# Patient Record
Sex: MALE | Race: WHITE | NOT HISPANIC OR LATINO | URBAN - METROPOLITAN AREA
[De-identification: names, ages, dates, MRNs, and addresses within clinical notes are randomized per-mention and may not be internally consistent; named-entity substitution may affect disease eponyms.]

---

## 2017-06-26 PROBLEM — Z00.00 ENCOUNTER FOR PREVENTIVE HEALTH EXAMINATION: Status: ACTIVE | Noted: 2017-06-26

## 2018-02-13 ENCOUNTER — INPATIENT (INPATIENT)
Facility: HOSPITAL | Age: 78
LOS: 6 days | Discharge: NURSING, REHAB OR LONG-TERM CARE | End: 2018-02-20
Attending: INTERNAL MEDICINE

## 2018-02-13 VITALS
DIASTOLIC BLOOD PRESSURE: 64 MMHG | HEART RATE: 90 BPM | OXYGEN SATURATION: 98 % | TEMPERATURE: 99 F | SYSTOLIC BLOOD PRESSURE: 137 MMHG | RESPIRATION RATE: 16 BRPM

## 2018-02-13 DIAGNOSIS — I49.9 CARDIAC ARRHYTHMIA, UNSPECIFIED: ICD-10-CM

## 2018-02-13 DIAGNOSIS — D64.9 ANEMIA, UNSPECIFIED: ICD-10-CM

## 2018-02-13 DIAGNOSIS — A41.9 SEPSIS, UNSPECIFIED ORGANISM: ICD-10-CM

## 2018-02-13 DIAGNOSIS — K80.20 CALCULUS OF GALLBLADDER WITHOUT CHOLECYSTITIS WITHOUT OBSTRUCTION: Chronic | ICD-10-CM

## 2018-02-13 DIAGNOSIS — F03.90 UNSPECIFIED DEMENTIA WITHOUT BEHAVIORAL DISTURBANCE: ICD-10-CM

## 2018-02-13 DIAGNOSIS — E11.9 TYPE 2 DIABETES MELLITUS WITHOUT COMPLICATIONS: ICD-10-CM

## 2018-02-13 DIAGNOSIS — Z96.649 PRESENCE OF UNSPECIFIED ARTIFICIAL HIP JOINT: Chronic | ICD-10-CM

## 2018-02-13 DIAGNOSIS — Z90.49 ACQUIRED ABSENCE OF OTHER SPECIFIED PARTS OF DIGESTIVE TRACT: Chronic | ICD-10-CM

## 2018-02-13 LAB
ALBUMIN SERPL ELPH-MCNC: 2.9 G/DL — LOW (ref 3–5.5)
ALP SERPL-CCNC: 62 U/L — SIGNIFICANT CHANGE UP (ref 30–115)
ALT FLD-CCNC: 15 U/L — SIGNIFICANT CHANGE UP (ref 0–41)
ANION GAP SERPL CALC-SCNC: 6 MMOL/L — LOW (ref 7–14)
APTT BLD: 27.4 SEC — SIGNIFICANT CHANGE UP (ref 27–39.2)
AST SERPL-CCNC: 26 U/L — SIGNIFICANT CHANGE UP (ref 0–41)
BASOPHILS # BLD AUTO: 0 K/UL — SIGNIFICANT CHANGE UP (ref 0–0.2)
BASOPHILS NFR BLD AUTO: 0 % — SIGNIFICANT CHANGE UP (ref 0–1)
BILIRUB SERPL-MCNC: 0.9 MG/DL — SIGNIFICANT CHANGE UP (ref 0.2–1.2)
BLD GP AB SCN SERPL QL: SIGNIFICANT CHANGE UP
BUN SERPL-MCNC: 35 MG/DL — HIGH (ref 10–20)
CALCIUM SERPL-MCNC: 8.6 MG/DL — SIGNIFICANT CHANGE UP (ref 8.5–10.1)
CHLORIDE SERPL-SCNC: 102 MMOL/L — SIGNIFICANT CHANGE UP (ref 98–110)
CK MB CFR SERPL CALC: 2.6 NG/ML — SIGNIFICANT CHANGE UP (ref 0.6–6.3)
CO2 SERPL-SCNC: 25 MMOL/L — SIGNIFICANT CHANGE UP (ref 17–32)
CREAT SERPL-MCNC: 1.8 MG/DL — HIGH (ref 0.7–1.5)
EOSINOPHIL # BLD AUTO: 0 K/UL — SIGNIFICANT CHANGE UP (ref 0–0.7)
EOSINOPHIL NFR BLD AUTO: 0 % — SIGNIFICANT CHANGE UP (ref 0–8)
GLUCOSE SERPL-MCNC: 417 MG/DL — CRITICAL HIGH (ref 70–110)
HCT VFR BLD CALC: 22.2 % — LOW (ref 42–52)
HGB BLD-MCNC: 5.9 G/DL — CRITICAL LOW (ref 14–18)
IMM GRANULOCYTES NFR BLD AUTO: 0.8 % — HIGH (ref 0.1–0.3)
INR BLD: 1.48 RATIO — HIGH (ref 0.65–1.3)
LACTATE SERPL-SCNC: 2 MMOL/L — SIGNIFICANT CHANGE UP (ref 0.5–2.2)
LIDOCAIN IGE QN: 24 U/L — SIGNIFICANT CHANGE UP (ref 7–60)
LYMPHOCYTES # BLD AUTO: 0.21 K/UL — LOW (ref 1.2–3.4)
LYMPHOCYTES # BLD AUTO: 3.2 % — LOW (ref 20.5–51.1)
MAGNESIUM SERPL-MCNC: 2.4 MG/DL — SIGNIFICANT CHANGE UP (ref 1.8–2.4)
MCHC RBC-ENTMCNC: 16.5 PG — LOW (ref 27–31)
MCHC RBC-ENTMCNC: 26.6 G/DL — LOW (ref 32–37)
MCV RBC AUTO: 62.2 FL — LOW (ref 80–94)
MONOCYTES # BLD AUTO: 0.27 K/UL — SIGNIFICANT CHANGE UP (ref 0.1–0.6)
MONOCYTES NFR BLD AUTO: 4.1 % — SIGNIFICANT CHANGE UP (ref 1.7–9.3)
NEUTROPHILS # BLD AUTO: 6.04 K/UL — SIGNIFICANT CHANGE UP (ref 1.4–6.5)
NEUTROPHILS NFR BLD AUTO: 91.9 % — HIGH (ref 42.2–75.2)
NRBC # BLD: 0 /100 WBCS — SIGNIFICANT CHANGE UP (ref 0–0)
PLATELET # BLD AUTO: 191 K/UL — SIGNIFICANT CHANGE UP (ref 130–400)
POTASSIUM SERPL-MCNC: 5 MMOL/L — SIGNIFICANT CHANGE UP (ref 3.5–5)
POTASSIUM SERPL-SCNC: 5 MMOL/L — SIGNIFICANT CHANGE UP (ref 3.5–5)
PROT SERPL-MCNC: 5.6 G/DL — LOW (ref 6–8)
PROTHROM AB SERPL-ACNC: 16.1 SEC — HIGH (ref 9.95–12.87)
RBC # BLD: 3.57 M/UL — LOW (ref 4.7–6.1)
RBC # FLD: 19.9 % — HIGH (ref 11.5–14.5)
SODIUM SERPL-SCNC: 133 MMOL/L — LOW (ref 135–146)
TROPONIN I SERPL-MCNC: 0.17 NG/ML — HIGH (ref 0–0.05)
TYPE + AB SCN PNL BLD: SIGNIFICANT CHANGE UP
WBC # BLD: 6.57 K/UL — SIGNIFICANT CHANGE UP (ref 4.8–10.8)
WBC # FLD AUTO: 6.57 K/UL — SIGNIFICANT CHANGE UP (ref 4.8–10.8)

## 2018-02-13 RX ORDER — CEFTRIAXONE 500 MG/1
1 INJECTION, POWDER, FOR SOLUTION INTRAMUSCULAR; INTRAVENOUS ONCE
Qty: 0 | Refills: 0 | Status: COMPLETED | OUTPATIENT
Start: 2018-02-13 | End: 2018-02-13

## 2018-02-13 RX ORDER — ACETAMINOPHEN 500 MG
650 TABLET ORAL ONCE
Qty: 0 | Refills: 0 | Status: COMPLETED | OUTPATIENT
Start: 2018-02-13 | End: 2018-02-13

## 2018-02-13 RX ORDER — AZITHROMYCIN 500 MG/1
500 TABLET, FILM COATED ORAL ONCE
Qty: 0 | Refills: 0 | Status: COMPLETED | OUTPATIENT
Start: 2018-02-13 | End: 2018-02-13

## 2018-02-13 RX ORDER — INSULIN LISPRO 100/ML
5 VIAL (ML) SUBCUTANEOUS ONCE
Qty: 0 | Refills: 0 | Status: COMPLETED | OUTPATIENT
Start: 2018-02-13 | End: 2018-02-13

## 2018-02-13 RX ADMIN — AZITHROMYCIN 255 MILLIGRAM(S): 500 TABLET, FILM COATED ORAL at 20:58

## 2018-02-13 RX ADMIN — Medication 5 UNIT(S): at 20:59

## 2018-02-13 RX ADMIN — CEFTRIAXONE 100 GRAM(S): 500 INJECTION, POWDER, FOR SOLUTION INTRAMUSCULAR; INTRAVENOUS at 21:04

## 2018-02-13 RX ADMIN — Medication 650 MILLIGRAM(S): at 20:58

## 2018-02-13 NOTE — H&P ADULT - HISTORY OF PRESENT ILLNESS
78 yo M with  history of dementia, afib on xarelto, HTN, DM II, anemia, brought in by EMS for multiple falls. Per family, patient has had rapid decline in health, fever, cough, congestion, increased agitation, last night fell from bed and this morning fell in bathroom, hit head + LOC. EMS was called and patient was combative, received sedation, likely IM Versed by paramedics prior to arrival. Per family pts Hb is usually 6 due to iron deficiency. Per family, due to his dementia he can become very combative and refuses to take his medications frequently.

## 2018-02-13 NOTE — H&P ADULT - NSHPLABSRESULTS_GEN_ALL_CORE
Labs:                         5.9<LL>  6.57    )-----------(   191      ( 13 Feb 2018 18:00 )              22.2<L>    Neutro%  91.9<H>   Lympho%  3.2<L>   Mono%    4.1     Bands    x        02-13    133<L>  |  102  |  35<H>  ----------------------------<  417<HH>  5.0   |  25  |  1.8<H>    Ca    8.6      13 Feb 2018 18:00  Mg     2.4     02-13    TPro  5.6<L>  /  Alb  2.9<L>  /  TBili  0.9  /  DBili  x   /  AST  26  /  ALT  15  /  AlkPhos  62  02-13      PT/INR - ( 13 Feb 2018 18:00 )   PT: 16.10 sec;   INR: 1.48 ratio         PTT - ( 13 Feb 2018 18:00 )  PTT:27.4 sec    CARDIAC MARKERS ( 13 Feb 2018 18:00 )  0.17 ng/mL / x     / x     / x     / 2.6 ng/mL        LIVER FUNCTIONS - ( 13 Feb 2018 18:00 )  Alb: 2.9 g/dL / Pro: 5.6 g/dL / ALK PHOS: 62 U/L / ALT: 15 U/L / AST: 26 U/L / GGT: x               Lactate, Blood: 2.0 mmol/L (02-13-18 @ 18:00)      < from: CT Chest No Cont (02.13.18 @ 18:32) >    No acute traumatic injury to the chest, abdomen or pelvis.    Small to moderate right pleural effusion and trace left pleural effusion.    Bilateralpatchy lung opacities, right greater the left.    Left apical 10 mm pulmonary nodule. On an outpatient basis PET CT is   recommended for further evaluation.    < end of copied text >    < from: CT Head No Cont (02.13.18 @ 18:26) >    No CT evidence for acute intracranial pathology.      Chronic microvascular ischemic changes    Sinus disease    < end of copied text >    < from: CT Cervical Spine No Cont (02.13.18 @ 18:27) >    Osteopenia without acute osseous abnormality.    < end of copied text >    < from: 12 Lead ECG (02.13.18 @ 16:44) >     Atrial fibrillation. rate 95  Possible Inferior infarct , age undetermined  Possible Anterior infarct , age undetermined  Abnormal ECG    < end of copied text >

## 2018-02-13 NOTE — H&P ADULT - PROBLEM SELECTOR PLAN 1
admit to ICU for monitoring  Start Unasyn continue azithromycin  check sputum culture, blood culture

## 2018-02-13 NOTE — ED PROVIDER NOTE - OBJECTIVE STATEMENT
76 yo M with  history of dementia, afib on xarelto, HTN, DM II, anemia, brought in by EMS for multiple falls. Per family, patient has had rapid decline in health, fever, cough, congestion, increased agitation, last night fell from bed and this morning fell in bathroom, hit head + LOC. EMS was called and patient was combative, received sedation, likely IM Versed by paramedics prior to arrival.     In ED patient had normal VS but was unable to provide any history, was somnolent with ?nystagmus, [facility] for head trauma with AMS on xarelto.

## 2018-02-13 NOTE — ED PROVIDER NOTE - MEDICAL DECISION MAKING DETAILS
Pt on blood thinnners, head trauma with LOC -- concern for secondary trauma in setting of fall due to medical condition. Will need trauma eval, pan scan, labs, fluids, likely admission

## 2018-02-13 NOTE — CONSULT NOTE ADULT - SUBJECTIVE AND OBJECTIVE BOX
76 yo M with recent multiple falls at home. Fell in bathroom today, hit head, LOC for 2 min. Presented to ED by EMS, received versed on route due to agitation. Vitals stable. On exam GCS 14 due to confusion. No traumatic injuries.    PMH: DM, HTN, CHF, afib on xarelto  ALL: NKDA  PSH: none  Meds: xarelto, januvia    Phys exam:  Gen: a&Ox2  Lungs: clear b/l  Abd: soft, NT, ND  Neuro: intact, GCS 14 for confusion                          5.9    6.57  )-----------( 191      ( 13 Feb 2018 18:00 )             22.2   LIVER FUNCTIONS - ( 13 Feb 2018 18:00 )  Alb: 2.9 g/dL / Pro: 5.6 g/dL / ALK PHOS: 62 U/L / ALT: 15 U/L / AST: 26 U/L / GGT: x         02-13    133<L>  |  102  |  35<H>  ----------------------------<  417<HH>  5.0   |  25  |  1.8<H>    Ca    8.6      13 Feb 2018 18:00  Mg     2.4     02-13    TPro  5.6<L>  /  Alb  2.9<L>  /  TBili  0.9  /  DBili  x   /  AST  26  /  ALT  15  /  AlkPhos  62  02-13      troponin 0.17    < from: CT Abdomen and Pelvis w/ IV Cont (02.13.18 @ 18:31) >  IMPRESSION:    No acute traumatic injury to the chest, abdomen or pelvis.    Small to moderate right pleural effusion and trace left pleural effusion.    Bilateralpatchy lung opacities, right greater the left.    Left apical 10 mm pulmonary nodule. On an outpatient basis PET CT is   recommended for further evaluation.    < end of copied text >  < from: CT Cervical Spine No Cont (02.13.18 @ 18:27) >  IMPRESSION:    Osteopenia without acute osseous abnormality.    < end of copied text >  < from: CT Head No Cont (02.13.18 @ 18:26) >  IMPRESSION:    No CT evidence for acute intracranial pathology.      Chronic microvascular ischemic changes    < end of copied text >

## 2018-02-13 NOTE — H&P ADULT - ASSESSMENT
Patient is a 77y old  Male who presents with a chief complaint of weakness and fall found to have acute on chronic anemia with new opacities on CT chest possible aspiration PNA no evidence of acute blood loss.   PAST MEDICAL & SURGICAL HISTORY:  Anemia  Diabetes  HTN (hypertension)  Arrhythmia  Dementia  S/P cholecystectomy  History of hip replacement

## 2018-02-13 NOTE — ED PROVIDER NOTE - PHYSICAL EXAMINATION
VITAL SIGNS: I have reviewed nursing notes and confirm.  CONSTITUTIONAL: Well-developed; well-nourished  SKIN: pale, dry skin, good turgor  HEAD: no visible signs of trauma  EYES: PERRL, EOM intact; initially ?horizontal nystagmus but now resolved  NECK: Supple, no step off, no apparent tenderness  CARD: irregular rhythm, normal rate  RESP: decreased BS on R base, ?rales above, unremarkable L Lung exam.  ABD: soft, NT, ND, normal BS  EXT: spontaneous movement of all extremities  NEURO: sedated but arousable, follows some very simple commands

## 2018-02-13 NOTE — H&P ADULT - NSHPPHYSICALEXAM_GEN_ALL_CORE
T(C): 37.1 (02-13-18 @ 23:22), Max: 38.7 (02-13-18 @ 18:08)  HR: 90 (02-13-18 @ 23:22) (90 - 98)  BP: 109/56 (02-13-18 @ 23:22) (109/56 - 147/70)  RR: 20 (02-13-18 @ 23:22) (16 - 20)  SpO2: 98% (02-13-18 @ 23:22) (96% - 100%)    PHYSICAL EXAM:  GENERAL: NAD, well-developed  HEAD:  Atraumatic, Normocephalic  EYES: EOMI, PERRLA, conjunctiva and sclera clear  NECK: Supple, No JVD  CHEST/LUNG: poor inspiratory effort, anterior breath sounds rhonchi noted  HEART: Regular rate and rhythm; No murmurs, rubs, or gallops  ABDOMEN: Soft, Nontender, Nondistended; Bowel sounds present. Stool brown  EXTREMITIES:  2+ Peripheral Pulses, No clubbing, cyanosis, or edema  PSYCH: AAOx3  NEUROLOGY: non-focal, arousable but obtunded  SKIN: No rashes or lesions

## 2018-02-13 NOTE — H&P ADULT - PROBLEM SELECTOR PLAN 5
pt has been having decline, initially was given sedative by EMS if upon arousal patient develops further agitation and medication is needed, would recommend haldol.

## 2018-02-13 NOTE — ED ADULT NURSE REASSESSMENT NOTE - NS ED NURSE REASSESS COMMENT FT1
Pt reassessed remain comfortable T-98.7 a fter Tylenol 650 mg po is given antibiotic is given as per order tolerated well , comfort provide pt with order for 2 unit PRBC  to be given not ready ,safety precaution on progress ,ED attending aware of pt status pt with fever , family members at bed site on going nursing observation .

## 2018-02-13 NOTE — H&P ADULT - PROBLEM SELECTOR PLAN 4
hold xarelto until Hb stable then can likely been resumed.  Pt on bystolic at home, will use low dose metoprolol for rate control

## 2018-02-13 NOTE — CONSULT NOTE ADULT - ASSESSMENT
76 yo M s/p multiple recent falls and LOC today after fall  - no traumatic injuries  - anemic  - JOEL  - elevated cardiac enzymes    Plan  Cleared from trauma   Medical mngt per primary team

## 2018-02-13 NOTE — H&P ADULT - PROBLEM SELECTOR PLAN 2
given 2 units PRBC in emergency department  follow up CBC in AM  will likely need iron supplementation

## 2018-02-13 NOTE — H&P ADULT - NSHPREVIEWOFSYSTEMS_GEN_ALL_CORE
REVIEW OF SYSTEMS:    CONSTITUTIONAL: No weakness, fevers or chills  EYES/ENT: No visual changes;  No vertigo or throat pain   NECK: No pain or stiffness  RESPIRATORY: See HPI  CARDIOVASCULAR: No chest pain or palpitations  GASTROINTESTINAL: No abdominal or epigastric pain. No nausea, vomiting, or hematemesis; No diarrhea or constipation. No melena or hematochezia.  GENITOURINARY: No dysuria, frequency or hematuria  NEUROLOGICAL: confusion acute on chronic  SKIN: No itching, rashes

## 2018-02-13 NOTE — ED PROVIDER NOTE - PROGRESS NOTE DETAILS
spoke with radiology, chest CT concerning for PNA, also nodule which will require additional imaging, PET scan -- will treat for PNA, labs still pending CBC etc. Hgb 5.9, will consent for transfusion -- per family, last week has blood stools, now resolved. rectally febrile to 101.6, tylenol ordered. trop 0.17, lactate 2.0, blood cultures ordered, will place talavera for I/O in setting of sepsis, get UA, UCx. patient will need ICU assessment Patient is much more awake, responsive, hemodynamically stable, accepted for admission to ICU by Dr. villavicencio

## 2018-02-14 LAB
ANION GAP SERPL CALC-SCNC: 4 MMOL/L — LOW (ref 7–14)
B-TYPE NATRIURETIC PEPTIDE BNP RESULT: 438 PG/ML — HIGH (ref 0–99)
B-TYPE NATRIURETIC PEPTIDE BNP RESULT: 566 PG/ML — HIGH (ref 0–99)
BUN SERPL-MCNC: 34 MG/DL — HIGH (ref 10–20)
CALCIUM SERPL-MCNC: 8.2 MG/DL — LOW (ref 8.5–10.1)
CHLORIDE SERPL-SCNC: 108 MMOL/L — SIGNIFICANT CHANGE UP (ref 98–110)
CHOLEST SERPL-MCNC: 93 MG/DL — LOW (ref 100–200)
CK MB BLD-MCNC: 2 % — SIGNIFICANT CHANGE UP (ref 0–4)
CK MB CFR SERPL CALC: 2.4 NG/ML — SIGNIFICANT CHANGE UP (ref 0.6–6.3)
CK MB CFR SERPL CALC: 4.2 NG/ML — SIGNIFICANT CHANGE UP (ref 0.6–6.3)
CK SERPL-CCNC: 111 U/L — SIGNIFICANT CHANGE UP (ref 0–225)
CK SERPL-CCNC: 138 U/L — SIGNIFICANT CHANGE UP (ref 0–225)
CO2 SERPL-SCNC: 25 MMOL/L — SIGNIFICANT CHANGE UP (ref 17–32)
CREAT SERPL-MCNC: 1.5 MG/DL — SIGNIFICANT CHANGE UP (ref 0.7–1.5)
FLU A RESULT: POSITIVE
FLU A RESULT: POSITIVE
FLUAV AG NPH QL: POSITIVE
FLUBV AG NPH QL: NEGATIVE — SIGNIFICANT CHANGE UP
GLUCOSE SERPL-MCNC: 187 MG/DL — HIGH (ref 70–110)
HCT VFR BLD CALC: 23.9 % — LOW (ref 42–52)
HCT VFR BLD CALC: 25.5 % — LOW (ref 42–52)
HDLC SERPL-MCNC: 50 MG/DL — SIGNIFICANT CHANGE UP (ref 40–60)
HGB BLD-MCNC: 6.7 G/DL — CRITICAL LOW (ref 14–18)
HGB BLD-MCNC: 7.2 G/DL — CRITICAL LOW (ref 14–18)
LACTATE SERPL-SCNC: 1.3 MMOL/L — SIGNIFICANT CHANGE UP (ref 0.5–2.2)
LACTATE SERPL-SCNC: 1.4 MMOL/L — SIGNIFICANT CHANGE UP (ref 0.5–2.2)
LIPID PNL WITH DIRECT LDL SERPL: 23 MG/DL — LOW (ref 50–100)
MAGNESIUM SERPL-MCNC: 2.5 MG/DL — HIGH (ref 1.8–2.4)
MCHC RBC-ENTMCNC: 18.2 PG — LOW (ref 27–31)
MCHC RBC-ENTMCNC: 18.3 PG — LOW (ref 27–31)
MCHC RBC-ENTMCNC: 28 G/DL — LOW (ref 32–37)
MCHC RBC-ENTMCNC: 28.2 G/DL — LOW (ref 32–37)
MCV RBC AUTO: 64.7 FL — LOW (ref 80–94)
MCV RBC AUTO: 64.9 FL — LOW (ref 80–94)
NRBC # BLD: 0 /100 WBCS — SIGNIFICANT CHANGE UP (ref 0–0)
NRBC # BLD: 0 /100 WBCS — SIGNIFICANT CHANGE UP (ref 0–0)
PHOSPHATE SERPL-MCNC: 5.9 MG/DL — HIGH (ref 2.1–4.9)
PLATELET # BLD AUTO: 182 K/UL — SIGNIFICANT CHANGE UP (ref 130–400)
PLATELET # BLD AUTO: 184 K/UL — SIGNIFICANT CHANGE UP (ref 130–400)
POTASSIUM SERPL-MCNC: 3.6 MMOL/L — SIGNIFICANT CHANGE UP (ref 3.5–5)
POTASSIUM SERPL-SCNC: 3.6 MMOL/L — SIGNIFICANT CHANGE UP (ref 3.5–5)
RBC # BLD: 3.68 M/UL — LOW (ref 4.7–6.1)
RBC # BLD: 3.94 M/UL — LOW (ref 4.7–6.1)
RBC # FLD: 21.5 % — HIGH (ref 11.5–14.5)
RBC # FLD: 22 % — HIGH (ref 11.5–14.5)
SODIUM SERPL-SCNC: 137 MMOL/L — SIGNIFICANT CHANGE UP (ref 135–146)
TOTAL CHOLESTEROL/HDL RATIO MEASUREMENT: 1.9 RATIO — LOW (ref 4–5.5)
TRIGL SERPL-MCNC: 39 MG/DL — LOW (ref 40–150)
TROPONIN I SERPL-MCNC: 0.69 NG/ML — HIGH (ref 0–0.05)
TROPONIN I SERPL-MCNC: 0.97 NG/ML — HIGH (ref 0–0.05)
WBC # BLD: 5.67 K/UL — SIGNIFICANT CHANGE UP (ref 4.8–10.8)
WBC # BLD: 6.1 K/UL — SIGNIFICANT CHANGE UP (ref 4.8–10.8)
WBC # FLD AUTO: 5.67 K/UL — SIGNIFICANT CHANGE UP (ref 4.8–10.8)
WBC # FLD AUTO: 6.1 K/UL — SIGNIFICANT CHANGE UP (ref 4.8–10.8)

## 2018-02-14 RX ORDER — INSULIN LISPRO 100/ML
6 VIAL (ML) SUBCUTANEOUS
Qty: 0 | Refills: 0 | Status: DISCONTINUED | OUTPATIENT
Start: 2018-02-14 | End: 2018-02-20

## 2018-02-14 RX ORDER — AMPICILLIN SODIUM AND SULBACTAM SODIUM 250; 125 MG/ML; MG/ML
3 INJECTION, POWDER, FOR SUSPENSION INTRAMUSCULAR; INTRAVENOUS EVERY 8 HOURS
Qty: 0 | Refills: 0 | Status: DISCONTINUED | OUTPATIENT
Start: 2018-02-14 | End: 2018-02-14

## 2018-02-14 RX ORDER — DEXTROSE 50 % IN WATER 50 %
12.5 SYRINGE (ML) INTRAVENOUS ONCE
Qty: 0 | Refills: 0 | Status: DISCONTINUED | OUTPATIENT
Start: 2018-02-14 | End: 2018-02-20

## 2018-02-14 RX ORDER — DEXTROSE 50 % IN WATER 50 %
1 SYRINGE (ML) INTRAVENOUS ONCE
Qty: 0 | Refills: 0 | Status: DISCONTINUED | OUTPATIENT
Start: 2018-02-14 | End: 2018-02-20

## 2018-02-14 RX ORDER — METOPROLOL TARTRATE 50 MG
12.5 TABLET ORAL
Qty: 0 | Refills: 0 | Status: DISCONTINUED | OUTPATIENT
Start: 2018-02-14 | End: 2018-02-14

## 2018-02-14 RX ORDER — DEXTROSE 50 % IN WATER 50 %
25 SYRINGE (ML) INTRAVENOUS
Qty: 0 | Refills: 0 | Status: DISCONTINUED | OUTPATIENT
Start: 2018-02-14 | End: 2018-02-20

## 2018-02-14 RX ORDER — ALFUZOSIN HYDROCHLORIDE 10 MG/1
0 TABLET, EXTENDED RELEASE ORAL
Qty: 30 | Refills: 0 | COMMUNITY

## 2018-02-14 RX ORDER — INSULIN GLARGINE 100 [IU]/ML
20 INJECTION, SOLUTION SUBCUTANEOUS AT BEDTIME
Qty: 0 | Refills: 0 | Status: DISCONTINUED | OUTPATIENT
Start: 2018-02-14 | End: 2018-02-20

## 2018-02-14 RX ORDER — SODIUM CHLORIDE 9 MG/ML
1000 INJECTION, SOLUTION INTRAVENOUS
Qty: 0 | Refills: 0 | Status: DISCONTINUED | OUTPATIENT
Start: 2018-02-14 | End: 2018-02-20

## 2018-02-14 RX ORDER — FERROUS SULFATE 325(65) MG
325 TABLET ORAL
Qty: 0 | Refills: 0 | Status: DISCONTINUED | OUTPATIENT
Start: 2018-02-14 | End: 2018-02-20

## 2018-02-14 RX ORDER — DONEPEZIL HYDROCHLORIDE 10 MG/1
10 TABLET, FILM COATED ORAL AT BEDTIME
Qty: 0 | Refills: 0 | Status: DISCONTINUED | OUTPATIENT
Start: 2018-02-14 | End: 2018-02-20

## 2018-02-14 RX ORDER — METOPROLOL TARTRATE 50 MG
25 TABLET ORAL
Qty: 0 | Refills: 0 | Status: DISCONTINUED | OUTPATIENT
Start: 2018-02-14 | End: 2018-02-20

## 2018-02-14 RX ORDER — DEXTROSE 50 % IN WATER 50 %
50 SYRINGE (ML) INTRAVENOUS
Qty: 0 | Refills: 0 | Status: DISCONTINUED | OUTPATIENT
Start: 2018-02-14 | End: 2018-02-20

## 2018-02-14 RX ORDER — INSULIN LISPRO 100/ML
VIAL (ML) SUBCUTANEOUS
Qty: 0 | Refills: 0 | Status: DISCONTINUED | OUTPATIENT
Start: 2018-02-14 | End: 2018-02-20

## 2018-02-14 RX ORDER — AMOXICILLIN 250 MG/5ML
0 SUSPENSION, RECONSTITUTED, ORAL (ML) ORAL
Qty: 28 | Refills: 0 | COMMUNITY

## 2018-02-14 RX ORDER — PANTOPRAZOLE SODIUM 20 MG/1
40 TABLET, DELAYED RELEASE ORAL
Qty: 0 | Refills: 0 | Status: DISCONTINUED | OUTPATIENT
Start: 2018-02-14 | End: 2018-02-15

## 2018-02-14 RX ORDER — DEXTROSE 50 % IN WATER 50 %
25 SYRINGE (ML) INTRAVENOUS ONCE
Qty: 0 | Refills: 0 | Status: DISCONTINUED | OUTPATIENT
Start: 2018-02-14 | End: 2018-02-20

## 2018-02-14 RX ORDER — SOD,AMMONIUM,POTASSIUM LACTATE
0 CREAM (GRAM) TOPICAL
Qty: 140 | Refills: 0 | COMMUNITY

## 2018-02-14 RX ORDER — AZITHROMYCIN 500 MG/1
500 TABLET, FILM COATED ORAL EVERY 24 HOURS
Qty: 0 | Refills: 0 | Status: DISCONTINUED | OUTPATIENT
Start: 2018-02-14 | End: 2018-02-20

## 2018-02-14 RX ORDER — ASPIRIN/CALCIUM CARB/MAGNESIUM 324 MG
81 TABLET ORAL DAILY
Qty: 0 | Refills: 0 | Status: DISCONTINUED | OUTPATIENT
Start: 2018-02-14 | End: 2018-02-20

## 2018-02-14 RX ORDER — AMPICILLIN SODIUM AND SULBACTAM SODIUM 250; 125 MG/ML; MG/ML
INJECTION, POWDER, FOR SUSPENSION INTRAMUSCULAR; INTRAVENOUS
Qty: 0 | Refills: 0 | Status: DISCONTINUED | OUTPATIENT
Start: 2018-02-14 | End: 2018-02-14

## 2018-02-14 RX ORDER — AMPICILLIN SODIUM AND SULBACTAM SODIUM 250; 125 MG/ML; MG/ML
3 INJECTION, POWDER, FOR SUSPENSION INTRAMUSCULAR; INTRAVENOUS ONCE
Qty: 0 | Refills: 0 | Status: COMPLETED | OUTPATIENT
Start: 2018-02-14 | End: 2018-02-14

## 2018-02-14 RX ORDER — GLUCAGON INJECTION, SOLUTION 0.5 MG/.1ML
1 INJECTION, SOLUTION SUBCUTANEOUS ONCE
Qty: 0 | Refills: 0 | Status: DISCONTINUED | OUTPATIENT
Start: 2018-02-14 | End: 2018-02-20

## 2018-02-14 RX ORDER — MEMANTINE HYDROCHLORIDE 10 MG/1
10 TABLET ORAL EVERY 12 HOURS
Qty: 0 | Refills: 0 | Status: DISCONTINUED | OUTPATIENT
Start: 2018-02-14 | End: 2018-02-20

## 2018-02-14 RX ORDER — PANTOPRAZOLE SODIUM 20 MG/1
40 TABLET, DELAYED RELEASE ORAL
Qty: 0 | Refills: 0 | Status: DISCONTINUED | OUTPATIENT
Start: 2018-02-14 | End: 2018-02-14

## 2018-02-14 RX ORDER — SODIUM CHLORIDE 9 MG/ML
1000 INJECTION INTRAMUSCULAR; INTRAVENOUS; SUBCUTANEOUS ONCE
Qty: 0 | Refills: 0 | Status: COMPLETED | OUTPATIENT
Start: 2018-02-14 | End: 2018-02-14

## 2018-02-14 RX ORDER — INSULIN HUMAN 100 [IU]/ML
1 INJECTION, SOLUTION SUBCUTANEOUS
Qty: 50 | Refills: 0 | Status: DISCONTINUED | OUTPATIENT
Start: 2018-02-14 | End: 2018-02-14

## 2018-02-14 RX ORDER — FUROSEMIDE 40 MG
20 TABLET ORAL ONCE
Qty: 0 | Refills: 0 | Status: COMPLETED | OUTPATIENT
Start: 2018-02-14 | End: 2018-02-14

## 2018-02-14 RX ADMIN — Medication 30 MILLIGRAM(S): at 04:23

## 2018-02-14 RX ADMIN — SODIUM CHLORIDE 1000 MILLILITER(S): 9 INJECTION INTRAMUSCULAR; INTRAVENOUS; SUBCUTANEOUS at 01:10

## 2018-02-14 RX ADMIN — Medication 81 MILLIGRAM(S): at 12:25

## 2018-02-14 RX ADMIN — AZITHROMYCIN 255 MILLIGRAM(S): 500 TABLET, FILM COATED ORAL at 05:52

## 2018-02-14 RX ADMIN — AMPICILLIN SODIUM AND SULBACTAM SODIUM 200 GRAM(S): 250; 125 INJECTION, POWDER, FOR SUSPENSION INTRAMUSCULAR; INTRAVENOUS at 04:23

## 2018-02-14 RX ADMIN — INSULIN GLARGINE 20 UNIT(S): 100 INJECTION, SOLUTION SUBCUTANEOUS at 22:07

## 2018-02-14 RX ADMIN — INSULIN HUMAN 1 UNIT(S)/HR: 100 INJECTION, SOLUTION SUBCUTANEOUS at 00:53

## 2018-02-14 RX ADMIN — MEMANTINE HYDROCHLORIDE 10 MILLIGRAM(S): 10 TABLET ORAL at 18:05

## 2018-02-14 RX ADMIN — Medication 25 MILLIGRAM(S): at 18:05

## 2018-02-14 RX ADMIN — Medication 30 MILLIGRAM(S): at 18:05

## 2018-02-14 RX ADMIN — Medication 20 MILLIGRAM(S): at 11:29

## 2018-02-14 RX ADMIN — Medication 325 MILLIGRAM(S): at 12:25

## 2018-02-14 RX ADMIN — DONEPEZIL HYDROCHLORIDE 10 MILLIGRAM(S): 10 TABLET, FILM COATED ORAL at 21:07

## 2018-02-14 RX ADMIN — Medication 12.5 MILLIGRAM(S): at 06:48

## 2018-02-14 RX ADMIN — PANTOPRAZOLE SODIUM 40 MILLIGRAM(S): 20 TABLET, DELAYED RELEASE ORAL at 18:04

## 2018-02-14 RX ADMIN — Medication 325 MILLIGRAM(S): at 17:38

## 2018-02-14 NOTE — CONSULT NOTE ADULT - ASSESSMENT
78 yo M with  history of dementia, afib on xarelto, HTN, DM II, anemia, brought in by EMS for multiple falls found to have Hb 5.9.    Anemia   likely multifactorial  No signs of active GI bleed   NPO   cbc q 12h   PPI q12h 76 yo M with  history of dementia, afib on xarelto, HTN, DM II, anemia, brought in by EMS for multiple falls found to have Hb 5.9, unknown baseline, unknown previous investigations because the family was not answering, no previous record in [facility]     Anemia   likely multifactorial  No signs of active GI bleed   cbc q 12h   PPI q12h   Transfuse to Hb > 7  will keep trying to reach the family 78 yo M with  history of dementia, afib on xarelto, HTN, DM II, anemia, brought in by EMS for multiple falls found to have Hb 5.9, unknown baseline, unknown previous investigations because the family was not answering, no previous record in [facility]     Anemia: likely multifactorial  No signs of active GI bleed     -cbc q 12h   -PPI q12h   -Transfuse to Hb > 7  -will keep trying to reach the family  -Elective EGD/Colonoscopy if family consents and if patient demonstrates no overt bleeding.   -If overt bleeding is observed will perform endoscopic procedures sooner

## 2018-02-14 NOTE — CONSULT NOTE ADULT - ASSESSMENT
IMPRESSION:    Influenza A  CHF? Pulmonary edema   HO dementia and anemia     PLAN:    CNS: Consider antipsychotic    HEENT: Oral care    PULMONARY:  HOB @ 45 degrees    CARDIOVASCULAR: I=O 2DEcho.  BNP.  Avoid volume overload.  ASA Beta blockers. Cards evaluation     GI: GI prophylaxis.  Feeding 1:1    RENAL:  Follow up lytes.  Correct as needed    INFECTIOUS DISEASE: Follow up cultures. Tamiflu BID.  Azithromycin.  DC Unasyn    HEMATOLOGICAL:  DVT prophylaxis.  1 Unit PRBC.  Lasix 40 mg post transfusion     ENDOCRINE:  Follow up FS.  Insulin protocol if needed.    MUSCULOSKELETAL:    Transfer to floor     Advance directives

## 2018-02-14 NOTE — ED ADULT NURSE REASSESSMENT NOTE - NS ED NURSE REASSESS COMMENT FT1
talavera order noted, no talavera placed by previous RN. confirmed with ICU MD that talavera not indicated at this time.

## 2018-02-14 NOTE — CONSULT NOTE ADULT - SUBJECTIVE AND OBJECTIVE BOX
HPI:  76 yo M with  history of dementia, afib on xarelto, HTN, DM II, anemia, brought in by EMS for multiple falls. Per family, patient has had rapid decline in health, fever, cough, congestion, increased agitation, last night fell from bed and this morning fell in bathroom, hit head + LOC. EMS was called and patient was combative, received sedation, likely IM Versed by paramedics prior to arrival. Per family pts Hb is usually 6 due to iron deficiency. Per family, due to his dementia he can become very combative and refuses to take his medications frequently. (13 Feb 2018 23:26). CT head showed no ich.      PAST MEDICAL & SURGICAL HISTORY:  Anemia  Diabetes  HTN (hypertension)  Arrhythmia  Dementia  S/P cholecystectomy  History of hip replacement      Hospital Course:    TODAY'S SUBJECTIVE & REVIEW OF SYMPTOMS:     Constitutional WNL   Cardio WNL   Resp WNL   GI WNL  Heme WNL  Endo WNL  Skin WNL  MSK WNL  Neuro WNL  Cognitive confused  Psych WNL      MEDICATIONS  (STANDING):  aspirin  chewable 81 milliGRAM(s) Oral daily  azithromycin  IVPB 500 milliGRAM(s) IV Intermittent every 24 hours  dextrose 50% Injectable 50 milliLiter(s) IV Push every 15 minutes  dextrose 50% Injectable 25 milliLiter(s) IV Push every 15 minutes  donepezil 10 milliGRAM(s) Oral at bedtime  ferrous    sulfate 325 milliGRAM(s) Oral three times a day with meals  insulin Infusion 1 Unit(s)/Hr (1 mL/Hr) IV Continuous <Continuous>  memantine 10 milliGRAM(s) Oral every 12 hours  metoprolol     tartrate 25 milliGRAM(s) Oral two times a day  oseltamivir 30 milliGRAM(s) Oral every 12 hours  pantoprazole   Suspension 40 milliGRAM(s) Oral before breakfast    MEDICATIONS  (PRN):      FAMILY HISTORY:  No pertinent family history in first degree relatives      Allergies    No Known Allergies    Intolerances        SOCIAL HISTORY:    [  ] Etoh  [  ] Smoking  [  ] Substance abuse     Home Environment:  [  ] Home Alone  [x  ] Lives with Family  [  ] Home Health Aid    Dwelling:  [  ] Apartment  [x  ] Private House  [  ] Adult Home  [  ] Skilled Nursing Facility      [  ] Short Term  [  ] Long Term  [ x ] Stairs       Elevator [  ]    FUNCTIONAL STATUS PTA: (Check all that apply)  Ambulation: [ x  ]Independent    [  ] Dependent     [  ] Non-Ambulatory  Assistive Device: [x  ] SA Cane  [  ]  Q Cane  [  ] Walker  [  ]  Wheelchair  ADL : [  ] Independent  [  ]  Dependent       Vital Signs Last 24 Hrs  T(C): 37.2 (14 Feb 2018 12:00), Max: 38.7 (13 Feb 2018 18:08)  T(F): 98.9 (14 Feb 2018 12:00), Max: 101.6 (13 Feb 2018 18:08)  HR: 108 (14 Feb 2018 12:30) (78 - 108)  BP: 136/64 (14 Feb 2018 12:30) (101/57 - 154/73)  BP(mean): 97 (14 Feb 2018 12:30) (81 - 120)  RR: 25 (14 Feb 2018 12:30) (14 - 28)  SpO2: 98% (14 Feb 2018 12:30) (95% - 100%)      PHYSICAL EXAM: Awake / confused  GENERAL: NAD, well-groomed, well-developed  HEAD:  Atraumatic, Normocephalic  EYES: EOMI, PERRLA, conjunctiva and sclera clear  NECK: Supple, No JVD, Normal thyroid  CHEST/LUNG: Clear to percussion bilaterally; No rales, rhonchi, wheezing, or rubs  HEART: Regular rate and rhythm; No murmurs, rubs, or gallops  ABDOMEN: Soft, Nontender, Nondistended; Bowel sounds present  EXTREMITIES:  2+ Peripheral Pulses, No clubbing, cyanosis, or edema    NERVOUS SYSTEM:  Cranial Nerves 2-12 intact [  ] Abnormal  [  ]  ROM: WFL all extremities [ x ]  Abnormal [  ]  Motor Strength: WFL all extremities  [x  ]  Abnormal [  ]  Sensation: intact to light touch [  ] Abnormal [  ]  Reflexes: Symmetric [  ]  Abnormal [  ]    FUNCTIONAL STATUS:  Bed Mobility: Independent [  ]  Supervision [  ]  Needs Assistance [ x ]  N/A [  ]  Transfers: Independent [  ]  Supervision [  ]  Needs Assistance [ x ]  N/A [  ]   Ambulation: Independent [  ]  Supervision [  ]  Needs Assistance [  ]  N/A [  ]  ADL: Independent [  ] Requires Assistance [  ] N/A [  ]      LABS:                        6.7    5.67  )-----------( 182      ( 14 Feb 2018 04:26 )             23.9     02-14    137  |  108  |  34<H>  ----------------------------<  187<H>  3.6   |  25  |  1.5    Ca    8.2<L>      14 Feb 2018 04:26  Phos  5.9     02-14  Mg     2.5     02-14    TPro  5.6<L>  /  Alb  2.9<L>  /  TBili  0.9  /  DBili  x   /  AST  26  /  ALT  15  /  AlkPhos  62  02-13    PT/INR - ( 13 Feb 2018 18:00 )   PT: 16.10 sec;   INR: 1.48 ratio         PTT - ( 13 Feb 2018 18:00 )  PTT:27.4 sec      RADIOLOGY & ADDITIONAL STUDIES:    Assesment:

## 2018-02-14 NOTE — SWALLOW BEDSIDE ASSESSMENT ADULT - ASR SWALLOW LINGUAL MOBILITY
gen weakness/impaired right lateral movement/impaired left lateral movement/impaired anterior elevation

## 2018-02-14 NOTE — PROGRESS NOTE ADULT - SUBJECTIVE AND OBJECTIVE BOX
Patient Summary:  Patient is a 77y old  Male who presents with a chief complaint of fall, weakness (13 Feb 2018 23:26)    HPI:  78 yo M with  history of dementia, afib on xarelto, HTN, DM II, anemia, brought in by EMS for multiple falls. Per family, patient has had rapid decline in health, fever, cough, congestion, increased agitation, last night fell from bed and this morning fell in bathroom, hit head + LOC. EMS was called and patient was combative, received sedation, likely IM Versed by paramedics prior to arrival. Per family pts Hb is usually 6 due to iron deficiency. Per family, due to his dementia he can become very combative and refuses to take his medications frequently. (13 Feb 2018 23:26)      HEALTH ISSUES - PROBLEM Dx:  Dementia: Dementia  Arrhythmia: Arrhythmia  Diabetes: Diabetes  Anemia: Anemia  Sepsis: Sepsis    OVERNIGHT/DAY EVENTS:  No acute events overnight. NO complaints at this time    CAPILLARY BLOOD GLUCOSE  123 (14 Feb 2018 14:00)  117 (14 Feb 2018 12:00)  104 (14 Feb 2018 10:00)  123 (14 Feb 2018 08:00)  160 (14 Feb 2018 06:00)  203 (14 Feb 2018 04:00)  183 (14 Feb 2018 03:00)          VITAL SIGNS:  Vital Signs Last 24 Hrs  T(C): 37.2 (14 Feb 2018 12:00), Max: 38.7 (13 Feb 2018 18:08)  T(F): 98.9 (14 Feb 2018 12:00), Max: 101.6 (13 Feb 2018 18:08)  HR: 106 (14 Feb 2018 15:00) (78 - 108)  BP: 144/72 (14 Feb 2018 15:00) (101/57 - 154/73)  BP(mean): 109 (14 Feb 2018 15:00) (81 - 120)  RR: 24 (14 Feb 2018 15:00) (14 - 28)  SpO2: 99% (14 Feb 2018 15:00) (95% - 100%)      02-13 @ 07:01  -  02-14 @ 07:00  --------------------------------------------------------  IN: 709 mL / OUT: 0 mL / NET: 709 mL    02-14 @ 07:01  -  02-14 @ 15:44  --------------------------------------------------------  IN: 294 mL / OUT: 500 mL / NET: -206 mL        PHYSICAL EXAM:  General: NAD  HEENT: NC/AT; PERRL, clear conjunctiva  Neck: supple  Respiratory: CTA b/l  Cardiovascular: +S1/S2; RRR  Abdomen: soft, NT/ND; +BS x4  Extremities:  no LE edema. Echymosis near IV site on R arm  Vascular: WWP, 2+ peripheral pulses b/l;  Skin: normal color and turgor; no rash  Neurological: A&Ox1-2, move all extremities. CN II-XII intact    LABS:                        6.7<LL>  5.67  )-----------( 182      ( 02-14 @ 04:26 )             23.9<L>                          5.9<LL>  6.57  )-----------( 191      ( 02-13 @ 18:00 )             22.2<L>      137  |  108  |  34<H>  ----------------------------<  187<H>  02-14 @ 04:26  3.6   |  25  |  1.5      133<L>  |  102  |  35<H>  ----------------------------<  417<HH>  02-13 @ 18:00  5.0   |  25  |  1.8<H>        Ca    8.2<L>   Ca    8.6   Phos  5.9   Mg     2.5   Mg     2.4     TPro  5.6<L>  /  Alb  2.9<L>  /  TBili  0.9  /  DBili  x   /  AST  26  /  ALT  15  /  AlkPhos  62      Alb: 2.9 g/dL / Pro: 5.6 g/dL / ALK PHOS: 62 U/L / ALT: 15 U/L / AST: 26 U/L / GGT: x           PT: 16.10 sec;   INR: 1.48 ratio;  PTT: 27.4 sec           CARDIAC MARKERS ( 14 Feb 2018 04:26 )  0.97 ng/mL / x     / 138 U/L / x     / 4.2 ng/mL  CARDIAC MARKERS ( 13 Feb 2018 18:00 )  0.17 ng/mL / x     / x     / x     / 2.6 ng/mL        RADIOLOGY & ADDITIONAL TESTS:    MEDICATIONS  (STANDING):  aspirin  chewable 81 milliGRAM(s) Oral daily  azithromycin  IVPB 500 milliGRAM(s) IV Intermittent every 24 hours  dextrose 50% Injectable 50 milliLiter(s) IV Push every 15 minutes  dextrose 50% Injectable 25 milliLiter(s) IV Push every 15 minutes  donepezil 10 milliGRAM(s) Oral at bedtime  ferrous    sulfate 325 milliGRAM(s) Oral three times a day with meals  insulin Infusion 1 Unit(s)/Hr (1 mL/Hr) IV Continuous <Continuous>  memantine 10 milliGRAM(s) Oral every 12 hours  metoprolol     tartrate 25 milliGRAM(s) Oral two times a day  oseltamivir 30 milliGRAM(s) Oral every 12 hours  pantoprazole   Suspension 40 milliGRAM(s) Oral two times a day before meals    MEDICATIONS  (PRN):      PAST MEDICAL & SURGICAL HISTORY:  Anemia  Diabetes  HTN (hypertension)  Arrhythmia  Dementia  S/P cholecystectomy  History of hip replacement      Allergies    No Known Allergies Patient Summary:  Patient is a 77y old  Male who presents with a chief complaint of fall, weakness (13 Feb 2018 23:26)    HPI:  78 yo M with  history of dementia, afib on xarelto, HTN, DM II, anemia, brought in by EMS for multiple falls. Per family, patient has had rapid decline in health, fever, cough, congestion, increased agitation, last night fell from bed and this morning fell in bathroom, hit head + LOC. EMS was called and patient was combative, received sedation, likely IM Versed by paramedics prior to arrival. Per family pts Hb is usually 6 due to iron deficiency. Per family, due to his dementia he can become very combative and refuses to take his medications frequently. (13 Feb 2018 23:26)      HEALTH ISSUES - PROBLEM Dx:  Dementia: Dementia  Arrhythmia: Arrhythmia  Diabetes: Diabetes  Anemia: Anemia  Sepsis: Sepsis    OVERNIGHT/DAY EVENTS:  No acute events overnight. NO complaints at this time. Pt is stable enough to be downgraded to the floor as pt does not require ICU level of care at this time    CAPILLARY BLOOD GLUCOSE  123 (14 Feb 2018 14:00)  117 (14 Feb 2018 12:00)  104 (14 Feb 2018 10:00)  123 (14 Feb 2018 08:00)  160 (14 Feb 2018 06:00)  203 (14 Feb 2018 04:00)  183 (14 Feb 2018 03:00)          VITAL SIGNS:  Vital Signs Last 24 Hrs  T(C): 37.2 (14 Feb 2018 12:00), Max: 38.7 (13 Feb 2018 18:08)  T(F): 98.9 (14 Feb 2018 12:00), Max: 101.6 (13 Feb 2018 18:08)  HR: 106 (14 Feb 2018 15:00) (78 - 108)  BP: 144/72 (14 Feb 2018 15:00) (101/57 - 154/73)  BP(mean): 109 (14 Feb 2018 15:00) (81 - 120)  RR: 24 (14 Feb 2018 15:00) (14 - 28)  SpO2: 99% (14 Feb 2018 15:00) (95% - 100%)      02-13 @ 07:01  -  02-14 @ 07:00  --------------------------------------------------------  IN: 709 mL / OUT: 0 mL / NET: 709 mL    02-14 @ 07:01  -  02-14 @ 15:44  --------------------------------------------------------  IN: 294 mL / OUT: 500 mL / NET: -206 mL        PHYSICAL EXAM:  General: NAD  HEENT: NC/AT; PERRL, clear conjunctiva  Neck: supple  Respiratory: CTA b/l  Cardiovascular: +S1/S2; RRR  Abdomen: soft, NT/ND; +BS x4  Extremities:  no LE edema. Echymosis near IV site on R arm  Vascular: WWP, 2+ peripheral pulses b/l;  Skin: normal color and turgor; no rash  Neurological: A&Ox1-2, move all extremities. CN II-XII intact    LABS:                        6.7<LL>  5.67  )-----------( 182      ( 02-14 @ 04:26 )             23.9<L>                          5.9<LL>  6.57  )-----------( 191      ( 02-13 @ 18:00 )             22.2<L>      137  |  108  |  34<H>  ----------------------------<  187<H>  02-14 @ 04:26  3.6   |  25  |  1.5      133<L>  |  102  |  35<H>  ----------------------------<  417<HH>  02-13 @ 18:00  5.0   |  25  |  1.8<H>        Ca    8.2<L>   Ca    8.6   Phos  5.9   Mg     2.5   Mg     2.4     TPro  5.6<L>  /  Alb  2.9<L>  /  TBili  0.9  /  DBili  x   /  AST  26  /  ALT  15  /  AlkPhos  62      Alb: 2.9 g/dL / Pro: 5.6 g/dL / ALK PHOS: 62 U/L / ALT: 15 U/L / AST: 26 U/L / GGT: x           PT: 16.10 sec;   INR: 1.48 ratio;  PTT: 27.4 sec           CARDIAC MARKERS ( 14 Feb 2018 04:26 )  0.97 ng/mL / x     / 138 U/L / x     / 4.2 ng/mL  CARDIAC MARKERS ( 13 Feb 2018 18:00 )  0.17 ng/mL / x     / x     / x     / 2.6 ng/mL        RADIOLOGY & ADDITIONAL TESTS:    MEDICATIONS  (STANDING):  aspirin  chewable 81 milliGRAM(s) Oral daily  azithromycin  IVPB 500 milliGRAM(s) IV Intermittent every 24 hours  dextrose 50% Injectable 50 milliLiter(s) IV Push every 15 minutes  dextrose 50% Injectable 25 milliLiter(s) IV Push every 15 minutes  donepezil 10 milliGRAM(s) Oral at bedtime  ferrous    sulfate 325 milliGRAM(s) Oral three times a day with meals  insulin Infusion 1 Unit(s)/Hr (1 mL/Hr) IV Continuous <Continuous>  memantine 10 milliGRAM(s) Oral every 12 hours  metoprolol     tartrate 25 milliGRAM(s) Oral two times a day  oseltamivir 30 milliGRAM(s) Oral every 12 hours  pantoprazole   Suspension 40 milliGRAM(s) Oral two times a day before meals    MEDICATIONS  (PRN):      PAST MEDICAL & SURGICAL HISTORY:  Anemia  Diabetes  HTN (hypertension)  Arrhythmia  Dementia  S/P cholecystectomy  History of hip replacement      Allergies    No Known Allergies

## 2018-02-14 NOTE — SWALLOW BEDSIDE ASSESSMENT ADULT - COMMENTS
+toleration w/o any overt s/s of penetration/aspiration +overt s/s of penetration/aspiration w/ thin Mild oral dysphagia w/o any overt s/s of penetration/aspiration

## 2018-02-14 NOTE — CONSULT NOTE ADULT - ASSESSMENT
77 years old male patient  with  history of dementia, afib on xarelto, HTN, DM II, anemia, brought in by EMS for multiple falls. Found to be Flu positive. severe anemia with hx of iron deficiency anemia s/p transfusion. Patient is asymptomatic currently with no evidence of acute decompensated heart failure clinically. normal echo findings. elvated BNP could be related to anemia.      1-Flu  2-aFib   3-anemia    Plan:  -tele monitor  -continue BB for rate control  -CHADSVASC = 4 , will hold xarelto for now given significant anemia and recent falls  -anemia workup, consider GI evaluation  -monitor H/H and transfuse as needed to maintain hb> 7  -monitor kidney function and maintain electrolytes within normal ranges

## 2018-02-14 NOTE — CONSULT NOTE ADULT - SUBJECTIVE AND OBJECTIVE BOX
HISTORY OF PRESENT ILLNESS:     This is a 77 years old male patient  with  history of dementia, afib on xarelto, HTN, DM II, anemia, brought in by EMS for multiple falls. As per note, patient has had rapid decline in health, fever, cough, congestion, increased agitation. Tested positive for flu. Found to have severe anemia s/p transfusion with no evidence of active bleeding. Patient seen at bedside, currently looks comfortable, denies chest pain shortness of breath or palpitations.   EKG showed afib with HR ~ 100. 2decho showed normal EF with no wall motion abnormality.     PAST MEDICAL & SURGICAL HISTORY:  Anemia  Diabetes  HTN (hypertension)  Arrhythmia  Dementia  S/P cholecystectomy  History of hip replacement    FAMILY HISTORY:  No pertinent family history in first degree relatives    Allergies  No Known Allergies      Home Medications:  AMLOD/OLMESA TAB 5-40MG:  (2018 11:32)  BYSTOLIC     TAB 5MG:  (2018 11:32)  EXELON       DIS 4.6MG/24:  (2018 11:32)  ferrous sulfate 325 mg (65 mg elemental iron) oral tablet: 1 tab(s) orally 3 times a day (2018 11:32)  JANUVIA      TAB 50MG:  (2018 11:32)  LINZESS      CAP 145MCG:  (2018 11:32)  NAMZARIC     CAP 28-10MG:  (2018 11:32)  NATEGLINIDE  TAB 120MG:  (2018 11:32)  PAZEO        KELLEE 0.7%:  (2018 11:32)  PIOGLITAZONE TAB 15MG:  (2018 11:32)  RAPAFLO      CAP 8MG:  (2018 11:32)  TOBRADEX     OIN 0.3-0.1%:  (2018 11:32)  TRADJENTA    TAB 5MG:  (2018 11:32)  XARELTO      TAB 15MG:  (2018 11:32)    MEDICATIONS  (STANDING):  aspirin  chewable 81 milliGRAM(s) Oral daily  azithromycin  IVPB 500 milliGRAM(s) IV Intermittent every 24 hours  dextrose 50% Injectable 50 milliLiter(s) IV Push every 15 minutes  dextrose 50% Injectable 25 milliLiter(s) IV Push every 15 minutes  donepezil 10 milliGRAM(s) Oral at bedtime  ferrous    sulfate 325 milliGRAM(s) Oral three times a day with meals  insulin Infusion 1 Unit(s)/Hr (1 mL/Hr) IV Continuous <Continuous>  memantine 10 milliGRAM(s) Oral every 12 hours  metoprolol     tartrate 25 milliGRAM(s) Oral two times a day  oseltamivir 30 milliGRAM(s) Oral every 12 hours  pantoprazole   Suspension 40 milliGRAM(s) Oral before breakfast      PHYSICAL EXAM:  T(C): 37.2 (18 @ 12:00), Max: 38.7 (18 @ 18:08)  HR: 104 (18 @ 14:30) (78 - 108)  BP: 149/69 (18 @ 14:30) (101/57 - 154/73)  RR: 26 (18 @ 14:30) (14 - 28)  SpO2: 95% (18 @ 14:30) (95% - 100%)  Wt(kg): --  I&O's Summary    2018 07:  -  2018 07:00  --------------------------------------------------------  IN: 709 mL / OUT: 0 mL / NET: 709 mL    2018 07:01  -  2018 15:10  --------------------------------------------------------  IN: 294 mL / OUT: 500 mL / NET: -206 mL      Daily     Daily Weight in k.1 (2018 02:19)    General Appearance: Normal	  Cardiovascular: Normal S1 S2, No JVD, No murmurs, No edema irregularly irregular.   Respiratory: Lungs clear to anterior auscultation	  Psychiatry: A & O x 3,   Gastrointestinal:  Soft, Non-tender  Extremities: Normal range of motion, No clubbing, cyanosis or edema    LABS:	 	    CBC Full  -  ( 2018 04:26 )  WBC Count : 5.67 K/uL  Hemoglobin : 6.7 g/dL  Hematocrit : 23.9 %  Platelet Count - Automated : 182 K/uL        137  |  108  |  34<H>  ----------------------------<  187<H>  3.6   |  25  |  1.5      133<L>  |  102  |  35<H>  ----------------------------<  417<HH>  5.0   |  25  |  1.8<H>    Ca    8.2<L>      2018 04:26  Ca    8.6      2018 18:00  Phos  5.9       Mg     2.5       Mg     2.4         TPro  5.6<L>  /  Alb  2.9<L>  /  TBili  0.9  /  DBili  x   /  AST  26  /  ALT  15  /  AlkPhos  62        CARDIAC MARKERS:  Troponin I, Serum: 0.97 ng/mL ( @ 04:26)  Troponin I, Serum: 0.17 ng/mL ( @ 18:00)        TELEMETRY EVENTS: afib HR ~ 100  ECG: < from: 12 Lead ECG (18 @ 21:01) >  Atrial fibrillation  Possible Anterior infarct , age undetermined    < end of copied text >    RADIOLOGY:< from: Xray Chest 1 View AP/PA (18 @ 05:45) >  Impression:      Stable bilateral lung opacities    < end of copied text >    PREVIOUS DIAGNOSTIC TESTING:    [ ] Echocardiogram:< from: Transthoracic Echocardiogram (18 @ 08:29) >  Summary:   1. Left ventricular ejection fraction, by visual estimation, is 55 to   60%.   2. Mildly increased LV wall thickness.   3. Thickening of the anterior and posterior mitral valve leaflets.   4. Moderate aortic regurgitation.   5. Mildly dilated aortic root.    < end of copied text >

## 2018-02-14 NOTE — CONSULT NOTE ADULT - ASSESSMENT
IMPRESSION: Rehab of gait dysfunction    PRECAUTIONS: [  ] Cardiac  [  ] Respiratory  [  ] Seizures [  ] Contact Isolation  [ x ] Droplet Isolation  [  ] Other    Weight Bearing Status:     RECOMMENDATION:    Out of Bed to Chair     DVT/Decubiti Prophylaxis    REHAB PLAN:     [ x  ] Bedside P/T 3-5 times a week   [   ]   Bedside O/T  2-3 times a week             [   ] No Rehab Therapy Indicated                   [   ]  Speech Therapy   Conditioning/ROM                                    ADL  Bed Mobility                                               Conditioning/ROM  Transfers                                                     Bed Mobility  Sitting /Standing Balance                         Transfers                                        Gait Training                                               Sitting/Standing Balance  Stair Training [   ]Applicable                    Home equipment Eval                                                                        Splinting  [   ] Only      GOALS:   ADL   [   ]   Independent                    Transfers  [ x  ] Independent                          Ambulation  [ x  ] Independent     [  x  ] With device                            [  x ]  CG                                                         [   ]  CG                                                                  [   ] CG                            [    ] Min A                                                   [   ] Min A                                                              [   ] Min  A          DISCHARGE PLAN:   [   ]  Good candidate for Intensive Rehabilitation/Hospital based-4A [facility]                                             Will tolerate 3hrs Intensive Rehab Daily                                       [x    ]  Short Term Rehab in Skilled Nursing Facility                        vs               [  x  ]  Home with Outpatient or VN services                                         [    ]  Possible Candidate for Intensive Hospital based Rehab

## 2018-02-14 NOTE — CONSULT NOTE ADULT - SUBJECTIVE AND OBJECTIVE BOX
Patient is a 77y old  Male who presents with a chief complaint of fall, weakness (13 Feb 2018 23:26)      HPI:  78 yo M with  history of dementia, afib on xarelto, HTN, DM II, anemia, brought in by EMS for multiple falls. Per family, patient has had rapid decline in health, fever, cough, congestion, increased agitation, last night fell from bed and this morning fell in bathroom, hit head + LOC. EMS was called and patient was combative, received sedation, likely IM Versed by paramedics prior to arrival. Per family pts Hb is usually 6 due to iron deficiency. Per family, due to his dementia he can become very combative and refuses to take his medications frequently. (13 Feb 2018 23:26)      ROS wnl      PAST MEDICAL & SURGICAL HISTORY:  Anemia  Diabetes  HTN (hypertension)  Arrhythmia  Dementia  S/P cholecystectomy  History of hip replacement      Home Medications:  AMLOD/OLMESA TAB 5-40MG:  (14 Feb 2018 11:32)  BYSTOLIC     TAB 5MG:  (14 Feb 2018 11:32)  EXELON       DIS 4.6MG/24:  (14 Feb 2018 11:32)  ferrous sulfate 325 mg (65 mg elemental iron) oral tablet: 1 tab(s) orally 3 times a day (14 Feb 2018 11:32)  JANUVIA      TAB 50MG:  (14 Feb 2018 11:32)  LINZESS      CAP 145MCG:  (14 Feb 2018 11:32)  NAMZARIC     CAP 28-10MG:  (14 Feb 2018 11:32)  NATEGLINIDE  TAB 120MG:  (14 Feb 2018 11:32)  PAZEO        KELLEE 0.7%:  (14 Feb 2018 11:32)  PIOGLITAZONE TAB 15MG:  (14 Feb 2018 11:32)  RAPAFLO      CAP 8MG:  (14 Feb 2018 11:32)  TOBRADEX     OIN 0.3-0.1%:  (14 Feb 2018 11:32)  TRADJENTA    TAB 5MG:  (14 Feb 2018 11:32)  XARELTO      TAB 15MG:  (14 Feb 2018 11:32)      MEDICATIONS  (STANDING):  aspirin  chewable 81 milliGRAM(s) Oral daily  azithromycin  IVPB 500 milliGRAM(s) IV Intermittent every 24 hours  dextrose 50% Injectable 50 milliLiter(s) IV Push every 15 minutes  dextrose 50% Injectable 25 milliLiter(s) IV Push every 15 minutes  donepezil 10 milliGRAM(s) Oral at bedtime  ferrous    sulfate 325 milliGRAM(s) Oral three times a day with meals  insulin Infusion 1 Unit(s)/Hr (1 mL/Hr) IV Continuous <Continuous>  memantine 10 milliGRAM(s) Oral every 12 hours  metoprolol     tartrate 25 milliGRAM(s) Oral two times a day  oseltamivir 30 milliGRAM(s) Oral every 12 hours  pantoprazole   Suspension 40 milliGRAM(s) Oral before breakfast    MEDICATIONS  (PRN):      Allergies    No Known Allergies    Intolerances        FAMILY HISTORY:  No pertinent family history in first degree relatives      SOCIAL    REVIEW OF SYSTEMS    Vital Signs Last 24 Hrs  T(C): 37.2 (14 Feb 2018 12:00), Max: 38.7 (13 Feb 2018 18:08)  T(F): 98.9 (14 Feb 2018 12:00), Max: 101.6 (13 Feb 2018 18:08)  HR: 104 (14 Feb 2018 13:30) (78 - 108)  BP: 123/68 (14 Feb 2018 13:30) (101/57 - 154/73)  BP(mean): 85 (14 Feb 2018 13:30) (81 - 120)  RR: 22 (14 Feb 2018 13:30) (14 - 28)  SpO2: 99% (14 Feb 2018 13:30) (95% - 100%)    GENERAL:  no distress  HEENT:  NC/AT,  anicteric  CHEST:   no increased effort, breath sounds clear  HEART:  Regular rhythm  ABDOMEN:  Soft, non-tender, non-distended, normoactive bowel sounds,  no masses ,no hepato-splenomegaly, no signs of chronic liver disease  EXTEREMITIES:  no cyanosis      CBC Full  -  ( 14 Feb 2018 04:26 )  WBC Count : 5.67 K/uL  Hemoglobin : 6.7 g/dL  Hematocrit : 23.9 %  Platelet Count - Automated : 182 K/uL  Mean Cell Volume : 64.9 fL  Mean Cell Hemoglobin : 18.2 pg  Mean Cell Hemoglobin Concentration : 28.0 g/dL  Auto Neutrophil # : x  Auto Lymphocyte # : x  Auto Monocyte # : x  Auto Eosinophil # : x  Auto Basophil # : x  Auto Neutrophil % : x  Auto Lymphocyte % : x  Auto Monocyte % : x  Auto Eosinophil % : x  Auto Basophil % : x      Hemoglobin: 6.7 g/dL (02-14-18 @ 04:26)  Hemoglobin: 5.9 g/dL (02-13-18 @ 18:00)  Bilirubin Total, Serum: 0.9 mg/dL (02-13-18 @ 18:00)  Alanine Aminotransferase (ALT/SGPT): 15 U/L (02-13-18 @ 18:00)  Aspartate Aminotransferase (AST/SGOT): 26 U/L (02-13-18 @ 18:00)  Alkaline Phosphatase, Serum: 62 U/L (02-13-18 @ 18:00)  INR: 1.48 ratio (02-13-18 @ 18:00)      PT/INR - ( 13 Feb 2018 18:00 )   PT: 16.10 sec;   INR: 1.48 ratio         PTT - ( 13 Feb 2018 18:00 )  PTT:27.4 sec    02-14    137  |  108  |  34<H>  ----------------------------<  187<H>  3.6   |  25  |  1.5    Ca    8.2<L>      14 Feb 2018 04:26  Phos  5.9     02-14  Mg     2.5     02-14    TPro  5.6<L>  /  Alb  2.9<L>  /  TBili  0.9  /  DBili  x   /  AST  26  /  ALT  15  /  AlkPhos  62  02-13        AMYLASE                  02-13 @ 18:00   --  LIPASE                   02-13 @ 18:00  24  HCG  --                    02-13 @ 18:00          RADIOLOGY Patient is a 77y old  Male who presents with a chief complaint of fall, weakness (13 Feb 2018 23:26)      HPI:  78 yo M with  history of dementia, afib on xarelto, HTN, DM II, anemia, brought in by EMS for multiple falls. Upon history taking the patient has dementia, not fully cooperative and refusing NANCY.   I tried to call the family (Gissel Baker at [phone]) but no answer     AS per ICU notes, and after they spoke to family the patient has had rapid decline in health in addition to fever, cough, congestion, increased agitation, and last night fell from bed and this morning fell in bathroom, hit head + LOC. EMS was called and patient was combative, received sedation, likely IM Versed by paramedics prior to arrival. Per family pts Hb is usually 6 due to iron deficiency. Per family, due to his dementia he can become very combative and refuses to take his medications frequently.     ROS wnl      PAST MEDICAL & SURGICAL HISTORY:  Anemia  Diabetes  HTN (hypertension)  Arrhythmia  Dementia  S/P cholecystectomy  History of hip replacement      Home Medications:  AMLOD/OLMESA TAB 5-40MG:  (14 Feb 2018 11:32)  BYSTOLIC     TAB 5MG:  (14 Feb 2018 11:32)  EXELON       DIS 4.6MG/24:  (14 Feb 2018 11:32)  ferrous sulfate 325 mg (65 mg elemental iron) oral tablet: 1 tab(s) orally 3 times a day (14 Feb 2018 11:32)  JANUVIA      TAB 50MG:  (14 Feb 2018 11:32)  LINZESS      CAP 145MCG:  (14 Feb 2018 11:32)  NAMZARIC     CAP 28-10MG:  (14 Feb 2018 11:32)  NATEGLINIDE  TAB 120MG:  (14 Feb 2018 11:32)  PAZEO        KELLEE 0.7%:  (14 Feb 2018 11:32)  PIOGLITAZONE TAB 15MG:  (14 Feb 2018 11:32)  RAPAFLO      CAP 8MG:  (14 Feb 2018 11:32)  TOBRADEX     OIN 0.3-0.1%:  (14 Feb 2018 11:32)  TRADJENTA    TAB 5MG:  (14 Feb 2018 11:32)  XARELTO      TAB 15MG:  (14 Feb 2018 11:32)      MEDICATIONS  (STANDING):  aspirin  chewable 81 milliGRAM(s) Oral daily  azithromycin  IVPB 500 milliGRAM(s) IV Intermittent every 24 hours  dextrose 50% Injectable 50 milliLiter(s) IV Push every 15 minutes  dextrose 50% Injectable 25 milliLiter(s) IV Push every 15 minutes  donepezil 10 milliGRAM(s) Oral at bedtime  ferrous    sulfate 325 milliGRAM(s) Oral three times a day with meals  insulin Infusion 1 Unit(s)/Hr (1 mL/Hr) IV Continuous <Continuous>  memantine 10 milliGRAM(s) Oral every 12 hours  metoprolol     tartrate 25 milliGRAM(s) Oral two times a day  oseltamivir 30 milliGRAM(s) Oral every 12 hours  pantoprazole   Suspension 40 milliGRAM(s) Oral before breakfast    MEDICATIONS  (PRN):      Allergies    No Known Allergies    Intolerances        FAMILY HISTORY:  No pertinent family history in first degree relatives      SOCIAL    REVIEW OF SYSTEMS    Vital Signs Last 24 Hrs  T(C): 37.2 (14 Feb 2018 12:00), Max: 38.7 (13 Feb 2018 18:08)  T(F): 98.9 (14 Feb 2018 12:00), Max: 101.6 (13 Feb 2018 18:08)  HR: 104 (14 Feb 2018 13:30) (78 - 108)  BP: 123/68 (14 Feb 2018 13:30) (101/57 - 154/73)  BP(mean): 85 (14 Feb 2018 13:30) (81 - 120)  RR: 22 (14 Feb 2018 13:30) (14 - 28)  SpO2: 99% (14 Feb 2018 13:30) (95% - 100%)    GENERAL:  no distress  HEENT:  NC/AT,  anicteric  CHEST:   no increased effort, breath sounds clear  HEART:  Regular rhythm  ABDOMEN:  Soft, non-tender, non-distended, normoactive bowel sounds,  no masses ,no hepato-splenomegaly, no signs of chronic liver disease  EXTEREMITIES:  no cyanosis      CBC Full  -  ( 14 Feb 2018 04:26 )  WBC Count : 5.67 K/uL  Hemoglobin : 6.7 g/dL  Hematocrit : 23.9 %  Platelet Count - Automated : 182 K/uL  Mean Cell Volume : 64.9 fL  Mean Cell Hemoglobin : 18.2 pg  Mean Cell Hemoglobin Concentration : 28.0 g/dL  Auto Neutrophil # : x  Auto Lymphocyte # : x  Auto Monocyte # : x  Auto Eosinophil # : x  Auto Basophil # : x  Auto Neutrophil % : x  Auto Lymphocyte % : x  Auto Monocyte % : x  Auto Eosinophil % : x  Auto Basophil % : x      Hemoglobin: 6.7 g/dL (02-14-18 @ 04:26)  Hemoglobin: 5.9 g/dL (02-13-18 @ 18:00)  Bilirubin Total, Serum: 0.9 mg/dL (02-13-18 @ 18:00)  Alanine Aminotransferase (ALT/SGPT): 15 U/L (02-13-18 @ 18:00)  Aspartate Aminotransferase (AST/SGOT): 26 U/L (02-13-18 @ 18:00)  Alkaline Phosphatase, Serum: 62 U/L (02-13-18 @ 18:00)  INR: 1.48 ratio (02-13-18 @ 18:00)      PT/INR - ( 13 Feb 2018 18:00 )   PT: 16.10 sec;   INR: 1.48 ratio         PTT - ( 13 Feb 2018 18:00 )  PTT:27.4 sec    02-14    137  |  108  |  34<H>  ----------------------------<  187<H>  3.6   |  25  |  1.5    Ca    8.2<L>      14 Feb 2018 04:26  Phos  5.9     02-14  Mg     2.5     02-14    TPro  5.6<L>  /  Alb  2.9<L>  /  TBili  0.9  /  DBili  x   /  AST  26  /  ALT  15  /  AlkPhos  62  02-13        AMYLASE                  02-13 @ 18:00   --  LIPASE                   02-13 @ 18:00  24  HCG  --                    02-13 @ 18:00          RADIOLOGY Patient is a 77y old  Male who presents with a chief complaint of fall, weakness       HPI:  76 yo M with  history of dementia, afib on xarelto, HTN, DM II, anemia, brought in by EMS for multiple falls. Upon history taking the patient has dementia, not fully cooperative and refusing NANCY.   I tried to call the family (Gissel Baker at [phone]) but no answer     AS per ICU notes, and after they spoke to family the patient has had rapid decline in health in addition to fever, cough, congestion, increased agitation, and last night fell from bed and this morning fell in bathroom, hit head + LOC. EMS was called and patient was combative, received sedation, likely IM Versed by paramedics prior to arrival. Per family pts Hb is usually 6 due to iron deficiency. Per family, due to his dementia he can become very combative and refuses to take his medications frequently.     ROS wnl      PAST MEDICAL & SURGICAL HISTORY:  Anemia  Diabetes  HTN (hypertension)  Arrhythmia  Dementia  S/P cholecystectomy  History of hip replacement      Home Medications:  AMLOD/OLMESA TAB 5-40MG:  (14 Feb 2018 11:32)  BYSTOLIC     TAB 5MG:  (14 Feb 2018 11:32)  EXELON       DIS 4.6MG/24:  (14 Feb 2018 11:32)  ferrous sulfate 325 mg (65 mg elemental iron) oral tablet: 1 tab(s) orally 3 times a day (14 Feb 2018 11:32)  JANUVIA      TAB 50MG:  (14 Feb 2018 11:32)  LINZESS      CAP 145MCG:  (14 Feb 2018 11:32)  NAMZARIC     CAP 28-10MG:  (14 Feb 2018 11:32)  NATEGLINIDE  TAB 120MG:  (14 Feb 2018 11:32)  PAZEO        KELLEE 0.7%:  (14 Feb 2018 11:32)  PIOGLITAZONE TAB 15MG:  (14 Feb 2018 11:32)  RAPAFLO      CAP 8MG:  (14 Feb 2018 11:32)  TOBRADEX     OIN 0.3-0.1%:  (14 Feb 2018 11:32)  TRADJENTA    TAB 5MG:  (14 Feb 2018 11:32)  XARELTO      TAB 15MG:  (14 Feb 2018 11:32)      MEDICATIONS  (STANDING):  aspirin  chewable 81 milliGRAM(s) Oral daily  azithromycin  IVPB 500 milliGRAM(s) IV Intermittent every 24 hours  dextrose 50% Injectable 50 milliLiter(s) IV Push every 15 minutes  dextrose 50% Injectable 25 milliLiter(s) IV Push every 15 minutes  donepezil 10 milliGRAM(s) Oral at bedtime  ferrous    sulfate 325 milliGRAM(s) Oral three times a day with meals  insulin Infusion 1 Unit(s)/Hr (1 mL/Hr) IV Continuous <Continuous>  memantine 10 milliGRAM(s) Oral every 12 hours  metoprolol     tartrate 25 milliGRAM(s) Oral two times a day  oseltamivir 30 milliGRAM(s) Oral every 12 hours  pantoprazole   Suspension 40 milliGRAM(s) Oral before breakfast    MEDICATIONS  (PRN):      Allergies    No Known Allergies        FAMILY HISTORY:  No pertinent family history in first degree relatives      Vital Signs Last 24 Hrs  T(C): 37.2 (14 Feb 2018 12:00), Max: 38.7 (13 Feb 2018 18:08)  T(F): 98.9 (14 Feb 2018 12:00), Max: 101.6 (13 Feb 2018 18:08)  HR: 104 (14 Feb 2018 13:30) (78 - 108)  BP: 123/68 (14 Feb 2018 13:30) (101/57 - 154/73)  BP(mean): 85 (14 Feb 2018 13:30) (81 - 120)  RR: 22 (14 Feb 2018 13:30) (14 - 28)  SpO2: 99% (14 Feb 2018 13:30) (95% - 100%)    GENERAL:  no distress  HEENT:  NC/AT,  anicteric  CHEST:   no increased effort, breath sounds clear  HEART:  Regular rhythm  ABDOMEN:  Soft, non-tender, non-distended, normoactive bowel sounds,  no masses ,no hepato-splenomegaly, no signs of chronic liver disease  EXTEREMITIES:  no cyanosis  RECTAL EXAM: refused      CBC Full  -  ( 14 Feb 2018 04:26 )  WBC Count : 5.67 K/uL  Hemoglobin : 6.7 g/dL  Hematocrit : 23.9 %  Platelet Count - Automated : 182 K/uL  Mean Cell Volume : 64.9 fL  Mean Cell Hemoglobin : 18.2 pg  Mean Cell Hemoglobin Concentration : 28.0 g/dL  Auto Neutrophil # : x  Auto Lymphocyte # : x  Auto Monocyte # : x  Auto Eosinophil # : x  Auto Basophil # : x  Auto Neutrophil % : x  Auto Lymphocyte % : x  Auto Monocyte % : x  Auto Eosinophil % : x  Auto Basophil % : x      Hemoglobin: 6.7 g/dL (02-14-18 @ 04:26)  Hemoglobin: 5.9 g/dL (02-13-18 @ 18:00)  Bilirubin Total, Serum: 0.9 mg/dL (02-13-18 @ 18:00)  Alanine Aminotransferase (ALT/SGPT): 15 U/L (02-13-18 @ 18:00)  Aspartate Aminotransferase (AST/SGOT): 26 U/L (02-13-18 @ 18:00)  Alkaline Phosphatase, Serum: 62 U/L (02-13-18 @ 18:00)  INR: 1.48 ratio (02-13-18 @ 18:00)      PT/INR - ( 13 Feb 2018 18:00 )   PT: 16.10 sec;   INR: 1.48 ratio         PTT - ( 13 Feb 2018 18:00 )  PTT:27.4 sec    02-14    137  |  108  |  34<H>  ----------------------------<  187<H>  3.6   |  25  |  1.5    Ca    8.2<L>      14 Feb 2018 04:26  Phos  5.9     02-14  Mg     2.5     02-14    TPro  5.6<L>  /  Alb  2.9<L>  /  TBili  0.9  /  DBili  x   /  AST  26  /  ALT  15  /  AlkPhos  62  02-13        AMYLASE                  02-13 @ 18:00   --  LIPASE                   02-13 @ 18:00  24  HCG  --                    02-13 @ 18:00          RADIOLOGY

## 2018-02-14 NOTE — PROGRESS NOTE ADULT - ASSESSMENT
CNS: Dementia - AAOx2  - c/w donepezil, memantine  - As per PT, SNF or H-OT    Pulmonary: CAP, Influenza A - sepsis resolved  - CXR: Stable b/l lung opacities  - c/w Azithromycin  - f/u cultures    CVS: Afib - rate controlled  - CHADSvASC 4 - holding AC for now considering signifcant anemia and recurrent falls  - As per cardio, c/w metoprolol for rate control.  NO evidence of overt CHF. Troponemia is likely 2/2 Demand Type 2 ischemia  - I=O  - BNP - 566  - f/u Echo: EF 55-60%. Moderate Aortic Regurg. Thick Anterior and posterior mitral valve leaflet.    GI: no active issues  - S&S cleared pt: Dysphagia 2 screen  - c/w Protonix BID    Renal: NO active issues    ID: CAP, Influenza A  - f/u cultures  - c/w Tamiflu BID  - c/w Azithromycin    Heme  - Hb baseline 6  - This admission, 5.9 -> s/p 2 units PRBC'  - As per GI, no overt signs of bleeding. CBC Q12, PPI Q12, Transfuse <7.  - c/w PO iron    Endo:  - Insulin protocol    Other:  - Full Code

## 2018-02-14 NOTE — CONSULT NOTE ADULT - SUBJECTIVE AND OBJECTIVE BOX
Patient is a 77y old  Male who presents with a chief complaint of fall, weakness (13 Feb 2018 23:26)      HPI:  78 yo M with  history of dementia, afib on xarelto, HTN, DM II, anemia, brought in by EMS for multiple falls. Per family, patient has had rapid decline in health, fever, cough, congestion, increased agitation, last night fell from bed and this morning fell in bathroom, hit head + LOC. EMS was called and patient was combative, received sedation, likely IM Versed by paramedics prior to arrival. Per family pts Hb is usually 6 due to iron deficiency. Per family, due to his dementia he can become very combative and refuses to take his medications frequently. (13 Feb 2018 23:26)      PAST MEDICAL & SURGICAL HISTORY:  Anemia  Diabetes  HTN (hypertension)  Arrhythmia  Dementia  S/P cholecystectomy  History of hip replacement      SOCIAL HX:   Smoking        no                 ETOH        no                    Other    FAMILY HISTORY:  No pertinent family history in first degree relatives      ROS:  See HPI     Allergies    No Known Allergies    Intolerances          PHYSICAL EXAM    ICU Vital Signs Last 24 Hrs  T(C): 36.7 (14 Feb 2018 04:00), Max: 38.7 (13 Feb 2018 18:08)  T(F): 98.1 (14 Feb 2018 04:00), Max: 101.6 (13 Feb 2018 18:08)  HR: 94 (14 Feb 2018 07:00) (78 - 98)  BP: 138/74 (14 Feb 2018 07:00) (101/57 - 154/73)  BP(mean): 107 (14 Feb 2018 07:00) (81 - 120)  ABP: --  ABP(mean): --  RR: 27 (14 Feb 2018 07:00) (14 - 27)  SpO2: 97% (14 Feb 2018 07:00) (95% - 100%)      General: In NAD   HEENT:  ARLINE              Lymph Nodes: No cervical LN   Lungs: Bilateral BS  Cardiovascular: Irregular  Abdomen: Soft, Positive BS  Extremities: No clubbing  Skin: Warm  Neurological: Non focal       02-13-18 @ 07:01  -  02-14-18 @ 07:00  --------------------------------------------------------  IN:    insulin Infusion: 9 mL    IV PiggyBack: 200 mL    Packed Red Blood Cells: 500 mL  Total IN: 709 mL    OUT:  Total OUT: 0 mL    Total NET: 709 mL          LABS:                          6.7    5.67  )-----------( 182      ( 14 Feb 2018 04:26 )             23.9                                               02-14    137  |  108  |  34<H>  ----------------------------<  187<H>  3.6   |  25  |  1.5    Ca    8.2<L>      14 Feb 2018 04:26  Phos  5.9     02-14  Mg     2.5     02-14    TPro  5.6<L>  /  Alb  2.9<L>  /  TBili  0.9  /  DBili  x   /  AST  26  /  ALT  15  /  AlkPhos  62  02-13      PT/INR - ( 13 Feb 2018 18:00 )   PT: 16.10 sec;   INR: 1.48 ratio         PTT - ( 13 Feb 2018 18:00 )  PTT:27.4 sec                                           CARDIAC MARKERS ( 14 Feb 2018 04:26 )  0.97 ng/mL / x     / 138 U/L / x     / 4.2 ng/mL  CARDIAC MARKERS ( 13 Feb 2018 18:00 )  0.17 ng/mL / x     / x     / x     / 2.6 ng/mL                                            LIVER FUNCTIONS - ( 13 Feb 2018 18:00 )  Alb: 2.9 g/dL / Pro: 5.6 g/dL / ALK PHOS: 62 U/L / ALT: 15 U/L / AST: 26 U/L / GGT: x                                                                                                                                       X-Rays                                                                                     ECHO    MEDICATIONS  (STANDING):  ampicillin/sulbactam  IVPB      ampicillin/sulbactam  IVPB 3 Gram(s) IV Intermittent every 8 hours  azithromycin  IVPB 500 milliGRAM(s) IV Intermittent every 24 hours  dextrose 50% Injectable 50 milliLiter(s) IV Push every 15 minutes  dextrose 50% Injectable 25 milliLiter(s) IV Push every 15 minutes  insulin Infusion 1 Unit(s)/Hr (1 mL/Hr) IV Continuous <Continuous>  metoprolol     tartrate 12.5 milliGRAM(s) Oral two times a day  oseltamivir      oseltamivir 30 milliGRAM(s) Oral daily    MEDICATIONS  (PRN):

## 2018-02-15 LAB
ANION GAP SERPL CALC-SCNC: 8 MMOL/L — SIGNIFICANT CHANGE UP (ref 7–14)
BASOPHILS # BLD AUTO: 0 K/UL — SIGNIFICANT CHANGE UP (ref 0–0.2)
BASOPHILS NFR BLD AUTO: 0 % — SIGNIFICANT CHANGE UP (ref 0–1)
BUN SERPL-MCNC: 30 MG/DL — HIGH (ref 10–20)
CALCIUM SERPL-MCNC: 8.2 MG/DL — LOW (ref 8.5–10.1)
CHLORIDE SERPL-SCNC: 103 MMOL/L — SIGNIFICANT CHANGE UP (ref 98–110)
CO2 SERPL-SCNC: 26 MMOL/L — SIGNIFICANT CHANGE UP (ref 17–32)
CREAT SERPL-MCNC: 1.5 MG/DL — SIGNIFICANT CHANGE UP (ref 0.7–1.5)
EOSINOPHIL # BLD AUTO: 0.05 K/UL — SIGNIFICANT CHANGE UP (ref 0–0.7)
EOSINOPHIL NFR BLD AUTO: 1.4 % — SIGNIFICANT CHANGE UP (ref 0–8)
GLUCOSE SERPL-MCNC: 201 MG/DL — HIGH (ref 70–110)
HCT VFR BLD CALC: 27.1 % — LOW (ref 42–52)
HGB BLD-MCNC: 7.7 G/DL — LOW (ref 14–18)
IMM GRANULOCYTES NFR BLD AUTO: 0.6 % — HIGH (ref 0.1–0.3)
LYMPHOCYTES # BLD AUTO: 0.32 K/UL — LOW (ref 1.2–3.4)
LYMPHOCYTES # BLD AUTO: 9 % — LOW (ref 20.5–51.1)
MCHC RBC-ENTMCNC: 18.4 PG — LOW (ref 27–31)
MCHC RBC-ENTMCNC: 28.4 G/DL — LOW (ref 32–37)
MCV RBC AUTO: 64.8 FL — LOW (ref 80–94)
MONOCYTES # BLD AUTO: 0.24 K/UL — SIGNIFICANT CHANGE UP (ref 0.1–0.6)
MONOCYTES NFR BLD AUTO: 6.8 % — SIGNIFICANT CHANGE UP (ref 1.7–9.3)
NEUTROPHILS # BLD AUTO: 2.92 K/UL — SIGNIFICANT CHANGE UP (ref 1.4–6.5)
NEUTROPHILS NFR BLD AUTO: 82.2 % — HIGH (ref 42.2–75.2)
NRBC # BLD: 1 /100 WBCS — HIGH (ref 0–0)
PLATELET # BLD AUTO: 174 K/UL — SIGNIFICANT CHANGE UP (ref 130–400)
POTASSIUM SERPL-MCNC: 3.3 MMOL/L — LOW (ref 3.5–5)
POTASSIUM SERPL-SCNC: 3.3 MMOL/L — LOW (ref 3.5–5)
RBC # BLD: 4.18 M/UL — LOW (ref 4.7–6.1)
RBC # FLD: 22 % — HIGH (ref 11.5–14.5)
SODIUM SERPL-SCNC: 137 MMOL/L — SIGNIFICANT CHANGE UP (ref 135–146)
WBC # BLD: 3.55 K/UL — LOW (ref 4.8–10.8)
WBC # FLD AUTO: 3.55 K/UL — LOW (ref 4.8–10.8)

## 2018-02-15 RX ORDER — POTASSIUM CHLORIDE 20 MEQ
40 PACKET (EA) ORAL ONCE
Qty: 0 | Refills: 0 | Status: COMPLETED | OUTPATIENT
Start: 2018-02-15 | End: 2018-02-15

## 2018-02-15 RX ORDER — PANTOPRAZOLE SODIUM 20 MG/1
40 TABLET, DELAYED RELEASE ORAL
Qty: 0 | Refills: 0 | Status: DISCONTINUED | OUTPATIENT
Start: 2018-02-15 | End: 2018-02-20

## 2018-02-15 RX ORDER — POTASSIUM CHLORIDE 20 MEQ
40 PACKET (EA) ORAL ONCE
Qty: 0 | Refills: 0 | Status: DISCONTINUED | OUTPATIENT
Start: 2018-02-15 | End: 2018-02-15

## 2018-02-15 RX ADMIN — Medication 30 MILLIGRAM(S): at 17:33

## 2018-02-15 RX ADMIN — MEMANTINE HYDROCHLORIDE 10 MILLIGRAM(S): 10 TABLET ORAL at 17:33

## 2018-02-15 RX ADMIN — Medication 6 UNIT(S): at 09:19

## 2018-02-15 RX ADMIN — Medication 25 MILLIGRAM(S): at 17:32

## 2018-02-15 RX ADMIN — Medication 25 MILLIGRAM(S): at 05:28

## 2018-02-15 RX ADMIN — Medication 325 MILLIGRAM(S): at 17:32

## 2018-02-15 RX ADMIN — Medication 325 MILLIGRAM(S): at 09:21

## 2018-02-15 RX ADMIN — AZITHROMYCIN 255 MILLIGRAM(S): 500 TABLET, FILM COATED ORAL at 05:26

## 2018-02-15 RX ADMIN — MEMANTINE HYDROCHLORIDE 10 MILLIGRAM(S): 10 TABLET ORAL at 05:27

## 2018-02-15 RX ADMIN — Medication 30 MILLIGRAM(S): at 05:27

## 2018-02-15 RX ADMIN — INSULIN GLARGINE 20 UNIT(S): 100 INJECTION, SOLUTION SUBCUTANEOUS at 21:37

## 2018-02-15 RX ADMIN — Medication 1: at 09:20

## 2018-02-15 NOTE — PHYSICAL THERAPY INITIAL EVALUATION ADULT - IMPAIRMENTS FOUND, PT EVAL
gait, locomotion, and balance/cognitive impairment/muscle strength/arousal, attention, and cognition

## 2018-02-15 NOTE — PHYSICAL THERAPY INITIAL EVALUATION ADULT - GENERAL OBSERVATIONS, REHAB EVAL
Patient encountered semi iniguez in bed + IV lock,+ condom catheter, NAD , initially receptive to PT , but declined midway, JUANITA fortune present serving as intereprtor

## 2018-02-15 NOTE — SWALLOW BEDSIDE ASSESSMENT ADULT - ASR SWALLOW ASPIRATION MONITOR
fever/position upright (90Y)/gurgly voice/throat clearing/cough
cough/gurgly voice/position upright (90Y)/fever

## 2018-02-15 NOTE — SWALLOW BEDSIDE ASSESSMENT ADULT - SLP GENERAL OBSERVATIONS
Pt received awake and alert on O2 nasal cannula.
Pt received in bed s/p breakfast, reportedly tolerating current po diet.

## 2018-02-15 NOTE — SWALLOW BEDSIDE ASSESSMENT ADULT - COMMENTS
+toleration Mild oral dysphagia w/o overt s/s of penetration/aspiration +overt s/s of penetration/aspiration persists

## 2018-02-15 NOTE — PROGRESS NOTE ADULT - ASSESSMENT
CNS: Dementia - AAOx1  - c/w donepezil, memantine  - As per PT, SNF or H-OT    Pulmonary: CAP, Influenza A - sepsis resolved  - CXR: Stable b/l lung opacities  - c/w Azithromycin  - f/u cultures    CVS: Afib - rate controlled  - CHADSvASC 4 - holding AC for now considering signifcant anemia and recurrent falls  - As per cardio, c/w metoprolol for rate control.  NO evidence of overt CHF. Troponemia is likely 2/2 Demand Type 2 ischemia  - I=O  - BNP - 566  - f/u Echo: EF 55-60%. Moderate Aortic Regurg. Thick Anterior and posterior mitral valve leaflet.    GI: no active issues  - S&S cleared pt: Dysphagia 2 screen  - c/w Protonix BID    Renal: NO active issues    ID: CAP, Influenza A  - f/u cultures  - c/w Tamiflu BID  - c/w Azithromycin    Heme  - Hb baseline 6  - This admission, 5.9 -> s/p 2 units PRBC'  - Hb 7.2 at last labs yesterday  - As per GI, no overt signs of bleeding. CBC Q12, PPI Q12, Transfuse <7.  - c/w PO iron    Endo:  - Insulin protocol    Other:  - Full Code CNS: Dementia - AAOx1  - c/w donepezil, memantine  - As per PT, SNF or H-OT    Pulmonary: CAP, Influenza A - sepsis resolved  - CXR: Stable b/l lung opacities  - c/w Azithromycin  - f/u cultures    CVS: Afib - rate controlled  - CHADSvASC 4 - holding AC for now considering signifcant anemia and recurrent falls  - As per cardio, c/w metoprolol for rate control.  NO evidence of overt CHF. Troponemia is likely 2/2 Demand Type 2 ischemia  - I=O  - BNP - 566  - f/u Echo: EF 55-60%. Moderate Aortic Regurg. Thick Anterior and posterior mitral valve leaflet.    GI: no active issues  - S&S cleared pt: Dysphagia 2 screen  - c/w Protonix BID    Renal: NO active issues    ID: CAP, Influenza A  - f/u cultures  - c/w Tamiflu BID  - c/w Azithromycin    Heme  - Hb baseline 6  - This admission, 5.9 -> s/p 2 units PRBC'  - Hb 7.2 at last labs yesterday  - As per GI, no overt signs of bleeding. CBC Q12, PPI Q12, Transfuse <7.  - Can follow up outpatient for EGD / colonoscopy  - c/w PO iron    Endo:  - Insulin protocol    Other:  - Full Code

## 2018-02-15 NOTE — PROGRESS NOTE ADULT - SUBJECTIVE AND OBJECTIVE BOX
Patient is a 77y old  Male who presents with a chief complaint of fall, weakness (13 Feb 2018 23:26)      MEDICATIONS  (STANDING):  aspirin  chewable 81 milliGRAM(s) Oral daily  azithromycin  IVPB 500 milliGRAM(s) IV Intermittent every 24 hours  dextrose 5%. 1000 milliLiter(s) (50 mL/Hr) IV Continuous <Continuous>  dextrose 50% Injectable 12.5 Gram(s) IV Push once  dextrose 50% Injectable 25 Gram(s) IV Push once  dextrose 50% Injectable 25 Gram(s) IV Push once  dextrose 50% Injectable 50 milliLiter(s) IV Push every 15 minutes  dextrose 50% Injectable 25 milliLiter(s) IV Push every 15 minutes  donepezil 10 milliGRAM(s) Oral at bedtime  ferrous    sulfate 325 milliGRAM(s) Oral three times a day with meals  insulin glargine Injectable (LANTUS) 20 Unit(s) SubCutaneous at bedtime  insulin lispro (HumaLOG) corrective regimen sliding scale   SubCutaneous three times a day before meals  insulin lispro Injectable (HumaLOG) 6 Unit(s) SubCutaneous three times a day before meals  memantine 10 milliGRAM(s) Oral every 12 hours  metoprolol     tartrate 25 milliGRAM(s) Oral two times a day  oseltamivir 30 milliGRAM(s) Oral every 12 hours  pantoprazole    Tablet 40 milliGRAM(s) Oral two times a day before meals    MEDICATIONS  (PRN):  dextrose Gel 1 Dose(s) Oral once PRN Blood Glucose LESS THAN 70 milliGRAM(s)/deciliter  glucagon  Injectable 1 milliGRAM(s) IntraMuscular once PRN Glucose LESS THAN 70 milligrams/deciliter      Overnight events: AAOx1; pt. is uncooperative due to dementia    T(C): 36.5, Max: 37.5 (02-14-18 @ 16:00)  HR: 95 (95 - 114)  BP: 143/77 (104/51 - 151/77)  RR: 19 (17 - 28)  SpO2: 95% (95% - 100%)  CAPILLARY BLOOD GLUCOSE  166 (15 Feb 2018 08:20)  216 (14 Feb 2018 22:00)  142 (14 Feb 2018 16:00)  123 (14 Feb 2018 14:00)  117 (14 Feb 2018 12:00)  104 (14 Feb 2018 10:00)        I&O's Summary    14 Feb 2018 07:01  -  15 Feb 2018 07:00  --------------------------------------------------------  IN: 294 mL / OUT: 1100 mL / NET: -806 mL        Labs:                        7.2    6.10  )-----------( 184      ( 14 Feb 2018 16:00 )             25.5             02-14    137  |  108  |  34<H>  ----------------------------<  187<H>  3.6   |  25  |  1.5    Ca    8.2<L>      14 Feb 2018 04:26  Phos  5.9     02-14  Mg     2.5     02-14    TPro  5.6<L>  /  Alb  2.9<L>  /  TBili  0.9  /  DBili  x   /  AST  26  /  ALT  15  /  AlkPhos  62  02-13    LIVER FUNCTIONS - ( 13 Feb 2018 18:00 )  Alb: 2.9 g/dL / Pro: 5.6 g/dL / ALK PHOS: 62 U/L / ALT: 15 U/L / AST: 26 U/L / GGT: x                 PT/INR - ( 13 Feb 2018 18:00 )   PT: 16.10 sec;   INR: 1.48 ratio         PTT - ( 13 Feb 2018 18:00 )  PTT:27.4 sec  CARDIAC MARKERS ( 14 Feb 2018 16:00 )  0.69 ng/mL / x     / 111 U/L / x     / 2.4 ng/mL  CARDIAC MARKERS ( 14 Feb 2018 04:26 )  0.97 ng/mL / x     / 138 U/L / x     / 4.2 ng/mL  CARDIAC MARKERS ( 13 Feb 2018 18:00 )  0.17 ng/mL / x     / x     / x     / 2.6 ng/mL Patient is a 77y old  Male who presents with a chief complaint of fall, weakness (13 Feb 2018 23:26)      MEDICATIONS  (STANDING):  aspirin  chewable 81 milliGRAM(s) Oral daily  azithromycin  IVPB 500 milliGRAM(s) IV Intermittent every 24 hours  dextrose 5%. 1000 milliLiter(s) (50 mL/Hr) IV Continuous <Continuous>  dextrose 50% Injectable 12.5 Gram(s) IV Push once  dextrose 50% Injectable 25 Gram(s) IV Push once  dextrose 50% Injectable 25 Gram(s) IV Push once  dextrose 50% Injectable 50 milliLiter(s) IV Push every 15 minutes  dextrose 50% Injectable 25 milliLiter(s) IV Push every 15 minutes  donepezil 10 milliGRAM(s) Oral at bedtime  ferrous    sulfate 325 milliGRAM(s) Oral three times a day with meals  insulin glargine Injectable (LANTUS) 20 Unit(s) SubCutaneous at bedtime  insulin lispro (HumaLOG) corrective regimen sliding scale   SubCutaneous three times a day before meals  insulin lispro Injectable (HumaLOG) 6 Unit(s) SubCutaneous three times a day before meals  memantine 10 milliGRAM(s) Oral every 12 hours  metoprolol     tartrate 25 milliGRAM(s) Oral two times a day  oseltamivir 30 milliGRAM(s) Oral every 12 hours  pantoprazole    Tablet 40 milliGRAM(s) Oral two times a day before meals    MEDICATIONS  (PRN):  dextrose Gel 1 Dose(s) Oral once PRN Blood Glucose LESS THAN 70 milliGRAM(s)/deciliter  glucagon  Injectable 1 milliGRAM(s) IntraMuscular once PRN Glucose LESS THAN 70 milligrams/deciliter      Overnight events: AAOx1; pt. is uncooperative due to dementia; no acute overnight events    T(C): 36.5, Max: 37.5 (02-14-18 @ 16:00)  HR: 95 (95 - 114)  BP: 143/77 (104/51 - 151/77)  RR: 19 (17 - 28)  SpO2: 95% (95% - 100%)  CAPILLARY BLOOD GLUCOSE  166 (15 Feb 2018 08:20)  216 (14 Feb 2018 22:00)  142 (14 Feb 2018 16:00)  123 (14 Feb 2018 14:00)  117 (14 Feb 2018 12:00)  104 (14 Feb 2018 10:00)        I&O's Summary    14 Feb 2018 07:01  -  15 Feb 2018 07:00  --------------------------------------------------------  IN: 294 mL / OUT: 1100 mL / NET: -806 mL        Labs:                        7.2    6.10  )-----------( 184      ( 14 Feb 2018 16:00 )             25.5             02-14    137  |  108  |  34<H>  ----------------------------<  187<H>  3.6   |  25  |  1.5    Ca    8.2<L>      14 Feb 2018 04:26  Phos  5.9     02-14  Mg     2.5     02-14    TPro  5.6<L>  /  Alb  2.9<L>  /  TBili  0.9  /  DBili  x   /  AST  26  /  ALT  15  /  AlkPhos  62  02-13    LIVER FUNCTIONS - ( 13 Feb 2018 18:00 )  Alb: 2.9 g/dL / Pro: 5.6 g/dL / ALK PHOS: 62 U/L / ALT: 15 U/L / AST: 26 U/L / GGT: x                 PT/INR - ( 13 Feb 2018 18:00 )   PT: 16.10 sec;   INR: 1.48 ratio         PTT - ( 13 Feb 2018 18:00 )  PTT:27.4 sec  CARDIAC MARKERS ( 14 Feb 2018 16:00 )  0.69 ng/mL / x     / 111 U/L / x     / 2.4 ng/mL  CARDIAC MARKERS ( 14 Feb 2018 04:26 )  0.97 ng/mL / x     / 138 U/L / x     / 4.2 ng/mL  CARDIAC MARKERS ( 13 Feb 2018 18:00 )  0.17 ng/mL / x     / x     / x     / 2.6 ng/mL

## 2018-02-15 NOTE — SWALLOW BEDSIDE ASSESSMENT ADULT - SWALLOW EVAL: DIAGNOSIS
+overt s/s of penetration/aspiration w/ thin liquids, +mild oral dysphagia for soft +toleration of puree, nectar and soft w/o any overt s/s of penetration/aspiration,.
+overt s/s of penetration/aspiration persist w/ thin liquids

## 2018-02-16 RX ORDER — POTASSIUM CHLORIDE 20 MEQ
20 PACKET (EA) ORAL ONCE
Qty: 0 | Refills: 0 | Status: COMPLETED | OUTPATIENT
Start: 2018-02-16 | End: 2018-02-16

## 2018-02-16 RX ORDER — DEXTROSE 50 % IN WATER 50 %
50 SYRINGE (ML) INTRAVENOUS
Qty: 0 | Refills: 0 | Status: COMPLETED | OUTPATIENT
Start: 2018-02-16 | End: 2018-02-16

## 2018-02-16 RX ADMIN — Medication 325 MILLIGRAM(S): at 17:40

## 2018-02-16 RX ADMIN — Medication 325 MILLIGRAM(S): at 08:37

## 2018-02-16 RX ADMIN — Medication 6 UNIT(S): at 17:39

## 2018-02-16 RX ADMIN — Medication 30 MILLIGRAM(S): at 17:40

## 2018-02-16 RX ADMIN — PANTOPRAZOLE SODIUM 40 MILLIGRAM(S): 20 TABLET, DELAYED RELEASE ORAL at 06:12

## 2018-02-16 RX ADMIN — AZITHROMYCIN 255 MILLIGRAM(S): 500 TABLET, FILM COATED ORAL at 05:34

## 2018-02-16 RX ADMIN — PANTOPRAZOLE SODIUM 40 MILLIGRAM(S): 20 TABLET, DELAYED RELEASE ORAL at 17:40

## 2018-02-16 RX ADMIN — Medication 25 MILLIGRAM(S): at 05:34

## 2018-02-16 RX ADMIN — Medication 325 MILLIGRAM(S): at 12:26

## 2018-02-16 RX ADMIN — Medication 25 MILLIGRAM(S): at 17:40

## 2018-02-16 RX ADMIN — DONEPEZIL HYDROCHLORIDE 10 MILLIGRAM(S): 10 TABLET, FILM COATED ORAL at 22:44

## 2018-02-16 RX ADMIN — MEMANTINE HYDROCHLORIDE 10 MILLIGRAM(S): 10 TABLET ORAL at 17:41

## 2018-02-16 RX ADMIN — Medication 50 MILLILITER(S): at 23:13

## 2018-02-16 RX ADMIN — Medication 30 MILLIGRAM(S): at 05:35

## 2018-02-16 RX ADMIN — Medication 6 UNIT(S): at 08:36

## 2018-02-16 RX ADMIN — Medication 2: at 12:25

## 2018-02-16 RX ADMIN — Medication 6 UNIT(S): at 12:25

## 2018-02-16 RX ADMIN — Medication 50 MILLIEQUIVALENT(S): at 15:15

## 2018-02-16 RX ADMIN — Medication 1: at 08:37

## 2018-02-16 RX ADMIN — Medication 81 MILLIGRAM(S): at 12:26

## 2018-02-16 RX ADMIN — MEMANTINE HYDROCHLORIDE 10 MILLIGRAM(S): 10 TABLET ORAL at 05:35

## 2018-02-16 NOTE — PROGRESS NOTE ADULT - SUBJECTIVE AND OBJECTIVE BOX
SUBJECTIVE:      REVIEW OF SYSTEMS:  See HPI    PHYSICAL EXAM  Vital Signs Last 24 Hrs  T(C): 37.2 (16 Feb 2018 14:29), Max: 37.2 (16 Feb 2018 14:29)  T(F): 98.9 (16 Feb 2018 14:29), Max: 98.9 (16 Feb 2018 14:29)  HR: 100 (16 Feb 2018 17:08) (94 - 103)  BP: 133/59 (16 Feb 2018 14:29) (133/59 - 175/71)  BP(mean): --  RR: 20 (16 Feb 2018 14:29) (18 - 20)  SpO2: 98% (16 Feb 2018 17:08) (95% - 100%)    General: In NAD  HEENT: ARLINE               Lymph Nodes: No Cervical LN    Lungs: [place] BS  Cardiovascular: Regular  Abdomen: Soft. + BS  Extremities: No clubbing   Skin: Warm  Neurological: Non focal      02-15-18 @ 07:01  -  02-16-18 @ 07:00  --------------------------------------------------------  IN:  Total IN: 0 mL    OUT:    Incontinent per Condom Catheter: 200 mL    Voided: 400 mL  Total OUT: 600 mL    Total NET: -600 mL          LABS:                          7.7    3.55  )-----------( 174      ( 15 Feb 2018 09:28 )             27.1                                               02-15    137  |  103  |  30<H>  ----------------------------<  201<H>  3.3<L>   |  26  |  1.5    Ca    8.2<L>      15 Feb 2018 09:28                                                                                                                                      Culture - Blood (collected 14 Feb 2018 20:00)  Source: .Blood Blood-Peripheral  Preliminary Report (16 Feb 2018 11:02):    No growth to date.    Culture - Blood (collected 14 Feb 2018 20:00)  Source: .Blood Blood-Peripheral  Preliminary Report (16 Feb 2018 11:02):    No growth to date.    Culture - Blood (collected 14 Feb 2018 11:29)  Source: .Blood None  Preliminary Report (16 Feb 2018 02:06):    No growth to date.    Culture - Blood (collected 14 Feb 2018 11:27)  Source: .Blood None  Preliminary Report (16 Feb 2018 01:02):    No growth to date.    Culture - Blood (collected 14 Feb 2018 04:26)  Source: .Blood None  Preliminary Report (15 Feb 2018 11:01):    No growth to date.                                                    MEDICATIONS  (STANDING):  aspirin  chewable 81 milliGRAM(s) Oral daily  azithromycin  IVPB 500 milliGRAM(s) IV Intermittent every 24 hours  dextrose 5%. 1000 milliLiter(s) (50 mL/Hr) IV Continuous <Continuous>  dextrose 50% Injectable 12.5 Gram(s) IV Push once  dextrose 50% Injectable 25 Gram(s) IV Push once  dextrose 50% Injectable 25 Gram(s) IV Push once  dextrose 50% Injectable 50 milliLiter(s) IV Push every 15 minutes  dextrose 50% Injectable 25 milliLiter(s) IV Push every 15 minutes  donepezil 10 milliGRAM(s) Oral at bedtime  ferrous    sulfate 325 milliGRAM(s) Oral three times a day with meals  insulin glargine Injectable (LANTUS) 20 Unit(s) SubCutaneous at bedtime  insulin lispro (HumaLOG) corrective regimen sliding scale   SubCutaneous three times a day before meals  insulin lispro Injectable (HumaLOG) 6 Unit(s) SubCutaneous three times a day before meals  memantine 10 milliGRAM(s) Oral every 12 hours  metoprolol     tartrate 25 milliGRAM(s) Oral two times a day  oseltamivir 30 milliGRAM(s) Oral every 12 hours  pantoprazole    Tablet 40 milliGRAM(s) Oral two times a day before meals    MEDICATIONS  (PRN):  dextrose Gel 1 Dose(s) Oral once PRN Blood Glucose LESS THAN 70 milliGRAM(s)/deciliter  glucagon  Injectable 1 milliGRAM(s) IntraMuscular once PRN Glucose LESS THAN 70 milligrams/deciliter

## 2018-02-16 NOTE — PROGRESS NOTE ADULT - ASSESSMENT
77M w/ PMH dementia, anemia; initial cc of weakness and fall. Flu posotive; sepsis resolved. Head CT negative; H/H stable; pt. stable. Pending d/c to SNF    CNS: Dementia: AAOx1  -donepezil, memantine  -d/c to SNF (PT/physiatry already evaluated)    Pulm: CAP; Influenza A; sepsis resolved  -CXR: stable b/l lung opacities  -azithromycin    CV: Afib: rate controlled  -witholding AC for now due to anemia/ recent hx of falls  -cw/ metoprolol for rate control  -no evidence of overt CHF as per cardio  -troponin elevation likely secondary to demand type 2 ischemia as per cardio    GI: no active issues  -c/w protonix  -speech and swallow has evaluated pt.    Renal: no active issues    ID: CAP; influenza A  -tamiflu, azithromyucin    Heme:  -Received 2 units PRBC during this admission  -H/H 7.7/27.1 today  -Can f/u outpatient EGD/colonoscopy  -po iron    Endo:  -Insulin sliding scale    Other:   -Full code 77M w/ PMH dementia, anemia; initial cc of weakness and fall. Flu positive; sepsis resolved. Head CT negative; H/H stable; pt. stable. Pending d/c to SNF    CNS: Dementia: AAOx1  -donepezil, memantine  -d/c to SNF (PT/physiatry already evaluated)    Pulm: CAP; Influenza A; sepsis resolved  -CXR: stable b/l lung opacities  -azithromycin    CV: Afib: rate controlled  -witholding AC for now due to anemia/ recent hx of falls  -cw/ metoprolol for rate control  -no evidence of overt CHF as per cardio  -troponin elevation likely secondary to demand type 2 ischemia as per cardio    GI: no active issues  -c/w protonix  -speech and swallow has evaluated pt.    Renal: no active issues    ID: CAP; influenza A  -tamiflu, azithromyucin    Heme:  -Received 2 units PRBC during this admission  -H/H 7.7/27.1 today  -Can f/u outpatient EGD/colonoscopy  -po iron    Endo:  -Insulin sliding scale    Other:   -Full code

## 2018-02-16 NOTE — PROGRESS NOTE ADULT - ASSESSMENT
IMPRESSION:    Influenza A  CHF? Pulmonary edema.Improved  HO dementia and anemia     PLAN:    CNS: Avoid depressants    HEENT: Oral care    PULMONARY:  HOB @ 45 degrees.  Repeat CXR     CARDIOVASCULAR: I=O to negative. FU 2DEcho.  Avoid volume overload.  ASA Beta blockers. Cards FU     GI: GI prophylaxis.  Feeding 1:1    RENAL:  Follow up lytes.  Correct as needed    INFECTIOUS DISEASE: Follow up cultures. Tamiflu BID.  Azithromycin.  DC Unasyn    HEMATOLOGICAL:  DVT prophylaxis.      ENDOCRINE:  Follow up FS.    MUSCULOSKELETAL:    Advance directives

## 2018-02-16 NOTE — PROGRESS NOTE ADULT - SUBJECTIVE AND OBJECTIVE BOX
Patient is a 77y old  Male who presents with a chief complaint of fall, weakness (13 Feb 2018 23:26)      MEDICATIONS  (STANDING):  aspirin  chewable 81 milliGRAM(s) Oral daily  azithromycin  IVPB 500 milliGRAM(s) IV Intermittent every 24 hours  dextrose 5%. 1000 milliLiter(s) (50 mL/Hr) IV Continuous <Continuous>  dextrose 50% Injectable 12.5 Gram(s) IV Push once  dextrose 50% Injectable 25 Gram(s) IV Push once  dextrose 50% Injectable 25 Gram(s) IV Push once  dextrose 50% Injectable 50 milliLiter(s) IV Push every 15 minutes  dextrose 50% Injectable 25 milliLiter(s) IV Push every 15 minutes  donepezil 10 milliGRAM(s) Oral at bedtime  ferrous    sulfate 325 milliGRAM(s) Oral three times a day with meals  insulin glargine Injectable (LANTUS) 20 Unit(s) SubCutaneous at bedtime  insulin lispro (HumaLOG) corrective regimen sliding scale   SubCutaneous three times a day before meals  insulin lispro Injectable (HumaLOG) 6 Unit(s) SubCutaneous three times a day before meals  memantine 10 milliGRAM(s) Oral every 12 hours  metoprolol     tartrate 25 milliGRAM(s) Oral two times a day  oseltamivir 30 milliGRAM(s) Oral every 12 hours  pantoprazole    Tablet 40 milliGRAM(s) Oral two times a day before meals    MEDICATIONS  (PRN):  dextrose Gel 1 Dose(s) Oral once PRN Blood Glucose LESS THAN 70 milliGRAM(s)/deciliter  glucagon  Injectable 1 milliGRAM(s) IntraMuscular once PRN Glucose LESS THAN 70 milligrams/deciliter      Overnight events:     T(C): 35.8, Max: 35.8 (02-16-18 @ 05:20)  HR: 94 (89 - 94)  BP: 175/71 (155/70 - 175/71)  RR: 18 (18 - 18)  SpO2: 100% (88% - 100%)  CAPILLARY BLOOD GLUCOSE  124 (15 Feb 2018 21:37)        I&O's Summary    15 Feb 2018 07:01  -  16 Feb 2018 07:00  --------------------------------------------------------  IN: 0 mL / OUT: 600 mL / NET: -600 mL        Labs:                        7.7    3.55  )-----------( 174      ( 15 Feb 2018 09:28 )             27.1             02-15    137  |  103  |  30<H>  ----------------------------<  201<H>  3.3<L>   |  26  |  1.5    Ca    8.2<L>      15 Feb 2018 09:28                CARDIAC MARKERS ( 14 Feb 2018 16:00 )  0.69 ng/mL / x     / 111 U/L / x     / 2.4 ng/mL            Culture - Blood (collected 14 Feb 2018 11:29)  Source: .Blood None  Preliminary Report (16 Feb 2018 02:06):    No growth to date.    Culture - Blood (collected 14 Feb 2018 11:27)  Source: .Blood None  Preliminary Report (16 Feb 2018 01:02):    No growth to date.    Culture - Blood (collected 14 Feb 2018 04:26)  Source: .Blood None  Preliminary Report (15 Feb 2018 11:01):    No growth to date. Patient is a 77y old  Male who presents with a chief complaint of fall, weakness (13 Feb 2018 23:26)      MEDICATIONS  (STANDING):  aspirin  chewable 81 milliGRAM(s) Oral daily  azithromycin  IVPB 500 milliGRAM(s) IV Intermittent every 24 hours  dextrose 5%. 1000 milliLiter(s) (50 mL/Hr) IV Continuous <Continuous>  dextrose 50% Injectable 12.5 Gram(s) IV Push once  dextrose 50% Injectable 25 Gram(s) IV Push once  dextrose 50% Injectable 25 Gram(s) IV Push once  dextrose 50% Injectable 50 milliLiter(s) IV Push every 15 minutes  dextrose 50% Injectable 25 milliLiter(s) IV Push every 15 minutes  donepezil 10 milliGRAM(s) Oral at bedtime  ferrous    sulfate 325 milliGRAM(s) Oral three times a day with meals  insulin glargine Injectable (LANTUS) 20 Unit(s) SubCutaneous at bedtime  insulin lispro (HumaLOG) corrective regimen sliding scale   SubCutaneous three times a day before meals  insulin lispro Injectable (HumaLOG) 6 Unit(s) SubCutaneous three times a day before meals  memantine 10 milliGRAM(s) Oral every 12 hours  metoprolol     tartrate 25 milliGRAM(s) Oral two times a day  oseltamivir 30 milliGRAM(s) Oral every 12 hours  pantoprazole    Tablet 40 milliGRAM(s) Oral two times a day before meals    MEDICATIONS  (PRN):  dextrose Gel 1 Dose(s) Oral once PRN Blood Glucose LESS THAN 70 milliGRAM(s)/deciliter  glucagon  Injectable 1 milliGRAM(s) IntraMuscular once PRN Glucose LESS THAN 70 milligrams/deciliter      Overnight events: NAD, AAOx1 (pt.'s baseline); ambulating w/ assistance out of bed to chair    T(C): 35.8, Max: 35.8 (02-16-18 @ 05:20)  HR: 94 (89 - 94)  BP: 175/71 (155/70 - 175/71)  RR: 18 (18 - 18)  SpO2: 100% (88% - 100%)  CAPILLARY BLOOD GLUCOSE  124 (15 Feb 2018 21:37)        I&O's Summary    15 Feb 2018 07:01  -  16 Feb 2018 07:00  --------------------------------------------------------  IN: 0 mL / OUT: 600 mL / NET: -600 mL        Labs:                        7.7    3.55  )-----------( 174      ( 15 Feb 2018 09:28 )             27.1             02-15    137  |  103  |  30<H>  ----------------------------<  201<H>  3.3<L>   |  26  |  1.5    Ca    8.2<L>      15 Feb 2018 09:28                CARDIAC MARKERS ( 14 Feb 2018 16:00 )  0.69 ng/mL / x     / 111 U/L / x     / 2.4 ng/mL            Culture - Blood (collected 14 Feb 2018 11:29)  Source: .Blood None  Preliminary Report (16 Feb 2018 02:06):    No growth to date.    Culture - Blood (collected 14 Feb 2018 11:27)  Source: .Blood None  Preliminary Report (16 Feb 2018 01:02):    No growth to date.    Culture - Blood (collected 14 Feb 2018 04:26)  Source: .Blood None  Preliminary Report (15 Feb 2018 11:01):    No growth to date.

## 2018-02-17 ENCOUNTER — TRANSCRIPTION ENCOUNTER (OUTPATIENT)
Age: 78
End: 2018-02-17

## 2018-02-17 LAB
ANION GAP SERPL CALC-SCNC: 3 MMOL/L — LOW (ref 7–14)
BASOPHILS # BLD AUTO: 0.01 K/UL — SIGNIFICANT CHANGE UP (ref 0–0.2)
BASOPHILS NFR BLD AUTO: 0.3 % — SIGNIFICANT CHANGE UP (ref 0–1)
BUN SERPL-MCNC: 25 MG/DL — HIGH (ref 10–20)
CALCIUM SERPL-MCNC: 7.5 MG/DL — LOW (ref 8.5–10.1)
CHLORIDE SERPL-SCNC: 105 MMOL/L — SIGNIFICANT CHANGE UP (ref 98–110)
CO2 SERPL-SCNC: 26 MMOL/L — SIGNIFICANT CHANGE UP (ref 17–32)
CREAT SERPL-MCNC: 1.3 MG/DL — SIGNIFICANT CHANGE UP (ref 0.7–1.5)
EOSINOPHIL # BLD AUTO: 0.04 K/UL — SIGNIFICANT CHANGE UP (ref 0–0.7)
EOSINOPHIL NFR BLD AUTO: 1.2 % — SIGNIFICANT CHANGE UP (ref 0–8)
GLUCOSE SERPL-MCNC: 108 MG/DL — SIGNIFICANT CHANGE UP (ref 70–110)
HCT VFR BLD CALC: 25.9 % — LOW (ref 42–52)
HGB BLD-MCNC: 7.2 G/DL — CRITICAL LOW (ref 14–18)
IMM GRANULOCYTES NFR BLD AUTO: 0.6 % — HIGH (ref 0.1–0.3)
LYMPHOCYTES # BLD AUTO: 0.42 K/UL — LOW (ref 1.2–3.4)
LYMPHOCYTES # BLD AUTO: 12.7 % — LOW (ref 20.5–51.1)
MCHC RBC-ENTMCNC: 18.4 PG — LOW (ref 27–31)
MCHC RBC-ENTMCNC: 27.8 G/DL — LOW (ref 32–37)
MCV RBC AUTO: 66.1 FL — LOW (ref 80–94)
MONOCYTES # BLD AUTO: 0.25 K/UL — SIGNIFICANT CHANGE UP (ref 0.1–0.6)
MONOCYTES NFR BLD AUTO: 7.5 % — SIGNIFICANT CHANGE UP (ref 1.7–9.3)
NEUTROPHILS # BLD AUTO: 2.58 K/UL — SIGNIFICANT CHANGE UP (ref 1.4–6.5)
NEUTROPHILS NFR BLD AUTO: 77.7 % — HIGH (ref 42.2–75.2)
NRBC # BLD: 0 /100 WBCS — SIGNIFICANT CHANGE UP (ref 0–0)
PLATELET # BLD AUTO: 149 K/UL — SIGNIFICANT CHANGE UP (ref 130–400)
POTASSIUM SERPL-MCNC: 3.3 MMOL/L — LOW (ref 3.5–5)
POTASSIUM SERPL-SCNC: 3.3 MMOL/L — LOW (ref 3.5–5)
RBC # BLD: 3.92 M/UL — LOW (ref 4.7–6.1)
RBC # FLD: 23 % — HIGH (ref 11.5–14.5)
SODIUM SERPL-SCNC: 134 MMOL/L — LOW (ref 135–146)
WBC # BLD: 3.32 K/UL — LOW (ref 4.8–10.8)
WBC # FLD AUTO: 3.32 K/UL — LOW (ref 4.8–10.8)

## 2018-02-17 RX ORDER — POTASSIUM CHLORIDE 20 MEQ
40 PACKET (EA) ORAL ONCE
Qty: 0 | Refills: 0 | Status: COMPLETED | OUTPATIENT
Start: 2018-02-17 | End: 2018-02-17

## 2018-02-17 RX ORDER — MEMANTINE HYDROCHLORIDE AND DONEPEZIL HYDROCHLORIDE 21; 10 MG/1; MG/1
0 CAPSULE ORAL
Qty: 30 | Refills: 0 | COMMUNITY

## 2018-02-17 RX ORDER — LINAGLIPTIN 5 MG/1
0 TABLET, FILM COATED ORAL
Qty: 30 | Refills: 0 | COMMUNITY

## 2018-02-17 RX ORDER — DONEPEZIL HYDROCHLORIDE 10 MG/1
1 TABLET, FILM COATED ORAL
Qty: 0 | Refills: 0 | COMMUNITY
Start: 2018-02-17

## 2018-02-17 RX ORDER — FERROUS SULFATE 325(65) MG
1 TABLET ORAL
Qty: 0 | Refills: 0 | COMMUNITY

## 2018-02-17 RX ORDER — POTASSIUM CHLORIDE 20 MEQ
40 PACKET (EA) ORAL ONCE
Qty: 0 | Refills: 0 | Status: DISCONTINUED | OUTPATIENT
Start: 2018-02-17 | End: 2018-02-17

## 2018-02-17 RX ORDER — LINACLOTIDE 145 UG/1
0 CAPSULE, GELATIN COATED ORAL
Qty: 30 | Refills: 0 | COMMUNITY

## 2018-02-17 RX ORDER — OLOPATADINE HYDROCHLORIDE 1 MG/ML
0 SOLUTION/ DROPS OPHTHALMIC
Qty: 2.5 | Refills: 0 | COMMUNITY

## 2018-02-17 RX ORDER — FERROUS SULFATE 325(65) MG
1 TABLET ORAL
Qty: 0 | Refills: 0 | COMMUNITY
Start: 2018-02-17

## 2018-02-17 RX ORDER — TOBRAMYCIN AND DEXAMETHASONE 1; 3 MG/ML; MG/ML
0 SUSPENSION/ DROPS OPHTHALMIC
Qty: 3.5 | Refills: 0 | COMMUNITY

## 2018-02-17 RX ORDER — ASPIRIN/CALCIUM CARB/MAGNESIUM 324 MG
1 TABLET ORAL
Qty: 0 | Refills: 0 | COMMUNITY
Start: 2018-02-17

## 2018-02-17 RX ADMIN — PANTOPRAZOLE SODIUM 40 MILLIGRAM(S): 20 TABLET, DELAYED RELEASE ORAL at 11:18

## 2018-02-17 RX ADMIN — INSULIN GLARGINE 20 UNIT(S): 100 INJECTION, SOLUTION SUBCUTANEOUS at 00:26

## 2018-02-17 RX ADMIN — Medication 325 MILLIGRAM(S): at 11:18

## 2018-02-17 NOTE — DISCHARGE NOTE ADULT - MEDICATION SUMMARY - MEDICATIONS TO CHANGE
I will SWITCH the dose or number of times a day I take the medications listed below when I get home from the hospital:    ALFUZOSIN    TAB 10MG ER    AMLOD/OLMESA TAB 5-40MG    AMMONIUM LAC CRE 12%    AMOXICILLIN  CAP 500MG    TOBRADEX     OIN 0.3-0.1%    NAMZARIC     CAP 28-10MG    LINZESS      CAP 145MCG    TRADJENTA    TAB 5MG    NATEGLINIDE  TAB 120MG    BYSTOLIC     TAB 5MG    PAZEO        KELLEE 0.7%    PIOGLITAZONE TAB 15MG    XARELTO      TAB 15MG    EXELON       DIS 4.6MG/24    RAPAFLO      CAP 8MG    JANUVIA      TAB 50MG    aspirin 81 mg oral tablet, chewable  -- 1 tab(s) by mouth once a day    donepezil 10 mg oral tablet  -- 1 tab(s) by mouth once a day (at bedtime)    ferrous sulfate 325 mg (65 mg elemental iron) oral tablet  -- 1 tab(s) by mouth 3 times a day    OLANZapine 2.5 mg oral tablet  -- 1 tab(s) by mouth 2 times a day    azithromycin 250 mg oral tablet  -- 1 tab(s) by mouth once a day I will SWITCH the dose or number of times a day I take the medications listed below when I get home from the hospital:  None I will SWITCH the dose or number of times a day I take the medications listed below when I get home from the hospital:    ALFUZOSIN    TAB 10MG ER    AMLOD/OLMESA TAB 5-40MG    NAMZARIC     CAP 28-10MG    NATEGLINIDE  TAB 120MG    BYSTOLIC     TAB 5MG    PIOGLITAZONE TAB 15MG    EXELON       DIS 4.6MG/24    RAPAFLO      CAP 8MG    JANUVIA      TAB 50MG    aspirin 81 mg oral tablet, chewable  -- 1 tab(s) by mouth once a day    OLANZapine 2.5 mg oral tablet  -- 1 tab(s) by mouth 2 times a day    azithromycin 250 mg oral tablet  -- 1 tab(s) by mouth once a day

## 2018-02-17 NOTE — DISCHARGE NOTE ADULT - MEDICATION SUMMARY - MEDICATIONS TO TAKE
I will START or STAY ON the medications listed below when I get home from the hospital:    aspirin 81 mg oral tablet, chewable  -- 1 tab(s) by mouth once a day  -- Indication: For History of hip replacement    RAPAFLO      CAP 8MG  -- Indication: For Arrhythmia    XARELTO      TAB 15MG  -- Indication: For HTN (hypertension)    JANUVIA      TAB 50MG  -- Indication: For Diabetes    NATEGLINIDE  TAB 120MG  -- Indication: For Diabetes    PIOGLITAZONE TAB 15MG  -- Indication: For Diabetes    AMLOD/OLMESA TAB 5-40MG  -- Indication: For HTN (hypertension)    OLANZapine 2.5 mg oral tablet  -- 1 tab(s) by mouth 2 times a day  -- Indication: For Dementia    BYSTOLIC     TAB 5MG  -- Indication: For Arrhythmia    EXELON       DIS 4.6MG/24  -- Indication: For Dementia    donepezil 10 mg oral tablet  -- 1 tab(s) by mouth once a day (at bedtime)  -- Indication: For Dementia    ferrous sulfate 325 mg (65 mg elemental iron) oral tablet  -- 1 tab(s) by mouth 3 times a day  -- Indication: For Anemia    azithromycin 250 mg oral tablet  -- 1 tab(s) by mouth once a day  -- Indication: For SEPSIS;PNEUMONIA;ANEMIA I will START or STAY ON the medications listed below when I get home from the hospital:    aspirin 81 mg oral tablet, chewable  -- 1 tab(s) by mouth once a day  -- Indication: For History of hip replacement    RAPAFLO      CAP 8MG  -- Indication: For Arrhythmia    JANUVIA      TAB 50MG  -- Indication: For Diabetes    NATEGLINIDE  TAB 120MG  -- Indication: For Diabetes    PIOGLITAZONE TAB 15MG  -- Indication: For Diabetes    AMLOD/OLMESA TAB 5-40MG  -- Indication: For HTN (hypertension)    OLANZapine 2.5 mg oral tablet  -- 1 tab(s) by mouth 2 times a day  -- Indication: For Dementia    BYSTOLIC     TAB 5MG  -- Indication: For Arrhythmia    EXELON       DIS 4.6MG/24  -- Indication: For Dementia    donepezil 10 mg oral tablet  -- 1 tab(s) by mouth once a day (at bedtime)  -- Indication: For Dementia    ferrous sulfate 325 mg (65 mg elemental iron) oral tablet  -- 1 tab(s) by mouth 3 times a day  -- Indication: For Anemia    azithromycin 250 mg oral tablet  -- 1 tab(s) by mouth once a day  -- Indication: For SEPSIS;PNEUMONIA;ANEMIA I will START or STAY ON the medications listed below when I get home from the hospital:    aspirin 81 mg oral tablet, chewable  -- 1 tab(s) by mouth once a day  -- Indication: For Heart Health    RAPAFLO      CAP 8MG  -- Indication: For BPH    alfuzosin 10 mg oral tablet, extended release  -- 1 tab(s) by mouth once a day (at bedtime)  -- Indication: For BPH    JANUVIA      TAB 50MG  -- Indication: For Diabetes    NATEGLINIDE  TAB 120MG  -- Indication: For Diabetes    PIOGLITAZONE TAB 15MG  -- Indication: For Diabetes    AMLOD/OLMESA TAB 5-40MG  -- Indication: For HTN (hypertension)    OLANZapine 2.5 mg oral tablet  -- 1 tab(s) by mouth 2 times a day  -- Indication: For Dementia    BYSTOLIC     TAB 5MG  -- Indication: For Arrhythmia    EXELON       DIS 4.6MG/24  -- Indication: For Dementia    memantine-donepezil 28 mg-10 mg oral capsule, extended release  -- 1 cap(s) by mouth once a day  -- Indication: For Dementia    azithromycin 250 mg oral tablet  -- 1 tab(s) by mouth once a day  -- Indication: For SEPSIS;PNEUMONIA;ANEMIA I will START or STAY ON the medications listed below when I get home from the hospital:    aspirin 81 mg oral tablet, chewable  -- 1 tab(s) by mouth once a day  -- Indication: For Heart Health    alfuzosin 10 mg oral tablet, extended release  -- 1 tab(s) by mouth once a day (at bedtime)  -- Indication: For BPH    RAPAFLO      CAP 8MG  -- 1 cap(s) by mouth once a day (before a meal)  -- Indication: For BPH    JANUVIA      TAB 50MG  -- 50 milligram(s) by mouth once a day  -- Indication: For Diabetes    NATEGLINIDE  TAB 120MG  -- 1 tab(s) by mouth 3 times a day (before meals)  -- Indication: For Diabetes    PIOGLITAZONE TAB 15MG  -- 1 tab(s) by mouth once a day  -- Indication: For Diabetes    AMLOD/OLMESA TAB 5-40MG  -- 1  by mouth once a day  -- Indication: For HTN (hypertension)    OLANZapine 2.5 mg oral tablet  -- 1 tab(s) by mouth 2 times a day  -- Indication: For Dementia    BYSTOLIC     TAB 5MG  -- 1 tab(s) by mouth once a day (at bedtime)  -- Indication: For Arrhythmia    Exelon 4.5 mg oral capsule  -- 1 cap(s) by transdermal patch 2 times a day  -- Indication: For Dementia    memantine-donepezil 28 mg-10 mg oral capsule, extended release  -- 1 cap(s) by mouth once a day  -- Indication: For Dementia    azithromycin 250 mg oral tablet  -- 1 tab(s) by mouth once a day  -- Indication: For SEPSIS;PNEUMONIA;ANEMIA

## 2018-02-17 NOTE — PROGRESS NOTE ADULT - ASSESSMENT
77M w/ PMH dementia, anemia; initial cc of weakness and fall. Flu positive; sepsis resolved. Head CT negative; H/H stable; pt. stable. Pending d/c to SNF.    CNS: Dementia: AAOx1  -donepezil, memantine  -d/c to SNF (PT/physiatry already evaluated)    Pulm: CAP; Influenza A; sepsis resolved  -CXR: stable b/l lung opacities  -azithromycin    CV: Afib: rate controlled  -witholding AC for now due to anemia/ recent hx of falls  -cw/ metoprolol for rate control  -no evidence of overt CHF as per cardio  -troponin elevation likely secondary to demand type 2 ischemia as per cardio    GI: no active issues  -c/w protonix  -speech and swallow has evaluated pt.    Renal: no active issues    ID: CAP; influenza A  -tamiflu, azithromyucin    Heme:  -Received 2 units PRBC during this admission  -H/H 7.7/27.1 today  -Can f/u outpatient EGD/colonoscopy  -po iron    Endo:  -Insulin sliding scale    Other:   -Full code    #Disposal;being discharged to SNF today

## 2018-02-17 NOTE — DISCHARGE NOTE ADULT - CARE PLAN
Principal Discharge DX:	Pneumonia due to infectious organism, unspecified laterality, unspecified part of lung  Goal:	To remain infection free  Assessment and plan of treatment:	Take all medications as prescribed  Secondary Diagnosis:	Delirium Principal Discharge DX:	Pneumonia due to infectious organism, unspecified laterality, unspecified part of lung  Goal:	To remain infection free  Assessment and plan of treatment:	Take all medications as prescribed  Secondary Diagnosis:	Anemia  Goal:	Unclear etiology at this time.  Assessment and plan of treatment:	s/p 3u PRBC total   Please monitor closely for GI bleeding and check Iron studies. Please repeat stool guaiac studies. If + will need GI evaluation for colonoscopy.  Xarelto is currently on hold. May resume if no evidence of GI bleeding, guaiac negative, and Hb is stable.  Secondary Diagnosis:	Dementia with behavioral disturbance, unspecified dementia type  Goal:	Improvement in behavioral symptoms  Assessment and plan of treatment:	Please continue home dementia medications and Olanzepine

## 2018-02-17 NOTE — PROGRESS NOTE ADULT - SUBJECTIVE AND OBJECTIVE BOX
T(F): , Max: 98.9 (02-16-18 @ 14:29)  HR: 93 (02-17-18 @ 12:38) (93 - 108)  BP: 133/65 (02-17-18 @ 12:38)  RR: 18 (02-17-18 @ 12:38)  SpO2: 98% (02-16-18 @ 17:08)  IN: 0 mL / OUT: 100 mL / NET: -100 mL      General: No apparent distress  Cardiovascular: S1, S2  Gastrointestinal: Soft, Non-tender, Non-distended  Respiratory: Good air entry bilaterally  Musculoskeletal: Moves all extremities  Lymphatic: No edema  Neurologic: No gross motor deficit, confused and slightly combative  Dermatologic: Skin dry                          7.2    3.32  )-----------( 149      ( 17 Feb 2018 05:34 )             25.9     02-17    134<L>  |  105  |  25<H>  ----------------------------<  108  3.3<L>   |  26  |  1.3    Ca    7.5<L>      17 Feb 2018 05:34        Culture - Blood (collected 14 Feb 2018 20:00)  Source: .Blood Blood-Peripheral  Preliminary Report (16 Feb 2018 11:02):    No growth to date.    Culture - Blood (collected 14 Feb 2018 20:00)  Source: .Blood Blood-Peripheral  Preliminary Report (16 Feb 2018 11:02):    No growth to date.

## 2018-02-17 NOTE — DISCHARGE NOTE ADULT - PATIENT PORTAL LINK FT
You can access the [facility] Patient Portal, offered by [facility], by registering with the following website: [email or web address]

## 2018-02-17 NOTE — PROGRESS NOTE ADULT - ASSESSMENT
Gram negative PNA/ Influenza A / Sepsis - resolved  Dementia - stable at baseline  Hypokalemia - replete  Hyponatremia - outpatient followup with PMD within 2 weeks  D/C planning to SNF for PT  Discharge planning >30 mins spent coordinating discharge planning and direct patient encounter

## 2018-02-17 NOTE — DISCHARGE NOTE ADULT - HOSPITAL COURSE
79Y/O M p s/p fall. Found to be Septic from PNA and Flu A. Patient had worsening cognative function from sepsis. Patient improved on tamiflu and azithromycin and was discharged to SNF for PT 79Y/O M p s/p fall. Found to be Septic from PNA and Flu A. Patient had worsening cognative function from sepsis. Patient improved on tamiflu and azithromycin and was discharged to SNF for PT.  We will discharge patient on Azithromycin 250mg daily. Pt had an acute episode of agitation the day before discharge, we initiated zyprexa and requested psych consult. pt refused psych interview but after discussing with psych, we will continue zyprexa on discharge. pt is stable otherwise and is currently not combative. agrees to go to SNF for rehab. 77Y/O M p s/p fall. Found to be Septic from PNA and Flu A. Patient had worsening cognative function from sepsis. Patient improved on tamiflu and azithromycin and was discharged to SNF for PT.  We will discharge patient on Azithromycin 250mg daily. Pt had an acute episode of agitation the day before discharge, we initiated zyprexa and requested psych consult. pt refused psych interview but after discussing with psych, we will continue zyprexa on discharge. pt is stable otherwise and is currently not combative. agrees to go to SNF for rehab. Xarelto currently being held for decrease in hemoglobin (to 6) requiring 3U PRBC. PATIENT MUST BE MONITORED CLOSELY FOR PRESENCE OF GI BLEED. 77Y/O M p s/p fall and combative behavior at home. Found to be Septic from PNA and Flu A. Patient also presented with an Hb of 6 s/p 3u PRBC total. Stool Guaiac was negative on admission. Patient had worsening cognitive function from sepsis. Patient improved on tamiflu and azithromycin and was discharged to SNF for PT.  We will discharge patient on Azithromycin 250mg daily. Pt had an acute episode of agitation the day before discharge, we initiated zyprexa and requested psych consult. pt refused psych interview but after discussing with psych, we will continue zyprexa on discharge. pt is stable otherwise and is currently not combative. agrees to go to SNF for rehab.     Xarelto currently being held for decrease in hemoglobin (to 6) requiring 3U PRBC. PATIENT MUST BE MONITORED CLOSELY FOR PRESENCE OF GI BLEED.

## 2018-02-17 NOTE — PROGRESS NOTE ADULT - SUBJECTIVE AND OBJECTIVE BOX
78 yo M with  history of dementia, afib on xarelto, HTN, DM II, anemia, brought in by EMS for multiple falls. Per family, patient has had rapid decline in health, fever, cough, congestion, increased agitation, last night fell from bed and this morning fell in bathroom, hit head + LOC. EMS was called and patient was combative, received sedation, likely IM Versed by paramedics prior to arrival. Per family pts Hb is usually 6 due to iron deficiency. Per family, due to his dementia he can become very combative and refuses to take his medications frequently.  Being treated for G -ve PNA/influenza A /Sepsis in hospital        Overnight events: NAD, AAOx1 (pt.'s baseline); ambulating w/ assistance out of bed to chair.plan of discharge today to SNF     MEDICATIONS  (STANDING):  aspirin  chewable 81 milliGRAM(s) Oral daily  azithromycin  IVPB 500 milliGRAM(s) IV Intermittent every 24 hours  dextrose 5%. 1000 milliLiter(s) (50 mL/Hr) IV Continuous <Continuous>  dextrose 50% Injectable 12.5 Gram(s) IV Push once  dextrose 50% Injectable 25 Gram(s) IV Push once  dextrose 50% Injectable 25 Gram(s) IV Push once  dextrose 50% Injectable 50 milliLiter(s) IV Push every 15 minutes  dextrose 50% Injectable 25 milliLiter(s) IV Push every 15 minutes  donepezil 10 milliGRAM(s) Oral at bedtime  ferrous    sulfate 325 milliGRAM(s) Oral three times a day with meals  insulin glargine Injectable (LANTUS) 20 Unit(s) SubCutaneous at bedtime  insulin lispro (HumaLOG) corrective regimen sliding scale   SubCutaneous three times a day before meals  insulin lispro Injectable (HumaLOG) 6 Unit(s) SubCutaneous three times a day before meals  memantine 10 milliGRAM(s) Oral every 12 hours  metoprolol     tartrate 25 milliGRAM(s) Oral two times a day  oseltamivir 30 milliGRAM(s) Oral every 12 hours  pantoprazole    Tablet 40 milliGRAM(s) Oral two times a day before meals    MEDICATIONS  (PRN):  dextrose Gel 1 Dose(s) Oral once PRN Blood Glucose LESS THAN 70 milliGRAM(s)/deciliter  glucagon  Injectable 1 milliGRAM(s) IntraMuscular once PRN Glucose LESS THAN 70 milligrams/deciliter    Vital Signs Last 24 Hrs  T(C): 35.8 (17 Feb 2018 12:38), Max: 37.2 (16 Feb 2018 14:29)  T(F): 96.4 (17 Feb 2018 12:38), Max: 98.9 (16 Feb 2018 14:29)  HR: 93 (17 Feb 2018 12:38) (93 - 108)  BP: 133/65 (17 Feb 2018 12:38) (133/59 - 143/72)  BP(mean): --  RR: 18 (17 Feb 2018 12:38) (18 - 97)  SpO2: 98% (16 Feb 2018 17:08) (98% - 98%)      I&O's Summary    15 Feb 2018 07:01  -  16 Feb 2018 07:00  --------------------------------------------------------  IN: 0 mL / OUT: 600 mL / NET: -600 mL        Labs:                                 7.2    3.32  )-----------( 149      ( 17 Feb 2018 05:34 )             25.9   02-17    134<L>  |  105  |  25<H>  ----------------------------<  108  3.3<L>   |  26  |  1.3    Ca    7.5<L>      17 Feb 2018 05:34                  CARDIAC MARKERS ( 14 Feb 2018 16:00 )  0.69 ng/mL / x     / 111 U/L / x     / 2.4 ng/mL            Culture - Blood (collected 14 Feb 2018 11:29)  Source: .Blood None  Preliminary Report (16 Feb 2018 02:06):    No growth to date.    Culture - Blood (collected 14 Feb 2018 11:27)  Source: .Blood None  Preliminary Report (16 Feb 2018 01:02):    No growth to date.    Culture - Blood (collected 14 Feb 2018 04:26)  Source: .Blood None  Preliminary Report (15 Feb 2018 11:01):    No growth to date.          < from: Xray Chest 1 View AP/PA (02.14.18 @ 05:45) >      < end of copied text > 78 yo M with  history of dementia, afib on xarelto, HTN, DM II, anemia, brought in by EMS for multiple falls. Per family, patient has had rapid decline in health, fever, cough, congestion, increased agitation, last night fell from bed and this morning fell in bathroom, hit head + LOC. EMS was called and patient was combative, received sedation, likely IM Versed by paramedics prior to arrival. Per family pts Hb is usually 6 due to iron deficiency. Per family, due to his dementia he can become very combative and refuses to take his medications frequently.  Being treated for G -ve PNA/influenza A /Sepsis in hospital        Overnight events: NAD, AAOx1 (pt.'s baseline); ambulating w/ assistance out of bed to chair.plan of discharge today to SNF     MEDICATIONS  (STANDING):  aspirin  chewable 81 milliGRAM(s) Oral daily  azithromycin  IVPB 500 milliGRAM(s) IV Intermittent every 24 hours  dextrose 5%. 1000 milliLiter(s) (50 mL/Hr) IV Continuous <Continuous>  dextrose 50% Injectable 12.5 Gram(s) IV Push once  dextrose 50% Injectable 25 Gram(s) IV Push once  dextrose 50% Injectable 25 Gram(s) IV Push once  dextrose 50% Injectable 50 milliLiter(s) IV Push every 15 minutes  dextrose 50% Injectable 25 milliLiter(s) IV Push every 15 minutes  donepezil 10 milliGRAM(s) Oral at bedtime  ferrous    sulfate 325 milliGRAM(s) Oral three times a day with meals  insulin glargine Injectable (LANTUS) 20 Unit(s) SubCutaneous at bedtime  insulin lispro (HumaLOG) corrective regimen sliding scale   SubCutaneous three times a day before meals  insulin lispro Injectable (HumaLOG) 6 Unit(s) SubCutaneous three times a day before meals  memantine 10 milliGRAM(s) Oral every 12 hours  metoprolol     tartrate 25 milliGRAM(s) Oral two times a day  oseltamivir 30 milliGRAM(s) Oral every 12 hours  pantoprazole    Tablet 40 milliGRAM(s) Oral two times a day before meals    MEDICATIONS  (PRN):  dextrose Gel 1 Dose(s) Oral once PRN Blood Glucose LESS THAN 70 milliGRAM(s)/deciliter  glucagon  Injectable 1 milliGRAM(s) IntraMuscular once PRN Glucose LESS THAN 70 milligrams/deciliter    Vital Signs Last 24 Hrs  T(C): 35.8 (17 Feb 2018 12:38), Max: 37.2 (16 Feb 2018 14:29)  T(F): 96.4 (17 Feb 2018 12:38), Max: 98.9 (16 Feb 2018 14:29)  HR: 93 (17 Feb 2018 12:38) (93 - 108)  BP: 133/65 (17 Feb 2018 12:38) (133/59 - 143/72)  BP(mean): --  RR: 18 (17 Feb 2018 12:38) (18 - 97)  SpO2: 98% (16 Feb 2018 17:08) (98% - 98%)    PHYSICAL EXAM:  general;refused all medications today,NAD  CVS;S1+S2+0  RESP;VB+0  ABD;sofft nontender BS+  Neuro;AAO*1   EXT;no LE edema        I&O's Summary    15 Feb 2018 07:01  -  16 Feb 2018 07:00  --------------------------------------------------------  IN: 0 mL / OUT: 600 mL / NET: -600 mL        Labs:                                 7.2    3.32  )-----------( 149      ( 17 Feb 2018 05:34 )             25.9   02-17    134<L>  |  105  |  25<H>  ----------------------------<  108  3.3<L>   |  26  |  1.3    Ca    7.5<L>      17 Feb 2018 05:34                  CARDIAC MARKERS ( 14 Feb 2018 16:00 )  0.69 ng/mL / x     / 111 U/L / x     / 2.4 ng/mL            Culture - Blood (collected 14 Feb 2018 11:29)  Source: .Blood None  Preliminary Report (16 Feb 2018 02:06):    No growth to date.    Culture - Blood (collected 14 Feb 2018 11:27)  Source: .Blood None  Preliminary Report (16 Feb 2018 01:02):    No growth to date.    Culture - Blood (collected 14 Feb 2018 04:26)  Source: .Blood None  Preliminary Report (15 Feb 2018 11:01):    No growth to date.          < from: Xray Chest 1 View AP/PA (02.14.18 @ 05:45) >      < end of copied text > 78 yo M with  history of dementia, afib on xarelto, HTN, DM II, anemia, brought in by EMS for multiple falls. Per family, patient has had rapid decline in health, fever, cough, congestion, increased agitation, last night fell from bed and this morning fell in bathroom, hit head + LOC. EMS was called and patient was combative, received sedation, likely IM Versed by paramedics prior to arrival. Per family pts Hb is usually 6 due to iron deficiency. Per family, due to his dementia he can become very combative and refuses to take his medications frequently.  Being treated for G -ve PNA/influenza A /Sepsis in hospital        Overnight events: NAD, AAOx1 (pt.'s baseline); ambulating w/ assistance out of bed to chair.refused all his meds today     MEDICATIONS  (STANDING):  aspirin  chewable 81 milliGRAM(s) Oral daily  azithromycin  IVPB 500 milliGRAM(s) IV Intermittent every 24 hours  dextrose 5%. 1000 milliLiter(s) (50 mL/Hr) IV Continuous <Continuous>  dextrose 50% Injectable 12.5 Gram(s) IV Push once  dextrose 50% Injectable 25 Gram(s) IV Push once  dextrose 50% Injectable 25 Gram(s) IV Push once  dextrose 50% Injectable 50 milliLiter(s) IV Push every 15 minutes  dextrose 50% Injectable 25 milliLiter(s) IV Push every 15 minutes  donepezil 10 milliGRAM(s) Oral at bedtime  ferrous    sulfate 325 milliGRAM(s) Oral three times a day with meals  insulin glargine Injectable (LANTUS) 20 Unit(s) SubCutaneous at bedtime  insulin lispro (HumaLOG) corrective regimen sliding scale   SubCutaneous three times a day before meals  insulin lispro Injectable (HumaLOG) 6 Unit(s) SubCutaneous three times a day before meals  memantine 10 milliGRAM(s) Oral every 12 hours  metoprolol     tartrate 25 milliGRAM(s) Oral two times a day  oseltamivir 30 milliGRAM(s) Oral every 12 hours  pantoprazole    Tablet 40 milliGRAM(s) Oral two times a day before meals    MEDICATIONS  (PRN):  dextrose Gel 1 Dose(s) Oral once PRN Blood Glucose LESS THAN 70 milliGRAM(s)/deciliter  glucagon  Injectable 1 milliGRAM(s) IntraMuscular once PRN Glucose LESS THAN 70 milligrams/deciliter    Vital Signs Last 24 Hrs  T(C): 35.8 (17 Feb 2018 12:38), Max: 37.2 (16 Feb 2018 14:29)  T(F): 96.4 (17 Feb 2018 12:38), Max: 98.9 (16 Feb 2018 14:29)  HR: 93 (17 Feb 2018 12:38) (93 - 108)  BP: 133/65 (17 Feb 2018 12:38) (133/59 - 143/72)  BP(mean): --  RR: 18 (17 Feb 2018 12:38) (18 - 97)  SpO2: 98% (16 Feb 2018 17:08) (98% - 98%)    PHYSICAL EXAM:  general;refused all medications today,NAD  CVS;S1+S2+0  RESP;VB+0  ABD;sofft nontender BS+  Neuro;AAO*1   EXT;no LE edema        I&O's Summary    15 Feb 2018 07:01  -  16 Feb 2018 07:00  --------------------------------------------------------  IN: 0 mL / OUT: 600 mL / NET: -600 mL        Labs:                                 7.2    3.32  )-----------( 149      ( 17 Feb 2018 05:34 )             25.9   02-17    134<L>  |  105  |  25<H>  ----------------------------<  108  3.3<L>   |  26  |  1.3    Ca    7.5<L>      17 Feb 2018 05:34                  CARDIAC MARKERS ( 14 Feb 2018 16:00 )  0.69 ng/mL / x     / 111 U/L / x     / 2.4 ng/mL            Culture - Blood (collected 14 Feb 2018 11:29)  Source: .Blood None  Preliminary Report (16 Feb 2018 02:06):    No growth to date.    Culture - Blood (collected 14 Feb 2018 11:27)  Source: .Blood None  Preliminary Report (16 Feb 2018 01:02):    No growth to date.    Culture - Blood (collected 14 Feb 2018 04:26)  Source: .Blood None  Preliminary Report (15 Feb 2018 11:01):    No growth to date.          < from: Xray Chest 1 View AP/PA (02.14.18 @ 05:45) >      < end of copied text >

## 2018-02-17 NOTE — CHART NOTE - NSCHARTNOTEFT_GEN_A_CORE
pt ' family don't want the pt to be discharged to [facility]  they have concerns about pt 'mental health  as pt is refusing medication and care and want psych eval

## 2018-02-17 NOTE — DISCHARGE NOTE ADULT - PLAN OF CARE
To remain infection free Take all medications as prescribed Unclear etiology at this time. s/p 3u PRBC total   Please monitor closely for GI bleeding and check Iron studies. Please repeat stool guaiac studies. If + will need GI evaluation for colonoscopy.  Xarelto is currently on hold. May resume if no evidence of GI bleeding, guaiac negative, and Hb is stable. Improvement in behavioral symptoms Please continue home dementia medications and Olanzepine

## 2018-02-17 NOTE — DISCHARGE NOTE ADULT - ADDITIONAL INSTRUCTIONS
Xarelto currently being held for decrease in hemoglobin (to 6) requiring 3U PRBC. PATIENT MUST BE MONITORED CLOSELY FOR PRESENCE OF GI BLEED.   Patient to have follow up stool Guaiac, if negative may restart Xarelto 15 qHS with close monitoring. If Guaiac + will need GI evaluation for GI bleed.

## 2018-02-17 NOTE — DISCHARGE NOTE ADULT - MEDICATION SUMMARY - MEDICATIONS TO STOP TAKING
I will STOP taking the medications listed below when I get home from the hospital:  None I will STOP taking the medications listed below when I get home from the hospital:    XARELTO      TAB 15MG I will STOP taking the medications listed below when I get home from the hospital:    AMMONIUM LAC CRE 12%    AMOXICILLIN  CAP 500MG    TOBRADEX     OIN 0.3-0.1%    LINZESS      CAP 145MCG    TRADJENTA    TAB 5MG    PAZEO        KELLEE 0.7%    XARELTO      TAB 15MG    RAPAFLO      CAP 8MG    donepezil 10 mg oral tablet  -- 1 tab(s) by mouth once a day (at bedtime)    ferrous sulfate 325 mg (65 mg elemental iron) oral tablet  -- 1 tab(s) by mouth 3 times a day I will STOP taking the medications listed below when I get home from the hospital:    AMMONIUM LAC CRE 12%    AMOXICILLIN  CAP 500MG    TOBRADEX     OIN 0.3-0.1%    LINZESS      CAP 145MCG    TRADJENTA    TAB 5MG    PAZEO        KELLEE 0.7%    XARELTO      TAB 15MG    donepezil 10 mg oral tablet  -- 1 tab(s) by mouth once a day (at bedtime)    ferrous sulfate 325 mg (65 mg elemental iron) oral tablet  -- 1 tab(s) by mouth 3 times a day

## 2018-02-17 NOTE — DISCHARGE NOTE ADULT - NSTOBACCOHOTLINE_GEN_A_NCS
[facility] Smokers Quitline (277-EV-HDNIT) [facility] Smokers Quitline (852-CI-AWQWK) [facility] Smokers Quitline (304-OR-MTHCW)

## 2018-02-18 LAB
ANION GAP SERPL CALC-SCNC: 5 MMOL/L — LOW (ref 7–14)
BASOPHILS # BLD AUTO: 0 K/UL — SIGNIFICANT CHANGE UP (ref 0–0.2)
BASOPHILS NFR BLD AUTO: 0 % — SIGNIFICANT CHANGE UP (ref 0–1)
BUN SERPL-MCNC: 23 MG/DL — HIGH (ref 10–20)
CALCIUM SERPL-MCNC: 7.7 MG/DL — LOW (ref 8.5–10.1)
CHLORIDE SERPL-SCNC: 107 MMOL/L — SIGNIFICANT CHANGE UP (ref 98–110)
CO2 SERPL-SCNC: 26 MMOL/L — SIGNIFICANT CHANGE UP (ref 17–32)
CREAT SERPL-MCNC: 1.2 MG/DL — SIGNIFICANT CHANGE UP (ref 0.7–1.5)
EOSINOPHIL # BLD AUTO: 0.05 K/UL — SIGNIFICANT CHANGE UP (ref 0–0.7)
EOSINOPHIL NFR BLD AUTO: 1.6 % — SIGNIFICANT CHANGE UP (ref 0–8)
GLUCOSE SERPL-MCNC: 129 MG/DL — HIGH (ref 70–110)
HCT VFR BLD CALC: 25.5 % — LOW (ref 42–52)
HGB BLD-MCNC: 7 G/DL — CRITICAL LOW (ref 14–18)
IMM GRANULOCYTES NFR BLD AUTO: 0.6 % — HIGH (ref 0.1–0.3)
LYMPHOCYTES # BLD AUTO: 0.64 K/UL — LOW (ref 1.2–3.4)
LYMPHOCYTES # BLD AUTO: 20.7 % — SIGNIFICANT CHANGE UP (ref 20.5–51.1)
MCHC RBC-ENTMCNC: 18.2 PG — LOW (ref 27–31)
MCHC RBC-ENTMCNC: 27.5 G/DL — LOW (ref 32–37)
MCV RBC AUTO: 66.4 FL — LOW (ref 80–94)
MONOCYTES # BLD AUTO: 0.26 K/UL — SIGNIFICANT CHANGE UP (ref 0.1–0.6)
MONOCYTES NFR BLD AUTO: 8.4 % — SIGNIFICANT CHANGE UP (ref 1.7–9.3)
NEUTROPHILS # BLD AUTO: 2.12 K/UL — SIGNIFICANT CHANGE UP (ref 1.4–6.5)
NEUTROPHILS NFR BLD AUTO: 68.7 % — SIGNIFICANT CHANGE UP (ref 42.2–75.2)
NRBC # BLD: 0 /100 WBCS — SIGNIFICANT CHANGE UP (ref 0–0)
PLATELET # BLD AUTO: 109 K/UL — LOW (ref 130–400)
POTASSIUM SERPL-MCNC: 3.6 MMOL/L — SIGNIFICANT CHANGE UP (ref 3.5–5)
POTASSIUM SERPL-SCNC: 3.6 MMOL/L — SIGNIFICANT CHANGE UP (ref 3.5–5)
RBC # BLD: 3.84 M/UL — LOW (ref 4.7–6.1)
RBC # FLD: 24.1 % — HIGH (ref 11.5–14.5)
SODIUM SERPL-SCNC: 138 MMOL/L — SIGNIFICANT CHANGE UP (ref 135–146)
WBC # BLD: 3.09 K/UL — LOW (ref 4.8–10.8)
WBC # FLD AUTO: 3.09 K/UL — LOW (ref 4.8–10.8)

## 2018-02-18 RX ORDER — SENNA PLUS 8.6 MG/1
2 TABLET ORAL AT BEDTIME
Qty: 0 | Refills: 0 | Status: DISCONTINUED | OUTPATIENT
Start: 2018-02-18 | End: 2018-02-20

## 2018-02-18 RX ORDER — OLANZAPINE 15 MG/1
2.5 TABLET, FILM COATED ORAL
Qty: 0 | Refills: 0 | Status: DISCONTINUED | OUTPATIENT
Start: 2018-02-18 | End: 2018-02-20

## 2018-02-18 RX ORDER — DOCUSATE SODIUM 100 MG
100 CAPSULE ORAL THREE TIMES A DAY
Qty: 0 | Refills: 0 | Status: DISCONTINUED | OUTPATIENT
Start: 2018-02-18 | End: 2018-02-20

## 2018-02-18 RX ADMIN — Medication 6 UNIT(S): at 18:01

## 2018-02-18 RX ADMIN — MEMANTINE HYDROCHLORIDE 10 MILLIGRAM(S): 10 TABLET ORAL at 17:59

## 2018-02-18 RX ADMIN — SENNA PLUS 2 TABLET(S): 8.6 TABLET ORAL at 21:40

## 2018-02-18 RX ADMIN — Medication 6 UNIT(S): at 12:17

## 2018-02-18 RX ADMIN — Medication 30 MILLIGRAM(S): at 17:58

## 2018-02-18 RX ADMIN — OLANZAPINE 2.5 MILLIGRAM(S): 15 TABLET, FILM COATED ORAL at 17:58

## 2018-02-18 RX ADMIN — Medication 325 MILLIGRAM(S): at 12:17

## 2018-02-18 RX ADMIN — Medication 81 MILLIGRAM(S): at 12:17

## 2018-02-18 RX ADMIN — DONEPEZIL HYDROCHLORIDE 10 MILLIGRAM(S): 10 TABLET, FILM COATED ORAL at 21:40

## 2018-02-18 RX ADMIN — Medication 325 MILLIGRAM(S): at 17:57

## 2018-02-18 RX ADMIN — PANTOPRAZOLE SODIUM 40 MILLIGRAM(S): 20 TABLET, DELAYED RELEASE ORAL at 17:57

## 2018-02-18 RX ADMIN — Medication 2: at 12:17

## 2018-02-18 RX ADMIN — Medication 25 MILLIGRAM(S): at 17:58

## 2018-02-18 RX ADMIN — Medication 100 MILLIGRAM(S): at 21:40

## 2018-02-18 RX ADMIN — Medication 1: at 18:02

## 2018-02-18 NOTE — PROGRESS NOTE ADULT - SUBJECTIVE AND OBJECTIVE BOX
Pt refused to take medications but agreed to in my presence    T(F): , Max: 97.7 (02-17-18 @ 21:46)  HR: 83 (02-18-18 @ 13:38) (83 - 95)  BP: 135/59 (02-18-18 @ 13:38)  RR: 18 (02-18-18 @ 13:38)  SpO2: --  IN: 0 mL / OUT: 600 mL / NET: -600 mL    IN: 0 mL / OUT: 280 mL / NET: -280 mL      General: No apparent distress  Cardiovascular: S1, S2  Gastrointestinal: Soft, Non-tender, Non-distended  Respiratory: Good air entry bilaterally  Musculoskeletal: Moves all extremities  Lymphatic: No edema  Neurologic: No gross motor deficit  Dermatologic: Skin dry                          7.0    3.09  )-----------( 109      ( 18 Feb 2018 04:30 )             25.5     02-18    138  |  107  |  23<H>  ----------------------------<  129<H>  3.6   |  26  |  1.2    Ca    7.7<L>      18 Feb 2018 04:30

## 2018-02-18 NOTE — PROGRESS NOTE ADULT - ASSESSMENT
Gram negative PNA/ Influenza A / Sepsis - resolved  Anemia - iron panel and cbc in AM, no obvious bleed  Dementia - combative, on 1:1, added zyprexa 2.5mg BID for now. F/U psych  Hypokalemia - resolved  Hyponatremia - outpatient followup with PMD within 2 weeks  D/C planning to SNF for PT

## 2018-02-19 DIAGNOSIS — F03.91 UNSPECIFIED DEMENTIA WITH BEHAVIORAL DISTURBANCE: ICD-10-CM

## 2018-02-19 LAB
ANION GAP SERPL CALC-SCNC: 8 MMOL/L — SIGNIFICANT CHANGE UP (ref 7–14)
BASOPHILS # BLD AUTO: 0 K/UL — SIGNIFICANT CHANGE UP (ref 0–0.2)
BASOPHILS NFR BLD AUTO: 0 % — SIGNIFICANT CHANGE UP (ref 0–1)
BLD GP AB SCN SERPL QL: SIGNIFICANT CHANGE UP
BUN SERPL-MCNC: 18 MG/DL — SIGNIFICANT CHANGE UP (ref 10–20)
CALCIUM SERPL-MCNC: 7.8 MG/DL — LOW (ref 8.5–10.1)
CHLORIDE SERPL-SCNC: 107 MMOL/L — SIGNIFICANT CHANGE UP (ref 98–110)
CO2 SERPL-SCNC: 26 MMOL/L — SIGNIFICANT CHANGE UP (ref 17–32)
CREAT SERPL-MCNC: 1.3 MG/DL — SIGNIFICANT CHANGE UP (ref 0.7–1.5)
CULTURE RESULTS: SIGNIFICANT CHANGE UP
EOSINOPHIL # BLD AUTO: 0.11 K/UL — SIGNIFICANT CHANGE UP (ref 0–0.7)
EOSINOPHIL NFR BLD AUTO: 2.8 % — SIGNIFICANT CHANGE UP (ref 0–8)
GLUCOSE SERPL-MCNC: 78 MG/DL — SIGNIFICANT CHANGE UP (ref 70–110)
HCT VFR BLD CALC: 28.2 % — LOW (ref 42–52)
HGB BLD-MCNC: 7.7 G/DL — LOW (ref 14–18)
IMM GRANULOCYTES NFR BLD AUTO: 0.8 % — HIGH (ref 0.1–0.3)
LYMPHOCYTES # BLD AUTO: 0.63 K/UL — LOW (ref 1.2–3.4)
LYMPHOCYTES # BLD AUTO: 16.3 % — LOW (ref 20.5–51.1)
MCHC RBC-ENTMCNC: 18.3 PG — LOW (ref 27–31)
MCHC RBC-ENTMCNC: 27.3 G/DL — LOW (ref 32–37)
MCV RBC AUTO: 67.1 FL — LOW (ref 80–94)
MONOCYTES # BLD AUTO: 0.25 K/UL — SIGNIFICANT CHANGE UP (ref 0.1–0.6)
MONOCYTES NFR BLD AUTO: 6.5 % — SIGNIFICANT CHANGE UP (ref 1.7–9.3)
NEUTROPHILS # BLD AUTO: 2.84 K/UL — SIGNIFICANT CHANGE UP (ref 1.4–6.5)
NEUTROPHILS NFR BLD AUTO: 73.6 % — SIGNIFICANT CHANGE UP (ref 42.2–75.2)
NRBC # BLD: 0 /100 WBCS — SIGNIFICANT CHANGE UP (ref 0–0)
PLATELET # BLD AUTO: 209 K/UL — SIGNIFICANT CHANGE UP (ref 130–400)
POTASSIUM SERPL-MCNC: 3.3 MMOL/L — LOW (ref 3.5–5)
POTASSIUM SERPL-SCNC: 3.3 MMOL/L — LOW (ref 3.5–5)
RBC # BLD: 4.2 M/UL — LOW (ref 4.7–6.1)
RBC # FLD: 24.6 % — HIGH (ref 11.5–14.5)
SODIUM SERPL-SCNC: 141 MMOL/L — SIGNIFICANT CHANGE UP (ref 135–146)
SPECIMEN SOURCE: SIGNIFICANT CHANGE UP
TYPE + AB SCN PNL BLD: SIGNIFICANT CHANGE UP
WBC # BLD: 3.86 K/UL — LOW (ref 4.8–10.8)
WBC # FLD AUTO: 3.86 K/UL — LOW (ref 4.8–10.8)

## 2018-02-19 RX ADMIN — OLANZAPINE 2.5 MILLIGRAM(S): 15 TABLET, FILM COATED ORAL at 06:43

## 2018-02-19 RX ADMIN — PANTOPRAZOLE SODIUM 40 MILLIGRAM(S): 20 TABLET, DELAYED RELEASE ORAL at 06:44

## 2018-02-19 RX ADMIN — PANTOPRAZOLE SODIUM 40 MILLIGRAM(S): 20 TABLET, DELAYED RELEASE ORAL at 18:14

## 2018-02-19 RX ADMIN — Medication 325 MILLIGRAM(S): at 06:42

## 2018-02-19 RX ADMIN — Medication 25 MILLIGRAM(S): at 18:14

## 2018-02-19 RX ADMIN — Medication 6 UNIT(S): at 09:13

## 2018-02-19 RX ADMIN — MEMANTINE HYDROCHLORIDE 10 MILLIGRAM(S): 10 TABLET ORAL at 18:15

## 2018-02-19 RX ADMIN — Medication 30 MILLIGRAM(S): at 06:43

## 2018-02-19 RX ADMIN — Medication 25 MILLIGRAM(S): at 06:43

## 2018-02-19 RX ADMIN — MEMANTINE HYDROCHLORIDE 10 MILLIGRAM(S): 10 TABLET ORAL at 06:43

## 2018-02-19 RX ADMIN — OLANZAPINE 2.5 MILLIGRAM(S): 15 TABLET, FILM COATED ORAL at 18:14

## 2018-02-19 RX ADMIN — Medication 325 MILLIGRAM(S): at 18:13

## 2018-02-19 NOTE — PROGRESS NOTE ADULT - ASSESSMENT
77M w/ PMH dementia, anemia; initial cc of weakness and fall. Flu positive; sepsis resolved. Head CT negative; H/H stable; pt. stable. Pending d/c to SNF.    overnight events: sunday around 2pm became very agitated and yelled at everyone in the room, wife states it was unusual behavior and requested psych consult. Zyprexa was given and 1:1 started, psych was consulted. pt was supposed to be discharged to SNF on sunday but this was postponed. pt wants to go home.     1) CAP; Influenza A; sepsis resolved (initially in ICU for 2 days and downgraded 3 days ago)  -CXR: stable b/l lung opacities  -azithromycin  -tamiflu completed    2) Dementia: AAOx1, Pakistani speaking but PCA says he is confused  - c/w donepezil, memantine  - planned d/c to SNF (PT/physiatry already evaluated) however, became combative on sunday--> on 1:1, added zyprexa 2.5mg BID for now. F/U psych    3) Afib: rate controlled, witholding AC for now due to anemia/ recent hx of falls  -cw/ metoprolol for rate control  -no evidence of overt CHF as per cardio  -troponin elevation likely secondary to demand type 2 ischemia as per cardio    4) Anemia  -Received 2 units PRBC during this admission  -Can f/u outpatient EGD/colonoscopy  -PO iron    5) DMII  -on Insulin sliding scale    -Full code    DISPO: planned discharge to SNF sunday --> sunday became very agitated and yelled at everyone in the room, wife states it was unusual behavior and requested psych consult. Zyprexa was given and 1:1 started, psych was consulted. pt was supposed to be discharged to SNF on sunday but this was postponed. pt wants to go home. Pt will go to [facility] , when medically stable.

## 2018-02-19 NOTE — BEHAVIORAL HEALTH ASSESSMENT NOTE - NSBHCONSULTRECOMMENDOTHER_PSY_A_CORE FT
Please make an appointment for psychiatry follow up at [facility] @ [address] [place] [phone] upon discharge.

## 2018-02-19 NOTE — BEHAVIORAL HEALTH ASSESSMENT NOTE - HPI (INCLUDE ILLNESS QUALITY, SEVERITY, DURATION, TIMING, CONTEXT, MODIFYING FACTORS, ASSOCIATED SIGNS AND SYMPTOMS)
Patient is a 78 year old [place]ian, , domiciled, male with dementia, is admitted to the hospital for . Psychiatry consult called for mental health evaluation. Upon approach patient is calm and cooperative, seen sitting in chair watching tv, patient's wife and son are at bedside. Patient reports that he is feeling "fine" and does not need to a psychiatrist. Patient reports that he does not need to talk to anyone and starts yelling at his wife in Lithuanian. Patient's son and wife report that since the dementia has been getting worse patient has becoming increasingly aggressive over the past year. Patient has been known to throw things, become abusive towards his wife, and verbally aggressive as well. During the hospital stay patient had episodes of aggression in which he was yelling and told his wife to leave the room. Patient also refuses medication. Patient has not required IM medication. As per family, patient has not made any comments of suicidality, homicidality and/or any hallucinations. Patient refused clinical interview. Patient is a 78 year old [place], , domiciled, male with dementia, is admitted to the hospital s/p fall. Psychiatry consult called for mental health evaluation. Upon approach patient is calm and cooperative, seen sitting in chair watching tv, patient's wife and son are at bedside. Patient reports that he is feeling "fine" and does not need to a psychiatrist. Patient reports that he does not need to talk to anyone and starts yelling at his wife in [place]ian. Patient's son and wife report that since the dementia has been getting worse patient has becoming increasingly aggressive over the past year. Patient has been known to throw things, become abusive towards his wife, and verbally aggressive as well. During the hospital stay patient had episodes of aggression in which he was yelling and told his wife to leave the room. Patient also refuses medication. Patient has not required IM medication. As per family, patient has not made any comments of suicidality, homicidality and/or any hallucinations. Patient refused clinical interview.

## 2018-02-19 NOTE — BEHAVIORAL HEALTH ASSESSMENT NOTE - DIFFERENTIAL
Dementia with behavioral disturbance Dementia with behavioral disturbance  depressive disorder nos  anxiety d/o nos

## 2018-02-19 NOTE — PROGRESS NOTE ADULT - SUBJECTIVE AND OBJECTIVE BOX
Patient confused, but no longer combative    T(F): , Max: 97.9 (02-19-18 @ 13:03)  HR: 106 (02-19-18 @ 13:03) (82 - 106)  BP: 136/64 (02-19-18 @ 13:03)  RR: 16 (02-19-18 @ 13:03)  SpO2: --  IN: 0 mL / OUT: 280 mL / NET: -280 mL    IN: 0 mL / OUT: 250 mL / NET: -250 mL      General: No apparent distress  Cardiovascular: S1, S2  Gastrointestinal: Soft, Non-tender, Non-distended  Respiratory: Good air entry bilaterally  Musculoskeletal: Moves all extremities  Lymphatic: No edema  Neurologic: No gross motor deficit, confused, gait somewhat unstable but ambulates with walker  Dermatologic: Skin dry                          7.7    3.86  )-----------( 209      ( 19 Feb 2018 07:45 )             28.2     02-19    141  |  107  |  18  ----------------------------<  78  3.3<L>   |  26  |  1.3    Ca    7.8<L>      19 Feb 2018 07:45

## 2018-02-19 NOTE — BEHAVIORAL HEALTH ASSESSMENT NOTE - NSBHCHARTREVIEWLAB_PSY_A_CORE FT
7.0    3.09  )-----------( 109      ( 18 Feb 2018 04:30 )             25.5   02-18    138  |  107  |  23<H>  ----------------------------<  129<H>  3.6   |  26  |  1.2    Ca    7.7<L>      18 Feb 2018 04:30

## 2018-02-19 NOTE — BEHAVIORAL HEALTH ASSESSMENT NOTE - SUMMARY
78 year old male with dementia admitted to the hospital for presents with chronic history of episodes of aggressive behavior. Given collateral information, exam and chart review patient does not appear to meet criteria for any acute mood/psychosis symptoms. Patient does not appear to be an imminent threat to himself and/or others at this time and does not demonstrate need for IPP admission. Patient may however benefit from outpatient psychiatry assessment and treatment.    If patient becomes agitated, patient may be given Zyprexa 2.5 mg po q6 prn, (not to exceed daily dose of 20 mg). If patient is considered a threat to himself and/or others and refusing po, patient may be given IM. 78 year old male with dementia admitted to the hospital s/p fall, for weakness, presents with chronic history of episodes of aggressive behavior. Given collateral information, exam and chart review patient does not appear to meet criteria for any acute mood/psychosis symptoms. Patient does not appear to be an imminent threat to himself and/or others at this time and does not demonstrate need for IPP admission. Patient may however benefit from outpatient psychiatry assessment and treatment.    If patient becomes agitated, patient may be given Zyprexa 2.5 mg po q6 prn, (not to exceed daily dose of 20 mg). If patient is considered a threat to himself and/or others and refusing po, patient may be given IM.

## 2018-02-19 NOTE — BEHAVIORAL HEALTH ASSESSMENT NOTE - NSBHCONSULTMEDAGITATION_PSY_A_CORE FT
If patient becomes agitated, patient may be given Zyprexa 2.5 mg po q6 prn, (not to exceed daily dose of 20 mg). If patient is considered a threat to himself and/or others and refusing po, patient may be given IM.

## 2018-02-19 NOTE — BEHAVIORAL HEALTH ASSESSMENT NOTE - RISK ASSESSMENT
Patient's risk is mitigated by family support, lack of pphx, lack of acute symptoms, no SA/HA history.

## 2018-02-19 NOTE — BEHAVIORAL HEALTH ASSESSMENT NOTE - NSBHCONSULTFOLLOWAFTERCARE_PSY_A_CORE FT
Please make an appointment for psychiatry follow up at [facility] @ [address] [place] [phone] upon discharge.

## 2018-02-19 NOTE — PROGRESS NOTE ADULT - ASSESSMENT
Gram negative PNA/ Influenza A / Sepsis - resolved  Anemia - stable  Dementia - calm on zyprexa 2.5mg BID   Hypokalemia - replete  Hyponatremia - outpatient followup with PMD within 2 weeks  D/C planning to SNF for PT

## 2018-02-19 NOTE — BEHAVIORAL HEALTH ASSESSMENT NOTE - NSBHCHARTREVIEWVS_PSY_A_CORE FT
Vital Signs Last 24 Hrs  T(F): 96.6 (19 Feb 2018 05:42), Max: 97.5 (18 Feb 2018 13:38)  HR: 88 (19 Feb 2018 05:42) (82 - 88)  BP: 142/64 (19 Feb 2018 05:42) (135/59 - 150/75)  RR: 20 (19 Feb 2018 05:42) (18 - 20)

## 2018-02-19 NOTE — PROGRESS NOTE ADULT - SUBJECTIVE AND OBJECTIVE BOX
LENGTH OF HOSPITAL STAY: 6d    CHIEF COMPLAINT:   Patient is a 78y old  Male who presents with a chief complaint of fall, weakness.      Overnight events:  sunday around 2pm became very agitated and yelled at everyone in the room, wife states it was unusual behavior and requested psych consult. Zyprexa was given and 1:1 started, psych was consulted. pt was supposed to be discharged to SNF on sunday but this was postponed. pt wants to go home.         ALLERGIES:  No Known Allergies    MEDICATIONS:  STANDING MEDICATIONS  aspirin  chewable 81 milliGRAM(s) Oral daily  azithromycin  IVPB 500 milliGRAM(s) IV Intermittent every 24 hours  dextrose 5%. 1000 milliLiter(s) IV Continuous <Continuous>  dextrose 50% Injectable 12.5 Gram(s) IV Push once  dextrose 50% Injectable 25 Gram(s) IV Push once  dextrose 50% Injectable 25 Gram(s) IV Push once  dextrose 50% Injectable 50 milliLiter(s) IV Push every 15 minutes  dextrose 50% Injectable 25 milliLiter(s) IV Push every 15 minutes  docusate sodium 100 milliGRAM(s) Oral three times a day  donepezil 10 milliGRAM(s) Oral at bedtime  ferrous    sulfate 325 milliGRAM(s) Oral three times a day with meals  insulin glargine Injectable (LANTUS) 20 Unit(s) SubCutaneous at bedtime  insulin lispro (HumaLOG) corrective regimen sliding scale   SubCutaneous three times a day before meals  insulin lispro Injectable (HumaLOG) 6 Unit(s) SubCutaneous three times a day before meals  memantine 10 milliGRAM(s) Oral every 12 hours  metoprolol     tartrate 25 milliGRAM(s) Oral two times a day  OLANZapine 2.5 milliGRAM(s) Oral two times a day  pantoprazole    Tablet 40 milliGRAM(s) Oral two times a day before meals  senna 2 Tablet(s) Oral at bedtime    PRN MEDICATIONS  dextrose Gel 1 Dose(s) Oral once PRN  glucagon  Injectable 1 milliGRAM(s) IntraMuscular once PRN    VITALS:   T(F): 96.6  HR: 88  BP: 142/64  RR: 20  SpO2: --    LABS:                        7.0    3.09  )-----------( 109      ( 18 Feb 2018 04:30 )             25.5     02-18    138  |  107  |  23<H>  ----------------------------<  129<H>  3.6   |  26  |  1.2    Ca    7.7<L>      18 Feb 2018 04:30      Cultures:      RADIOLOGY:    PHYSICAL EXAM:  GEN: No acute distress  HEENT:   LUNGS: Clear to auscultation bilaterally   HEART: S1/S2 present. RRR.   ABD: Soft, non-tender, non-distended. Bowel sounds present  EXT:  NEURO: AAOX3 LENGTH OF HOSPITAL STAY: 6d    CHIEF COMPLAINT:   Patient is a 78y old  Male who presents with a chief complaint of fall, weakness.      Overnight events:  sunday around 2pm became very agitated and yelled at everyone in the room, wife states it was unusual behavior and requested psych consult. Zyprexa was given and 1:1 started, psych was consulted. pt was supposed to be discharged to SNF on sunday but this was postponed. pt wants to go home.         ALLERGIES:  No Known Allergies    MEDICATIONS:  STANDING MEDICATIONS  aspirin  chewable 81 milliGRAM(s) Oral daily  azithromycin  IVPB 500 milliGRAM(s) IV Intermittent every 24 hours  dextrose 5%. 1000 milliLiter(s) IV Continuous <Continuous>  dextrose 50% Injectable 12.5 Gram(s) IV Push once  dextrose 50% Injectable 25 Gram(s) IV Push once  dextrose 50% Injectable 25 Gram(s) IV Push once  dextrose 50% Injectable 50 milliLiter(s) IV Push every 15 minutes  dextrose 50% Injectable 25 milliLiter(s) IV Push every 15 minutes  docusate sodium 100 milliGRAM(s) Oral three times a day  donepezil 10 milliGRAM(s) Oral at bedtime  ferrous    sulfate 325 milliGRAM(s) Oral three times a day with meals  insulin glargine Injectable (LANTUS) 20 Unit(s) SubCutaneous at bedtime  insulin lispro (HumaLOG) corrective regimen sliding scale   SubCutaneous three times a day before meals  insulin lispro Injectable (HumaLOG) 6 Unit(s) SubCutaneous three times a day before meals  memantine 10 milliGRAM(s) Oral every 12 hours  metoprolol     tartrate 25 milliGRAM(s) Oral two times a day  OLANZapine 2.5 milliGRAM(s) Oral two times a day  pantoprazole    Tablet 40 milliGRAM(s) Oral two times a day before meals  senna 2 Tablet(s) Oral at bedtime    PRN MEDICATIONS  dextrose Gel 1 Dose(s) Oral once PRN  glucagon  Injectable 1 milliGRAM(s) IntraMuscular once PRN    VITALS:   T(F): 96.6  HR: 88  BP: 142/64  RR: 20  SpO2: --    LABS:                        7.0    3.09  )-----------( 109      ( 18 Feb 2018 04:30 )             25.5     02-18    138  |  107  |  23<H>  ----------------------------<  129<H>  3.6   |  26  |  1.2    Ca    7.7<L>      18 Feb 2018 04:30      Cultures:      RADIOLOGY:    PHYSICAL EXAM:  GEN: No acute distress, calm with wife and PCA (1:1) at side, [place] speaking, wants to go home  LUNGS: Clear to auscultation bilaterally   HEART: S1/S2 present. RRR.   ABD: Soft, non-tender, non-distended. Bowel sounds present  NEURO: AAOx2

## 2018-02-20 VITALS
DIASTOLIC BLOOD PRESSURE: 55 MMHG | HEART RATE: 72 BPM | SYSTOLIC BLOOD PRESSURE: 143 MMHG | RESPIRATION RATE: 19 BRPM | TEMPERATURE: 98 F

## 2018-02-20 DIAGNOSIS — D64.9 ANEMIA, UNSPECIFIED: ICD-10-CM

## 2018-02-20 DIAGNOSIS — I48.2 CHRONIC ATRIAL FIBRILLATION: ICD-10-CM

## 2018-02-20 DIAGNOSIS — I10 ESSENTIAL (PRIMARY) HYPERTENSION: ICD-10-CM

## 2018-02-20 DIAGNOSIS — E11.9 TYPE 2 DIABETES MELLITUS WITHOUT COMPLICATIONS: ICD-10-CM

## 2018-02-20 DIAGNOSIS — J18.9 PNEUMONIA, UNSPECIFIED ORGANISM: ICD-10-CM

## 2018-02-20 DIAGNOSIS — J10.1 INFLUENZA DUE TO OTHER IDENTIFIED INFLUENZA VIRUS WITH OTHER RESPIRATORY MANIFESTATIONS: ICD-10-CM

## 2018-02-20 DIAGNOSIS — E87.8 OTHER DISORDERS OF ELECTROLYTE AND FLUID BALANCE, NOT ELSEWHERE CLASSIFIED: ICD-10-CM

## 2018-02-20 LAB
ANION GAP SERPL CALC-SCNC: 9 MMOL/L — SIGNIFICANT CHANGE UP (ref 7–14)
BASOPHILS # BLD AUTO: 0.01 K/UL — SIGNIFICANT CHANGE UP (ref 0–0.2)
BASOPHILS NFR BLD AUTO: 0.3 % — SIGNIFICANT CHANGE UP (ref 0–1)
BUN SERPL-MCNC: 17 MG/DL — SIGNIFICANT CHANGE UP (ref 10–20)
CALCIUM SERPL-MCNC: 7.7 MG/DL — LOW (ref 8.5–10.1)
CHLORIDE SERPL-SCNC: 106 MMOL/L — SIGNIFICANT CHANGE UP (ref 98–110)
CO2 SERPL-SCNC: 21 MMOL/L — SIGNIFICANT CHANGE UP (ref 17–32)
CREAT SERPL-MCNC: 1.5 MG/DL — SIGNIFICANT CHANGE UP (ref 0.7–1.5)
CULTURE RESULTS: SIGNIFICANT CHANGE UP
EOSINOPHIL # BLD AUTO: 0.08 K/UL — SIGNIFICANT CHANGE UP (ref 0–0.7)
EOSINOPHIL NFR BLD AUTO: 2.3 % — SIGNIFICANT CHANGE UP (ref 0–8)
GLUCOSE SERPL-MCNC: 158 MG/DL — HIGH (ref 70–110)
HCT VFR BLD CALC: 26.4 % — LOW (ref 42–52)
HGB BLD-MCNC: 7.3 G/DL — CRITICAL LOW (ref 14–18)
IMM GRANULOCYTES NFR BLD AUTO: 0.6 % — HIGH (ref 0.1–0.3)
LYMPHOCYTES # BLD AUTO: 0.59 K/UL — LOW (ref 1.2–3.4)
LYMPHOCYTES # BLD AUTO: 17.1 % — LOW (ref 20.5–51.1)
MCHC RBC-ENTMCNC: 18.5 PG — LOW (ref 27–31)
MCHC RBC-ENTMCNC: 27.7 G/DL — LOW (ref 32–37)
MCV RBC AUTO: 66.8 FL — LOW (ref 80–94)
MONOCYTES # BLD AUTO: 0.28 K/UL — SIGNIFICANT CHANGE UP (ref 0.1–0.6)
MONOCYTES NFR BLD AUTO: 8.1 % — SIGNIFICANT CHANGE UP (ref 1.7–9.3)
NEUTROPHILS # BLD AUTO: 2.48 K/UL — SIGNIFICANT CHANGE UP (ref 1.4–6.5)
NEUTROPHILS NFR BLD AUTO: 71.6 % — SIGNIFICANT CHANGE UP (ref 42.2–75.2)
NRBC # BLD: 0 /100 WBCS — SIGNIFICANT CHANGE UP (ref 0–0)
PLATELET # BLD AUTO: 177 K/UL — SIGNIFICANT CHANGE UP (ref 130–400)
POTASSIUM SERPL-MCNC: 3.6 MMOL/L — SIGNIFICANT CHANGE UP (ref 3.5–5)
POTASSIUM SERPL-SCNC: 3.6 MMOL/L — SIGNIFICANT CHANGE UP (ref 3.5–5)
RBC # BLD: 3.95 M/UL — LOW (ref 4.7–6.1)
RBC # FLD: 24.8 % — HIGH (ref 11.5–14.5)
SODIUM SERPL-SCNC: 136 MMOL/L — SIGNIFICANT CHANGE UP (ref 135–146)
SPECIMEN SOURCE: SIGNIFICANT CHANGE UP
WBC # BLD: 3.46 K/UL — LOW (ref 4.8–10.8)
WBC # FLD AUTO: 3.46 K/UL — LOW (ref 4.8–10.8)

## 2018-02-20 RX ORDER — AZITHROMYCIN 500 MG/1
250 TABLET, FILM COATED ORAL DAILY
Qty: 0 | Refills: 0 | Status: DISCONTINUED | OUTPATIENT
Start: 2018-02-20 | End: 2018-02-20

## 2018-02-20 RX ORDER — RIVAROXABAN 15 MG-20MG
0 KIT ORAL
Qty: 30 | Refills: 0 | COMMUNITY

## 2018-02-20 RX ORDER — NEBIVOLOL HYDROCHLORIDE 5 MG/1
0 TABLET ORAL
Qty: 60 | Refills: 0 | COMMUNITY

## 2018-02-20 RX ORDER — MEMANTINE HYDROCHLORIDE AND DONEPEZIL HYDROCHLORIDE 21; 10 MG/1; MG/1
1 CAPSULE ORAL
Qty: 0 | Refills: 0 | COMMUNITY
Start: 2018-02-20

## 2018-02-20 RX ORDER — AMLODIPINE BESYLATE AND OLMESARTRAN MEDOXOMIL 10; 40 MG/1; MG/1
0 TABLET, FILM COATED ORAL
Qty: 30 | Refills: 0 | COMMUNITY

## 2018-02-20 RX ORDER — AZITHROMYCIN 500 MG/1
1 TABLET, FILM COATED ORAL
Qty: 0 | Refills: 0 | COMMUNITY
Start: 2018-02-20

## 2018-02-20 RX ORDER — PIOGLITAZONE HYDROCHLORIDE 15 MG/1
0 TABLET ORAL
Qty: 60 | Refills: 0 | COMMUNITY

## 2018-02-20 RX ORDER — SITAGLIPTIN 50 MG/1
0 TABLET, FILM COATED ORAL
Qty: 30 | Refills: 0 | COMMUNITY

## 2018-02-20 RX ORDER — OLANZAPINE 15 MG/1
1 TABLET, FILM COATED ORAL
Qty: 0 | Refills: 0 | COMMUNITY
Start: 2018-02-20

## 2018-02-20 RX ORDER — SILODOSIN 4 MG/1
0 CAPSULE ORAL
Qty: 30 | Refills: 0 | COMMUNITY

## 2018-02-20 RX ORDER — RIVASTIGMINE 4.6 MG/24H
0 PATCH, EXTENDED RELEASE TRANSDERMAL
Qty: 30 | Refills: 0 | COMMUNITY

## 2018-02-20 RX ORDER — NATEGLINIDE 60 MG/1
0 TABLET, COATED ORAL
Qty: 90 | Refills: 0 | COMMUNITY

## 2018-02-20 RX ADMIN — Medication 6 UNIT(S): at 17:57

## 2018-02-20 RX ADMIN — MEMANTINE HYDROCHLORIDE 10 MILLIGRAM(S): 10 TABLET ORAL at 17:09

## 2018-02-20 RX ADMIN — Medication 325 MILLIGRAM(S): at 11:58

## 2018-02-20 RX ADMIN — PANTOPRAZOLE SODIUM 40 MILLIGRAM(S): 20 TABLET, DELAYED RELEASE ORAL at 17:09

## 2018-02-20 RX ADMIN — Medication 25 MILLIGRAM(S): at 17:09

## 2018-02-20 RX ADMIN — Medication 81 MILLIGRAM(S): at 11:58

## 2018-02-20 RX ADMIN — AZITHROMYCIN 250 MILLIGRAM(S): 500 TABLET, FILM COATED ORAL at 11:58

## 2018-02-20 RX ADMIN — OLANZAPINE 2.5 MILLIGRAM(S): 15 TABLET, FILM COATED ORAL at 17:09

## 2018-02-20 RX ADMIN — Medication 3: at 17:58

## 2018-02-20 NOTE — PROGRESS NOTE ADULT - SUBJECTIVE AND OBJECTIVE BOX
OSVALDO MARTINGAURI  78y  Male  ***My note supercedes ALL resident notes that I sign.  My corrections for their notes are in my note.***    INTERVAL EVENTS: Pt awake and alert.  Seems stable.  Agrees to go to SNF.  Has no pain.  No SOB.  Ate today.  Spoke with RN, no nursing issues.    T(F): 96.2 (02-20-18 @ 13:47), Max: 98.7 (02-19-18 @ 21:25)  HR: 93 (02-20-18 @ 13:47) (90 - 97)  BP: 152/72 (02-20-18 @ 13:47) (142/67 - 152/72)  RR: 18 (02-20-18 @ 13:47) (18 - 19)  SpO2: --    Labs:                        7.3     (    66.8   3.46  )-----------( ---------      177      ( 20 Feb 2018 05:51 )             26.4    (    24.8            02-20    Hemoglobin: 7.3 g/dL (02-20 @ 05:51)  Hemoglobin: 7.7 g/dL (02-19 @ 07:45)  Hemoglobin: 7.0 g/dL (02-18 @ 04:30)  Hemoglobin: 7.2 g/dL (02-17 @ 05:34)      136  |  106  |  17  ----------------------------<  158<H>  3.6   |  21  |  1.5    Ca    7.7<L>      20 Feb 2018 05:51    Creatinine:   1.5 (02-20 @ 05:51)  GFR (AA):     50  GFR (non AA): 43    Creatinine:   1.3 (02-19 @ 07:45)  GFR (AA):     62  GFR (non AA): 54    Creatinine:   1.2 (02-18 @ 04:30)  GFR (AA):     68  GFR (non AA): 59    Creatinine:   1.3 (02-17 @ 05:34)  GFR (AA):     58  GFR (non AA): 50        RADIOLOGY & ADDITIONAL TESTS:    azithromycin   Tablet 250 milliGRAM(s) Oral daily    aspirin  chewable 81 milliGRAM(s) Oral daily  dextrose 5%. 1000 milliLiter(s) IV Continuous <Continuous>  dextrose 50% Injectable 12.5 Gram(s) IV Push once  dextrose 50% Injectable 25 Gram(s) IV Push once  dextrose 50% Injectable 25 Gram(s) IV Push once  dextrose 50% Injectable 50 milliLiter(s) IV Push every 15 minutes  dextrose 50% Injectable 25 milliLiter(s) IV Push every 15 minutes  dextrose Gel 1 Dose(s) Oral once PRN  docusate sodium 100 milliGRAM(s) Oral three times a day  donepezil 10 milliGRAM(s) Oral at bedtime  ferrous    sulfate 325 milliGRAM(s) Oral three times a day with meals  glucagon  Injectable 1 milliGRAM(s) IntraMuscular once PRN  insulin glargine Injectable (LANTUS) 20 Unit(s) SubCutaneous at bedtime  insulin lispro (HumaLOG) corrective regimen sliding scale   SubCutaneous three times a day before meals  insulin lispro Injectable (HumaLOG) 6 Unit(s) SubCutaneous three times a day before meals  memantine 10 milliGRAM(s) Oral every 12 hours  metoprolol     tartrate 25 milliGRAM(s) Oral two times a day  OLANZapine 2.5 milliGRAM(s) Oral two times a day  pantoprazole    Tablet 40 milliGRAM(s) Oral two times a day before meals  senna 2 Tablet(s) Oral at bedtime

## 2018-02-20 NOTE — PROGRESS NOTE ADULT - PROBLEM SELECTOR PLAN 3
microcytic, not worked up (d/w prior hospitalist) - oversight  would give 1 unit PRBC prior to tx to snf (d/w pgy 1)  begin iron tab and mvi q24  Can w/u as outpt, since no obvious bleeding: check Fe studies, check guaiac at NH x2 - If + or iron def, then outpt GI eval (if family wants) - this needs to go in NH paperwork (d/w PGY 1)  cont PPI  OK to cont ASA microcytic, not worked up (d/w prior hospitalist) - oversight  would give 1 unit PRBC prior to tx to snf (d/w pgy 1)  begin iron tab and mvi q24  Can w/u as outpt, since no obvious bleeding: check Fe studies, check guaiac at NH x2 - If + or iron def, then outpt GI eval (if family wants) - this needs to go in NH paperwork (d/w PGY 1)  cont PPI  OK to cont ASA, but hold xarelto

## 2018-02-20 NOTE — PROGRESS NOTE ADULT - PROBLEM SELECTOR PLAN 7
cont usual meds (donepizil and namenda)  cont small dose zyprexa  pt off 1:1 sit and cooperating, mildly sleepy, easily arousable

## 2018-02-20 NOTE — PROVIDER CONTACT NOTE (OTHER) - SITUATION
Patient refusing Fingerstick, combative and slaps hand away when attempting to take fingerstick.
md made aware pts med rec does not have frequency next to meds to inform rehab when to give meds and how often.

## 2018-02-20 NOTE — DIETITIAN INITIAL EVALUATION ADULT. - DIET TYPE
dysphagia 2, mechanical soft, nectar consistency fld/As per PCA, pts PO intake varies based on mood/psychiatric state but mainly >50%. States that pt has good appetite.

## 2018-02-20 NOTE — PROGRESS NOTE ADULT - ATTENDING COMMENTS
Patient seen and examined independently. Agree with resident note with exceptions. Case discussed with housestaff, nursing and patient.    Sepsis 2/2 Gram negative PNA 2/2 aspiration - Improved. Modified diet recommended by Swallow    D/C plan for SNF for PT
Patient seen and examined independently. Agree with resident note with exceptions. Case discussed with housestaff, nursing and patient  T(F): , Max: 98.9 (02-16-18 @ 14:29)  HR: 100 (02-16-18 @ 17:08) (94 - 103)  BP: 133/59 (02-16-18 @ 14:29)  RR: 20 (02-16-18 @ 14:29)  SpO2: 98% (02-16-18 @ 17:08)  IN: 0 mL / OUT: 600 mL / NET: -600 mL      General: No apparent distress  Cardiovascular: S1, S2  Gastrointestinal: Soft, Non-tender, Non-distended  Respiratory: Good air entry bilaterally  Musculoskeletal: Moves all extremities  Lymphatic: No edema  Neurologic: No gross motor deficit  Dermatologic: Skin dry                          7.7    3.55  )-----------( 174      ( 15 Feb 2018 09:28 )             27.1     02-15    137  |  103  |  30<H>  ----------------------------<  201<H>  3.3<L>   |  26  |  1.5    Ca    8.2<L>      15 Feb 2018 09:28        Culture - Blood (collected 14 Feb 2018 20:00)  Source: .Blood Blood-Peripheral  Preliminary Report (16 Feb 2018 11:02):    No growth to date.    Culture - Blood (collected 14 Feb 2018 20:00)  Source: .Blood Blood-Peripheral  Preliminary Report (16 Feb 2018 11:02):    No growth to date.    Culture - Blood (collected 14 Feb 2018 11:29)  Source: .Blood None  Preliminary Report (16 Feb 2018 02:06):    No growth to date.    Culture - Blood (collected 14 Feb 2018 11:27)  Source: .Blood None  Preliminary Report (16 Feb 2018 01:02):    No growth to date.    Culture - Blood (collected 14 Feb 2018 04:26)  Source: .Blood None  Preliminary Report (15 Feb 2018 11:01):    No growth to date.      Gram negative PNA/ Influenza A / Sepsis - resolved  Dementia - stable at baseline  D/C planning to SNF for PT
OK to go to SNF after 1 unit of PRBC given (need consent from family)

## 2018-02-20 NOTE — PROGRESS NOTE ADULT - PROVIDER SPECIALTY LIST ADULT
Hospitalist
Internal Medicine
Pulmonology
Internal Medicine
Internal Medicine

## 2018-02-20 NOTE — DIETITIAN INITIAL EVALUATION ADULT. - ENERGY NEEDS
total kcal = 5681-5027 kcal/day (MSJ x 1.2-1.3); total protein =72-96 g/day (15-20% total kcal); total fluid = 2127 mL/day (25mL/kg)

## 2018-02-20 NOTE — DIETITIAN INITIAL EVALUATION ADULT. - PHYSICAL APPEARANCE
overweight/BMI 26.8; alert, disoriented/demented. Occasionally combative & refusing meds. Ecchymotic/ BS 18.

## 2018-02-20 NOTE — DIETITIAN INITIAL EVALUATION ADULT. - FACTORS AFF FOOD INTAKE
difficulty swallowing/SLP reccs: Dysphagia 2, soft w/ nectar thick liquids. No GI abnormalities noted. +LBM 2/19.

## 2018-02-20 NOTE — PROGRESS NOTE ADULT - PROBLEM SELECTOR PLAN 8
CHADSVASc is 4; was on xarelto  pt had hgb of 6 on admit and got 2 u PRBC and a 3rd today  check guaiac at NH, if neg, restart xarelto; if +, hold xarelto and cont asa and get GI eval  plan to NH must be clear in d/c instructions (d/w PGY 2)

## 2018-02-22 DIAGNOSIS — R55 SYNCOPE AND COLLAPSE: ICD-10-CM

## 2018-02-22 DIAGNOSIS — A41.9 SEPSIS, UNSPECIFIED ORGANISM: ICD-10-CM

## 2018-02-22 DIAGNOSIS — I48.91 UNSPECIFIED ATRIAL FIBRILLATION: ICD-10-CM

## 2018-02-22 DIAGNOSIS — E11.65 TYPE 2 DIABETES MELLITUS WITH HYPERGLYCEMIA: ICD-10-CM

## 2018-02-22 DIAGNOSIS — I10 ESSENTIAL (PRIMARY) HYPERTENSION: ICD-10-CM

## 2018-02-22 DIAGNOSIS — F05 DELIRIUM DUE TO KNOWN PHYSIOLOGICAL CONDITION: ICD-10-CM

## 2018-02-22 DIAGNOSIS — E87.1 HYPO-OSMOLALITY AND HYPONATREMIA: ICD-10-CM

## 2018-02-22 DIAGNOSIS — E11.9 TYPE 2 DIABETES MELLITUS WITHOUT COMPLICATIONS: ICD-10-CM

## 2018-02-22 DIAGNOSIS — Z91.81 HISTORY OF FALLING: ICD-10-CM

## 2018-02-22 DIAGNOSIS — Z96.649 PRESENCE OF UNSPECIFIED ARTIFICIAL HIP JOINT: ICD-10-CM

## 2018-02-22 DIAGNOSIS — N40.0 BENIGN PROSTATIC HYPERPLASIA WITHOUT LOWER URINARY TRACT SYMPTOMS: ICD-10-CM

## 2018-02-22 DIAGNOSIS — I48.2 CHRONIC ATRIAL FIBRILLATION: ICD-10-CM

## 2018-02-22 DIAGNOSIS — J15.6 PNEUMONIA DUE TO OTHER GRAM-NEGATIVE BACTERIA: ICD-10-CM

## 2018-02-22 DIAGNOSIS — F03.90 UNSPECIFIED DEMENTIA WITHOUT BEHAVIORAL DISTURBANCE: ICD-10-CM

## 2018-02-22 DIAGNOSIS — R50.9 FEVER, UNSPECIFIED: ICD-10-CM

## 2018-02-22 DIAGNOSIS — J11.1 INFLUENZA DUE TO UNIDENTIFIED INFLUENZA VIRUS WITH OTHER RESPIRATORY MANIFESTATIONS: ICD-10-CM

## 2018-02-22 DIAGNOSIS — F03.91 UNSPECIFIED DEMENTIA WITH BEHAVIORAL DISTURBANCE: ICD-10-CM

## 2018-02-22 DIAGNOSIS — D64.9 ANEMIA, UNSPECIFIED: ICD-10-CM

## 2018-02-22 DIAGNOSIS — E87.6 HYPOKALEMIA: ICD-10-CM

## 2018-02-22 DIAGNOSIS — N17.9 ACUTE KIDNEY FAILURE, UNSPECIFIED: ICD-10-CM

## 2018-02-22 DIAGNOSIS — Z79.01 LONG TERM (CURRENT) USE OF ANTICOAGULANTS: ICD-10-CM

## 2018-02-22 DIAGNOSIS — I35.1 NONRHEUMATIC AORTIC (VALVE) INSUFFICIENCY: ICD-10-CM

## 2018-02-22 DIAGNOSIS — I49.9 CARDIAC ARRHYTHMIA, UNSPECIFIED: ICD-10-CM

## 2018-03-22 PROBLEM — D64.9 ANEMIA, UNSPECIFIED: Chronic | Status: ACTIVE | Noted: 2018-02-13

## 2018-03-22 PROBLEM — E11.9 TYPE 2 DIABETES MELLITUS WITHOUT COMPLICATIONS: Chronic | Status: ACTIVE | Noted: 2018-02-13

## 2018-03-22 PROBLEM — F03.90 UNSPECIFIED DEMENTIA WITHOUT BEHAVIORAL DISTURBANCE: Chronic | Status: ACTIVE | Noted: 2018-02-13

## 2018-03-22 PROBLEM — I10 ESSENTIAL (PRIMARY) HYPERTENSION: Chronic | Status: ACTIVE | Noted: 2018-02-13

## 2018-03-22 PROBLEM — I49.9 CARDIAC ARRHYTHMIA, UNSPECIFIED: Chronic | Status: ACTIVE | Noted: 2018-02-13

## 2018-04-03 ENCOUNTER — OUTPATIENT (OUTPATIENT)
Dept: OUTPATIENT SERVICES | Facility: HOSPITAL | Age: 78
LOS: 1 days | Discharge: HOME | End: 2018-04-03

## 2018-04-03 ENCOUNTER — APPOINTMENT (OUTPATIENT)
Dept: HEMATOLOGY ONCOLOGY | Facility: CLINIC | Age: 78
End: 2018-04-03

## 2018-04-03 ENCOUNTER — LABORATORY RESULT (OUTPATIENT)
Age: 78
End: 2018-04-03

## 2018-04-03 VITALS
SYSTOLIC BLOOD PRESSURE: 151 MMHG | BODY MASS INDEX: 25.41 KG/M2 | HEART RATE: 83 BPM | RESPIRATION RATE: 14 BRPM | DIASTOLIC BLOOD PRESSURE: 81 MMHG | HEIGHT: 74 IN | WEIGHT: 198 LBS | TEMPERATURE: 97 F

## 2018-04-03 DIAGNOSIS — Z90.49 ACQUIRED ABSENCE OF OTHER SPECIFIED PARTS OF DIGESTIVE TRACT: Chronic | ICD-10-CM

## 2018-04-03 DIAGNOSIS — Z86.39 PERSONAL HISTORY OF OTHER ENDOCRINE, NUTRITIONAL AND METABOLIC DISEASE: ICD-10-CM

## 2018-04-03 DIAGNOSIS — Z86.79 PERSONAL HISTORY OF OTHER DISEASES OF THE CIRCULATORY SYSTEM: ICD-10-CM

## 2018-04-03 DIAGNOSIS — D64.9 ANEMIA, UNSPECIFIED: ICD-10-CM

## 2018-04-03 DIAGNOSIS — Z96.649 PRESENCE OF UNSPECIFIED ARTIFICIAL HIP JOINT: Chronic | ICD-10-CM

## 2018-04-03 DIAGNOSIS — Z78.9 OTHER SPECIFIED HEALTH STATUS: ICD-10-CM

## 2018-04-03 RX ORDER — CHLORHEXIDINE GLUCONATE, 0.12% ORAL RINSE 1.2 MG/ML
0.12 SOLUTION DENTAL
Qty: 1419 | Refills: 0 | Status: DISCONTINUED | COMMUNITY
Start: 2017-10-14

## 2018-04-03 RX ORDER — LINACLOTIDE 145 UG/1
145 CAPSULE, GELATIN COATED ORAL
Qty: 30 | Refills: 0 | Status: DISCONTINUED | COMMUNITY
Start: 2017-12-22

## 2018-04-03 RX ORDER — AMMONIUM LACTATE 12 %
12 CREAM (GRAM) TOPICAL
Qty: 140 | Refills: 0 | Status: DISCONTINUED | COMMUNITY
Start: 2017-12-22

## 2018-04-03 RX ORDER — OLOPATADINE HYDROCHLORIDE 7 MG/ML
0.7 SOLUTION OPHTHALMIC
Qty: 2 | Refills: 0 | Status: DISCONTINUED | COMMUNITY
Start: 2017-10-14

## 2018-04-03 RX ORDER — PIOGLITAZONE HYDROCHLORIDE 15 MG/1
15 TABLET ORAL
Qty: 60 | Refills: 0 | Status: ACTIVE | COMMUNITY
Start: 2017-11-22

## 2018-04-03 RX ORDER — RIVASTIGMINE 4.6 MG/24H
4.6 PATCH, EXTENDED RELEASE TRANSDERMAL
Qty: 30 | Refills: 0 | Status: DISCONTINUED | COMMUNITY
Start: 2017-12-22

## 2018-04-03 RX ORDER — TOBRAMYCIN AND DEXAMETHASONE 3; 1 MG/G; MG/G
0.3-0.1 OINTMENT OPHTHALMIC
Qty: 4 | Refills: 0 | Status: DISCONTINUED | COMMUNITY
Start: 2017-10-14

## 2018-04-03 RX ORDER — CHLORHEXIDINE GLUCONATE 4 %
325 (65 FE) LIQUID (ML) TOPICAL DAILY
Qty: 30 | Refills: 1 | Status: ACTIVE | COMMUNITY
Start: 2018-04-03 | End: 1900-01-01

## 2018-04-03 RX ORDER — SITAGLIPTIN 50 MG/1
50 TABLET, FILM COATED ORAL
Qty: 30 | Refills: 0 | Status: ACTIVE | COMMUNITY
Start: 2017-11-06

## 2018-04-03 RX ORDER — SILODOSIN 8 MG/1
8 CAPSULE ORAL
Qty: 30 | Refills: 0 | Status: ACTIVE | COMMUNITY
Start: 2017-10-14

## 2018-04-03 RX ORDER — BLOOD-GLUCOSE METER
70 EACH MISCELLANEOUS
Qty: 100 | Refills: 0 | Status: DISCONTINUED | COMMUNITY
Start: 2017-10-21

## 2018-04-03 RX ORDER — MEMANTINE HYDROCHLORIDE AND DONEPEZIL HYDROCHLORIDE 28; 10 MG/1; MG/1
28-10 CAPSULE ORAL
Qty: 30 | Refills: 0 | Status: ACTIVE | COMMUNITY
Start: 2017-10-18

## 2018-04-03 RX ORDER — OLMESARTAN MEDOXOMIL 40 MG/1
40 TABLET, FILM COATED ORAL
Qty: 30 | Refills: 0 | Status: ACTIVE | COMMUNITY
Start: 2018-03-29

## 2018-04-03 RX ORDER — NATEGLINIDE 120 MG/1
120 TABLET, COATED ORAL
Qty: 90 | Refills: 0 | Status: ACTIVE | COMMUNITY
Start: 2017-10-26

## 2018-04-03 RX ORDER — AMOXICILLIN 500 MG/1
500 CAPSULE ORAL
Qty: 28 | Refills: 0 | Status: DISCONTINUED | COMMUNITY
Start: 2018-02-06

## 2018-04-03 RX ORDER — AMLODIPINE AND OLMESARTAN MEDOXOMIL 5; 40 MG/1; MG/1
5-40 TABLET ORAL
Qty: 30 | Refills: 0 | Status: DISCONTINUED | COMMUNITY
Start: 2017-10-26

## 2018-04-03 RX ORDER — LINAGLIPTIN 5 MG/1
5 TABLET, FILM COATED ORAL
Qty: 30 | Refills: 0 | Status: ACTIVE | COMMUNITY
Start: 2017-10-05

## 2018-04-03 RX ORDER — RIVAROXABAN 15 MG/1
15 TABLET, FILM COATED ORAL
Qty: 30 | Refills: 0 | Status: DISCONTINUED | COMMUNITY
Start: 2017-11-06

## 2018-04-03 RX ORDER — AMLODIPINE BESYLATE 5 MG/1
5 TABLET ORAL
Qty: 30 | Refills: 0 | Status: ACTIVE | COMMUNITY
Start: 2018-03-29

## 2018-04-03 RX ORDER — OLANZAPINE 2.5 MG/1
2.5 TABLET, FILM COATED ORAL
Qty: 60 | Refills: 0 | Status: ACTIVE | COMMUNITY
Start: 2018-03-29

## 2018-04-03 RX ORDER — ALFUZOSIN HYDROCHLORIDE 10 MG/1
10 TABLET, EXTENDED RELEASE ORAL
Qty: 30 | Refills: 0 | Status: DISCONTINUED | COMMUNITY
Start: 2017-10-14

## 2018-04-03 RX ORDER — NEBIVOLOL HYDROCHLORIDE 5 MG/1
5 TABLET ORAL
Qty: 60 | Refills: 0 | Status: DISCONTINUED | COMMUNITY
Start: 2017-10-23

## 2018-04-04 LAB
ALBUMIN SERPL ELPH-MCNC: 3.8 G/DL
ALP BLD-CCNC: 78 U/L
ALT SERPL-CCNC: 8 U/L
ANION GAP SERPL CALC-SCNC: 16 MMOL/L
AST SERPL-CCNC: 13 U/L
BILIRUB SERPL-MCNC: 0.5 MG/DL
BUN SERPL-MCNC: 23 MG/DL
CALCIUM SERPL-MCNC: 9.1 MG/DL
CHLORIDE SERPL-SCNC: 102 MMOL/L
CO2 SERPL-SCNC: 24 MMOL/L
CREAT SERPL-MCNC: 1.3 MG/DL
FERRITIN SERPL-MCNC: 28 NG/ML
FOLATE SERPL-MCNC: 11.1 NG/ML
GLUCOSE SERPL-MCNC: 115 MG/DL
HCT VFR BLD CALC: 31.1 %
HGB BLD-MCNC: 8.8 G/DL
IRON SATN MFR SERPL: 6 %
IRON SERPL-MCNC: 22 UG/DL
MCHC RBC-ENTMCNC: 19.8 PG
MCHC RBC-ENTMCNC: 28.3 G/DL
MCV RBC AUTO: 69.9 FL
PLATELET # BLD AUTO: 248 K/UL
PMV BLD: NORMAL
POTASSIUM SERPL-SCNC: 4.8 MMOL/L
PROT SERPL-MCNC: 6.9 G/DL
RBC # BLD: 4.45 M/UL
RBC # FLD: 21.8 %
RETICS # AUTO: 1.1 %
RETICS AGGREG/RBC NFR: 49.3 K/UL
SODIUM SERPL-SCNC: 142 MMOL/L
TIBC SERPL-MCNC: 347 UG/DL
UIBC SERPL-MCNC: 325 UG/DL
VIT B12 SERPL-MCNC: 605 PG/ML
WBC # FLD AUTO: 5.21 K/UL

## 2018-04-05 LAB
DEPRECATED KAPPA LC FREE/LAMBDA SER: 1.22 RATIO
EPO SERPL-MCNC: 389.2 MIU/ML
KAPPA LC CSF-MCNC: 2.73 MG/DL
KAPPA LC SERPL-MCNC: 3.32 MG/DL

## 2018-04-09 DIAGNOSIS — D64.9 ANEMIA, UNSPECIFIED: ICD-10-CM

## 2018-04-09 LAB
ALBUMIN MFR SERPL ELPH: 54.4 %
ALBUMIN SERPL-MCNC: 3.8 G/DL
ALBUMIN/GLOB SERPL: 1.2 RATIO
ALPHA1 GLOB MFR SERPL ELPH: 4.9 %
ALPHA1 GLOB SERPL ELPH-MCNC: 0.3 G/DL
ALPHA2 GLOB MFR SERPL ELPH: 9.9 %
ALPHA2 GLOB SERPL ELPH-MCNC: 0.7 G/DL
B-GLOBULIN MFR SERPL ELPH: 11.1 %
B-GLOBULIN SERPL ELPH-MCNC: 0.8 G/DL
GAMMA GLOB FLD ELPH-MCNC: 1.4 G/DL
GAMMA GLOB MFR SERPL ELPH: 19.7 %
INTERPRETATION SERPL IEP-IMP: NORMAL
M PROTEIN SPEC IFE-MCNC: NORMAL
PROT SERPL-MCNC: 6.9 G/DL
PROT SERPL-MCNC: 6.9 G/DL

## 2018-04-21 NOTE — H&P ADULT - PROBLEM SELECTOR PLAN 3
Problem: Patient Care Overview  Goal: Plan of Care/Patient Progress Review  Outcome: Improving  Pt is POD 1 s/p transnasal endoscopic approach for resection of cyst. A&Ox4. AVSS. Neuros intact. Denied metallic, salty or vinnie taste in mouth. Nasal sling removed by patient. Dabbing on tissue small amt of sanguinous drainage. Pt respecting nasal precautions. Headache and nausea resolved. Tolerating regular diet with a good appetite. Scopolamine patch behind ear. PIV SL. Strict I&O. Remove garcia today once order is placed. Lung sounds clear. No bm yet. Bowel sounds active. Senna given. Up and ambulating hallways independently. Possible d/c home with assist tomorrow.        insulin basal bolus if tolerating PO diet

## 2018-06-21 NOTE — ED ADULT NURSE NOTE - READING LANGUAGE PREFERRED
Large area of cytotoxic cerebral edema identified when reviewing brain imaging in the territory of the R middle cerebral artery. There is not mass effect associated with it. We will continue to monitor the patients clinical exam for any worsening of symptoms which may indicate expansion of the stroke or the area of the edema resulting in the clinical change. 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   English

## 2018-08-11 ENCOUNTER — INPATIENT (INPATIENT)
Facility: HOSPITAL | Age: 78
LOS: 4 days | Discharge: NURSING, REHAB OR LONG-TERM CARE | End: 2018-08-16
Attending: INTERNAL MEDICINE | Admitting: INTERNAL MEDICINE

## 2018-08-11 VITALS
HEART RATE: 93 BPM | TEMPERATURE: 97 F | RESPIRATION RATE: 20 BRPM | SYSTOLIC BLOOD PRESSURE: 184 MMHG | OXYGEN SATURATION: 100 % | DIASTOLIC BLOOD PRESSURE: 103 MMHG

## 2018-08-11 DIAGNOSIS — Z96.649 PRESENCE OF UNSPECIFIED ARTIFICIAL HIP JOINT: Chronic | ICD-10-CM

## 2018-08-11 DIAGNOSIS — Z90.49 ACQUIRED ABSENCE OF OTHER SPECIFIED PARTS OF DIGESTIVE TRACT: Chronic | ICD-10-CM

## 2018-08-11 LAB
ALBUMIN SERPL ELPH-MCNC: 3.7 G/DL — SIGNIFICANT CHANGE UP (ref 3.5–5.2)
ALP SERPL-CCNC: 94 U/L — SIGNIFICANT CHANGE UP (ref 30–115)
ALT FLD-CCNC: 9 U/L — SIGNIFICANT CHANGE UP (ref 0–41)
ANION GAP SERPL CALC-SCNC: 15 MMOL/L — HIGH (ref 7–14)
APPEARANCE UR: CLEAR — SIGNIFICANT CHANGE UP
APTT BLD: 29.5 SEC — SIGNIFICANT CHANGE UP (ref 27–39.2)
AST SERPL-CCNC: 16 U/L — SIGNIFICANT CHANGE UP (ref 0–41)
BASE EXCESS BLDV CALC-SCNC: 5 MMOL/L — HIGH (ref -2–2)
BASOPHILS # BLD AUTO: 0.01 K/UL — SIGNIFICANT CHANGE UP (ref 0–0.2)
BASOPHILS NFR BLD AUTO: 0.1 % — SIGNIFICANT CHANGE UP (ref 0–1)
BILIRUB SERPL-MCNC: 0.6 MG/DL — SIGNIFICANT CHANGE UP (ref 0.2–1.2)
BILIRUB UR-MCNC: NEGATIVE — SIGNIFICANT CHANGE UP
BUN SERPL-MCNC: 24 MG/DL — HIGH (ref 10–20)
CA-I SERPL-SCNC: 1.26 MMOL/L — SIGNIFICANT CHANGE UP (ref 1.12–1.3)
CALCIUM SERPL-MCNC: 9 MG/DL — SIGNIFICANT CHANGE UP (ref 8.5–10.1)
CHLORIDE SERPL-SCNC: 101 MMOL/L — SIGNIFICANT CHANGE UP (ref 98–110)
CO2 SERPL-SCNC: 26 MMOL/L — SIGNIFICANT CHANGE UP (ref 17–32)
COLOR SPEC: YELLOW — SIGNIFICANT CHANGE UP
CREAT SERPL-MCNC: 1.2 MG/DL — SIGNIFICANT CHANGE UP (ref 0.7–1.5)
DIFF PNL FLD: NEGATIVE — SIGNIFICANT CHANGE UP
EOSINOPHIL # BLD AUTO: 0.24 K/UL — SIGNIFICANT CHANGE UP (ref 0–0.7)
EOSINOPHIL NFR BLD AUTO: 3.4 % — SIGNIFICANT CHANGE UP (ref 0–8)
GAS PNL BLDV: 136 MMOL/L — SIGNIFICANT CHANGE UP (ref 136–145)
GAS PNL BLDV: SIGNIFICANT CHANGE UP
GLUCOSE SERPL-MCNC: 120 MG/DL — HIGH (ref 70–99)
GLUCOSE UR QL: NEGATIVE MG/DL — SIGNIFICANT CHANGE UP
HCO3 BLDV-SCNC: 30 MMOL/L — HIGH (ref 22–29)
HCT VFR BLD CALC: 44.7 % — SIGNIFICANT CHANGE UP (ref 42–52)
HCT VFR BLDA CALC: 47.4 % — HIGH (ref 34–44)
HGB BLD CALC-MCNC: 15.5 G/DL — SIGNIFICANT CHANGE UP (ref 14–18)
HGB BLD-MCNC: 14.6 G/DL — SIGNIFICANT CHANGE UP (ref 14–18)
IMM GRANULOCYTES NFR BLD AUTO: 0.6 % — HIGH (ref 0.1–0.3)
INR BLD: 1.2 RATIO — SIGNIFICANT CHANGE UP (ref 0.65–1.3)
KETONES UR-MCNC: NEGATIVE — SIGNIFICANT CHANGE UP
LACTATE BLDV-MCNC: 1 MMOL/L — SIGNIFICANT CHANGE UP (ref 0.5–1.6)
LACTATE SERPL-SCNC: 1 MMOL/L — SIGNIFICANT CHANGE UP (ref 0.5–2.2)
LEUKOCYTE ESTERASE UR-ACNC: NEGATIVE — SIGNIFICANT CHANGE UP
LYMPHOCYTES # BLD AUTO: 0.7 K/UL — LOW (ref 1.2–3.4)
LYMPHOCYTES # BLD AUTO: 9.9 % — LOW (ref 20.5–51.1)
MCHC RBC-ENTMCNC: 26.6 PG — LOW (ref 27–31)
MCHC RBC-ENTMCNC: 32.7 G/DL — SIGNIFICANT CHANGE UP (ref 32–37)
MCV RBC AUTO: 81.6 FL — SIGNIFICANT CHANGE UP (ref 80–94)
MONOCYTES # BLD AUTO: 0.71 K/UL — HIGH (ref 0.1–0.6)
MONOCYTES NFR BLD AUTO: 10 % — HIGH (ref 1.7–9.3)
NEUTROPHILS # BLD AUTO: 5.38 K/UL — SIGNIFICANT CHANGE UP (ref 1.4–6.5)
NEUTROPHILS NFR BLD AUTO: 76 % — HIGH (ref 42.2–75.2)
NITRITE UR-MCNC: NEGATIVE — SIGNIFICANT CHANGE UP
NRBC # BLD: 0 /100 WBCS — SIGNIFICANT CHANGE UP (ref 0–0)
NT-PROBNP SERPL-SCNC: 1830 PG/ML — HIGH (ref 0–300)
PCO2 BLDV: 47 MMHG — SIGNIFICANT CHANGE UP (ref 41–51)
PH BLDV: 7.42 — SIGNIFICANT CHANGE UP (ref 7.26–7.43)
PH UR: 7 — SIGNIFICANT CHANGE UP (ref 5–8)
PLATELET # BLD AUTO: 149 K/UL — SIGNIFICANT CHANGE UP (ref 130–400)
PO2 BLDV: 35 MMHG — SIGNIFICANT CHANGE UP (ref 20–40)
POTASSIUM BLDV-SCNC: 3.9 MMOL/L — SIGNIFICANT CHANGE UP (ref 3.3–5.6)
POTASSIUM SERPL-MCNC: 4.4 MMOL/L — SIGNIFICANT CHANGE UP (ref 3.5–5)
POTASSIUM SERPL-SCNC: 4.4 MMOL/L — SIGNIFICANT CHANGE UP (ref 3.5–5)
PROT SERPL-MCNC: 6.7 G/DL — SIGNIFICANT CHANGE UP (ref 6–8)
PROT UR-MCNC: NEGATIVE MG/DL — SIGNIFICANT CHANGE UP
PROTHROM AB SERPL-ACNC: 12.9 SEC — HIGH (ref 9.95–12.87)
RBC # BLD: 5.48 M/UL — SIGNIFICANT CHANGE UP (ref 4.7–6.1)
RBC # FLD: 17.1 % — HIGH (ref 11.5–14.5)
SAO2 % BLDV: 64 % — SIGNIFICANT CHANGE UP
SODIUM SERPL-SCNC: 142 MMOL/L — SIGNIFICANT CHANGE UP (ref 135–146)
SP GR SPEC: 1.01 — SIGNIFICANT CHANGE UP (ref 1.01–1.03)
TROPONIN T SERPL-MCNC: <0.01 NG/ML — SIGNIFICANT CHANGE UP
UROBILINOGEN FLD QL: 0.2 MG/DL — SIGNIFICANT CHANGE UP (ref 0.2–0.2)
WBC # BLD: 7.08 K/UL — SIGNIFICANT CHANGE UP (ref 4.8–10.8)
WBC # FLD AUTO: 7.08 K/UL — SIGNIFICANT CHANGE UP (ref 4.8–10.8)

## 2018-08-11 RX ORDER — AMLODIPINE BESYLATE 2.5 MG/1
5 TABLET ORAL ONCE
Qty: 0 | Refills: 0 | Status: COMPLETED | OUTPATIENT
Start: 2018-08-11 | End: 2018-08-11

## 2018-08-11 RX ORDER — SODIUM CHLORIDE 9 MG/ML
1000 INJECTION INTRAMUSCULAR; INTRAVENOUS; SUBCUTANEOUS ONCE
Qty: 0 | Refills: 0 | Status: COMPLETED | OUTPATIENT
Start: 2018-08-11 | End: 2018-08-11

## 2018-08-11 RX ORDER — ALFUZOSIN HYDROCHLORIDE 10 MG/1
1 TABLET, EXTENDED RELEASE ORAL
Qty: 0 | Refills: 0 | COMMUNITY

## 2018-08-11 RX ORDER — AMLODIPINE BESYLATE AND OLMESARTRAN MEDOXOMIL 10; 40 MG/1; MG/1
1 TABLET, FILM COATED ORAL
Qty: 30 | Refills: 0 | COMMUNITY

## 2018-08-11 RX ADMIN — SODIUM CHLORIDE 1000 MILLILITER(S): 9 INJECTION INTRAMUSCULAR; INTRAVENOUS; SUBCUTANEOUS at 18:00

## 2018-08-11 RX ADMIN — SODIUM CHLORIDE 1000 MILLILITER(S): 9 INJECTION INTRAMUSCULAR; INTRAVENOUS; SUBCUTANEOUS at 18:50

## 2018-08-11 NOTE — ED ADULT TRIAGE NOTE - CHIEF COMPLAINT QUOTE
patient reports weakness and lethargy - frequent urination x few days mucous membranes dry. patient reports body pain

## 2018-08-11 NOTE — ED ADULT NURSE NOTE - NSIMPLEMENTINTERV_GEN_ALL_ED
Implemented All Fall with Harm Risk Interventions:  [place] to call system. Call bell, personal items and telephone within reach. Instruct patient to call for assistance. Room bathroom lighting operational. Non-slip footwear when patient is off stretcher. Physically safe environment: no spills, clutter or unnecessary equipment. Stretcher in lowest position, wheels locked, appropriate side rails in place. Provide visual cue, wrist band, yellow gown, etc. Monitor gait and stability. Monitor for mental status changes and reorient to person, place, and time. Review medications for side effects contributing to fall risk. Reinforce activity limits and safety measures with patient and family. Provide visual clues: red socks.

## 2018-08-11 NOTE — ED PROVIDER NOTE - MEDICAL DECISION MAKING DETAILS
unclear etiology of pt symtpoms. ? progression of chronic disease. CTC pending for occult pna. HD stable. afebrile. albs un

## 2018-08-11 NOTE — ED PROVIDER NOTE - OBJECTIVE STATEMENT
78 M pmhx afib formerly on Xarelto, dementia, DMII not on insulin, presenting from home with decreased ambulation (walks with walker at baseline) x 3 days "gurgling," and urinary frequency. bib family. Pt poor historian. denies active, cp, dysuria, n/v. No diarrhea per family.

## 2018-08-11 NOTE — ED ADULT NURSE NOTE - OBJECTIVE STATEMENT
Pt presents to ED with increased weakness, sleepiness, and decreased PO intake over the past 2 days. Family reports that patient would walk with walker, until the past days, also urinary frequency.

## 2018-08-11 NOTE — ED ADULT NURSE REASSESSMENT NOTE - NS ED NURSE REASSESS COMMENT FT1
pt appears to be comfortable. no s/s pain or discomfort. currently resting on stretcher, VSS. will monitor

## 2018-08-11 NOTE — ED PROVIDER NOTE - CARE PLAN
Principal Discharge DX:	Ambulatory dysfunction  Secondary Diagnosis:	Generalized weakness  Secondary Diagnosis:	Urinary frequency

## 2018-08-12 LAB
CULTURE RESULTS: NO GROWTH — SIGNIFICANT CHANGE UP
SPECIMEN SOURCE: SIGNIFICANT CHANGE UP

## 2018-08-12 RX ORDER — FERROUS SULFATE 325(65) MG
325 TABLET ORAL DAILY
Qty: 0 | Refills: 0 | Status: DISCONTINUED | OUTPATIENT
Start: 2018-08-12 | End: 2018-08-16

## 2018-08-12 RX ORDER — DEXTROSE 50 % IN WATER 50 %
15 SYRINGE (ML) INTRAVENOUS ONCE
Qty: 0 | Refills: 0 | Status: DISCONTINUED | OUTPATIENT
Start: 2018-08-12 | End: 2018-08-16

## 2018-08-12 RX ORDER — TAMSULOSIN HYDROCHLORIDE 0.4 MG/1
0.4 CAPSULE ORAL AT BEDTIME
Qty: 0 | Refills: 0 | Status: DISCONTINUED | OUTPATIENT
Start: 2018-08-12 | End: 2018-08-16

## 2018-08-12 RX ORDER — DEXTROSE 50 % IN WATER 50 %
12.5 SYRINGE (ML) INTRAVENOUS ONCE
Qty: 0 | Refills: 0 | Status: DISCONTINUED | OUTPATIENT
Start: 2018-08-12 | End: 2018-08-16

## 2018-08-12 RX ORDER — METOPROLOL TARTRATE 50 MG
100 TABLET ORAL DAILY
Qty: 0 | Refills: 0 | Status: DISCONTINUED | OUTPATIENT
Start: 2018-08-12 | End: 2018-08-16

## 2018-08-12 RX ORDER — AMLODIPINE BESYLATE 2.5 MG/1
5 TABLET ORAL DAILY
Qty: 0 | Refills: 0 | Status: DISCONTINUED | OUTPATIENT
Start: 2018-08-12 | End: 2018-08-16

## 2018-08-12 RX ORDER — HEPARIN SODIUM 5000 [USP'U]/ML
5000 INJECTION INTRAVENOUS; SUBCUTANEOUS EVERY 8 HOURS
Qty: 0 | Refills: 0 | Status: DISCONTINUED | OUTPATIENT
Start: 2018-08-12 | End: 2018-08-16

## 2018-08-12 RX ORDER — OLANZAPINE 15 MG/1
2.5 TABLET, FILM COATED ORAL
Qty: 0 | Refills: 0 | Status: DISCONTINUED | OUTPATIENT
Start: 2018-08-12 | End: 2018-08-16

## 2018-08-12 RX ORDER — GLUCAGON INJECTION, SOLUTION 0.5 MG/.1ML
1 INJECTION, SOLUTION SUBCUTANEOUS ONCE
Qty: 0 | Refills: 0 | Status: DISCONTINUED | OUTPATIENT
Start: 2018-08-12 | End: 2018-08-16

## 2018-08-12 RX ORDER — INSULIN LISPRO 100/ML
VIAL (ML) SUBCUTANEOUS
Qty: 0 | Refills: 0 | Status: DISCONTINUED | OUTPATIENT
Start: 2018-08-12 | End: 2018-08-16

## 2018-08-12 RX ORDER — DONEPEZIL HYDROCHLORIDE 10 MG/1
10 TABLET, FILM COATED ORAL AT BEDTIME
Qty: 0 | Refills: 0 | Status: DISCONTINUED | OUTPATIENT
Start: 2018-08-12 | End: 2018-08-16

## 2018-08-12 RX ORDER — MEMANTINE HYDROCHLORIDE 10 MG/1
10 TABLET ORAL
Qty: 0 | Refills: 0 | Status: DISCONTINUED | OUTPATIENT
Start: 2018-08-12 | End: 2018-08-16

## 2018-08-12 RX ORDER — INSULIN LISPRO 100/ML
5 VIAL (ML) SUBCUTANEOUS
Qty: 0 | Refills: 0 | Status: DISCONTINUED | OUTPATIENT
Start: 2018-08-12 | End: 2018-08-16

## 2018-08-12 RX ORDER — OXYBUTYNIN CHLORIDE 5 MG
5 TABLET ORAL
Qty: 0 | Refills: 0 | Status: DISCONTINUED | OUTPATIENT
Start: 2018-08-12 | End: 2018-08-16

## 2018-08-12 RX ORDER — INSULIN GLARGINE 100 [IU]/ML
15 INJECTION, SOLUTION SUBCUTANEOUS AT BEDTIME
Qty: 0 | Refills: 0 | Status: DISCONTINUED | OUTPATIENT
Start: 2018-08-12 | End: 2018-08-16

## 2018-08-12 RX ORDER — DEXTROSE 50 % IN WATER 50 %
25 SYRINGE (ML) INTRAVENOUS ONCE
Qty: 0 | Refills: 0 | Status: DISCONTINUED | OUTPATIENT
Start: 2018-08-12 | End: 2018-08-16

## 2018-08-12 RX ORDER — ASPIRIN/CALCIUM CARB/MAGNESIUM 324 MG
81 TABLET ORAL DAILY
Qty: 0 | Refills: 0 | Status: DISCONTINUED | OUTPATIENT
Start: 2018-08-12 | End: 2018-08-16

## 2018-08-12 RX ORDER — LOSARTAN POTASSIUM 100 MG/1
25 TABLET, FILM COATED ORAL DAILY
Qty: 0 | Refills: 0 | Status: DISCONTINUED | OUTPATIENT
Start: 2018-08-12 | End: 2018-08-16

## 2018-08-12 RX ORDER — SODIUM CHLORIDE 9 MG/ML
1000 INJECTION, SOLUTION INTRAVENOUS
Qty: 0 | Refills: 0 | Status: DISCONTINUED | OUTPATIENT
Start: 2018-08-12 | End: 2018-08-16

## 2018-08-12 RX ADMIN — Medication 81 MILLIGRAM(S): at 12:18

## 2018-08-12 RX ADMIN — Medication 325 MILLIGRAM(S): at 12:18

## 2018-08-12 RX ADMIN — Medication 5 MILLIGRAM(S): at 17:36

## 2018-08-12 RX ADMIN — TAMSULOSIN HYDROCHLORIDE 0.4 MILLIGRAM(S): 0.4 CAPSULE ORAL at 21:31

## 2018-08-12 RX ADMIN — MEMANTINE HYDROCHLORIDE 10 MILLIGRAM(S): 10 TABLET ORAL at 17:36

## 2018-08-12 RX ADMIN — LOSARTAN POTASSIUM 25 MILLIGRAM(S): 100 TABLET, FILM COATED ORAL at 06:21

## 2018-08-12 RX ADMIN — HEPARIN SODIUM 5000 UNIT(S): 5000 INJECTION INTRAVENOUS; SUBCUTANEOUS at 06:21

## 2018-08-12 RX ADMIN — Medication 1: at 17:34

## 2018-08-12 RX ADMIN — Medication 5 UNIT(S): at 17:34

## 2018-08-12 RX ADMIN — OLANZAPINE 2.5 MILLIGRAM(S): 15 TABLET, FILM COATED ORAL at 17:36

## 2018-08-12 RX ADMIN — DONEPEZIL HYDROCHLORIDE 10 MILLIGRAM(S): 10 TABLET, FILM COATED ORAL at 21:31

## 2018-08-12 RX ADMIN — HEPARIN SODIUM 5000 UNIT(S): 5000 INJECTION INTRAVENOUS; SUBCUTANEOUS at 21:32

## 2018-08-12 RX ADMIN — Medication 2: at 12:18

## 2018-08-12 RX ADMIN — Medication 100 MILLIGRAM(S): at 05:20

## 2018-08-12 RX ADMIN — HEPARIN SODIUM 5000 UNIT(S): 5000 INJECTION INTRAVENOUS; SUBCUTANEOUS at 14:00

## 2018-08-12 RX ADMIN — INSULIN GLARGINE 15 UNIT(S): 100 INJECTION, SOLUTION SUBCUTANEOUS at 21:43

## 2018-08-12 RX ADMIN — AMLODIPINE BESYLATE 5 MILLIGRAM(S): 2.5 TABLET ORAL at 10:11

## 2018-08-12 RX ADMIN — Medication 5 UNIT(S): at 12:18

## 2018-08-12 RX ADMIN — OLANZAPINE 2.5 MILLIGRAM(S): 15 TABLET, FILM COATED ORAL at 06:22

## 2018-08-12 RX ADMIN — Medication 5 MILLIGRAM(S): at 06:22

## 2018-08-12 RX ADMIN — AMLODIPINE BESYLATE 5 MILLIGRAM(S): 2.5 TABLET ORAL at 00:22

## 2018-08-12 RX ADMIN — MEMANTINE HYDROCHLORIDE 10 MILLIGRAM(S): 10 TABLET ORAL at 06:22

## 2018-08-12 NOTE — H&P ADULT - ATTENDING COMMENTS
78 year old male with pmhx afib formerly on Xarelto- stopped for unknown reasons, dementia, DMII not on insulin, presenting from home with decreased ambulation (walks with walker at baseline) x 3 days.  Per son, pt also has been experiencing urinary frequency.  Brought in by family for difficulty taking care of the patient at home. Pt poor historian. No review of systems obtained from patient.  Family report no fevers, recent infection.  Son reports loss of appetite recently x few days.  Pt does not have activities of daily living such as walking, dressing, showering, cleaning up after himself, toileting, eating, balancing check books.  Son had home attendant to help patient with common ADL's.  Pt baseline walks with a walker but unable to stand for a few weeks.      REVIEW OF SYSTEMS: see cc/HPI  CONSTITUTIONAL: (+) generalized weakness, fevers or chills  EYES/ENT: No visual changes;  No vertigo or throat pain   NECK: No pain or stiffness  RESPIRATORY: No cough, wheezing, hemoptysis; No shortness of breath  CARDIOVASCULAR: No chest pain or palpitations  GASTROINTESTINAL: No abdominal or epigastric pain. No nausea, vomiting, or hematemesis; No diarrhea or constipation. No melena or hematochezia.  GENITOURINARY: No dysuria, frequency or hematuria  NEUROLOGICAL: No numbness or weakness  SKIN: No itching, rashes    Physical Exam:  General: WN/WD NAD, spoke [place] citizen softly  Neurology: Arousable, appropriate and follows commands, NON - focal exam  Eyes: PERRLA/ EOMI  ENT/Neck: Neck supple, trachea midline, No JVD  Respiratory: CTA B/L, No wheezing, rales, rhonchi  CV: Normal rate regular rhythm, S1S2, no murmurs, rubs or gallops  Abdominal: Soft, NT, ND +BS,   Extremities: No edema, + peripheral pulses  Skin: No Rashes, Hematoma, Ecchymosis  Incisions: n/a  Tubes:n/a    A/P   Dementia w/ behavioral changes  -Neurology eval  -TSH, Vit B12, RPR   -Rehab /PT eval  - c/w outpatient Rx    A. Fib   -c/w outpatient Rx    DM-   monitoring and outpatient Rx    DVT prophylaxis

## 2018-08-12 NOTE — H&P ADULT - NSHPSOCIALHISTORY_GEN_ALL_CORE
Marital Status:  ( X  )    (   ) Single    (   )    (  )   Occupation:   Lives with: (  ) alone  ( X ) children   (  ) spouse   (  ) parents  (  ) other    Substance Use (street drugs): ( X ) never used  (  ) other:  Tobacco Usage:  (  X ) never smoked   (   ) former smoker   (   ) current smoker  (     ) pack year  (        ) last cigarette date  Alcohol Usage: Rarely

## 2018-08-12 NOTE — H&P ADULT - ASSESSMENT
78 year old male with PMHx of Atrial fibrillation, previously on xarelto - discontinued for unknown reason, dementia, DM presents with worsening dementia, needing help with all ADL's and difficulty ambulating independently.    # Inability to ambulate secondary to dementia  - Family request physiatry consult for admission to short term rehab transition to long term  - PT/ Physiatry consult    # Dementia  -family reports he is alert but oriented x 0 at baseline.  Has memory deficits  -needs help with all ADL's  -pt on memantine-donepezil, namzaric, exelon at home.    -Restart memantine and donepezil.      # Atrial Fibrillation  -pt on bystolic 5 mg at home - most appropriate in house medication is metoprolol 100 mg    #DM  -Start ISS  -monitor fingersticks    #FENP:  Fluids: not required  Electrolytes: WNL  Nutrition: Regular diet  Prophylaxis: Heparin    #Dispo: from home.  Family looking for admission to SNF    #Code Status: PLEASE DISCUSS CODE STATUS WITH SON. 78 year old male with PMHx of Atrial fibrillation, previously on xarelto - discontinued for unknown reason, dementia, DM presents with worsening dementia, needing help with all ADL's and difficulty ambulating independently.    # Inability to ambulate secondary to dementia  - Family request physiatry consult for admission to short term rehab transition to long term  - PT/ Physiatry consult    # Dementia  -family reports he is alert but oriented x 0 at baseline.  Has memory deficits  -needs help with all ADL's  -pt on memantine-donepezil, and exelon at home.    -Restart memantine and donepezil.  D/w in house pharmacy to see if there is another option for rivastigmine.     # Atrial Fibrillation  -pt on bystolic 5 mg at home - most appropriate in house medication is metoprolol 100 mg    #DM  -Start ISS  -monitor fingersticks    #FENP:  Fluids: not required  Electrolytes: WNL  Nutrition: Regular diet  Prophylaxis: Heparin    #Dispo: from home.  Family looking for admission to SNF    #Code Status: PLEASE DISCUSS CODE STATUS WITH SON.

## 2018-08-12 NOTE — H&P ADULT - HISTORY OF PRESENT ILLNESS
78 year old male with pmhx afib formerly on Xarelto- stopped for unknown reasons, dementia, DMII not on insulin, presenting from home with decreased ambulation (walks with walker at baseline) x 3 days.  Per son, pt also has been experiencing urinary frequency.  Brought in by family for difficulty taking care of the patient at home. Pt poor historian. No review of systems obtained from patient.  Family report no fevers, recent infection.  Son reports loss of appetite recently x few days.  Pt does not have activities of daily living such as walking, dressing, showering, cleaning up after himself, toileting, eating, balancing check books.  Son had home attendant to help patient with common ADL's.  Pt baseline walks with a walker but unable to stand for a few weeks.

## 2018-08-12 NOTE — H&P ADULT - NSHPPHYSICALEXAM_GEN_ALL_CORE
T(F): 96.3 (08-11-18 @ 23:00), Max: 98.8 (08-11-18 @ 17:36)  HR: 93 (08-11-18 @ 23:00) (83 - 93)  BP: 185/88 (08-11-18 @ 23:00) (154/76 - 185/88)  RR: 18 (08-11-18 @ 23:00) (18 - 20)  SpO2: 95% (08-11-18 @ 19:18) (95% - 100%)    General:  WN/ WD NAD  Neurology:  A&O x0  Respiratory: CTA B/l - pt did not take deep breaths  CV: Irregular heart beat  Abdominal: soft, NT, ND, +BS    Extremities: no edema, Pulses 2+ b/l,  Chronic venous stasis ulcers b/l

## 2018-08-12 NOTE — H&P ADULT - NSHPLABSRESULTS_GEN_ALL_CORE
14.6   7.08  )-----------( 149      ( 11 Aug 2018 17:30 )             44.7           142  |  101  |  24<H>  ----------------------------<  120<H>  4.4   |  26  |  1.2    Ca    9.0      11 Aug 2018 17:30    TPro  6.7  /  Alb  3.7  /  TBili  0.6  /  DBili  x   /  AST  16  /  ALT  9   /  AlkPhos  94                Urinalysis Basic - ( 11 Aug 2018 17:30 )    Color: Yellow / Appearance: Clear / S.010 / pH: x  Gluc: x / Ketone: Negative  / Bili: Negative / Urobili: 0.2 mg/dL   Blood: x / Protein: Negative mg/dL / Nitrite: Negative   Leuk Esterase: Negative / RBC: x / WBC x   Sq Epi: x / Non Sq Epi: x / Bacteria: x        PT/INR - ( 11 Aug 2018 17:30 )   PT: 12.90 sec;   INR: 1.20 ratio         PTT - ( 11 Aug 2018 17:30 )  PTT:29.5 sec    Lactate Trend   @ 17:30 Lactate:1.0       CARDIAC MARKERS ( 11 Aug 2018 17:30 )  x     / <0.01 ng/mL / x     / x     / x            CAPILLARY BLOOD GLUCOSE      CT Chest:  < from: CT Chest No Cont (18 @ 20:22) >  IMPRESSION:  Since 2018:  1.  Bilateral trace pleural effusions (improved on the right side).  2.  Stable small pericardial effusion.  3.  Unchanged 1 cm left upper lobe nodule containing calcification and apparent regions of fat density, may represent a pulmonary hamartoma.  4.  Apparent new multiple 2 to 3 mm nodular opacities in the bilateral lungs, predominant upper lobes, could represent small airways inflammation. Consider 3 month follow-up exam.    < from: CT Head No Cont (18 @ 20:21) >  IMPRESSION:   1.  No evidence of acute intracranial pathology.  Stable exam since 2018.    2.  Stable mild ventricular prominence, mildchronic microvascular changes and moderate cerebral atrophy.  < end of copied text >

## 2018-08-13 LAB
ANION GAP SERPL CALC-SCNC: 18 MMOL/L — HIGH (ref 7–14)
BASOPHILS # BLD AUTO: 0.02 K/UL — SIGNIFICANT CHANGE UP (ref 0–0.2)
BASOPHILS NFR BLD AUTO: 0.3 % — SIGNIFICANT CHANGE UP (ref 0–1)
BUN SERPL-MCNC: 29 MG/DL — HIGH (ref 10–20)
CALCIUM SERPL-MCNC: 8.6 MG/DL — SIGNIFICANT CHANGE UP (ref 8.5–10.1)
CHLORIDE SERPL-SCNC: 98 MMOL/L — SIGNIFICANT CHANGE UP (ref 98–110)
CO2 SERPL-SCNC: 24 MMOL/L — SIGNIFICANT CHANGE UP (ref 17–32)
CREAT SERPL-MCNC: 1.3 MG/DL — SIGNIFICANT CHANGE UP (ref 0.7–1.5)
EOSINOPHIL # BLD AUTO: 0.35 K/UL — SIGNIFICANT CHANGE UP (ref 0–0.7)
EOSINOPHIL NFR BLD AUTO: 5.1 % — SIGNIFICANT CHANGE UP (ref 0–8)
GLUCOSE SERPL-MCNC: 94 MG/DL — SIGNIFICANT CHANGE UP (ref 70–99)
HCT VFR BLD CALC: 43.6 % — SIGNIFICANT CHANGE UP (ref 42–52)
HGB BLD-MCNC: 14.1 G/DL — SIGNIFICANT CHANGE UP (ref 14–18)
IMM GRANULOCYTES NFR BLD AUTO: 0.7 % — HIGH (ref 0.1–0.3)
LYMPHOCYTES # BLD AUTO: 0.95 K/UL — LOW (ref 1.2–3.4)
LYMPHOCYTES # BLD AUTO: 14 % — LOW (ref 20.5–51.1)
MAGNESIUM SERPL-MCNC: 2.1 MG/DL — SIGNIFICANT CHANGE UP (ref 1.8–2.4)
MCHC RBC-ENTMCNC: 26.3 PG — LOW (ref 27–31)
MCHC RBC-ENTMCNC: 32.3 G/DL — SIGNIFICANT CHANGE UP (ref 32–37)
MCV RBC AUTO: 81.2 FL — SIGNIFICANT CHANGE UP (ref 80–94)
MONOCYTES # BLD AUTO: 0.72 K/UL — HIGH (ref 0.1–0.6)
MONOCYTES NFR BLD AUTO: 10.6 % — HIGH (ref 1.7–9.3)
NEUTROPHILS # BLD AUTO: 4.71 K/UL — SIGNIFICANT CHANGE UP (ref 1.4–6.5)
NEUTROPHILS NFR BLD AUTO: 69.3 % — SIGNIFICANT CHANGE UP (ref 42.2–75.2)
NRBC # BLD: 0 /100 WBCS — SIGNIFICANT CHANGE UP (ref 0–0)
PLATELET # BLD AUTO: 161 K/UL — SIGNIFICANT CHANGE UP (ref 130–400)
POTASSIUM SERPL-MCNC: 4 MMOL/L — SIGNIFICANT CHANGE UP (ref 3.5–5)
POTASSIUM SERPL-SCNC: 4 MMOL/L — SIGNIFICANT CHANGE UP (ref 3.5–5)
RBC # BLD: 5.37 M/UL — SIGNIFICANT CHANGE UP (ref 4.7–6.1)
RBC # FLD: 16.5 % — HIGH (ref 11.5–14.5)
SODIUM SERPL-SCNC: 140 MMOL/L — SIGNIFICANT CHANGE UP (ref 135–146)
WBC # BLD: 6.8 K/UL — SIGNIFICANT CHANGE UP (ref 4.8–10.8)
WBC # FLD AUTO: 6.8 K/UL — SIGNIFICANT CHANGE UP (ref 4.8–10.8)

## 2018-08-13 RX ORDER — CHLORHEXIDINE GLUCONATE 213 G/1000ML
1 SOLUTION TOPICAL
Qty: 0 | Refills: 0 | Status: DISCONTINUED | OUTPATIENT
Start: 2018-08-13 | End: 2018-08-16

## 2018-08-13 RX ADMIN — Medication 5 UNIT(S): at 18:04

## 2018-08-13 RX ADMIN — Medication 5 UNIT(S): at 12:51

## 2018-08-13 RX ADMIN — Medication 100 MILLIGRAM(S): at 05:57

## 2018-08-13 RX ADMIN — Medication 5 MILLIGRAM(S): at 18:07

## 2018-08-13 RX ADMIN — HEPARIN SODIUM 5000 UNIT(S): 5000 INJECTION INTRAVENOUS; SUBCUTANEOUS at 13:40

## 2018-08-13 RX ADMIN — HEPARIN SODIUM 5000 UNIT(S): 5000 INJECTION INTRAVENOUS; SUBCUTANEOUS at 05:57

## 2018-08-13 RX ADMIN — MEMANTINE HYDROCHLORIDE 10 MILLIGRAM(S): 10 TABLET ORAL at 18:07

## 2018-08-13 RX ADMIN — AMLODIPINE BESYLATE 5 MILLIGRAM(S): 2.5 TABLET ORAL at 05:58

## 2018-08-13 RX ADMIN — OLANZAPINE 2.5 MILLIGRAM(S): 15 TABLET, FILM COATED ORAL at 05:59

## 2018-08-13 RX ADMIN — INSULIN GLARGINE 15 UNIT(S): 100 INJECTION, SOLUTION SUBCUTANEOUS at 23:37

## 2018-08-13 RX ADMIN — MEMANTINE HYDROCHLORIDE 10 MILLIGRAM(S): 10 TABLET ORAL at 05:58

## 2018-08-13 RX ADMIN — Medication 5 MILLIGRAM(S): at 05:57

## 2018-08-13 RX ADMIN — DONEPEZIL HYDROCHLORIDE 10 MILLIGRAM(S): 10 TABLET, FILM COATED ORAL at 21:47

## 2018-08-13 RX ADMIN — Medication 325 MILLIGRAM(S): at 12:52

## 2018-08-13 RX ADMIN — OLANZAPINE 2.5 MILLIGRAM(S): 15 TABLET, FILM COATED ORAL at 18:07

## 2018-08-13 RX ADMIN — Medication 81 MILLIGRAM(S): at 12:52

## 2018-08-13 RX ADMIN — LOSARTAN POTASSIUM 25 MILLIGRAM(S): 100 TABLET, FILM COATED ORAL at 05:58

## 2018-08-13 RX ADMIN — Medication 1: at 18:05

## 2018-08-13 RX ADMIN — TAMSULOSIN HYDROCHLORIDE 0.4 MILLIGRAM(S): 0.4 CAPSULE ORAL at 21:47

## 2018-08-13 RX ADMIN — HEPARIN SODIUM 5000 UNIT(S): 5000 INJECTION INTRAVENOUS; SUBCUTANEOUS at 21:47

## 2018-08-13 NOTE — CONSULT NOTE ADULT - SUBJECTIVE AND OBJECTIVE BOX
Patient is a 78y old  Male who presents with a chief complaint of inability to ambulate (12 Aug 2018 03:02)    HPI:  78 year old male with pmhx afib formerly on Xarelto- stopped for unknown reasons, dementia, DMII not on insulin, presenting from home with decreased ambulation (walks with walker at baseline) x 3 days.  Per son, pt also has been experiencing urinary frequency.  Brought in by family for difficulty taking care of the patient at home. Pt poor historian. No review of systems obtained from patient.  Family report no fevers, recent infection.  Son reports loss of appetite recently x few days.  Pt does not have activities of daily living such as walking, dressing, showering, cleaning up after himself, toileting, eating, balancing check books.  Son had home attendant to help patient with common ADL's.  Pt baseline walks with a walker but unable to stand for a few weeks. (12 Aug 2018 03:02)      PAST MEDICAL & SURGICAL HISTORY:  Anemia  Diabetes  HTN (hypertension)  Arrhythmia  Dementia  S/P cholecystectomy  History of hip replacement      Hospital Course: uncomplicated    TODAY'S SUBJECTIVE & REVIEW OF SYMPTOMS:     Constitutional Weakness   Cardio WNL   Resp WNL   GI WNL  Heme WNL  Endo WNL  Skin WNL  MSK WNL  Neuro WNL  Cognitive WNL  Psych WNL      MEDICATIONS  (STANDING):  amLODIPine   Tablet 5 milliGRAM(s) Oral daily  aspirin  chewable 81 milliGRAM(s) Oral daily  dextrose 5%. 1000 milliLiter(s) (50 mL/Hr) IV Continuous <Continuous>  dextrose 50% Injectable 12.5 Gram(s) IV Push once  dextrose 50% Injectable 25 Gram(s) IV Push once  dextrose 50% Injectable 25 Gram(s) IV Push once  donepezil 10 milliGRAM(s) Oral at bedtime  ferrous    sulfate 325 milliGRAM(s) Oral daily  heparin  Injectable 5000 Unit(s) SubCutaneous every 8 hours  insulin glargine Injectable (LANTUS) 15 Unit(s) SubCutaneous at bedtime  insulin lispro (HumaLOG) corrective regimen sliding scale   SubCutaneous three times a day before meals  insulin lispro Injectable (HumaLOG) 5 Unit(s) SubCutaneous three times a day before meals  losartan 25 milliGRAM(s) Oral daily  memantine 10 milliGRAM(s) Oral two times a day  metoprolol succinate  milliGRAM(s) Oral daily  OLANZapine 2.5 milliGRAM(s) Oral two times a day  oxybutynin 5 milliGRAM(s) Oral two times a day  tamsulosin 0.4 milliGRAM(s) Oral at bedtime    MEDICATIONS  (PRN):  chlorhexidine 4% Liquid 1 Application(s) Topical two times a day PRN as needed  dextrose 40% Gel 15 Gram(s) Oral once PRN Blood Glucose LESS THAN 70 milliGRAM(s)/deciliter  glucagon  Injectable 1 milliGRAM(s) IntraMuscular once PRN Glucose LESS THAN 70 milligrams/deciliter      FAMILY HISTORY:  No pertinent family history in first degree relatives      Allergies    No Known Allergies    Intolerances        SOCIAL HISTORY:    [    ] Etoh  [    ] Smoking  [    ] Substance abuse     Home Environment:  [    ] Home Alone  [   x ] Lives with Family  [  x  ] Home Health Aid5 x7    Dwelling:  [ x   ] Apartment  [    ] Private House  [    ] Adult Home  [    ] Skilled Nursing Facility      [    ] Short Term  [    ] Long Term  [  x  ] Stairs First Floor                          [    ] Elevator     FUNCTIONAL STATUS PTA: (Check all that apply)  Ambulation: [     ]Independent    [ x   ] Dependent     [    ] Non-Ambulatory  Assistive Device: [    ] SA Cane  [    ]  Q Cane  [ x   ] Walker  [    ]  Wheelchair  ADL : [    ] Independent  [  x  ]  Dependent       Vital Signs Last 24 Hrs  T(C): 37.1 (13 Aug 2018 08:00), Max: 37.1 (13 Aug 2018 00:00)  T(F): 98.7 (13 Aug 2018 08:00), Max: 98.7 (13 Aug 2018 00:00)  HR: 91 (13 Aug 2018 08:00) (75 - 91)  BP: 170/95 (13 Aug 2018 08:00) (146/79 - 170/95)  BP(mean): --  RR: 18 (13 Aug 2018 08:00) (18 - 20)  SpO2: --      PHYSICAL EXAM: Alert & Oriented X1  GENERAL: NAD, well-groomed, well-developed  HEAD:  Atraumatic, Normocephalic  EYES: EOMI, PERRLA, conjunctiva and sclera clear  NECK: Supple, No JVD, Normal thyroid  CHEST/LUNG: Clear to percussion bilaterally; No rales, rhonchi, wheezing, or rubs  HEART: Regular rate and rhythm; No murmurs, rubs, or gallops  ABDOMEN: Soft, Nontender, Nondistended; Bowel sounds present  EXTREMITIES:  no calf tenderness/ stasis changes distally    NERVOUS SYSTEM:  Cranial Nerves 2-12 intact [  x  ] Abnormal  [    ]  ROM: WFL all extremities [ x   ]  Abnormal [     ]  Motor Strength: WFL all extremities  [  x  ]  Abnormal [    ]  Sensation: intact to light touch [ x   ] Abnormal [      ? cogwheeling BUEs    FUNCTIONAL STATUS:  Bed Mobility: [   ]  Independent [    ]  Supervision [ x   ]  Needs Assistance [  ]  N/A  Transfers: [    ]  Independent [    ]  Supervision [ x   ]  Needs Assistance [    ]  N/A    Ambulation:  [    ]  Independent [    ]  Supervision [ x   ]  Needs Assistance [    ]  N/A   ADL:  [    ]   Independent [  x  ] Requires Assistance [    ] N/A       LABS:                        14.1   6.80  )-----------( 161      ( 13 Aug 2018 05:12 )             43.6     08-13    140  |  98  |  29<H>  ----------------------------<  94  4.0   |  24  |  1.3    Ca    8.6      13 Aug 2018 05:12  Mg     2.1     08-13    TPro  6.7  /  Alb  3.7  /  TBili  0.6  /  DBili  x   /  AST  16  /  ALT  9   /  AlkPhos  94  08-11    PT/INR - ( 11 Aug 2018 17:30 )   PT: 12.90 sec;   INR: 1.20 ratio         PTT - ( 11 Aug 2018 17:30 )  PTT:29.5 sec  Urinalysis Basic - ( 11 Aug 2018 17:30 )    Color: Yellow / Appearance: Clear / S.010 / pH: x  Gluc: x / Ketone: Negative  / Bili: Negative / Urobili: 0.2 mg/dL   Blood: x / Protein: Negative mg/dL / Nitrite: Negative   Leuk Esterase: Negative / RBC: x / WBC x   Sq Epi: x / Non Sq Epi: x / Bacteria: x        RADIOLOGY & ADDITIONAL STUDIES:

## 2018-08-13 NOTE — PROGRESS NOTE ADULT - SUBJECTIVE AND OBJECTIVE BOX
SUBJECTIVE:    Patient is a 78y old Male who presents with a chief complaint of inability to ambulate (12 Aug 2018 03:02)    Currently admitted to medicine with the primary diagnosis of Ambulatory dysfunction       PAST MEDICAL & SURGICAL HISTORY  Anemia  Diabetes  HTN (hypertension)  Arrhythmia  Dementia  S/P cholecystectomy  History of hip replacement    SOCIAL HISTORY:  Negative for smoking/alcohol/drug use.     ALLERGIES:  No Known Allergies    MEDICATIONS:  STANDING MEDICATIONS  amLODIPine   Tablet 5 milliGRAM(s) Oral daily  aspirin  chewable 81 milliGRAM(s) Oral daily  dextrose 5%. 1000 milliLiter(s) IV Continuous <Continuous>  dextrose 50% Injectable 12.5 Gram(s) IV Push once  dextrose 50% Injectable 25 Gram(s) IV Push once  dextrose 50% Injectable 25 Gram(s) IV Push once  donepezil 10 milliGRAM(s) Oral at bedtime  ferrous    sulfate 325 milliGRAM(s) Oral daily  heparin  Injectable 5000 Unit(s) SubCutaneous every 8 hours  insulin glargine Injectable (LANTUS) 15 Unit(s) SubCutaneous at bedtime  insulin lispro (HumaLOG) corrective regimen sliding scale   SubCutaneous three times a day before meals  insulin lispro Injectable (HumaLOG) 5 Unit(s) SubCutaneous three times a day before meals  losartan 25 milliGRAM(s) Oral daily  memantine 10 milliGRAM(s) Oral two times a day  metoprolol succinate  milliGRAM(s) Oral daily  OLANZapine 2.5 milliGRAM(s) Oral two times a day  oxybutynin 5 milliGRAM(s) Oral two times a day  tamsulosin 0.4 milliGRAM(s) Oral at bedtime    PRN MEDICATIONS  chlorhexidine 4% Liquid 1 Application(s) Topical two times a day PRN  dextrose 40% Gel 15 Gram(s) Oral once PRN  glucagon  Injectable 1 milliGRAM(s) IntraMuscular once PRN    Vital Signs Last 24 Hrs  T(C): 37.1 (13 Aug 2018 08:00), Max: 37.1 (13 Aug 2018 00:00)  T(F): 98.7 (13 Aug 2018 08:00), Max: 98.7 (13 Aug 2018 00:00)  HR: 91 (13 Aug 2018 08:00) (75 - 91)  BP: 170/95 (13 Aug 2018 08:00) (146/79 - 170/95)  BP(mean): --  RR: 18 (13 Aug 2018 08:00) (18 - 20)  SpO2: --        LABS:                        14.1   6.80  )-----------( 161      ( 13 Aug 2018 05:12 )             43.6     13    140  |  98  |  29<H>  ----------------------------<  94  4.0   |  24  |  1.3    Ca    8.6      13 Aug 2018 05:12  Mg     2.1         TPro  6.7  /  Alb  3.7  /  TBili  0.6  /  DBili  x   /  AST  16  /  ALT  9   /  AlkPhos  94  0811    PT/INR - ( 11 Aug 2018 17:30 )   PT: 12.90 sec;   INR: 1.20 ratio         PTT - ( 11 Aug 2018 17:30 )  PTT:29.5 sec  Urinalysis Basic - ( 11 Aug 2018 17:30 )    Color: Yellow / Appearance: Clear / S.010 / pH: x  Gluc: x / Ketone: Negative  / Bili: Negative / Urobili: 0.2 mg/dL   Blood: x / Protein: Negative mg/dL / Nitrite: Negative   Leuk Esterase: Negative / RBC: x / WBC x   Sq Epi: x / Non Sq Epi: x / Bacteria: x            Culture - Urine (collected 11 Aug 2018 17:30)  Source: .Urine Clean Catch (Midstream)  Final Report (12 Aug 2018 19:31):    No growth    Culture - Blood (collected 11 Aug 2018 17:30)  Source: .Blood Blood-Peripheral  Preliminary Report (13 Aug 2018 01:02):    No growth to date.    Culture - Blood (collected 11 Aug 2018 17:30)  Source: .Blood Blood-Peripheral  Preliminary Report (13 Aug 2018 01:02):    No growth to date.      CARDIAC MARKERS ( 11 Aug 2018 17:30 )  x     / <0.01 ng/mL / x     / x     / x          RADIOLOGY:  < from: CT Chest No Cont (18 @ 20:22) >  IMPRESSION:    Since 2018:    1.  Bilateral trace pleural effusions (improved on the right side).    2.  Stable small pericardial effusion.    3.  Unchanged 1 cm left upper lobe nodule containing calcification and   apparent regions of fat density, may represent a pulmonary hamartoma.    4.  Apparent new multiple 2 to 3 mm nodular opacities in the bilateral   lungs, predominant upper lobes, could represent small airways   inflammation. Consider 3 month follow-up exam.    < from: CT Head No Cont (18 @ 20:21) >  IMPRESSION:     1.  No evidence of acute intracranial pathology.  Stable exam since   2018.    2.  Stable mild ventricular prominence, mildchronic microvascular   changes and moderate cerebral atrophy.    < from: Xray Chest 1 View-PORTABLE IMMEDIATE (18 @ 19:04) >  mpression:      Left upper lobe 1 cm nodule better seen on chest CT of same day    EKG  < from: 12 Lead ECG (18 @ 17:46) >  Diagnosis Line Atrial fibrillation  Incomplete right bundle branch block  Septalinfarct , age undetermined  Abnormal ECG    < end of copied text >    PHYSICAL EXAM:    GEN: No acute distress  LUNGS: Clear to auscultation bilaterally   HEART: Irregular, S1, S2 no murmurs, rubs, or gallops  ABD: Soft, non-tender, non-distended.  EXT: NC/NC/NE/2+PP/MARIN/lower extremity venous stasis changes seen bilaterally.3/5 bilatr LE, Gait not assessed    NEURO: AAOX1, CNII-XII intact   :

## 2018-08-13 NOTE — SWALLOW BEDSIDE ASSESSMENT ADULT - SWALLOW EVAL: DIAGNOSIS
mild oral dysphagia for thin liquids w/o overt s/s aspiration/penetration; +toleration for regular, soft, + puree w/o overt s/s aspiration/penetration

## 2018-08-13 NOTE — SWALLOW BEDSIDE ASSESSMENT ADULT - SLP PERTINENT HISTORY OF CURRENT PROBLEM
pt admitted from home for decreased ambulation + appetite. at baseline, pt walks w/ a walker but has been unable to stand for the last few wks. pt being treated for dementia w/ behavioral changes. neuro c/s pending. SLP c/s 2' to pt reportedly experiencing difficulty swallowing meds.

## 2018-08-13 NOTE — PROGRESS NOTE ADULT - ASSESSMENT
· Assessment		  78 year old male with PMHx of Atrial fibrillation, previously on xarelto - discontinued for unknown reason, dementia, DM presents with worsening dementia, needing help with all ADL's and difficulty ambulating independently.    # Inability to ambulate secondary to dementia  - Family request physiatry consult for admission to short term rehab transition to long term  - PT/ Physiatry consult    # Dementia w/ behavioral changes  -family reports he is alert but oriented x 0 at baseline.  Has memory deficits  -needs help with all ADL's    -Continue memantine and donepezil.   -CONSULT neurology - rule out parkinsonism, NPH  - f/u TSH, Vit B12, RPR   -Rehab /PT eval    #Urinary Incontinence  -Continue oxybutinin  -check bladder sono with PVR    # Atrial Fibrillation  -continue metoprolol 100 mg  no AC -high risk of falls    #Hypertension - titrate meds  -continue metoprolol, losartan, and amlodipine    #DM  -Continue insulin glargine and lispro  -monitor fingersticks  CAPILLARY BLOOD GLUCOSE  145 (13 Aug 2018 11:48)  102 (13 Aug 2018 07:57)  100 (13 Aug 2018 02:00)  92 (12 Aug 2018 21:36)  158 (12 Aug 2018 16:28)

## 2018-08-13 NOTE — CONSULT NOTE ADULT - ASSESSMENT
IMPRESSION: Rehab of gait ab / dementia/ parkinsonism?    PRECAUTIONS: [  x  ] Cardiac  [    ] Respiratory  [    ] Seizures [    ] Contact Isolation  [    ] Droplet Isolation  [    ] Other    Weight Bearing Status:     RECOMMENDATION: ? NEURO EVAL    Out of Bed to Chair     DVT/Decubiti Prophylaxis    REHAB PLAN:     [    x ] Bedside P/T 3-5 times a week   [     ] Bedside O/T  2-3 times a week   [     ] No Rehab Therapy Indicated   [     ]  Speech Therapy   Conditioning/ROM                                 ADL  Bed Mobility                                            Conditioning/ROM  Transfers                                                  Bed Mobility  Sitting /Standing Balance                      Transfers                                        Gait Training                                            Sitting/Standing Balance  Stair Training [   ]Applicable                 Home equipment Eval                                                                     Splinting  [   ] Only      GOALS:   ADL   [    ]   Independent         Transfers  [    ] Independent            Ambulation  [     ] Independent     [  x   ] With device                            [x    ]  CG                                               [  x  ]  CG                                                    [ x    ] CG                            [    ] Min A                                          [    ] Min A                                                [     ] Min  A          DISCHARGE PLAN:   [     ]  Good candidate for Intensive Rehabilitation/Hospital based-[facility]A [facility]                                             Will tolerate 3hrs Intensive Rehab Daily                                       [   x   ]  Short Term Rehab in Skilled Nursing Facility                                       [      ]  Home with Outpatient or  services                                         [      ]  Possible Candidate for Intensive Hospital based Rehab

## 2018-08-13 NOTE — PROGRESS NOTE ADULT - ASSESSMENT
· Assessment		  78 year old male with PMHx of Atrial fibrillation, previously on xarelto - discontinued for unknown reason, dementia, DM presents with worsening dementia, needing help with all ADL's and difficulty ambulating independently.    # Inability to ambulate secondary to dementia  - Family request physiatry consult for admission to short term rehab transition to long term  - PT/ Physiatry consult    # Dementia w/ behavioral changes  -family reports he is alert but oriented x 0 at baseline.  Has memory deficits  -needs help with all ADL's    -Continue memantine and donepezil.   -Possible Neurology eval  - f/u TSH, Vit B12, RPR   -Rehab /PT eval    #Urinary Incontinence  -Continue oxybutinin    # Atrial Fibrillation  -continue metoprolol 100 mg    #Elevated Blood Pressure  -continue metoprolol, losartan, and amlodipine    #DM  -Continue insulin glargine and lispro  -monitor fingersticks    #FENP:  Fluids: not required  Electrolytes: WNL  Nutrition: DASH diet  DVT Prophylaxis: Heparin    #Dispo: from home.  Family looking for admission to SNF    #Code Status: PLEASE DISCUSS CODE STATUS WITH SON. 78 year old male with PMHx of Atrial fibrillation, previously on xarelto - discontinued for unknown reason, dementia, DM presents with worsening dementia, needing help with all ADL's and difficulty ambulating independently.    # Inability to ambulate secondary to dementia  - Family request physiatry consult for admission to short term rehab transition to long term  - PT/ Physiatry consult    # Dementia w/ behavioral changes  -family reports he is alert but oriented x 0 at baseline.  Has memory deficits  -needs help with all ADL's    -Continue memantine and donepezil.   -Neuro consulted - r/o parkinsonism vs NPH  - f/u TSH, Vit B12, RPR   -Rehab /PT eval    #Urinary Incontinence  -Continue oxybutinin  -f/u bladder scan with PVR    # Atrial Fibrillation  -continue metoprolol 100 mg  -not on AC due to high risk for falls    #HTN  -continue metoprolol, losartan, and amlodipine    #DM  -Continue insulin glargine and lispro  -monitor fingersticks    Diet: DASH  DVT Prophylaxis: Heparin    #Dispo: from home, awaiting placement

## 2018-08-13 NOTE — SWALLOW BEDSIDE ASSESSMENT ADULT - SLP GENERAL OBSERVATIONS
pt received in bed awake alert w/o c/o pain. +room air. pt endorses difficulty swallowing some pills. [place] speaking JUANITA Miranda provided translation at b/s.

## 2018-08-13 NOTE — PROGRESS NOTE ADULT - SUBJECTIVE AND OBJECTIVE BOX
SUBJECTIVE:    Patient is a 78y old Male who presents with a chief complaint of inability to ambulate (12 Aug 2018 03:02)    Currently admitted to medicine with the primary diagnosis of Ambulatory dysfunction     Today is hospital day 2d. This morning he is resting in bed.  Patient is a poor historian due to worsening dementia and requires Vietnamese translation.  He has an acute urinary incontinence and inability to ambulate that began 3 weeks ago.   Patient is bedridden and now exhibiting mild fecal incontinence, epigastric pain and difficulty swallowing pills. He denies headache, nausea and vomiting. Patient care technician reports that stools are "pasty, dark brown" with no evidence of mary red blood or tarry appearance.  PAST MEDICAL & SURGICAL HISTORY  Anemia  Diabetes  HTN (hypertension)  Arrhythmia  Dementia  S/P cholecystectomy  History of hip replacement    SOCIAL HISTORY:  Negative for smoking/alcohol/drug use.     ALLERGIES:  No Known Allergies    MEDICATIONS:  STANDING MEDICATIONS  amLODIPine   Tablet 5 milliGRAM(s) Oral daily  aspirin  chewable 81 milliGRAM(s) Oral daily  dextrose 5%. 1000 milliLiter(s) IV Continuous <Continuous>  dextrose 50% Injectable 12.5 Gram(s) IV Push once  dextrose 50% Injectable 25 Gram(s) IV Push once  dextrose 50% Injectable 25 Gram(s) IV Push once  donepezil 10 milliGRAM(s) Oral at bedtime  ferrous    sulfate 325 milliGRAM(s) Oral daily  heparin  Injectable 5000 Unit(s) SubCutaneous every 8 hours  insulin glargine Injectable (LANTUS) 15 Unit(s) SubCutaneous at bedtime  insulin lispro (HumaLOG) corrective regimen sliding scale   SubCutaneous three times a day before meals  insulin lispro Injectable (HumaLOG) 5 Unit(s) SubCutaneous three times a day before meals  losartan 25 milliGRAM(s) Oral daily  memantine 10 milliGRAM(s) Oral two times a day  metoprolol succinate  milliGRAM(s) Oral daily  OLANZapine 2.5 milliGRAM(s) Oral two times a day  oxybutynin 5 milliGRAM(s) Oral two times a day  tamsulosin 0.4 milliGRAM(s) Oral at bedtime    PRN MEDICATIONS  chlorhexidine 4% Liquid 1 Application(s) Topical two times a day PRN  dextrose 40% Gel 15 Gram(s) Oral once PRN  glucagon  Injectable 1 milliGRAM(s) IntraMuscular once PRN    VITALS:   T(F): 98.7  HR: 91  BP: 170/95  RR: 18  SpO2: --    LABS:                        14.1   6.80  )-----------( 161      ( 13 Aug 2018 05:12 )             43.6         140  |  98  |  29<H>  ----------------------------<  94  4.0   |  24  |  1.3    Ca    8.6      13 Aug 2018 05:12  Mg     2.1         TPro  6.7  /  Alb  3.7  /  TBili  0.6  /  DBili  x   /  AST  16  /  ALT  9   /  AlkPhos  94  08    PT/INR - ( 11 Aug 2018 17:30 )   PT: 12.90 sec;   INR: 1.20 ratio         PTT - ( 11 Aug 2018 17:30 )  PTT:29.5 sec  Urinalysis Basic - ( 11 Aug 2018 17:30 )    Color: Yellow / Appearance: Clear / S.010 / pH: x  Gluc: x / Ketone: Negative  / Bili: Negative / Urobili: 0.2 mg/dL   Blood: x / Protein: Negative mg/dL / Nitrite: Negative   Leuk Esterase: Negative / RBC: x / WBC x   Sq Epi: x / Non Sq Epi: x / Bacteria: x            Culture - Urine (collected 11 Aug 2018 17:30)  Source: .Urine Clean Catch (Midstream)  Final Report (12 Aug 2018 19:31):    No growth    Culture - Blood (collected 11 Aug 2018 17:30)  Source: .Blood Blood-Peripheral  Preliminary Report (13 Aug 2018 01:02):    No growth to date.    Culture - Blood (collected 11 Aug 2018 17:30)  Source: .Blood Blood-Peripheral  Preliminary Report (13 Aug 2018 01:02):    No growth to date.      CARDIAC MARKERS ( 11 Aug 2018 17:30 )  x     / <0.01 ng/mL / x     / x     / x          RADIOLOGY:  < from: CT Chest No Cont (18 @ 20:22) >  IMPRESSION:    Since 2018:    1.  Bilateral trace pleural effusions (improved on the right side).    2.  Stable small pericardial effusion.    3.  Unchanged 1 cm left upper lobe nodule containing calcification and   apparent regions of fat density, may represent a pulmonary hamartoma.    4.  Apparent new multiple 2 to 3 mm nodular opacities in the bilateral   lungs, predominant upper lobes, could represent small airways   inflammation. Consider 3 month follow-up exam.    < from: CT Head No Cont (18 @ 20:21) >  IMPRESSION:     1.  No evidence of acute intracranial pathology.  Stable exam since   2018.    2.  Stable mild ventricular prominence, mildchronic microvascular   changes and moderate cerebral atrophy.    < from: Xray Chest 1 View-PORTABLE IMMEDIATE (18 @ 19:04) >  mpression:      Left upper lobe 1 cm nodule better seen on chest CT of same day    EKG  < from: 12 Lead ECG (18 @ 17:46) >  Diagnosis Line Atrial fibrillation  Incomplete right bundle branch block  Septalinfarct , age undetermined  Abnormal ECG    < end of copied text >    PHYSICAL EXAM:    GEN: No acute distress  LUNGS: Clear to auscultation bilaterally   HEART: Irregular, S1, S2 indistinguishable, no murmurs, rubs, or gallops  ABD: Soft, non-tender, non-distended.  EXT: NC/NC/NE/2+PP/MARIN/lower extremity venous stasis changes seen bilaterally.   NEURO: AAOX1    Intravenous access:   NG tube:   Deluca Catheter: SUBJECTIVE:    Patient is a 78y old Male who presents with a chief complaint of inability to ambulate (12 Aug 2018 03:02)    Currently admitted to medicine with the primary diagnosis of Ambulatory dysfunction     Today is hospital day 2d. This morning he is resting in bed.  Patient is a poor historian due to worsening dementia and requires Citizen of [place] translation.  He has an acute urinary incontinence and inability to ambulate that began 3 weeks ago.   Patient is bedridden and now exhibiting mild fecal incontinence, epigastric pain and difficulty swallowing pills. He denies headache, nausea and vomiting. Patient care technician reports that stools are "pasty, dark brown" with no evidence of mary red blood or tarry appearance.  PAST MEDICAL & SURGICAL HISTORY  Anemia  Diabetes  HTN (hypertension)  Arrhythmia  Dementia  S/P cholecystectomy  History of hip replacement    SOCIAL HISTORY:  Negative for smoking/alcohol/drug use.     ALLERGIES:  No Known Allergies    MEDICATIONS:  STANDING MEDICATIONS  amLODIPine   Tablet 5 milliGRAM(s) Oral daily  aspirin  chewable 81 milliGRAM(s) Oral daily  dextrose 5%. 1000 milliLiter(s) IV Continuous <Continuous>  dextrose 50% Injectable 12.5 Gram(s) IV Push once  dextrose 50% Injectable 25 Gram(s) IV Push once  dextrose 50% Injectable 25 Gram(s) IV Push once  donepezil 10 milliGRAM(s) Oral at bedtime  ferrous    sulfate 325 milliGRAM(s) Oral daily  heparin  Injectable 5000 Unit(s) SubCutaneous every 8 hours  insulin glargine Injectable (LANTUS) 15 Unit(s) SubCutaneous at bedtime  insulin lispro (HumaLOG) corrective regimen sliding scale   SubCutaneous three times a day before meals  insulin lispro Injectable (HumaLOG) 5 Unit(s) SubCutaneous three times a day before meals  losartan 25 milliGRAM(s) Oral daily  memantine 10 milliGRAM(s) Oral two times a day  metoprolol succinate  milliGRAM(s) Oral daily  OLANZapine 2.5 milliGRAM(s) Oral two times a day  oxybutynin 5 milliGRAM(s) Oral two times a day  tamsulosin 0.4 milliGRAM(s) Oral at bedtime    PRN MEDICATIONS  chlorhexidine 4% Liquid 1 Application(s) Topical two times a day PRN  dextrose 40% Gel 15 Gram(s) Oral once PRN  glucagon  Injectable 1 milliGRAM(s) IntraMuscular once PRN    VITALS:   T(F): 98.7  HR: 91  BP: 170/95  RR: 18  SpO2: --    LABS:                        14.1   6.80  )-----------( 161      ( 13 Aug 2018 05:12 )             43.6         140  |  98  |  29<H>  ----------------------------<  94  4.0   |  24  |  1.3    Ca    8.6      13 Aug 2018 05:12  Mg     2.1         TPro  6.7  /  Alb  3.7  /  TBili  0.6  /  DBili  x   /  AST  16  /  ALT  9   /  AlkPhos  94  08    PT/INR - ( 11 Aug 2018 17:30 )   PT: 12.90 sec;   INR: 1.20 ratio         PTT - ( 11 Aug 2018 17:30 )  PTT:29.5 sec  Urinalysis Basic - ( 11 Aug 2018 17:30 )    Color: Yellow / Appearance: Clear / S.010 / pH: x  Gluc: x / Ketone: Negative  / Bili: Negative / Urobili: 0.2 mg/dL   Blood: x / Protein: Negative mg/dL / Nitrite: Negative   Leuk Esterase: Negative / RBC: x / WBC x   Sq Epi: x / Non Sq Epi: x / Bacteria: x            Culture - Urine (collected 11 Aug 2018 17:30)  Source: .Urine Clean Catch (Midstream)  Final Report (12 Aug 2018 19:31):    No growth    Culture - Blood (collected 11 Aug 2018 17:30)  Source: .Blood Blood-Peripheral  Preliminary Report (13 Aug 2018 01:02):    No growth to date.    Culture - Blood (collected 11 Aug 2018 17:30)  Source: .Blood Blood-Peripheral  Preliminary Report (13 Aug 2018 01:02):    No growth to date.      CARDIAC MARKERS ( 11 Aug 2018 17:30 )  x     / <0.01 ng/mL / x     / x     / x          RADIOLOGY:  < from: CT Chest No Cont (18 @ 20:22) >  IMPRESSION:    Since 2018:    1.  Bilateral trace pleural effusions (improved on the right side).    2.  Stable small pericardial effusion.    3.  Unchanged 1 cm left upper lobe nodule containing calcification and   apparent regions of fat density, may represent a pulmonary hamartoma.    4.  Apparent new multiple 2 to 3 mm nodular opacities in the bilateral   lungs, predominant upper lobes, could represent small airways   inflammation. Consider 3 month follow-up exam.    < from: CT Head No Cont (18 @ 20:21) >  IMPRESSION:     1.  No evidence of acute intracranial pathology.  Stable exam since   2018.    2.  Stable mild ventricular prominence, mildchronic microvascular   changes and moderate cerebral atrophy.    < from: Xray Chest 1 View-PORTABLE IMMEDIATE (18 @ 19:04) >  mpression:      Left upper lobe 1 cm nodule better seen on chest CT of same day    EKG  < from: 12 Lead ECG (18 @ 17:46) >  Diagnosis Line Atrial fibrillation  Incomplete right bundle branch block  Septalinfarct , age undetermined  Abnormal ECG    < end of copied text >    PHYSICAL EXAM:    GEN: No acute distress  LUNGS: Clear to auscultation bilaterally   HEART: Irregular, S1, S2 indistinguishable, no murmurs, rubs, or gallops  ABD: Soft, non-tender, non-distended.  EXT: NC/NC/NE/2+PP/MARIN/lower extremity venous stasis changes seen bilaterally.   NEURO: AAOX1

## 2018-08-14 RX ORDER — DOCUSATE SODIUM 100 MG
100 CAPSULE ORAL THREE TIMES A DAY
Qty: 0 | Refills: 0 | Status: DISCONTINUED | OUTPATIENT
Start: 2018-08-14 | End: 2018-08-16

## 2018-08-14 RX ORDER — SENNA PLUS 8.6 MG/1
2 TABLET ORAL AT BEDTIME
Qty: 0 | Refills: 0 | Status: DISCONTINUED | OUTPATIENT
Start: 2018-08-14 | End: 2018-08-16

## 2018-08-14 RX ORDER — HYDRALAZINE HCL 50 MG
25 TABLET ORAL ONCE
Qty: 0 | Refills: 0 | Status: COMPLETED | OUTPATIENT
Start: 2018-08-14 | End: 2018-08-14

## 2018-08-14 RX ORDER — MORPHINE SULFATE 50 MG/1
2 CAPSULE, EXTENDED RELEASE ORAL ONCE
Qty: 0 | Refills: 0 | Status: DISCONTINUED | OUTPATIENT
Start: 2018-08-14 | End: 2018-08-14

## 2018-08-14 RX ADMIN — INSULIN GLARGINE 15 UNIT(S): 100 INJECTION, SOLUTION SUBCUTANEOUS at 22:19

## 2018-08-14 RX ADMIN — Medication 100 MILLIGRAM(S): at 13:26

## 2018-08-14 RX ADMIN — HEPARIN SODIUM 5000 UNIT(S): 5000 INJECTION INTRAVENOUS; SUBCUTANEOUS at 05:45

## 2018-08-14 RX ADMIN — Medication 81 MILLIGRAM(S): at 13:26

## 2018-08-14 RX ADMIN — Medication 325 MILLIGRAM(S): at 13:26

## 2018-08-14 RX ADMIN — Medication 5 MILLIGRAM(S): at 17:39

## 2018-08-14 RX ADMIN — Medication 5 UNIT(S): at 17:37

## 2018-08-14 RX ADMIN — Medication 5 MILLIGRAM(S): at 05:43

## 2018-08-14 RX ADMIN — AMLODIPINE BESYLATE 5 MILLIGRAM(S): 2.5 TABLET ORAL at 05:43

## 2018-08-14 RX ADMIN — Medication 5 UNIT(S): at 12:11

## 2018-08-14 RX ADMIN — HEPARIN SODIUM 5000 UNIT(S): 5000 INJECTION INTRAVENOUS; SUBCUTANEOUS at 13:24

## 2018-08-14 RX ADMIN — MORPHINE SULFATE 2 MILLIGRAM(S): 50 CAPSULE, EXTENDED RELEASE ORAL at 16:58

## 2018-08-14 RX ADMIN — HEPARIN SODIUM 5000 UNIT(S): 5000 INJECTION INTRAVENOUS; SUBCUTANEOUS at 22:19

## 2018-08-14 RX ADMIN — MEMANTINE HYDROCHLORIDE 10 MILLIGRAM(S): 10 TABLET ORAL at 05:44

## 2018-08-14 RX ADMIN — SENNA PLUS 2 TABLET(S): 8.6 TABLET ORAL at 22:19

## 2018-08-14 RX ADMIN — Medication 100 MILLIGRAM(S): at 22:19

## 2018-08-14 RX ADMIN — LOSARTAN POTASSIUM 25 MILLIGRAM(S): 100 TABLET, FILM COATED ORAL at 05:43

## 2018-08-14 RX ADMIN — TAMSULOSIN HYDROCHLORIDE 0.4 MILLIGRAM(S): 0.4 CAPSULE ORAL at 22:18

## 2018-08-14 RX ADMIN — OLANZAPINE 2.5 MILLIGRAM(S): 15 TABLET, FILM COATED ORAL at 17:38

## 2018-08-14 RX ADMIN — Medication 100 MILLIGRAM(S): at 05:44

## 2018-08-14 RX ADMIN — Medication 25 MILLIGRAM(S): at 17:37

## 2018-08-14 RX ADMIN — DONEPEZIL HYDROCHLORIDE 10 MILLIGRAM(S): 10 TABLET, FILM COATED ORAL at 22:19

## 2018-08-14 RX ADMIN — OLANZAPINE 2.5 MILLIGRAM(S): 15 TABLET, FILM COATED ORAL at 06:36

## 2018-08-14 RX ADMIN — MEMANTINE HYDROCHLORIDE 10 MILLIGRAM(S): 10 TABLET ORAL at 17:39

## 2018-08-14 RX ADMIN — Medication 5 UNIT(S): at 09:08

## 2018-08-14 RX ADMIN — MORPHINE SULFATE 2 MILLIGRAM(S): 50 CAPSULE, EXTENDED RELEASE ORAL at 17:39

## 2018-08-14 NOTE — PROGRESS NOTE ADULT - SUBJECTIVE AND OBJECTIVE BOX
SUBJECTIVE:    Patient is a 78y old Male who presents with a chief complaint of inability to ambulate (12 Aug 2018 03:02)    Currently admitted to medicine with the primary diagnosis of Ambulatory dysfunction     Today is hospital day 3d. This morning he is resting comfortably in bed. Patient is a poor historian due to worsening dementia and requires [place]onese translation.  He has an acute urinary incontinence and inability to ambulate that began 3 weeks ago.   Patient is bedridden and now exhibiting mild fecal incontinence, epigastric pain.  Neurology is consulted. Pt seen yesterday by speech and swallow for inability to swallow pills and passed swallow evaluation.  Pt receives oral medications crushed.   Patients condition is otherwise stable.     PAST MEDICAL & SURGICAL HISTORY  Anemia  Diabetes  HTN (hypertension)  Arrhythmia  Dementia  S/P cholecystectomy  History of hip replacement    SOCIAL HISTORY:  Negative for smoking/alcohol/drug use.     ALLERGIES:  No Known Allergies    MEDICATIONS:  STANDING MEDICATIONS  amLODIPine   Tablet 5 milliGRAM(s) Oral daily  aspirin  chewable 81 milliGRAM(s) Oral daily  dextrose 5%. 1000 milliLiter(s) IV Continuous <Continuous>  dextrose 50% Injectable 12.5 Gram(s) IV Push once  dextrose 50% Injectable 25 Gram(s) IV Push once  dextrose 50% Injectable 25 Gram(s) IV Push once  donepezil 10 milliGRAM(s) Oral at bedtime  ferrous    sulfate 325 milliGRAM(s) Oral daily  heparin  Injectable 5000 Unit(s) SubCutaneous every 8 hours  insulin glargine Injectable (LANTUS) 15 Unit(s) SubCutaneous at bedtime  insulin lispro (HumaLOG) corrective regimen sliding scale   SubCutaneous three times a day before meals  insulin lispro Injectable (HumaLOG) 5 Unit(s) SubCutaneous three times a day before meals  losartan 25 milliGRAM(s) Oral daily  memantine 10 milliGRAM(s) Oral two times a day  metoprolol succinate  milliGRAM(s) Oral daily  OLANZapine 2.5 milliGRAM(s) Oral two times a day  oxybutynin 5 milliGRAM(s) Oral two times a day  tamsulosin 0.4 milliGRAM(s) Oral at bedtime    PRN MEDICATIONS  chlorhexidine 4% Liquid 1 Application(s) Topical two times a day PRN  dextrose 40% Gel 15 Gram(s) Oral once PRN  glucagon  Injectable 1 milliGRAM(s) IntraMuscular once PRN    VITALS:   T(F): 99  HR: 76  BP: 109/59  RR: 18  SpO2: --    LABS:                        14.1   6.80  )-----------( 161      ( 13 Aug 2018 05:12 )             43.6     08-13    140  |  98  |  29<H>  ----------------------------<  94  4.0   |  24  |  1.3    Ca    8.6      13 Aug 2018 05:12  Mg     2.1     08-13                Culture - Urine (collected 11 Aug 2018 17:30)  Source: .Urine Clean Catch (Midstream)  Final Report (12 Aug 2018 19:31):    No growth    Culture - Blood (collected 11 Aug 2018 17:30)  Source: .Blood Blood-Peripheral  Preliminary Report (13 Aug 2018 01:02):    No growth to date.    Culture - Blood (collected 11 Aug 2018 17:30)  Source: .Blood Blood-Peripheral  Preliminary Report (13 Aug 2018 01:02):    No growth to date.    RADIOLOGY:  < from: CT Chest No Cont (08.11.18 @ 20:22) >  IMPRESSION:    Since 2/13/2018:    1.  Bilateral trace pleural effusions (improved on the right side).    2.  Stable small pericardial effusion.    3.  Unchanged 1 cm left upper lobe nodule containing calcification and   apparent regions of fat density, may represent a pulmonary hamartoma.    4.  Apparent new multiple 2 to 3 mm nodular opacities in the bilateral   lungs, predominant upper lobes, could represent small airways   inflammation. Consider 3 month follow-up exam.    < from: CT Head No Cont (08.11.18 @ 20:21) >  IMPRESSION:     1.  No evidence of acute intracranial pathology.  Stable exam since   2/13/2018.    2.  Stable mild ventricular prominence, mildchronic microvascular   changes and moderate cerebral atrophy.    < from: Xray Chest 1 View-PORTABLE IMMEDIATE (08.11.18 @ 19:04) >  Skeleton/soft tissues: Degenerative change    Impression:      Left upper lobe 1 cm nodule better seen on chest CT of same day      PHYSICAL EXAM:  GEN: No acute distress  LUNGS: Clear to auscultation bilaterally   HEART: Irregular, S1, S2 indistinguishable due to Afib  ABD: Soft, non-tender, non-distended.  EXT: NC/NC/NE/2+PP/MARIN/venous statis changes in lower extremities bilaterally   NEURO: AAOX3    Intravenous access:   NG tube:   Deluca Catheter: SUBJECTIVE:    Patient is a 78y old Male who presents with a chief complaint of inability to ambulate (12 Aug 2018 03:02)    Currently admitted to medicine with the primary diagnosis of Ambulatory dysfunction     Today is hospital day 3d. This morning he is resting comfortably in bed. Patient is a poor historian due to worsening dementia and requires [place] translation. He has acute urinary incontinence and inability to ambulate that began 3 weeks ago. Patient is bedridden and now exhibiting mild fecal incontinence, epigastric pain. Neurology is consulted. Pt seen yesterday by speech and swallow for inability to swallow pills and passed swallow evaluation.  Pt receives oral medications crushed. Patient's condition is otherwise stable.    PAST MEDICAL & SURGICAL HISTORY  Anemia  Diabetes  HTN (hypertension)  Arrhythmia  Dementia  S/P cholecystectomy  History of hip replacement    SOCIAL HISTORY:  Negative for smoking/alcohol/drug use.     ALLERGIES:  No Known Allergies    MEDICATIONS:  STANDING MEDICATIONS  amLODIPine   Tablet 5 milliGRAM(s) Oral daily  aspirin  chewable 81 milliGRAM(s) Oral daily  dextrose 5%. 1000 milliLiter(s) IV Continuous <Continuous>  dextrose 50% Injectable 12.5 Gram(s) IV Push once  dextrose 50% Injectable 25 Gram(s) IV Push once  dextrose 50% Injectable 25 Gram(s) IV Push once  donepezil 10 milliGRAM(s) Oral at bedtime  ferrous    sulfate 325 milliGRAM(s) Oral daily  heparin  Injectable 5000 Unit(s) SubCutaneous every 8 hours  insulin glargine Injectable (LANTUS) 15 Unit(s) SubCutaneous at bedtime  insulin lispro (HumaLOG) corrective regimen sliding scale   SubCutaneous three times a day before meals  insulin lispro Injectable (HumaLOG) 5 Unit(s) SubCutaneous three times a day before meals  losartan 25 milliGRAM(s) Oral daily  memantine 10 milliGRAM(s) Oral two times a day  metoprolol succinate  milliGRAM(s) Oral daily  OLANZapine 2.5 milliGRAM(s) Oral two times a day  oxybutynin 5 milliGRAM(s) Oral two times a day  tamsulosin 0.4 milliGRAM(s) Oral at bedtime    PRN MEDICATIONS  chlorhexidine 4% Liquid 1 Application(s) Topical two times a day PRN  dextrose 40% Gel 15 Gram(s) Oral once PRN  glucagon  Injectable 1 milliGRAM(s) IntraMuscular once PRN    VITALS:   T(F): 99  HR: 76  BP: 109/59  RR: 18  SpO2: --    LABS:                        14.1   6.80  )-----------( 161      ( 13 Aug 2018 05:12 )             43.6     08-13    140  |  98  |  29<H>  ----------------------------<  94  4.0   |  24  |  1.3    Ca    8.6      13 Aug 2018 05:12  Mg     2.1     08-13                Culture - Urine (collected 11 Aug 2018 17:30)  Source: .Urine Clean Catch (Midstream)  Final Report (12 Aug 2018 19:31):    No growth    Culture - Blood (collected 11 Aug 2018 17:30)  Source: .Blood Blood-Peripheral  Preliminary Report (13 Aug 2018 01:02):    No growth to date.    Culture - Blood (collected 11 Aug 2018 17:30)  Source: .Blood Blood-Peripheral  Preliminary Report (13 Aug 2018 01:02):    No growth to date.    RADIOLOGY:  < from: CT Chest No Cont (08.11.18 @ 20:22) >  IMPRESSION:    Since 2/13/2018:    1.  Bilateral trace pleural effusions (improved on the right side).    2.  Stable small pericardial effusion.    3.  Unchanged 1 cm left upper lobe nodule containing calcification and   apparent regions of fat density, may represent a pulmonary hamartoma.    4.  Apparent new multiple 2 to 3 mm nodular opacities in the bilateral   lungs, predominant upper lobes, could represent small airways   inflammation. Consider 3 month follow-up exam.    < from: CT Head No Cont (08.11.18 @ 20:21) >  IMPRESSION:     1.  No evidence of acute intracranial pathology.  Stable exam since   2/13/2018.    2.  Stable mild ventricular prominence, mildchronic microvascular   changes and moderate cerebral atrophy.    < from: Xray Chest 1 View-PORTABLE IMMEDIATE (08.11.18 @ 19:04) >  Skeleton/soft tissues: Degenerative change    Impression:      Left upper lobe 1 cm nodule better seen on chest CT of same day      PHYSICAL EXAM:  GEN: No acute distress  LUNGS: Clear to auscultation bilaterally   HEART: Irregularly irregular  ABD: Soft, non-tender, non-distended.  EXT: NC/NC/NE/2+PP/MARIN/venous statis changes in lower extremities bilaterally   NEURO: AAOX3

## 2018-08-14 NOTE — CONSULT NOTE ADULT - SUBJECTIVE AND OBJECTIVE BOX
CC: Difficulty ambulating    HPI:   78 year old male with pmhx Dementia ,afib formerly on Xarelto- stopped for unknown reasons, dementia, DMII not on insulin, presenting from home with decreased ambulation (walks with walker at baseline) and is able to carry ADLS with assistance of HHA. As per sons account patient noted to have difficulty walking x 3 days pta a/w urinary frequency/ incontinence.  Family report no fevers, recent infection. Pt baseline walks with a walker but unable to stand steady for a few weeks. Patient is a poor historian.    Home medication  oxybytynin 5mg bid  -linzess 145 mcg  namzeric 28/10mg q 24  januvia 50mg   nateglinide 120mg amlodipine 5mg   excelon po 4.5 mg bid  asa 81mgolmesartan 5mg     Social history  unable to obtain info     Neuro Exam:  Orientation: patient confused not following commands, says random words, fluent speech  Cranial Nerves: no obvious asymmetry  Motor: rigid tone right side              tremors with intention             No resting tremors noted  Sensory exam: responds to pain all 4 extremities by with drawing and grimacing  Deep tendon reflexes: brisk lower extremities 2+/4 upper extremities          Allergies    No Known Allergies    Intolerances      MEDICATIONS  (STANDING):  amLODIPine   Tablet 5 milliGRAM(s) Oral daily  aspirin  chewable 81 milliGRAM(s) Oral daily  dextrose 5%. 1000 milliLiter(s) (50 mL/Hr) IV Continuous <Continuous>  dextrose 50% Injectable 12.5 Gram(s) IV Push once  dextrose 50% Injectable 25 Gram(s) IV Push once  dextrose 50% Injectable 25 Gram(s) IV Push once  donepezil 10 milliGRAM(s) Oral at bedtime  ferrous    sulfate 325 milliGRAM(s) Oral daily  heparin  Injectable 5000 Unit(s) SubCutaneous every 8 hours  insulin glargine Injectable (LANTUS) 15 Unit(s) SubCutaneous at bedtime  insulin lispro (HumaLOG) corrective regimen sliding scale   SubCutaneous three times a day before meals  insulin lispro Injectable (HumaLOG) 5 Unit(s) SubCutaneous three times a day before meals  losartan 25 milliGRAM(s) Oral daily  memantine 10 milliGRAM(s) Oral two times a day  metoprolol succinate  milliGRAM(s) Oral daily  OLANZapine 2.5 milliGRAM(s) Oral two times a day  oxybutynin 5 milliGRAM(s) Oral two times a day  tamsulosin 0.4 milliGRAM(s) Oral at bedtime    MEDICATIONS  (PRN):  chlorhexidine 4% Liquid 1 Application(s) Topical two times a day PRN as needed  dextrose 40% Gel 15 Gram(s) Oral once PRN Blood Glucose LESS THAN 70 milliGRAM(s)/deciliter  glucagon  Injectable 1 milliGRAM(s) IntraMuscular once PRN Glucose LESS THAN 70 milligrams/deciliter      LABS:                        14.1   6.80  )-----------( 161      ( 13 Aug 2018 05:12 )             43.6     08-13    140  |  98  |  29<H>  ----------------------------<  94  4.0   |  24  |  1.3    Ca    8.6      13 Aug 2018 05:12  Mg     2.1     08-13              Neuro Imaging:  < from: CT Head No Cont (08.11.18 @ 20:21) >    Findings:    There is stable mild ventricular prominence superimposed upon a moderate   degree of cerebral volume loss.    There are stable patchy hypodensities throughout the hemispheric white   matter without mass effect compatible with chronic microvascular changes.     There is no acute intracranial hemorrhage, extra-axial fluid collection   or midline shift.  Gray white matter differentiation is maintained.    There is calcific atherosclerotic disease at the skull base.    There is polypoid mucosal disease within the maxillary sinuses..      IMPRESSION:     1.  No evidence of acute intracranial pathology.  Stable exam since   2/13/2018.    2.  Stable mild ventricular prominence, mildchronic microvascular   changes and moderate cerebral atrophy.      < end of copied text >    EEG:     Echo:   Carotid Doppler: N/A  Telemetry:

## 2018-08-14 NOTE — PROVIDER CONTACT NOTE (OTHER) - ACTION/TREATMENT ORDERED:
Dr. Kraus stated he will endorse to night shift
MD Salgado orded to give 15U of lantus. Will continue to monitor blood glucose.
MD to assess bedside

## 2018-08-14 NOTE — CONSULT NOTE ADULT - ATTENDING COMMENTS
Patient examined and wife interviewed.  Apparently patient on namzeric for poor memory for a couple of years.  Now recently ambulation and urinary function declined.  Recent head CT show enlarged ventricles.  Patient has some parkinson features on exam and wife described either festination or mangnetic gait.    Physical therapy for timed 10 meter walk.    Recommend high volume 30 cc spinal tap under flouroscopy (send for routine studies CSF cell count, protein, glucose, cryptococcal ag, VDRL, gram stain and culture).    Then in 24-48 hours have a documented physical therapy exam repeating the tests done prior to the spinal tap including a timed 10 meter walk.

## 2018-08-14 NOTE — PROGRESS NOTE ADULT - ASSESSMENT
· Assessment		  78 year old male with PMHx of Atrial fibrillation, previously on xarelto - discontinued for unknown reason, dementia, DM presents with worsening dementia, needing help with all ADL's and difficulty ambulating independently.    # Inability to ambulate secondary to dementia  - Family request physiatry consult for admission to short term rehab transition to long term  - PT/ Physiatry consult    # Dementia w/ behavioral changes  -family reports he is alert but oriented x 0 at baseline.  Has memory deficits  -needs help with all ADL's    -Continue memantine and donepezil.   -Neuro consulted - r/o parkinsonism vs NPH  - f/u TSH, Vit B12, RPR   -Rehab /PT eval    #Urinary Incontinence  -Continue oxybutinin and tamsulosin  -f/u bladder scan with PVR    # Atrial Fibrillation  -continue metoprolol 100 mg  -not on AC due to high risk for falls    #HTN  -continue metoprolol, losartan, and amlodipine    #DM  -Continue insulin glargine and lispro  -monitor fingersticks    Diet: DASH  DVT Prophylaxis: Heparin    #Dispo: from home, awaiting placement 78 year old male with PMHx of Atrial fibrillation, previously on xarelto - discontinued for unknown reason, dementia, DM presents with worsening dementia, needing help with all ADL's and difficulty ambulating independently.    # Inability to ambulate secondary to dementia  - Family request physiatry consult for admission to short term rehab transition to long term  - PT/ Physiatry consult    # Dementia w/ behavioral changes  -family reports he is alert but oriented x 0 at baseline.  Has memory deficits  -needs help with all ADL's    -Continue memantine and donepezil.   -Neuro consulted - r/o parkinsonism vs NPH  - f/u TSH, Vit B12, RPR   -Rehab /PT eval    #Urinary Incontinence  -Continue oxybutinin and tamsulosin  -f/u bladder scan with PVR    # Atrial Fibrillation  -continue metoprolol 100 mg  -not on AC due to high risk for falls    #HTN  -continue metoprolol, losartan, and amlodipine    #DM  -Continue insulin glargine and lispro  -monitor fingersticks    Diet: DASH  DVT Prophylaxis: Heparin    #Dispo: from home, awaiting placement

## 2018-08-14 NOTE — CONSULT NOTE ADULT - ASSESSMENT
78 year old male with pmhx Dementia ,afib formerly on Xarelto- stopped for unknown reasons, dementia, DMII not on insulin, presenting from home with decreased ambulation (walks with walker at baseline) and is able to carry ADLS with assistance of HHA. As per sons account patient noted to have difficulty walking x 3 days pta a/w urinary frequency/ incontinence.     Neuro attending note to follow

## 2018-08-14 NOTE — PROVIDER CONTACT NOTE (OTHER) - SITUATION
STAT troponin was ordered by Dr. Finnegan, as per MD he will order the lab for 4pm lab draw. phlebotomist stated she was not going to draw the lab because it was not on her list. Dr. Kraus aware

## 2018-08-15 LAB
ANION GAP SERPL CALC-SCNC: 15 MMOL/L — HIGH (ref 7–14)
BASOPHILS # BLD AUTO: 0.02 K/UL — SIGNIFICANT CHANGE UP (ref 0–0.2)
BASOPHILS NFR BLD AUTO: 0.3 % — SIGNIFICANT CHANGE UP (ref 0–1)
BUN SERPL-MCNC: 23 MG/DL — HIGH (ref 10–20)
CALCIUM SERPL-MCNC: 8.9 MG/DL — SIGNIFICANT CHANGE UP (ref 8.5–10.1)
CHLORIDE SERPL-SCNC: 100 MMOL/L — SIGNIFICANT CHANGE UP (ref 98–110)
CO2 SERPL-SCNC: 24 MMOL/L — SIGNIFICANT CHANGE UP (ref 17–32)
CREAT SERPL-MCNC: 1.1 MG/DL — SIGNIFICANT CHANGE UP (ref 0.7–1.5)
EOSINOPHIL # BLD AUTO: 0.4 K/UL — SIGNIFICANT CHANGE UP (ref 0–0.7)
EOSINOPHIL NFR BLD AUTO: 6.5 % — SIGNIFICANT CHANGE UP (ref 0–8)
GLUCOSE SERPL-MCNC: 108 MG/DL — HIGH (ref 70–99)
HCT VFR BLD CALC: 43.2 % — SIGNIFICANT CHANGE UP (ref 42–52)
HGB BLD-MCNC: 14.5 G/DL — SIGNIFICANT CHANGE UP (ref 14–18)
IMM GRANULOCYTES NFR BLD AUTO: 0.5 % — HIGH (ref 0.1–0.3)
LYMPHOCYTES # BLD AUTO: 0.73 K/UL — LOW (ref 1.2–3.4)
LYMPHOCYTES # BLD AUTO: 11.9 % — LOW (ref 20.5–51.1)
MCHC RBC-ENTMCNC: 26.9 PG — LOW (ref 27–31)
MCHC RBC-ENTMCNC: 33.6 G/DL — SIGNIFICANT CHANGE UP (ref 32–37)
MCV RBC AUTO: 80 FL — SIGNIFICANT CHANGE UP (ref 80–94)
MONOCYTES # BLD AUTO: 0.6 K/UL — SIGNIFICANT CHANGE UP (ref 0.1–0.6)
MONOCYTES NFR BLD AUTO: 9.7 % — HIGH (ref 1.7–9.3)
NEUTROPHILS # BLD AUTO: 4.38 K/UL — SIGNIFICANT CHANGE UP (ref 1.4–6.5)
NEUTROPHILS NFR BLD AUTO: 71.1 % — SIGNIFICANT CHANGE UP (ref 42.2–75.2)
NRBC # BLD: 0 /100 WBCS — SIGNIFICANT CHANGE UP (ref 0–0)
PLATELET # BLD AUTO: 164 K/UL — SIGNIFICANT CHANGE UP (ref 130–400)
POTASSIUM SERPL-MCNC: 3.8 MMOL/L — SIGNIFICANT CHANGE UP (ref 3.5–5)
POTASSIUM SERPL-SCNC: 3.8 MMOL/L — SIGNIFICANT CHANGE UP (ref 3.5–5)
RBC # BLD: 5.4 M/UL — SIGNIFICANT CHANGE UP (ref 4.7–6.1)
RBC # FLD: 16 % — HIGH (ref 11.5–14.5)
SODIUM SERPL-SCNC: 139 MMOL/L — SIGNIFICANT CHANGE UP (ref 135–146)
T PALLIDUM AB TITR SER: NEGATIVE — SIGNIFICANT CHANGE UP
TROPONIN T SERPL-MCNC: <0.01 NG/ML — SIGNIFICANT CHANGE UP
TSH SERPL-MCNC: 1.54 UIU/ML — SIGNIFICANT CHANGE UP (ref 0.27–4.2)
VIT B12 SERPL-MCNC: 612 PG/ML — SIGNIFICANT CHANGE UP (ref 232–1245)
WBC # BLD: 6.16 K/UL — SIGNIFICANT CHANGE UP (ref 4.8–10.8)
WBC # FLD AUTO: 6.16 K/UL — SIGNIFICANT CHANGE UP (ref 4.8–10.8)

## 2018-08-15 RX ORDER — LINACLOTIDE 145 UG/1
145 CAPSULE, GELATIN COATED ORAL
Qty: 0 | Refills: 0 | Status: DISCONTINUED | OUTPATIENT
Start: 2018-08-15 | End: 2018-08-16

## 2018-08-15 RX ADMIN — Medication 100 MILLIGRAM(S): at 13:57

## 2018-08-15 RX ADMIN — OLANZAPINE 2.5 MILLIGRAM(S): 15 TABLET, FILM COATED ORAL at 19:57

## 2018-08-15 RX ADMIN — Medication 5 MILLIGRAM(S): at 19:57

## 2018-08-15 RX ADMIN — TAMSULOSIN HYDROCHLORIDE 0.4 MILLIGRAM(S): 0.4 CAPSULE ORAL at 22:16

## 2018-08-15 RX ADMIN — Medication 5 UNIT(S): at 08:19

## 2018-08-15 RX ADMIN — MEMANTINE HYDROCHLORIDE 10 MILLIGRAM(S): 10 TABLET ORAL at 05:26

## 2018-08-15 RX ADMIN — INSULIN GLARGINE 15 UNIT(S): 100 INJECTION, SOLUTION SUBCUTANEOUS at 22:16

## 2018-08-15 RX ADMIN — Medication 81 MILLIGRAM(S): at 13:55

## 2018-08-15 RX ADMIN — Medication 100 MILLIGRAM(S): at 05:27

## 2018-08-15 RX ADMIN — SENNA PLUS 2 TABLET(S): 8.6 TABLET ORAL at 22:16

## 2018-08-15 RX ADMIN — AMLODIPINE BESYLATE 5 MILLIGRAM(S): 2.5 TABLET ORAL at 05:27

## 2018-08-15 RX ADMIN — HEPARIN SODIUM 5000 UNIT(S): 5000 INJECTION INTRAVENOUS; SUBCUTANEOUS at 13:57

## 2018-08-15 RX ADMIN — HEPARIN SODIUM 5000 UNIT(S): 5000 INJECTION INTRAVENOUS; SUBCUTANEOUS at 05:27

## 2018-08-15 RX ADMIN — LINACLOTIDE 145 MICROGRAM(S): 145 CAPSULE, GELATIN COATED ORAL at 13:54

## 2018-08-15 RX ADMIN — Medication 100 MILLIGRAM(S): at 05:26

## 2018-08-15 RX ADMIN — HEPARIN SODIUM 5000 UNIT(S): 5000 INJECTION INTRAVENOUS; SUBCUTANEOUS at 22:17

## 2018-08-15 RX ADMIN — Medication 5 MILLIGRAM(S): at 05:26

## 2018-08-15 RX ADMIN — Medication 100 MILLIGRAM(S): at 22:16

## 2018-08-15 RX ADMIN — Medication 5 UNIT(S): at 12:12

## 2018-08-15 RX ADMIN — DONEPEZIL HYDROCHLORIDE 10 MILLIGRAM(S): 10 TABLET, FILM COATED ORAL at 22:16

## 2018-08-15 RX ADMIN — OLANZAPINE 2.5 MILLIGRAM(S): 15 TABLET, FILM COATED ORAL at 05:26

## 2018-08-15 RX ADMIN — LOSARTAN POTASSIUM 25 MILLIGRAM(S): 100 TABLET, FILM COATED ORAL at 05:26

## 2018-08-15 RX ADMIN — Medication 1: at 12:10

## 2018-08-15 RX ADMIN — MEMANTINE HYDROCHLORIDE 10 MILLIGRAM(S): 10 TABLET ORAL at 19:57

## 2018-08-15 RX ADMIN — Medication 325 MILLIGRAM(S): at 13:56

## 2018-08-15 NOTE — PROGRESS NOTE ADULT - ASSESSMENT
78 year old male with PMHx of Atrial fibrillation, previously on xarelto - discontinued for unknown reason, dementia, DM presents with worsening dementia, needing help with all ADL's and difficulty ambulating independently.    # Inability to ambulate secondary to dementia  - Family request physiatry consult for admission to short term rehab transition to long term  - PT/ Physiatry consult    # Dementia w/ behavioral changes  -family reports he is alert but oriented x 0 at baseline.  Has memory deficits  -needs help with all ADL's    -Continue memantine and donepezil.   -Neuro consulted - r/o parkinsonism vs NPH  - f/u TSH, Vit B12  -RPR is negative  -Rehab /PT eval    #Urinary Incontinence  -Continue oxybutinin and tamsulosin  -f/u bladder scan with PVR    #Chest pain  -EKG: stable in comparison to baseline EKG  -Troponins (trend)- first result is negative    # Atrial Fibrillation  -continue metoprolol 100 mg  -not on AC due to high risk for falls    #HTN  -continue metoprolol, losartan, and amlodipine    #DM  -Continue insulin glargine and lispro  -monitor fingersticks    Diet: DASH  DVT Prophylaxis: Heparin    #Dispo: Longterm Living, awaiting placement

## 2018-08-15 NOTE — PHYSICAL THERAPY INITIAL EVALUATION ADULT - GENERAL OBSERVATIONS, REHAB EVAL
Time in: 230 pm Time out: 305 pm Chart reviewed. Pt. seen semirecline in bed in No apparent distress, + spouse at b/s; appears confused but oriented to person

## 2018-08-15 NOTE — PROGRESS NOTE ADULT - SUBJECTIVE AND OBJECTIVE BOX
SUBJECTIVE:    Patient is a 78y old Male who presents with a chief complaint of inability to ambulate (12 Aug 2018 03:02)    Currently admitted to medicine with the primary diagnosis of Ambulatory dysfunction     Today is hospital day 4d. This morning he is resting comfortably in bed. Yesterday, the family reported that the patient had chronic left sided anterior atypical chest pain.  EKG and troponin performed to rule out ACS. Patient's EKG has no changes from baseline EKG on admission and the first troponin is negative.  This morning, the patient reports that his chest pain has resolved.  The patient is alert and oriented with no complaints.  He reports feeling much better.      PAST MEDICAL & SURGICAL HISTORY  Anemia  Diabetes  HTN (hypertension)  Arrhythmia  Dementia  S/P cholecystectomy  History of hip replacement    SOCIAL HISTORY:  Negative for smoking/alcohol/drug use.     ALLERGIES:  No Known Allergies    MEDICATIONS:  STANDING MEDICATIONS  amLODIPine   Tablet 5 milliGRAM(s) Oral daily  aspirin  chewable 81 milliGRAM(s) Oral daily  dextrose 5%. 1000 milliLiter(s) IV Continuous <Continuous>  dextrose 50% Injectable 12.5 Gram(s) IV Push once  dextrose 50% Injectable 25 Gram(s) IV Push once  dextrose 50% Injectable 25 Gram(s) IV Push once  docusate sodium 100 milliGRAM(s) Oral three times a day  donepezil 10 milliGRAM(s) Oral at bedtime  ferrous    sulfate 325 milliGRAM(s) Oral daily  heparin  Injectable 5000 Unit(s) SubCutaneous every 8 hours  insulin glargine Injectable (LANTUS) 15 Unit(s) SubCutaneous at bedtime  insulin lispro (HumaLOG) corrective regimen sliding scale   SubCutaneous three times a day before meals  insulin lispro Injectable (HumaLOG) 5 Unit(s) SubCutaneous three times a day before meals  losartan 25 milliGRAM(s) Oral daily  memantine 10 milliGRAM(s) Oral two times a day  metoprolol succinate  milliGRAM(s) Oral daily  OLANZapine 2.5 milliGRAM(s) Oral two times a day  oxybutynin 5 milliGRAM(s) Oral two times a day  senna 2 Tablet(s) Oral at bedtime  tamsulosin 0.4 milliGRAM(s) Oral at bedtime    PRN MEDICATIONS  chlorhexidine 4% Liquid 1 Application(s) Topical two times a day PRN  dextrose 40% Gel 15 Gram(s) Oral once PRN  glucagon  Injectable 1 milliGRAM(s) IntraMuscular once PRN    VITALS:   T(F): 97.6  HR: 83  BP: 141/70  RR: 18  SpO2: --    LABS:                        14.5   6.16  )-----------( 164      ( 15 Aug 2018 06:35 )             43.2     08-15    139  |  100  |  23<H>  ----------------------------<  108<H>  3.8   |  24  |  1.1    Ca    8.9      15 Aug 2018 06:35      Troponin T, Serum: <0.01 ng/mL (08-15-18 @ 06:35)      CARDIAC MARKERS ( 15 Aug 2018 06:35 )  x     / <0.01 ng/mL / x     / x     / x        EKG  < from: 12 Lead ECG (08.14.18 @ 15:59) >  Diagnosis Line Atrial fibrillation  Incomplete right bundle branch block  Minimal voltage criteria for LVH, may be normal variant  Septal infarct , age undetermined  Abnormal ECG    < from: 12 Lead ECG (08.11.18 @ 17:46) >  Diagnosis Line Atrial fibrillation  Incomplete right bundle branch block  Septalinfarct , age undetermined  Abnormal ECG            RADIOLOGY:  < from: CT Chest No Cont (08.11.18 @ 20:22) >  1.  Bilateral trace pleural effusions (improved on the right side).    2.  Stable small pericardial effusion.    3.  Unchanged 1 cm left upper lobe nodule containing calcification and   apparent regions of fat density, may represent a pulmonary hamartoma.    4.  Apparent new multiple 2 to 3 mm nodular opacities in the bilateral   lungs, predominant upper lobes, could represent small airways   inflammation. Consider 3 month follow-up exam.  < from: CT Head No Cont (08.11.18 @ 20:21) >    IMPRESSION:     1.  No evidence of acute intracranial pathology.  Stable exam since   2/13/2018.    2.  Stable mild ventricular prominence, mildchronic microvascular   changes and moderate cerebral atrophy.    < from: Xray Chest 1 View-PORTABLE IMMEDIATE (08.11.18 @ 19:04) >  Impression:      Left upper lobe 1 cm nodule better seen on chest CT of same day          PHYSICAL EXAM:  GEN: No acute distress  LUNGS: Clear to auscultation bilaterally   HEART: Irregular, S1,S2 indistinguishable due to AFib  ABD: Soft, non-tender, non-distended.  EXT: NC/NC/NE/2+PP/MARIN/Venous Stasis changes in lower extremities bilaterally   NEURO: AAOX3    Intravenous access:   NG tube:   Deluca Catheter: SUBJECTIVE:    Patient is a 78y old Male who presents with a chief complaint of inability to ambulate (12 Aug 2018 03:02)    Currently admitted to medicine with the primary diagnosis of Ambulatory dysfunction     Today is hospital day 4d. This morning he is resting comfortably in bed. Yesterday, the family reported that the patient had chronic left sided anterior atypical chest pain.  EKG and troponin performed to rule out ACS. Patient's EKG has no changes from baseline EKG on admission and the first troponin is negative.  This morning, the patient reports that his chest pain has resolved.  The patient is alert and oriented with no complaints.  He reports feeling well. Patient remains bedridden and unable to ambulate.     PAST MEDICAL & SURGICAL HISTORY  Anemia  Diabetes  HTN (hypertension)  Arrhythmia  Dementia  S/P cholecystectomy  History of hip replacement    SOCIAL HISTORY:  Negative for smoking/alcohol/drug use.     ALLERGIES:  No Known Allergies    MEDICATIONS:  STANDING MEDICATIONS  amLODIPine   Tablet 5 milliGRAM(s) Oral daily  aspirin  chewable 81 milliGRAM(s) Oral daily  dextrose 5%. 1000 milliLiter(s) IV Continuous <Continuous>  dextrose 50% Injectable 12.5 Gram(s) IV Push once  dextrose 50% Injectable 25 Gram(s) IV Push once  dextrose 50% Injectable 25 Gram(s) IV Push once  docusate sodium 100 milliGRAM(s) Oral three times a day  donepezil 10 milliGRAM(s) Oral at bedtime  ferrous    sulfate 325 milliGRAM(s) Oral daily  heparin  Injectable 5000 Unit(s) SubCutaneous every 8 hours  insulin glargine Injectable (LANTUS) 15 Unit(s) SubCutaneous at bedtime  insulin lispro (HumaLOG) corrective regimen sliding scale   SubCutaneous three times a day before meals  insulin lispro Injectable (HumaLOG) 5 Unit(s) SubCutaneous three times a day before meals  losartan 25 milliGRAM(s) Oral daily  memantine 10 milliGRAM(s) Oral two times a day  metoprolol succinate  milliGRAM(s) Oral daily  OLANZapine 2.5 milliGRAM(s) Oral two times a day  oxybutynin 5 milliGRAM(s) Oral two times a day  senna 2 Tablet(s) Oral at bedtime  tamsulosin 0.4 milliGRAM(s) Oral at bedtime    PRN MEDICATIONS  chlorhexidine 4% Liquid 1 Application(s) Topical two times a day PRN  dextrose 40% Gel 15 Gram(s) Oral once PRN  glucagon  Injectable 1 milliGRAM(s) IntraMuscular once PRN    VITALS:   T(F): 97.6  HR: 83  BP: 141/70  RR: 18  SpO2: --    LABS:                        14.5   6.16  )-----------( 164      ( 15 Aug 2018 06:35 )             43.2     08-15    139  |  100  |  23<H>  ----------------------------<  108<H>  3.8   |  24  |  1.1    Ca    8.9      15 Aug 2018 06:35      Troponin T, Serum: <0.01 ng/mL (08-15-18 @ 06:35)      CARDIAC MARKERS ( 15 Aug 2018 06:35 )  x     / <0.01 ng/mL / x     / x     / x        EKG  < from: 12 Lead ECG (08.14.18 @ 15:59) >  Diagnosis Line Atrial fibrillation  Incomplete right bundle branch block  Minimal voltage criteria for LVH, may be normal variant  Septal infarct , age undetermined  Abnormal ECG    < from: 12 Lead ECG (08.11.18 @ 17:46) >  Diagnosis Line Atrial fibrillation  Incomplete right bundle branch block  Septalinfarct , age undetermined  Abnormal ECG            RADIOLOGY:  < from: CT Chest No Cont (08.11.18 @ 20:22) >  1.  Bilateral trace pleural effusions (improved on the right side).    2.  Stable small pericardial effusion.    3.  Unchanged 1 cm left upper lobe nodule containing calcification and   apparent regions of fat density, may represent a pulmonary hamartoma.    4.  Apparent new multiple 2 to 3 mm nodular opacities in the bilateral   lungs, predominant upper lobes, could represent small airways   inflammation. Consider 3 month follow-up exam.  < from: CT Head No Cont (08.11.18 @ 20:21) >    IMPRESSION:     1.  No evidence of acute intracranial pathology.  Stable exam since   2/13/2018.    2.  Stable mild ventricular prominence, mildchronic microvascular   changes and moderate cerebral atrophy.    < from: Xray Chest 1 View-PORTABLE IMMEDIATE (08.11.18 @ 19:04) >  Impression:      Left upper lobe 1 cm nodule better seen on chest CT of same day          PHYSICAL EXAM:  GEN: No acute distress  LUNGS: Clear to auscultation bilaterally   HEART: Irregular, S1,S2 indistinguishable due to AFib  ABD: Soft, non-tender, non-distended.  EXT: NC/NC/NE/2+PP/MARIN/Venous Stasis changes in lower extremities bilaterally   NEURO: AAOX3    Intravenous access:   NG tube:   Deluca Catheter:

## 2018-08-16 ENCOUNTER — TRANSCRIPTION ENCOUNTER (OUTPATIENT)
Age: 78
End: 2018-08-16

## 2018-08-16 VITALS
HEART RATE: 112 BPM | DIASTOLIC BLOOD PRESSURE: 84 MMHG | TEMPERATURE: 97 F | RESPIRATION RATE: 18 BRPM | SYSTOLIC BLOOD PRESSURE: 160 MMHG

## 2018-08-16 LAB — TROPONIN T SERPL-MCNC: <0.01 NG/ML — SIGNIFICANT CHANGE UP

## 2018-08-16 RX ORDER — DOCUSATE SODIUM 100 MG
1 CAPSULE ORAL
Qty: 0 | Refills: 0 | COMMUNITY
Start: 2018-08-16

## 2018-08-16 RX ORDER — SENNA PLUS 8.6 MG/1
1 TABLET ORAL
Qty: 0 | Refills: 0 | COMMUNITY
Start: 2018-08-16

## 2018-08-16 RX ORDER — LACTULOSE 10 G/15ML
10 SOLUTION ORAL THREE TIMES A DAY
Qty: 0 | Refills: 0 | Status: DISCONTINUED | OUTPATIENT
Start: 2018-08-16 | End: 2018-08-16

## 2018-08-16 RX ORDER — SENNA PLUS 8.6 MG/1
2 TABLET ORAL
Qty: 0 | Refills: 0 | COMMUNITY
Start: 2018-08-16

## 2018-08-16 RX ADMIN — HEPARIN SODIUM 5000 UNIT(S): 5000 INJECTION INTRAVENOUS; SUBCUTANEOUS at 13:22

## 2018-08-16 RX ADMIN — Medication 325 MILLIGRAM(S): at 13:22

## 2018-08-16 RX ADMIN — Medication 100 MILLIGRAM(S): at 06:24

## 2018-08-16 RX ADMIN — Medication 81 MILLIGRAM(S): at 13:22

## 2018-08-16 RX ADMIN — HEPARIN SODIUM 5000 UNIT(S): 5000 INJECTION INTRAVENOUS; SUBCUTANEOUS at 06:25

## 2018-08-16 RX ADMIN — OLANZAPINE 2.5 MILLIGRAM(S): 15 TABLET, FILM COATED ORAL at 06:25

## 2018-08-16 RX ADMIN — MEMANTINE HYDROCHLORIDE 10 MILLIGRAM(S): 10 TABLET ORAL at 06:24

## 2018-08-16 RX ADMIN — Medication 5 MILLIGRAM(S): at 06:24

## 2018-08-16 RX ADMIN — OLANZAPINE 2.5 MILLIGRAM(S): 15 TABLET, FILM COATED ORAL at 17:55

## 2018-08-16 RX ADMIN — Medication 1: at 17:54

## 2018-08-16 RX ADMIN — Medication 100 MILLIGRAM(S): at 13:22

## 2018-08-16 RX ADMIN — MEMANTINE HYDROCHLORIDE 10 MILLIGRAM(S): 10 TABLET ORAL at 17:55

## 2018-08-16 RX ADMIN — Medication 5 MILLIGRAM(S): at 17:55

## 2018-08-16 RX ADMIN — LACTULOSE 10 GRAM(S): 10 SOLUTION ORAL at 13:21

## 2018-08-16 RX ADMIN — Medication 5 UNIT(S): at 17:54

## 2018-08-16 RX ADMIN — LOSARTAN POTASSIUM 25 MILLIGRAM(S): 100 TABLET, FILM COATED ORAL at 06:24

## 2018-08-16 RX ADMIN — AMLODIPINE BESYLATE 5 MILLIGRAM(S): 2.5 TABLET ORAL at 06:24

## 2018-08-16 NOTE — DISCHARGE NOTE ADULT - CARE PROVIDER_API CALL
Maribell Eldridge  [address]   [place] [address]  Phone: [phone]  Fax: (   )    -

## 2018-08-16 NOTE — DISCHARGE NOTE ADULT - PROVIDER TOKENS
FREE:[LAST:[Chente],FIRST:[Maribell],PHONE:[phone]],FAX:[phone]   [phone]    -],ADDRESS:[[address] [place] [address] [address]   [place] [address]]]

## 2018-08-16 NOTE — PROGRESS NOTE ADULT - SUBJECTIVE AND OBJECTIVE BOX
SUBJECTIVE:    Patient is a 78y old Male who presents with a chief complaint of inability to ambulate (12 Aug 2018 03:02)    Currently admitted to medicine with the primary diagnosis of Ambulatory dysfunction     Today is hospital day 5d. This morning he is resting. The patient's wife reports that he has not had a bowel movement since admission and the patient is not eating.  His home medication (linzess) for constipation was added and given yesterday with no improvement. The patient has history of dementia and is alert and oriented x 2.  Patient remains bedridden and unable to ambulate.     PAST MEDICAL & SURGICAL HISTORY  Anemia  Diabetes  HTN (hypertension)  Arrhythmia  Dementia  S/P cholecystectomy  History of hip replacement    SOCIAL HISTORY:  Negative for smoking/alcohol/drug use.     ALLERGIES:  No Known Allergies    MEDICATIONS:  STANDING MEDICATIONS  amLODIPine   Tablet 5 milliGRAM(s) Oral daily  aspirin  chewable 81 milliGRAM(s) Oral daily  dextrose 5%. 1000 milliLiter(s) IV Continuous <Continuous>  dextrose 50% Injectable 12.5 Gram(s) IV Push once  dextrose 50% Injectable 25 Gram(s) IV Push once  dextrose 50% Injectable 25 Gram(s) IV Push once  docusate sodium 100 milliGRAM(s) Oral three times a day  donepezil 10 milliGRAM(s) Oral at bedtime  ferrous    sulfate 325 milliGRAM(s) Oral daily  heparin  Injectable 5000 Unit(s) SubCutaneous every 8 hours  insulin glargine Injectable (LANTUS) 15 Unit(s) SubCutaneous at bedtime  insulin lispro (HumaLOG) corrective regimen sliding scale   SubCutaneous three times a day before meals  insulin lispro Injectable (HumaLOG) 5 Unit(s) SubCutaneous three times a day before meals  linaclotide 145 MICROGram(s) Oral before breakfast  losartan 25 milliGRAM(s) Oral daily  memantine 10 milliGRAM(s) Oral two times a day  metoprolol succinate  milliGRAM(s) Oral daily  OLANZapine 2.5 milliGRAM(s) Oral two times a day  oxybutynin 5 milliGRAM(s) Oral two times a day  senna 2 Tablet(s) Oral at bedtime  tamsulosin 0.4 milliGRAM(s) Oral at bedtime    PRN MEDICATIONS  chlorhexidine 4% Liquid 1 Application(s) Topical two times a day PRN  dextrose 40% Gel 15 Gram(s) Oral once PRN  glucagon  Injectable 1 milliGRAM(s) IntraMuscular once PRN    VITALS:   T(F): 98.3  HR: 90  BP: 155/70  RR: 18  SpO2: --    LABS:                        14.5   6.16  )-----------( 164      ( 15 Aug 2018 06:35 )             43.2     08-15    139  |  100  |  23<H>  ----------------------------<  108<H>  3.8   |  24  |  1.1    Ca    8.9      15 Aug 2018 06:35    Troponin T, Serum: <0.01 ng/mL (08-16-18 @ 05:43)      CARDIAC MARKERS ( 16 Aug 2018 05:43 )  x     / <0.01 ng/mL / x     / x     / x      CARDIAC MARKERS ( 15 Aug 2018 06:35 )  x     / <0.01 ng/mL / x     / x     / x        EKG    < from: 12 Lead ECG (08.14.18 @ 15:59) >  Diagnosis Line Atrial fibrillation  Incomplete right bundle branch block  Minimal voltage criteria for LVH, may be normal variant  Septal infarct , age undetermined  Abnormal ECG    < from: 12 Lead ECG (08.11.18 @ 17:46) >  Diagnosis Line Atrial fibrillation  Incomplete right bundle branch block  Septalinfarct , age undetermined  Abnormal ECG    RADIOLOGY:  < from: CT Chest No Cont (08.11.18 @ 20:22) >  IMPRESSION:    Since 2/13/2018:    1.  Bilateral trace pleural effusions (improved on the right side).    2.  Stable small pericardial effusion.    3.  Unchanged 1 cm left upper lobe nodule containing calcification and   apparent regions of fat density, may represent a pulmonary hamartoma.    4.  Apparent new multiple 2 to 3 mm nodular opacities in the bilateral   lungs, predominant upper lobes, could represent small airways   inflammation. Consider 3 month follow-up exam.    < from: CT Head No Cont (08.11.18 @ 20:21) >  IMPRESSION:     1.  No evidence of acute intracranial pathology.  Stable exam since   2/13/2018.    2.  Stable mild ventricular prominence, mildchronic microvascular   changes and moderate cerebral atrophy.    < from: Xray Chest 1 View-PORTABLE IMMEDIATE (08.11.18 @ 19:04) >  Impression:      Left upper lobe 1 cm nodule better seen on chest CT of same day    PHYSICAL EXAM:    GEN: No acute distress  LUNGS: Clear to auscultation bilaterally   HEART: Irregular, S1, S2 indistinguishable due to AFib  ABD: Soft, non-tender, mildly distended.  EXT: NC/NC/NE/2+PP/MARIN/ Lower extremity venous stasis changes  NEURO: AAOX2    Intravenous access:   NG tube:   Deluca Catheter: SUBJECTIVE:    Patient is a 78y old Male who presents with a chief complaint of inability to ambulate (12 Aug 2018 03:02)    Currently admitted to medicine with the primary diagnosis of Ambulatory dysfunction     Today is hospital day 5d. This morning he is resting. The patient's wife reports that he has not had a bowel movement since admission and the patient is not eating.  His home medication (linzess) for constipation was added and given yesterday with no improvement. The patient has history of dementia and is alert and oriented x 2.  Patient remains bedridden and unable to ambulate.     PAST MEDICAL & SURGICAL HISTORY  Anemia  Diabetes  HTN (hypertension)  Arrhythmia  Dementia  S/P cholecystectomy  History of hip replacement    SOCIAL HISTORY:  Negative for smoking/alcohol/drug use.     ALLERGIES:  No Known Allergies    MEDICATIONS:  STANDING MEDICATIONS  amLODIPine   Tablet 5 milliGRAM(s) Oral daily  aspirin  chewable 81 milliGRAM(s) Oral daily  dextrose 5%. 1000 milliLiter(s) IV Continuous <Continuous>  dextrose 50% Injectable 12.5 Gram(s) IV Push once  dextrose 50% Injectable 25 Gram(s) IV Push once  dextrose 50% Injectable 25 Gram(s) IV Push once  docusate sodium 100 milliGRAM(s) Oral three times a day  donepezil 10 milliGRAM(s) Oral at bedtime  ferrous    sulfate 325 milliGRAM(s) Oral daily  heparin  Injectable 5000 Unit(s) SubCutaneous every 8 hours  insulin glargine Injectable (LANTUS) 15 Unit(s) SubCutaneous at bedtime  insulin lispro (HumaLOG) corrective regimen sliding scale   SubCutaneous three times a day before meals  insulin lispro Injectable (HumaLOG) 5 Unit(s) SubCutaneous three times a day before meals  linaclotide 145 MICROGram(s) Oral before breakfast  losartan 25 milliGRAM(s) Oral daily  memantine 10 milliGRAM(s) Oral two times a day  metoprolol succinate  milliGRAM(s) Oral daily  OLANZapine 2.5 milliGRAM(s) Oral two times a day  oxybutynin 5 milliGRAM(s) Oral two times a day  senna 2 Tablet(s) Oral at bedtime  tamsulosin 0.4 milliGRAM(s) Oral at bedtime    PRN MEDICATIONS  chlorhexidine 4% Liquid 1 Application(s) Topical two times a day PRN  dextrose 40% Gel 15 Gram(s) Oral once PRN  glucagon  Injectable 1 milliGRAM(s) IntraMuscular once PRN    VITALS:   T(F): 98.3  HR: 90  BP: 155/70  RR: 18  SpO2: --    LABS:                        14.5   6.16  )-----------( 164      ( 15 Aug 2018 06:35 )             43.2     08-15    139  |  100  |  23<H>  ----------------------------<  108<H>  3.8   |  24  |  1.1    Ca    8.9      15 Aug 2018 06:35    Troponin T, Serum: <0.01 ng/mL (08-16-18 @ 05:43)      CARDIAC MARKERS ( 16 Aug 2018 05:43 )  x     / <0.01 ng/mL / x     / x     / x      CARDIAC MARKERS ( 15 Aug 2018 06:35 )  x     / <0.01 ng/mL / x     / x     / x        EKG    < from: 12 Lead ECG (08.14.18 @ 15:59) >  Diagnosis Line Atrial fibrillation  Incomplete right bundle branch block  Minimal voltage criteria for LVH, may be normal variant  Septal infarct , age undetermined  Abnormal ECG    < from: 12 Lead ECG (08.11.18 @ 17:46) >  Diagnosis Line Atrial fibrillation  Incomplete right bundle branch block  Septalinfarct , age undetermined  Abnormal ECG    RADIOLOGY:  < from: CT Chest No Cont (08.11.18 @ 20:22) >  IMPRESSION:    Since 2/13/2018:    1.  Bilateral trace pleural effusions (improved on the right side).    2.  Stable small pericardial effusion.    3.  Unchanged 1 cm left upper lobe nodule containing calcification and   apparent regions of fat density, may represent a pulmonary hamartoma.    4.  Apparent new multiple 2 to 3 mm nodular opacities in the bilateral   lungs, predominant upper lobes, could represent small airways   inflammation. Consider 3 month follow-up exam.    < from: CT Head No Cont (08.11.18 @ 20:21) >  IMPRESSION:     1.  No evidence of acute intracranial pathology.  Stable exam since   2/13/2018.    2.  Stable mild ventricular prominence, mildchronic microvascular   changes and moderate cerebral atrophy.    < from: Xray Chest 1 View-PORTABLE IMMEDIATE (08.11.18 @ 19:04) >  Impression:      Left upper lobe 1 cm nodule better seen on chest CT of same day    PHYSICAL EXAM:    GEN: No acute distress  LUNGS: Clear to auscultation bilaterally, no rhonchi, rales, crackles, or wheezes   HEART: Irregular, S1, S2 indistinguishable due to AFib, no murmurs, rubs, or gallops  ABD: Soft, non-tender, mildly distended.  EXT: NC/NC/NE/2+PP/MARIN/ Lower extremity venous stasis changes  NEURO: AAOX2    Intravenous access:   NG tube:   Deluca Catheter: SUBJECTIVE:    Patient is a 78y old Male who presents with a chief complaint of inability to ambulate (12 Aug 2018 03:02)    Currently admitted to medicine with the primary diagnosis of Ambulatory dysfunction     Today is hospital day 5d. This morning he is resting. The patient's wife reports that he has not had a bowel movement since admission and the patient is not eating.  His home medication (Linzess) for constipation was added and given yesterday with no improvement. The patient has history of dementia and is alert and oriented x 2.  Patient remains in bed and difficulty ambulating. Seen by PT yesterday    PAST MEDICAL & SURGICAL HISTORY  Anemia  Diabetes  HTN (hypertension)  Arrhythmia  Dementia  S/P cholecystectomy  History of hip replacement    SOCIAL HISTORY:  Negative for smoking/alcohol/drug use.     ALLERGIES:  No Known Allergies    MEDICATIONS:  STANDING MEDICATIONS  amLODIPine   Tablet 5 milliGRAM(s) Oral daily  aspirin  chewable 81 milliGRAM(s) Oral daily  dextrose 5%. 1000 milliLiter(s) IV Continuous <Continuous>  dextrose 50% Injectable 12.5 Gram(s) IV Push once  dextrose 50% Injectable 25 Gram(s) IV Push once  dextrose 50% Injectable 25 Gram(s) IV Push once  docusate sodium 100 milliGRAM(s) Oral three times a day  donepezil 10 milliGRAM(s) Oral at bedtime  ferrous    sulfate 325 milliGRAM(s) Oral daily  heparin  Injectable 5000 Unit(s) SubCutaneous every 8 hours  insulin glargine Injectable (LANTUS) 15 Unit(s) SubCutaneous at bedtime  insulin lispro (HumaLOG) corrective regimen sliding scale   SubCutaneous three times a day before meals  insulin lispro Injectable (HumaLOG) 5 Unit(s) SubCutaneous three times a day before meals  linaclotide 145 MICROGram(s) Oral before breakfast  losartan 25 milliGRAM(s) Oral daily  memantine 10 milliGRAM(s) Oral two times a day  metoprolol succinate  milliGRAM(s) Oral daily  OLANZapine 2.5 milliGRAM(s) Oral two times a day  oxybutynin 5 milliGRAM(s) Oral two times a day  senna 2 Tablet(s) Oral at bedtime  tamsulosin 0.4 milliGRAM(s) Oral at bedtime    PRN MEDICATIONS  chlorhexidine 4% Liquid 1 Application(s) Topical two times a day PRN  dextrose 40% Gel 15 Gram(s) Oral once PRN  glucagon  Injectable 1 milliGRAM(s) IntraMuscular once PRN    VITALS:   T(F): 98.3  HR: 90  BP: 155/70  RR: 18  SpO2: --    LABS:                        14.5   6.16  )-----------( 164      ( 15 Aug 2018 06:35 )             43.2     08-15    139  |  100  |  23<H>  ----------------------------<  108<H>  3.8   |  24  |  1.1    Ca    8.9      15 Aug 2018 06:35    Troponin T, Serum: <0.01 ng/mL (08-16-18 @ 05:43)      CARDIAC MARKERS ( 16 Aug 2018 05:43 )  x     / <0.01 ng/mL / x     / x     / x      CARDIAC MARKERS ( 15 Aug 2018 06:35 )  x     / <0.01 ng/mL / x     / x     / x        EKG    < from: 12 Lead ECG (08.14.18 @ 15:59) >  Diagnosis Line Atrial fibrillation  Incomplete right bundle branch block  Minimal voltage criteria for LVH, may be normal variant  Septal infarct , age undetermined  Abnormal ECG    < from: 12 Lead ECG (08.11.18 @ 17:46) >  Diagnosis Line Atrial fibrillation  Incomplete right bundle branch block  Septalinfarct , age undetermined  Abnormal ECG    RADIOLOGY:  < from: CT Chest No Cont (08.11.18 @ 20:22) >  IMPRESSION:    Since 2/13/2018:    1.  Bilateral trace pleural effusions (improved on the right side).    2.  Stable small pericardial effusion.    3.  Unchanged 1 cm left upper lobe nodule containing calcification and   apparent regions of fat density, may represent a pulmonary hamartoma.    4.  Apparent new multiple 2 to 3 mm nodular opacities in the bilateral   lungs, predominant upper lobes, could represent small airways   inflammation. Consider 3 month follow-up exam.    < from: CT Head No Cont (08.11.18 @ 20:21) >  IMPRESSION:     1.  No evidence of acute intracranial pathology.  Stable exam since   2/13/2018.    2.  Stable mild ventricular prominence, mildchronic microvascular   changes and moderate cerebral atrophy.    < from: Xray Chest 1 View-PORTABLE IMMEDIATE (08.11.18 @ 19:04) >  Impression:      Left upper lobe 1 cm nodule better seen on chest CT of same day    PHYSICAL EXAM:    GEN: No acute distress  LUNGS: Clear to auscultation bilaterally, no rhonchi, rales, or wheezes   HEART: Irregularly irregular, no murmurs, rubs, or gallops  ABD: Soft, non-tender, mildly distended.  EXT: NC/NC/NE/2+PP/MARIN/Lower extremity with venous stasis changes B/L  NEURO: AAOX2 SUBJECTIVE:    Patient is a 78y old Male who presents with a chief complaint of inability to ambulate (12 Aug 2018 03:02)    Currently admitted to medicine with the primary diagnosis of Ambulatory dysfunction     Today is hospital day 5d. This morning he is resting. The patient's wife reports that he has not had a bowel movement since admission and the patient is not eating.  His home medication (Linzess) for constipation was added and given yesterday with no improvement. The patient has history of dementia and is alert and oriented x 2.  Patient remains in bed and difficulty ambulating. Seen by PT yesterday    PAST MEDICAL & SURGICAL HISTORY  Anemia  Diabetes  HTN (hypertension)  Arrhythmia  Dementia  S/P cholecystectomy  History of hip replacement    SOCIAL HISTORY:  Negative for smoking/alcohol/drug use.     ALLERGIES:  No Known Allergies    MEDICATIONS:  STANDING MEDICATIONS  amLODIPine   Tablet 5 milliGRAM(s) Oral daily  aspirin  chewable 81 milliGRAM(s) Oral daily  dextrose 5%. 1000 milliLiter(s) IV Continuous <Continuous>  dextrose 50% Injectable 12.5 Gram(s) IV Push once  dextrose 50% Injectable 25 Gram(s) IV Push once  dextrose 50% Injectable 25 Gram(s) IV Push once  docusate sodium 100 milliGRAM(s) Oral three times a day  donepezil 10 milliGRAM(s) Oral at bedtime  ferrous    sulfate 325 milliGRAM(s) Oral daily  heparin  Injectable 5000 Unit(s) SubCutaneous every 8 hours  insulin glargine Injectable (LANTUS) 15 Unit(s) SubCutaneous at bedtime  insulin lispro (HumaLOG) corrective regimen sliding scale   SubCutaneous three times a day before meals  insulin lispro Injectable (HumaLOG) 5 Unit(s) SubCutaneous three times a day before meals  linaclotide 145 MICROGram(s) Oral before breakfast  losartan 25 milliGRAM(s) Oral daily  memantine 10 milliGRAM(s) Oral two times a day  metoprolol succinate  milliGRAM(s) Oral daily  OLANZapine 2.5 milliGRAM(s) Oral two times a day  oxybutynin 5 milliGRAM(s) Oral two times a day  senna 2 Tablet(s) Oral at bedtime  tamsulosin 0.4 milliGRAM(s) Oral at bedtime    PRN MEDICATIONS  chlorhexidine 4% Liquid 1 Application(s) Topical two times a day PRN  dextrose 40% Gel 15 Gram(s) Oral once PRN  glucagon  Injectable 1 milliGRAM(s) IntraMuscular once PRN    VITALS:   T(F): 98.3  HR: 90  BP: 155/70  RR: 18  SpO2: --    LABS:                        14.5   6.16  )-----------( 164      ( 15 Aug 2018 06:35 )             43.2     08-15    139  |  100  |  23<H>  ----------------------------<  108<H>  3.8   |  24  |  1.1    Ca    8.9      15 Aug 2018 06:35    Troponin T, Serum: <0.01 ng/mL (08-16-18 @ 05:43)      CARDIAC MARKERS ( 16 Aug 2018 05:43 )  x     / <0.01 ng/mL / x     / x     / x      CARDIAC MARKERS ( 15 Aug 2018 06:35 )  x     / <0.01 ng/mL / x     / x     / x        EKG    < from: 12 Lead ECG (08.14.18 @ 15:59) >  Diagnosis Line Atrial fibrillation  Incomplete right bundle branch block  Minimal voltage criteria for LVH, may be normal variant  Septal infarct , age undetermined  Abnormal ECG    < from: 12 Lead ECG (08.11.18 @ 17:46) >  Diagnosis Line Atrial fibrillation  Incomplete right bundle branch block  Septalinfarct , age undetermined  Abnormal ECG    RADIOLOGY:  < from: CT Chest No Cont (08.11.18 @ 20:22) >  IMPRESSION:    Since 2/13/2018:    1.  Bilateral trace pleural effusions (improved on the right side).    2.  Stable small pericardial effusion.    3.  Unchanged 1 cm left upper lobe nodule containing calcification and   apparent regions of fat density, may represent a pulmonary hamartoma.    4.  Apparent new multiple 2 to 3 mm nodular opacities in the bilateral   lungs, predominant upper lobes, could represent small airways   inflammation. Consider 3 month follow-up exam.    < from: CT Head No Cont (08.11.18 @ 20:21) >  IMPRESSION:     1.  No evidence of acute intracranial pathology.  Stable exam since   2/13/2018.    2.  Stable mild ventricular prominence, mildchronic microvascular   changes and moderate cerebral atrophy.    < from: Xray Chest 1 View-PORTABLE IMMEDIATE (08.11.18 @ 19:04) >  Impression:      Left upper lobe 1 cm nodule better seen on chest CT of same day    PHYSICAL EXAM:    GEN: No acute distress  LUNGS/ pulmonary: Clear to auscultation bilaterally, no rhonchi, rales, or wheezes   HEART / cvs : Irregularly irregular, no murmurs, rubs, or gallops  ABD: Soft, non-tender, mildly distended.  EXT: NC/NC/NE/2+PP/MARIN/Lower extremity with venous stasis changes B/L  NEURO: AAOX1

## 2018-08-16 NOTE — DISCHARGE NOTE ADULT - PATIENT PORTAL LINK FT
You can access the [email or web address] Patient Portal, offered by [facility], by registering with the following website: [email or web address]

## 2018-08-16 NOTE — DISCHARGE NOTE ADULT - PLAN OF CARE
Overactive bladder  - Continue with current medications Improvement Ambulation dysfunction likely secondary to dementia  - Physiatry consulted - recommends short term rehab in a skilled nursing facility  - Physical therapy Control -family reports he is alert but oriented x 0 at baseline. Has memory deficits  -needs help with all ADLs    -Continue memantine and donepezil.   -Neuro consulted - to rule out parkinsonism vs NPH - recommended LP 30cc under fluoroscopy   -TSH and B12 are within normal limits  -RPR is negative Continue with current home medication and monitor glucose finger sticks twice daily  - Monitor BMP every 3 months Continue with metoprolol, losartan and amlodipine  Monitor blood pressure

## 2018-08-16 NOTE — PROGRESS NOTE ADULT - ASSESSMENT
78 year old male with PMHx of Atrial fibrillation, previously on xarelto - discontinued for unknown reason, dementia, DM presents with worsening dementia, needing help with all ADL's and difficulty ambulating independently.    # Inability to ambulate secondary to dementia  - Family request physiatry consult for admission to short term rehab transition to long term  - PT/ Physiatry consult    # Dementia w/ behavioral changes  -family reports he is alert but oriented x 0 at baseline.  Has memory deficits  -needs help with all ADL's    -Continue memantine and donepezil.   -Neuro consulted - r/o parkinsonism vs NPH  - f/u TSH, Vit B12  -RPR is negative  -Rehab /PT eval    #Urinary Incontinence  -Continue oxybutinin and tamsulosin  -f/u bladder scan with PVR    #Constipation  -continue docusate, senna, and linaclotide  -order lactulose    #Chest pain  -EKG: stable in comparison to baseline EKG  -f/u on Troponins (trend)- first result is negative,     # Atrial Fibrillation  -continue metoprolol 100 mg  -not on AC due to high risk for falls    #HTN  -continue metoprolol, losartan, and amlodipine    #DM  -Continue insulin glargine and lispro  -monitor fingersticks    Diet: DASH  DVT Prophylaxis: Heparin    #Dispo: Longterm Living, awaiting placement 78 year old male with PMHx of Atrial fibrillation, previously on xarelto - discontinued for unknown reason, dementia, DM presents with worsening dementia, requiring assistance with all ADLs and difficulty ambulating independently.    # Inability to ambulate secondary to dementia  - Family request physiatry consult for admission to short term rehab transition to long term  - PT/ Physiatry consult    # Dementia w/ behavioral changes  -family reports he is alert but oriented x 0 at baseline.  Has memory deficits  -needs help with all ADL's    -Continue memantine and donepezil.   -Neuro consulted - r/o parkinsonism vs NPH  - f/u TSH, Vit B12  -RPR is negative  -Rehab /PT eval    #Urinary Incontinence  -Continue oxybutinin and tamsulosin  -f/u bladder scan with PVR    #Constipation  -continue docusate, senna, and linaclotide  -order lactulose    #Chest pain  -EKG: stable in comparison to baseline EKG  -f/u on Troponins (trend)- first result is negative,     # Atrial Fibrillation  -continue metoprolol 100 mg  -not on AC due to high risk for falls    #HTN  -continue metoprolol, losartan, and amlodipine    #DM  -Continue insulin glargine and lispro  -monitor fingersticks    Diet: DASH  DVT Prophylaxis: Heparin    #Dispo: Longterm Living, awaiting placement 78 year old male with PMHx of Atrial fibrillation, previously on xarelto - discontinued for unknown reason, dementia, DM presents with worsening dementia, requiring assistance with all ADLs and difficulty ambulating independently.    # Inability to ambulate likely secondary to dementia  - Family request physiatry consult for admission to short term rehab transition to long term  - Physiatry recommends STR in SNF    # Vascular Dementia w/ behavioral changes  -family reports he is alert but oriented x 0 at baseline. Has memory deficits  -needs help with all ADLs    -Continue memantine and donepezil.   -Neuro consulted - r/o parkinsonism vs NPH - recommended LP 30cc under fluoroscopy   - TSH and B12 - WNL  -RPR is negative    #Urinary Incontinence  -Continue oxybutinin and tamsulosin  -f/u bladder scan with PVR    #Constipation  -continue docusate, senna, and linaclotide  -add lactulose    #Chest pain  - EKG: stable in comparison to EKG on admission  - Troponin negative x2    # Atrial Fibrillation  -continue metoprolol 100 mg  -not on AC due to high risk for falls    #HTN  -continue metoprolol, losartan, and amlodipine    #DM  -Continue insulin glargine and lispro  -monitor fingersticks    Diet: DASH  DVT Prophylaxis: Heparin    #Dispo: Awaiting long term placement 78 year old male with PMHx of Atrial fibrillation, previously on xarelto - discontinued for unknown reason, dementia, DM presents with worsening dementia, requiring assistance with all ADLs and difficulty ambulating independently.    # Inability to ambulate [id number] to worsening dementia  - Family request physiatry consult for admission to short term rehab transition to long term  - Physiatry recommends STR in SNF    # Vascular Dementia w/ behavioral changes  -family reports he is alert but oriented x 0 at baseline. Has memory deficits  -needs help with all ADLs    -Continue memantine and donepezil.   -Neuro consulted - r/o parkinsonism vs NPH - recommended LP 30cc under fluoroscopy   - TSH and B12 - WNL  -RPR is negative    #Urinary Incontinence  -Continue oxybutinin and tamsulosin  -f/u bladder scan with PVR    #Constipation  -continue docusate, senna, and linaclotide  -add lactulose    #Chest pain  - EKG: stable in comparison to EKG on admission  - Troponin negative x2    # Atrial Fibrillation  -continue metoprolol 100 mg  -not on AC due to high risk for falls    #HTN  -continue metoprolol, losartan, and amlodipine    #DM  -Continue insulin glargine and lispro  -monitor fingersticks    Diet: DASH  DVT Prophylaxis: Heparin    #Dispo: Awaiting long term placement    Constipation - Linez added as he ws taking at home

## 2018-08-16 NOTE — DISCHARGE NOTE ADULT - HOSPITAL COURSE
78 year old male with PMHx of Atrial fibrillation, previously on xarelto - discontinued for unknown reason, dementia, DM presents with worsening dementia, requiring assistance with all ADLs and difficulty ambulating independently.    # Inability to ambulate secondary to worsening dementia  - Family request physiatry consult for admission to short term rehab transition to long term  - Physiatry recommends STR in SNF    # Vascular Dementia w/ behavioral changes  -family reports he is alert but oriented x 0 at baseline. Has memory deficits  -needs help with all ADLs    -Continue memantine and donepezil.   -Neuro consulted - r/o parkinsonism vs NPH - recommended LP 30cc under fluoroscopy   - TSH and B12 - WNL  -RPR is negative    #Urinary Incontinence  -Continue oxybutinin and tamsulosin while inpatient. Switch back to patient's equivalent regimen once discharge    #Constipation  -continue docusate, senna, and linaclotide  -add lactulose as needed    #Chest pain  - EKG: stable in comparison to EKG on admission  - Troponin negative x2    # Atrial Fibrillation  -continue metoprolol 100 mg  -not on AC due to high risk for falls    #HTN  -continue metoprolol, losartan, and amlodipine    #DM  -Continue insulin glargine and lispro while inpatient, switch back to oral medication once discharged  -monitor fingersticks    Diet: DASH

## 2018-08-16 NOTE — DISCHARGE NOTE ADULT - MEDICATION SUMMARY - MEDICATIONS TO TAKE
I will START or STAY ON the medications listed below when I get home from the hospital:    aspirin 81 mg oral tablet, chewable  -- 1 tab(s) by mouth once a day  -- Indication: For CAD    Tylenol 325 mg oral tablet  -- 2 tab(s) by mouth every 4 hours, As Needed  -- Indication: For Pain    olmesartan 5 mg oral tablet  -- 2 tab(s) by mouth once a day  -- Indication: For Hypertension    RAPAFLO      CAP 8MG  -- 1 cap(s) by mouth once a day (before a meal)  -- Indication: For BPH    JANUVIA      TAB 50MG  -- 50 milligram(s) by mouth once a day  -- Indication: For Diabetes    NATEGLINIDE  TAB 120MG  -- 1 tab(s) by mouth 3 times a day (before meals)  -- Indication: For Diabetes    PIOGLITAZONE TAB 15MG  -- 1 tab(s) by mouth once a day  -- Indication: For Diabetes    OLANZapine 2.5 mg oral tablet  -- 1 tab(s) by mouth 2 times a day  -- Indication: For Agitation    BYSTOLIC     TAB 5MG  -- 1 tab(s) by mouth once a day (at bedtime)  -- Indication: For Overactive bladder    amLODIPine 5 mg oral tablet  -- 1 tab(s) by mouth once a day  -- Indication: For Hypertension    Namzaric 28 mg-10 mg oral capsule, extended release  -- 1 cap(s) by mouth once a day  -- Indication: For Dementia    Exelon 4.5 mg oral capsule  -- 1 cap(s) by transdermal patch 2 times a day  -- Indication: For Dementia    memantine-donepezil 28 mg-10 mg oral capsule, extended release  -- 1 cap(s) by mouth once a day  -- Indication: For Dementia    Linzess 145 mcg oral capsule  -- 1 cap(s) by mouth once a day  -- Indication: For Chronic constipation    ferrous sulfate 325 mg (65 mg elemental iron) oral delayed release tablet  -- 1 tab(s) by mouth once a day  -- Indication: For Anemia    docusate sodium 100 mg oral capsule  -- 1 cap(s) by mouth 3 times a day  -- Indication: For Constipation    senna oral tablet  -- 2 tab(s) by mouth once a day (at bedtime)  -- Indication: For Constipation    solifenacin 5 mg oral tablet  -- 1 tab(s) by mouth once a day  -- Indication: For Overactive bladder

## 2018-08-16 NOTE — DISCHARGE NOTE ADULT - CARE PLAN
Principal Discharge DX:	Gait abnormality  Goal:	Improvement  Assessment and plan of treatment:	Ambulation dysfunction likely secondary to dementia  - Physiatry consulted - recommends short term rehab in a skilled nursing facility  - Physical therapy  Secondary Diagnosis:	Vascular dementia with behavior disturbance  Goal:	Control  Assessment and plan of treatment:	-family reports he is alert but oriented x 0 at baseline. Has memory deficits  -needs help with all ADLs    -Continue memantine and donepezil.   -Neuro consulted - to rule out parkinsonism vs NPH - recommended LP 30cc under fluoroscopy   -TSH and B12 are within normal limits  -RPR is negative  Secondary Diagnosis:	Diabetes  Goal:	Control  Assessment and plan of treatment:	Continue with current home medication and monitor glucose finger sticks twice daily  - Monitor BMP every 3 months  Secondary Diagnosis:	HTN (hypertension)  Goal:	Control  Assessment and plan of treatment:	Continue with metoprolol, losartan and amlodipine  Monitor blood pressure  Secondary Diagnosis:	Urinary frequency  Goal:	Control  Assessment and plan of treatment:	Overactive bladder  - Continue with current medications

## 2018-08-16 NOTE — PROGRESS NOTE ADULT - ATTENDING COMMENTS
Patient seen and examined at the bed side.   D/W wife. not in distress.  C/O constipation and wife brought the medications from home. Linzess. non formulary. Patient will be receiving today.   D/W House staff. await d/c to SNF
Patient seen and examined at the bed side. not in distress.   Agree with above note.   C/o constipation - Add lactulose as needed with liness  D/W Wife

## 2018-08-17 LAB
CULTURE RESULTS: SIGNIFICANT CHANGE UP
CULTURE RESULTS: SIGNIFICANT CHANGE UP
SPECIMEN SOURCE: SIGNIFICANT CHANGE UP
SPECIMEN SOURCE: SIGNIFICANT CHANGE UP

## 2018-08-28 DIAGNOSIS — I10 ESSENTIAL (PRIMARY) HYPERTENSION: ICD-10-CM

## 2018-08-28 DIAGNOSIS — E11.9 TYPE 2 DIABETES MELLITUS WITHOUT COMPLICATIONS: ICD-10-CM

## 2018-08-28 DIAGNOSIS — R32 UNSPECIFIED URINARY INCONTINENCE: ICD-10-CM

## 2018-08-28 DIAGNOSIS — I49.9 CARDIAC ARRHYTHMIA, UNSPECIFIED: ICD-10-CM

## 2018-08-28 DIAGNOSIS — K59.00 CONSTIPATION, UNSPECIFIED: ICD-10-CM

## 2018-08-28 DIAGNOSIS — R53.1 WEAKNESS: ICD-10-CM

## 2018-08-28 DIAGNOSIS — R07.89 OTHER CHEST PAIN: ICD-10-CM

## 2018-08-28 DIAGNOSIS — R35.0 FREQUENCY OF MICTURITION: ICD-10-CM

## 2018-08-28 DIAGNOSIS — Z79.84 LONG TERM (CURRENT) USE OF ORAL HYPOGLYCEMIC DRUGS: ICD-10-CM

## 2018-08-28 DIAGNOSIS — F01.51 VASCULAR DEMENTIA, UNSPECIFIED SEVERITY, WITH BEHAVIORAL DISTURBANCE: ICD-10-CM

## 2018-08-28 DIAGNOSIS — R15.9 FULL INCONTINENCE OF FECES: ICD-10-CM

## 2018-08-28 DIAGNOSIS — Z90.49 ACQUIRED ABSENCE OF OTHER SPECIFIED PARTS OF DIGESTIVE TRACT: ICD-10-CM

## 2018-08-28 DIAGNOSIS — R26.2 DIFFICULTY IN WALKING, NOT ELSEWHERE CLASSIFIED: ICD-10-CM

## 2018-08-28 DIAGNOSIS — Z74.01 BED CONFINEMENT STATUS: ICD-10-CM

## 2018-08-28 DIAGNOSIS — D64.9 ANEMIA, UNSPECIFIED: ICD-10-CM

## 2018-08-28 DIAGNOSIS — I48.91 UNSPECIFIED ATRIAL FIBRILLATION: ICD-10-CM

## 2018-08-28 DIAGNOSIS — R26.9 UNSPECIFIED ABNORMALITIES OF GAIT AND MOBILITY: ICD-10-CM

## 2018-08-28 DIAGNOSIS — Z96.649 PRESENCE OF UNSPECIFIED ARTIFICIAL HIP JOINT: ICD-10-CM

## 2018-10-02 ENCOUNTER — OUTPATIENT (OUTPATIENT)
Dept: OUTPATIENT SERVICES | Facility: HOSPITAL | Age: 78
LOS: 1 days | Discharge: HOME | End: 2018-10-02

## 2018-10-02 DIAGNOSIS — Z90.49 ACQUIRED ABSENCE OF OTHER SPECIFIED PARTS OF DIGESTIVE TRACT: Chronic | ICD-10-CM

## 2018-10-02 DIAGNOSIS — Z96.649 PRESENCE OF UNSPECIFIED ARTIFICIAL HIP JOINT: Chronic | ICD-10-CM

## 2018-10-02 DIAGNOSIS — I25.10 ATHEROSCLEROTIC HEART DISEASE OF NATIVE CORONARY ARTERY WITHOUT ANGINA PECTORIS: ICD-10-CM

## 2018-10-02 DIAGNOSIS — R33.9 RETENTION OF URINE, UNSPECIFIED: ICD-10-CM

## 2018-10-09 ENCOUNTER — OUTPATIENT (OUTPATIENT)
Dept: OUTPATIENT SERVICES | Facility: HOSPITAL | Age: 78
LOS: 1 days | Discharge: HOME | End: 2018-10-09

## 2018-10-09 DIAGNOSIS — Z90.49 ACQUIRED ABSENCE OF OTHER SPECIFIED PARTS OF DIGESTIVE TRACT: Chronic | ICD-10-CM

## 2018-10-09 DIAGNOSIS — R33.9 RETENTION OF URINE, UNSPECIFIED: ICD-10-CM

## 2018-10-09 DIAGNOSIS — Z96.649 PRESENCE OF UNSPECIFIED ARTIFICIAL HIP JOINT: Chronic | ICD-10-CM

## 2018-10-19 ENCOUNTER — OUTPATIENT (OUTPATIENT)
Dept: OUTPATIENT SERVICES | Facility: HOSPITAL | Age: 78
LOS: 1 days | Discharge: HOME | End: 2018-10-19

## 2018-10-19 DIAGNOSIS — Z96.649 PRESENCE OF UNSPECIFIED ARTIFICIAL HIP JOINT: Chronic | ICD-10-CM

## 2018-10-19 DIAGNOSIS — E11.8 TYPE 2 DIABETES MELLITUS WITH UNSPECIFIED COMPLICATIONS: ICD-10-CM

## 2018-10-19 DIAGNOSIS — S72.091A OTHER FRACTURE OF HEAD AND NECK OF RIGHT FEMUR, INITIAL ENCOUNTER FOR CLOSED FRACTURE: ICD-10-CM

## 2018-10-19 DIAGNOSIS — Z90.49 ACQUIRED ABSENCE OF OTHER SPECIFIED PARTS OF DIGESTIVE TRACT: Chronic | ICD-10-CM

## 2018-10-22 ENCOUNTER — INPATIENT (INPATIENT)
Facility: HOSPITAL | Age: 78
LOS: 16 days | Discharge: NURSING, REHAB OR LONG-TERM CARE | End: 2018-11-08
Attending: INTERNAL MEDICINE | Admitting: INTERNAL MEDICINE
Payer: MEDICARE

## 2018-10-22 VITALS
WEIGHT: 160.06 LBS | OXYGEN SATURATION: 99 % | HEART RATE: 120 BPM | HEIGHT: 72 IN | RESPIRATION RATE: 16 BRPM | TEMPERATURE: 98 F | DIASTOLIC BLOOD PRESSURE: 76 MMHG | SYSTOLIC BLOOD PRESSURE: 128 MMHG

## 2018-10-22 DIAGNOSIS — Z90.49 ACQUIRED ABSENCE OF OTHER SPECIFIED PARTS OF DIGESTIVE TRACT: Chronic | ICD-10-CM

## 2018-10-22 DIAGNOSIS — Z96.649 PRESENCE OF UNSPECIFIED ARTIFICIAL HIP JOINT: Chronic | ICD-10-CM

## 2018-10-22 LAB
ALBUMIN SERPL ELPH-MCNC: 3.5 G/DL — SIGNIFICANT CHANGE UP (ref 3.5–5.2)
ALP SERPL-CCNC: 122 U/L — HIGH (ref 30–115)
ALT FLD-CCNC: 11 U/L — SIGNIFICANT CHANGE UP (ref 0–41)
ANION GAP SERPL CALC-SCNC: 12 MMOL/L — SIGNIFICANT CHANGE UP (ref 7–14)
APPEARANCE UR: ABNORMAL
APTT BLD: 33.1 SEC — SIGNIFICANT CHANGE UP (ref 27–39.2)
AST SERPL-CCNC: 16 U/L — SIGNIFICANT CHANGE UP (ref 0–41)
BACTERIA # UR AUTO: ABNORMAL
BASE EXCESS BLDV CALC-SCNC: 3.7 MMOL/L — HIGH (ref -2–2)
BASOPHILS # BLD AUTO: 0.02 K/UL — SIGNIFICANT CHANGE UP (ref 0–0.2)
BASOPHILS NFR BLD AUTO: 0.2 % — SIGNIFICANT CHANGE UP (ref 0–1)
BILIRUB SERPL-MCNC: 0.7 MG/DL — SIGNIFICANT CHANGE UP (ref 0.2–1.2)
BILIRUB UR-MCNC: NEGATIVE — SIGNIFICANT CHANGE UP
BUN SERPL-MCNC: 30 MG/DL — HIGH (ref 10–20)
CA-I SERPL-SCNC: 1.24 MMOL/L — SIGNIFICANT CHANGE UP (ref 1.12–1.3)
CALCIUM SERPL-MCNC: 9.1 MG/DL — SIGNIFICANT CHANGE UP (ref 8.5–10.1)
CHLORIDE SERPL-SCNC: 106 MMOL/L — SIGNIFICANT CHANGE UP (ref 98–110)
CO2 SERPL-SCNC: 26 MMOL/L — SIGNIFICANT CHANGE UP (ref 17–32)
COD CRY URNS QL: NEGATIVE — SIGNIFICANT CHANGE UP
COLOR SPEC: YELLOW — SIGNIFICANT CHANGE UP
CREAT SERPL-MCNC: 1.1 MG/DL — SIGNIFICANT CHANGE UP (ref 0.7–1.5)
DIFF PNL FLD: NEGATIVE — SIGNIFICANT CHANGE UP
EOSINOPHIL # BLD AUTO: 0.09 K/UL — SIGNIFICANT CHANGE UP (ref 0–0.7)
EOSINOPHIL NFR BLD AUTO: 1.1 % — SIGNIFICANT CHANGE UP (ref 0–8)
EPI CELLS # UR: NEGATIVE — SIGNIFICANT CHANGE UP
GAS PNL BLDV: 145 MMOL/L — SIGNIFICANT CHANGE UP (ref 136–145)
GAS PNL BLDV: SIGNIFICANT CHANGE UP
GLUCOSE SERPL-MCNC: 179 MG/DL — HIGH (ref 70–99)
GLUCOSE UR QL: NEGATIVE MG/DL — SIGNIFICANT CHANGE UP
GRAN CASTS # UR COMP ASSIST: NEGATIVE — SIGNIFICANT CHANGE UP
HCO3 BLDV-SCNC: 28 MMOL/L — SIGNIFICANT CHANGE UP (ref 22–29)
HCT VFR BLD CALC: 38.4 % — LOW (ref 42–52)
HCT VFR BLDA CALC: 39.4 % — SIGNIFICANT CHANGE UP (ref 34–44)
HGB BLD CALC-MCNC: 12.8 G/DL — LOW (ref 14–18)
HGB BLD-MCNC: 12.4 G/DL — LOW (ref 14–18)
HOROWITZ INDEX BLDV+IHG-RTO: 21 — SIGNIFICANT CHANGE UP
HYALINE CASTS # UR AUTO: NEGATIVE — SIGNIFICANT CHANGE UP
IMM GRANULOCYTES NFR BLD AUTO: 0.7 % — HIGH (ref 0.1–0.3)
INR BLD: 1.88 RATIO — HIGH (ref 0.65–1.3)
KETONES UR-MCNC: ABNORMAL
LACTATE BLDV-MCNC: 1.3 MMOL/L — SIGNIFICANT CHANGE UP (ref 0.5–1.6)
LACTATE SERPL-SCNC: 1.6 MMOL/L — SIGNIFICANT CHANGE UP (ref 0.5–2.2)
LEUKOCYTE ESTERASE UR-ACNC: NEGATIVE — SIGNIFICANT CHANGE UP
LYMPHOCYTES # BLD AUTO: 0.74 K/UL — LOW (ref 1.2–3.4)
LYMPHOCYTES # BLD AUTO: 8.9 % — LOW (ref 20.5–51.1)
MAGNESIUM SERPL-MCNC: 2.1 MG/DL — SIGNIFICANT CHANGE UP (ref 1.8–2.4)
MCHC RBC-ENTMCNC: 28.3 PG — SIGNIFICANT CHANGE UP (ref 27–31)
MCHC RBC-ENTMCNC: 32.3 G/DL — SIGNIFICANT CHANGE UP (ref 32–37)
MCV RBC AUTO: 87.7 FL — SIGNIFICANT CHANGE UP (ref 80–94)
MONOCYTES # BLD AUTO: 0.69 K/UL — HIGH (ref 0.1–0.6)
MONOCYTES NFR BLD AUTO: 8.3 % — SIGNIFICANT CHANGE UP (ref 1.7–9.3)
NEUTROPHILS # BLD AUTO: 6.7 K/UL — HIGH (ref 1.4–6.5)
NEUTROPHILS NFR BLD AUTO: 80.8 % — HIGH (ref 42.2–75.2)
NITRITE UR-MCNC: POSITIVE
NRBC # BLD: 0 /100 WBCS — SIGNIFICANT CHANGE UP (ref 0–0)
PCO2 BLDV: 40 MMHG — LOW (ref 41–51)
PH BLDV: 7.45 — HIGH (ref 7.26–7.43)
PH UR: 5.5 — SIGNIFICANT CHANGE UP (ref 5–8)
PLATELET # BLD AUTO: 222 K/UL — SIGNIFICANT CHANGE UP (ref 130–400)
PO2 BLDV: 50 MMHG — HIGH (ref 20–40)
POTASSIUM BLDV-SCNC: 4.1 MMOL/L — SIGNIFICANT CHANGE UP (ref 3.3–5.6)
POTASSIUM SERPL-MCNC: 4.6 MMOL/L — SIGNIFICANT CHANGE UP (ref 3.5–5)
POTASSIUM SERPL-SCNC: 4.6 MMOL/L — SIGNIFICANT CHANGE UP (ref 3.5–5)
PROT SERPL-MCNC: 6 G/DL — SIGNIFICANT CHANGE UP (ref 6–8)
PROT UR-MCNC: ABNORMAL MG/DL
PROTHROM AB SERPL-ACNC: 20.6 SEC — HIGH (ref 9.95–12.87)
RBC # BLD: 4.38 M/UL — LOW (ref 4.7–6.1)
RBC # FLD: 17.4 % — HIGH (ref 11.5–14.5)
RBC CASTS # UR COMP ASSIST: NEGATIVE — SIGNIFICANT CHANGE UP
SAO2 % BLDV: 88 % — SIGNIFICANT CHANGE UP
SODIUM SERPL-SCNC: 144 MMOL/L — SIGNIFICANT CHANGE UP (ref 135–146)
SP GR SPEC: >=1.03 (ref 1.01–1.03)
TRI-PHOS CRY UR QL COMP ASSIST: NEGATIVE — SIGNIFICANT CHANGE UP
TROPONIN T SERPL-MCNC: <0.01 NG/ML — SIGNIFICANT CHANGE UP
URATE CRY FLD QL MICRO: ABNORMAL /HPF
UROBILINOGEN FLD QL: 0.2 MG/DL — SIGNIFICANT CHANGE UP (ref 0.2–0.2)
WBC # BLD: 8.3 K/UL — SIGNIFICANT CHANGE UP (ref 4.8–10.8)
WBC # FLD AUTO: 8.3 K/UL — SIGNIFICANT CHANGE UP (ref 4.8–10.8)
WBC UR QL: NEGATIVE — SIGNIFICANT CHANGE UP

## 2018-10-22 RX ORDER — CEFTRIAXONE 500 MG/1
1 INJECTION, POWDER, FOR SOLUTION INTRAMUSCULAR; INTRAVENOUS ONCE
Qty: 0 | Refills: 0 | Status: COMPLETED | OUTPATIENT
Start: 2018-10-22 | End: 2018-10-22

## 2018-10-22 RX ORDER — SODIUM CHLORIDE 9 MG/ML
2000 INJECTION INTRAMUSCULAR; INTRAVENOUS; SUBCUTANEOUS
Qty: 0 | Refills: 0 | Status: DISCONTINUED | OUTPATIENT
Start: 2018-10-22 | End: 2018-10-27

## 2018-10-22 RX ORDER — DILTIAZEM HCL 120 MG
20 CAPSULE, EXT RELEASE 24 HR ORAL ONCE
Qty: 0 | Refills: 0 | Status: COMPLETED | OUTPATIENT
Start: 2018-10-22 | End: 2018-10-22

## 2018-10-22 RX ORDER — DILTIAZEM HCL 120 MG
20 CAPSULE, EXT RELEASE 24 HR ORAL ONCE
Qty: 0 | Refills: 0 | Status: DISCONTINUED | OUTPATIENT
Start: 2018-10-22 | End: 2018-10-27

## 2018-10-22 RX ADMIN — SODIUM CHLORIDE 1000 MILLILITER(S): 9 INJECTION INTRAMUSCULAR; INTRAVENOUS; SUBCUTANEOUS at 22:44

## 2018-10-22 RX ADMIN — Medication 324 MILLIGRAM(S): at 22:44

## 2018-10-22 RX ADMIN — SODIUM CHLORIDE 1000 MILLILITER(S): 9 INJECTION INTRAMUSCULAR; INTRAVENOUS; SUBCUTANEOUS at 23:53

## 2018-10-22 RX ADMIN — CEFTRIAXONE 1 GRAM(S): 500 INJECTION, POWDER, FOR SOLUTION INTRAMUSCULAR; INTRAVENOUS at 23:00

## 2018-10-22 RX ADMIN — CEFTRIAXONE 100 GRAM(S): 500 INJECTION, POWDER, FOR SOLUTION INTRAMUSCULAR; INTRAVENOUS at 22:24

## 2018-10-22 NOTE — ED ADULT TRIAGE NOTE - CHIEF COMPLAINT QUOTE
pt came in from [facility]. as per patients family "he was sent in because he is weak, not eating and not himself, his right foot hurts, he has a sore, he broke his right hip last month and had surgery"

## 2018-10-22 NOTE — ED ADULT NURSE NOTE - CHIEF COMPLAINT QUOTE
pt came in from [facility]. as per patients family "he was sent in because he is weak, not eating and not himself, his right foot hurts, he has a sore, he broke his right hip last month and had surgery"

## 2018-10-23 LAB
CK MB CFR SERPL CALC: 1.6 NG/ML — SIGNIFICANT CHANGE UP (ref 0.6–6.3)
CK SERPL-CCNC: 21 U/L — SIGNIFICANT CHANGE UP (ref 0–225)
TROPONIN T SERPL-MCNC: <0.01 NG/ML — SIGNIFICANT CHANGE UP

## 2018-10-23 PROCEDURE — 99221 1ST HOSP IP/OBS SF/LOW 40: CPT

## 2018-10-23 RX ORDER — FERROUS SULFATE 325(65) MG
325 TABLET ORAL DAILY
Qty: 0 | Refills: 0 | Status: DISCONTINUED | OUTPATIENT
Start: 2018-10-23 | End: 2018-10-30

## 2018-10-23 RX ORDER — NATEGLINIDE 60 MG/1
1 TABLET, COATED ORAL
Qty: 90 | Refills: 0 | COMMUNITY

## 2018-10-23 RX ORDER — OLMESARTAN MEDOXOMIL 5 MG/1
2 TABLET, FILM COATED ORAL
Qty: 0 | Refills: 0 | COMMUNITY

## 2018-10-23 RX ORDER — SITAGLIPTIN 50 MG/1
50 TABLET, FILM COATED ORAL
Qty: 30 | Refills: 0 | COMMUNITY

## 2018-10-23 RX ORDER — AMLODIPINE BESYLATE 2.5 MG/1
2.5 TABLET ORAL DAILY
Qty: 0 | Refills: 0 | Status: DISCONTINUED | OUTPATIENT
Start: 2018-10-23 | End: 2018-10-30

## 2018-10-23 RX ORDER — SILODOSIN 4 MG/1
1 CAPSULE ORAL
Qty: 30 | Refills: 0 | COMMUNITY

## 2018-10-23 RX ORDER — NEBIVOLOL HYDROCHLORIDE 5 MG/1
1 TABLET ORAL
Qty: 60 | Refills: 0 | COMMUNITY

## 2018-10-23 RX ORDER — SODIUM CHLORIDE 9 MG/ML
1000 INJECTION INTRAMUSCULAR; INTRAVENOUS; SUBCUTANEOUS ONCE
Qty: 0 | Refills: 0 | Status: COMPLETED | OUTPATIENT
Start: 2018-10-23 | End: 2018-10-23

## 2018-10-23 RX ORDER — METOPROLOL TARTRATE 50 MG
25 TABLET ORAL
Qty: 0 | Refills: 0 | Status: DISCONTINUED | OUTPATIENT
Start: 2018-10-23 | End: 2018-10-25

## 2018-10-23 RX ORDER — ACETAMINOPHEN 500 MG
975 TABLET ORAL ONCE
Qty: 0 | Refills: 0 | Status: COMPLETED | OUTPATIENT
Start: 2018-10-23 | End: 2018-10-23

## 2018-10-23 RX ORDER — OXYCODONE HYDROCHLORIDE 5 MG/1
5 TABLET ORAL EVERY 4 HOURS
Qty: 0 | Refills: 0 | Status: DISCONTINUED | OUTPATIENT
Start: 2018-10-23 | End: 2018-10-30

## 2018-10-23 RX ORDER — RIVASTIGMINE 4.6 MG/24H
1 PATCH, EXTENDED RELEASE TRANSDERMAL
Qty: 30 | Refills: 0 | COMMUNITY

## 2018-10-23 RX ORDER — CHLORHEXIDINE GLUCONATE 213 G/1000ML
1 SOLUTION TOPICAL
Qty: 0 | Refills: 0 | Status: DISCONTINUED | OUTPATIENT
Start: 2018-10-23 | End: 2018-11-02

## 2018-10-23 RX ORDER — SODIUM CHLORIDE 9 MG/ML
1000 INJECTION INTRAMUSCULAR; INTRAVENOUS; SUBCUTANEOUS
Qty: 0 | Refills: 0 | Status: DISCONTINUED | OUTPATIENT
Start: 2018-10-23 | End: 2018-10-30

## 2018-10-23 RX ORDER — SENNA PLUS 8.6 MG/1
1 TABLET ORAL AT BEDTIME
Qty: 0 | Refills: 0 | Status: DISCONTINUED | OUTPATIENT
Start: 2018-10-23 | End: 2018-10-30

## 2018-10-23 RX ORDER — PIOGLITAZONE HYDROCHLORIDE 15 MG/1
1 TABLET ORAL
Qty: 60 | Refills: 0 | COMMUNITY

## 2018-10-23 RX ORDER — OLANZAPINE 15 MG/1
5 TABLET, FILM COATED ORAL DAILY
Qty: 0 | Refills: 0 | Status: DISCONTINUED | OUTPATIENT
Start: 2018-10-23 | End: 2018-10-30

## 2018-10-23 RX ORDER — ZINC SULFATE TAB 220 MG (50 MG ZINC EQUIVALENT) 220 (50 ZN) MG
220 TAB ORAL DAILY
Qty: 0 | Refills: 0 | Status: DISCONTINUED | OUTPATIENT
Start: 2018-10-23 | End: 2018-10-30

## 2018-10-23 RX ORDER — LINACLOTIDE 145 UG/1
1 CAPSULE, GELATIN COATED ORAL
Qty: 0 | Refills: 0 | COMMUNITY

## 2018-10-23 RX ORDER — TAMSULOSIN HYDROCHLORIDE 0.4 MG/1
0.4 CAPSULE ORAL AT BEDTIME
Qty: 0 | Refills: 0 | Status: DISCONTINUED | OUTPATIENT
Start: 2018-10-23 | End: 2018-10-30

## 2018-10-23 RX ORDER — MEMANTINE HYDROCHLORIDE AND DONEPEZIL HYDROCHLORIDE 21; 10 MG/1; MG/1
1 CAPSULE ORAL
Qty: 0 | Refills: 0 | COMMUNITY

## 2018-10-23 RX ORDER — SOLIFENACIN SUCCINATE 10 MG/1
1 TABLET ORAL
Qty: 0 | Refills: 0 | COMMUNITY

## 2018-10-23 RX ORDER — AMLODIPINE BESYLATE 2.5 MG/1
1 TABLET ORAL
Qty: 0 | Refills: 0 | COMMUNITY

## 2018-10-23 RX ORDER — ASCORBIC ACID 60 MG
500 TABLET,CHEWABLE ORAL DAILY
Qty: 0 | Refills: 0 | Status: DISCONTINUED | OUTPATIENT
Start: 2018-10-23 | End: 2018-10-30

## 2018-10-23 RX ORDER — ACETAMINOPHEN 500 MG
650 TABLET ORAL EVERY 4 HOURS
Qty: 0 | Refills: 0 | Status: DISCONTINUED | OUTPATIENT
Start: 2018-10-23 | End: 2018-10-30

## 2018-10-23 RX ORDER — LOSARTAN POTASSIUM 100 MG/1
25 TABLET, FILM COATED ORAL DAILY
Qty: 0 | Refills: 0 | Status: DISCONTINUED | OUTPATIENT
Start: 2018-10-23 | End: 2018-10-25

## 2018-10-23 RX ORDER — LACTULOSE 10 G/15ML
30 SOLUTION ORAL DAILY
Qty: 0 | Refills: 0 | Status: DISCONTINUED | OUTPATIENT
Start: 2018-10-23 | End: 2018-11-02

## 2018-10-23 RX ADMIN — SODIUM CHLORIDE 75 MILLILITER(S): 9 INJECTION INTRAMUSCULAR; INTRAVENOUS; SUBCUTANEOUS at 12:14

## 2018-10-23 RX ADMIN — ZINC SULFATE TAB 220 MG (50 MG ZINC EQUIVALENT) 220 MILLIGRAM(S): 220 (50 ZN) TAB at 12:14

## 2018-10-23 RX ADMIN — SODIUM CHLORIDE 1000 MILLILITER(S): 9 INJECTION INTRAMUSCULAR; INTRAVENOUS; SUBCUTANEOUS at 10:20

## 2018-10-23 RX ADMIN — Medication 975 MILLIGRAM(S): at 08:26

## 2018-10-23 RX ADMIN — Medication 500 MILLIGRAM(S): at 12:13

## 2018-10-23 RX ADMIN — OLANZAPINE 5 MILLIGRAM(S): 15 TABLET, FILM COATED ORAL at 12:13

## 2018-10-23 RX ADMIN — SODIUM CHLORIDE 2000 MILLILITER(S): 9 INJECTION INTRAMUSCULAR; INTRAVENOUS; SUBCUTANEOUS at 08:27

## 2018-10-23 RX ADMIN — LACTULOSE 30 GRAM(S): 10 SOLUTION ORAL at 12:13

## 2018-10-23 RX ADMIN — Medication 325 MILLIGRAM(S): at 12:13

## 2018-10-23 NOTE — SWALLOW BEDSIDE ASSESSMENT ADULT - SLP PERTINENT HISTORY OF CURRENT PROBLEM
Pt admitted from [facility] w/ progressive weakness and decreased po acceptance. PMH: dementia, Afib. CTH: ? acute ICH vs subacute infacrt, awaiting MRI.

## 2018-10-23 NOTE — H&P ADULT - REASON FOR ADMISSION
worsening weakness and decreased PO intake for 2 weeks duration (as per wife), 2 days (as per rehab facility) progressive weakness and decreased PO intake for 2 days

## 2018-10-23 NOTE — H&P ADULT - HISTORY OF PRESENT ILLNESS
77 yo M with PMHx of AFIB previously on Xarelto discontinued secondary to frequent falls , DVT s/p Right Hip Replacement on Eliquis, Vascular Dementia with behavioral changes, DM II presents from Rehab facility for evaluation of worsening weakness and decreased PO intake. 77 yo M with PMHx of AFIB previously on Xarelto discontinued secondary to frequent falls , DVT s/p Right Hip Replacement (10/14/18) on Eliquis, Vascular Dementia with behavioral changes, [facility] II [facility] weakness and decreased PO intake for the past 2 weeks. 79 yo M with PMHx of AFIB previously on Xarelto discontinued secondary to frequent falls , DVT s/p Right Hip Replacement (10/14/18) on Eliquis-bedbound, Vascular Dementia with behavioral changes, DM II, HTN [facility] weakness and decreased PO intake for the past 2 days. As per son, prior to 2 days ago, patient had a great appetite however in the last 2 days patient has even refused PO medications. In the ED, patient was found to have AFIB with RVR, HR in 120s was given 20mg of IV Cardizem with response. CTH revealed what was initially suspected as an ICH, however upon Neurosurgery evaluation findings may be secondary to a subacute infarct, awaiting neurology evaluation.

## 2018-10-23 NOTE — ED PROVIDER NOTE - PHYSICAL EXAMINATION
Physical Exam    Vital Signs: I have reviewed the initial vital signs.  Constitutional: well-nourished, appears stated age, no acute distress  Eyes: Conjunctiva pink, Sclera clear, PERRLA, EOMI.  Cardiovascular: S1 and S2, tachycardia rate, irregular rhythm, well-perfused extremities, radial pulses equal and 2+  Respiratory: unlabored respiratory effort, clear to auscultation bilaterally no wheezing, rales and rhonchi  Gastrointestinal: soft, non-tender abdomen, no pulsatile mass, normal bowl sounds  Musculoskeletal: supple neck, no lower extremity edema, no midline tenderness  Integumentary: warm, dry, no rash. Right hip insicion is intact no erythema, nno drainage. Right lateral foot with healing ulcer.   Neurologic: awake, responds to voice stimuli, cranial nerves II-XII grossly intact, extremities’ motor and sensory functions grossly intact

## 2018-10-23 NOTE — H&P ADULT - NSHPLABSRESULTS_GEN_ALL_CORE
12.4   8.30  )-----------( 222      ( 22 Oct 2018 21:12 )             38.4       10-22    144  |  106  |  30<H>  ----------------------------<  179<H>  4.6   |  26  |  1.1    Ca    9.1      22 Oct 2018 21:12  Mg     2.1     10-22    TPro  6.0  /  Alb  3.5  /  TBili  0.7  /  DBili  x   /  AST  16  /  ALT  11  /  AlkPhos  122<H>  10-22              Urinalysis Basic - ( 22 Oct 2018 21:12 )    Color: Yellow / Appearance: Slightly Cloudy / SG: >=1.030 / pH: x  Gluc: x / Ketone: Trace  / Bili: Negative / Urobili: 0.2 mg/dL   Blood: x / Protein: Trace mg/dL / Nitrite: Positive   Leuk Esterase: Negative / RBC: Negative / WBC Negative   Sq Epi: x / Non Sq Epi: Negative / Bacteria: Moderate        PT/INR - ( 22 Oct 2018 21:12 )   PT: 20.60 sec;   INR: 1.88 ratio         PTT - ( 22 Oct 2018 21:12 )  PTT:33.1 sec    Lactate Trend  10-22 @ 21:12 Lactate:1.6       CARDIAC MARKERS ( 22 Oct 2018 21:12 )  x     / <0.01 ng/mL / x     / x     / x          CTH:    In the right parietal lobe posteromedially, there is an ill-defined area   with loss of gray-white matter differentiation is slightly increased   density within. Associated edematous changes are noted. The findings may   reflect an underlying mass with associated edema as well as resolving   subacute parenchymal hemorrhage.  Further evaluation with MRI with and without contrast may be of benefit. 12.4   8.30  )-----------( 222      ( 22 Oct 2018 21:12 )             38.4       10-22    144  |  106  |  30<H>  ----------------------------<  179<H>  4.6   |  26  |  1.1    Ca    9.1      22 Oct 2018 21:12  Mg     2.1     10-22    TPro  6.0  /  Alb  3.5  /  TBili  0.7  /  DBili  x   /  AST  16  /  ALT  11  /  AlkPhos  122<H>  10-22              Urinalysis Basic - ( 22 Oct 2018 21:12 )    Color: Yellow / Appearance: Slightly Cloudy / SG: >=1.030 / pH: x  Gluc: x / Ketone: Trace  / Bili: Negative / Urobili: 0.2 mg/dL   Blood: x / Protein: Trace mg/dL / Nitrite: Positive   Leuk Esterase: Negative / RBC: Negative / WBC Negative   Sq Epi: x / Non Sq Epi: Negative / Bacteria: Moderate        PT/INR - ( 22 Oct 2018 21:12 )   PT: 20.60 sec;   INR: 1.88 ratio         PTT - ( 22 Oct 2018 21:12 )  PTT:33.1 sec    Lactate Trend  10-22 @ 21:12 Lactate:1.6       CARDIAC MARKERS ( 22 Oct 2018 21:12 )  x     / <0.01 ng/mL / x     / x     / x          CTH which I reviewed shows the following     In the right parietal lobe posteromedially, there is an ill-defined area   with loss of gray-white matter differentiation is slightly increased   density within. Associated edematous changes are noted. The findings may   reflect an underlying mass with associated edema as well as resolving   subacute parenchymal hemorrhage.

## 2018-10-23 NOTE — CONSULT NOTE ADULT - SUBJECTIVE AND OBJECTIVE BOX
HISTORY OF PRESENT ILLNESS:     79 yo Hebrew speaking male, PMH of HTN, Afib RVR, dementia, DM, presents to the ED via EMS from rehab facility for evaluation of weakness. Pt had right hip replacement at [facility] and developed DVT during hospital course. Pt is on Eliquis. Pt is sent in from rehab facility due to change in baseline and not eating. Pt is unable to contribute to history due to dementia.    PAST MEDICAL & SURGICAL HISTORY:  Anemia  Diabetes  HTN (hypertension)  Arrhythmia  Dementia  S/P cholecystectomy  History of hip replacement    FAMILY HISTORY:  No pertinent family history in first degree relatives    Allergies    No Known Allergies    Intolerances    MEDICATIONS:  senna oral tablet: 2 tab(s) orally once a day (at bedtime)  · 	docusate sodium 100 mg oral capsule: 1 cap(s) orally 3 times a day  · 	memantine-donepezil 28 mg-10 mg oral capsule, extended release: 1 cap(s) orally once a day  · 	OLANZapine 2.5 mg oral tablet: 1 tab(s) orally 2 times a day  · 	aspirin 81 mg oral tablet, chewable: 1 tab(s) orally once a day  · 	JANUVIA      TAB 50M milligram(s) orally once a day  · 	NATEGLINIDE  TAB 120M tab(s) orally 3 times a day (before meals)  · 	PIOGLITAZONE TAB 15M tab(s) orally once a day  · 	RAPAFLO      CAP 8M cap(s) orally once a day (before a meal)  · 	BYSTOLIC     TAB 5M tab(s) orally once a day (at bedtime)  · 	Exelon 4.5 mg oral capsule: 1 cap(s) transdermal 2 times a day  · 	Linzess 145 mcg oral capsule: 1 cap(s) orally once a day  · 	ferrous sulfate 325 mg (65 mg elemental iron) oral delayed release tablet: 1 tab(s) orally once a day  · 	Namzaric 28 mg-10 mg oral capsule, extended release: 1 cap(s) orally once a day  · 	olmesartan 5 mg oral tablet: 2 tab(s) orally once a day  · 	Tylenol 325 mg oral tablet: 2 tab(s) orally every 4 hours, As Needed  · 	amLODIPine 5 mg oral tablet: 1 tab(s) orally once a day  · 	solifenacin 5 mg oral tablet: 1 tab(s) orally once a daysenna oral tablet: 2 tab(s) orally once a day (at bedtime)  · 	docusate sodium 100 mg oral capsule: 1 cap(s) orally 3 times a day  · 	memantine-donepezil 28 mg-10 mg oral capsule, extended release: 1 cap(s) orally once a day  · 	OLANZapine 2.5 mg oral tablet: 1 tab(s) orally 2 times a day  · 	aspirin 81 mg oral tablet, chewable: 1 tab(s) orally once a day  · 	JANUVIA      TAB 50M milligram(s) orally once a day  · 	NATEGLINIDE  TAB 120M tab(s) orally 3 times a day (before meals)  · 	PIOGLITAZONE TAB 15M tab(s) orally once a day  · 	RAPAFLO      CAP 8M cap(s) orally once a day (before a meal)  · 	BYSTOLIC     TAB 5M tab(s) orally once a day (at bedtime)  · 	Exelon 4.5 mg oral capsule: 1 cap(s) transdermal 2 times a day  · 	Linzess 145 mcg oral capsule: 1 cap(s) orally once a day  · 	ferrous sulfate 325 mg (65 mg elemental iron) oral delayed release tablet: 1 tab(s) orally once a day  · 	Namzaric 28 mg-10 mg oral capsule, extended release: 1 cap(s) orally once a day  · 	olmesartan 5 mg oral tablet: 2 tab(s) orally once a day  · 	Tylenol 325 mg oral tablet: 2 tab(s) orally every 4 hours, As Needed  · 	amLODIPine 5 mg oral tablet: 1 tab(s) orally once a day  · 	solifenacin 5 mg oral tablet: 1 tab(s) orally once a day  OTHER:  diltiazem Injectable 20 milliGRAM(s) IV Push Once  diltiazem IVPB 20 milliGRAM(s) IV Intermittent Once    IVF:  sodium chloride 0.9%. 2000 milliLiter(s) IV Continuous <Continuous>    Vital Signs Last 24 Hrs  T(C): 37.2 (23 Oct 2018 07:49), Max: 37.7 (23 Oct 2018 06:37)  T(F): 99 (23 Oct 2018 07:49), Max: 99.8 (23 Oct 2018 06:37)  HR: 111 (23 Oct 2018 07:49) (80 - 143)  BP: 145/67 (23 Oct 2018 07:49) (128/76 - 167/77)  BP(mean): --  RR: 17 (23 Oct 2018 07:49) (16 - 18)  SpO2: 97% (23 Oct 2018 07:49) (97% - 100%)    PHYSICAL EXAM:          LABS:                        12.4   8.30  )-----------( 222      ( 22 Oct 2018 21:12 )             38.4     10    144  |  106  |  30<H>  ----------------------------<  179<H>  4.6   |  26  |  1.1    Ca    9.1      22 Oct 2018 21:12  Mg     2.1     10-22    TPro  6.0  /  Alb  3.5  /  TBili  0.7  /  DBili  x   /  AST  16  /  ALT  11  /  AlkPhos  122<H>  10    PT/INR - ( 22 Oct 2018 21:12 )   PT: 20.60 sec;   INR: 1.88 ratio         PTT - ( 22 Oct 2018 21:12 )  PTT:33.1 sec  Urinalysis Basic - ( 22 Oct 2018 21:12 )    Color: Yellow / Appearance: Slightly Cloudy / SG: >=1.030 / pH: x  Gluc: x / Ketone: Trace  / Bili: Negative / Urobili: 0.2 mg/dL   Blood: x / Protein: Trace mg/dL / Nitrite: Positive   Leuk Esterase: Negative / RBC: Negative / WBC Negative   Sq Epi: x / Non Sq Epi: Negative / Bacteria: Moderate    RADIOLOGY & ADDITIONAL STUDIES:  Head CT reviewed with Dr. Freedman of Neuroradiology. Findings represent subacute infarct with questionable petechial hemorrhage.    Assessment:  As above    Plan:  No Neurosurgical intervention  Neurology for subacute infarct HISTORY OF PRESENT ILLNESS:     77 yo South Korean speaking male, PMH of HTN, Afib RVR, dementia, DM, presents to the ED via EMS from rehab facility for evaluation of weakness. Pt had right hip replacement at [facility] and developed DVT during hospital course. Pt is on Eliquis. Pt is sent in from rehab facility due to change in baseline and not eating. Pt is unable to contribute to history due to dementia. Pt had Head CT at [facility] which was read as < from: CT Head No Cont (10.23.18 @ 00:48) >  In the right parietal lobe posteromedially, there is an ill-defined area   with loss of gray-white matter differentiation is slightly increased   density within. Associated edematous changes are noted. The findings may   reflect an underlying mass with associated edema as well as resolving   subacute parenchymal hemorrhage.  Pt was transferred to [facility] for Neurosrugery eval.      PAST MEDICAL & SURGICAL HISTORY:  Anemia  Diabetes  HTN (hypertension)  Arrhythmia  Dementia  S/P cholecystectomy  History of hip replacement    FAMILY HISTORY:  No pertinent family history in first degree relatives    Allergies    No Known Allergies    Intolerances    MEDICATIONS:  senna oral tablet: 2 tab(s) orally once a day (at bedtime)  · 	docusate sodium 100 mg oral capsule: 1 cap(s) orally 3 times a day  · 	memantine-donepezil 28 mg-10 mg oral capsule, extended release: 1 cap(s) orally once a day  · 	OLANZapine 2.5 mg oral tablet: 1 tab(s) orally 2 times a day  · 	aspirin 81 mg oral tablet, chewable: 1 tab(s) orally once a day  · 	JANUVIA      TAB 50M milligram(s) orally once a day  · 	NATEGLINIDE  TAB 120M tab(s) orally 3 times a day (before meals)  · 	PIOGLITAZONE TAB 15M tab(s) orally once a day  · 	RAPAFLO      CAP 8M cap(s) orally once a day (before a meal)  · 	BYSTOLIC     TAB 5M tab(s) orally once a day (at bedtime)  · 	Exelon 4.5 mg oral capsule: 1 cap(s) transdermal 2 times a day  · 	Linzess 145 mcg oral capsule: 1 cap(s) orally once a day  · 	ferrous sulfate 325 mg (65 mg elemental iron) oral delayed release tablet: 1 tab(s) orally once a day  · 	Namzaric 28 mg-10 mg oral capsule, extended release: 1 cap(s) orally once a day  · 	olmesartan 5 mg oral tablet: 2 tab(s) orally once a day  · 	Tylenol 325 mg oral tablet: 2 tab(s) orally every 4 hours, As Needed  · 	amLODIPine 5 mg oral tablet: 1 tab(s) orally once a day  · 	solifenacin 5 mg oral tablet: 1 tab(s) orally once a daysenna oral tablet: 2 tab(s) orally once a day (at bedtime)  · 	docusate sodium 100 mg oral capsule: 1 cap(s) orally 3 times a day  · 	memantine-donepezil 28 mg-10 mg oral capsule, extended release: 1 cap(s) orally once a day  · 	OLANZapine 2.5 mg oral tablet: 1 tab(s) orally 2 times a day  · 	aspirin 81 mg oral tablet, chewable: 1 tab(s) orally once a day  · 	JANUVIA      TAB 50M milligram(s) orally once a day  · 	NATEGLINIDE  TAB 120M tab(s) orally 3 times a day (before meals)  · 	PIOGLITAZONE TAB 15M tab(s) orally once a day  · 	RAPAFLO      CAP 8M cap(s) orally once a day (before a meal)  · 	BYSTOLIC     TAB 5M tab(s) orally once a day (at bedtime)  · 	Exelon 4.5 mg oral capsule: 1 cap(s) transdermal 2 times a day  · 	Linzess 145 mcg oral capsule: 1 cap(s) orally once a day  · 	ferrous sulfate 325 mg (65 mg elemental iron) oral delayed release tablet: 1 tab(s) orally once a day  · 	Namzaric 28 mg-10 mg oral capsule, extended release: 1 cap(s) orally once a day  · 	olmesartan 5 mg oral tablet: 2 tab(s) orally once a day  · 	Tylenol 325 mg oral tablet: 2 tab(s) orally every 4 hours, As Needed  · 	amLODIPine 5 mg oral tablet: 1 tab(s) orally once a day  · 	solifenacin 5 mg oral tablet: 1 tab(s) orally once a day  OTHER:  diltiazem Injectable 20 milliGRAM(s) IV Push Once  diltiazem IVPB 20 milliGRAM(s) IV Intermittent Once    IVF:  sodium chloride 0.9%. 2000 milliLiter(s) IV Continuous <Continuous>    Vital Signs Last 24 Hrs  T(C): 37.2 (23 Oct 2018 07:49), Max: 37.7 (23 Oct 2018 06:37)  T(F): 99 (23 Oct 2018 07:49), Max: 99.8 (23 Oct 2018 06:37)  HR: 111 (23 Oct 2018 07:49) (80 - 143)  BP: 145/67 (23 Oct 2018 07:49) (128/76 - 167/77)  BP(mean): --  RR: 17 (23 Oct 2018 07:49) (16 - 18)  SpO2: 97% (23 Oct 2018 07:49) (97% - 100%)    PHYSICAL EXAM:  A&Ox1 (person only)  Pupils pinpoint  Tracks  No droop  Tongue midline  MARIN  Difficulty with commands    LABS:                        12.4   8.30  )-----------( 222      ( 22 Oct 2018 21:12 )             38.4     10-    144  |  106  |  30<H>  ----------------------------<  179<H>  4.6   |  26  |  1.1    Ca    9.1      22 Oct 2018 21:12  Mg     2.1     10-    TPro  6.0  /  Alb  3.5  /  TBili  0.7  /  DBili  x   /  AST  16  /  ALT  11  /  AlkPhos  122<H>  10-22    PT/INR - ( 22 Oct 2018 21:12 )   PT: 20.60 sec;   INR: 1.88 ratio         PTT - ( 22 Oct 2018 21:12 )  PTT:33.1 sec  Urinalysis Basic - ( 22 Oct 2018 21:12 )    Color: Yellow / Appearance: Slightly Cloudy / SG: >=1.030 / pH: x  Gluc: x / Ketone: Trace  / Bili: Negative / Urobili: 0.2 mg/dL   Blood: x / Protein: Trace mg/dL / Nitrite: Positive   Leuk Esterase: Negative / RBC: Negative / WBC Negative   Sq Epi: x / Non Sq Epi: Negative / Bacteria: Moderate    RADIOLOGY & ADDITIONAL STUDIES:  Head CT reviewed with Dr. Freedman of Neuroradiology. Findings represent subacute infarct with questionable petechial hemorrhage.    Assessment:  As above    Plan:  No Neurosurgical intervention  Neurology for subacute infarct  Would hold Eliquis at this time

## 2018-10-23 NOTE — ED PROVIDER NOTE - MEDICAL DECISION MAKING DETAILS
78yM pmhx NIDDM, Afib,  vascualr demenita,  HTN -  dvt diagnosed 1 week ago after right hip surgeyr 1 week ago  - on Eliquis - presents from NH for decreased PO x 2 days not eating drinking - pt with no appetite - family also mentions dysphagia for a few months.  + dry cough. has had talavera since surgery 1 week ago per family at bedside-  PE: alert  no pallor cvs tachycardic irregular resp cta  no tachypnea abd sfot nontender   right hip with well healing surgical site -  with staples. no erythema  FROM   LE no edema  healing ulcer right foot - no surrounding cellulitis -  neuro intact - plan   rate control , IVF  labs ekg 78yM pmhx NIDDM, Afib,  vascular dementia,  HTN -  dvt diagnosed 1 week ago after right hip surgery 1 week ago  - on Eliquis - presents from NH for decreased PO x 2 days not eating drinking - pt with no appetite - family also mentions dysphagia for a few months.  + dry cough. has had talavera since surgery 1 week ago per family at bedside-  PE: alert  no pallor cvs tachycardic irregular resp cta  no tachypnea abd sfot nontender   right hip with well healing surgical site -  with staples. no erythema  FROM   LE no edema  healing ulcer right foot - no surrounding cellulitis -  neuro intact - plan   rate control , IVF  labs ekg

## 2018-10-23 NOTE — ED ADULT NURSE REASSESSMENT NOTE - NS ED NURSE REASSESS COMMENT FT1
Satish transfer from [facility] for abnormal head CT, neuro consult. BIBA - Primary Care

## 2018-10-23 NOTE — ED PROVIDER NOTE - CARE PLAN
Principal Discharge DX:	Brain mass  Goal:	vs. Intracranial Bleed Principal Discharge DX:	UTI (urinary tract infection)  Secondary Diagnosis:	Afib  Secondary Diagnosis:	CVA (cerebral vascular accident)

## 2018-10-23 NOTE — ED PROVIDER NOTE - PROGRESS NOTE DETAILS
I was directly involved in the care of this patient. PA Fellow Saurabh note/plan reviewed and agreed. D/w Dr. Mohamud aware and accepts transfer Neurosurgery PA aware of consult Transfer from [facility], p/w AMS from NH, found to have UTI as well as concern for resolving parenchymal hemorrhage. Also per family here recent R hip surgery with DVT, pafib,on Eliquis. On exam, pt NAD, ill and non-toxic appearing, tachycardic, lungs CTAB, abd soft benign, LE including surgery site C/D/I with no edema/erythema, symmetric warmth/color. Pt awake, confused, EOMI grossly, PERRL, speaks in low voice that is intermittently comprehensible. NSGY paged. Will also give Tylenol and fluids. Tachy, hemodynamically stable. Giving 1L, Tylenol, another dilt push. Signed off care to Dr. Garcia who will continue to assess, awaiting NSGY consult GAYLE: Endorsed to me. 78M pmhx of afib on Eliquis tx presented altered found to have sepsis and UTI as well as ICH / mass on CTH. Pt HD stable and tachycardic. Pending NSG consult. To get diltiazem IVP for rate control and PO vs gtt depending on response. TBA. Neurosurgery RENETTA Acosta bedside, states Dr. parker read the CT as an subacute infarct w/ petechial hemorrages and no mass. recommends neurology to see patient. Neurosurgery RENETTA Acosta bedside, states Dr. parker read the CT as an subacute infarct w/ petechial hemorrages and no mass. recommends neurology to see patient and to hold eliquis GAYLE: Eval by NSG. Recommend Neuro consult and hold eliquis. Pt given 20 Cardizem IVP remains HD stable and . WIll give PO Cardizem and admit. Spoke with Dr. joe, aware of consult

## 2018-10-23 NOTE — PATIENT PROFILE ADULT - FALL HARM RISK
surgery/coagulation(Bleeding disorder R/T clinical cond/anti-coags)/age(85 years old or older)/other

## 2018-10-23 NOTE — ED ADULT NURSE REASSESSMENT NOTE - NS ED NURSE REASSESS COMMENT FT1
Pt assessed, lethargic, responds to pain and movement. Wife is at bedside. Pt has right ankle ulceration, cleaned and re dressed. Sacrum stage 1 PTA. Deluca cath in place PTA, draining zia urine. Pt is on cardiac monitor and POX continuous. Pt awaiting neurosurgery at this time, will cont to monitor and assess.

## 2018-10-23 NOTE — CONSULT NOTE ADULT - SUBJECTIVE AND OBJECTIVE BOX
SUSI MARTIN    Chief Complaint: Lethargy, AMS  Right   Handed    HPI:  78 year old gentleman with a PMHx of AFIB previously on Xarelto discontinued secondary to frequent falls , DVT s/p Right Hip Replacement on Eliquis, Vascular Dementia with behavioral changes, DM II presents from Rehab facility for evaluation of worsening weakness and decreased PO intake. CTH revealed a right parietal lobe posteromedially, there is an ill-defined area with loss of gray-white matter differentiation with associated edematous changes as well as resolving subacute parenchymal hemorrhage. On exam he exhibits generalized weakness and appears dehydrated.     Relevant PMH:  [] Prior ischemic stroke/TIA  [x] Afib  []CAD  []HTN  []DLD  []DM []PVD []Obesity [] Sedintary lifestyle []CHF  []CYRIL  []Cancer Hx     Social History: [] Smoking []  Drug Use: []   Alcohol Use:   [] Other:        Relevant Cerebral Imaging:  < from: CT Head No Cont (10.23.18 @ 00:48) >  IMPRESSION:      In the right parietal lobe posteromedially, there is an ill-defined area   with loss of gray-white matter differentiation is slightly increased   density within. Associated edematous changes are noted. The findings may   reflect an underlying mass with associated edema as well as resolving   subacute parenchymal hemorrhage.      Home Medications:  amLODIPine 5 mg oral tablet: 0.5 tab(s) orally once a day (23 Oct 2018 11:36)  BYSTOLIC     TAB 5M tab(s) orally once a day (at bedtime) (12 Aug 2018 03:21)  docusate sodium 100 mg oral capsule: 1 cap(s) orally 3 times a day (16 Aug 2018 16:40)  Eliquis 5 mg oral tablet: 1 tab(s) orally 2 times a day (23 Oct 2018 11:40)  Exelon 4.5 mg oral capsule: 1 cap(s) transdermal 2 times a day (12 Aug 2018 03:21)  ferrous sulfate 325 mg (65 mg elemental iron) oral delayed release tablet: 1 tab(s) orally once a day (12 Aug 2018 03:21)  Flomax 0.4 mg oral capsule: 1 cap(s) orally once a day (at bedtime) (23 Oct 2018 11:39)  lactulose 10 g/15 mL oral solution: orally once a day (23 Oct 2018 11:41)  Linzess 145 mcg oral capsule: 1 cap(s) orally once a day (12 Aug 2018 03:21)  memantine-donepezil 28 mg-10 mg oral capsule, extended release: 1 cap(s) orally once a day (12 Aug 2018 03:21)  metFORMIN 500 mg oral tablet: 1 tab(s) orally 2 times a day (23 Oct 2018 11:35)  Metoprolol Tartrate 25 mg oral tablet: 1 tab(s) orally 2 times a day (23 Oct 2018 11:37)  Namzaric 28 mg-10 mg oral capsule, extended release: 1 cap(s) orally once a day (12 Aug 2018 03:21)  OLANZapine 5 mg oral tablet: 1 tab(s) orally once a day (23 Oct 2018 11:38)  oxyCODONE 5 mg oral tablet: orally every 4 hours (5 times/day), As Needed (23 Oct 2018 11:42)  senna oral tablet: 2 tab(s) orally once a day (at bedtime) (16 Aug 2018 16:40)  solifenacin 5 mg oral tablet: 1 tab(s) orally once a day (12 Aug 2018 03:21)  Tylenol 325 mg oral tablet: 2 tab(s) orally every 4 hours, As Needed (12 Aug 2018 03:21)      MEDICATIONS  (STANDING):  chlorhexidine 4% Liquid 1 Application(s) Topical <User Schedule>  diltiazem IVPB 20 milliGRAM(s) IV Intermittent Once  sodium chloride 0.9%. 2000 milliLiter(s) (1000 mL/Hr) IV Continuous <Continuous>      PT/OT/Speech/Rehab/S&Swr:pending    Exam:    Vital Signs Last 24 Hrs  T(C): 37.2 (23 Oct 2018 07:49), Max: 37.7 (23 Oct 2018 06:37)  T(F): 99 (23 Oct 2018 07:49), Max: 99.8 (23 Oct 2018 06:37)  HR: 106 (23 Oct 2018 10:08) (80 - 143)  BP: 122/67 (23 Oct 2018 10:08) (122/67 - 185/82)  BP(mean): --  RR: 17 (23 Oct 2018 10:08) (16 - 20)  SpO2: 98% (23 Oct 2018 10:08) (97% - 100%)    NIHSS      LOC:       1a:   2  1b(Questions):     1      1c(Instructions):  2           Best Gaze:0  Visual:0  Motor:                 RUE: 3    RLE:  3   LUE:  3   LLE:   3  FACE:   0  Limb Ataxia:0  Sensory:     0  Language:     2  Dysarthria:     2     Extinction and Inattention:0    NIHSS on admission:   21       m-RS:3    Impression:  78 year old gentleman with a PMHx of AFIB previously on Xarelto discontinued secondary to frequent falls , DVT s/p Right Hip Replacement on Eliquis, Vascular Dementia with behavioral changes, DM II presents from Rehab facility for evaluation of worsening weakness and decreased PO intake. CTH revealed a right parietal lobe posteromedially, there is an ill-defined area with loss of gray-white matter differentiation with associated edematous changes as well as resolving subacute parenchymal hemorrhage. On exam he exhibits generalized weakness without focality and appears significantly dehydrated. Etiology of neuro imaging unknown at this time, will have a better understanding post MRI brain.    Suggestion:  MRI brain with and without contrast.  MRA head and neck.  PT OT Rehab  IV hydration as per medicine.    Conner Elizabeth NP  [phone]

## 2018-10-23 NOTE — H&P ADULT - NSHPPHYSICALEXAM_GEN_ALL_CORE
PHYSICAL EXAM:  GENERAL: NAD, somnolent   HEAD:  Atraumatic, Normocephalic  EYES: EOMI, PERRLA, conjunctiva and sclera clear  ENMT: No tonsillar erythema, exudates, or enlargement; Moist mucous membranes, Good dentition, No lesions  NECK: Supple, No JVD, Normal thyroid  NERVOUS SYSTEM:  Alert & Oriented X0  CHEST/LUNG: Clear to percussion bilaterally; No rales, rhonchi, wheezing, or rubs  HEART: Regular rate and rhythm; No murmurs, rubs, or gallops  ABDOMEN: Soft, Nontender, Nondistended; Bowel sounds present  EXTREMITIES:  2+ Peripheral Pulses, No clubbing, cyanosis, or edema, right hip staples still intact  LYMPH: No lymphadenopathy noted  SKIN: No rashes or lesions PHYSICAL EXAM:  GENERAL: NAD, somnolent   HEAD:  Atraumatic, Normocephalic  EYES: EOMI, PERRLA, conjunctiva and sclera clear  ENMT: No tonsillar erythema, exudates, or enlargement; Moist mucous membranes, Good dentition, No lesions  NECK: Supple, No JVD, Normal thyroid  NERVOUS SYSTEM:  Alert & Oriented X0  Pulm:Clear to percussion bilaterally; No rales, rhonchi, wheezing, or rubs  CV: Regular rate and rhythm; No murmurs, rubs, or gallops  GI: Soft, Nontender, Nondistended; Bowel sounds present  EXTREMITIES:  2+ Peripheral Pulses, No clubbing, cyanosis, or edema, right hip staples still intact  LYMPH: No lymphadenopathy noted  SKIN: No rashes or lesions

## 2018-10-23 NOTE — H&P ADULT - ASSESSMENT
79 [facility] evaluation of progressive weakness and decreased PO intake.     Suspected Right Parietal Lobe Subacute Infarct, Failure to Thrive  -Awaiting Neurology evaluation, advised to hold Eliquis   -Neuro checks as per unit, fall precautions  -Due to patient's somnolence and decreased PO intake will continue gentle IVF, f/u speech and swallow/nutrition     History of RLE DVT  -Repeat lower extremity duplex in light of holding off anticogulation    AFIB, HR currently in 90s  -Continue metoprolol    HTN  -Continue amlodipine, cozaar      DM II  -Monitor finger sticks, start insulin sliding scale if >180    Code Status: Full Code, discussed with Son  Disposition: Return to facility once stable

## 2018-10-23 NOTE — ED CLERICAL - NS ED CLERK NOTE PRE-ARRIVAL INFORMATION; ADDITIONAL PRE-ARRIVAL INFORMATION
This patient is eligible for outpatient care navigation through the [facility] readmission reduction initiative. This patient will be engaged by the transitional care management team to enroll into this program. Please call the number above to facilitate this enrollment or for close follow up.

## 2018-10-23 NOTE — ED PROVIDER NOTE - OBJECTIVE STATEMENT
79 yo Mauritian speaking male, PMH of HTN, Afib RVR, dementia, DM, presents to the ED via EMS from rehab facility for evaluation of weakness. Pt had right hip replacement at [facility] and developed DVT during hospital course. Pt is on Eliquis. Pt is sent in from rehab facility due to change in baseline and not eating. Pt is unable to contribute to history due to dementia.

## 2018-10-24 LAB
ALBUMIN SERPL ELPH-MCNC: 3.2 G/DL — LOW (ref 3.5–5.2)
ALP SERPL-CCNC: 112 U/L — SIGNIFICANT CHANGE UP (ref 30–115)
ALT FLD-CCNC: 10 U/L — SIGNIFICANT CHANGE UP (ref 0–41)
ANION GAP SERPL CALC-SCNC: 13 MMOL/L — SIGNIFICANT CHANGE UP (ref 7–14)
AST SERPL-CCNC: 15 U/L — SIGNIFICANT CHANGE UP (ref 0–41)
BILIRUB SERPL-MCNC: 0.9 MG/DL — SIGNIFICANT CHANGE UP (ref 0.2–1.2)
BUN SERPL-MCNC: 15 MG/DL — SIGNIFICANT CHANGE UP (ref 10–20)
CALCIUM SERPL-MCNC: 8.4 MG/DL — LOW (ref 8.5–10.1)
CHLORIDE SERPL-SCNC: 105 MMOL/L — SIGNIFICANT CHANGE UP (ref 98–110)
CO2 SERPL-SCNC: 26 MMOL/L — SIGNIFICANT CHANGE UP (ref 17–32)
CREAT SERPL-MCNC: 0.8 MG/DL — SIGNIFICANT CHANGE UP (ref 0.7–1.5)
GLUCOSE BLDC GLUCOMTR-MCNC: 108 MG/DL — HIGH (ref 70–99)
GLUCOSE BLDC GLUCOMTR-MCNC: 109 MG/DL — HIGH (ref 70–99)
GLUCOSE SERPL-MCNC: 103 MG/DL — HIGH (ref 70–99)
HCT VFR BLD CALC: 35.6 % — LOW (ref 42–52)
HGB BLD-MCNC: 11.3 G/DL — LOW (ref 14–18)
MAGNESIUM SERPL-MCNC: 1.8 MG/DL — SIGNIFICANT CHANGE UP (ref 1.8–2.4)
MCHC RBC-ENTMCNC: 28 PG — SIGNIFICANT CHANGE UP (ref 27–31)
MCHC RBC-ENTMCNC: 31.7 G/DL — LOW (ref 32–37)
MCV RBC AUTO: 88.1 FL — SIGNIFICANT CHANGE UP (ref 80–94)
NRBC # BLD: 0 /100 WBCS — SIGNIFICANT CHANGE UP (ref 0–0)
PLATELET # BLD AUTO: 169 K/UL — SIGNIFICANT CHANGE UP (ref 130–400)
POTASSIUM SERPL-MCNC: 3.7 MMOL/L — SIGNIFICANT CHANGE UP (ref 3.5–5)
POTASSIUM SERPL-SCNC: 3.7 MMOL/L — SIGNIFICANT CHANGE UP (ref 3.5–5)
PROT SERPL-MCNC: 5.4 G/DL — LOW (ref 6–8)
RBC # BLD: 4.04 M/UL — LOW (ref 4.7–6.1)
RBC # FLD: 17.2 % — HIGH (ref 11.5–14.5)
SODIUM SERPL-SCNC: 144 MMOL/L — SIGNIFICANT CHANGE UP (ref 135–146)
WBC # BLD: 4.14 K/UL — LOW (ref 4.8–10.8)
WBC # FLD AUTO: 4.14 K/UL — LOW (ref 4.8–10.8)

## 2018-10-24 PROCEDURE — 93970 EXTREMITY STUDY: CPT | Mod: 26

## 2018-10-24 RX ORDER — ASPIRIN/CALCIUM CARB/MAGNESIUM 324 MG
81 TABLET ORAL DAILY
Qty: 0 | Refills: 0 | Status: DISCONTINUED | OUTPATIENT
Start: 2018-10-24 | End: 2018-11-02

## 2018-10-24 RX ORDER — METFORMIN HYDROCHLORIDE 850 MG/1
1 TABLET ORAL
Qty: 60 | Refills: 0 | OUTPATIENT
Start: 2018-10-24 | End: 2018-11-22

## 2018-10-24 RX ORDER — CEFTRIAXONE 500 MG/1
1 INJECTION, POWDER, FOR SOLUTION INTRAMUSCULAR; INTRAVENOUS EVERY 24 HOURS
Qty: 0 | Refills: 0 | Status: DISCONTINUED | OUTPATIENT
Start: 2018-10-24 | End: 2018-10-31

## 2018-10-24 RX ADMIN — SODIUM CHLORIDE 75 MILLILITER(S): 9 INJECTION INTRAMUSCULAR; INTRAVENOUS; SUBCUTANEOUS at 00:25

## 2018-10-24 RX ADMIN — SODIUM CHLORIDE 75 MILLILITER(S): 9 INJECTION INTRAMUSCULAR; INTRAVENOUS; SUBCUTANEOUS at 16:27

## 2018-10-24 RX ADMIN — TAMSULOSIN HYDROCHLORIDE 0.4 MILLIGRAM(S): 0.4 CAPSULE ORAL at 22:01

## 2018-10-24 RX ADMIN — SENNA PLUS 1 TABLET(S): 8.6 TABLET ORAL at 22:01

## 2018-10-24 RX ADMIN — OLANZAPINE 5 MILLIGRAM(S): 15 TABLET, FILM COATED ORAL at 13:39

## 2018-10-24 RX ADMIN — Medication 81 MILLIGRAM(S): at 13:41

## 2018-10-24 RX ADMIN — Medication 500 MILLIGRAM(S): at 13:40

## 2018-10-24 RX ADMIN — CEFTRIAXONE 100 GRAM(S): 500 INJECTION, POWDER, FOR SOLUTION INTRAMUSCULAR; INTRAVENOUS at 13:41

## 2018-10-24 RX ADMIN — Medication 25 MILLIGRAM(S): at 17:49

## 2018-10-24 RX ADMIN — Medication 325 MILLIGRAM(S): at 13:39

## 2018-10-24 RX ADMIN — LACTULOSE 30 GRAM(S): 10 SOLUTION ORAL at 13:42

## 2018-10-24 RX ADMIN — ZINC SULFATE TAB 220 MG (50 MG ZINC EQUIVALENT) 220 MILLIGRAM(S): 220 (50 ZN) TAB at 13:39

## 2018-10-24 RX ADMIN — CHLORHEXIDINE GLUCONATE 1 APPLICATION(S): 213 SOLUTION TOPICAL at 05:24

## 2018-10-24 NOTE — SWALLOW BEDSIDE ASSESSMENT ADULT - SLP PERTINENT HISTORY OF CURRENT PROBLEM
Pt admitted from [facility] w/ progressive weakness and decreased po acceptance. PMH: dementia, Afib. CTH: ? acute ICH vs subacute infacrt, awaiting MRI.

## 2018-10-24 NOTE — PROGRESS NOTE ADULT - ASSESSMENT
79 yo M brought in from [facility] for evaluation of progressive weakness and decreased PO intake.     ===========================================================  TODAY: Patient was difficult to arouse this morning. I am told this is his baseline since coming to the floor. We will continue to follow up with MRI results and recommendations from Neurology. He is to be transferred to Stroke unit.   ===========================================================    PLAN:  Right Parietal Lobe Subacute Infarct vs. intracranial mass; complicated by Vascular dementia & Failure to Thrive  - Neurology recommendations appreciated:   ·	Will continue to hold Eliquis at this time   ·	Follow up with MRA and MRI head results; will likely need repeat in near future  ·	OK to give anti-platelet, but could be risky if this is a mass with small hemorrhage; hold for now  ·	Neurochecks Q4H  ·	Transfer to stroke unit  - UA (-); Bcx (-); Cardiac enzymes (-)  - MI/Rehab recommends short term rehab in SNF  - Follow up S+S  - Continue with IVF as he is not eating/drinking much  - Continue home Olanzapine 5mg PO QD    History of RLE DVT  - Duplex US performed this morning    Atrial fibrillation, rate controlled; on Eliquis (now discontinued); [id number] (7)  - Continue metoprolol tartrate 25mg PO QD    Hypertension; Controlled  - Pressure is on the lower end, will continue to monitor and hold amlodipine if necessary  - Continue amlodipine 2.5mg PO QD  - Continue Losartan 25mg PO QD    T2DM  - Monitor finger sticks, start insulin sliding scale if >180    Anemia  - Continue with Ferrous Sulfate 325mg PO QD    BPH  - Continue Tamsulosin 0.4mg PO QD    Constipation  - Senna QHS  _________________________________________  Fluid: Gentle IVF hydration  Nutrition: Pending speech and swallow recommendations  Electrolytes:      Sodium - NL      Potassium - NL      Bicarb - NL      Chloride - NL      GI PPX:                                   (X) Not indicated;  () Pantoprazole 40mg Daily    DVT PPX:  () Not indicated;  (X) Heparin 5000 mg SubQ;  () Lovenox 40 mg SubQ;  () SCDs    ACTIVITY:  () Ad Sylvia  /  (X) Increase as Tolerated  /  () OOB w/ assist  /  () Bed Rest    BMI:  Height (cm): 182.88 (10-23)  Weight (kg): 72.6 (10-22)  BMI (kg/m2): 21.7 (10-23)    DISPO:  Patient to be discharged when condition(s) optimized.    (X) Discussion with patient and/or family regarding goals of care.  (X) Discussed Case and Plan with the Medical Attending.    CODE STATUS  (X) FULL  () DNR / DNI    Please call Dr. Shields ([email or web address]) with any questions/consult recs: Spectra #[id number] 79 yo M brought in from [facility] for evaluation of progressive weakness and decreased PO intake.     ===========================================================  TODAY: Patient was difficult to arouse this morning. I am told this is his baseline since coming to the floor. We will continue to follow up with MRI results and recommendations from Neurology. He is to be transferred to Stroke unit.   ===========================================================    PLAN:  Right Parietal Lobe Subacute Infarct vs. intracranial mass; complicated by Vascular dementia & Failure to Thrive  - Neurology recommendations appreciated:   ·	Will continue to hold Eliquis at this time   ·	Follow up with MRA and MRI head results; will likely need repeat in near future  ·	OK to give anti-platelet, but could be risky if this is a mass with small hemorrhage; hold for now  ·	Neurochecks Q4H  ·	Transfer to stroke unit  - UA (-); Bcx (-); Cardiac enzymes (-)  - UT/Rehab recommends short term rehab in SNF  - Follow up S+S  - Continue with IVF as he is not eating/drinking much  - Continue home Olanzapine 5mg PO QD    Urinary tract infection; complicated  - UA is nitrite positive  - Will follow up on culture results    History of RLE DVT  - Duplex US performed this morning    Atrial fibrillation, rate controlled; on Eliquis (now discontinued); [id number] (7)  - Continue metoprolol tartrate 25mg PO QD    Hypertension; Controlled  - Pressure is on the lower end, will continue to monitor and hold amlodipine if necessary  - Continue amlodipine 2.5mg PO QD  - Continue Losartan 25mg PO QD    T2DM  - Monitor finger sticks, start insulin sliding scale if >180    Anemia  - Continue with Ferrous Sulfate 325mg PO QD    BPH  - Continue Tamsulosin 0.4mg PO QD    Constipation  - Senna QHS  _________________________________________  Fluid: Gentle IVF hydration  Nutrition: Pending speech and swallow recommendations  Electrolytes:      Sodium - NL      Potassium - NL      Bicarb - NL      Chloride - NL      GI PPX:                                   (X) Not indicated;  () Pantoprazole 40mg Daily    DVT PPX:  () Not indicated;  (X) Heparin 5000 mg SubQ;  () Lovenox 40 mg SubQ;  () SCDs    ACTIVITY:  () Ad Sylvia  /  (X) Increase as Tolerated  /  () OOB w/ assist  /  () Bed Rest    BMI:  Height (cm): 182.88 (10-23)  Weight (kg): 72.6 (10-22)  BMI (kg/m2): 21.7 (10-23)    DISPO:  Patient to be discharged when condition(s) optimized.    (X) Discussion with patient and/or family regarding goals of care.  (X) Discussed Case and Plan with the Medical Attending.    CODE STATUS  (X) FULL  () DNR / DNI    Please call Dr. Shields ([email or web address]) with any questions/consult recs: Spectra #[id number]

## 2018-10-24 NOTE — CONSULT NOTE ADULT - ASSESSMENT
IMPRESSION: Rehab of R THR / dementia    PRECAUTIONS: [  ] Cardiac  [  ] Respiratory  [  ] Seizures [  ] Contact Isolation  [  ] Droplet Isolation  [  ] Other    Weight Bearing Status:  NWB RLE    RECOMMENDATION:    Out of Bed to Chair     DVT/Decubiti Prophylaxis    REHAB PLAN:     [ x  ] Bedside P/T 3-5 times a week   [   ]   Bedside O/T  2-3 times a week             [   ] No Rehab Therapy Indicated                   [   ]  Speech Therapy   Conditioning/ROM                                    ADL  Bed Mobility                                               Conditioning/ROM  Transfers                                                     Bed Mobility  Sitting /Standing Balance                         Transfers                                        Gait Training                                               Sitting/Standing Balance  Stair Training [   ]Applicable                    Home equipment Eval                                                                        Splinting  [   ] Only      GOALS:   ADL   [   ]   Independent                    Transfers  [   ] Independent                          Ambulation  [   ] Independent     [  x  ] With device                            [   ]  CG                                                         [ x  ]  CG                                                                  [   ] CG                            [    ] Min A                                                   [   ] Min A                                                              [ x  ] Min  A          DISCHARGE PLAN:   [   ]  Good candidate for Intensive Rehabilitation/Hospital based                                             Will tolerate 3hrs Intensive Rehab Daily                                       [ x   ]  Short Term Rehab in Skilled Nursing Facility / snf                                       [    ]  Home with Outpatient or  services                                         [    ]  Possible Candidate for Intensive Hospital based Rehab

## 2018-10-24 NOTE — CONSULT NOTE ADULT - SUBJECTIVE AND OBJECTIVE BOX
HPI:  77 yo M with PMHx of AFIB previously on Xarelto discontinued secondary to frequent falls , DVT s/p Right Hip Replacement (10/14/18) on Eliquis-bedbound, Vascular Dementia with behavioral changes, DM II, HTN presents [facility] for evaluation of progressive weakness and decreased PO intake. As per son, prior the  patient had a great appetite however in the last 2 days patient has even refused PO medications. In the ED, patient was found to have AFIB with RVR, HR in 120s was given 20mg of IV Cardizem with response. CTH revealed what was initially suspected as an ICH, however upon Neurosurgery evaluation findings may be secondary to a subacute infarct,  pt passed swallowing evaluation for puree with thin liquids but is not eating.    PAST MEDICAL & SURGICAL HISTORY:  Anemia  Diabetes  HTN (hypertension)  Arrhythmia  Dementia  S/P cholecystectomy  History of hip replacement    ICU Vital Signs Last 24 Hrs  T(C): 37.3 (24 Oct 2018 13:05), Max: 37.4 (23 Oct 2018 19:10)  T(F): 99.1 (24 Oct 2018 13:05), Max: 99.3 (23 Oct 2018 19:10)  HR: 100 (24 Oct 2018 13:05) (82 - 101)  BP: 156/82 (24 Oct 2018 13:05) (104/61 - 165/81)  RR: 18 (24 Oct 2018 13:05) (18 - 18)  SpO2: 98% (23 Oct 2018 22:35) (98% - 100%)    Height (cm): 182.88 (10-23-18 @ 22:35), 185.42 (08-11-18 @ 23:00), 172.72 (02-20-18 @ 15:03)  Weight (kg): 72.6 (10-22-18 @ 20:11), 82.4 (08-11-18 @ 23:00), 85.1 (02-14-18 @ 02:19)  BMI (kg/m2): 21.7 (10-23-18 @ 22:35), 21.1 (10-22-18 @ 20:11), 24 (08-11-18 @ 23:00)  BSA (m2): 1.94 (10-23-18 @ 22:35), 1.96 (10-22-18 @ 20:11), 2.07 (08-11-18 @ 23:00)    I&O's Detail    23 Oct 2018 07:01  -  24 Oct 2018 07:00  --------------------------------------------------------  IN:  Total IN: 0 mL    OUT:    Indwelling Catheter - Urethral: 3000 mL  Total OUT: 3000 mL    Total NET: -3000 mL            PHYSICAL EXAM:  GENERAL: lethargy responding only when wife talks to him  HEENT: Moist mucous membranes  ABDOMEN: Soft, Nontender, Nondistended   + talavera in place  EXTREMITIES: no edema  SKIN: No rashes or lesions  IV ACCESS: peripheral iv line  FEEDING ACCESS: p.o diet    MEDICATIONS  (STANDING):  amLODIPine   Tablet 2.5 milliGRAM(s) Oral daily  ascorbic acid 500 milliGRAM(s) Oral daily  aspirin enteric coated 81 milliGRAM(s) Oral daily  cefTRIAXone   IVPB 1 Gram(s) IV Intermittent every 24 hours  chlorhexidine 4% Liquid 1 Application(s) Topical <User Schedule>  diltiazem IVPB 20 milliGRAM(s) IV Intermittent Once  ferrous    sulfate 325 milliGRAM(s) Oral daily  lactulose Syrup 30 Gram(s) Oral daily  losartan 25 milliGRAM(s) Oral daily  metoprolol tartrate 25 milliGRAM(s) Oral two times a day  OLANZapine 5 milliGRAM(s) Oral daily  senna 1 Tablet(s) Oral at bedtime  sodium chloride 0.9%. 2000 milliLiter(s) (1000 mL/Hr) IV Continuous <Continuous>  sodium chloride 0.9%. 1000 milliLiter(s) (75 mL/Hr) IV Continuous <Continuous>  tamsulosin 0.4 milliGRAM(s) Oral at bedtime  zinc sulfate 220 milliGRAM(s) Oral daily    MEDICATIONS  (PRN):  acetaminophen   Tablet .. 650 milliGRAM(s) Oral every 4 hours PRN Moderate Pain (4 - 6)  oxyCODONE    IR 5 milliGRAM(s) Oral every 4 hours PRN Moderate Pain (4 - 6)    Allergies: NKDA    LABS:    10-24    144  |  105  |  15  ----------------------------<  103<H>  3.7   |  26  |  0.8    Ca    8.4<L>      24 Oct 2018 06:12  Mg     1.8     10-24    TPro  5.4<L>  /  Alb  3.2<L>  /  TBili  0.9  /  DBili  x   /  AST  15  /  ALT  10  /  AlkPhos  112  10-24  PT/INR - ( 22 Oct 2018 21:12 )   PT: 20.60 sec;   INR: 1.88 ratio      PTT - ( 22 Oct 2018 21:12 )  PTT:33.1 sec  RADIOLOGY:  < from: CT Abdomen and Pelvis w/ IV Cont (10.23.18 @ 04:23) >    IMPRESSION:      1.  No CT evidence of acute abdominopelvic pathology.  2.  Trace right pleural effusion, essentially stable since August 11, 2018.  3.  8 mm left lower lobe nodule. Nonemergent PET CT may be of benefit. HPI:  77 yo M with PMHx of AFIB previously on Xarelto discontinued secondary to frequent falls , DVT s/p Right Hip Replacement (10/14/18) on Eliquis-bedbound, Vascular Dementia with behavioral changes, DM II, HTN [facility] for evaluation of progressive weakness and decreased PO intake. As per son, prior the  patient had a great appetite however in the last 2 days patient has even refused PO medications. In the ED, patient was found to have AFIB with RVR, HR in 120s was given 20mg of IV Cardizem with response. CTH revealed what was initially suspected as an ICH, however upon Neurosurgery evaluation findings may be secondary to a subacute infarct,  pt passed swallowing evaluation for puree with thin liquids but is not eating.    PAST MEDICAL & SURGICAL HISTORY:  Anemia  Diabetes  HTN (hypertension)  Arrhythmia  Dementia  S/P cholecystectomy  History of hip replacement    ICU Vital Signs Last 24 Hrs  T(C): 37.3 (24 Oct 2018 13:05), Max: 37.4 (23 Oct 2018 19:10)  T(F): 99.1 (24 Oct 2018 13:05), Max: 99.3 (23 Oct 2018 19:10)  HR: 100 (24 Oct 2018 13:05) (82 - 101)  BP: 156/82 (24 Oct 2018 13:05) (104/61 - 165/81)  RR: 18 (24 Oct 2018 13:05) (18 - 18)  SpO2: 98% (23 Oct 2018 22:35) (98% - 100%)    Height (cm): 182.88 (10-23-18 @ 22:35), 185.42 (08-11-18 @ 23:00), 172.72 (02-20-18 @ 15:03)  Weight (kg): 72.6 (10-22-18 @ 20:11), 82.4 (08-11-18 @ 23:00), 85.1 (02-14-18 @ 02:19)  BMI (kg/m2): 21.7 (10-23-18 @ 22:35), 21.1 (10-22-18 @ 20:11), 24 (08-11-18 @ 23:00)  BSA (m2): 1.94 (10-23-18 @ 22:35), 1.96 (10-22-18 @ 20:11), 2.07 (08-11-18 @ 23:00)    I&O's Detail    23 Oct 2018 07:01  -  24 Oct 2018 07:00  --------------------------------------------------------  IN:  Total IN: 0 mL    OUT:    Indwelling Catheter - Urethral: 3000 mL  Total OUT: 3000 mL    Total NET: -3000 mL            PHYSICAL EXAM:  GENERAL: lethargy responding only when wife talks to him, opened eyes to look at me only when she told him to, reportedly responds to her (? speaks, as well),  afebrile  HEENT: Moist mucous membranes  ABDOMEN: Soft, Nontender, Nondistended   + talavera in place  EXTREMITIES: no edema  SKIN: No rashes or lesions  IV ACCESS: peripheral iv line  FEEDING ACCESS: p.o diet    MEDICATIONS  (STANDING):  amLODIPine   Tablet 2.5 milliGRAM(s) Oral daily  ascorbic acid 500 milliGRAM(s) Oral daily  aspirin enteric coated 81 milliGRAM(s) Oral daily  cefTRIAXone   IVPB 1 Gram(s) IV Intermittent every 24 hours  chlorhexidine 4% Liquid 1 Application(s) Topical <User Schedule>  diltiazem IVPB 20 milliGRAM(s) IV Intermittent Once  ferrous    sulfate 325 milliGRAM(s) Oral daily  lactulose Syrup 30 Gram(s) Oral daily  losartan 25 milliGRAM(s) Oral daily  metoprolol tartrate 25 milliGRAM(s) Oral two times a day  OLANZapine 5 milliGRAM(s) Oral daily  senna 1 Tablet(s) Oral at bedtime  sodium chloride 0.9%. 2000 milliLiter(s) (1000 mL/Hr) IV Continuous <Continuous>  sodium chloride 0.9%. 1000 milliLiter(s) (75 mL/Hr) IV Continuous <Continuous>  tamsulosin 0.4 milliGRAM(s) Oral at bedtime  zinc sulfate 220 milliGRAM(s) Oral daily    MEDICATIONS  (PRN):  acetaminophen   Tablet .. 650 milliGRAM(s) Oral every 4 hours PRN Moderate Pain (4 - 6)  oxyCODONE    IR 5 milliGRAM(s) Oral every 4 hours PRN Moderate Pain (4 - 6)    Allergies: NKDA    LABS:    10-24    144  |  105  |  15  ----------------------------<  103<H>  3.7   |  26  |  0.8    Ca    8.4<L>      24 Oct 2018 06:12  Mg     1.8     10-24    TPro  5.4<L>  /  Alb  3.2<L>  /  TBili  0.9  /  DBili  x   /  AST  15  /  ALT  10  /  AlkPhos  112  10-24  PT/INR - ( 22 Oct 2018 21:12 )   PT: 20.60 sec;   INR: 1.88 ratio      PTT - ( 22 Oct 2018 21:12 )  PTT:33.1 sec  RADIOLOGY:  < from: CT Abdomen and Pelvis w/ IV Cont (10.23.18 @ 04:23) >    IMPRESSION:      1.  No CT evidence of acute abdominopelvic pathology.  2.  Trace right pleural effusion, essentially stable since August 11, 2018.  3.  8 mm left lower lobe nodule. Nonemergent PET CT may be of benefit.

## 2018-10-24 NOTE — CONSULT NOTE ADULT - ASSESSMENT
ASSESSMENT  -Right parietal lobe subacute infarct vs intracranial mass complicated by vascular dementia adn failure to thrive  -A fib rate controlled  -HTN   -DM  --BPH  -Hypokalemia  -hypomagnesemia  PLAN  suggest carbohydrate consistent with assistance  calorie count x 3days  -if p.o intake is inadequate suggest an NGT feed and feed after each meal glucerna 1.2 at 240ml x 3 feeds  check bmp/phos/mg and correct lytes ASSESSMENT  -Right parietal lobe subacute infarct vs intracranial mass complicated by vascular dementia adn failure to thrive  -A fib rate controlled  -HTN   -DM  --BPH  -Hypokalemia  -hypomagnesemia  PLAN  - permitted puree dieet plus all/ thin liquids, per speech  - suggest carbohydrate consistent with assistance  - add Glucerna Shake between meals or hs, dheeraj if he'll drink those but not eat food  - calorie count x 3days  - if p.o intake is inadequate suggest an NGT feed and feed after each attempted po meal give NG Glucerna 1.2 at 240ml x 3 feeds/d to start  - check bmp/phos/mg and correct lytes ASSESSMENT  -Right parietal lobe subacute infarct vs intracranial mass complicated by vascular dementia adn failure to thrive  -A fib rate controlled  -HTN   -DM  --BPH  -Hypokalemia  -hypomagnesemia  PLAN  - permitted puree dieet plus all/ thin liquids, per speech  - suggest carbohydrate consistent with assistance  - add Glucerna Shake between meals or hs, dheeraj if he'll drink those but not eat food  - calorie count x 3days  - if p.o intake is inadequate suggest an NGT feed and feed after each attempted po meal give NG Glucerna 1.2 at 240ml x 3 feeds/d to start  - check bmp/phos/mg and correct lytes  - how long has pt been receiving Zinc at these doses? Might want to check Zn level, if he's been on it for over a month.

## 2018-10-24 NOTE — PROGRESS NOTE ADULT - SUBJECTIVE AND OBJECTIVE BOX
Neurology Follow up note    Name  SUSI MARTIN    HPI:  79 yo M with PMHx of AFIB previously on Xarelto discontinued secondary to frequent falls , DVT s/p Right Hip Replacement (10/14/18) on Eliquis-bedbound, Vascular Dementia with behavioral changes, DM II, HTN presents from [facility] for evaluation of progressive weakness and decreased PO intake for the past 2 days. As per son, prior to 2 days ago, patient had a great appetite however in the last 2 days patient has even refused PO medications. In the ED, patient was found to have AFIB with RVR, HR in 120s was given 20mg of IV Cardizem with response. CTH revealed what was initially suspected as an ICH, however upon Neurosurgery evaluation findings may be secondary to a subacute infarct, awaiting neurology evaluation. (23 Oct 2018 10:52)      Interval History - No significant change or events overnight.  MRI done and reviewed with neuroradiology.  Looks like subacute stroke with enhancement typical for subacute stroke.  Petechial hemorrhage          Vital Signs Last 24 Hrs  T(C): 35.9 (24 Oct 2018 05:15), Max: 37.4 (23 Oct 2018 19:10)  T(F): 96.6 (24 Oct 2018 05:15), Max: 99.3 (23 Oct 2018 19:10)  HR: 90 (24 Oct 2018 05:15) (82 - 101)  BP: 104/61 (24 Oct 2018 05:15) (104/61 - 165/81)  BP(mean): --  RR: 18 (24 Oct 2018 05:15) (18 - 19)  SpO2: 98% (23 Oct 2018 22:35) (98% - 100%)  ICU Vital Signs Last 24 Hrs  T(C): 35.9 (24 Oct 2018 05:15), Max: 37.4 (23 Oct 2018 19:10)  T(F): 96.6 (24 Oct 2018 05:15), Max: 99.3 (23 Oct 2018 19:10)  HR: 90 (24 Oct 2018 05:15) (82 - 101)  BP: 104/61 (24 Oct 2018 05:15) (104/61 - 165/81)  BP(mean): --  ABP: --  ABP(mean): --  RR: 18 (24 Oct 2018 05:15) (18 - 19)  SpO2: 98% (23 Oct 2018 22:35) (98% - 100%)          Neurological Exam:   Lethargic follows simple commands intermittently.  Moving all extremities except RLE.  Legs not elevating against gravity  NIHSS 19    Medications  acetaminophen   Tablet .. 650 milliGRAM(s) Oral every 4 hours PRN  amLODIPine   Tablet 2.5 milliGRAM(s) Oral daily  ascorbic acid 500 milliGRAM(s) Oral daily  chlorhexidine 4% Liquid 1 Application(s) Topical <User Schedule>  diltiazem IVPB 20 milliGRAM(s) IV Intermittent Once  ferrous    sulfate 325 milliGRAM(s) Oral daily  lactulose Syrup 30 Gram(s) Oral daily  losartan 25 milliGRAM(s) Oral daily  metoprolol tartrate 25 milliGRAM(s) Oral two times a day  OLANZapine 5 milliGRAM(s) Oral daily  oxyCODONE    IR 5 milliGRAM(s) Oral every 4 hours PRN  senna 1 Tablet(s) Oral at bedtime  sodium chloride 0.9%. 2000 milliLiter(s) IV Continuous <Continuous>  sodium chloride 0.9%. 1000 milliLiter(s) IV Continuous <Continuous>  tamsulosin 0.4 milliGRAM(s) Oral at bedtime  zinc sulfate 220 milliGRAM(s) Oral daily      Lab                        11.3   4.14  )-----------( 169      ( 24 Oct 2018 06:12 )             35.6     10-24    144  |  105  |  15  ----------------------------<  103<H>  3.7   |  26  |  0.8    Ca    8.4<L>      24 Oct 2018 06:12  Mg     1.8     10-24    TPro  5.4<L>  /  Alb  3.2<L>  /  TBili  0.9  /  DBili  x   /  AST  15  /  ALT  10  /  AlkPhos  112  10-24    CAPILLARY BLOOD GLUCOSE        LIVER FUNCTIONS - ( 24 Oct 2018 06:12 )  Alb: 3.2 g/dL / Pro: 5.4 g/dL / ALK PHOS: 112 U/L / ALT: 10 U/L / AST: 15 U/L / GGT: x           PT/INR - ( 22 Oct 2018 21:12 )   PT: 20.60 sec;   INR: 1.88 ratio         PTT - ( 22 Oct 2018 21:12 )  PTT:33.1 sec    Urinalysis (10.22.18 @ 21:12)    pH Urine: 5.5    Glucose Qualitative, Urine: Negative mg/dL    Blood, Urine: Negative    Color: Yellow    Urine Appearance: Slightly Cloudy    Bilirubin: Negative    Ketone - Urine: Trace    Specific Gravity: >=1.030    Protein, Urine: Trace mg/dL    Urobilinogen: 0.2 mg/dL    Nitrite: Positive    Leukocyte Esterase Concentration: Negative        Radiology  < from: MR Angio Head No Cont (10.23.18 @ 19:08) >  EXAM:  MR ANGIO BRAIN            PROCEDURE DATE:  10/23/2018            INTERPRETATION:  Clinical History / Reason for exam: Subacute infarct.    Technique: MRA of the brain was performed using 3-D time-of-flight   technique on the 3.0 Layla magnetwithout the administration of   intravenous contrast.    Comparison: None available at this time.      Findings: There is normal signal flow enhancement noted in the bilateral   internal carotid arteries, middle cerebral arteries and distal MCA   branches.  Normal signal flow enhancement is noted of the bilateral   anterior cerebral arteries, anterior communicating artery and bilateral   A2 segments.    There is normal signal flow enhancement of the bilateral vertebral   arteries which are codominant.  There is otherwise normal filling is   noted of the basilar artery, bilateral SCA and PCA vessels. There is   persistent fetal origin of the left PCA off the left internal carotid   artery with small/hypoplastic P1 segment of left PCA.     There is normal signal flow enhancement of the right posterior   communicating artery.  There is no evidence of aneurysms, vascular   malformations or stenosis identified at this time.      IMPRESSION:     1.  Persistent fetal origin of the left PCA off the left internal carotid   artery with small/hypoplastic P1 segment of left PCA.    2.  Slight irregularity of the mid basilar likely due to atherosclerotic   changes.    3.  Otherwise unremarkable MRA of the brain without contrast.    < end of copied text >  < from: MR Angio Neck w/ IV Cont (10.23.18 @ 19:16) >  EXAM:  MR ANGIO NECK IC            PROCEDURE DATE:  10/23/2018            INTERPRETATION:  Clinical History / Reason for exam: Subacute infarct.    Technique: MRA of the neck was performed using 2-D time of flight and 3-D   time of flight techniqueon the 3.0 Layla magnet before and after the   intravenous administration of 9 cc of gadolinium contrast.      Comparison: None available at this time.      Findings: The origins of the innominate artery, left common carotid and   left subclavian artery off the aortic arch are not visualized in this   study..    There is normal signal flow enhancement of the bilateral common carotid   arteries with normal carotid bifurcations.  There is no MR evidence of   stenosis involving the bilateral internal carotid arteries.      Normal branching pattern is noted of the bilateral external carotid   arteries.  There is normal signal flow enhancement of the bilateral   vertebral arteries which are codominant.        IMPRESSION:     Unremarkable MRA of the neck with and without contrast.    < end of copied text >        Assessment- Patient presenting with AMS likely from subacute stroke and urinary tract infection.  OK to start on aspirin and in 2-3 days if patient improving ok to start back on eliquis    Plan  1. OK to start asa 81mg QD  2. Hold anticoagulation for another day ro two until mental status improves then ok to restart eliquis  3. Will need ASA 81mg and eliquis and statin  4. PT/OT/Rehab when more alert  5. Keep -140  6. Neurochecks and vitals q 6 hours  7. Call with questions or changes

## 2018-10-24 NOTE — SWALLOW BEDSIDE ASSESSMENT ADULT - COMMENTS
MD reports new CVA vs mass, awaiting MRI mild oral dysphagia w/o overt s/s of penetration/aspiration w/ puree

## 2018-10-24 NOTE — PROGRESS NOTE ADULT - SUBJECTIVE AND OBJECTIVE BOX
DAILY PROGRESS NOTE  ===========================================================    Patient Information:  SUSI MARTIN  /  78y  /  Male  /  MRN#: [id number]    Working Diagnosis:  1. Right Parietal Lobe Subacute Infarct vs. intracranial mass; complicated by Vascular dementia & Failure to Thrive    Hospital Day: 1d     Past Medical History:   Anemia  Diabetes  HTN (hypertension)  Dementia  UTI (urinary tract infection)  Gallstone S/P cholecystectomy  Hx of DVT s/p hip replacement    |:::::::::::::::::::::::::::| SUBJECTIVE |:::::::::::::::::::::::::::|    OVERNIGHT EVENTS: None reported    TODAY: Patient was seen today at bedside. He was difficult to arouse from sleep. He responded to simple commands.     Review of systems is otherwise negative.    |:::::::::::::::::::::::::::| OBJECTIVE |:::::::::::::::::::::::::::|    VITAL SIGNS: Last 24 Hours  T(F): 96.6 (24 Oct 2018 05:15), Max: 99.3 (23 Oct 2018 19:10)  HR: 90 (24 Oct 2018 05:15) (82 - 101)  BP: 104/61 (24 Oct 2018 05:15) (104/61 - 165/81)  RR: 18 (24 Oct 2018 05:15) (18 - 19)  SpO2: 98% (23 Oct 2018 22:35) (98% - 100%)    10-23-18 @ 07:01  -  10-24-18 @ 07:00  --------------------------------------------------------  IN: 0 mL / OUT: 3000 mL / NET: -3000 mL    PHYSICAL EXAM:  GENERAL:   Difficult to arouse from sleep; followed simple commands; NAD.  HEENT:  Head NC/AT; Conjunctivae pink, Sclera anicteric & non-injected.  NECK:   Supple.  CARDIO:  Slightly increased rate; Irregular rhythm; S1 & S2.  RESP:   Poor entry due to effort BL; No rales or rhonchi appreciated however.  GI:   Soft; NT/ND; No guarding; No rebound tenderness.  EXT:   No edema in UE and LE; could not assess strength as patient was difficult to arouse from sleep despite multiple efforts.  SKIN:   Intact.    LAB RESULTS:                        11.3   4.14  )-----------( 169      ( 24 Oct 2018 06:12 )             35.6     144  |  105  |  15  ----------------------------<  103<H>  3.7   |  26  |  0.8    Ca      8.4<L>       Mg     1.8        TPro  5.4<L>  /  Alb  3.2<L>  /  TBili  0.9  /  DBili  x   /  AST  15  /  ALT  10  /  AlkPhos  112      PT: 20.60 sec;   INR: 1.88 ratio     PTT:33.1 sec    Urinalysis Basic - ( 22 Oct 2018 21:12 )  Color: Yellow / Appearance: Slightly Cloudy / SG: >=1.030 / pH: x  Gluc: x / Ketone: Trace  / Bili: Negative / Urobili: 0.2 mg/dL   Blood: x / Protein: Trace mg/dL / Nitrite: Positive   Leuk Esterase: Negative / RBC: Negative / WBC Negative   Sq Epi: x / Non Sq Epi: Negative / Bacteria: Moderate    Creatine Kinase, Serum: 21 U/L (10-23-18 @ 16:30)  Troponin T, Serum: <0.01 ng/mL (10-23-18 @ 16:30)    CARDIAC MARKERS ( 23 Oct 2018 16:30 )  x     / <0.01 ng/mL / 21 U/L / x     / 1.6 ng/mL  CARDIAC MARKERS ( 22 Oct 2018 21:12 )  x     / <0.01 ng/mL / x     / x     / x        MICROBIOLOGY:    Culture - Blood (collected 22 Oct 2018 21:12)  Source: .Blood Blood-Peripheral  Preliminary Report (24 Oct 2018 03:01):    No growth to date.    Culture - Blood (collected 22 Oct 2018 21:12)  Source: .Blood Blood-Peripheral  Preliminary Report (24 Oct 2018 03:01):    No growth to date.    RADIOLOGY:  MR Angio Neck w/ IV Cont (10.23.18 @ 19:16)     IMPRESSION:   Unremarkable MRA of the neck with and without contrast.    ~~~~~  MR Angio Head No Cont (10.23.18 @ 19:08)     IMPRESSION:   1.  Persistent fetal origin of the left PCA off the left internal carotid artery with small/hypoplastic P1 segment of left PCA.  2.  Slight irregularity of the mid basilar likely due to atherosclerotic changes.  3.  Otherwise unremarkable MRA of the brain without contrast.    ~~~~~  PENDING MRI HEAD W/W/O CONTRAST READ    ALLERGIES:  No Known Allergies    INPATIENT MEDICATIONS:  amLODIPine   Tablet 2.5 milliGRAM(s) Oral daily  ascorbic acid 500 milliGRAM(s) Oral daily  chlorhexidine 4% Liquid 1 Application(s) Topical <User Schedule>  ferrous    sulfate 325 milliGRAM(s) Oral daily  lactulose Syrup 30 Gram(s) Oral daily  losartan 25 milliGRAM(s) Oral daily  metoprolol tartrate 25 milliGRAM(s) Oral two times a day  OLANZapine 5 milliGRAM(s) Oral daily  senna 1 Tablet(s) Oral at bedtime  sodium chloride 0.9%. 2000 milliLiter(s) IV Continuous <Continuous>  sodium chloride 0.9%. 1000 milliLiter(s) IV Continuous <Continuous>  tamsulosin 0.4 milliGRAM(s) Oral at bedtime  zinc sulfate 220 milliGRAM(s) Oral daily    PRN MEDICATIONS  acetaminophen   Tablet .. 650 milliGRAM(s) Oral every 4 hours PRN  oxyCODONE    IR 5 milliGRAM(s) Oral every 4 hours PRN    HOME MEDICATIONS:  amLODIPine 5 mg oral tablet: 0.5 tab(s) orally once a day (23 Oct 2018 11:36)  Cozaar 25 mg oral tablet: 1 tab(s) orally once a day (23 Oct 2018 11:48)  Eliquis 5 mg oral tablet: 1 tab(s) orally 2 times a day (23 Oct 2018 11:40)  ferrous sulfate 325 mg (65 mg elemental iron) oral delayed release tablet: 1 tab(s) orally once a day (12 Aug 2018 03:21)  Flomax 0.4 mg oral capsule: 1 cap(s) orally once a day (at bedtime) (23 Oct 2018 11:39)  lactulose 10 g/15 mL oral solution: orally once a day (23 Oct 2018 11:41)  metFORMIN 500 mg oral tablet: 1 tab(s) orally 2 times a day (23 Oct 2018 11:35)  Metoprolol Tartrate 25 mg oral tablet: 1 tab(s) orally 2 times a day (23 Oct 2018 11:37)  OLANZapine 5 mg oral tablet: 1 tab(s) orally once a day (23 Oct 2018 11:38)  oxyCODONE 5 mg oral tablet: orally every 4 hours (5 times/day), As Needed (23 Oct 2018 11:42)  senna oral tablet: 1 tab(s) orally once a day (at bedtime) (23 Oct 2018 11:44)  Tylenol 325 mg oral tablet: 2 tab(s) orally every 4 hours, As Needed (12 Aug 2018 03:21)  Vitamin C 500 mg oral tablet: 1 tab(s) orally once a day (23 Oct 2018 11:47)  zinc sulfate: 50 milligram(s) orally once a day (23 Oct 2018 11:47)  ------------------------------------------------------------------------------------------------------------

## 2018-10-24 NOTE — CONSULT NOTE ADULT - SUBJECTIVE AND OBJECTIVE BOX
HPI:  79 yo M with PMHx of AFIB previously on Xarelto discontinued secondary to frequent falls , DVT s/p Right Hip Replacement (10/14/18) on Eliquis-bedbound, Vascular Dementia with behavioral changes, DM II, HTN presents from [facility] facility for evaluation of progressive weakness and decreased PO intake for the past 2 days. As per son, prior to 2 days ago, patient had a great appetite however in the last 2 days patient has even refused PO medications. In the ED, patient was found to have AFIB with RVR, HR in 120s was given 20mg of IV Cardizem with response. CTH revealed what was initially suspected as an ICH, however upon Neurosurgery evaluation findings may be secondary to a subacute infarct, awaiting neurology evaluation. (23 Oct 2018 10:52)      PAST MEDICAL & SURGICAL HISTORY:  Anemia  Diabetes  HTN (hypertension)  Arrhythmia  Dementia  S/P cholecystectomy  History of hip replacement      Hospital Course:    TODAY'S SUBJECTIVE & REVIEW OF SYMPTOMS:     Constitutional WNL   Cardio WNL   Resp WNL   GI WNL  Heme WNL  Endo WNL  Skin WNL  Neuro Weakness  Cognitive confused      MEDICATIONS  (STANDING):  amLODIPine   Tablet 2.5 milliGRAM(s) Oral daily  ascorbic acid 500 milliGRAM(s) Oral daily  chlorhexidine 4% Liquid 1 Application(s) Topical <User Schedule>  diltiazem IVPB 20 milliGRAM(s) IV Intermittent Once  ferrous    sulfate 325 milliGRAM(s) Oral daily  lactulose Syrup 30 Gram(s) Oral daily  losartan 25 milliGRAM(s) Oral daily  metoprolol tartrate 25 milliGRAM(s) Oral two times a day  OLANZapine 5 milliGRAM(s) Oral daily  senna 1 Tablet(s) Oral at bedtime  sodium chloride 0.9%. 2000 milliLiter(s) (1000 mL/Hr) IV Continuous <Continuous>  sodium chloride 0.9%. 1000 milliLiter(s) (75 mL/Hr) IV Continuous <Continuous>  tamsulosin 0.4 milliGRAM(s) Oral at bedtime  zinc sulfate 220 milliGRAM(s) Oral daily    MEDICATIONS  (PRN):  acetaminophen   Tablet .. 650 milliGRAM(s) Oral every 4 hours PRN Moderate Pain (4 - 6)  oxyCODONE    IR 5 milliGRAM(s) Oral every 4 hours PRN Moderate Pain (4 - 6)      FAMILY HISTORY:  No pertinent family history in first degree relatives      Allergies    No Known Allergies    Intolerances        SOCIAL HISTORY:    [  ] Etoh  [  ] Smoking  [  ] Substance abuse     Home Environment:  [  ] Home Alone  [  ] Lives with Family  [  ] Home Health Aid    Dwelling:  [  ] Apartment  [  ] Private House  [  ] Adult Home  [  ] Skilled Nursing Facility      [x  ] Short Term  [  ] Long Term  [  ] Stairs       Elevator [  ]    FUNCTIONAL STATUS PTA: (Check all that apply)  Ambulation: [   ]Independent    [x  ] Dependent     [  ] Non-Ambulatory  Assistive Device: [  ] SA Cane  [  ]  Q Cane  [  ] Walker  [  ]  Wheelchair  ADL : [  ] Independent  [ x ]  Dependent       Vital Signs Last 24 Hrs  T(C): 35.9 (24 Oct 2018 05:15), Max: 37.4 (23 Oct 2018 19:10)  T(F): 96.6 (24 Oct 2018 05:15), Max: 99.3 (23 Oct 2018 19:10)  HR: 90 (24 Oct 2018 05:15) (82 - 101)  BP: 104/61 (24 Oct 2018 05:15) (104/61 - 165/81)  BP(mean): --  RR: 18 (24 Oct 2018 05:15) (18 - 19)  SpO2: 98% (23 Oct 2018 22:35) (98% - 100%)      PHYSICAL EXAM: confused  GENERAL: NAD, well-groomed, well-developed  HEAD:  Atraumatic, Normocephalic  CHEST/LUNG: Clear   HEART: S1S2+  ABDOMEN: Soft, Nontender  EXTREMITIES:  no calf tenderness    NERVOUS SYSTEM:  Cranial Nerves 2-12 intact [  ] Abnormal  [  ]  ROM: WFL all extremities [  ]  Abnormal [ x ]limited rle  Motor Strength: WFL all extremities  [  ]  Abnormal [ x ]limited rle  Sensation: intact to light touch [  ] Abnormal [  ]  Reflexes: Symmetric [  ]  Abnormal [  ]    FUNCTIONAL STATUS:  Bed Mobility: Independent [  ]  Supervision [  ]  Needs Assistance [x  ]  N/A [  ]  Transfers: Independent [  ]  Supervision [  ]  Needs Assistance [x  ]  N/A [  ]   Ambulation: Independent [  ]  Supervision [  ]  Needs Assistance [  ]  N/A [  ]  ADL: Independent [  ] Requires Assistance [  ] N/A [  ]      LABS:                        11.3   4.14  )-----------( 169      ( 24 Oct 2018 06:12 )             35.6     10-24    144  |  105  |  15  ----------------------------<  103<H>  3.7   |  26  |  0.8    Ca    8.4<L>      24 Oct 2018 06:12  Mg     1.8     10-24    TPro  5.4<L>  /  Alb  3.2<L>  /  TBili  0.9  /  DBili  x   /  AST  15  /  ALT  10  /  AlkPhos  112  10-24    PT/INR - ( 22 Oct 2018 21:12 )   PT: 20.60 sec;   INR: 1.88 ratio         PTT - ( 22 Oct 2018 21:12 )  PTT:33.1 sec  Urinalysis Basic - ( 22 Oct 2018 21:12 )    Color: Yellow / Appearance: Slightly Cloudy / SG: >=1.030 / pH: x  Gluc: x / Ketone: Trace  / Bili: Negative / Urobili: 0.2 mg/dL   Blood: x / Protein: Trace mg/dL / Nitrite: Positive   Leuk Esterase: Negative / RBC: Negative / WBC Negative   Sq Epi: x / Non Sq Epi: Negative / Bacteria: Moderate        RADIOLOGY & ADDITIONAL STUDIES:    Assesment:

## 2018-10-24 NOTE — SWALLOW BEDSIDE ASSESSMENT ADULT - ORAL PHASE
Delayed oral transit time Decreased anterior-posterior movement of the bolus/Delayed oral transit time

## 2018-10-25 LAB
APTT BLD: 34.7 SEC — SIGNIFICANT CHANGE UP (ref 27–39.2)
BASOPHILS # BLD AUTO: 0.01 K/UL — SIGNIFICANT CHANGE UP (ref 0–0.2)
BASOPHILS NFR BLD AUTO: 0.3 % — SIGNIFICANT CHANGE UP (ref 0–1)
EOSINOPHIL # BLD AUTO: 0.1 K/UL — SIGNIFICANT CHANGE UP (ref 0–0.7)
EOSINOPHIL NFR BLD AUTO: 2.5 % — SIGNIFICANT CHANGE UP (ref 0–8)
GLUCOSE BLDC GLUCOMTR-MCNC: 111 MG/DL — HIGH (ref 70–99)
GLUCOSE BLDC GLUCOMTR-MCNC: 112 MG/DL — HIGH (ref 70–99)
GLUCOSE BLDC GLUCOMTR-MCNC: 113 MG/DL — HIGH (ref 70–99)
GLUCOSE BLDC GLUCOMTR-MCNC: 85 MG/DL — SIGNIFICANT CHANGE UP (ref 70–99)
GLUCOSE BLDC GLUCOMTR-MCNC: 91 MG/DL — SIGNIFICANT CHANGE UP (ref 70–99)
HCT VFR BLD CALC: 35.5 % — LOW (ref 42–52)
HGB BLD-MCNC: 11.3 G/DL — LOW (ref 14–18)
IMM GRANULOCYTES NFR BLD AUTO: 0.8 % — HIGH (ref 0.1–0.3)
INR BLD: 1.34 RATIO — HIGH (ref 0.65–1.3)
LYMPHOCYTES # BLD AUTO: 0.52 K/UL — LOW (ref 1.2–3.4)
LYMPHOCYTES # BLD AUTO: 13 % — LOW (ref 20.5–51.1)
MAGNESIUM SERPL-MCNC: 1.8 MG/DL — SIGNIFICANT CHANGE UP (ref 1.8–2.4)
MCHC RBC-ENTMCNC: 27.8 PG — SIGNIFICANT CHANGE UP (ref 27–31)
MCHC RBC-ENTMCNC: 31.8 G/DL — LOW (ref 32–37)
MCV RBC AUTO: 87.2 FL — SIGNIFICANT CHANGE UP (ref 80–94)
MONOCYTES # BLD AUTO: 0.33 K/UL — SIGNIFICANT CHANGE UP (ref 0.1–0.6)
MONOCYTES NFR BLD AUTO: 8.3 % — SIGNIFICANT CHANGE UP (ref 1.7–9.3)
NEUTROPHILS # BLD AUTO: 3.01 K/UL — SIGNIFICANT CHANGE UP (ref 1.4–6.5)
NEUTROPHILS NFR BLD AUTO: 75.1 % — SIGNIFICANT CHANGE UP (ref 42.2–75.2)
NRBC # BLD: 0 /100 WBCS — SIGNIFICANT CHANGE UP (ref 0–0)
PHOSPHATE SERPL-MCNC: 2.5 MG/DL — SIGNIFICANT CHANGE UP (ref 2.1–4.9)
PLATELET # BLD AUTO: 174 K/UL — SIGNIFICANT CHANGE UP (ref 130–400)
PROTHROM AB SERPL-ACNC: 15.4 SEC — HIGH (ref 9.95–12.87)
RBC # BLD: 4.07 M/UL — LOW (ref 4.7–6.1)
RBC # FLD: 17.2 % — HIGH (ref 11.5–14.5)
TROPONIN T SERPL-MCNC: <0.01 NG/ML — SIGNIFICANT CHANGE UP
WBC # BLD: 4 K/UL — LOW (ref 4.8–10.8)
WBC # FLD AUTO: 4 K/UL — LOW (ref 4.8–10.8)

## 2018-10-25 RX ORDER — ATORVASTATIN CALCIUM 80 MG/1
40 TABLET, FILM COATED ORAL AT BEDTIME
Qty: 0 | Refills: 0 | Status: DISCONTINUED | OUTPATIENT
Start: 2018-10-25 | End: 2018-10-30

## 2018-10-25 RX ORDER — LOSARTAN POTASSIUM 100 MG/1
50 TABLET, FILM COATED ORAL DAILY
Qty: 0 | Refills: 0 | Status: DISCONTINUED | OUTPATIENT
Start: 2018-10-26 | End: 2018-10-30

## 2018-10-25 RX ORDER — LOSARTAN POTASSIUM 100 MG/1
25 TABLET, FILM COATED ORAL DAILY
Qty: 0 | Refills: 0 | Status: DISCONTINUED | OUTPATIENT
Start: 2018-10-25 | End: 2018-10-26

## 2018-10-25 RX ORDER — METOPROLOL TARTRATE 50 MG
50 TABLET ORAL
Qty: 0 | Refills: 0 | Status: DISCONTINUED | OUTPATIENT
Start: 2018-10-25 | End: 2018-10-26

## 2018-10-25 RX ADMIN — SENNA PLUS 1 TABLET(S): 8.6 TABLET ORAL at 21:49

## 2018-10-25 RX ADMIN — LOSARTAN POTASSIUM 25 MILLIGRAM(S): 100 TABLET, FILM COATED ORAL at 16:18

## 2018-10-25 RX ADMIN — Medication 25 MILLIGRAM(S): at 06:23

## 2018-10-25 RX ADMIN — AMLODIPINE BESYLATE 2.5 MILLIGRAM(S): 2.5 TABLET ORAL at 06:23

## 2018-10-25 RX ADMIN — CEFTRIAXONE 100 GRAM(S): 500 INJECTION, POWDER, FOR SOLUTION INTRAMUSCULAR; INTRAVENOUS at 12:37

## 2018-10-25 RX ADMIN — Medication 500 MILLIGRAM(S): at 12:36

## 2018-10-25 RX ADMIN — Medication 325 MILLIGRAM(S): at 12:36

## 2018-10-25 RX ADMIN — OLANZAPINE 5 MILLIGRAM(S): 15 TABLET, FILM COATED ORAL at 12:36

## 2018-10-25 RX ADMIN — SODIUM CHLORIDE 75 MILLILITER(S): 9 INJECTION INTRAMUSCULAR; INTRAVENOUS; SUBCUTANEOUS at 13:55

## 2018-10-25 RX ADMIN — CHLORHEXIDINE GLUCONATE 1 APPLICATION(S): 213 SOLUTION TOPICAL at 06:23

## 2018-10-25 RX ADMIN — LOSARTAN POTASSIUM 25 MILLIGRAM(S): 100 TABLET, FILM COATED ORAL at 06:23

## 2018-10-25 RX ADMIN — Medication 50 MILLIGRAM(S): at 17:16

## 2018-10-25 RX ADMIN — LACTULOSE 30 GRAM(S): 10 SOLUTION ORAL at 12:36

## 2018-10-25 RX ADMIN — ATORVASTATIN CALCIUM 40 MILLIGRAM(S): 80 TABLET, FILM COATED ORAL at 21:48

## 2018-10-25 RX ADMIN — Medication 81 MILLIGRAM(S): at 12:36

## 2018-10-25 RX ADMIN — ZINC SULFATE TAB 220 MG (50 MG ZINC EQUIVALENT) 220 MILLIGRAM(S): 220 (50 ZN) TAB at 12:37

## 2018-10-25 NOTE — CHART NOTE - NSCHARTNOTEFT_GEN_A_CORE
79 yo M brought in from [facility] for evaluation of progressive weakness and decreased PO intake.     ===========================================================  TODAY: Patient was seen by speech and swallow today and advanced his diet to Dysphagia II thin liquid; reported to be eating better as per his wife. He also complained of right foot pain at site of chronic ucer; ordered Foot XRAY and Podiatry consult. Lastly, he complained about chest pain this afternoon; EKG showed no acute ST or T wave changes and is likely GI related. His pressure and HR remain elevated and Metorpolol was increased from 25mg to 50mg BID. As per Neuro, we will resume Eliquis tomorrow.     VITAL SIGNS: Last 24 Hours  T(F): 96.6 (24 Oct 2018 05:15), Max: 99.3 (23 Oct 2018 19:10)  HR: 90 (24 Oct 2018 05:15) (82 - 101)  BP: 104/61 (24 Oct 2018 05:15) (104/61 - 165/81)  RR: 18 (24 Oct 2018 05:15) (18 - 19)  SpO2: 98% (23 Oct 2018 22:35) (98% - 100%)    10-23-18 @ 07:01  -  10-24-18 @ 07:00  --------------------------------------------------------  IN: 0 mL / OUT: 3000 mL / NET: -3000 mL    PHYSICAL EXAM:  GENERAL:   Difficult to arouse from sleep; followed simple commands; NAD.  HEENT:  Head NC/AT; Conjunctivae pink, Sclera anicteric & non-injected.  NECK:   Supple.  CARDIO:  Slightly increased rate; Irregular rhythm; S1 & S2.  RESP:   Poor entry due to effort BL; No rales or rhonchi appreciated however.  GI:   Soft; NT/ND; No guarding; No rebound tenderness.  EXT:   No edema in UE and LE; Strength 4-5/5 in extremity.  SKIN:   Intact; Right foot ulcer, dry.    LAB RESULTS:                   11.3<L>  4.00<L> >----------< 174                  35.5<L>    MCV: 87.2  MCHC: 31.8<L>  RDW:  17.2<H>    Urinalysis Basic - ( 22 Oct 2018 21:12 )  Nitrite: Positive   Leuk Esterase: Negative  ____________________________________________________________________    Please see my note from earlier today regarding ASSESSMENT AND PLAN    Please follow up with:   1. Deluca removed this afternoon for void trial; bladder scan ordered and should be performed tonight  2. Follow up with Foot Xray and Podiatry for chronic right foot ulcer that is now painful  3. Follow up with patient's blood pressure and HR; Medications increased today  4. Follow up Zinc; unsure of how long patient has been receiving supplementation  5. Follow up with speech and swallow, and dietary recommendations; increased diet to dysphagia 2 today  6. As per Dr. Yimi Santana, may resume Eliquis tomorrow for Afib  7. Follow up urine culture (received by lab); UA was Nitrite positive  8. Patient complained of CP this afternoon; EKG without changes from prior; likely GI related with changed diet    Please call Dr. Shields ([phone]) with any questions regarding this patient and/or his care. Thank you.

## 2018-10-25 NOTE — CONSULT NOTE ADULT - ASSESSMENT
Assessment:   - Right lateral foot DFU. Stable w/ no signs of acute infection.     Plan:   - pt seen/evaluated @ bedside  - right foot xray r/o osteomyelitis  - dressed w/ betadine/DSD  - plan will be discussed w/ attending   - podiatry will f/u

## 2018-10-25 NOTE — PHYSICAL THERAPY INITIAL EVALUATION ADULT - SPECIFY REASON(S)
patient wife declined PT eval stating patient has not been feeling well today and unable to stay awake . Patient observed to be somnolent and unable to stay awake . will f/u JUANITA mast made aware

## 2018-10-25 NOTE — CONSULT NOTE ADULT - SUBJECTIVE AND OBJECTIVE BOX
PROGRESS NOTE   Patient is a 78y old  Male who presents with a chief complaint of Generalized weakness; Failure to thrive (25 Oct 2018 14:46). Podiatry consulted for DFU right lateral foot       HPI:  79 yo M with PMHx of AFIB previously on Xarelto discontinued secondary to frequent falls , DVT s/p Right Hip Replacement (10/14/18) on Eliquis-bedbound, Vascular Dementia with behavioral changes, DM II, HTN presents from [facility] facility for evaluation of progressive weakness and decreased PO intake for the past 2 days. As per son, prior to 2 days ago, patient had a great appetite however in the last 2 days patient has even refused PO medications. In the ED, patient was found to have AFIB with RVR, HR in 120s was given 20mg of IV Cardizem with response. CTH revealed what was initially suspected as an ICH, however upon Neurosurgery evaluation findings may be secondary to a subacute infarct, awaiting neurology evaluation. (23 Oct 2018 10:52)      UTI(URINARY TRACT INFECTION);AFIB;CVA(CEREBAL VASCULAR ACCIDENT)  ^MED ATTN  No pertinent family history in first degree relatives  Handoff  MEWS Score  Anemia  Diabetes  HTN (hypertension)  Arrhythmia  Dementia  UTI (urinary tract infection)  Brain mass  S/P cholecystectomy  Gallstone  History of hip replacement  MED ATTN  66  CVA (cerebral vascular accident)  Afib      VITALS:  Vital Signs Last 24 Hrs  T(C): 37.2 (25 Oct 2018 13:15), Max: 37.2 (24 Oct 2018 20:25)  T(F): 98.9 (25 Oct 2018 13:15), Max: 98.9 (24 Oct 2018 20:25)  HR: 95 (25 Oct 2018 13:15) (87 - 95)  BP: 150/76 (25 Oct 2018 13:15) (150/76 - 174/77)  BP(mean): --  RR: 18 (25 Oct 2018 13:15) (16 - 18)  SpO2: 100% (25 Oct 2018 10:49) (98% - 100%)    LABS:                        11.3   4.00  )-----------( 174      ( 25 Oct 2018 06:10 )             35.5     10-24    144  |  105  |  15  ----------------------------<  103<H>  3.7   |  26  |  0.8    Ca    8.4<L>      24 Oct 2018 06:12  Phos  2.5     10-25  Mg     1.8     10-25    TPro  5.4<L>  /  Alb  3.2<L>  /  TBili  0.9  /  DBili  x   /  AST  15  /  ALT  10  /  AlkPhos  112  10-24    PT/INR - ( 25 Oct 2018 06:10 )   PT: 15.40 sec;   INR: 1.34 ratio         PTT - ( 25 Oct 2018 06:10 )  PTT:34.7 sec  Hemoglobin A1C     PHYSICAL EXAM  GEN: SUSI MARTIN is a pleasant well-nourished, well developed 78y Male in no acute distress, alert awake, and oriented to person, place and time.     Medication(s):   acetaminophen   Tablet .. 650 milliGRAM(s) Oral every 4 hours PRN  amLODIPine   Tablet 2.5 milliGRAM(s) Oral daily  ascorbic acid 500 milliGRAM(s) Oral daily  aspirin enteric coated 81 milliGRAM(s) Oral daily  atorvastatin 40 milliGRAM(s) Oral at bedtime  cefTRIAXone   IVPB 1 Gram(s) IV Intermittent every 24 hours  chlorhexidine 4% Liquid 1 Application(s) Topical <User Schedule>  diltiazem IVPB 20 milliGRAM(s) IV Intermittent Once  ferrous    sulfate 325 milliGRAM(s) Oral daily  lactulose Syrup 30 Gram(s) Oral daily  losartan 25 milliGRAM(s) Oral daily  metoprolol tartrate 50 milliGRAM(s) Oral two times a day  OLANZapine 5 milliGRAM(s) Oral daily  oxyCODONE    IR 5 milliGRAM(s) Oral every 4 hours PRN  senna 1 Tablet(s) Oral at bedtime  sodium chloride 0.9%. 2000 milliLiter(s) IV Continuous <Continuous>  sodium chloride 0.9%. 1000 milliLiter(s) IV Continuous <Continuous>  tamsulosin 0.4 milliGRAM(s) Oral at bedtime  zinc sulfate 220 milliGRAM(s) Oral daily      LE Focused Exam:      Vasc:    - DP/PT pulses 1/4 B/L   - Skin temp warm to warm, proximal to distal B/L  - CFT < 3 sec B/L    Neuro:   - Gross sensation in tact B/L     Derm:   - No interdigital macerations B/L   - Right lateral foot DFU. + erythema, + edema, - malodor, - purulence    MSK:   - Muscle strength 2/5 in all quadrants w/ no defects B/L

## 2018-10-25 NOTE — PROGRESS NOTE ADULT - SUBJECTIVE AND OBJECTIVE BOX
DAILY PROGRESS NOTE  ===========================================================    Patient Information:  SUSI MARTIN  /  78y  /  Male  /  MRN#: [id number]    Working Diagnosis:  1. 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 He was easier to arouse from sleep today; his wife was present at bedside. He responded to simple commands. He complains of right foot pain, and this afternoon complained of some chest pain.     Review of systems is otherwise negative.    |:::::::::::::::::::::::::::| OBJECTIVE |:::::::::::::::::::::::::::|    VITAL SIGNS: Last 24 Hours  T(F): 96.6 (24 Oct 2018 05:15), Max: 99.3 (23 Oct 2018 19:10)  HR: 90 (24 Oct 2018 05:15) (82 - 101)  BP: 104/61 (24 Oct 2018 05:15) (104/61 - 165/81)  RR: 18 (24 Oct 2018 05:15) (18 - 19)  SpO2: 98% (23 Oct 2018 22:35) (98% - 100%)    10-23-18 @ 07:01  -  10-24-18 @ 07:00  --------------------------------------------------------  IN: 0 mL / OUT: 3000 mL / NET: -3000 mL    PHYSICAL EXAM:  GENERAL:   Difficult to arouse from sleep; followed simple commands; NAD.  HEENT:  Head NC/AT; Conjunctivae pink, Sclera anicteric & non-injected.  NECK:   Supple.  CARDIO:  Slightly increased rate; Irregular rhythm; S1 & S2.  RESP:   Poor entry due to effort BL; No rales or rhonchi appreciated however.  GI:   Soft; NT/ND; No guarding; No rebound tenderness.  EXT:   No edema in UE and LE; could not assess strength as patient was difficult to arouse from sleep despite multiple efforts.  SKIN:   Intact; Right foot ulcer, dry.    LAB RESULTS:               11.3<L>  4.00<L> >----------< 174           35.5<L>    MCV: 87.2  MCHC: 31.8<L>  RDW:  17.2<H>    Urinalysis Basic - ( 22 Oct 2018 21:12 )  Color: Yellow / Appearance: Slightly Cloudy / SG: >=1.030 / pH: x  Gluc: x / Ketone: Trace  / Bili: Negative / Urobili: 0.2 mg/dL   Blood: x / Protein: Trace mg/dL / Nitrite: Positive   Leuk Esterase: Negative / RBC: Negative / WBC Negative   Sq Epi: x / Non Sq Epi: Negative / Bacteria: Moderate    MICROBIOLOGY:    Culture - Blood (collected 22 Oct 2018 21:12)  Source: .Blood Blood-Peripheral  Preliminary Report (24 Oct 2018 03:01):    No growth to date.    Culture - Blood (collected 22 Oct 2018 21:12)  Source: .Blood Blood-Peripheral  Preliminary Report (24 Oct 2018 03:01):    No growth to date.    RADIOLOGY:  MR Angio Neck w/ IV Cont (10.23.18 @ 19:16)     IMPRESSION:   Unremarkable MRA of the neck with and without contrast.    ~~~~~  MR Angio Head No Cont (10.23.18 @ 19:08)     IMPRESSION:   1.  Persistent fetal origin of the left PCA off the left internal carotid artery with small/hypoplastic P1 segment of left PCA.  2.  Slight irregularity of the mid basilar likely due to atherosclerotic changes.  3.  Otherwise unremarkable MRA of the brain without contrast.    ~~~~~  PENDING MRI HEAD W/W/O CONTRAST READ    ALLERGIES:  No Known Allergies    INPATIENT MEDICATIONS:  MEDICATIONS  (STANDING):  amLODIPine   Tablet 2.5 milliGRAM(s) Oral daily  ascorbic acid 500 milliGRAM(s) Oral daily  aspirin enteric coated 81 milliGRAM(s) Oral daily  atorvastatin 40 milliGRAM(s) Oral at bedtime  cefTRIAXone   IVPB 1 Gram(s) IV Intermittent every 24 hours  chlorhexidine 4% Liquid 1 Application(s) Topical <User Schedule>  diltiazem IVPB 20 milliGRAM(s) IV Intermittent Once  ferrous    sulfate 325 milliGRAM(s) Oral daily  lactulose Syrup 30 Gram(s) Oral daily  losartan 25 milliGRAM(s) Oral daily  metoprolol tartrate 50 milliGRAM(s) Oral two times a day  OLANZapine 5 milliGRAM(s) Oral daily  senna 1 Tablet(s) Oral at bedtime  sodium chloride 0.9%. 2000 milliLiter(s) (1000 mL/Hr) IV Continuous <Continuous>  sodium chloride 0.9%. 1000 milliLiter(s) (75 mL/Hr) IV Continuous <Continuous>  tamsulosin 0.4 milliGRAM(s) Oral at bedtime  zinc sulfate 220 milliGRAM(s) Oral daily    MEDICATIONS  (PRN):  acetaminophen   Tablet .. 650 milliGRAM(s) Oral every 4 hours PRN Moderate Pain (4 - 6)  oxyCODONE    IR 5 milliGRAM(s) Oral every 4 hours PRN Moderate Pain (4 - 6)    HOME MEDICATIONS:  amLODIPine 5 mg oral tablet: 0.5 tab(s) orally once a day (23 Oct 2018 11:36)  Cozaar 25 mg oral tablet: 1 tab(s) orally once a day (23 Oct 2018 11:48)  Eliquis 5 mg oral tablet: 1 tab(s) orally 2 times a day (23 Oct 2018 11:40)  ferrous sulfate 325 mg (65 mg elemental iron) oral delayed release tablet: 1 tab(s) orally once a day (12 Aug 2018 03:21)  Flomax 0.4 mg oral capsule: 1 cap(s) orally once a day (at bedtime) (23 Oct 2018 11:39)  lactulose 10 g/15 mL oral solution: orally once a day (23 Oct 2018 11:41)  metFORMIN 500 mg oral tablet: 1 tab(s) orally 2 times a day (23 Oct 2018 11:35)  Metoprolol Tartrate 25 mg oral tablet: 1 tab(s) orally 2 times a day (23 Oct 2018 11:37)  OLANZapine 5 mg oral tablet: 1 tab(s) orally once a day (23 Oct 2018 11:38)  oxyCODONE 5 mg oral tablet: orally every 4 hours (5 times/day), As Needed (23 Oct 2018 11:42)  senna oral tablet: 1 tab(s) orally once a day (at bedtime) (23 Oct 2018 11:44)  Tylenol 325 mg oral tablet: 2 tab(s) orally every 4 hours, As Needed (12 Aug 2018 03:21)  Vitamin C 500 mg oral tablet: 1 tab(s) orally once a day (23 Oct 2018 11:47)  zinc sulfate: 50 milligram(s) orally once a day (23 Oct 2018 11:47)  ------------------------------------------------------------------------------------------------------------ DAILY PROGRESS NOTE  ===========================================================    Patient Information:  SUSI MARTIN  /  78y  /  Male  /  MRN#: [id number]    Working Diagnosis:  1. 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 He was easier to arouse from sleep today; his wife was present at bedside. He responded to simple commands. He complains of right foot pain, and this afternoon complained of some chest pain.     Review of systems is otherwise negative.    |:::::::::::::::::::::::::::| OBJECTIVE |:::::::::::::::::::::::::::|    VITAL SIGNS: Last 24 Hours  T(F): 96.6 (24 Oct 2018 05:15), Max: 99.3 (23 Oct 2018 19:10)  HR: 90 (24 Oct 2018 05:15) (82 - 101)  BP: 104/61 (24 Oct 2018 05:15) (104/61 - 165/81)  RR: 18 (24 Oct 2018 05:15) (18 - 19)  SpO2: 98% (23 Oct 2018 22:35) (98% - 100%)    10-23-18 @ 07:01  -  10-24-18 @ 07:00  --------------------------------------------------------  IN: 0 mL / OUT: 3000 mL / NET: -3000 mL    PHYSICAL EXAM:  GENERAL:   Difficult to arouse from sleep; followed simple commands; NAD.  HEENT:  Head NC/AT; Conjunctivae pink, Sclera anicteric & non-injected.  NECK:   Supple.  CARDIO:  Slightly increased rate; Irregular rhythm; S1 & S2.  RESP:   Poor entry due to effort BL; No rales or rhonchi appreciated however.  GI:   Soft; NT/ND; No guarding; No rebound tenderness.  EXT:   No edema in UE and LE; could not assess strength as patient was difficult to arouse from sleep despite multiple efforts.  SKIN:   Intact; Right foot ulcer, dry.    LAB RESULTS:               11.3<L>  4.00<L> >----------< 174           35.5<L>    MCV: 87.2  MCHC: 31.8<L>  RDW:  17.2<H>    Urinalysis Basic - ( 22 Oct 2018 21:12 )  Color: Yellow / Appearance: Slightly Cloudy / SG: >=1.030 / pH: x  Gluc: x / Ketone: Trace  / Bili: Negative / Urobili: 0.2 mg/dL   Blood: x / Protein: Trace mg/dL / Nitrite: Positive   Leuk Esterase: Negative / RBC: Negative / WBC Negative   Sq Epi: x / Non Sq Epi: Negative / Bacteria: Moderate    MICROBIOLOGY:    Culture - Blood (collected 22 Oct 2018 21:12)  Source: .Blood Blood-Peripheral  Preliminary Report (24 Oct 2018 03:01):    No growth to date.    Culture - Blood (collected 22 Oct 2018 21:12)  Source: .Blood Blood-Peripheral  Preliminary Report (24 Oct 2018 03:01):    No growth to date.    RADIOLOGY:  MR Angio Neck w/ IV Cont (10.23.18 @ 19:16)     IMPRESSION:   Unremarkable MRA of the neck with and without contrast.    ~~~~~  MR Angio Head No Cont (10.23.18 @ 19:08)     IMPRESSION:   1.  Persistent fetal origin of the left PCA off the left internal carotid artery with small/hypoplastic P1 segment of left PCA.  2.  Slight irregularity of the mid basilar likely due to atherosclerotic changes.  3.  Otherwise unremarkable MRA of the brain without contrast.    ~~~~~  MR Head w/wo IV Cont (10.23.18 @ 18:57)     IMPRESSION:  1.  Signal abnormality with abnormal enhancement in the right medial occipital lobe consistent with subacute infarct and petechial/microhemorrhages.  2.  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   3.  Scattered T2 and FLAIR hyperintensities periventricular and subcortical white matter which are nonspecific and without mass effect most likely consistent with chronicsmall vessel ischemic changes.  4.  Left greater than right maxillary sinus disease.    ALLERGIES:  No Known Allergies    INPATIENT MEDICATIONS:  MEDICATIONS  (STANDING):  amLODIPine   Tablet 2.5 milliGRAM(s) Oral daily  ascorbic acid 500 milliGRAM(s) Oral daily  aspirin enteric coated 81 milliGRAM(s) Oral daily  atorvastatin 40 milliGRAM(s) Oral at bedtime  cefTRIAXone   IVPB 1 Gram(s) IV Intermittent every 24 hours  chlorhexidine 4% Liquid 1 Application(s) Topical <User Schedule>  diltiazem IVPB 20 milliGRAM(s) IV Intermittent Once  ferrous    sulfate 325 milliGRAM(s) Oral daily  lactulose Syrup 30 Gram(s) Oral daily  losartan 25 milliGRAM(s) Oral daily  metoprolol tartrate 50 milliGRAM(s) Oral two times a day  OLANZapine 5 milliGRAM(s) Oral daily  senna 1 Tablet(s) Oral at bedtime  sodium chloride 0.9%. 2000 milliLiter(s) (1000 mL/Hr) IV Continuous <Continuous>  sodium chloride 0.9%. 1000 milliLiter(s) (75 mL/Hr) IV Continuous <Continuous>  tamsulosin 0.4 milliGRAM(s) Oral at bedtime  zinc sulfate 220 milliGRAM(s) Oral daily    MEDICATIONS  (PRN):  acetaminophen   Tablet .. 650 milliGRAM(s) Oral every 4 hours PRN Moderate Pain (4 - 6)  oxyCODONE    IR 5 milliGRAM(s) Oral every 4 hours PRN Moderate Pain (4 - 6)    HOME MEDICATIONS:  amLODIPine 5 mg oral tablet: 0.5 tab(s) orally once a day (23 Oct 2018 11:36)  Cozaar 25 mg oral tablet: 1 tab(s) orally once a day (23 Oct 2018 11:48)  Eliquis 5 mg oral tablet: 1 tab(s) orally 2 times a day (23 Oct 2018 11:40)  ferrous sulfate 325 mg (65 mg elemental iron) oral delayed release tablet: 1 tab(s) orally once a day (12 Aug 2018 03:21)  Flomax 0.4 mg oral capsule: 1 cap(s) orally once a day (at bedtime) (23 Oct 2018 11:39)  lactulose 10 g/15 mL oral solution: orally once a day (23 Oct 2018 11:41)  metFORMIN 500 mg oral tablet: 1 tab(s) orally 2 times a day (23 Oct 2018 11:35)  Metoprolol Tartrate 25 mg oral tablet: 1 tab(s) orally 2 times a day (23 Oct 2018 11:37)  OLANZapine 5 mg oral tablet: 1 tab(s) orally once a day (23 Oct 2018 11:38)  oxyCODONE 5 mg oral tablet: orally every 4 hours (5 times/day), As Needed (23 Oct 2018 11:42)  senna oral tablet: 1 tab(s) orally once a day (at bedtime) (23 Oct 2018 11:44)  Tylenol 325 mg oral tablet: 2 tab(s) orally every 4 hours, As Needed (12 Aug 2018 03:21)  Vitamin C 500 mg oral tablet: 1 tab(s) orally once a day (23 Oct 2018 11:47)  zinc sulfate: 50 milligram(s) orally once a day (23 Oct 2018 11:47)  ------------------------------------------------------------------------------------------------------------

## 2018-10-25 NOTE — SWALLOW BEDSIDE ASSESSMENT ADULT - SLP PERTINENT HISTORY OF CURRENT PROBLEM
Pt admitted from [facility] w/ progressive weakness and decreased po acceptance. PMH: dementia, Afib. CTH: ? acute ICH vs subacute infacrt, awaiting MRI.

## 2018-10-25 NOTE — PROGRESS NOTE ADULT - ASSESSMENT
79 yo M brought in from [facility] for evaluation of progressive weakness and decreased PO intake.     ===========================================================  TODAY: Patient was seen by speech and swallow today and advanced his diet to Dysphagia II thin liquid; reported to be eating better as per his wife. He also complained of right foot pain at site of chronic ucer; ordered Foot XRAY and Podiatry consult. Lastly, he complained about chest pain this afternoon; EKG showed no acute ST or T wave changes and is likely GI related. His pressure and HR remain elevated and Metorpolol was increased from 25mg to 50mg BID. As per Neuro, we will resume Eliquis tomorrow.  ===========================================================    PLAN:  Right Parietal Lobe Subacute Infarct vs. intracranial mass; complicated by Vascular dementia & Failure to Thrive  - Neurology recommendations appreciated:   ·	Will continue to hold Eliquis at this time   ·	Follow up with MRA and MRI head results; will likely need repeat in near future  ·	OK to give anti-platelet, but could be risky if this is a mass with small hemorrhage; hold for now  ·	Neurochecks Q4H  ·	Transfer to stroke unit  - UA (-); Bcx (-); Cardiac enzymes (-)  - AR/Rehab recommends short term rehab in SNF  - Follow up S+S  - Continue with IVF as he is not eating/drinking much  - Continue home Olanzapine 5mg PO QD    Urinary tract infection; complicated  - UA is nitrite positive  - Will follow up on culture results    History of RLE DVT  - Duplex US performed this morning    Atrial fibrillation, rate controlled; on Eliquis (now discontinued); [id number] (7)  - Continue metoprolol tartrate 25mg PO QD    Hypertension; Controlled  - Pressure is on the lower end, will continue to monitor and hold amlodipine if necessary  - Continue amlodipine 2.5mg PO QD  - Continue Losartan 25mg PO QD    T2DM  - Monitor finger sticks, start insulin sliding scale if >180    Anemia  - Continue with Ferrous Sulfate 325mg PO QD    BPH  - Continue Tamsulosin 0.4mg PO QD    Constipation  - Senna QHS  _________________________________________  Fluid: Gentle IVF hydration  Nutrition: Pending speech and swallow recommendations  Electrolytes:  Phosphorus NL  Magnesium NL  Zinc Pending      GI PPX:                                   (X) Not indicated;  () Pantoprazole 40mg Daily    DVT PPX:  () Not indicated;  (X) Heparin 5000 mg SubQ;  () Lovenox 40 mg SubQ;  () SCDs    ACTIVITY:  () Ad Sylvia  /  (X) Increase as Tolerated  /  () OOB w/ assist  /  () Bed Rest    BMI:  Height (cm): 182.88 (10-23)  Weight (kg): 72.6 (10-22)  BMI (kg/m2): 21.7 (10-23)    DISPO:  Patient to be discharged when condition(s) optimized.    (X) Discussion with patient and/or family regarding goals of care.  (X) Discussed Case and Plan with the Medical Attending.    CODE STATUS  (X) FULL  () DNR / DNI    Please call Dr. Shields (PGY-1) with any questions/consult recs: Spectra #[id number] 77 yo M brought in from [facility] for evaluation of progressive weakness and decreased PO intake.     ===========================================================  TODAY: Patient was seen by speech and swallow today and advanced his diet to Dysphagia II thin liquid; reported to be eating better as per his wife. He also complained of right foot pain at site of chronic ucer; ordered Foot XRAY and Podiatry consult. Lastly, he complained about chest pain this afternoon; EKG showed no acute ST or T wave changes and is likely GI related. His pressure and HR remain elevated and Metorpolol was increased from 25mg to 50mg BID. As per Neuro, we will resume Eliquis tomorrow.  ===========================================================    PLAN:  Right Parietal Lobe Subacute Infarct vs. intracranial mass; complicated by Vascular dementia & Failure to Thrive  - Neurology recommendations appreciated:   ·	Will continue to hold Eliquis at this time; resume on 10/26/2018 per Neurology  ·	MRA and MRI head results noted  ·	OK to give anti-platelet, but could be risky if this is a mass with small hemorrhage; hold for now  ·	Neurochecks Q4H  ·	Transfer to stroke unit  - UA (-); Bcx (-); Cardiac enzymes (-)  - MN/Rehab recommends short term rehab in SNF  - Follow up S+S  - Continue with IVF as he is not eating/drinking much  - Continue home Olanzapine 5mg PO QD    Troponinemia without associated symptoms  - No complaint of chest pain  - Troponin increased to 0.11 today  - Follow up 4PM troponin level    Urinary tract infection; complicated  - UA is nitrite positive  - Will follow up on culture results    History of RLE DVT  - Duplex US performed this morning    Atrial fibrillation, rate controlled; on Eliquis (now discontinued); [id number] (7)  - Metoprolol tartrate 25mg PO QD increased to 50mg PO    RIGHT foot ulcer (lateral); dry; chronic  - Consult placed for podiatry  - Follow up with Foot Xray    Urinary retention?  - Deluca placed 12-13 days ago  - Deluca discontinued; follow up void trail with bladder scan    Hypertension; Uncontrolled  - Pressure is on the lower end, will continue to monitor and hold amlodipine if necessary  - Continue amlodipine 2.5mg PO QD  - Increased Losartan 25mg PO QD to 50mg     T2DM  - Monitor finger sticks, start insulin sliding scale if >180    Anemia  - Continue with Ferrous Sulfate 325mg PO QD    BPH  - Continue Tamsulosin 0.4mg PO QD    Constipation  - Senna QHS  _________________________________________  Fluid: Gentle IVF hydration  Nutrition: S+S recommendations noted; increased to Dysphagia 2 thin liquid  Electrolytes:  Phosphorus NL  Magnesium NL  Zinc Pending      GI PPX:                                   (X) Not indicated;  () Pantoprazole 40mg Daily    DVT PPX:  () Not indicated;  (X) Heparin 5000 mg SubQ;  () Lovenox 40 mg SubQ;  () SCDs    ACTIVITY:  () Ad Sylvia  /  (X) Increase as Tolerated  /  () OOB w/ assist  /  () Bed Rest    BMI:  Height (cm): 182.88 (10-23)  Weight (kg): 72.6 (10-22)  BMI (kg/m2): 21.7 (10-23)    DISPO:  Patient to be discharged when condition(s) optimized.    (X) Discussion with patient and/or family regarding goals of care.  (X) Discussed Case and Plan with the Medical Attending.    CODE STATUS  (X) FULL  () DNR / DNI    Please call Dr. Shields (PGY-1) with any questions/consult recs: Spectra #[id number]

## 2018-10-25 NOTE — SWALLOW BEDSIDE ASSESSMENT ADULT - COMMENTS
MRI: R medial occipital lobe subacute infarct and microhemorrhages, ?hydrocephalus, chronic sm vessel ischemic changes +toleration Mild oral dysphagia w/o any overt s/s of penetration/aspiration

## 2018-10-26 LAB
-  AMIKACIN: SIGNIFICANT CHANGE UP
-  AMOXICILLIN/CLAVULANIC ACID: SIGNIFICANT CHANGE UP
-  AMPICILLIN/SULBACTAM: SIGNIFICANT CHANGE UP
-  AMPICILLIN: SIGNIFICANT CHANGE UP
-  AZTREONAM: SIGNIFICANT CHANGE UP
-  CEFAZOLIN: SIGNIFICANT CHANGE UP
-  CEFEPIME: SIGNIFICANT CHANGE UP
-  CEFOXITIN: SIGNIFICANT CHANGE UP
-  CEFTRIAXONE: SIGNIFICANT CHANGE UP
-  CIPROFLOXACIN: SIGNIFICANT CHANGE UP
-  ERTAPENEM: SIGNIFICANT CHANGE UP
-  GENTAMICIN: SIGNIFICANT CHANGE UP
-  IMIPENEM: SIGNIFICANT CHANGE UP
-  LEVOFLOXACIN: SIGNIFICANT CHANGE UP
-  MEROPENEM: SIGNIFICANT CHANGE UP
-  NITROFURANTOIN: SIGNIFICANT CHANGE UP
-  PIPERACILLIN/TAZOBACTAM: SIGNIFICANT CHANGE UP
-  TIGECYCLINE: SIGNIFICANT CHANGE UP
-  TOBRAMYCIN: SIGNIFICANT CHANGE UP
-  TRIMETHOPRIM/SULFAMETHOXAZOLE: SIGNIFICANT CHANGE UP
ANION GAP SERPL CALC-SCNC: 11 MMOL/L — SIGNIFICANT CHANGE UP (ref 7–14)
BASOPHILS # BLD AUTO: 0.01 K/UL — SIGNIFICANT CHANGE UP (ref 0–0.2)
BASOPHILS NFR BLD AUTO: 0.3 % — SIGNIFICANT CHANGE UP (ref 0–1)
BUN SERPL-MCNC: 17 MG/DL — SIGNIFICANT CHANGE UP (ref 10–20)
CALCIUM SERPL-MCNC: 7.9 MG/DL — LOW (ref 8.5–10.1)
CHLORIDE SERPL-SCNC: 99 MMOL/L — SIGNIFICANT CHANGE UP (ref 98–110)
CO2 SERPL-SCNC: 24 MMOL/L — SIGNIFICANT CHANGE UP (ref 17–32)
CREAT SERPL-MCNC: 0.8 MG/DL — SIGNIFICANT CHANGE UP (ref 0.7–1.5)
CULTURE RESULTS: SIGNIFICANT CHANGE UP
EOSINOPHIL # BLD AUTO: 0.15 K/UL — SIGNIFICANT CHANGE UP (ref 0–0.7)
EOSINOPHIL NFR BLD AUTO: 3.8 % — SIGNIFICANT CHANGE UP (ref 0–8)
GLUCOSE BLDC GLUCOMTR-MCNC: 127 MG/DL — HIGH (ref 70–99)
GLUCOSE BLDC GLUCOMTR-MCNC: 139 MG/DL — HIGH (ref 70–99)
GLUCOSE BLDC GLUCOMTR-MCNC: 140 MG/DL — HIGH (ref 70–99)
GLUCOSE BLDC GLUCOMTR-MCNC: 95 MG/DL — SIGNIFICANT CHANGE UP (ref 70–99)
GLUCOSE SERPL-MCNC: 90 MG/DL — SIGNIFICANT CHANGE UP (ref 70–99)
HCT VFR BLD CALC: 34.6 % — LOW (ref 42–52)
HGB BLD-MCNC: 11.1 G/DL — LOW (ref 14–18)
IMM GRANULOCYTES NFR BLD AUTO: 0.8 % — HIGH (ref 0.1–0.3)
LYMPHOCYTES # BLD AUTO: 0.83 K/UL — LOW (ref 1.2–3.4)
LYMPHOCYTES # BLD AUTO: 20.8 % — SIGNIFICANT CHANGE UP (ref 20.5–51.1)
MCHC RBC-ENTMCNC: 27.8 PG — SIGNIFICANT CHANGE UP (ref 27–31)
MCHC RBC-ENTMCNC: 32.1 G/DL — SIGNIFICANT CHANGE UP (ref 32–37)
MCV RBC AUTO: 86.7 FL — SIGNIFICANT CHANGE UP (ref 80–94)
METHOD TYPE: SIGNIFICANT CHANGE UP
MONOCYTES # BLD AUTO: 0.39 K/UL — SIGNIFICANT CHANGE UP (ref 0.1–0.6)
MONOCYTES NFR BLD AUTO: 9.8 % — HIGH (ref 1.7–9.3)
NEUTROPHILS # BLD AUTO: 2.59 K/UL — SIGNIFICANT CHANGE UP (ref 1.4–6.5)
NEUTROPHILS NFR BLD AUTO: 64.5 % — SIGNIFICANT CHANGE UP (ref 42.2–75.2)
NRBC # BLD: 0 /100 WBCS — SIGNIFICANT CHANGE UP (ref 0–0)
ORGANISM # SPEC MICROSCOPIC CNT: SIGNIFICANT CHANGE UP
ORGANISM # SPEC MICROSCOPIC CNT: SIGNIFICANT CHANGE UP
PLATELET # BLD AUTO: 180 K/UL — SIGNIFICANT CHANGE UP (ref 130–400)
POTASSIUM SERPL-MCNC: 3.1 MMOL/L — LOW (ref 3.5–5)
POTASSIUM SERPL-SCNC: 3.1 MMOL/L — LOW (ref 3.5–5)
RBC # BLD: 3.99 M/UL — LOW (ref 4.7–6.1)
RBC # FLD: 17.2 % — HIGH (ref 11.5–14.5)
SODIUM SERPL-SCNC: 134 MMOL/L — LOW (ref 135–146)
SPECIMEN SOURCE: SIGNIFICANT CHANGE UP
WBC # BLD: 4 K/UL — LOW (ref 4.8–10.8)
WBC # FLD AUTO: 4 K/UL — LOW (ref 4.8–10.8)

## 2018-10-26 PROCEDURE — 93925 LOWER EXTREMITY STUDY: CPT | Mod: 26

## 2018-10-26 PROCEDURE — 99221 1ST HOSP IP/OBS SF/LOW 40: CPT

## 2018-10-26 RX ORDER — APIXABAN 2.5 MG/1
5 TABLET, FILM COATED ORAL EVERY 12 HOURS
Qty: 0 | Refills: 0 | Status: DISCONTINUED | OUTPATIENT
Start: 2018-10-26 | End: 2018-10-31

## 2018-10-26 RX ORDER — DRONABINOL 2.5 MG
2.5 CAPSULE ORAL
Qty: 0 | Refills: 0 | Status: DISCONTINUED | OUTPATIENT
Start: 2018-10-26 | End: 2018-10-30

## 2018-10-26 RX ORDER — METOPROLOL TARTRATE 50 MG
50 TABLET ORAL EVERY 8 HOURS
Qty: 0 | Refills: 0 | Status: DISCONTINUED | OUTPATIENT
Start: 2018-10-26 | End: 2018-10-30

## 2018-10-26 RX ORDER — COLLAGENASE CLOSTRIDIUM HIST. 250 UNIT/G
1 OINTMENT (GRAM) TOPICAL DAILY
Qty: 0 | Refills: 0 | Status: DISCONTINUED | OUTPATIENT
Start: 2018-10-26 | End: 2018-11-02

## 2018-10-26 RX ADMIN — Medication 50 MILLIGRAM(S): at 05:44

## 2018-10-26 RX ADMIN — CEFTRIAXONE 100 GRAM(S): 500 INJECTION, POWDER, FOR SOLUTION INTRAMUSCULAR; INTRAVENOUS at 13:04

## 2018-10-26 RX ADMIN — SODIUM CHLORIDE 75 MILLILITER(S): 9 INJECTION INTRAMUSCULAR; INTRAVENOUS; SUBCUTANEOUS at 16:50

## 2018-10-26 RX ADMIN — LOSARTAN POTASSIUM 25 MILLIGRAM(S): 100 TABLET, FILM COATED ORAL at 05:44

## 2018-10-26 RX ADMIN — TAMSULOSIN HYDROCHLORIDE 0.4 MILLIGRAM(S): 0.4 CAPSULE ORAL at 21:51

## 2018-10-26 RX ADMIN — AMLODIPINE BESYLATE 2.5 MILLIGRAM(S): 2.5 TABLET ORAL at 05:43

## 2018-10-26 RX ADMIN — Medication 81 MILLIGRAM(S): at 11:44

## 2018-10-26 RX ADMIN — ATORVASTATIN CALCIUM 40 MILLIGRAM(S): 80 TABLET, FILM COATED ORAL at 21:50

## 2018-10-26 RX ADMIN — Medication 500 MILLIGRAM(S): at 11:45

## 2018-10-26 RX ADMIN — Medication 325 MILLIGRAM(S): at 13:05

## 2018-10-26 RX ADMIN — OLANZAPINE 5 MILLIGRAM(S): 15 TABLET, FILM COATED ORAL at 11:46

## 2018-10-26 RX ADMIN — CHLORHEXIDINE GLUCONATE 1 APPLICATION(S): 213 SOLUTION TOPICAL at 05:42

## 2018-10-26 RX ADMIN — Medication 2.5 MILLIGRAM(S): at 16:34

## 2018-10-26 RX ADMIN — LOSARTAN POTASSIUM 50 MILLIGRAM(S): 100 TABLET, FILM COATED ORAL at 05:42

## 2018-10-26 RX ADMIN — ZINC SULFATE TAB 220 MG (50 MG ZINC EQUIVALENT) 220 MILLIGRAM(S): 220 (50 ZN) TAB at 11:45

## 2018-10-26 RX ADMIN — OXYCODONE HYDROCHLORIDE 5 MILLIGRAM(S): 5 TABLET ORAL at 06:34

## 2018-10-26 RX ADMIN — Medication 50 MILLIGRAM(S): at 14:06

## 2018-10-26 RX ADMIN — APIXABAN 5 MILLIGRAM(S): 2.5 TABLET, FILM COATED ORAL at 17:50

## 2018-10-26 RX ADMIN — Medication 50 MILLIGRAM(S): at 21:50

## 2018-10-26 RX ADMIN — Medication 1 APPLICATION(S): at 14:06

## 2018-10-26 RX ADMIN — SENNA PLUS 1 TABLET(S): 8.6 TABLET ORAL at 21:51

## 2018-10-26 RX ADMIN — LACTULOSE 30 GRAM(S): 10 SOLUTION ORAL at 11:48

## 2018-10-26 NOTE — PHYSICAL THERAPY INITIAL EVALUATION ADULT - PERTINENT HX OF CURRENT PROBLEM, REHAB EVAL
pt is an 79 y/o male admitted from [facility] 2* to progressive weakness and decreased PO intake

## 2018-10-26 NOTE — PROGRESS NOTE ADULT - SUBJECTIVE AND OBJECTIVE BOX
Gave patient depo provera injection. Patient tolerated well. Patient will return in 12 weeks.    PROGRESS NOTE   Pt seen at bedside during PM rounds with attending present.       Vital Signs Last 24 Hrs  T(C): 36.4 (26 Oct 2018 11:52), Max: 36.6 (25 Oct 2018 18:27)  T(F): 97.6 (26 Oct 2018 11:52), Max: 97.9 (25 Oct 2018 18:27)  HR: 112 (26 Oct 2018 11:52) (82 - 112)  BP: 156/96 (26 Oct 2018 11:52) (141/76 - 160/72)  BP(mean): --  RR: 18 (26 Oct 2018 11:52) (18 - 18)  SpO2: 97% (26 Oct 2018 11:52) (97% - 97%)                          11.1   4.00  )-----------( 180      ( 26 Oct 2018 05:19 )             34.6               10-26    134<L>  |  99  |  17  ----------------------------<  90  3.1<L>   |  24  |  0.8    Ca    7.9<L>      26 Oct 2018 05:19  Phos  2.5     10-25  Mg     1.8     10-25        PHYSICAL EXAM  LE Focused Exam:      Vasc:    - DP/PT pulses 1/4 B/L   - Skin temp warm to warm, proximal to distal B/L  - CFT < 3 sec B/L    Neuro:   - Gross sensation in tact B/L     Derm:   - No interdigital macerations B/L   - Right lateral foot ulcer - malodor, - purulence, Eschar wound base, Dry borders,     MSK:   - Muscle strength 2/5 in all quadrants w/ no defects B/L     < from: Xray Foot AP + Lateral, Right (10.25.18 @ 15:31) >    EXAM:  XR FOOT 2 VIEWS RT            PROCEDURE DATE:  10/25/2018            INTERPRETATION:  Clinical history: Chronic right lateral foot ulcer.    Technique: 2 views of the right foot.    No studies are available for direct comparison.    Findings:    Small skin ulceration lateral to the fifth metatarsal base with   subcortical apparent lucency and no evidence of periosteal reaction..    No evidence of acute displaced fracture or dislocation. Midfoot   degenerative change with overlap of the metatarsal bases present. There   are forefoot hammertoe deformities consistent with neuropathic change.   Tibiotalar degenerative changes are present. Degenerative changes of the   hindfoot midfoot and first MTP joint and IP joints. No joint effusion.    Impression:    1.  Base of fifth metatarsal skin ulceration with minimal subcortical   lucency identified on single projection. Consider MRI of the midfoot to   exclude osteomyelitis              JULIA MATAMOROS M.D., RESIDENT RADIOLOGIST  This document has been electronically signed.  ROSEY GALLARDO M.D., ATTENDING RADIOLOGIST  This document has been electronically signed. Oct 26 2018 10:46AM        < end of copied text >      Assessment:   - Right lateral foot DFU. Stable w/ no signs of acute infection.     Plan:   - pt seen/evaluated @ bedside  - xray reviewed with pt  - No dressing  - santyl Ordered  - WOund care orders: santyl, dsd/ kerlix  - A duplex ordered.   - plan will be discussed w/ attending   - podiatry will f/u PROGRESS NOTE   Pt seen at bedside during PM rounds with attending present.       Vital Signs Last 24 Hrs  T(C): 36.4 (26 Oct 2018 11:52), Max: 36.6 (25 Oct 2018 18:27)  T(F): 97.6 (26 Oct 2018 11:52), Max: 97.9 (25 Oct 2018 18:27)  HR: 112 (26 Oct 2018 11:52) (82 - 112)  BP: 156/96 (26 Oct 2018 11:52) (141/76 - 160/72)  BP(mean): --  RR: 18 (26 Oct 2018 11:52) (18 - 18)  SpO2: 97% (26 Oct 2018 11:52) (97% - 97%)                          11.1   4.00  )-----------( 180      ( 26 Oct 2018 05:19 )             34.6               10-26    134<L>  |  99  |  17  ----------------------------<  90  3.1<L>   |  24  |  0.8    Ca    7.9<L>      26 Oct 2018 05:19  Phos  2.5     10-25  Mg     1.8     10-25        PHYSICAL EXAM  LE Focused Exam:      Vasc:    - DP/PT pulses 1/4 B/L   - Skin temp warm to warm, proximal to distal B/L  - CFT < 3 sec B/L    Neuro:   - Gross sensation in tact B/L     Derm:   - No interdigital macerations B/L   - Right lateral foot ulcer - malodor, - purulence, Eschar wound base, Dry borders,     MSK:   - Muscle strength 2/5 in all quadrants w/ no defects B/L     < from: Xray Foot AP + Lateral, Right (10.25.18 @ 15:31) >    EXAM:  XR FOOT 2 VIEWS RT            PROCEDURE DATE:  10/25/2018            INTERPRETATION:  Clinical history: Chronic right lateral foot ulcer.    Technique: 2 views of the right foot.    No studies are available for direct comparison.    Findings:    Small skin ulceration lateral to the fifth metatarsal base with   subcortical apparent lucency and no evidence of periosteal reaction..    No evidence of acute displaced fracture or dislocation. Midfoot   degenerative change with overlap of the metatarsal bases present. There   are forefoot hammertoe deformities consistent with neuropathic change.   Tibiotalar degenerative changes are present. Degenerative changes of the   hindfoot midfoot and first MTP joint and IP joints. No joint effusion.    Impression:    1.  Base of fifth metatarsal skin ulceration with minimal subcortical   lucency identified on single projection. Consider MRI of the midfoot to   exclude osteomyelitis              JULIA MATAMOROS M.D., RESIDENT RADIOLOGIST  This document has been electronically signed.  ROSEY GALLARDO M.D., ATTENDING RADIOLOGIST  This document has been electronically signed. Oct 26 2018 10:46AM        < end of copied text >      Assessment:   - Right lateral foot DFU. Stable w/ no signs of acute infection.     Plan:   - pt seen/evaluated @ bedside  - xray reviewed with pt.   - recommend serial x-rays as o/p to monitor progression of tres changes.  - No dressing  - santyl Ordered  - WOund care orders: santyl, dsd/ kerlix - Daily  - A duplex ordered.   - plan will be discussed w/ attending   - podiatry will f/u PROGRESS NOTE   Pt seen at bedside during PM rounds with attending present.       Vital Signs Last 24 Hrs  T(C): 36.4 (26 Oct 2018 11:52), Max: 36.6 (25 Oct 2018 18:27)  T(F): 97.6 (26 Oct 2018 11:52), Max: 97.9 (25 Oct 2018 18:27)  HR: 112 (26 Oct 2018 11:52) (82 - 112)  BP: 156/96 (26 Oct 2018 11:52) (141/76 - 160/72)  BP(mean): --  RR: 18 (26 Oct 2018 11:52) (18 - 18)  SpO2: 97% (26 Oct 2018 11:52) (97% - 97%)                          11.1   4.00  )-----------( 180      ( 26 Oct 2018 05:19 )             34.6               10-26    134<L>  |  99  |  17  ----------------------------<  90  3.1<L>   |  24  |  0.8    Ca    7.9<L>      26 Oct 2018 05:19  Phos  2.5     10-25  Mg     1.8     10-25        PHYSICAL EXAM  LE Focused Exam:      Vasc:    - DP/PT pulses 1/4 B/L   - Skin temp warm to warm, proximal to distal B/L  - CFT < 3 sec B/L    Neuro:   - Gross sensation in tact B/L     Derm:   - No interdigital macerations B/L   - Right lateral foot ulcer - malodor, - purulence, Eschar wound base, Dry borders,     MSK:   - Muscle strength 2/5 in all quadrants w/ no defects B/L     < from: Xray Foot AP + Lateral, Right (10.25.18 @ 15:31) >    EXAM:  XR FOOT 2 VIEWS RT            PROCEDURE DATE:  10/25/2018            INTERPRETATION:  Clinical history: Chronic right lateral foot ulcer.    Technique: 2 views of the right foot.    No studies are available for direct comparison.    Findings:    Small skin ulceration lateral to the fifth metatarsal base with   subcortical apparent lucency and no evidence of periosteal reaction..    No evidence of acute displaced fracture or dislocation. Midfoot   degenerative change with overlap of the metatarsal bases present. There   are forefoot hammertoe deformities consistent with neuropathic change.   Tibiotalar degenerative changes are present. Degenerative changes of the   hindfoot midfoot and first MTP joint and IP joints. No joint effusion.    Impression:    1.  Base of fifth metatarsal skin ulceration with minimal subcortical   lucency identified on single projection. Consider MRI of the midfoot to   exclude osteomyelitis              JULIA MATAMOROS M.D., RESIDENT RADIOLOGIST  This document has been electronically signed.  ROSEY GALLARDO M.D., ATTENDING RADIOLOGIST  This document has been electronically signed. Oct 26 2018 10:46AM        < end of copied text >      Assessment:   - Right lateral foot DFU. Stable w/ no signs of acute infection.     Plan:   - pt seen/evaluated @ bedside  - xray reviewed with pt.   - recommend serial x-rays as o/p to monitor progression of tres changes.  - No dressing  - santyl Ordered  - WOund care orders: santyl, dsd/ kerlix - Daily  - A duplex ordered.   -see attending attestation   - podiatry will f/u

## 2018-10-26 NOTE — PROGRESS NOTE ADULT - SUBJECTIVE AND OBJECTIVE BOX
SUBJECTIVE:    Patient is a 78y old Male who presents with a chief complaint of progressive weakness and decreased PO intake for 2 days (26 Oct 2018 13:43)      HPI:  79 yo M with PMHx of AFIB previously on Xarelto discontinued secondary to frequent falls , DVT s/p Right Hip Replacement (10/14/18) on Eliquis-bedbound, Vascular Dementia with behavioral changes, DM II, HTN [facility] progressive weakness and decreased PO intake for the past 2 days. As per son, prior to 2 days ago, patient had a great appetite however in the last 2 days patient has even refused PO medications. In the ED, patient was found to have AFIB with RVR, HR in 120s was given 20mg of IV Cardizem with response. CTH revealed what was initially suspected as an ICH, however upon Neurosurgery evaluation findings may be secondary to a subacute infarct, awaiting neurology evaluation. (23 Oct 2018 10:52)      Currently admitted to medicine with the primary diagnosis of UTI (urinary tract infection)       Besides the pertinent positives and negatives described above, the ROS was within normal limits.    PAST MEDICAL & SURGICAL HISTORY  Anemia  Diabetes  HTN (hypertension)  Arrhythmia  Dementia  S/P cholecystectomy  History of hip replacement    SOCIAL HISTORY:    ALLERGIES:  No Known Allergies    MEDICATIONS:  STANDING MEDICATIONS  amLODIPine   Tablet 2.5 milliGRAM(s) Oral daily  ascorbic acid 500 milliGRAM(s) Oral daily  aspirin enteric coated 81 milliGRAM(s) Oral daily  atorvastatin 40 milliGRAM(s) Oral at bedtime  cefTRIAXone   IVPB 1 Gram(s) IV Intermittent every 24 hours  chlorhexidine 4% Liquid 1 Application(s) Topical <User Schedule>  collagenase Ointment 1 Application(s) Topical daily  diltiazem IVPB 20 milliGRAM(s) IV Intermittent Once  dronabinol 2.5 milliGRAM(s) Oral three times a day before meals  ferrous    sulfate 325 milliGRAM(s) Oral daily  lactulose Syrup 30 Gram(s) Oral daily  losartan 50 milliGRAM(s) Oral daily  metoprolol tartrate 50 milliGRAM(s) Oral every 8 hours  OLANZapine 5 milliGRAM(s) Oral daily  senna 1 Tablet(s) Oral at bedtime  sodium chloride 0.9%. 2000 milliLiter(s) IV Continuous <Continuous>  sodium chloride 0.9%. 1000 milliLiter(s) IV Continuous <Continuous>  tamsulosin 0.4 milliGRAM(s) Oral at bedtime  zinc sulfate 220 milliGRAM(s) Oral daily    PRN MEDICATIONS  acetaminophen   Tablet .. 650 milliGRAM(s) Oral every 4 hours PRN  oxyCODONE    IR 5 milliGRAM(s) Oral every 4 hours PRN    VITALS:   T(F): 97.2  HR: 999  BP: 156/77  RR: 20  SpO2: 97%    LABS:                        11.1   4.00  )-----------( 180      ( 26 Oct 2018 05:19 )             34.6     10-26    134<L>  |  99  |  17  ----------------------------<  90  3.1<L>   |  24  |  0.8    Ca    7.9<L>      26 Oct 2018 05:19  Phos  2.5     10-25  Mg     1.8     10-25      PT/INR - ( 25 Oct 2018 06:10 )   PT: 15.40 sec;   INR: 1.34 ratio         PTT - ( 25 Oct 2018 06:10 )  PTT:34.7 sec      Troponin T, Serum: <0.01 ng/mL (10-25-18 @ 17:28)      Culture - Urine (collected 24 Oct 2018 16:57)  Source: .Urine Clean Catch (Midstream)  Final Report (26 Oct 2018 17:10):    >100,000 CFU/ml Serratia marcescens  Organism: Serratia marcescens (26 Oct 2018 17:10)  Organism: Serratia marcescens (26 Oct 2018 17:10)      CARDIAC MARKERS ( 25 Oct 2018 17:28 )  x     / <0.01 ng/mL / x     / x     / x          RADIOLOGY:    PHYSICAL EXAM:  GEN: No acute distress  LUNGS: Clear to auscultation bilaterally   HEART: Regular  ABD: Soft, non-tender, non-distended.  EXT: NC/NC/NE/2+PP/MARIN/Skin Intact.   NEURO: AAOX3    Intravenous access:   NG tube:   Deluca Catheter:

## 2018-10-26 NOTE — PROGRESS NOTE ADULT - SUBJECTIVE AND OBJECTIVE BOX
SUSI MARTIN  78y Male    CHIEF COMPLAINT:    Patient is a 78y old  Male who presents with a chief complaint of progressive weakness and decreased PO intake for 2 days (26 Oct 2018 10:15)      INTERVAL HPI/OVERNIGHT EVENTS:    Patient seen and examined. Resting comfortably in bed with wife and son at bedside. He is more awake and alert, answering my questions appropriately.     ROS: All other systems are negative.    Vital Signs:    T(F): 97.6 (10-26-18 @ 11:52), Max: 98.9 (10-25-18 @ 13:15)  HR: 112 (10-26-18 @ 11:52) (82 - 112) - 96  BP: 156/96 (10-26-18 @ 11:52) (141/76 - 160/72)  RR: 18 (10-26-18 @ 11:52) (18 - 18)  SpO2: 97% (10-26-18 @ 11:52) (97% - 97%)  I&O's Summary    25 Oct 2018 07:01  -  26 Oct 2018 07:00  --------------------------------------------------------  IN: 120 mL / OUT: 200 mL / NET: -80 mL    POCT Blood Glucose.: 140 mg/dL (26 Oct 2018 11:30)  POCT Blood Glucose.: 95 mg/dL (26 Oct 2018 06:40)  POCT Blood Glucose.: 111 mg/dL (25 Oct 2018 21:18)  POCT Blood Glucose.: 112 mg/dL (25 Oct 2018 16:31)      PHYSICAL EXAM:    GENERAL:  NAD, thin, frail  SKIN: No rashes or lesions  HENT: Atraumatic. Normocephalic.   NECK: Supple, No JVD.  PULMONARY: Decreased inspiratory effort. No wheezing. No rales  CVS: Normal S1, S2. Rate and Rhythm are regular. No murmurs.  ABDOMEN/GI: Soft, Nontender, Nondistended; BS present  MSK:  No edema B/L LE, no clubbing or cyanosis.  NEUROLOGIC:  Sensation intact upper and lower extremities, LE 4/5 weakness bilaterally. No drift, no facial assymentry  PSYCH: Alert & oriented x 3    Consultant(s) Notes Reviewed:  [x ] YES  [ ] NO  Care Discussed with Consultants/Other Providers [ x] YES  [ ] NO    LABS:                        11.1   4.00  )-----------( 180      ( 26 Oct 2018 05:19 )             34.6   10-26    134<L>  |  99  |  17  ----------------------------<  90  3.1<L>   |  24  |  0.8    Ca    7.9<L>      26 Oct 2018 05:19  Phos  2.5     10-25  Mg     1.8     10-25    PT/INR - ( 25 Oct 2018 06:10 )   PT: 15.40 sec;   INR: 1.34 ratio      PTT - ( 25 Oct 2018 06:10 )  PTT:34.7 sec    Trop <0.01, CKMB --, CK --, 10-25-18 @ 17:28  Trop <0.01, CKMB 1.6, CK 21, 10-23-18 @ 16:30      Culture - Urine (collected 24 Oct 2018 16:57)  Source: .Urine Clean Catch (Midstream)  Preliminary Report (25 Oct 2018 17:58):    >100,000 CFU/ml Serratia marcescens    RADIOLOGY & ADDITIONAL TESTS:    Imaging or report Personally Reviewed:  [x] YES  [ ] NO    Medications:  Standing  amLODIPine   Tablet 2.5 milliGRAM(s) Oral daily  ascorbic acid 500 milliGRAM(s) Oral daily  aspirin enteric coated 81 milliGRAM(s) Oral daily  atorvastatin 40 milliGRAM(s) Oral at bedtime  cefTRIAXone   IVPB 1 Gram(s) IV Intermittent every 24 hours  chlorhexidine 4% Liquid 1 Application(s) Topical <User Schedule>  diltiazem IVPB 20 milliGRAM(s) IV Intermittent Once  ferrous    sulfate 325 milliGRAM(s) Oral daily  lactulose Syrup 30 Gram(s) Oral daily  losartan 25 milliGRAM(s) Oral daily  losartan 50 milliGRAM(s) Oral daily  metoprolol tartrate 50 milliGRAM(s) Oral every 8 hours  OLANZapine 5 milliGRAM(s) Oral daily  senna 1 Tablet(s) Oral at bedtime  sodium chloride 0.9%. 2000 milliLiter(s) IV Continuous <Continuous>  sodium chloride 0.9%. 1000 milliLiter(s) IV Continuous <Continuous>  tamsulosin 0.4 milliGRAM(s) Oral at bedtime  zinc sulfate 220 milliGRAM(s) Oral daily    PRN Meds  acetaminophen   Tablet .. 650 milliGRAM(s) Oral every 4 hours PRN  oxyCODONE    IR 5 milliGRAM(s) Oral every 4 hours PRN      Case discussed with housestaff    Care discussed with pt/family

## 2018-10-26 NOTE — PROGRESS NOTE ADULT - SUBJECTIVE AND OBJECTIVE BOX
SUBJECTIVE:    Patient is a 78y old Male who presents with a chief complaint of progressive weakness and decreased PO intake for 2 days (26 Oct 2018 17:24)      HPI:  79 yo M with PMHx of AFIB previously on Xarelto discontinued secondary to frequent falls , DVT s/p Right Hip Replacement (10/14/18) on Eliquis-bedbound, Vascular Dementia with behavioral changes, DM II, HTN [facility] progressive weakness and decreased PO intake for the past 2 days. As per son, prior to 2 days ago, patient had a great appetite however in the last 2 days patient has even refused PO medications. In the ED, patient was found to have AFIB with RVR, HR in 120s was given 20mg of IV Cardizem with response. CTH revealed what was initially suspected as an ICH, however upon Neurosurgery evaluation findings may be secondary to a subacute infarct, awaiting neurology evaluation. (23 Oct 2018 10:52)      Currently admitted to medicine with the primary diagnosis of UTI (urinary tract infection)       Besides the pertinent positives and negatives described above, the ROS was within normal limits.    PAST MEDICAL & SURGICAL HISTORY  Anemia  Diabetes  HTN (hypertension)  Arrhythmia  Dementia  S/P cholecystectomy  History of hip replacement    SOCIAL HISTORY:    ALLERGIES:  No Known Allergies    MEDICATIONS:  STANDING MEDICATIONS  amLODIPine   Tablet 2.5 milliGRAM(s) Oral daily  apixaban 5 milliGRAM(s) Oral every 12 hours  ascorbic acid 500 milliGRAM(s) Oral daily  aspirin enteric coated 81 milliGRAM(s) Oral daily  atorvastatin 40 milliGRAM(s) Oral at bedtime  cefTRIAXone   IVPB 1 Gram(s) IV Intermittent every 24 hours  chlorhexidine 4% Liquid 1 Application(s) Topical <User Schedule>  collagenase Ointment 1 Application(s) Topical daily  diltiazem IVPB 20 milliGRAM(s) IV Intermittent Once  dronabinol 2.5 milliGRAM(s) Oral three times a day before meals  ferrous    sulfate 325 milliGRAM(s) Oral daily  lactulose Syrup 30 Gram(s) Oral daily  losartan 50 milliGRAM(s) Oral daily  metoprolol tartrate 50 milliGRAM(s) Oral every 8 hours  OLANZapine 5 milliGRAM(s) Oral daily  senna 1 Tablet(s) Oral at bedtime  sodium chloride 0.9%. 2000 milliLiter(s) IV Continuous <Continuous>  sodium chloride 0.9%. 1000 milliLiter(s) IV Continuous <Continuous>  tamsulosin 0.4 milliGRAM(s) Oral at bedtime  zinc sulfate 220 milliGRAM(s) Oral daily    PRN MEDICATIONS  acetaminophen   Tablet .. 650 milliGRAM(s) Oral every 4 hours PRN  oxyCODONE    IR 5 milliGRAM(s) Oral every 4 hours PRN    VITALS:   T(F): 97.2  HR: 999  BP: 156/77  RR: 20  SpO2: 97%    LABS:                        11.1   4.00  )-----------( 180      ( 26 Oct 2018 05:19 )             34.6     10-26    134<L>  |  99  |  17  ----------------------------<  90  3.1<L>   |  24  |  0.8    Ca    7.9<L>      26 Oct 2018 05:19  Phos  2.5     10-25  Mg     1.8     10-25      PT/INR - ( 25 Oct 2018 06:10 )   PT: 15.40 sec;   INR: 1.34 ratio         PTT - ( 25 Oct 2018 06:10 )  PTT:34.7 sec          Culture - Urine (collected 24 Oct 2018 16:57)  Source: .Urine Clean Catch (Midstream)  Final Report (26 Oct 2018 17:10):    >100,000 CFU/ml Serratia marcescens  Organism: Serratia marcescens (26 Oct 2018 17:10)  Organism: Serratia marcescens (26 Oct 2018 17:10)      CARDIAC MARKERS ( 25 Oct 2018 17:28 )  x     / <0.01 ng/mL / x     / x     / x          RADIOLOGY:    PHYSICAL EXAM:  GEN: No acute distress  LUNGS: Clear to auscultation bilaterally   HEART: Regular  ABD: Soft, non-tender, non-distended.  EXT: NC/NC/NE/2+PP/MARIN/Skin Intact.   NEURO: AAOX1    Intravenous access:   NG tube:   Deluca Catheter:

## 2018-10-26 NOTE — GOALS OF CARE CONVERSATION - PERSONAL ADVANCE DIRECTIVE - CONVERSATION DETAILS
I had a long conversation with patient and wife at [facility] about his current diagnoses, management and prognosis. GIven the patient's failure to thrive, unwillingness to eat, the possibility of NG tube we introduced. SHe adamantly refused having one placed. Her and the patient are unsure of their advanced directives and wishes. They wish to have this conversation with their two sons and come to a decision. I had a long conversation with patient and wife at [facility] about his current diagnoses, management and prognosis. GIven the patient's failure to thrive, unwillingness to eat, the possibility of NG tube we introduced. SHe adamantly refused having one placed. Her and the patient are unsure of their advanced directives and wishes. They wish to have this conversation with their two sons and come to a decision.  Total time spent: 60 minutes

## 2018-10-26 NOTE — PROGRESS NOTE ADULT - ASSESSMENT
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    -CT head and MRI show right medial occipital lobe subacute infarct with stable microhemorrhages  -since second CT head reconfirmed stable hemorrhages, restarted eliquis as per neuro  -patient's mental status at baseline according to family  -patient has been downgraded from stroke unit, will continue to be monitored on neuro floor  -getting TTE with contrast for possible shunt    # Afib  -rate controlled on meto tartrate 50 q8h, eliquis resumed    # Right lateral foot DFU  -podiatry recommending serial xrays o/p, santyl, dsd/ kerlix - Daily  -pain control with oxy 5 q4h    # UTI  -on ceftriaxone 1g q24h    # HTN   -amlodipine 2.5 / losartan 50  # Decreased PO intake  -resolved with dronabinol 2.5 before meals    DVT PPX

## 2018-10-26 NOTE — CHART NOTE - NSCHARTNOTEFT_GEN_A_CORE
Pt appears comfortable, conversant when woken up by wife  He has been refusing to eat, including food brought from home  NGT presented to pt and wife but refused  Marinol ordered this afternoon, not given yet    T(F): 97.6 (10-26-18 @ 11:52), Max: 97.9 (10-25-18 @ 18:27)  HR: 112 (10-26-18 @ 11:52) (82 - 112)  BP: 156/96 (10-26-18 @ 11:52) (141/76 - 160/72)  RR: 18 (10-26-18 @ 11:52) (18 - 18)  SpO2: 97% (10-26-18 @ 11:52) (97% - 97%)    10-26    134<L>  |  99  |  17  ----------------------------<  90  3.1<L>   |  24  |  0.8    Ca    7.9<L>      26 Oct 2018 05:19  Phos  2.5     10-25  Mg     1.8     10-25                       11.1   4.00  )-----------( 180      ( 26 Oct 2018 05:19 )             34.6     CAPILLARY BLOOD GLUCOSE  POCT Blood Glucose.: 140 mg/dL (26 Oct 2018 11:30)  POCT Blood Glucose.: 95 mg/dL (26 Oct 2018 06:40)  POCT Blood Glucose.: 111 mg/dL (25 Oct 2018 21:18)  POCT Blood Glucose.: 112 mg/dL (25 Oct 2018 16:31)    Diet, Dysphagia 2 Mechanical Soft-Thin Liquids:   Consistent Carbohydrate {No Snacks} (10-26-18 @ 13:32)    Plan:  -supplement K+  -marinol as ordered  -continue to encourage and assist with meals  -if refuses to eat would benefit from NGT

## 2018-10-26 NOTE — PROGRESS NOTE ADULT - SUBJECTIVE AND OBJECTIVE BOX
SUSI MARTIN    Chief Complaint: Lethargy, AMS  Right   Handed    HPI:  78 year old gentleman with a PMHx of AFIB previously on Xarelto discontinued secondary to frequent falls , DVT s/p Right Hip Replacement on Eliquis, Vascular Dementia with behavioral changes, DM II presents from Rehab facility for evaluation of worsening weakness and decreased PO intake. CTH revealed a right parietal lobe posteromedially, there is an ill-defined area with loss of gray-white matter differentiation with associated edematous changes as well as resolving subacute parenchymal hemorrhage. Signal abnormality with abnormal enhancement in the right medial occipital lobe consistent with subacute infarct and   petechial/microhemorrhages. He is improved since admission he is mildly lethargic and following commands.     Relevant PMH:  [] Prior ischemic stroke/TIA  [x] Afib  []CAD  []HTN  []DLD  []DM []PVD []Obesity [] Sedintary lifestyle []CHF  []CYRIL[]Cancer Hx     Social History: [] Smoking []  Drug Use: []   Alcohol Use:   [] Other:        Relevant Cerebral Imaging:  IMPRESSION:    1.  Signal abnormality with abnormal enhancement in the right medial   occipital lobe consistent with subacute infarct and   petechial/microhemorrhages.    2.  Prominent ventricles out of proportion to the sulcal pattern in the   appropriate clinical setting may be consistent with communicating   hydrocephalus.     3.  Scattered T2 and FLAIR hyperintensities periventricular and   subcortical white matter which are nonspecific and without mass effect   most likely consistent with chronicsmall vessel ischemic changes.    4.  Left greater than right maxillary sinus disease.      Home Medications:  amLODIPine 5 mg oral tablet: 0.5 tab(s) orally once a day (23 Oct 2018 11:36)  Cozaar 25 mg oral tablet: 1 tab(s) orally once a day (23 Oct 2018 11:48)  Eliquis 5 mg oral tablet: 1 tab(s) orally 2 times a day (23 Oct 2018 11:40)  ferrous sulfate 325 mg (65 mg elemental iron) oral delayed release tablet: 1 tab(s) orally once a day (12 Aug 2018 03:21)  Flomax 0.4 mg oral capsule: 1 cap(s) orally once a day (at bedtime) (23 Oct 2018 11:39)  lactulose 10 g/15 mL oral solution: orally once a day (23 Oct 2018 11:41)  metFORMIN 500 mg oral tablet: 1 tab(s) orally 2 times a day (23 Oct 2018 11:35)  Metoprolol Tartrate 25 mg oral tablet: 1 tab(s) orally 2 times a day (23 Oct 2018 11:37)  OLANZapine 5 mg oral tablet: 1 tab(s) orally once a day (23 Oct 2018 11:38)  oxyCODONE 5 mg oral tablet: orally every 4 hours (5 times/day), As Needed (23 Oct 2018 11:42)  senna oral tablet: 1 tab(s) orally once a day (at bedtime) (23 Oct 2018 11:44)  Tylenol 325 mg oral tablet: 2 tab(s) orally every 4 hours, As Needed (12 Aug 2018 03:21)  Vitamin C 500 mg oral tablet: 1 tab(s) orally once a day (23 Oct 2018 11:47)  zinc sulfate: 50 milligram(s) orally once a day (23 Oct 2018 11:47)      MEDICATIONS  (STANDING):  amLODIPine   Tablet 2.5 milliGRAM(s) Oral daily  ascorbic acid 500 milliGRAM(s) Oral daily  aspirin enteric coated 81 milliGRAM(s) Oral daily  atorvastatin 40 milliGRAM(s) Oral at bedtime  cefTRIAXone   IVPB 1 Gram(s) IV Intermittent every 24 hours  chlorhexidine 4% Liquid 1 Application(s) Topical <User Schedule>  diltiazem IVPB 20 milliGRAM(s) IV Intermittent Once  ferrous    sulfate 325 milliGRAM(s) Oral daily  lactulose Syrup 30 Gram(s) Oral daily  losartan 25 milliGRAM(s) Oral daily  losartan 50 milliGRAM(s) Oral daily  metoprolol tartrate 50 milliGRAM(s) Oral two times a day  OLANZapine 5 milliGRAM(s) Oral daily  senna 1 Tablet(s) Oral at bedtime  sodium chloride 0.9%. 2000 milliLiter(s) (1000 mL/Hr) IV Continuous <Continuous>  sodium chloride 0.9%. 1000 milliLiter(s) (75 mL/Hr) IV Continuous <Continuous>  tamsulosin 0.4 milliGRAM(s) Oral at bedtime  zinc sulfate 220 milliGRAM(s) Oral daily      PT/OT/Speech/Rehab/S&Swr: pending    Exam:    Vital Signs Last 24 Hrs  T(C): 35.8 (26 Oct 2018 07:38), Max: 37.2 (25 Oct 2018 13:15)  T(F): 96.4 (26 Oct 2018 07:38), Max: 98.9 (25 Oct 2018 13:15)  HR: 88 (26 Oct 2018 07:38) (82 - 102)  BP: 160/72 (26 Oct 2018 07:38) (141/76 - 160/72)  BP(mean): --  RR: 18 (26 Oct 2018 03:19) (18 - 18)  SpO2: 100% (25 Oct 2018 10:49) (100% - 100%)    NIHSS      LOC:       1a:  0   1b(Questions): 1        1c(Instructions):1           Best Gaze:0  Visual:0  Motor:                 RUE:  1  RLE:  1  LUE: 1  LLE: 1   FACE:   0  Limb Ataxia:0  Sensory:     0  Language:  1    Dysarthria:   0       Extinction and Inattention:0    NIHSS on admission:   21      NIHSS today:    7         m-RS:3    Impression:  78 year old gentleman with a PMHx of AFIB previously on Xarelto discontinued secondary to frequent falls , DVT s/p Right Hip Replacement on Eliquis, Vascular Dementia with behavioral changes, DM II presents from Rehab facility for evaluation of worsening weakness and decreased PO intake. CTH revealed a right parietal lobe posteromedially, there is an ill-defined area with loss of gray-white matter differentiation with associated edematous changes as well as resolving subacute parenchymal hemorrhage. Signal abnormality with abnormal enhancement in the right medial occipital lobe consistent with subacute infarct and   petechial/microhemorrhages. He is improved since admission he is mildly lethargic and following commands.     Suggestion:  Routine stroke workup including:  Repeat CTH, if stable may restart Eliquis. ASA as per medicine.  TTE with contrast to rule out right to left shunt, vegetation, akinesia or intracardiac thrombus.  PT OT Rehab.    Telemetry monitoring.  Hemoglobin A1C, LDL  Disposition:  May come out of the stroke unit.  Conner Elizabeth NP  [phone]

## 2018-10-26 NOTE — PROGRESS NOTE ADULT - ASSESSMENT
78 year old male with AFIB previously on Xarelto discontinued secondary to frequent falls , DVT s/p Right Hip Replacement on Eliquis, Vascular Dementia with behavioral changes, DM II presents from [facility] due to worsening weakness and decreased PO intake. Found to have a subacute infarct and petechial/microhemorrhages.    1. Right Parietal Lobe subacute infarct and petechial/microhemorrhages.  -mental status improving  -check repeat CTH today, and if no bleed, may restart eliquis  -check TTE with contrast, HBa1c and Lipid Panel  -appreciate neurology eval    2. Malnutrition/Failure to thrive likely 2/2 vascular dementia and severe deconditioning  -patient ate some of his breakfast this morning and wife will attempt to give him food from home that is in accordance to s/s recs  -c/w with gentle hydration and Glucerna  -if appetite does not improve, will consider an appetite stimulant, such as marinol  -please replete K(3.1) and Mg (1.8) and monitor daily BMP, phos  -f/u Dr Crespo and c/w calorie count    3 Urinary tract infection; complicated  -s/p talavera removal yesterday, has had a chronic talavera x2-3 weeks per family  - UA is nitrite positive, cx >100k Serratia Marcescens, P sensitivity  - c/w rocephin    4. Atrial fibrillation   [id number] (7)  - c/w Metoprolol 50 Q8H daily  -if CTH stable, will restart eliquis    5. RIGHT foot ulcer  -painful to touch with black eschar, no   - Follow up with Foot Xray and podiatry    6. Hypertension  /96  -c/w Losartan 50, Metoprolol 50 Q8H and Amlodipine 2.5  -if BP remains elevated, can increase Losartan or Amlodipine    7. T2DM  - Monitor finger sticks, start insulin sliding scale if >180    8. Anemia  - Continue with Ferrous Sulfate 325mg PO QD    9. BPH  - Continue Tamsulosin 0.4mg PO QD    10. Dispo: from [facility] 78 year old male with AFIB previously on Xarelto discontinued secondary to frequent falls , DVT s/p Right Hip Replacement on Eliquis, Vascular Dementia with behavioral changes, DM II presents from [facility] due to worsening weakness and decreased PO intake. Found to have a subacute infarct and petechial/microhemorrhages.    1. Right Parietal Lobe subacute infarct and petechial/microhemorrhages.  -mental status improving  -check repeat CTH today, and if no bleed, may restart eliquis and may downgrade from Stroke Unit Monitoring   -check TTE with contrast, HBa1c and Lipid Panel  -appreciate neurology eval      2. Malnutrition/Failure to thrive likely 2/2 vascular dementia and severe deconditioning/ Hypokalemia  -patient ate some of his breakfast this morning and wife will attempt to give him food from home that is in accordance to s/s recs  -c/w with gentle hydration and Glucerna  -if appetite does not improve, will consider an appetite stimulant, such as marinol  -please replete K(3.1) and Mg (1.8) and monitor daily BMP, phos  -f/u Dr Crespo and c/w calorie count    3 Urinary tract infection; complicated by urinary retention  -s/p talavera removal yesterday, has had a chronic talavera x2-3 weeks per family  - UA is nitrite positive, cx >100k Serratia Marcescens, P sensitivity  - c/w rocephin    4. Atrial fibrillation   [id number] (7): High-risk for CVA  - c/w Metoprolol 50 Q8H daily  -if CTH stable, will restart eliquis    5. RIGHT foot ulcer  -painful to touch with black eschar, no   - Follow up with Foot Xray and podiatry    6. Hypertension  /96  -c/w Losartan 50, Metoprolol 50 Q8H and Amlodipine 2.5  -if BP remains elevated, can increase Losartan or Amlodipine    7. T2DM  - Monitor finger sticks, start insulin sliding scale if >180    8. Anemia  - Continue with Ferrous Sulfate 325mg PO QD    9. BPH  - Continue Tamsulosin 0.4mg PO QD    Overall patient  remains at high risk for cardiovascular events despite all care.  Goals of Care Conversation performed with patient and family member (spouse), please to refer to note (dated today); total discussion time 60 minutes.       10. Dispo: from [facility]    Senior Attending: Patient is seen and examined independently. I agree with hospitalist note above and plan of care.

## 2018-10-27 LAB
ALBUMIN SERPL ELPH-MCNC: 3 G/DL — LOW (ref 3.5–5.2)
ALP SERPL-CCNC: 120 U/L — HIGH (ref 30–115)
ALT FLD-CCNC: 9 U/L — SIGNIFICANT CHANGE UP (ref 0–41)
AST SERPL-CCNC: 16 U/L — SIGNIFICANT CHANGE UP (ref 0–41)
BASOPHILS # BLD AUTO: 0.01 K/UL — SIGNIFICANT CHANGE UP (ref 0–0.2)
BASOPHILS NFR BLD AUTO: 0.2 % — SIGNIFICANT CHANGE UP (ref 0–1)
BILIRUB SERPL-MCNC: 0.8 MG/DL — SIGNIFICANT CHANGE UP (ref 0.2–1.2)
BUN SERPL-MCNC: 16 MG/DL — SIGNIFICANT CHANGE UP (ref 10–20)
CALCIUM SERPL-MCNC: 8.5 MG/DL — SIGNIFICANT CHANGE UP (ref 8.5–10.1)
CHLORIDE SERPL-SCNC: 103 MMOL/L — SIGNIFICANT CHANGE UP (ref 98–110)
CO2 SERPL-SCNC: 24 MMOL/L — SIGNIFICANT CHANGE UP (ref 17–32)
CREAT SERPL-MCNC: 0.8 MG/DL — SIGNIFICANT CHANGE UP (ref 0.7–1.5)
EOSINOPHIL # BLD AUTO: 0.17 K/UL — SIGNIFICANT CHANGE UP (ref 0–0.7)
EOSINOPHIL NFR BLD AUTO: 3.5 % — SIGNIFICANT CHANGE UP (ref 0–8)
ESTIMATED AVERAGE GLUCOSE: 114 MG/DL — SIGNIFICANT CHANGE UP (ref 68–114)
GLUCOSE BLDC GLUCOMTR-MCNC: 109 MG/DL — HIGH (ref 70–99)
GLUCOSE BLDC GLUCOMTR-MCNC: 128 MG/DL — HIGH (ref 70–99)
GLUCOSE BLDC GLUCOMTR-MCNC: 130 MG/DL — HIGH (ref 70–99)
GLUCOSE BLDC GLUCOMTR-MCNC: 143 MG/DL — HIGH (ref 70–99)
GLUCOSE SERPL-MCNC: 143 MG/DL — HIGH (ref 70–99)
HBA1C BLD-MCNC: 5.6 % — SIGNIFICANT CHANGE UP (ref 4–5.6)
HCT VFR BLD CALC: 36.5 % — LOW (ref 42–52)
HGB BLD-MCNC: 11.9 G/DL — LOW (ref 14–18)
IMM GRANULOCYTES NFR BLD AUTO: 0.8 % — HIGH (ref 0.1–0.3)
LDLC SERPL DIRECT ASSAY-MCNC: 63 MG/DL — SIGNIFICANT CHANGE UP
LYMPHOCYTES # BLD AUTO: 0.56 K/UL — LOW (ref 1.2–3.4)
LYMPHOCYTES # BLD AUTO: 11.6 % — LOW (ref 20.5–51.1)
MAGNESIUM SERPL-MCNC: 1.7 MG/DL — LOW (ref 1.8–2.4)
MCHC RBC-ENTMCNC: 27.9 PG — SIGNIFICANT CHANGE UP (ref 27–31)
MCHC RBC-ENTMCNC: 32.6 G/DL — SIGNIFICANT CHANGE UP (ref 32–37)
MCV RBC AUTO: 85.5 FL — SIGNIFICANT CHANGE UP (ref 80–94)
MONOCYTES # BLD AUTO: 0.35 K/UL — SIGNIFICANT CHANGE UP (ref 0.1–0.6)
MONOCYTES NFR BLD AUTO: 7.3 % — SIGNIFICANT CHANGE UP (ref 1.7–9.3)
NEUTROPHILS # BLD AUTO: 3.68 K/UL — SIGNIFICANT CHANGE UP (ref 1.4–6.5)
NEUTROPHILS NFR BLD AUTO: 76.6 % — HIGH (ref 42.2–75.2)
NRBC # BLD: 0 /100 WBCS — SIGNIFICANT CHANGE UP (ref 0–0)
PLATELET # BLD AUTO: 189 K/UL — SIGNIFICANT CHANGE UP (ref 130–400)
POTASSIUM SERPL-MCNC: 3.2 MMOL/L — LOW (ref 3.5–5)
POTASSIUM SERPL-SCNC: 3.2 MMOL/L — LOW (ref 3.5–5)
PROT SERPL-MCNC: 5.2 G/DL — LOW (ref 6–8)
RBC # BLD: 4.27 M/UL — LOW (ref 4.7–6.1)
RBC # FLD: 17.7 % — HIGH (ref 11.5–14.5)
SODIUM SERPL-SCNC: 141 MMOL/L — SIGNIFICANT CHANGE UP (ref 135–146)
WBC # BLD: 4.81 K/UL — SIGNIFICANT CHANGE UP (ref 4.8–10.8)
WBC # FLD AUTO: 4.81 K/UL — SIGNIFICANT CHANGE UP (ref 4.8–10.8)
ZINC SERPL-MCNC: 76 UG/DL — SIGNIFICANT CHANGE UP (ref 56–134)

## 2018-10-27 RX ORDER — POTASSIUM CHLORIDE 20 MEQ
20 PACKET (EA) ORAL
Qty: 0 | Refills: 0 | Status: COMPLETED | OUTPATIENT
Start: 2018-10-27 | End: 2018-10-27

## 2018-10-27 RX ORDER — MAGNESIUM SULFATE 500 MG/ML
2 VIAL (ML) INJECTION ONCE
Qty: 0 | Refills: 0 | Status: COMPLETED | OUTPATIENT
Start: 2018-10-27 | End: 2018-10-27

## 2018-10-27 RX ADMIN — TAMSULOSIN HYDROCHLORIDE 0.4 MILLIGRAM(S): 0.4 CAPSULE ORAL at 21:07

## 2018-10-27 RX ADMIN — Medication 81 MILLIGRAM(S): at 11:49

## 2018-10-27 RX ADMIN — CEFTRIAXONE 100 GRAM(S): 500 INJECTION, POWDER, FOR SOLUTION INTRAMUSCULAR; INTRAVENOUS at 11:52

## 2018-10-27 RX ADMIN — Medication 1 APPLICATION(S): at 11:51

## 2018-10-27 RX ADMIN — Medication 50 GRAM(S): at 15:42

## 2018-10-27 RX ADMIN — AMLODIPINE BESYLATE 2.5 MILLIGRAM(S): 2.5 TABLET ORAL at 05:40

## 2018-10-27 RX ADMIN — Medication 50 MILLIEQUIVALENT(S): at 15:41

## 2018-10-27 RX ADMIN — LOSARTAN POTASSIUM 50 MILLIGRAM(S): 100 TABLET, FILM COATED ORAL at 05:40

## 2018-10-27 RX ADMIN — LACTULOSE 30 GRAM(S): 10 SOLUTION ORAL at 11:49

## 2018-10-27 RX ADMIN — APIXABAN 5 MILLIGRAM(S): 2.5 TABLET, FILM COATED ORAL at 05:40

## 2018-10-27 RX ADMIN — Medication 50 MILLIGRAM(S): at 21:07

## 2018-10-27 RX ADMIN — ATORVASTATIN CALCIUM 40 MILLIGRAM(S): 80 TABLET, FILM COATED ORAL at 21:07

## 2018-10-27 RX ADMIN — Medication 2.5 MILLIGRAM(S): at 08:18

## 2018-10-27 RX ADMIN — CHLORHEXIDINE GLUCONATE 1 APPLICATION(S): 213 SOLUTION TOPICAL at 05:40

## 2018-10-27 RX ADMIN — Medication 325 MILLIGRAM(S): at 11:49

## 2018-10-27 RX ADMIN — Medication 50 MILLIGRAM(S): at 05:40

## 2018-10-27 RX ADMIN — SENNA PLUS 1 TABLET(S): 8.6 TABLET ORAL at 21:07

## 2018-10-27 RX ADMIN — Medication 2.5 MILLIGRAM(S): at 17:33

## 2018-10-27 RX ADMIN — Medication 50 MILLIGRAM(S): at 13:28

## 2018-10-27 RX ADMIN — APIXABAN 5 MILLIGRAM(S): 2.5 TABLET, FILM COATED ORAL at 17:33

## 2018-10-27 RX ADMIN — Medication 500 MILLIGRAM(S): at 11:49

## 2018-10-27 RX ADMIN — ZINC SULFATE TAB 220 MG (50 MG ZINC EQUIVALENT) 220 MILLIGRAM(S): 220 (50 ZN) TAB at 11:49

## 2018-10-27 RX ADMIN — Medication 2.5 MILLIGRAM(S): at 11:48

## 2018-10-27 RX ADMIN — Medication 50 MILLIEQUIVALENT(S): at 20:21

## 2018-10-27 RX ADMIN — OLANZAPINE 5 MILLIGRAM(S): 15 TABLET, FILM COATED ORAL at 11:49

## 2018-10-27 NOTE — PROGRESS NOTE ADULT - SUBJECTIVE AND OBJECTIVE BOX
VERONICAALFREDOMIGAURI  78y Male    CHIEF COMPLAINT:    Patient is a 78y old  Male who presents with a chief complaint of progressive weakness and decreased PO intake for 2 days (26 Oct 2018 17:39)      INTERVAL HPI/OVERNIGHT EVENTS:    Patient seen and examined. Mental status unchanged. Arousable when prompted, answers questions appropriately with 1-2 work answers. Per RN, ate <25% of breakfast with the addition of marinol    ROS: All other systems are negative.    Vital Signs:    T(F): 96.9 (10-27-18 @ 07:15), Max: 98.2 (10-27-18 @ 00:08)  HR: 84 (10-27-18 @ 07:15) (84 - 999)  BP: 154/67 (10-27-18 @ 07:15) (126/67 - 156/77)  RR: 17 (10-27-18 @ 07:15) (17 - 20)  SpO2: --  I&O's Summary    26 Oct 2018 07:01  -  27 Oct 2018 07:00  --------------------------------------------------------  IN: 0 mL / OUT: 2 mL / NET: -2 mL    27 Oct 2018 07:01  -  27 Oct 2018 12:46  --------------------------------------------------------  IN: 300 mL / OUT: 0 mL / NET: 300 mL    POCT Blood Glucose.: 130 mg/dL (27 Oct 2018 11:10)  POCT Blood Glucose.: 109 mg/dL (27 Oct 2018 05:47)  POCT Blood Glucose.: 127 mg/dL (26 Oct 2018 21:38)  POCT Blood Glucose.: 139 mg/dL (26 Oct 2018 16:21)      PHYSICAL EXAM:    GENERAL:  NAD, elderly  SKIN: No rashes or lesions  HENT: Atraumatic. Normocephalic.   NECK: Supple, No JVD. No lymphadenopathy.  PULMONARY: decreased inspiratory effort.  No wheezing. No rales  CVS: Normal S1, S2. Rate and Rhythm are regular. No murmurs.  ABDOMEN/GI: Soft, Nontender, Nondistended; BS present  MSK:  No edema B/L LE. No clubbing or cyanosis  NEUROLOGIC:  No motor or sensory deficit.  PSYCH: Alert & oriented x 3, slightly confused at times    Consultant(s) Notes Reviewed:  [x ] YES  [ ] NO  Care Discussed with Consultants/Other Providers [ x] YES  [ ] NO    LABS:                        11.9   4.81  )-----------( 189      ( 27 Oct 2018 09:25 )             36.5     10-27    141  |  103  |  16  ----------------------------<  143<H>  3.2<L>   |  24  |  0.8    Ca    8.5      27 Oct 2018 09:25  Mg     1.7     10-27    TPro  5.2<L>  /  Alb  3.0<L>  /  TBili  0.8  /  DBili  x   /  AST  16  /  ALT  9   /  AlkPhos  120<H>  10-27    Trop <0.01, CKMB --, CK --, 10-25-18 @ 17:28      Culture - Urine (collected 24 Oct 2018 16:57)  Source: .Urine Clean Catch (Midstream)  Final Report (26 Oct 2018 17:10):    >100,000 CFU/ml Serratia marcescens  Organism: Serratia marcescens (26 Oct 2018 17:10)  Organism: Serratia marcescens (26 Oct 2018 17:10)    RADIOLOGY & ADDITIONAL TESTS:    Imaging or report Personally Reviewed:  [x] YES  [ ] NO    Medications:  Standing  amLODIPine   Tablet 2.5 milliGRAM(s) Oral daily  apixaban 5 milliGRAM(s) Oral every 12 hours  ascorbic acid 500 milliGRAM(s) Oral daily  aspirin enteric coated 81 milliGRAM(s) Oral daily  atorvastatin 40 milliGRAM(s) Oral at bedtime  cefTRIAXone   IVPB 1 Gram(s) IV Intermittent every 24 hours  chlorhexidine 4% Liquid 1 Application(s) Topical <User Schedule>  collagenase Ointment 1 Application(s) Topical daily  dronabinol 2.5 milliGRAM(s) Oral three times a day before meals  ferrous    sulfate 325 milliGRAM(s) Oral daily  lactulose Syrup 30 Gram(s) Oral daily  losartan 50 milliGRAM(s) Oral daily  metoprolol tartrate 50 milliGRAM(s) Oral every 8 hours  OLANZapine 5 milliGRAM(s) Oral daily  senna 1 Tablet(s) Oral at bedtime  sodium chloride 0.9%. 2000 milliLiter(s) IV Continuous <Continuous>  sodium chloride 0.9%. 1000 milliLiter(s) IV Continuous <Continuous>  tamsulosin 0.4 milliGRAM(s) Oral at bedtime  zinc sulfate 220 milliGRAM(s) Oral daily    PRN Meds  acetaminophen   Tablet .. 650 milliGRAM(s) Oral every 4 hours PRN  oxyCODONE    IR 5 milliGRAM(s) Oral every 4 hours PRN

## 2018-10-27 NOTE — PROGRESS NOTE ADULT - ASSESSMENT
78 year old male with AFIB previously on Xarelto discontinued secondary to frequent falls , DVT s/p Right Hip Replacement on Eliquis, Vascular Dementia with behavioral changes, DM II presents from [facility] due to worsening weakness and decreased PO intake. Found to have a subacute infarct and petechial/microhemorrhages.    1. Right Parietal Lobe subacute infarct and petechial/microhemorrhages.  -mental status improving although still lethargic  -CTH stable, eliquis resumed.   -check TTE with contrast, DLD 63, Hba1c 5.6  -if mental status worsens, check stat CTH    2. Malnutrition/Failure to thrive likely 2/2 vascular dementia and severe deconditioning/ Hypokalemia/Hypomagnesemia  -patient ate <25% of breakfast, slightly improved with the addition of marinol  -c/w with gentle hydration and Glucerna  -please replete K(3.2) and Mg (1.7) and monitor daily BMP, phos  -f/u Dr Crespo and c/w calorie count  -to readdress GOC with the family    3 Urinary tract infection; complicated by urinary retention  -s/p talavera removal yesterday, has had a chronic talavera x2-3 weeks per family  -voiding, incontinent  - UA is nitrite positive, cx >100k Serratia Marcescens, P sensitivity  - c/w rocephin    4. Atrial fibrillation   [id number] (7): High-risk for CVA  - c/w Metoprolol 50 Q8H daily and eliquis    5. RIGHT foot ulcer  -painful to touch with black eschar, no discharge  - appreciate podiatry eval, c/w santyl dressing with gauze, lightly moistened with saline    6. Hypertension  /67  -c/w Losartan 50, Metoprolol 50 Q8H and Amlodipine 2.5  -if BP remains elevated, can increase Losartan or Amlodipine    7. T2DM  Hba1c 5.6  - Monitor finger sticks, start insulin sliding scale if >180    8. Anemia  - Continue with Ferrous Sulfate 325mg PO QD    9. BPH  - Continue Tamsulosin 0.4mg PO QD    10. Dispo: from [facility]

## 2018-10-28 LAB
ALBUMIN SERPL ELPH-MCNC: 2.9 G/DL — LOW (ref 3.5–5.2)
ALP SERPL-CCNC: 123 U/L — HIGH (ref 30–115)
ALT FLD-CCNC: 9 U/L — SIGNIFICANT CHANGE UP (ref 0–41)
ANION GAP SERPL CALC-SCNC: 13 MMOL/L — SIGNIFICANT CHANGE UP (ref 7–14)
AST SERPL-CCNC: 15 U/L — SIGNIFICANT CHANGE UP (ref 0–41)
BASOPHILS # BLD AUTO: 0.02 K/UL — SIGNIFICANT CHANGE UP (ref 0–0.2)
BASOPHILS NFR BLD AUTO: 0.5 % — SIGNIFICANT CHANGE UP (ref 0–1)
BILIRUB SERPL-MCNC: 0.8 MG/DL — SIGNIFICANT CHANGE UP (ref 0.2–1.2)
BUN SERPL-MCNC: 15 MG/DL — SIGNIFICANT CHANGE UP (ref 10–20)
CALCIUM SERPL-MCNC: 8.5 MG/DL — SIGNIFICANT CHANGE UP (ref 8.5–10.1)
CHLORIDE SERPL-SCNC: 102 MMOL/L — SIGNIFICANT CHANGE UP (ref 98–110)
CO2 SERPL-SCNC: 22 MMOL/L — SIGNIFICANT CHANGE UP (ref 17–32)
CREAT SERPL-MCNC: 0.8 MG/DL — SIGNIFICANT CHANGE UP (ref 0.7–1.5)
CULTURE RESULTS: SIGNIFICANT CHANGE UP
CULTURE RESULTS: SIGNIFICANT CHANGE UP
EOSINOPHIL # BLD AUTO: 0.14 K/UL — SIGNIFICANT CHANGE UP (ref 0–0.7)
EOSINOPHIL NFR BLD AUTO: 3.2 % — SIGNIFICANT CHANGE UP (ref 0–8)
GLUCOSE BLDC GLUCOMTR-MCNC: 134 MG/DL — HIGH (ref 70–99)
GLUCOSE BLDC GLUCOMTR-MCNC: 169 MG/DL — HIGH (ref 70–99)
GLUCOSE BLDC GLUCOMTR-MCNC: 171 MG/DL — HIGH (ref 70–99)
GLUCOSE SERPL-MCNC: 111 MG/DL — HIGH (ref 70–99)
HCT VFR BLD CALC: 34.6 % — LOW (ref 42–52)
HGB BLD-MCNC: 11.2 G/DL — LOW (ref 14–18)
IMM GRANULOCYTES NFR BLD AUTO: 0.7 % — HIGH (ref 0.1–0.3)
LYMPHOCYTES # BLD AUTO: 0.84 K/UL — LOW (ref 1.2–3.4)
LYMPHOCYTES # BLD AUTO: 19 % — LOW (ref 20.5–51.1)
MAGNESIUM SERPL-MCNC: 2.1 MG/DL — SIGNIFICANT CHANGE UP (ref 1.8–2.4)
MCHC RBC-ENTMCNC: 27.7 PG — SIGNIFICANT CHANGE UP (ref 27–31)
MCHC RBC-ENTMCNC: 32.4 G/DL — SIGNIFICANT CHANGE UP (ref 32–37)
MCV RBC AUTO: 85.6 FL — SIGNIFICANT CHANGE UP (ref 80–94)
MONOCYTES # BLD AUTO: 0.4 K/UL — SIGNIFICANT CHANGE UP (ref 0.1–0.6)
MONOCYTES NFR BLD AUTO: 9.1 % — SIGNIFICANT CHANGE UP (ref 1.7–9.3)
NEUTROPHILS # BLD AUTO: 2.98 K/UL — SIGNIFICANT CHANGE UP (ref 1.4–6.5)
NEUTROPHILS NFR BLD AUTO: 67.5 % — SIGNIFICANT CHANGE UP (ref 42.2–75.2)
NRBC # BLD: 0 /100 WBCS — SIGNIFICANT CHANGE UP (ref 0–0)
PLATELET # BLD AUTO: 180 K/UL — SIGNIFICANT CHANGE UP (ref 130–400)
POTASSIUM SERPL-MCNC: 3.6 MMOL/L — SIGNIFICANT CHANGE UP (ref 3.5–5)
POTASSIUM SERPL-SCNC: 3.6 MMOL/L — SIGNIFICANT CHANGE UP (ref 3.5–5)
PROT SERPL-MCNC: 5.3 G/DL — LOW (ref 6–8)
RBC # BLD: 4.04 M/UL — LOW (ref 4.7–6.1)
RBC # FLD: 18 % — HIGH (ref 11.5–14.5)
SODIUM SERPL-SCNC: 137 MMOL/L — SIGNIFICANT CHANGE UP (ref 135–146)
SPECIMEN SOURCE: SIGNIFICANT CHANGE UP
SPECIMEN SOURCE: SIGNIFICANT CHANGE UP
WBC # BLD: 4.41 K/UL — LOW (ref 4.8–10.8)
WBC # FLD AUTO: 4.41 K/UL — LOW (ref 4.8–10.8)

## 2018-10-28 RX ADMIN — LOSARTAN POTASSIUM 50 MILLIGRAM(S): 100 TABLET, FILM COATED ORAL at 05:34

## 2018-10-28 RX ADMIN — Medication 50 MILLIGRAM(S): at 13:27

## 2018-10-28 RX ADMIN — SODIUM CHLORIDE 75 MILLILITER(S): 9 INJECTION INTRAMUSCULAR; INTRAVENOUS; SUBCUTANEOUS at 08:06

## 2018-10-28 RX ADMIN — Medication 2.5 MILLIGRAM(S): at 08:06

## 2018-10-28 RX ADMIN — CHLORHEXIDINE GLUCONATE 1 APPLICATION(S): 213 SOLUTION TOPICAL at 05:34

## 2018-10-28 RX ADMIN — ZINC SULFATE TAB 220 MG (50 MG ZINC EQUIVALENT) 220 MILLIGRAM(S): 220 (50 ZN) TAB at 11:14

## 2018-10-28 RX ADMIN — Medication 81 MILLIGRAM(S): at 11:14

## 2018-10-28 RX ADMIN — TAMSULOSIN HYDROCHLORIDE 0.4 MILLIGRAM(S): 0.4 CAPSULE ORAL at 21:35

## 2018-10-28 RX ADMIN — Medication 50 MILLIGRAM(S): at 21:36

## 2018-10-28 RX ADMIN — Medication 50 MILLIGRAM(S): at 05:34

## 2018-10-28 RX ADMIN — AMLODIPINE BESYLATE 2.5 MILLIGRAM(S): 2.5 TABLET ORAL at 05:34

## 2018-10-28 RX ADMIN — Medication 325 MILLIGRAM(S): at 11:14

## 2018-10-28 RX ADMIN — APIXABAN 5 MILLIGRAM(S): 2.5 TABLET, FILM COATED ORAL at 17:42

## 2018-10-28 RX ADMIN — Medication 2.5 MILLIGRAM(S): at 12:20

## 2018-10-28 RX ADMIN — APIXABAN 5 MILLIGRAM(S): 2.5 TABLET, FILM COATED ORAL at 05:34

## 2018-10-28 RX ADMIN — Medication 500 MILLIGRAM(S): at 11:14

## 2018-10-28 RX ADMIN — CEFTRIAXONE 100 GRAM(S): 500 INJECTION, POWDER, FOR SOLUTION INTRAMUSCULAR; INTRAVENOUS at 11:16

## 2018-10-28 RX ADMIN — Medication 2.5 MILLIGRAM(S): at 17:41

## 2018-10-28 RX ADMIN — OLANZAPINE 5 MILLIGRAM(S): 15 TABLET, FILM COATED ORAL at 11:14

## 2018-10-28 RX ADMIN — ATORVASTATIN CALCIUM 40 MILLIGRAM(S): 80 TABLET, FILM COATED ORAL at 21:35

## 2018-10-28 RX ADMIN — LACTULOSE 30 GRAM(S): 10 SOLUTION ORAL at 11:15

## 2018-10-28 RX ADMIN — Medication 1 APPLICATION(S): at 11:14

## 2018-10-28 NOTE — PROGRESS NOTE ADULT - SUBJECTIVE AND OBJECTIVE BOX
SUBJECTIVE:    Patient is a 78y old Male who presents with a chief complaint of progressive weakness and decreased PO intake for 2 days (27 Oct 2018 12:41)      HPI:  79 yo M with PMHx of AFIB previously on Xarelto discontinued secondary to frequent falls , DVT s/p Right Hip Replacement (10/14/18) on Eliquis-bedbound, Vascular Dementia with behavioral changes, DM II, HTN [facility] evaluation of progressive weakness and decreased PO intake for the past 2 days. As per son, prior to 2 days ago, patient had a great appetite however in the last 2 days patient has even refused PO medications. In the ED, patient was found to have AFIB with RVR, HR in 120s was given 20mg of IV Cardizem with response. CTH revealed what was initially suspected as an ICH, however upon Neurosurgery evaluation findings may be secondary to a subacute infarct, awaiting neurology evaluation. (23 Oct 2018 10:52)      Currently admitted to medicine with the primary diagnosis of UTI (urinary tract infection)       Besides the pertinent positives and negatives described above, the ROS was within normal limits.    PAST MEDICAL & SURGICAL HISTORY  Anemia  Diabetes  HTN (hypertension)  Arrhythmia  Dementia  S/P cholecystectomy  History of hip replacement    SOCIAL HISTORY:    ALLERGIES:  No Known Allergies    MEDICATIONS:  STANDING MEDICATIONS  amLODIPine   Tablet 2.5 milliGRAM(s) Oral daily  apixaban 5 milliGRAM(s) Oral every 12 hours  ascorbic acid 500 milliGRAM(s) Oral daily  aspirin enteric coated 81 milliGRAM(s) Oral daily  atorvastatin 40 milliGRAM(s) Oral at bedtime  cefTRIAXone   IVPB 1 Gram(s) IV Intermittent every 24 hours  chlorhexidine 4% Liquid 1 Application(s) Topical <User Schedule>  collagenase Ointment 1 Application(s) Topical daily  dronabinol 2.5 milliGRAM(s) Oral three times a day before meals  ferrous    sulfate 325 milliGRAM(s) Oral daily  lactulose Syrup 30 Gram(s) Oral daily  losartan 50 milliGRAM(s) Oral daily  metoprolol tartrate 50 milliGRAM(s) Oral every 8 hours  OLANZapine 5 milliGRAM(s) Oral daily  senna 1 Tablet(s) Oral at bedtime  sodium chloride 0.9%. 1000 milliLiter(s) IV Continuous <Continuous>  tamsulosin 0.4 milliGRAM(s) Oral at bedtime  zinc sulfate 220 milliGRAM(s) Oral daily    PRN MEDICATIONS  acetaminophen   Tablet .. 650 milliGRAM(s) Oral every 4 hours PRN  oxyCODONE    IR 5 milliGRAM(s) Oral every 4 hours PRN    VITALS:   T(F): 95.8  HR: 90  BP: 157/94  RR: 18  SpO2: 98%    LABS:                        11.2   4.41  )-----------( 180      ( 28 Oct 2018 05:10 )             34.6     10-28    137  |  102  |  15  ----------------------------<  111<H>  3.6   |  22  |  0.8    Ca    8.5      28 Oct 2018 05:10  Mg     2.1     10-28    TPro  5.3<L>  /  Alb  2.9<L>  /  TBili  0.8  /  DBili  x   /  AST  15  /  ALT  9   /  AlkPhos  123<H>  10-28                  RADIOLOGY:    PHYSICAL EXAM:  GEN: No acute distress  LUNGS: Clear to auscultation bilaterally   HEART: Regular  ABD: Soft, non-tender, non-distended.  EXT: NC/NC/NE/2+PP/MARIN/Skin Intact. right foot dfu  NEURO: AAOX1    Intravenous access:   NG tube:   Deluca Catheter:

## 2018-10-28 NOTE — PROGRESS NOTE ADULT - ASSESSMENT
78 year old male with AFIB previously on Xarelto discontinued secondary to frequent falls , DVT s/p Right Hip Replacement on Eliquis, Vascular Dementia with behavioral changes, DM II presents from [facility] due to worsening weakness and decreased PO intake. Found to have a subacute infarct and petechial/microhemorrhages.    1. Right Parietal Lobe subacute infarct and petechial/microhemorrhages.  -mental status stable, still lethargic  -CTH stable, eliquis resumed.   - DLD 63, Hba1c 5.6  -awaiting TTE with contrast    2. Malnutrition/Failure to thrive likely 2/2 vascular dementia and severe deconditioning/ Hypokalemia/Hypomagnesemia  -patient ate some food with the help of his wife  -c/w with gentle hydration and Glucerna  -electrolytes repleted, c/w daily monitoring of BMP, Mg, Phos  -f/u Dr Crespo and c/w calorie count  -GOC discussed with wife again today. She is yet to speak to her older son who is away and will be returning in a few days. However, given the patient's minimal PO intake, she wishes to continue to feed him today by mouth and if he still does not eat today with encouragement, she will most likely agree to NGT tomorrow as a temporary nutritional measure.     3 Urinary tract infection; complicated by urinary retention  -s/p talavera removal  -voiding, incontinent  - UA is nitrite positive, cx >100k Serratia Marcescens, P sensitivity  - c/w rocephin    4. Atrial fibrillation   [id number] (7): High-risk for CVA  - c/w Metoprolol 50 Q8H daily and eliquis    5. RIGHT foot ulcer  -pain improved  - appreciate podiatry eval, c/w santyl dressing with gauze, lightly moistened with saline    6. Hypertension  -stable   -c/w Losartan 50, Metoprolol 50 Q8H and Amlodipine 2.5  -if BP remains elevated, can increase Losartan or Amlodipine    7. T2DM  Hba1c 5.6  - Monitor finger sticks, start insulin sliding scale if >180    8. Anemia  - Continue with Ferrous Sulfate 325mg PO QD    9. BPH  - Continue Tamsulosin 0.4mg PO QD    10. Dispo: from [facility]

## 2018-10-28 NOTE — PROGRESS NOTE ADULT - ASSESSMENT
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    -CT head and MRI show right medial occipital lobe subacute infarct with stable microhemorrhages  -back on eliquis for afib with rvr and hip surgery ppx  -patient's mental status at baseline according to family  -patient has been downgraded from stroke unit, will continue to be monitored on neuro floor  -getting TTE with contrast for possible shunt    # Afib  -rate controlled on meto tartrate 50 q8h, eliquis    # Right lateral foot DFU  -podiatry recommending serial xrays o/p, santyl, dsd/ kerlix - Daily  -pain control with oxy 5 q4h    # UTI  -on ceftriaxone 1g q24h    # HTN   -amlodipine 2.5 / losartan 50    # Decreased PO intake  -resolved with dronabinol 2.5 before meals    DVT PPX

## 2018-10-28 NOTE — PROGRESS NOTE ADULT - SUBJECTIVE AND OBJECTIVE BOX
VERONICA OSVALDOGAURI  78y Male    CHIEF COMPLAINT:    Patient is a 78y old  Male who presents with a chief complaint of progressive weakness and decreased PO intake for 2 days (28 Oct 2018 08:42)      INTERVAL HPI/OVERNIGHT EVENTS:    Patient seen and examined. No acute events overnight. Lethargic but arousable, answers in short 1-2 word answers. Ate some breakfast that pts wife brought him    ROS: All other systems are negative.    Vital Signs:    T(F): 95.8 (10-28-18 @ 07:22), Max: 96.9 (10-28-18 @ 00:03)  HR: 90 (10-28-18 @ 07:22) (82 - 99)  BP: 157/94 (10-28-18 @ 07:22) (136/65 - 178/79)  RR: 18 (10-28-18 @ 07:22) (18 - 18)  SpO2: 98% (10-27-18 @ 21:09) (97% - 99%)  I&O's Summary    27 Oct 2018 07:01  -  28 Oct 2018 07:00  --------------------------------------------------------  IN: 900 mL / OUT: 0 mL / NET: 900 mL    28 Oct 2018 07:01  -  28 Oct 2018 11:51  --------------------------------------------------------  IN: 225 mL / OUT: 0 mL / NET: 225 mL    POCT Blood Glucose.: 134 mg/dL (28 Oct 2018 10:58)  POCT Blood Glucose.: 128 mg/dL (27 Oct 2018 21:27)  POCT Blood Glucose.: 143 mg/dL (27 Oct 2018 16:18)      PHYSICAL EXAM:    GENERAL:  NAD, laying in bed  SKIN: No rashes or lesions  HEENT: Atraumatic. Normocephalic.  NECK: Supple, No JVD. No lymphadenopathy.  PULMONARY: Decreased inspiratory effort. No wheezing. No rales  CVS: Normal S1, S2. Rate and Rhythm are regular. No murmurs.  ABDOMEN/GI: Soft, Nontender, mildly distended; BS present  MSK:  No edema B/L LE. No clubbing or cyanosis  NEUROLOGIC:  No motor or sensory deficit.  PSYCH: Alert & oriented x 3, lethargic    Consultant(s) Notes Reviewed:  [x ] YES  [ ] NO    LABS:                        11.2   4.41  )-----------( 180      ( 28 Oct 2018 05:10 )             34.6     10-28    137  |  102  |  15  ----------------------------<  111<H>  3.6   |  22  |  0.8    Ca    8.5      28 Oct 2018 05:10  Mg     2.1     10-28    TPro  5.3<L>  /  Alb  2.9<L>  /  TBili  0.8  /  DBili  x   /  AST  15  /  ALT  9   /  AlkPhos  123<H>  10-28  Trop <0.01, CKMB --, CK --, 10-25-18 @ 17:28    RADIOLOGY & ADDITIONAL TESTS:    Imaging or report Personally Reviewed:  [x] YES  [ ] NO  EKG reviewed: [x] YES  [ ] NO    Medications:  Standing  amLODIPine   Tablet 2.5 milliGRAM(s) Oral daily  apixaban 5 milliGRAM(s) Oral every 12 hours  ascorbic acid 500 milliGRAM(s) Oral daily  aspirin enteric coated 81 milliGRAM(s) Oral daily  atorvastatin 40 milliGRAM(s) Oral at bedtime  cefTRIAXone   IVPB 1 Gram(s) IV Intermittent every 24 hours  chlorhexidine 4% Liquid 1 Application(s) Topical <User Schedule>  collagenase Ointment 1 Application(s) Topical daily  dronabinol 2.5 milliGRAM(s) Oral three times a day before meals  ferrous    sulfate 325 milliGRAM(s) Oral daily  lactulose Syrup 30 Gram(s) Oral daily  losartan 50 milliGRAM(s) Oral daily  metoprolol tartrate 50 milliGRAM(s) Oral every 8 hours  OLANZapine 5 milliGRAM(s) Oral daily  senna 1 Tablet(s) Oral at bedtime  sodium chloride 0.9%. 1000 milliLiter(s) IV Continuous <Continuous>  tamsulosin 0.4 milliGRAM(s) Oral at bedtime  zinc sulfate 220 milliGRAM(s) Oral daily    PRN Meds  acetaminophen   Tablet .. 650 milliGRAM(s) Oral every 4 hours PRN  oxyCODONE    IR 5 milliGRAM(s) Oral every 4 hours PRN

## 2018-10-29 LAB
ALBUMIN SERPL ELPH-MCNC: 3.1 G/DL — LOW (ref 3.5–5.2)
ALP SERPL-CCNC: 129 U/L — HIGH (ref 30–115)
ALT FLD-CCNC: 10 U/L — SIGNIFICANT CHANGE UP (ref 0–41)
ANION GAP SERPL CALC-SCNC: 16 MMOL/L — HIGH (ref 7–14)
AST SERPL-CCNC: 16 U/L — SIGNIFICANT CHANGE UP (ref 0–41)
BASOPHILS # BLD AUTO: 0.01 K/UL — SIGNIFICANT CHANGE UP (ref 0–0.2)
BASOPHILS NFR BLD AUTO: 0.2 % — SIGNIFICANT CHANGE UP (ref 0–1)
BILIRUB SERPL-MCNC: 0.8 MG/DL — SIGNIFICANT CHANGE UP (ref 0.2–1.2)
BUN SERPL-MCNC: 12 MG/DL — SIGNIFICANT CHANGE UP (ref 10–20)
CALCIUM SERPL-MCNC: 8.6 MG/DL — SIGNIFICANT CHANGE UP (ref 8.5–10.1)
CHLORIDE SERPL-SCNC: 103 MMOL/L — SIGNIFICANT CHANGE UP (ref 98–110)
CO2 SERPL-SCNC: 25 MMOL/L — SIGNIFICANT CHANGE UP (ref 17–32)
CREAT SERPL-MCNC: 0.8 MG/DL — SIGNIFICANT CHANGE UP (ref 0.7–1.5)
EOSINOPHIL # BLD AUTO: 0.18 K/UL — SIGNIFICANT CHANGE UP (ref 0–0.7)
EOSINOPHIL NFR BLD AUTO: 4 % — SIGNIFICANT CHANGE UP (ref 0–8)
GLUCOSE BLDC GLUCOMTR-MCNC: 130 MG/DL — HIGH (ref 70–99)
GLUCOSE BLDC GLUCOMTR-MCNC: 133 MG/DL — HIGH (ref 70–99)
GLUCOSE BLDC GLUCOMTR-MCNC: 136 MG/DL — HIGH (ref 70–99)
GLUCOSE BLDC GLUCOMTR-MCNC: 181 MG/DL — HIGH (ref 70–99)
GLUCOSE SERPL-MCNC: 126 MG/DL — HIGH (ref 70–99)
HCT VFR BLD CALC: 35.2 % — LOW (ref 42–52)
HGB BLD-MCNC: 11.4 G/DL — LOW (ref 14–18)
IMM GRANULOCYTES NFR BLD AUTO: 0.9 % — HIGH (ref 0.1–0.3)
LYMPHOCYTES # BLD AUTO: 0.8 K/UL — LOW (ref 1.2–3.4)
LYMPHOCYTES # BLD AUTO: 17.6 % — LOW (ref 20.5–51.1)
MAGNESIUM SERPL-MCNC: 1.9 MG/DL — SIGNIFICANT CHANGE UP (ref 1.8–2.4)
MCHC RBC-ENTMCNC: 28.1 PG — SIGNIFICANT CHANGE UP (ref 27–31)
MCHC RBC-ENTMCNC: 32.4 G/DL — SIGNIFICANT CHANGE UP (ref 32–37)
MCV RBC AUTO: 86.7 FL — SIGNIFICANT CHANGE UP (ref 80–94)
MONOCYTES # BLD AUTO: 0.4 K/UL — SIGNIFICANT CHANGE UP (ref 0.1–0.6)
MONOCYTES NFR BLD AUTO: 8.8 % — SIGNIFICANT CHANGE UP (ref 1.7–9.3)
NEUTROPHILS # BLD AUTO: 3.12 K/UL — SIGNIFICANT CHANGE UP (ref 1.4–6.5)
NEUTROPHILS NFR BLD AUTO: 68.5 % — SIGNIFICANT CHANGE UP (ref 42.2–75.2)
PHOSPHATE SERPL-MCNC: 2.4 MG/DL — SIGNIFICANT CHANGE UP (ref 2.1–4.9)
PLATELET # BLD AUTO: 191 K/UL — SIGNIFICANT CHANGE UP (ref 130–400)
POTASSIUM SERPL-MCNC: 3.3 MMOL/L — LOW (ref 3.5–5)
POTASSIUM SERPL-SCNC: 3.3 MMOL/L — LOW (ref 3.5–5)
PROT SERPL-MCNC: 5.3 G/DL — LOW (ref 6–8)
RBC # BLD: 4.06 M/UL — LOW (ref 4.7–6.1)
RBC # FLD: 17.9 % — HIGH (ref 11.5–14.5)
SODIUM SERPL-SCNC: 144 MMOL/L — SIGNIFICANT CHANGE UP (ref 135–146)
WBC # BLD: 4.55 K/UL — LOW (ref 4.8–10.8)
WBC # FLD AUTO: 4.55 K/UL — LOW (ref 4.8–10.8)

## 2018-10-29 RX ORDER — POTASSIUM CHLORIDE 20 MEQ
20 PACKET (EA) ORAL ONCE
Qty: 0 | Refills: 0 | Status: COMPLETED | OUTPATIENT
Start: 2018-10-29 | End: 2018-10-29

## 2018-10-29 RX ADMIN — APIXABAN 5 MILLIGRAM(S): 2.5 TABLET, FILM COATED ORAL at 06:14

## 2018-10-29 RX ADMIN — CHLORHEXIDINE GLUCONATE 1 APPLICATION(S): 213 SOLUTION TOPICAL at 06:14

## 2018-10-29 RX ADMIN — Medication 50 MILLIEQUIVALENT(S): at 12:51

## 2018-10-29 RX ADMIN — Medication 500 MILLIGRAM(S): at 12:48

## 2018-10-29 RX ADMIN — LOSARTAN POTASSIUM 50 MILLIGRAM(S): 100 TABLET, FILM COATED ORAL at 06:14

## 2018-10-29 RX ADMIN — OLANZAPINE 5 MILLIGRAM(S): 15 TABLET, FILM COATED ORAL at 12:48

## 2018-10-29 RX ADMIN — Medication 1 APPLICATION(S): at 19:00

## 2018-10-29 RX ADMIN — Medication 2.5 MILLIGRAM(S): at 13:13

## 2018-10-29 RX ADMIN — Medication 325 MILLIGRAM(S): at 12:48

## 2018-10-29 RX ADMIN — SODIUM CHLORIDE 75 MILLILITER(S): 9 INJECTION INTRAMUSCULAR; INTRAVENOUS; SUBCUTANEOUS at 11:13

## 2018-10-29 RX ADMIN — CEFTRIAXONE 100 GRAM(S): 500 INJECTION, POWDER, FOR SOLUTION INTRAMUSCULAR; INTRAVENOUS at 12:54

## 2018-10-29 RX ADMIN — SODIUM CHLORIDE 75 MILLILITER(S): 9 INJECTION INTRAMUSCULAR; INTRAVENOUS; SUBCUTANEOUS at 12:47

## 2018-10-29 RX ADMIN — ZINC SULFATE TAB 220 MG (50 MG ZINC EQUIVALENT) 220 MILLIGRAM(S): 220 (50 ZN) TAB at 12:48

## 2018-10-29 RX ADMIN — Medication 50 MILLIGRAM(S): at 06:14

## 2018-10-29 RX ADMIN — LACTULOSE 30 GRAM(S): 10 SOLUTION ORAL at 12:52

## 2018-10-29 RX ADMIN — Medication 81 MILLIGRAM(S): at 12:48

## 2018-10-29 RX ADMIN — AMLODIPINE BESYLATE 2.5 MILLIGRAM(S): 2.5 TABLET ORAL at 06:14

## 2018-10-29 RX ADMIN — Medication 2.5 MILLIGRAM(S): at 06:22

## 2018-10-29 NOTE — PROGRESS NOTE ADULT - SUBJECTIVE AND OBJECTIVE BOX
OSVALDO MARTINGAURI  78y Male    CHIEF COMPLAINT:    Patient is a 78y old  Male who presents with a chief complaint of progressive weakness and decreased PO intake for 2 days (29 Oct 2018 13:07)      INTERVAL HPI/OVERNIGHT EVENTS:    Patient seen and examined. No acute events overnight. Remains lethargic    ROS: All other systems are negative.    Vital Signs:    T(F): 96.3 (10-29-18 @ 07:28), Max: 97.8 (10-28-18 @ 16:02)  HR: 95 (10-29-18 @ 07:28) (93 - 96)  BP: 176/90 (10-29-18 @ 07:28) (139/85 - 176/90)  RR: 17 (10-29-18 @ 07:28) (17 - 18)  SpO2: --  I&O's Summary      POCT Blood Glucose.: 130 mg/dL (29 Oct 2018 11:11)  POCT Blood Glucose.: 136 mg/dL (29 Oct 2018 07:08)  POCT Blood Glucose.: 169 mg/dL (28 Oct 2018 21:23)  POCT Blood Glucose.: 171 mg/dL (28 Oct 2018 16:26)      PHYSICAL EXAM:    GENERAL:  NAD, sleeping  SKIN: No rashes or lesions  HEENT: Atraumatic. Normocephalic.   NECK: Supple, No JVD.   PULMONARY: Decreased inspiratory effort  CVS: Normal S1, S2. Rate and Rhythm are regular. No murmurs.  ABDOMEN/GI: Soft, Nontender, Nondistended; BS present  MSK:  No edema B/L LE.  NEUROLOGIC:  No motor or sensory deficit.  PSYCH: Alert & oriented x 2, awakens when prompted, otherwise continues to sleep    Consultant(s) Notes Reviewed:  [x ] YES  [ ] NO  Care Discussed with Consultants/Other Providers [ x] YES  [ ] NO    LABS:                        11.4   4.55  )-----------( 191      ( 29 Oct 2018 06:42 )             35.2     10-29    144  |  103  |  12  ----------------------------<  126<H>  3.3<L>   |  25  |  0.8    Ca    8.6      29 Oct 2018 06:42  Phos  2.4     10-29  Mg     1.9     10-29    TPro  5.3<L>  /  Alb  3.1<L>  /  TBili  0.8  /  DBili  x   /  AST  16  /  ALT  10  /  AlkPhos  129<H>  10-29    RADIOLOGY & ADDITIONAL TESTS:    Imaging or report Personally Reviewed:  [x] YES  [ ] NO    Medications:  Standing  amLODIPine   Tablet 2.5 milliGRAM(s) Oral daily  apixaban 5 milliGRAM(s) Oral every 12 hours  ascorbic acid 500 milliGRAM(s) Oral daily  aspirin enteric coated 81 milliGRAM(s) Oral daily  atorvastatin 40 milliGRAM(s) Oral at bedtime  cefTRIAXone   IVPB 1 Gram(s) IV Intermittent every 24 hours  chlorhexidine 4% Liquid 1 Application(s) Topical <User Schedule>  collagenase Ointment 1 Application(s) Topical daily  dronabinol 2.5 milliGRAM(s) Oral three times a day before meals  ferrous    sulfate 325 milliGRAM(s) Oral daily  lactulose Syrup 30 Gram(s) Oral daily  losartan 50 milliGRAM(s) Oral daily  metoprolol tartrate 50 milliGRAM(s) Oral every 8 hours  OLANZapine 5 milliGRAM(s) Oral daily  senna 1 Tablet(s) Oral at bedtime  sodium chloride 0.9%. 1000 milliLiter(s) IV Continuous <Continuous>  tamsulosin 0.4 milliGRAM(s) Oral at bedtime  zinc sulfate 220 milliGRAM(s) Oral daily    PRN Meds  acetaminophen   Tablet .. 650 milliGRAM(s) Oral every 4 hours PRN  oxyCODONE    IR 5 milliGRAM(s) Oral every 4 hours PRN

## 2018-10-29 NOTE — PROGRESS NOTE ADULT - SUBJECTIVE AND OBJECTIVE BOX
SUBJECTIVE:    Patient is a 78y old Male who presents with a chief complaint of progressive weakness and decreased PO intake for 2 days (29 Oct 2018 14:08)      HPI:  79 yo M with PMHx of AFIB previously on Xarelto discontinued secondary to frequent falls , DVT s/p Right Hip Replacement (10/14/18) on Eliquis-bedbound, Vascular Dementia with behavioral changes, DM II, HTN [facility] of progressive weakness and decreased PO intake for the past 2 days. As per son, prior to 2 days ago, patient had a great appetite however in the last 2 days patient has even refused PO medications. In the ED, patient was found to have AFIB with RVR, HR in 120s was given 20mg of IV Cardizem with response. CTH revealed what was initially suspected as an ICH, however upon Neurosurgery evaluation findings may be secondary to a subacute infarct, awaiting neurology evaluation. (23 Oct 2018 10:52)      Currently admitted to medicine with the primary diagnosis of UTI (urinary tract infection)       Besides the pertinent positives and negatives described above, the ROS was within normal limits.    PAST MEDICAL & SURGICAL HISTORY  Anemia  Diabetes  HTN (hypertension)  Arrhythmia  Dementia  S/P cholecystectomy  History of hip replacement    SOCIAL HISTORY:    ALLERGIES:  No Known Allergies    MEDICATIONS:  STANDING MEDICATIONS  amLODIPine   Tablet 2.5 milliGRAM(s) Oral daily  apixaban 5 milliGRAM(s) Oral every 12 hours  ascorbic acid 500 milliGRAM(s) Oral daily  aspirin enteric coated 81 milliGRAM(s) Oral daily  atorvastatin 40 milliGRAM(s) Oral at bedtime  cefTRIAXone   IVPB 1 Gram(s) IV Intermittent every 24 hours  chlorhexidine 4% Liquid 1 Application(s) Topical <User Schedule>  collagenase Ointment 1 Application(s) Topical daily  dronabinol 2.5 milliGRAM(s) Oral three times a day before meals  ferrous    sulfate 325 milliGRAM(s) Oral daily  lactulose Syrup 30 Gram(s) Oral daily  losartan 50 milliGRAM(s) Oral daily  metoprolol tartrate 50 milliGRAM(s) Oral every 8 hours  OLANZapine 5 milliGRAM(s) Oral daily  senna 1 Tablet(s) Oral at bedtime  sodium chloride 0.9%. 1000 milliLiter(s) IV Continuous <Continuous>  tamsulosin 0.4 milliGRAM(s) Oral at bedtime  zinc sulfate 220 milliGRAM(s) Oral daily    PRN MEDICATIONS  acetaminophen   Tablet .. 650 milliGRAM(s) Oral every 4 hours PRN  oxyCODONE    IR 5 milliGRAM(s) Oral every 4 hours PRN    VITALS:   T(F): 97  HR: 101  BP: 179/82  RR: 18  SpO2: --    LABS:                        11.4   4.55  )-----------( 191      ( 29 Oct 2018 06:42 )             35.2     10-29    144  |  103  |  12  ----------------------------<  126<H>  3.3<L>   |  25  |  0.8    Ca    8.6      29 Oct 2018 06:42  Phos  2.4     10-29  Mg     1.9     10-29    TPro  5.3<L>  /  Alb  3.1<L>  /  TBili  0.8  /  DBili  x   /  AST  16  /  ALT  10  /  AlkPhos  129<H>  10-29                  RADIOLOGY:    PHYSICAL EXAM:  GEN: No acute distress  LUNGS: Clear to auscultation bilaterally   HEART: Regular  ABD: Soft, non-tender, non-distended.  EXT: NC/NC/NE/2+PP/MARIN/Skin Intact.   NEURO: AAOX1, lethargic    Intravenous access:   NG tube:   Deluca Catheter:

## 2018-10-29 NOTE — PROGRESS NOTE ADULT - SUBJECTIVE AND OBJECTIVE BOX
SUSI MARTIN      Chief Complaint: Lethargy, AMS     Right Handed    HPI:  78 year old gentleman with a PMHx of AFIB previously on Xarelto discontinued secondary to frequent falls , DVT s/p Right Hip Replacement on Eliquis, Vascular Dementia with behavioral changes, DM II presents from Rehab facility for evaluation of worsening weakness and decreased PO intake. CTH revealed a right parietal lobe posteromedially, there is an ill-defined area with loss of gray-white matter differentiation with associated edematous changes as well as resolving subacute parenchymal hemorrhage. Signal abnormality with abnormal enhancement in the right medial occipital lobe consistent with subacute infarct and   petechial/microhemorrhages. He is lethargic and able to move all extremities when aroused by noxious stimuli.     Relevant PMH:  [] Prior ischemic stroke/TIA  [x] Afib  []CAD  []HTN  []DLD  [x]DM []PVD []Obesity [] Sedintary lifestyle []CHF  []CYRIL[]Cancer Hx     Social History: [] Smoking []  Drug Use: []   Alcohol Use:   [] Other:        Relevant Cerebral Imaging:  IMPRESSION:    1.  Signal abnormality with abnormal enhancement in the right medial   occipital lobe consistent with subacute infarct and   petechial/microhemorrhages.    2.  Prominent ventricles out of proportion to the sulcal pattern in the   appropriate clinical setting may be consistent with communicating   hydrocephalus.     3.  Scattered T2 and FLAIR hyperintensities periventricular and   subcortical white matter which are nonspecific and without mass effect   most likely consistent with chronicsmall vessel ischemic changes.    4.  Left greater than right maxillary sinus disease.    Relevant blood tests:  Hemoglobin A1C, Whole Blood: 5.6 % (10-26-18 @ 20:00)  LDL Cholesterol, Direct: 63: LDLD    Relevant cardiac rhythm monitoring:  Known to have afib.  Relevant Cardiac Structure:(TTE/DOMO +/-):[]No intracardiac thrombus/[] no vegetation/[]no akynesia/EF:  TTE pending    Home Medications:  amLODIPine 5 mg oral tablet: 0.5 tab(s) orally once a day (23 Oct 2018 11:36)  Cozaar 25 mg oral tablet: 1 tab(s) orally once a day (23 Oct 2018 11:48)  Eliquis 5 mg oral tablet: 1 tab(s) orally 2 times a day (23 Oct 2018 11:40)  ferrous sulfate 325 mg (65 mg elemental iron) oral delayed release tablet: 1 tab(s) orally once a day (12 Aug 2018 03:21)  Flomax 0.4 mg oral capsule: 1 cap(s) orally once a day (at bedtime) (23 Oct 2018 11:39)  lactulose 10 g/15 mL oral solution: orally once a day (23 Oct 2018 11:41)  metFORMIN 500 mg oral tablet: 1 tab(s) orally 2 times a day (23 Oct 2018 11:35)  Metoprolol Tartrate 25 mg oral tablet: 1 tab(s) orally 2 times a day (23 Oct 2018 11:37)  OLANZapine 5 mg oral tablet: 1 tab(s) orally once a day (23 Oct 2018 11:38)  oxyCODONE 5 mg oral tablet: orally every 4 hours (5 times/day), As Needed (23 Oct 2018 11:42)  senna oral tablet: 1 tab(s) orally once a day (at bedtime) (23 Oct 2018 11:44)  Tylenol 325 mg oral tablet: 2 tab(s) orally every 4 hours, As Needed (12 Aug 2018 03:21)  Vitamin C 500 mg oral tablet: 1 tab(s) orally once a day (23 Oct 2018 11:47)  zinc sulfate: 50 milligram(s) orally once a day (23 Oct 2018 11:47)      MEDICATIONS  (STANDING):  amLODIPine   Tablet 2.5 milliGRAM(s) Oral daily  apixaban 5 milliGRAM(s) Oral every 12 hours  ascorbic acid 500 milliGRAM(s) Oral daily  aspirin enteric coated 81 milliGRAM(s) Oral daily  atorvastatin 40 milliGRAM(s) Oral at bedtime  cefTRIAXone   IVPB 1 Gram(s) IV Intermittent every 24 hours  chlorhexidine 4% Liquid 1 Application(s) Topical <User Schedule>  collagenase Ointment 1 Application(s) Topical daily  dronabinol 2.5 milliGRAM(s) Oral three times a day before meals  ferrous    sulfate 325 milliGRAM(s) Oral daily  lactulose Syrup 30 Gram(s) Oral daily  losartan 50 milliGRAM(s) Oral daily  metoprolol tartrate 50 milliGRAM(s) Oral every 8 hours  OLANZapine 5 milliGRAM(s) Oral daily  senna 1 Tablet(s) Oral at bedtime  sodium chloride 0.9%. 1000 milliLiter(s) (75 mL/Hr) IV Continuous <Continuous>  tamsulosin 0.4 milliGRAM(s) Oral at bedtime  zinc sulfate 220 milliGRAM(s) Oral daily      PT/OT/Speech/Rehab/S&Swr: pending    Exam:    Vital Signs Last 24 Hrs  T(C): 35.7 (29 Oct 2018 07:28), Max: 36.6 (28 Oct 2018 16:02)  T(F): 96.3 (29 Oct 2018 07:28), Max: 97.8 (28 Oct 2018 16:02)  HR: 95 (29 Oct 2018 07:28) (89 - 96)  BP: 176/90 (29 Oct 2018 07:28) (139/85 - 176/90)  BP(mean): --  RR: 17 (29 Oct 2018 07:28) (17 - 18)  SpO2: 97% (28 Oct 2018 13:30) (97% - 97%)    NIHSS          LOC:       1a:  0   1b(Questions): 1        1c(Instructions):1           Best Gaze:0  Visual:0  Motor:                 RUE:  1  RLE:  1  LUE: 1  LLE: 1   FACE:   0  Limb Ataxia:0  Sensory:     0  Language:  1    Dysarthria:   0       Extinction and Inattention:0    NIHSS on admission:   21      NIHSS today:    7         m-RS:3    Impression:  78 year old gentleman with a PMHx of AFIB previously on Xarelto discontinued secondary to frequent falls , DVT s/p Right Hip Replacement on Eliquis, Vascular Dementia with behavioral changes, DM II presents from Rehab facility for evaluation of worsening weakness and decreased PO intake. CTH revealed a right parietal lobe posteromedially, there is an ill-defined area with loss of gray-white matter differentiation with associated edematous changes as well as resolving subacute parenchymal hemorrhage. Signal abnormality with abnormal enhancement in the right medial occipital lobe consistent with subacute infarct and   petechial/microhemorrhages. He is lethargic and able to move all extremities when aroused by noxious stimuli.     Suggestion:  Routine stroke workup including:  May continue Eliquis provided the blood pressure is kept less than 140/80  ASA as per medicine.  Continue statin.  TTE with contrast to rule out right to left shunt, vegetation, akinesia or intracardiac thrombus.  PT OT Rehab.  Disposition:  Continue neuro medical floor.  Conner Elizabeth NP  [phone]

## 2018-10-29 NOTE — CHART NOTE - NSCHARTNOTEFT_GEN_A_CORE
Pt sleeping, arousable   Wife at bedside and reports he is still refusing to eat  She reports NGT being placed later today for feeding    T(F): 96.3 (10-29-18 @ 07:28), Max: 97.8 (10-28-18 @ 16:02)  HR: 95 (10-29-18 @ 07:28) (93 - 96)  BP: 176/90 (10-29-18 @ 07:28) (139/85 - 176/90)  RR: 17 (10-29-18 @ 07:28) (17 - 18)    10-29    144  |  103  |  12  ----------------------------<  126<H>  3.3<L>   |  25  |  0.8    Ca    8.6      29 Oct 2018 06:42  Phos  2.4     10-29  Mg     1.9     10-29    TPro  5.3<L>  /  Alb  3.1<L>  /  TBili  0.8  /  DBili  x   /  AST  16  /  ALT  10  /  AlkPhos  129<H>  10-29                        11.4   4.55  )-----------( 191      ( 29 Oct 2018 06:42 )             35.2     CAPILLARY BLOOD GLUCOSE  POCT Blood Glucose.: 130 mg/dL (29 Oct 2018 11:11)  POCT Blood Glucose.: 136 mg/dL (29 Oct 2018 07:08)  POCT Blood Glucose.: 169 mg/dL (28 Oct 2018 21:23)  POCT Blood Glucose.: 171 mg/dL (28 Oct 2018 16:26)     Diet, Dysphagia 2 Mechanical Soft-Thin Liquids:   Consistent Carbohydrate {No Snacks} (10-26-18 @ 13:32)    Plan:  -once NGT in place and placement confirmed start Jevity 1.2 120ml X 1 feed then 240ml X 4 feeds/day to start  -monitor K+, IP and Mg closely once feeds start. will increase feeds once lytes are stable  -supplement K+ today  -marinol as ordered  -continue to encourage and assist with meals

## 2018-10-29 NOTE — PROGRESS NOTE ADULT - ASSESSMENT
78 year old male with AFIB previously on Xarelto discontinued secondary to frequent falls , DVT s/p Right Hip Replacement on Eliquis, Vascular Dementia with behavioral changes, DM II presents from [facility] due to worsening weakness and decreased PO intake. Found to have a subacute infarct and petechial/microhemorrhages.    1. Right Parietal Lobe subacute infarct and petechial/microhemorrhages.  -mental status stable, still lethargic  -CTH stable, eliquis resumed.   - DLD 63, Hba1c 5.6  -awaiting TTE with contrast    2. Malnutrition/Failure to thrive likely 2/2 vascular dementia and severe deconditioning/ Hypokalemia/Hypomagnesemia  -today, patient ate only a very small amount of food  -the wife agreed to a trial of NGT today to start Glucerna 1.2 at 240ml x 3 feeds/d as per Dr Toussaint recommendations  -She is still awaiting for her younger son to return home to have a discussion with him regarding goals of care and their wishes in terms of how aggressive the treatment/managment should be  -replete K, check AM BMP, Mg, Phos  -f/u Nutrition      3 Urinary tract infection; complicated by urinary retention  -s/p talavera removal  -voiding, incontinent  - UA is nitrite positive, cx >100k Serratia Marcescens, P sensitivity  - c/w rocephin today and tomorrow. Then d/c as pt would've completed 7 days of therapy    4. Atrial fibrillation   [id number] (7): High-risk for CVA  - c/w Metoprolol 50 Q8H daily and eliquis    5. RIGHT foot ulcer  -pain improved  - appreciate podiatry eval, c/w santyl dressing with gauze, lightly moistened with saline    6. Hypertension  -stable   -c/w Losartan 50, Metoprolol 50 Q8H and Amlodipine 2.5  -if BP remains elevated, can increase Losartan or Amlodipine    7. T2DM  Hba1c 5.6  - Monitor finger sticks, start insulin sliding scale if >180    8. Anemia  - Continue with Ferrous Sulfate 325mg PO QD    9. BPH  - Continue Tamsulosin 0.4mg PO QD    10. Dispo: from [facility], [place], operated by [facility]    Case discussed with patient's wife at bedside and Housestaff 78 year old male with AFIB previously on Xarelto discontinued secondary to frequent falls , DVT s/p Right Hip Replacement on Eliquis, Vascular Dementia with behavioral changes, DM II presents from [facility] due to worsening weakness and decreased PO intake. Found to have a subacute infarct and petechial/microhemorrhages.    1. Right Parietal Lobe subacute infarct and petechial/microhemorrhages.  -mental status stable, still lethargic  -CTH stable, eliquis resumed.   - DLD 63, Hba1c 5.6  -awaiting TTE with contrast    2. Malnutrition/Failure to thrive likely 2/2 vascular dementia and severe deconditioning/ Hypokalemia/Hypomagnesemia  -today, patient ate only a very small amount of food  -the wife agreed to a trial of NGT today to start Glucerna 1.2 at 240ml x 3 feeds/d as per Dr Toussaint recommendations  -She is still awaiting for her younger son to return home to have a discussion with him regarding goals of care and their wishes in terms of how aggressive the treatment/managment should be  -replete K, check AM BMP, Mg, Phos  -f/u Nutrition    Senior Attending: Patient is seen and examined independently. I agree with hospitalist note above and plan of care.        3 Urinary tract infection; complicated by urinary retention  -s/p talavera removal  -voiding, incontinent  - UA is nitrite positive, cx >100k Serratia Marcescens, P sensitivity  - c/w rocephin today and tomorrow. Then d/c as pt would've completed 7 days of therapy    4. Atrial fibrillation   [id number] (7): High-risk for CVA  - c/w Metoprolol 50 Q8H daily and eliquis    5. RIGHT foot ulcer  -pain improved  - appreciate podiatry eval, c/w santyl dressing with gauze, lightly moistened with saline    6. Hypertension  -stable   -c/w Losartan 50, Metoprolol 50 Q8H and Amlodipine 2.5  -if BP remains elevated, can increase Losartan or Amlodipine    7. T2DM  Hba1c 5.6  - Monitor finger sticks, start insulin sliding scale if >180    8. Anemia  - Continue with Ferrous Sulfate 325mg PO QD    9. BPH  - Continue Tamsulosin 0.4mg PO QD    10. Dispo: from [facility]    Case discussed with patient's wife at bedside and Housestaff

## 2018-10-29 NOTE — PROGRESS NOTE ADULT - ASSESSMENT
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    -CT head and MRI show right medial occipital lobe subacute infarct with stable microhemorrhages  -on eliquis for afib with rvr and hip surgery dvt ppx  -patient has been more lethargic and not eating as much as per family  -getting TTE with contrast for possible shunt    # Afib  -rate controlled on meto tartrate 50 q8h, eliquis    # Right lateral foot DFU  -podiatry recommending serial xrays o/p, santyl, dsd/ kerlix - Daily  -pain control with oxy 5 q4h    # UTI  -on ceftriaxone 1g q24h    # HTN   -amlodipine 2.5 / losartan 50    # Decreased PO intake  -placed feeding tube, awaiting read to start feeds    DVT PPX

## 2018-10-30 LAB
ALBUMIN SERPL ELPH-MCNC: 2.9 G/DL — LOW (ref 3.5–5.2)
ALP SERPL-CCNC: 127 U/L — HIGH (ref 30–115)
ALT FLD-CCNC: 9 U/L — SIGNIFICANT CHANGE UP (ref 0–41)
ANION GAP SERPL CALC-SCNC: 15 MMOL/L — HIGH (ref 7–14)
AST SERPL-CCNC: 15 U/L — SIGNIFICANT CHANGE UP (ref 0–41)
BASOPHILS # BLD AUTO: 0.01 K/UL — SIGNIFICANT CHANGE UP (ref 0–0.2)
BASOPHILS NFR BLD AUTO: 0.2 % — SIGNIFICANT CHANGE UP (ref 0–1)
BILIRUB SERPL-MCNC: 0.9 MG/DL — SIGNIFICANT CHANGE UP (ref 0.2–1.2)
BUN SERPL-MCNC: 11 MG/DL — SIGNIFICANT CHANGE UP (ref 10–20)
CALCIUM SERPL-MCNC: 8.4 MG/DL — LOW (ref 8.5–10.1)
CHLORIDE SERPL-SCNC: 104 MMOL/L — SIGNIFICANT CHANGE UP (ref 98–110)
CO2 SERPL-SCNC: 25 MMOL/L — SIGNIFICANT CHANGE UP (ref 17–32)
CREAT SERPL-MCNC: 0.9 MG/DL — SIGNIFICANT CHANGE UP (ref 0.7–1.5)
EOSINOPHIL # BLD AUTO: 0.15 K/UL — SIGNIFICANT CHANGE UP (ref 0–0.7)
EOSINOPHIL NFR BLD AUTO: 3.1 % — SIGNIFICANT CHANGE UP (ref 0–8)
GLUCOSE BLDC GLUCOMTR-MCNC: 115 MG/DL — HIGH (ref 70–99)
GLUCOSE BLDC GLUCOMTR-MCNC: 128 MG/DL — HIGH (ref 70–99)
GLUCOSE BLDC GLUCOMTR-MCNC: 136 MG/DL — HIGH (ref 70–99)
GLUCOSE BLDC GLUCOMTR-MCNC: 142 MG/DL — HIGH (ref 70–99)
GLUCOSE SERPL-MCNC: 111 MG/DL — HIGH (ref 70–99)
HCT VFR BLD CALC: 33.9 % — LOW (ref 42–52)
HGB BLD-MCNC: 11 G/DL — LOW (ref 14–18)
IMM GRANULOCYTES NFR BLD AUTO: 0.6 % — HIGH (ref 0.1–0.3)
LYMPHOCYTES # BLD AUTO: 0.56 K/UL — LOW (ref 1.2–3.4)
LYMPHOCYTES # BLD AUTO: 11.5 % — LOW (ref 20.5–51.1)
MAGNESIUM SERPL-MCNC: 1.7 MG/DL — LOW (ref 1.8–2.4)
MCHC RBC-ENTMCNC: 28.4 PG — SIGNIFICANT CHANGE UP (ref 27–31)
MCHC RBC-ENTMCNC: 32.4 G/DL — SIGNIFICANT CHANGE UP (ref 32–37)
MCV RBC AUTO: 87.6 FL — SIGNIFICANT CHANGE UP (ref 80–94)
MONOCYTES # BLD AUTO: 0.36 K/UL — SIGNIFICANT CHANGE UP (ref 0.1–0.6)
MONOCYTES NFR BLD AUTO: 7.4 % — SIGNIFICANT CHANGE UP (ref 1.7–9.3)
NEUTROPHILS # BLD AUTO: 3.74 K/UL — SIGNIFICANT CHANGE UP (ref 1.4–6.5)
NEUTROPHILS NFR BLD AUTO: 77.2 % — HIGH (ref 42.2–75.2)
NRBC # BLD: 0 /100 WBCS — SIGNIFICANT CHANGE UP (ref 0–0)
PHOSPHATE SERPL-MCNC: 2.6 MG/DL — SIGNIFICANT CHANGE UP (ref 2.1–4.9)
PLATELET # BLD AUTO: 174 K/UL — SIGNIFICANT CHANGE UP (ref 130–400)
POTASSIUM SERPL-MCNC: 3.5 MMOL/L — SIGNIFICANT CHANGE UP (ref 3.5–5)
POTASSIUM SERPL-SCNC: 3.5 MMOL/L — SIGNIFICANT CHANGE UP (ref 3.5–5)
PROT SERPL-MCNC: 5.3 G/DL — LOW (ref 6–8)
RBC # BLD: 3.87 M/UL — LOW (ref 4.7–6.1)
RBC # FLD: 18.3 % — HIGH (ref 11.5–14.5)
SODIUM SERPL-SCNC: 144 MMOL/L — SIGNIFICANT CHANGE UP (ref 135–146)
WBC # BLD: 4.85 K/UL — SIGNIFICANT CHANGE UP (ref 4.8–10.8)
WBC # FLD AUTO: 4.85 K/UL — SIGNIFICANT CHANGE UP (ref 4.8–10.8)

## 2018-10-30 PROCEDURE — 99231 SBSQ HOSP IP/OBS SF/LOW 25: CPT

## 2018-10-30 RX ORDER — OXYCODONE HYDROCHLORIDE 5 MG/1
5 TABLET ORAL EVERY 4 HOURS
Qty: 0 | Refills: 0 | Status: DISCONTINUED | OUTPATIENT
Start: 2018-10-30 | End: 2018-11-02

## 2018-10-30 RX ORDER — SENNA PLUS 8.6 MG/1
1 TABLET ORAL AT BEDTIME
Qty: 0 | Refills: 0 | Status: DISCONTINUED | OUTPATIENT
Start: 2018-10-30 | End: 2018-11-02

## 2018-10-30 RX ORDER — LABETALOL HCL 100 MG
10 TABLET ORAL ONCE
Qty: 0 | Refills: 0 | Status: COMPLETED | OUTPATIENT
Start: 2018-10-30 | End: 2018-10-30

## 2018-10-30 RX ORDER — AMLODIPINE BESYLATE 2.5 MG/1
2.5 TABLET ORAL DAILY
Qty: 0 | Refills: 0 | Status: DISCONTINUED | OUTPATIENT
Start: 2018-10-30 | End: 2018-11-02

## 2018-10-30 RX ORDER — ATORVASTATIN CALCIUM 80 MG/1
40 TABLET, FILM COATED ORAL AT BEDTIME
Qty: 0 | Refills: 0 | Status: DISCONTINUED | OUTPATIENT
Start: 2018-10-30 | End: 2018-11-02

## 2018-10-30 RX ORDER — OLANZAPINE 15 MG/1
5 TABLET, FILM COATED ORAL DAILY
Qty: 0 | Refills: 0 | Status: DISCONTINUED | OUTPATIENT
Start: 2018-10-30 | End: 2018-11-02

## 2018-10-30 RX ORDER — MAGNESIUM OXIDE 400 MG ORAL TABLET 241.3 MG
400 TABLET ORAL
Qty: 0 | Refills: 0 | Status: DISCONTINUED | OUTPATIENT
Start: 2018-10-30 | End: 2018-11-02

## 2018-10-30 RX ORDER — ASCORBIC ACID 60 MG
500 TABLET,CHEWABLE ORAL DAILY
Qty: 0 | Refills: 0 | Status: DISCONTINUED | OUTPATIENT
Start: 2018-10-30 | End: 2018-11-02

## 2018-10-30 RX ORDER — FERROUS SULFATE 325(65) MG
325 TABLET ORAL DAILY
Qty: 0 | Refills: 0 | Status: DISCONTINUED | OUTPATIENT
Start: 2018-10-30 | End: 2018-11-02

## 2018-10-30 RX ORDER — METOPROLOL TARTRATE 50 MG
50 TABLET ORAL EVERY 8 HOURS
Qty: 0 | Refills: 0 | Status: DISCONTINUED | OUTPATIENT
Start: 2018-10-30 | End: 2018-11-02

## 2018-10-30 RX ORDER — ACETAMINOPHEN 500 MG
650 TABLET ORAL EVERY 4 HOURS
Qty: 0 | Refills: 0 | Status: DISCONTINUED | OUTPATIENT
Start: 2018-10-30 | End: 2018-11-02

## 2018-10-30 RX ORDER — MAGNESIUM SULFATE 500 MG/ML
1 VIAL (ML) INJECTION ONCE
Qty: 0 | Refills: 0 | Status: COMPLETED | OUTPATIENT
Start: 2018-10-30 | End: 2018-10-30

## 2018-10-30 RX ORDER — LOSARTAN POTASSIUM 100 MG/1
50 TABLET, FILM COATED ORAL DAILY
Qty: 0 | Refills: 0 | Status: DISCONTINUED | OUTPATIENT
Start: 2018-10-30 | End: 2018-11-02

## 2018-10-30 RX ORDER — TAMSULOSIN HYDROCHLORIDE 0.4 MG/1
0.4 CAPSULE ORAL AT BEDTIME
Qty: 0 | Refills: 0 | Status: DISCONTINUED | OUTPATIENT
Start: 2018-10-30 | End: 2018-11-02

## 2018-10-30 RX ORDER — ZINC SULFATE TAB 220 MG (50 MG ZINC EQUIVALENT) 220 (50 ZN) MG
220 TAB ORAL DAILY
Qty: 0 | Refills: 0 | Status: DISCONTINUED | OUTPATIENT
Start: 2018-10-30 | End: 2018-11-02

## 2018-10-30 RX ADMIN — APIXABAN 5 MILLIGRAM(S): 2.5 TABLET, FILM COATED ORAL at 15:26

## 2018-10-30 RX ADMIN — CEFTRIAXONE 100 GRAM(S): 500 INJECTION, POWDER, FOR SOLUTION INTRAMUSCULAR; INTRAVENOUS at 11:39

## 2018-10-30 RX ADMIN — Medication 10 MILLIGRAM(S): at 08:17

## 2018-10-30 RX ADMIN — Medication 50 MILLIGRAM(S): at 15:25

## 2018-10-30 RX ADMIN — Medication 1 APPLICATION(S): at 10:37

## 2018-10-30 RX ADMIN — Medication 25 GRAM(S): at 12:18

## 2018-10-30 RX ADMIN — CHLORHEXIDINE GLUCONATE 1 APPLICATION(S): 213 SOLUTION TOPICAL at 06:33

## 2018-10-30 RX ADMIN — SODIUM CHLORIDE 75 MILLILITER(S): 9 INJECTION INTRAMUSCULAR; INTRAVENOUS; SUBCUTANEOUS at 06:36

## 2018-10-30 RX ADMIN — Medication 81 MILLIGRAM(S): at 15:27

## 2018-10-30 RX ADMIN — LOSARTAN POTASSIUM 50 MILLIGRAM(S): 100 TABLET, FILM COATED ORAL at 15:26

## 2018-10-30 RX ADMIN — MAGNESIUM OXIDE 400 MG ORAL TABLET 400 MILLIGRAM(S): 241.3 TABLET ORAL at 17:36

## 2018-10-30 NOTE — PROGRESS NOTE ADULT - ASSESSMENT
78 year old male with AFIB previously on Xarelto discontinued secondary to frequent falls , DVT s/p Right Hip Replacement on Eliquis, Vascular Dementia with behavioral changes, DM II presents from [facility] due to worsening weakness and decreased PO intake. Found to have a subacute infarct and petechial/microhemorrhages.      ·	Malnutrition/Failure to thrive likely 2/2 vascular dementia and severe deconditioning  -today, patient did not eat anything by mouth  -placed NGT, confirmed placement with Xray.   start Glucerna 1.2 at 240ml x 3 feeds/d as per Dr Toussaint recommendations  -She is still awaiting for her younger son to return home to have a discussion with him regarding goals of care and their wishes in terms of how aggressive the treatment/managment should be. Once that conversation takes place, will need Palliative team support  -f/u Nutrition    ·	Magnesium Deficiency  repeatedly decreased levels  Start Magnesium supplements Q12H via NGT  Check AM BMP, Mg, Phos    ·	Right Parietal Lobe subacute infarct and petechial/microhemorrhages.  -mental status stable, still lethargic  -CTH stable, eliquis resumed.   - DLD 63, Hba1c 5.6  -pending TTE read    ·	Urinary tract infection; complicated by urinary retention  -s/p talavera removal  -voiding, incontinent  - UA is nitrite positive, cx >100k Serratia Marcescens, P sensitivity  -last day of rocephin today    ·	Atrial fibrillation   [id number] (7): High-risk for CVA  - c/w Metoprolol 50 Q8H daily and eliquis    ·	RIGHT foot ulcer  -pain improved  - appreciate podiatry eval, c/w santyl dressing with gauze, lightly moistened with saline    ·	Hypertension  -stable   -c/w Losartan 50, Metoprolol 50 Q8H and Amlodipine 2.5  -if BP remains elevated, can increase Losartan or Amlodipine    ·	T2DM  Hba1c 5.6  - Monitor finger sticks, start insulin sliding scale if >180    ·	Anemia  - Continue with Ferrous Sulfate 325mg PO QD    ·	BPH  - Continue Tamsulosin 0.4mg PO QD    ·	Dispo: from [facility]    Case discussed with patient's wife at bedside and Housestaff

## 2018-10-30 NOTE — PROGRESS NOTE ADULT - SUBJECTIVE AND OBJECTIVE BOX
SUSI MARTIN  78y Male    CHIEF COMPLAINT:    Patient is a 78y old  Male who presents with a chief complaint of progressive weakness and decreased PO intake for 2 days (30 Oct 2018 11:30)      INTERVAL HPI/OVERNIGHT EVENTS:    Patient seen and examined. No acute events overnight. Remains lethargic but arousable. Answers yes/no questions. Recognizes faces    ROS: All other systems are negative.    Vital Signs:    T(F): 96.5 (10-30-18 @ 07:49), Max: 96.8 (10-29-18 @ 23:25)  HR: 109 (10-30-18 @ 15:38) (96 - 113)  BP: 187/96 (10-30-18 @ 15:38) (158/83 - 190/91)  RR: 20 (10-30-18 @ 07:49) (17 - 20)  SpO2: --    POCT Blood Glucose.: 136 mg/dL (30 Oct 2018 11:38)  POCT Blood Glucose.: 115 mg/dL (30 Oct 2018 06:21)  POCT Blood Glucose.: 133 mg/dL (29 Oct 2018 21:07)  POCT Blood Glucose.: 181 mg/dL (29 Oct 2018 16:24)    PHYSICAL EXAM:    GENERAL:  NAD  SKIN: No rashes or lesions  HEENT: Atraumatic. Normocephalic. Dry Mucous membranes  NECK: Supple, No JVD.   PULMONARY: Decreased breath sounds b/l  CVS: Normal S1, S2. Tachycardic  ABDOMEN/GI: Soft, Nontender, Nondistended; BS present  MSK:  No edema B/L LE. No clubbing  NEUROLOGIC:  moves all extremities.  PSYCH: Alert & oriented x 3, lethargic     Consultant(s) Notes Reviewed:  [x ] YES  [ ] NO  Care Discussed with Consultants/Other Providers [ x] YES  [ ] NO    LABS:                        11.0   4.85  )-----------( 174      ( 30 Oct 2018 07:20 )             33.9     10-30    144  |  104  |  11  ----------------------------<  111<H>  3.5   |  25  |  0.9    Ca    8.4<L>      30 Oct 2018 07:20  Phos  2.6     10-30  Mg     1.7     10-30    TPro  5.3<L>  /  Alb  2.9<L>  /  TBili  0.9  /  DBili  x   /  AST  15  /  ALT  9   /  AlkPhos  127<H>  10-30    RADIOLOGY & ADDITIONAL TESTS:    Imaging or report Personally Reviewed:  [x] YES  [ ] NO    Medications:  Standing  amLODIPine   Tablet 2.5 milliGRAM(s) Oral daily  apixaban 5 milliGRAM(s) Oral every 12 hours  ascorbic acid 500 milliGRAM(s) Oral daily  aspirin enteric coated 81 milliGRAM(s) Oral daily  atorvastatin 40 milliGRAM(s) Oral at bedtime  cefTRIAXone   IVPB 1 Gram(s) IV Intermittent every 24 hours  chlorhexidine 4% Liquid 1 Application(s) Topical <User Schedule>  collagenase Ointment 1 Application(s) Topical daily  dronabinol 2.5 milliGRAM(s) Oral three times a day before meals  ferrous    sulfate 325 milliGRAM(s) Oral daily  lactulose Syrup 30 Gram(s) Oral daily  losartan 50 milliGRAM(s) Oral daily  metoprolol tartrate 50 milliGRAM(s) Oral every 8 hours  OLANZapine 5 milliGRAM(s) Oral daily  senna 1 Tablet(s) Oral at bedtime  tamsulosin 0.4 milliGRAM(s) Oral at bedtime  zinc sulfate 220 milliGRAM(s) Oral daily    PRN Meds  acetaminophen   Tablet .. 650 milliGRAM(s) Oral every 4 hours PRN  oxyCODONE    IR 5 milliGRAM(s) Oral every 4 hours PRN

## 2018-10-30 NOTE — PROGRESS NOTE ADULT - SUBJECTIVE AND OBJECTIVE BOX
PROGRESS NOTE   Pt seen at bedside during AM rounds. Pt is not verbal. Pt's wife at bedside. Pt's wife says her  experiences pain on pressure form his R foot ulcer. Dressing clean and intact. No acute events to his feet.      Vital Signs Last 24 Hrs  T(C): 35.8 (30 Oct 2018 07:49), Max: 36.1 (29 Oct 2018 15:48)  T(F): 96.5 (30 Oct 2018 07:49), Max: 97 (29 Oct 2018 15:48)  HR: 113 (30 Oct 2018 07:49) (96 - 113)  BP: 190/91 (30 Oct 2018 07:49) (160/97 - 190/91)  BP(mean): --  RR: 20 (30 Oct 2018 07:49) (17 - 20)  SpO2: --                          11.0   4.85  )-----------( 174      ( 30 Oct 2018 07:20 )             33.9               10-30    144  |  104  |  11  ----------------------------<  111<H>  3.5   |  25  |  0.9    Ca    8.4<L>      30 Oct 2018 07:20  Phos  2.6     10-30  Mg     1.7     10-30    TPro  5.3<L>  /  Alb  2.9<L>  /  TBili  0.9  /  DBili  x   /  AST  15  /  ALT  9   /  AlkPhos  127<H>  10-30      PHYSICAL EXAM  LE Focused Exam:      Vasc:    - DP/PT pulses 1/4 B/L   - Skin temp warm to warm, proximal to distal B/L  - CFT < 3 sec B/L  Neuro:   - Gross sensation in tact B/L   Derm:   - No interdigital macerations B/L   - Right lateral foot ulcer - malodor, - purulence, Eschar wound base, Dry borders, Improved necrotic and fibrotic tissue size.   MSK:   - Muscle strength 2/5 in all quadrants w/ no defects B/L     < from: [facility] Duplex Lower Extrem Arterial, Bilat (10.26.18 @ 16:10) >    EXAM:  ART DUPLEX LOWER EXT BILATERAL            PROCEDURE DATE:  10/26/2018            INTERPRETATION:  Clinical History / Reason for exam: Clinical History /   Reason for exam: This is an 78-year-old male with history of non-healing   ulcer.   Lower extremity arterial duplex ultrasound was performed to assess for   arterial occlusive disease.    Right:   common femoral artery: patent, triphasic waveforms, slightly slowed    superficial femoral artery: Very mild spectral broadening, with   diminished waveforms distally; triphasic waveform is maintained   popliteal artery: Triphasic waveforms   anterior tibial artery: Occluded   posterior tibial artery: Patent proximally, likely distal occlusion   peroneal artery: Patent proximally, with likely distal occlusion     Left:   common femoral artery: Patent, with triphasic waveforms   superficial femoral artery: Patent with very mild spectral broadening and   triphasic waveforms   popliteal artery: Patent, with triphasic waveforms   anteriortibial artery: Not visualized   posterior tibial artery: Occluded   peroneal artery: Not visualized     Impression:    On the right: Occlusion of the anterior tibial artery and of the distal   posterior tibial and peroneal arteries. No significant proximal disease.    On the left: No significant disease noted in the common femoral and   superficial femoral arteries, or the popliteal. The posterior tibial   artery is occluded, and the anterior tibial and peroneal arteries were   not visualized.    Please consider vascular surgical consultation if felt to be appropriate   in the current clinical context.    ICD-10: L97.819    RAGHAV GANT M.D., RESIDENT RADIOLOGIST  This document has been electronically signed.  JUSTIN HUGHES M.D., ATTENDING VASCULAR  This document has been electronically signed. Oct 29 2018  7:14AM      < end of copied text >      Assessment:   - Right lateral foot DFU. Stable w/ no signs of acute infection.     Plan:   - pt seen/evaluated @ bedside  - No surgical planing Per Podiatry  - F/u as o/p with Dr. Yanick Bower  - recommend serial x-rays as o/p to monitor progression of tres changes.  - WOund care orders: santyl, dsd/ kerlix - Daily  - Recommend Vascular consult- As per A duplex read  - Please Reconsult Podiatry as needed PROGRESS NOTE   Pt seen at bedside during AM rounds with attending. Pt is not verbal. Pt's wife at bedside. Pt's wife says her  experiences pain on pressure form his R foot ulcer. Dressing clean and intact. No acute events to his feet.      Vital Signs Last 24 Hrs  T(C): 35.8 (30 Oct 2018 07:49), Max: 36.1 (29 Oct 2018 15:48)  T(F): 96.5 (30 Oct 2018 07:49), Max: 97 (29 Oct 2018 15:48)  HR: 113 (30 Oct 2018 07:49) (96 - 113)  BP: 190/91 (30 Oct 2018 07:49) (160/97 - 190/91)  BP(mean): --  RR: 20 (30 Oct 2018 07:49) (17 - 20)  SpO2: --                          11.0   4.85  )-----------( 174      ( 30 Oct 2018 07:20 )             33.9               10-30    144  |  104  |  11  ----------------------------<  111<H>  3.5   |  25  |  0.9    Ca    8.4<L>      30 Oct 2018 07:20  Phos  2.6     10-30  Mg     1.7     10-30    TPro  5.3<L>  /  Alb  2.9<L>  /  TBili  0.9  /  DBili  x   /  AST  15  /  ALT  9   /  AlkPhos  127<H>  10-30      PHYSICAL EXAM  LE Focused Exam:      Vasc:    - DP/PT pulses 1/4 B/L   - Skin temp warm to warm, proximal to distal B/L  - CFT < 3 sec B/L  Neuro:   - Gross sensation in tact B/L   Derm:   - No interdigital macerations B/L   - Right lateral foot ulcer - malodor, - purulence, Eschar wound base, Dry borders, Improved necrotic and fibrotic tissue size.   MSK:   - Muscle strength 2/5 in all quadrants w/ no defects B/L     < from: [facility] Duplex Lower Extrem Arterial, Bilat (10.26.18 @ 16:10) >    EXAM:  ART DUPLEX LOWER EXT BILATERAL            PROCEDURE DATE:  10/26/2018            INTERPRETATION:  Clinical History / Reason for exam: Clinical History /   Reason for exam: This is an 78-year-old male with history of non-healing   ulcer.   Lower extremity arterial duplex ultrasound was performed to assess for   arterial occlusive disease.    Right:   common femoral artery: patent, triphasic waveforms, slightly slowed    superficial femoral artery: Very mild spectral broadening, with   diminished waveforms distally; triphasic waveform is maintained   popliteal artery: Triphasic waveforms   anterior tibial artery: Occluded   posterior tibial artery: Patent proximally, likely distal occlusion   peroneal artery: Patent proximally, with likely distal occlusion     Left:   common femoral artery: Patent, with triphasic waveforms   superficial femoral artery: Patent with very mild spectral broadening and   triphasic waveforms   popliteal artery: Patent, with triphasic waveforms   anteriortibial artery: Not visualized   posterior tibial artery: Occluded   peroneal artery: Not visualized     Impression:    On the right: Occlusion of the anterior tibial artery and of the distal   posterior tibial and peroneal arteries. No significant proximal disease.    On the left: No significant disease noted in the common femoral and   superficial femoral arteries, or the popliteal. The posterior tibial   artery is occluded, and the anterior tibial and peroneal arteries were   not visualized.    Please consider vascular surgical consultation if felt to be appropriate   in the current clinical context.    ICD-10: L97.819    RAGHAV GANT M.D., RESIDENT RADIOLOGIST  This document has been electronically signed.  JUSTIN HUGHES M.D., ATTENDING VASCULAR  This document has been electronically signed. Oct 29 2018  7:14AM      < end of copied text >      Assessment:   - Right lateral foot DFU. Stable w/ no signs of acute infection.     Plan:   - pt seen/evaluated @ bedside with attending   - No surgical planing Per Podiatry  - F/u as o/p with Dr. Yanick Bower  - recommend serial x-rays as o/p to monitor progression of tres changes.  - WOund care orders: santyl, dsd/ kerlix - Daily  - Recommend Vascular consult- As per A duplex read  - Please Reconsult Podiatry as needed

## 2018-10-30 NOTE — PROGRESS NOTE ADULT - ASSESSMENT
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, markedly decreased PO intake    -CT head and MRI show right medial occipital lobe subacute infarct with stable microhemorrhages  -on eliquis for afib with rvr and hip surgery dvt ppx  -patient has been more lethargic and not eating as much as per family  -NG tube placed, awaiting read, family wants to hold off on PEG tube and see if patient will become more alert after NG tube feeds, discussing goals of care    # Afib  -rate controlled on meto tartrate 50 q8h, eliquis    # Right lateral foot DFU  -podiatry recommending serial xrays o/p, santyl, dsd/ kerlix - Daily  -pain control with oxy 5 q4h  -will f/u as outpt    # UTI  -on ceftriaxone 1g q24h    # HTN   -amlodipine 2.5 / losartan 50    DVT PPX

## 2018-10-30 NOTE — CHART NOTE - NSCHARTNOTEFT_GEN_A_CORE
Calorie count ordered, however patient no longer on PO diet and per PCA, pt only receiving NGT feeds. If pt started on PO diet again, re-order NEW 3 day calorie count for RD to post.

## 2018-10-30 NOTE — PROGRESS NOTE ADULT - SUBJECTIVE AND OBJECTIVE BOX
SUBJECTIVE:    Patient is a 78y old Male who presents with a chief complaint of progressive weakness and decreased PO intake for 2 days (30 Oct 2018 09:37)      HPI:  77 yo M with PMHx of AFIB previously on Xarelto discontinued secondary to frequent falls , DVT s/p Right Hip Replacement (10/14/18) on Eliquis-bedbound, Vascular Dementia with behavioral changes, DM II, HTN [facility] of progressive weakness and decreased PO intake for the past 2 days. As per son, prior to 2 days ago, patient had a great appetite however in the last 2 days patient has even refused PO medications. In the ED, patient was found to have AFIB with RVR, HR in 120s was given 20mg of IV Cardizem with response. CTH revealed what was initially suspected as an ICH, however upon Neurosurgery evaluation findings may be secondary to a subacute infarct, awaiting neurology evaluation. (23 Oct 2018 10:52)      Currently admitted to medicine with the primary diagnosis of UTI (urinary tract infection)       Besides the pertinent positives and negatives described above, the ROS was within normal limits.    PAST MEDICAL & SURGICAL HISTORY  Anemia  Diabetes  HTN (hypertension)  Arrhythmia  Dementia  S/P cholecystectomy  History of hip replacement    SOCIAL HISTORY:    ALLERGIES:  No Known Allergies    MEDICATIONS:  STANDING MEDICATIONS  amLODIPine   Tablet 2.5 milliGRAM(s) Oral daily  apixaban 5 milliGRAM(s) Oral every 12 hours  ascorbic acid 500 milliGRAM(s) Oral daily  aspirin enteric coated 81 milliGRAM(s) Oral daily  atorvastatin 40 milliGRAM(s) Oral at bedtime  cefTRIAXone   IVPB 1 Gram(s) IV Intermittent every 24 hours  chlorhexidine 4% Liquid 1 Application(s) Topical <User Schedule>  collagenase Ointment 1 Application(s) Topical daily  dronabinol 2.5 milliGRAM(s) Oral three times a day before meals  ferrous    sulfate 325 milliGRAM(s) Oral daily  lactulose Syrup 30 Gram(s) Oral daily  losartan 50 milliGRAM(s) Oral daily  metoprolol tartrate 50 milliGRAM(s) Oral every 8 hours  OLANZapine 5 milliGRAM(s) Oral daily  senna 1 Tablet(s) Oral at bedtime  sodium chloride 0.9%. 1000 milliLiter(s) IV Continuous <Continuous>  tamsulosin 0.4 milliGRAM(s) Oral at bedtime  zinc sulfate 220 milliGRAM(s) Oral daily    PRN MEDICATIONS  acetaminophen   Tablet .. 650 milliGRAM(s) Oral every 4 hours PRN  oxyCODONE    IR 5 milliGRAM(s) Oral every 4 hours PRN    VITALS:   T(F): 96.5  HR: 109  BP: 158/83  RR: 20  SpO2: --    LABS:                        11.0   4.85  )-----------( 174      ( 30 Oct 2018 07:20 )             33.9     10-30    144  |  104  |  11  ----------------------------<  111<H>  3.5   |  25  |  0.9    Ca    8.4<L>      30 Oct 2018 07:20  Phos  2.6     10-30  Mg     1.7     10-30    TPro  5.3<L>  /  Alb  2.9<L>  /  TBili  0.9  /  DBili  x   /  AST  15  /  ALT  9   /  AlkPhos  127<H>  10-30                  RADIOLOGY:    PHYSICAL EXAM:  GEN: No acute distress  LUNGS: Clear to auscultation bilaterally   HEART: Regular  ABD: Soft, non-tender, non-distended.  EXT: NC/NC/NE/2+PP/MARIN/Skin Intact.   NEURO: lethargic, minimally responsive    Intravenous access:   NG tube: placed, awaiting confirmation  Deluca Catheter:

## 2018-10-31 LAB
ALBUMIN SERPL ELPH-MCNC: 2.8 G/DL — LOW (ref 3.5–5.2)
ALP SERPL-CCNC: 127 U/L — HIGH (ref 30–115)
ALT FLD-CCNC: 10 U/L — SIGNIFICANT CHANGE UP (ref 0–41)
ANION GAP SERPL CALC-SCNC: 16 MMOL/L — HIGH (ref 7–14)
AST SERPL-CCNC: 16 U/L — SIGNIFICANT CHANGE UP (ref 0–41)
BASOPHILS # BLD AUTO: 0.01 K/UL — SIGNIFICANT CHANGE UP (ref 0–0.2)
BASOPHILS NFR BLD AUTO: 0.2 % — SIGNIFICANT CHANGE UP (ref 0–1)
BILIRUB SERPL-MCNC: 0.8 MG/DL — SIGNIFICANT CHANGE UP (ref 0.2–1.2)
BUN SERPL-MCNC: 11 MG/DL — SIGNIFICANT CHANGE UP (ref 10–20)
CALCIUM SERPL-MCNC: 8.5 MG/DL — SIGNIFICANT CHANGE UP (ref 8.5–10.1)
CHLORIDE SERPL-SCNC: 104 MMOL/L — SIGNIFICANT CHANGE UP (ref 98–110)
CO2 SERPL-SCNC: 24 MMOL/L — SIGNIFICANT CHANGE UP (ref 17–32)
CREAT SERPL-MCNC: 0.8 MG/DL — SIGNIFICANT CHANGE UP (ref 0.7–1.5)
EOSINOPHIL # BLD AUTO: 0.11 K/UL — SIGNIFICANT CHANGE UP (ref 0–0.7)
EOSINOPHIL NFR BLD AUTO: 2.2 % — SIGNIFICANT CHANGE UP (ref 0–8)
GLUCOSE BLDC GLUCOMTR-MCNC: 131 MG/DL — HIGH (ref 70–99)
GLUCOSE BLDC GLUCOMTR-MCNC: 150 MG/DL — HIGH (ref 70–99)
GLUCOSE BLDC GLUCOMTR-MCNC: 155 MG/DL — HIGH (ref 70–99)
GLUCOSE BLDC GLUCOMTR-MCNC: 164 MG/DL — HIGH (ref 70–99)
GLUCOSE SERPL-MCNC: 129 MG/DL — HIGH (ref 70–99)
HCT VFR BLD CALC: 34.5 % — LOW (ref 42–52)
HGB BLD-MCNC: 11.1 G/DL — LOW (ref 14–18)
IMM GRANULOCYTES NFR BLD AUTO: 0.4 % — HIGH (ref 0.1–0.3)
LYMPHOCYTES # BLD AUTO: 0.59 K/UL — LOW (ref 1.2–3.4)
LYMPHOCYTES # BLD AUTO: 11.9 % — LOW (ref 20.5–51.1)
MAGNESIUM SERPL-MCNC: 1.9 MG/DL — SIGNIFICANT CHANGE UP (ref 1.8–2.4)
MCHC RBC-ENTMCNC: 27.9 PG — SIGNIFICANT CHANGE UP (ref 27–31)
MCHC RBC-ENTMCNC: 32.2 G/DL — SIGNIFICANT CHANGE UP (ref 32–37)
MCV RBC AUTO: 86.7 FL — SIGNIFICANT CHANGE UP (ref 80–94)
MONOCYTES # BLD AUTO: 0.33 K/UL — SIGNIFICANT CHANGE UP (ref 0.1–0.6)
MONOCYTES NFR BLD AUTO: 6.6 % — SIGNIFICANT CHANGE UP (ref 1.7–9.3)
NEUTROPHILS # BLD AUTO: 3.91 K/UL — SIGNIFICANT CHANGE UP (ref 1.4–6.5)
NEUTROPHILS NFR BLD AUTO: 78.7 % — HIGH (ref 42.2–75.2)
NRBC # BLD: 0 /100 WBCS — SIGNIFICANT CHANGE UP (ref 0–0)
PHOSPHATE SERPL-MCNC: 2.6 MG/DL — SIGNIFICANT CHANGE UP (ref 2.1–4.9)
PLATELET # BLD AUTO: 168 K/UL — SIGNIFICANT CHANGE UP (ref 130–400)
POTASSIUM SERPL-MCNC: 3.7 MMOL/L — SIGNIFICANT CHANGE UP (ref 3.5–5)
POTASSIUM SERPL-SCNC: 3.7 MMOL/L — SIGNIFICANT CHANGE UP (ref 3.5–5)
PROT SERPL-MCNC: 5.2 G/DL — LOW (ref 6–8)
RBC # BLD: 3.98 M/UL — LOW (ref 4.7–6.1)
RBC # FLD: 18.2 % — HIGH (ref 11.5–14.5)
SODIUM SERPL-SCNC: 144 MMOL/L — SIGNIFICANT CHANGE UP (ref 135–146)
WBC # BLD: 4.97 K/UL — SIGNIFICANT CHANGE UP (ref 4.8–10.8)
WBC # FLD AUTO: 4.97 K/UL — SIGNIFICANT CHANGE UP (ref 4.8–10.8)

## 2018-10-31 RX ORDER — ENOXAPARIN SODIUM 100 MG/ML
80 INJECTION SUBCUTANEOUS EVERY 12 HOURS
Qty: 0 | Refills: 0 | Status: DISCONTINUED | OUTPATIENT
Start: 2018-10-31 | End: 2018-11-01

## 2018-10-31 RX ADMIN — Medication 50 MILLIGRAM(S): at 13:38

## 2018-10-31 RX ADMIN — Medication 500 MILLIGRAM(S): at 11:27

## 2018-10-31 RX ADMIN — Medication 1 APPLICATION(S): at 11:41

## 2018-10-31 RX ADMIN — TAMSULOSIN HYDROCHLORIDE 0.4 MILLIGRAM(S): 0.4 CAPSULE ORAL at 21:21

## 2018-10-31 RX ADMIN — LACTULOSE 30 GRAM(S): 10 SOLUTION ORAL at 11:41

## 2018-10-31 RX ADMIN — OLANZAPINE 5 MILLIGRAM(S): 15 TABLET, FILM COATED ORAL at 11:22

## 2018-10-31 RX ADMIN — CHLORHEXIDINE GLUCONATE 1 APPLICATION(S): 213 SOLUTION TOPICAL at 06:44

## 2018-10-31 RX ADMIN — Medication 325 MILLIGRAM(S): at 11:22

## 2018-10-31 RX ADMIN — ATORVASTATIN CALCIUM 40 MILLIGRAM(S): 80 TABLET, FILM COATED ORAL at 21:21

## 2018-10-31 RX ADMIN — CEFTRIAXONE 100 GRAM(S): 500 INJECTION, POWDER, FOR SOLUTION INTRAMUSCULAR; INTRAVENOUS at 11:40

## 2018-10-31 RX ADMIN — MAGNESIUM OXIDE 400 MG ORAL TABLET 400 MILLIGRAM(S): 241.3 TABLET ORAL at 17:33

## 2018-10-31 RX ADMIN — Medication 81 MILLIGRAM(S): at 11:22

## 2018-10-31 RX ADMIN — ZINC SULFATE TAB 220 MG (50 MG ZINC EQUIVALENT) 220 MILLIGRAM(S): 220 (50 ZN) TAB at 11:22

## 2018-10-31 RX ADMIN — Medication 50 MILLIGRAM(S): at 21:21

## 2018-10-31 RX ADMIN — ENOXAPARIN SODIUM 80 MILLIGRAM(S): 100 INJECTION SUBCUTANEOUS at 17:33

## 2018-10-31 RX ADMIN — SENNA PLUS 1 TABLET(S): 8.6 TABLET ORAL at 21:21

## 2018-10-31 NOTE — SWALLOW BEDSIDE ASSESSMENT ADULT - SLP PERTINENT HISTORY OF CURRENT PROBLEM
Pt admitted from [facility] w/ progressive weakness and decreased po acceptance. PMH: dementia, Afib. CTH: ? acute ICH vs subacute infacrt, awaiting MRI.

## 2018-10-31 NOTE — PROGRESS NOTE ADULT - ASSESSMENT
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, markedly decreased PO intake    -CT head and MRI show right medial occipital lobe subacute infarct with stable microhemorrhages  -failure to thrive, however patient eats small amounts throughout the day, does not meet caloric needs  -changed Eliquis to lovenox 80 bid in preparation for possible PEG tube, awaiting GI consult / family amenable to PEG tube placement    # Afib  -rate controlled on meto tartrate 50 q8h, eliquis    # Right lateral foot DFU  -podiatry recommending serial xrays o/p, santyl, dsd/ kerlix - Daily  -pain control with oxy 5 q4h  -will f/u as outpt  -diminished pulses in LE bilaterally, vascular consult placed    # HTN   -amlodipine 2.5 / losartan 50    DVT PPX

## 2018-10-31 NOTE — SWALLOW BEDSIDE ASSESSMENT ADULT - ASR SWALLOW REFERRAL
Registered Dietitian/resume calorie count, if intake remains poor consider non-oral means of nutrition and hydration as 1' means w/ po fr supplemental means if this is consistent w/ pt and family wishes

## 2018-10-31 NOTE — CONSULT NOTE ADULT - SUBJECTIVE AND OBJECTIVE BOX
SUSI MARTIN [id number]  78y Male    HPI:  77 yo M with PMHx of AFIB previously on Xarelto discontinued secondary to frequent falls , DVT s/p Right Hip Replacement (10/14/18) on Eliquis-bedbound, Vascular Dementia with behavioral changes, DM II, HTN presents from [facility] for evaluation of progressive weakness and decreased PO intake for the past 2 days. Vascular surgery consulted due to a non-healing R lateral side foot ulcer and arterial duplex that showed R AT, PT, Peroneal occlusion, as well as, L PT occlusion. Podiatry has seen the patient and has no surgical plan at this time and would like to follow up with the patient as an outpatient.       PAST MEDICAL & SURGICAL HISTORY:  Anemia  Diabetes  HTN (hypertension)  Arrhythmia  Dementia  S/P cholecystectomy  History of hip replacement        MEDICATIONS  (STANDING):  amLODIPine   Tablet 2.5 milliGRAM(s) Oral daily  ascorbic acid 500 milliGRAM(s) Oral daily  aspirin enteric coated 81 milliGRAM(s) Oral daily  atorvastatin 40 milliGRAM(s) Oral at bedtime  chlorhexidine 4% Liquid 1 Application(s) Topical <User Schedule>  collagenase Ointment 1 Application(s) Topical daily  enoxaparin Injectable 80 milliGRAM(s) SubCutaneous every 12 hours  ferrous    sulfate 325 milliGRAM(s) Oral daily  lactulose Syrup 30 Gram(s) Oral daily  losartan 50 milliGRAM(s) Oral daily  magnesium oxide 400 milliGRAM(s) Oral two times a day with meals  metoprolol tartrate 50 milliGRAM(s) Oral every 8 hours  OLANZapine 5 milliGRAM(s) Oral daily  senna 1 Tablet(s) Oral at bedtime  tamsulosin 0.4 milliGRAM(s) Oral at bedtime  zinc sulfate 220 milliGRAM(s) Oral daily    MEDICATIONS  (PRN):  acetaminophen   Tablet .. 650 milliGRAM(s) Oral every 4 hours PRN Moderate Pain (4 - 6)  oxyCODONE    IR 5 milliGRAM(s) Oral every 4 hours PRN Moderate Pain (4 - 6)      Allergies    No Known Allergies    Intolerances          Vital Signs Last 24 Hrs  T(C): 36.1 (31 Oct 2018 16:23), Max: 36.1 (31 Oct 2018 16:23)  T(F): 96.9 (31 Oct 2018 16:23), Max: 96.9 (31 Oct 2018 16:23)  HR: 102 (31 Oct 2018 16:23) (97 - 105)  BP: 148/82 (31 Oct 2018 16:23) (131/77 - 158/98)  BP(mean): --  RR: 18 (31 Oct 2018 16:23) (18 - 20)  SpO2: --    PHYSICAL EXAM:  GENERAL: NAD, well-appearing  CHEST/LUNG: Clear to auscultation bilaterally  HEART: Regular rate and rhythm  ABDOMEN: Soft, Nontender, Nondistended;   EXTREMITIES:  No clubbing, cyanosis, or edema      LABS:  Labs:  CAPILLARY BLOOD GLUCOSE      POCT Blood Glucose.: 155 mg/dL (31 Oct 2018 16:31)  POCT Blood Glucose.: 164 mg/dL (31 Oct 2018 11:14)  POCT Blood Glucose.: 131 mg/dL (31 Oct 2018 06:52)  POCT Blood Glucose.: 142 mg/dL (30 Oct 2018 21:00)                          11.1   4.97  )-----------( 168      ( 31 Oct 2018 04:57 )             34.5       Auto Neutrophil %: 78.7 % (10-31-18 @ 04:57)  Auto Immature Granulocyte %: 0.4 % (10-31-18 @ 04:57)    10-31    144  |  104  |  11  ----------------------------<  129<H>  3.7   |  24  |  0.8      Calcium, Total Serum: 8.5 mg/dL (10-31-18 @ 04:57)      LFTs:             5.2  | 0.8  | 16       ------------------[127     ( 31 Oct 2018 04:57 )  2.8  | x    | 10          Lipase:x      Amylase:x             Coags:                RADIOLOGY & ADDITIONAL STUDIES:  < from: [facility] Duplex Lower Extrem Arterial, Bilat (10.26.18 @ 16:10) >    Impression:    On the right: Occlusion of the anterior tibial artery and of the distal   posterior tibial and peroneal arteries. No significant proximal disease.    On the left: No significant disease noted in the common femoral and   superficial femoral arteries, or the popliteal. The posterior tibial   artery is occluded, and the anterior tibial and peroneal arteries were   not visualized.    Please consider vascular surgical consultation if felt to be appropriate   in the current clinical context.    < end of copied text >  < from: [facility] Duplex Lower Ext Vein Scan, Radha (10.24.18 @ 07:47) >  Impression:    No evidence of deep venous thrombosis or superficial thrombophlebitis in   the bilateral lower extremities.    < end of copied text > SUSI MARTIN [id number]  78y Male    HPI:  79 yo M with PMHx of AFIB previously on Xarelto discontinued secondary to frequent falls , DVT s/p Right Hip Replacement (10/14/18) on Eliquis-bedbound, Vascular Dementia with behavioral changes, DM II, HTN presents from [facility] [place], operated by [facility] for evaluation of progressive weakness and decreased PO intake for the past 2 days. Vascular surgery consulted due to a non-healing R lateral side foot ulcer and arterial duplex that showed R AT, PT, Peroneal occlusion, as well as, L PT occlusion. Podiatry has seen the patient and has no surgical plan at this time and would like to follow up with the patient as an outpatient.       PAST MEDICAL & SURGICAL HISTORY:  Anemia  Diabetes  HTN (hypertension)  Arrhythmia  Dementia  S/P cholecystectomy  History of hip replacement        MEDICATIONS  (STANDING):  amLODIPine   Tablet 2.5 milliGRAM(s) Oral daily  ascorbic acid 500 milliGRAM(s) Oral daily  aspirin enteric coated 81 milliGRAM(s) Oral daily  atorvastatin 40 milliGRAM(s) Oral at bedtime  chlorhexidine 4% Liquid 1 Application(s) Topical <User Schedule>  collagenase Ointment 1 Application(s) Topical daily  enoxaparin Injectable 80 milliGRAM(s) SubCutaneous every 12 hours  ferrous    sulfate 325 milliGRAM(s) Oral daily  lactulose Syrup 30 Gram(s) Oral daily  losartan 50 milliGRAM(s) Oral daily  magnesium oxide 400 milliGRAM(s) Oral two times a day with meals  metoprolol tartrate 50 milliGRAM(s) Oral every 8 hours  OLANZapine 5 milliGRAM(s) Oral daily  senna 1 Tablet(s) Oral at bedtime  tamsulosin 0.4 milliGRAM(s) Oral at bedtime  zinc sulfate 220 milliGRAM(s) Oral daily    MEDICATIONS  (PRN):  acetaminophen   Tablet .. 650 milliGRAM(s) Oral every 4 hours PRN Moderate Pain (4 - 6)  oxyCODONE    IR 5 milliGRAM(s) Oral every 4 hours PRN Moderate Pain (4 - 6)      Allergies    No Known Allergies    Intolerances          Vital Signs Last 24 Hrs  T(C): 36.1 (31 Oct 2018 16:23), Max: 36.1 (31 Oct 2018 16:23)  T(F): 96.9 (31 Oct 2018 16:23), Max: 96.9 (31 Oct 2018 16:23)  HR: 102 (31 Oct 2018 16:23) (97 - 105)  BP: 148/82 (31 Oct 2018 16:23) (131/77 - 158/98)  RR: 18 (31 Oct 2018 16:23) (18 - 20)      PHYSICAL EXAM:  GENERAL: NAD, well-appearing  CHEST/LUNG: Clear to auscultation bilaterally  HEART: Regular rate and rhythm  ABDOMEN: Soft, Nontender, Nondistended;   EXTREMITIES:  No clubbing, cyanosis, or edema      LABS:  Labs:  CAPILLARY BLOOD GLUCOSE      POCT Blood Glucose.: 155 mg/dL (31 Oct 2018 16:31)  POCT Blood Glucose.: 164 mg/dL (31 Oct 2018 11:14)  POCT Blood Glucose.: 131 mg/dL (31 Oct 2018 06:52)  POCT Blood Glucose.: 142 mg/dL (30 Oct 2018 21:00)                          11.1   4.97  )-----------( 168      ( 31 Oct 2018 04:57 )             34.5       Auto Neutrophil %: 78.7 % (10-31-18 @ 04:57)  Auto Immature Granulocyte %: 0.4 % (10-31-18 @ 04:57)    10-31    144  |  104  |  11  ----------------------------<  129<H>  3.7   |  24  |  0.8      Calcium, Total Serum: 8.5 mg/dL (10-31-18 @ 04:57)      LFTs:             5.2  | 0.8  | 16       ------------------[127     ( 31 Oct 2018 04:57 )  2.8  | x    | 10          Lipase:x      Amylase:x             Coags:                RADIOLOGY & ADDITIONAL STUDIES:  < from: [facility] Duplex Lower Extrem Arterial, Bilat (10.26.18 @ 16:10) >    Impression:    On the right: Occlusion of the anterior tibial artery and of the distal   posterior tibial and peroneal arteries. No significant proximal disease.    On the left: No significant disease noted in the common femoral and   superficial femoral arteries, or the popliteal. The posterior tibial   artery is occluded, and the anterior tibial and peroneal arteries were   not visualized.    Please consider vascular surgical consultation if felt to be appropriate   in the current clinical context.    < end of copied text >  < from: [facility] Duplex Lower Ext Vein Scan, Radha (10.24.18 @ 07:47) >  Impression:    No evidence of deep venous thrombosis or superficial thrombophlebitis in   the bilateral lower extremities.    < end of copied text >

## 2018-10-31 NOTE — SWALLOW BEDSIDE ASSESSMENT ADULT - COMMENTS
MRI: R medial occipital lobe subacute infarct and microhemorrhages, chronic sm vessel ischemic changes. New orders placed 2' report of decreased po intake. Pt known to ST service from this admission, prev reccs for cut up (dys 3) w/ thin +toleration

## 2018-10-31 NOTE — PROGRESS NOTE ADULT - SUBJECTIVE AND OBJECTIVE BOX
SUBJECTIVE:    Patient is a 78y old Male who presents with a chief complaint of progressive weakness and decreased PO intake for 2 days (30 Oct 2018 15:56)      HPI:  77 yo M with PMHx of AFIB previously on Xarelto discontinued secondary to frequent falls , DVT s/p Right Hip Replacement (10/14/18) on Eliquis-bedbound, Vascular Dementia with behavioral changes, DM II, HTN [facility] for evaluation of progressive weakness and decreased PO intake for the past 2 days. As per son, prior to 2 days ago, patient had a great appetite however in the last 2 days patient has even refused PO medications. In the ED, patient was found to have AFIB with RVR, HR in 120s was given 20mg of IV Cardizem with response. CTH revealed what was initially suspected as an ICH, however upon Neurosurgery evaluation findings may be secondary to a subacute infarct, awaiting neurology evaluation. (23 Oct 2018 10:52)      Currently admitted to medicine with the primary diagnosis of UTI (urinary tract infection)       Besides the pertinent positives and negatives described above, the ROS was within normal limits.    PAST MEDICAL & SURGICAL HISTORY  Anemia  Diabetes  HTN (hypertension)  Arrhythmia  Dementia  S/P cholecystectomy  History of hip replacement    SOCIAL HISTORY:    ALLERGIES:  No Known Allergies    MEDICATIONS:  STANDING MEDICATIONS  amLODIPine   Tablet 2.5 milliGRAM(s) Oral daily  ascorbic acid 500 milliGRAM(s) Oral daily  aspirin enteric coated 81 milliGRAM(s) Oral daily  atorvastatin 40 milliGRAM(s) Oral at bedtime  cefTRIAXone   IVPB 1 Gram(s) IV Intermittent every 24 hours  chlorhexidine 4% Liquid 1 Application(s) Topical <User Schedule>  collagenase Ointment 1 Application(s) Topical daily  enoxaparin Injectable 80 milliGRAM(s) SubCutaneous every 12 hours  ferrous    sulfate 325 milliGRAM(s) Oral daily  lactulose Syrup 30 Gram(s) Oral daily  losartan 50 milliGRAM(s) Oral daily  magnesium oxide 400 milliGRAM(s) Oral two times a day with meals  metoprolol tartrate 50 milliGRAM(s) Oral every 8 hours  OLANZapine 5 milliGRAM(s) Oral daily  senna 1 Tablet(s) Oral at bedtime  tamsulosin 0.4 milliGRAM(s) Oral at bedtime  zinc sulfate 220 milliGRAM(s) Oral daily    PRN MEDICATIONS  acetaminophen   Tablet .. 650 milliGRAM(s) Oral every 4 hours PRN  oxyCODONE    IR 5 milliGRAM(s) Oral every 4 hours PRN    VITALS:   T(F): 96.1  HR: 105  BP: 156/81  RR: 20  SpO2: --    LABS:                        11.1   4.97  )-----------( 168      ( 31 Oct 2018 04:57 )             34.5     10-31    144  |  104  |  11  ----------------------------<  129<H>  3.7   |  24  |  0.8    Ca    8.5      31 Oct 2018 04:57  Phos  2.6     10-31  Mg     1.9     10-31    TPro  5.2<L>  /  Alb  2.8<L>  /  TBili  0.8  /  DBili  x   /  AST  16  /  ALT  10  /  AlkPhos  127<H>  10-31                  RADIOLOGY:    PHYSICAL EXAM:  GEN: No acute distress  LUNGS: Clear to auscultation bilaterally   HEART: Regular  ABD: Soft, non-tender, non-distended.  EXT: decreased pulses in b/l LE  NEURO: AAOX2    Intravenous access:   NG tube: no  Deluca Catheter:

## 2018-10-31 NOTE — CONSULT NOTE ADULT - ASSESSMENT
79 yo M with b/l R PT, AT, peroneal occlusion and L PT occlusion (AT and peroneal are not visualized)    PLAN:   - need for angiogram, likely able to do as an outpatient    - att to see

## 2018-10-31 NOTE — SWALLOW BEDSIDE ASSESSMENT ADULT - ORAL PREPARATORY PHASE
Within functional limits
Bolus falls into anterior sulcus/Reduced oral grading
Reduced oral grading
Within functional limits

## 2018-10-31 NOTE — SWALLOW BEDSIDE ASSESSMENT ADULT - NS SPL SWALLOW CLINIC TRIAL FT
+toleration
No effort to move bolus posteriorly, no swallow trigger
+bolus hold (judged to be behavioral) w/o overt s/s of penetration/aspiration w/ thin
mild oral dysphagia w/o overt s/s of penetration/aspiration w/ nectar

## 2018-10-31 NOTE — SWALLOW BEDSIDE ASSESSMENT ADULT - PHARYNGEAL PHASE
Within functional limits
Within functional limits
Absent trigger of swallow
Delayed pharyngeal swallow

## 2018-10-31 NOTE — CHART NOTE - NSCHARTNOTEFT_GEN_A_CORE
Speech yuan noted and appreciated  Pt with feeding tube yesterday and tolerated feeds but he pulled it out. Tube no longer in place  PO intake remains very poor    T(F): 96.1 (10-31-18 @ 08:02), Max: 96.2 (10-30-18 @ 16:07)  HR: 105 (10-31-18 @ 13:37) (97 - 109)  BP: 156/81 (10-31-18 @ 13:37) (131/77 - 187/96)  RR: 20 (10-31-18 @ 08:02) (18 - 20)    10-31    144  |  104  |  11  ----------------------------<  129<H>  3.7   |  24  |  0.8    Ca    8.5      31 Oct 2018 04:57  Phos  2.6     10-31  Mg     1.9     10-31    TPro  5.2<L>  /  Alb  2.8<L>  /  TBili  0.8  /  DBili  x   /  AST  16  /  ALT  10  /  AlkPhos  127<H>  10-31                          11.1   4.97  )-----------( 168      ( 31 Oct 2018 04:57 )             34.5     CAPILLARY BLOOD GLUCOSE  POCT Blood Glucose.: 164 mg/dL (31 Oct 2018 11:14)  POCT Blood Glucose.: 131 mg/dL (31 Oct 2018 06:52)  POCT Blood Glucose.: 142 mg/dL (30 Oct 2018 21:00)  POCT Blood Glucose.: 128 mg/dL (30 Oct 2018 16:24)     Diet, NPO with Tube Feed:   Tube Feeding Modality: Nasogastric  Jevity 1.2 Trae  Total Volume for 24 Hours (mL): 960  Bolus   Total Volume of Bolus (mL):  240  Bolus Feed Rate (mL per Hour): 240   Bolus Feed Duration (in Hours): 1  Tube Feed Frequency: Every 6 hours   Tube Feed Start Time: 05:00 (10-30-18 @ 17:03)  Diet, Dysphagia 2 Mechanical Soft-Thin Liquids:   Consistent Carbohydrate {No Snacks} (10-31-18 @ 07:06)    Plan:  -reattempt NGT placement and resume previously ordered feeds but give feeds 1 hour after each PO meal and qsh  -continue to encourage PO intake  -?PEG

## 2018-11-01 LAB
ALBUMIN SERPL ELPH-MCNC: 3.1 G/DL — LOW (ref 3.5–5.2)
ALP SERPL-CCNC: 126 U/L — HIGH (ref 30–115)
ALT FLD-CCNC: 9 U/L — SIGNIFICANT CHANGE UP (ref 0–41)
ANION GAP SERPL CALC-SCNC: 14 MMOL/L — SIGNIFICANT CHANGE UP (ref 7–14)
APTT BLD: 37.1 SEC — SIGNIFICANT CHANGE UP (ref 27–39.2)
APTT BLD: 52.5 SEC — HIGH (ref 27–39.2)
AST SERPL-CCNC: 15 U/L — SIGNIFICANT CHANGE UP (ref 0–41)
BASOPHILS # BLD AUTO: 0.01 K/UL — SIGNIFICANT CHANGE UP (ref 0–0.2)
BASOPHILS NFR BLD AUTO: 0.2 % — SIGNIFICANT CHANGE UP (ref 0–1)
BILIRUB SERPL-MCNC: 1 MG/DL — SIGNIFICANT CHANGE UP (ref 0.2–1.2)
BLD GP AB SCN SERPL QL: SIGNIFICANT CHANGE UP
BUN SERPL-MCNC: 13 MG/DL — SIGNIFICANT CHANGE UP (ref 10–20)
CALCIUM SERPL-MCNC: 8.6 MG/DL — SIGNIFICANT CHANGE UP (ref 8.5–10.1)
CHLORIDE SERPL-SCNC: 105 MMOL/L — SIGNIFICANT CHANGE UP (ref 98–110)
CO2 SERPL-SCNC: 27 MMOL/L — SIGNIFICANT CHANGE UP (ref 17–32)
CREAT SERPL-MCNC: 0.9 MG/DL — SIGNIFICANT CHANGE UP (ref 0.7–1.5)
EOSINOPHIL # BLD AUTO: 0.16 K/UL — SIGNIFICANT CHANGE UP (ref 0–0.7)
EOSINOPHIL NFR BLD AUTO: 3.8 % — SIGNIFICANT CHANGE UP (ref 0–8)
GLUCOSE BLDC GLUCOMTR-MCNC: 152 MG/DL — HIGH (ref 70–99)
GLUCOSE BLDC GLUCOMTR-MCNC: 154 MG/DL — HIGH (ref 70–99)
GLUCOSE BLDC GLUCOMTR-MCNC: 175 MG/DL — HIGH (ref 70–99)
GLUCOSE SERPL-MCNC: 147 MG/DL — HIGH (ref 70–99)
HCT VFR BLD CALC: 34.4 % — LOW (ref 42–52)
HGB BLD-MCNC: 11.3 G/DL — LOW (ref 14–18)
IMM GRANULOCYTES NFR BLD AUTO: 0.5 % — HIGH (ref 0.1–0.3)
INR BLD: 1.47 RATIO — HIGH (ref 0.65–1.3)
LYMPHOCYTES # BLD AUTO: 0.55 K/UL — LOW (ref 1.2–3.4)
LYMPHOCYTES # BLD AUTO: 12.9 % — LOW (ref 20.5–51.1)
MAGNESIUM SERPL-MCNC: 1.9 MG/DL — SIGNIFICANT CHANGE UP (ref 1.8–2.4)
MCHC RBC-ENTMCNC: 28.5 PG — SIGNIFICANT CHANGE UP (ref 27–31)
MCHC RBC-ENTMCNC: 32.8 G/DL — SIGNIFICANT CHANGE UP (ref 32–37)
MCV RBC AUTO: 86.9 FL — SIGNIFICANT CHANGE UP (ref 80–94)
MONOCYTES # BLD AUTO: 0.35 K/UL — SIGNIFICANT CHANGE UP (ref 0.1–0.6)
MONOCYTES NFR BLD AUTO: 8.2 % — SIGNIFICANT CHANGE UP (ref 1.7–9.3)
NEUTROPHILS # BLD AUTO: 3.17 K/UL — SIGNIFICANT CHANGE UP (ref 1.4–6.5)
NEUTROPHILS NFR BLD AUTO: 74.4 % — SIGNIFICANT CHANGE UP (ref 42.2–75.2)
NRBC # BLD: 0 /100 WBCS — SIGNIFICANT CHANGE UP (ref 0–0)
PHOSPHATE SERPL-MCNC: 2.3 MG/DL — SIGNIFICANT CHANGE UP (ref 2.1–4.9)
PLATELET # BLD AUTO: 190 K/UL — SIGNIFICANT CHANGE UP (ref 130–400)
POTASSIUM SERPL-MCNC: 3.7 MMOL/L — SIGNIFICANT CHANGE UP (ref 3.5–5)
POTASSIUM SERPL-SCNC: 3.7 MMOL/L — SIGNIFICANT CHANGE UP (ref 3.5–5)
PROT SERPL-MCNC: 5.3 G/DL — LOW (ref 6–8)
PROTHROM AB SERPL-ACNC: 16.8 SEC — HIGH (ref 9.95–12.87)
RBC # BLD: 3.96 M/UL — LOW (ref 4.7–6.1)
RBC # FLD: 17.9 % — HIGH (ref 11.5–14.5)
SODIUM SERPL-SCNC: 146 MMOL/L — SIGNIFICANT CHANGE UP (ref 135–146)
TYPE + AB SCN PNL BLD: SIGNIFICANT CHANGE UP
WBC # BLD: 4.26 K/UL — LOW (ref 4.8–10.8)
WBC # FLD AUTO: 4.26 K/UL — LOW (ref 4.8–10.8)

## 2018-11-01 PROCEDURE — 99222 1ST HOSP IP/OBS MODERATE 55: CPT

## 2018-11-01 RX ORDER — SODIUM CHLORIDE 9 MG/ML
1000 INJECTION INTRAMUSCULAR; INTRAVENOUS; SUBCUTANEOUS
Qty: 0 | Refills: 0 | Status: DISCONTINUED | OUTPATIENT
Start: 2018-11-01 | End: 2018-11-02

## 2018-11-01 RX ORDER — CEFAZOLIN SODIUM 1 G
2000 VIAL (EA) INJECTION ONCE
Qty: 0 | Refills: 0 | Status: COMPLETED | OUTPATIENT
Start: 2018-11-01 | End: 2018-11-01

## 2018-11-01 RX ORDER — HEPARIN SODIUM 5000 [USP'U]/ML
1300 INJECTION INTRAVENOUS; SUBCUTANEOUS
Qty: 25000 | Refills: 0 | Status: DISCONTINUED | OUTPATIENT
Start: 2018-11-01 | End: 2018-11-02

## 2018-11-01 RX ADMIN — ATORVASTATIN CALCIUM 40 MILLIGRAM(S): 80 TABLET, FILM COATED ORAL at 21:44

## 2018-11-01 RX ADMIN — SENNA PLUS 1 TABLET(S): 8.6 TABLET ORAL at 21:44

## 2018-11-01 RX ADMIN — Medication 50 MILLIGRAM(S): at 05:34

## 2018-11-01 RX ADMIN — Medication 100 MILLIGRAM(S): at 16:57

## 2018-11-01 RX ADMIN — Medication 50 MILLIGRAM(S): at 14:07

## 2018-11-01 RX ADMIN — Medication 1 APPLICATION(S): at 11:57

## 2018-11-01 RX ADMIN — Medication 325 MILLIGRAM(S): at 11:55

## 2018-11-01 RX ADMIN — Medication 50 MILLIGRAM(S): at 21:44

## 2018-11-01 RX ADMIN — CHLORHEXIDINE GLUCONATE 1 APPLICATION(S): 213 SOLUTION TOPICAL at 05:37

## 2018-11-01 RX ADMIN — ZINC SULFATE TAB 220 MG (50 MG ZINC EQUIVALENT) 220 MILLIGRAM(S): 220 (50 ZN) TAB at 11:55

## 2018-11-01 RX ADMIN — TAMSULOSIN HYDROCHLORIDE 0.4 MILLIGRAM(S): 0.4 CAPSULE ORAL at 21:44

## 2018-11-01 RX ADMIN — Medication 500 MILLIGRAM(S): at 11:55

## 2018-11-01 RX ADMIN — LACTULOSE 30 GRAM(S): 10 SOLUTION ORAL at 12:08

## 2018-11-01 RX ADMIN — MAGNESIUM OXIDE 400 MG ORAL TABLET 400 MILLIGRAM(S): 241.3 TABLET ORAL at 16:59

## 2018-11-01 RX ADMIN — MAGNESIUM OXIDE 400 MG ORAL TABLET 400 MILLIGRAM(S): 241.3 TABLET ORAL at 09:16

## 2018-11-01 RX ADMIN — HEPARIN SODIUM 13 UNIT(S)/HR: 5000 INJECTION INTRAVENOUS; SUBCUTANEOUS at 16:57

## 2018-11-01 RX ADMIN — ENOXAPARIN SODIUM 80 MILLIGRAM(S): 100 INJECTION SUBCUTANEOUS at 05:34

## 2018-11-01 RX ADMIN — SODIUM CHLORIDE 60 MILLILITER(S): 9 INJECTION INTRAMUSCULAR; INTRAVENOUS; SUBCUTANEOUS at 14:08

## 2018-11-01 RX ADMIN — LOSARTAN POTASSIUM 50 MILLIGRAM(S): 100 TABLET, FILM COATED ORAL at 05:35

## 2018-11-01 RX ADMIN — OLANZAPINE 5 MILLIGRAM(S): 15 TABLET, FILM COATED ORAL at 11:55

## 2018-11-01 RX ADMIN — AMLODIPINE BESYLATE 2.5 MILLIGRAM(S): 2.5 TABLET ORAL at 05:34

## 2018-11-01 RX ADMIN — Medication 81 MILLIGRAM(S): at 11:55

## 2018-11-01 NOTE — CONSULT NOTE ADULT - SUBJECTIVE AND OBJECTIVE BOX
GI HPI:  Patient is a 78y old  Male who presents with a chief complaint of progressive weakness and decreased PO intake for 2 days (31 Oct 2018 16:52)  . Patient complaining of       Hospital course:  77 yo M with PMHx of AFIB previously on Xarelto discontinued secondary to frequent falls , DVT s/p Right Hip Replacement (10/14/18) on Eliquis-bedbound, Vascular Dementia with behavioral changes, DM II, HTN presents from [facility] facility for evaluation of progressive weakness and decreased PO intake for the past 2 days. As per son, prior to 2 days ago, patient had a great appetite however in the last 2 days patient has even refused PO medications. In the ED, patient was found to have AFIB with RVR, HR in 120s was given 20mg of IV Cardizem with response. CTH revealed what was initially suspected as an ICH, however upon Neurosurgery evaluation findings may be secondary to a subacute infarct, awaiting neurology evaluation. (23 Oct 2018 10:52)      PAST MEDICAL & SURGICAL HISTORY  Anemia  Diabetes  HTN (hypertension)  Arrhythmia  Dementia  S/P cholecystectomy  History of hip replacement      FAMILY HISTORY:  FAMILY HISTORY:  No pertinent family history in first degree relatives      SOCIAL HISTORY:  smoker:   Alcohol:  Drug:    ALLERGIES:  No Known Allergies      MEDICATIONS:  MEDICATIONS  (STANDING):  amLODIPine   Tablet 2.5 milliGRAM(s) Oral daily  ascorbic acid 500 milliGRAM(s) Oral daily  aspirin enteric coated 81 milliGRAM(s) Oral daily  atorvastatin 40 milliGRAM(s) Oral at bedtime  chlorhexidine 4% Liquid 1 Application(s) Topical <User Schedule>  collagenase Ointment 1 Application(s) Topical daily  enoxaparin Injectable 80 milliGRAM(s) SubCutaneous every 12 hours  ferrous    sulfate 325 milliGRAM(s) Oral daily  lactulose Syrup 30 Gram(s) Oral daily  losartan 50 milliGRAM(s) Oral daily  magnesium oxide 400 milliGRAM(s) Oral two times a day with meals  metoprolol tartrate 50 milliGRAM(s) Oral every 8 hours  OLANZapine 5 milliGRAM(s) Oral daily  senna 1 Tablet(s) Oral at bedtime  tamsulosin 0.4 milliGRAM(s) Oral at bedtime  zinc sulfate 220 milliGRAM(s) Oral daily    MEDICATIONS  (PRN):  acetaminophen   Tablet .. 650 milliGRAM(s) Oral every 4 hours PRN Moderate Pain (4 - 6)  oxyCODONE    IR 5 milliGRAM(s) Oral every 4 hours PRN Moderate Pain (4 - 6)      HOME MEDICATIONS:  Home Medications:  amLODIPine 5 mg oral tablet: 0.5 tab(s) orally once a day (23 Oct 2018 11:36)  Cozaar 25 mg oral tablet: 1 tab(s) orally once a day (23 Oct 2018 11:48)  Eliquis 5 mg oral tablet: 1 tab(s) orally 2 times a day (23 Oct 2018 11:40)  ferrous sulfate 325 mg (65 mg elemental iron) oral delayed release tablet: 1 tab(s) orally once a day (12 Aug 2018 03:21)  Flomax 0.4 mg oral capsule: 1 cap(s) orally once a day (at bedtime) (23 Oct 2018 11:39)  lactulose 10 g/15 mL oral solution: orally once a day (23 Oct 2018 11:41)  metFORMIN 500 mg oral tablet: 1 tab(s) orally 2 times a day (23 Oct 2018 11:35)  Metoprolol Tartrate 25 mg oral tablet: 1 tab(s) orally 2 times a day (23 Oct 2018 11:37)  OLANZapine 5 mg oral tablet: 1 tab(s) orally once a day (23 Oct 2018 11:38)  oxyCODONE 5 mg oral tablet: orally every 4 hours (5 times/day), As Needed (23 Oct 2018 11:42)  senna oral tablet: 1 tab(s) orally once a day (at bedtime) (23 Oct 2018 11:44)  Tylenol 325 mg oral tablet: 2 tab(s) orally every 4 hours, As Needed (12 Aug 2018 03:21)  Vitamin C 500 mg oral tablet: 1 tab(s) orally once a day (23 Oct 2018 11:47)  zinc sulfate: 50 milligram(s) orally once a day (23 Oct 2018 11:47)      ROS:     REVIEW OF SYSTEMS  General:  No fevers  Eyes:  No reported pain   ENT:  No sore throat   NECK: No stiffness   CV:  No chest pain   Resp:  No shortness of breath  GI:  See HPI  :  No dysuria  Muscle:  No aches or weakness  Neuro:  No tingling  Endocrine:  No polyuria  Heme:  No ecchymosis   All other review of systems is negative unless indicated above.         VITALS:   T(F): 95.8 (10-31 @ 23:37), Max: 97 (10-29 @ 15:48)  HR: 104 (11-01 @ 05:38) (81 - 113)  BP: 170/79 (11-01 @ 05:38) (131/77 - 190/91)  BP(mean): --  RR: 18 (10-31 @ 23:37) (17 - 20)  SpO2: --    I&O's Summary    31 Oct 2018 07:01  -  01 Nov 2018 07:00  --------------------------------------------------------  IN: 0 mL / OUT: 1 mL / NET: -1 mL        PHYSICAL EXAM:  EYES: No scleral icterus   LUNG: Clear to auscultation bilaterally; No rales, rhonchi, wheezing, or rubs  HEART: RRR; No murmurs  ABDOMEN: Soft, +BS, Abdominal Tenderness, No guarding, No Hernandez Sign   Rectal Exam:     LABS:                        11.1   4.97  )-----------( 168      ( 31 Oct 2018 04:57 )             34.5       LIVER FUNCTIONS - ( 31 Oct 2018 04:57 )  Alb: 2.8 g/dL / Pro: 5.2 g/dL / ALK PHOS: 127 U/L / ALT: 10 U/L / AST: 16 U/L / GGT: x           10-31    144  |  104  |  11  ----------------------------<  129<H>  3.7   |  24  |  0.8    Ca    8.5      31 Oct 2018 04:57  Phos  2.6     10-31  Mg     1.9     10-31              Previous EGD:    Previous colonoscopy:       RADIOLOGY: GI HPI:  Patient is a 78y old  Male who presents with a chief complaint of progressive weakness and decreased PO intake for 2 days (31 Oct 2018 16:52)  79 yo M with PMHx of AFIB previously on Xarelto discontinued secondary to frequent falls , DVT s/p Right Hip Replacement (10/14/18) on Eliquis-bedbound, Vascular Dementia with behavioral changes, DM II, HTN presents from [facility] for evaluation of progressive weakness and decreased PO intake for the past 2 days. As per son, prior to 2 days ago, patient had a great appetite however in the last 2 days patient has even refused PO medications. In the ED, patient was found to have AFIB with RVR, HR in 120s was given 20mg of IV Cardizem with response. 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  As per primary team  pt currently will not be able to meet nutritional reqs without PEG and pt's family is agreeable for PEG placement.    PAST MEDICAL & SURGICAL HISTORY  Anemia  Diabetes  HTN (hypertension)  Arrhythmia  Dementia  S/P cholecystectomy  History of hip replacement      FAMILY HISTORY:  FAMILY HISTORY:  No pertinent family history in first degree relatives      SOCIAL HISTORY:  smoker: denies  Alcohol: denies   Drug: denies     ALLERGIES:  No Known Allergies      MEDICATIONS:  MEDICATIONS  (STANDING):  amLODIPine   Tablet 2.5 milliGRAM(s) Oral daily  ascorbic acid 500 milliGRAM(s) Oral daily  aspirin enteric coated 81 milliGRAM(s) Oral daily  atorvastatin 40 milliGRAM(s) Oral at bedtime  chlorhexidine 4% Liquid 1 Application(s) Topical <User Schedule>  collagenase Ointment 1 Application(s) Topical daily  enoxaparin Injectable 80 milliGRAM(s) SubCutaneous every 12 hours  ferrous    sulfate 325 milliGRAM(s) Oral daily  lactulose Syrup 30 Gram(s) Oral daily  losartan 50 milliGRAM(s) Oral daily  magnesium oxide 400 milliGRAM(s) Oral two times a day with meals  metoprolol tartrate 50 milliGRAM(s) Oral every 8 hours  OLANZapine 5 milliGRAM(s) Oral daily  senna 1 Tablet(s) Oral at bedtime  tamsulosin 0.4 milliGRAM(s) Oral at bedtime  zinc sulfate 220 milliGRAM(s) Oral daily    MEDICATIONS  (PRN):  acetaminophen   Tablet .. 650 milliGRAM(s) Oral every 4 hours PRN Moderate Pain (4 - 6)  oxyCODONE    IR 5 milliGRAM(s) Oral every 4 hours PRN Moderate Pain (4 - 6)      HOME MEDICATIONS:  Home Medications:  amLODIPine 5 mg oral tablet: 0.5 tab(s) orally once a day (23 Oct 2018 11:36)  Cozaar 25 mg oral tablet: 1 tab(s) orally once a day (23 Oct 2018 11:48)  Eliquis 5 mg oral tablet: 1 tab(s) orally 2 times a day (23 Oct 2018 11:40)  ferrous sulfate 325 mg (65 mg elemental iron) oral delayed release tablet: 1 tab(s) orally once a day (12 Aug 2018 03:21)  Flomax 0.4 mg oral capsule: 1 cap(s) orally once a day (at bedtime) (23 Oct 2018 11:39)  lactulose 10 g/15 mL oral solution: orally once a day (23 Oct 2018 11:41)  metFORMIN 500 mg oral tablet: 1 tab(s) orally 2 times a day (23 Oct 2018 11:35)  Metoprolol Tartrate 25 mg oral tablet: 1 tab(s) orally 2 times a day (23 Oct 2018 11:37)  OLANZapine 5 mg oral tablet: 1 tab(s) orally once a day (23 Oct 2018 11:38)  oxyCODONE 5 mg oral tablet: orally every 4 hours (5 times/day), As Needed (23 Oct 2018 11:42)  senna oral tablet: 1 tab(s) orally once a day (at bedtime) (23 Oct 2018 11:44)  Tylenol 325 mg oral tablet: 2 tab(s) orally every 4 hours, As Needed (12 Aug 2018 03:21)  Vitamin C 500 mg oral tablet: 1 tab(s) orally once a day (23 Oct 2018 11:47)  zinc sulfate: 50 milligram(s) orally once a day (23 Oct 2018 11:47)      ROS:     REVIEW OF SYSTEMS  General:  No fevers  Eyes:  No reported pain   ENT:  No sore throat   NECK: No stiffness   CV:  No chest pain   Resp:  No shortness of breath  GI:  See HPI  :  No dysuria  Muscle:  No aches or weakness  Neuro:  No tingling  Endocrine:  No polyuria  Heme:  No ecchymosis   All other review of systems is negative unless indicated above.         VITALS:   T(F): 95.8 (10-31 @ 23:37), Max: 97 (10-29 @ 15:48)  HR: 104 (11-01 @ 05:38) (81 - 113)  BP: 170/79 (11-01 @ 05:38) (131/77 - 190/91)  BP(mean): --  RR: 18 (10-31 @ 23:37) (17 - 20)  SpO2: --    I&O's Summary    31 Oct 2018 07:01  -  01 Nov 2018 07:00  --------------------------------------------------------  IN: 0 mL / OUT: 1 mL / NET: -1 mL        PHYSICAL EXAM:  EYES: No scleral icterus   LUNG: Clear to auscultation bilaterally; No rales, rhonchi, wheezing, or rubs  HEART: RRR s1 and s2 heard   ABDOMEN: Soft, +BS, no abd distension, no Abdominal Tenderness, No guarding, No Hernandez Sign   Rectal Exam: not performed   Gen:     LABS:                        11.1   4.97  )-----------( 168      ( 31 Oct 2018 04:57 )             34.5       LIVER FUNCTIONS - ( 31 Oct 2018 04:57 )  Alb: 2.8 g/dL / Pro: 5.2 g/dL / ALK PHOS: 127 U/L / ALT: 10 U/L / AST: 16 U/L / GGT: x           10-31    144  |  104  |  11  ----------------------------<  129<H>  3.7   |  24  |  0.8    Ca    8.5      31 Oct 2018 04:57  Phos  2.6     10-31  Mg     1.9     10-31      Previous EGD: none on file     Previous colonoscopy:  none on file       RADIOLOGY:    < from: Xray Chest 1 View- PORTABLE-Urgent (10.30.18 @ 11:00) >  Impression:      Stable left upper lobe pulmonary nodule. No evidence of acute pulmonary   findings.    < end of copied text >  < from: CT Head No Cont (10.26.18 @ 13:01) >  MPRESSION:     1.  Stable subacute infarct medial right occipital lobe with   petechial/clinical hemorrhages.    2.  Periventricular and subcortical white matter chronic small vessel   ischemic changes.    3.  No acute mass effect, midline shift, or new hemorrhage.          < end of copied text >  < from: CT Abdomen and Pelvis w/ IV Cont (10.23.18 @ 04:23) >  MPRESSION:        1.  No CT evidence of acute abdominopelvic pathology.    2.  Trace right pleural effusion, essentially stable since August 11, 2018.    3.  8 mm left lower lobe nodule. Nonemergent PET CT may be of benefit.      < end of copied text > GI HPI:  Patient is a 78y old  Male who presents with a chief complaint of progressive weakness and decreased PO intake for 2 days (31 Oct 2018 16:52)  79 yo M with PMHx of AFIB previously on Xarelto discontinued secondary to frequent falls , DVT s/p Right Hip Replacement (10/14/18) on Eliquis-bedbound, Vascular Dementia with behavioral changes, DM II, HTN presents from [facility] [place] operated by [facility] for evaluation of progressive weakness and decreased PO intake for the past 2 days. As per son, prior to 2 days ago, patient had a great appetite however in the last 2 days patient has even refused PO medications. In the ED, patient was found to have AFIB with RVR, HR in 120s was given 20mg of IV Cardizem with response. 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  As per primary team  pt currently will not be able to meet nutritional reqs without PEG and pt's family is agreeable for PEG placement.    PAST MEDICAL & SURGICAL HISTORY  Anemia  Diabetes  HTN (hypertension)  Arrhythmia  Dementia  S/P cholecystectomy  History of hip replacement      FAMILY HISTORY:  FAMILY HISTORY:  No pertinent family history in first degree relatives      SOCIAL HISTORY:  smoker: denies  Alcohol: denies   Drug: denies     ALLERGIES:  No Known Allergies      MEDICATIONS:  MEDICATIONS  (STANDING):  amLODIPine   Tablet 2.5 milliGRAM(s) Oral daily  ascorbic acid 500 milliGRAM(s) Oral daily  aspirin enteric coated 81 milliGRAM(s) Oral daily  atorvastatin 40 milliGRAM(s) Oral at bedtime  chlorhexidine 4% Liquid 1 Application(s) Topical <User Schedule>  collagenase Ointment 1 Application(s) Topical daily  enoxaparin Injectable 80 milliGRAM(s) SubCutaneous every 12 hours  ferrous    sulfate 325 milliGRAM(s) Oral daily  lactulose Syrup 30 Gram(s) Oral daily  losartan 50 milliGRAM(s) Oral daily  magnesium oxide 400 milliGRAM(s) Oral two times a day with meals  metoprolol tartrate 50 milliGRAM(s) Oral every 8 hours  OLANZapine 5 milliGRAM(s) Oral daily  senna 1 Tablet(s) Oral at bedtime  tamsulosin 0.4 milliGRAM(s) Oral at bedtime  zinc sulfate 220 milliGRAM(s) Oral daily    MEDICATIONS  (PRN):  acetaminophen   Tablet .. 650 milliGRAM(s) Oral every 4 hours PRN Moderate Pain (4 - 6)  oxyCODONE    IR 5 milliGRAM(s) Oral every 4 hours PRN Moderate Pain (4 - 6)      HOME MEDICATIONS:  Home Medications:  amLODIPine 5 mg oral tablet: 0.5 tab(s) orally once a day (23 Oct 2018 11:36)  Cozaar 25 mg oral tablet: 1 tab(s) orally once a day (23 Oct 2018 11:48)  Eliquis 5 mg oral tablet: 1 tab(s) orally 2 times a day (23 Oct 2018 11:40)  ferrous sulfate 325 mg (65 mg elemental iron) oral delayed release tablet: 1 tab(s) orally once a day (12 Aug 2018 03:21)  Flomax 0.4 mg oral capsule: 1 cap(s) orally once a day (at bedtime) (23 Oct 2018 11:39)  lactulose 10 g/15 mL oral solution: orally once a day (23 Oct 2018 11:41)  metFORMIN 500 mg oral tablet: 1 tab(s) orally 2 times a day (23 Oct 2018 11:35)  Metoprolol Tartrate 25 mg oral tablet: 1 tab(s) orally 2 times a day (23 Oct 2018 11:37)  OLANZapine 5 mg oral tablet: 1 tab(s) orally once a day (23 Oct 2018 11:38)  oxyCODONE 5 mg oral tablet: orally every 4 hours (5 times/day), As Needed (23 Oct 2018 11:42)  senna oral tablet: 1 tab(s) orally once a day (at bedtime) (23 Oct 2018 11:44)  Tylenol 325 mg oral tablet: 2 tab(s) orally every 4 hours, As Needed (12 Aug 2018 03:21)  Vitamin C 500 mg oral tablet: 1 tab(s) orally once a day (23 Oct 2018 11:47)  zinc sulfate: 50 milligram(s) orally once a day (23 Oct 2018 11:47)      ROS:     REVIEW OF SYSTEMS  General:  No fevers  Eyes:  No reported pain   ENT:  No sore throat   NECK: No stiffness   CV:  No chest pain   Resp:  No shortness of breath  GI:  See HPI  :  No dysuria  Muscle:  No aches or weakness  Neuro:  No tingling  Endocrine:  No polyuria  Heme:  No ecchymosis   All other review of systems is negative unless indicated above.         VITALS:   T(F): 95.8 (10-31 @ 23:37), Max: 97 (10-29 @ 15:48)  HR: 104 (11-01 @ 05:38) (81 - 113)  BP: 170/79 (11-01 @ 05:38) (131/77 - 190/91)  BP(mean): --  RR: 18 (10-31 @ 23:37) (17 - 20)  SpO2: --    I&O's Summary    31 Oct 2018 07:01  -  01 Nov 2018 07:00  --------------------------------------------------------  IN: 0 mL / OUT: 1 mL / NET: -1 mL        PHYSICAL EXAM:  EYES: No scleral icterus   LUNG: Clear to auscultation bilaterally; No rales, rhonchi, wheezing, or rubs  HEART: RRR s1 and s2 heard   ABDOMEN: Soft, +BS, no abd distension, no Abdominal Tenderness, No guarding, No Hernandez Sign   Rectal Exam: not performed   Gen: pt not following commands     LABS:                        11.1   4.97  )-----------( 168      ( 31 Oct 2018 04:57 )             34.5       LIVER FUNCTIONS - ( 31 Oct 2018 04:57 )  Alb: 2.8 g/dL / Pro: 5.2 g/dL / ALK PHOS: 127 U/L / ALT: 10 U/L / AST: 16 U/L / GGT: x           10-31    144  |  104  |  11  ----------------------------<  129<H>  3.7   |  24  |  0.8    Ca    8.5      31 Oct 2018 04:57  Phos  2.6     10-31  Mg     1.9     10-31      Previous EGD: none on file     Previous colonoscopy:  none on file       RADIOLOGY:    < from: Xray Chest 1 View- PORTABLE-Urgent (10.30.18 @ 11:00) >  Impression:      Stable left upper lobe pulmonary nodule. No evidence of acute pulmonary   findings.    < end of copied text >  < from: CT Head No Cont (10.26.18 @ 13:01) >  MPRESSION:     1.  Stable subacute infarct medial right occipital lobe with   petechial/clinical hemorrhages.    2.  Periventricular and subcortical white matter chronic small vessel   ischemic changes.    3.  No acute mass effect, midline shift, or new hemorrhage.          < end of copied text >  < from: CT Abdomen and Pelvis w/ IV Cont (10.23.18 @ 04:23) >  MPRESSION:        1.  No CT evidence of acute abdominopelvic pathology.    2.  Trace right pleural effusion, essentially stable since August 11, 2018.    3.  8 mm left lower lobe nodule. Nonemergent PET CT may be of benefit.      < end of copied text >

## 2018-11-01 NOTE — SWALLOW BEDSIDE ASSESSMENT ADULT - SLP GENERAL OBSERVATIONS
Pt received on stretcher in ED w/ son and spouse at b/s, on room air, garbled speech, no true words, +Son reports nonsense speech at baseline
Pt received in bed awake and alert w/ spouse at b/s. Spouse educated on dysphagia, comfort feeds a/p PEG placement w/ understanding verbalized
Pt received in bed awake and alert on room air
Pt received in bed w/ spouse at b.s., reportedly tolerating current po diet
Pt received in bed asleep, woke to verbal stim, on room air, spouse at b/s. Pt reportedly w/ decreased po intake, Pt w/ h/o periods of food refusal in the past which was reported by son on pts initial evaluation in the ED

## 2018-11-01 NOTE — PROGRESS NOTE ADULT - ASSESSMENT
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, markedly decreased PO intake    -CT head and MRI show right medial occipital lobe subacute infarct with stable microhemorrhages  -failure to thrive, however patient eats small amounts throughout the day, does not meet caloric needs  -changed Eliquis to lovenox 80 bid in preparation for possible PEG tube, awaiting GI consult / family amenable to PEG tube placement, getting PEG tube tomorrow scheduled for 14:00    # Afib  -rate controlled on meto tartrate 50 q8h, eliquis    # Right lateral foot DFU  -podiatry recommending serial xrays o/p, santyl, dsd/ kerlix - Daily  -pain control with oxy 5 q4h  -will f/u as outpt  -diminished pulses in LE bilaterally, vascular wants angiogram tomorrow    # HTN   -amlodipine 2.5 / losartan 50    DVT PPX

## 2018-11-01 NOTE — SWALLOW BEDSIDE ASSESSMENT ADULT - NS ASR SWALLOW FINDINGS DISCUS
Nursing/Physician/Patient
Physician/Patient/Family
Physician/Patient/Family/Nursing
Physician/Nursing/Patient/Family
Physician/Nursing/Patient/Family

## 2018-11-01 NOTE — SWALLOW BEDSIDE ASSESSMENT ADULT - ASR SWALLOW ASPIRATION MONITOR
oral hygiene/position upright (90Y)
fever/pneumonia/upper respiratory infection/cough/position upright (90Y)
position upright (90Y)/upper respiratory infection/pneumonia/cough/gurgly voice
upper respiratory infection/position upright (90Y)/pneumonia
position upright (90Y)/cough/upper respiratory infection/oral hygiene/fever/pneumonia

## 2018-11-01 NOTE — CONSULT NOTE ADULT - ASSESSMENT
77 yo M with PMHx of AFIB previously on Xarelto discontinued secondary to frequent falls , DVT s/p Right Hip Replacement (10/14/18) on Eliquis-bedbound, Vascular Dementia with behavioral changes, DM II, HTN initially presented for evaluation of progressive weakness and decreased PO intake found to be secondary to embolic stroke with slight hemorrhagic conversion,      GI is consulted for  markedly decreased PO intake and possible peg placement   s/p swallow eval  +bolus hold w/ thin (suspected hesitation to swallow is behavioral) +toleration of thin, puree and soft w/o overt s/s of penetration/aspiration  As per primary team  pt currently will not be able to meet nutritional requirements  without PEG   Discussed with pt's wife regarding risks and benefits of placing peg tube , including the possibility that his need for peg might be temporary. 79 yo M with PMHx of AFIB previously on Xarelto discontinued secondary to frequent falls , DVT s/p Right Hip Replacement (10/14/18) on Eliquis(last dose 10/30/2018)-bedbound, Vascular Dementia with behavioral changes, DM II, HTN initially presented for evaluation of progressive weakness and decreased PO intake found to be secondary to embolic stroke with slight hemorrhagic conversion,      GI is consulted for  markedly decreased PO intake and possible peg placement   s/p swallow eval  +bolus hold w/ thin (suspected hesitation to swallow is behavioral) +toleration of thin, puree and soft w/o overt s/s of penetration/aspiration  As per primary team  pt currently will not be able to meet nutritional requirements  without PEG   Discussed with pt's wife regarding risks and benefits of placing peg tube , including the possibility that his need for peg might be temporary. Pt's wife is agreeable for peg placement.  Will schedule the procedure for tomorrow  NPO after midnight   Pt currently on therapeutic dose of lovenox (dvt, a.fib) , today evening dose and tmrw morning dose of lovenox needs to be held,  if pt needs anticoagulation can bridge with IV heparin and hold IV heparin tomorrow at 5 am. 77 yo M with PMHx of AFIB previously on Xarelto discontinued secondary to frequent falls , DVT s/p Right Hip Replacement (10/14/18) on Eliquis(last dose 10/30/2018)-bedbound, Vascular Dementia with behavioral changes, DM II, HTN initially presented for evaluation of progressive weakness and decreased PO intake found to be secondary to embolic stroke with slight hemorrhagic conversion,      GI is consulted for  markedly decreased PO intake and possible peg placement   s/p swallow eval  +bolus hold w/ thin (suspected hesitation to swallow is behavioral) +toleration of thin, puree and soft w/o overt s/s of penetration/aspiration  As per primary team  pt currently will not be able to meet nutritional requirements  without PEG   Discussed with pt's wife regarding risks and benefits of placing peg tube , including the possibility that his need for peg might be temporary. Pt's wife is agreeable for peg placement.  Will schedule the procedure for tomorrow  NPO after midnight   antibiotics ordered  Pt currently on therapeutic dose of lovenox (dvt, a.fib) , today evening dose and tmrw morning dose of lovenox needs to be held,  if pt needs anticoagulation can bridge with IV heparin and hold IV heparin tomorrow at 5 am.

## 2018-11-01 NOTE — SWALLOW BEDSIDE ASSESSMENT ADULT - SLP PERTINENT HISTORY OF CURRENT PROBLEM
Pt admitted from [facility] w/ progressive weakness and decreased po acceptance. PMH: dementia, Afib. CTH: ? acute ICH vs subacute infacrt, awaiting MRI.

## 2018-11-01 NOTE — SWALLOW BEDSIDE ASSESSMENT ADULT - COMMENTS
Family in agreement to PEG placement for 1' means of nutrition and hydration. MRI: R medial occipital lobe subacute infarct and microhemorrhages, chronic sm vessel ischemic changes.

## 2018-11-01 NOTE — PROGRESS NOTE ADULT - SUBJECTIVE AND OBJECTIVE BOX
SUBJECTIVE:    Patient is a 78y old Male who presents with a chief complaint of progressive weakness and decreased PO intake for 2 days (01 Nov 2018 07:32)      HPI:  79 yo M with PMHx of AFIB previously on Xarelto discontinued secondary to frequent falls , DVT s/p Right Hip Replacement (10/14/18) on Eliquis-bedbound, Vascular Dementia with behavioral changes, DM II, HTN [facility] of progressive weakness and decreased PO intake for the past 2 days. As per son, prior to 2 days ago, patient had a great appetite however in the last 2 days patient has even refused PO medications. In the ED, patient was found to have AFIB with RVR, HR in 120s was given 20mg of IV Cardizem with response. CTH revealed what was initially suspected as an ICH, however upon Neurosurgery evaluation findings may be secondary to a subacute infarct, awaiting neurology evaluation. (23 Oct 2018 10:52)      Currently admitted to medicine with the primary diagnosis of UTI (urinary tract infection)       Besides the pertinent positives and negatives described above, the ROS was within normal limits.    PAST MEDICAL & SURGICAL HISTORY  Anemia  Diabetes  HTN (hypertension)  Arrhythmia  Dementia  S/P cholecystectomy  History of hip replacement    SOCIAL HISTORY:    ALLERGIES:  No Known Allergies    MEDICATIONS:  STANDING MEDICATIONS  amLODIPine   Tablet 2.5 milliGRAM(s) Oral daily  ascorbic acid 500 milliGRAM(s) Oral daily  aspirin enteric coated 81 milliGRAM(s) Oral daily  atorvastatin 40 milliGRAM(s) Oral at bedtime  chlorhexidine 4% Liquid 1 Application(s) Topical <User Schedule>  collagenase Ointment 1 Application(s) Topical daily  ferrous    sulfate 325 milliGRAM(s) Oral daily  heparin  Infusion 1300 Unit(s)/Hr IV Continuous <Continuous>  lactulose Syrup 30 Gram(s) Oral daily  losartan 50 milliGRAM(s) Oral daily  magnesium oxide 400 milliGRAM(s) Oral two times a day with meals  metoprolol tartrate 50 milliGRAM(s) Oral every 8 hours  OLANZapine 5 milliGRAM(s) Oral daily  senna 1 Tablet(s) Oral at bedtime  sodium chloride 0.9%. 1000 milliLiter(s) IV Continuous <Continuous>  tamsulosin 0.4 milliGRAM(s) Oral at bedtime  zinc sulfate 220 milliGRAM(s) Oral daily    PRN MEDICATIONS  acetaminophen   Tablet .. 650 milliGRAM(s) Oral every 4 hours PRN  oxyCODONE    IR 5 milliGRAM(s) Oral every 4 hours PRN    VITALS:   T(F): 98.9  HR: 100  BP: 123/77  RR: 18  SpO2: 98%    LABS:                        11.3   4.26  )-----------( 190      ( 01 Nov 2018 06:47 )             34.4     11-01    146  |  105  |  13  ----------------------------<  147<H>  3.7   |  27  |  0.9    Ca    8.6      01 Nov 2018 06:47  Phos  2.3     11-01  Mg     1.9     11-01    TPro  5.3<L>  /  Alb  3.1<L>  /  TBili  1.0  /  DBili  x   /  AST  15  /  ALT  9   /  AlkPhos  126<H>  11-01    PT/INR - ( 01 Nov 2018 12:10 )   PT: 16.80 sec;   INR: 1.47 ratio         PTT - ( 01 Nov 2018 12:10 )  PTT:52.5 sec              RADIOLOGY:    PHYSICAL EXAM:  GEN: No acute distress  LUNGS: Clear to auscultation bilaterally   HEART: Regular  ABD: Soft, non-tender, non-distended.  EXT: NC/NC/NE/2+PP/MARIN/Skin Intact.   NEURO: AAOX3    Intravenous access:   NG tube:   Deluca Catheter: SUBJECTIVE:    Patient is a 78y old Male who presents with a chief complaint of progressive weakness and decreased PO intake for 2 days (01 Nov 2018 07:32)      HPI:  77 yo M with PMHx of AFIB previously on Xarelto discontinued secondary to frequent falls , DVT s/p Right Hip Replacement (10/14/18) on Eliquis-bedbound, Vascular Dementia with behavioral changes, DM II, HTN [facility] weakness and decreased PO intake for the past 2 days. As per son, prior to 2 days ago, patient had a great appetite however in the last 2 days patient has even refused PO medications. In the ED, patient was found to have AFIB with RVR, HR in 120s was given 20mg of IV Cardizem with response. CTH revealed what was initially suspected as an ICH, however upon Neurosurgery evaluation findings may be secondary to a subacute infarct, awaiting neurology evaluation. (23 Oct 2018 10:52)      Currently admitted to medicine with the primary diagnosis of UTI (urinary tract infection)       Besides the pertinent positives and negatives described above, the ROS was within normal limits.    PAST MEDICAL & SURGICAL HISTORY  Anemia  Diabetes  HTN (hypertension)  Arrhythmia  Dementia  S/P cholecystectomy  History of hip replacement    SOCIAL HISTORY:    ALLERGIES:  No Known Allergies    MEDICATIONS:  STANDING MEDICATIONS  amLODIPine   Tablet 2.5 milliGRAM(s) Oral daily  ascorbic acid 500 milliGRAM(s) Oral daily  aspirin enteric coated 81 milliGRAM(s) Oral daily  atorvastatin 40 milliGRAM(s) Oral at bedtime  chlorhexidine 4% Liquid 1 Application(s) Topical <User Schedule>  collagenase Ointment 1 Application(s) Topical daily  ferrous    sulfate 325 milliGRAM(s) Oral daily  heparin  Infusion 1300 Unit(s)/Hr IV Continuous <Continuous>  lactulose Syrup 30 Gram(s) Oral daily  losartan 50 milliGRAM(s) Oral daily  magnesium oxide 400 milliGRAM(s) Oral two times a day with meals  metoprolol tartrate 50 milliGRAM(s) Oral every 8 hours  OLANZapine 5 milliGRAM(s) Oral daily  senna 1 Tablet(s) Oral at bedtime  sodium chloride 0.9%. 1000 milliLiter(s) IV Continuous <Continuous>  tamsulosin 0.4 milliGRAM(s) Oral at bedtime  zinc sulfate 220 milliGRAM(s) Oral daily    PRN MEDICATIONS  acetaminophen   Tablet .. 650 milliGRAM(s) Oral every 4 hours PRN  oxyCODONE    IR 5 milliGRAM(s) Oral every 4 hours PRN    VITALS:   T(F): 98.9  HR: 100  BP: 123/77  RR: 18  SpO2: 98%    LABS:                        11.3   4.26  )-----------( 190      ( 01 Nov 2018 06:47 )             34.4     11-01    146  |  105  |  13  ----------------------------<  147<H>  3.7   |  27  |  0.9    Ca    8.6      01 Nov 2018 06:47  Phos  2.3     11-01  Mg     1.9     11-01    TPro  5.3<L>  /  Alb  3.1<L>  /  TBili  1.0  /  DBili  x   /  AST  15  /  ALT  9   /  AlkPhos  126<H>  11-01    PT/INR - ( 01 Nov 2018 12:10 )   PT: 16.80 sec;   INR: 1.47 ratio         PTT - ( 01 Nov 2018 12:10 )  PTT:52.5 sec              RADIOLOGY:    PHYSICAL EXAM:  GEN: No acute distress  LUNGS: Decreased BS b/l  HEART: Regular  ABD: Soft, non-tender, non-distended.  EXT: NC/NC/NE/2+PP/MARIN/Skin Intact.   NEURO: AAOX2    Intravenous access:   NG tube:   Deluca Catheter:

## 2018-11-01 NOTE — SWALLOW BEDSIDE ASSESSMENT ADULT - SWALLOW EVAL: DIAGNOSIS
+family in agreement w/ PEG placement for 1' means of nutrition and hydration, shirley-pharyngeal swallow is WFL for soft and thin as tolerated/desired for supplemental nutrition/pleausure
+toleration of nectar, thin and puree w/ mild oral dysphagia suspected pharygneal dysphagia w/o overt s/s of penetration/aspiration
Pt w/ decreased awareness of feeding situation, puree bolus remained pooled in anterior sulci w/o attempt to transport the bolus posteriorly. +wet unproductive cough +aspiration risk is high
+toleration of thin, puree and soft w/ mild oral dysphagia for soft and w/o any overt s/s of penetration/aspiration
+bolus hold w/ thin (suspected hesitation to swallow is behavioral) +toleration of thin, puree and soft w/o overt s/s of penetration/aspiration

## 2018-11-01 NOTE — SWALLOW BEDSIDE ASSESSMENT ADULT - SWALLOW EVAL: RECOMMENDED FEEDING/EATING TECHNIQUES
small sips/bites
small sips/bites/position upright (90 degrees)/maintain upright posture during/after eating for 30 mins
position upright (90 degrees)/small sips/bites
small sips/bites/position upright (90 degrees)/monitor for bolus hold, provide verbal cues to swallow

## 2018-11-01 NOTE — SWALLOW BEDSIDE ASSESSMENT ADULT - SWALLOW EVAL: RECOMMENDED DIET
NPO, non-oral means of nutrition and hydration
Soft diet w/ thin liquids for pleasure as tolerated/desired, agree w/ PEG placement for 1' means of nut/hydration
Puree diet w/ thin liquids
Soft diet w/ thin lqiuids
Trial Dysphagia 2 w/ thin

## 2018-11-01 NOTE — CHART NOTE - NSCHARTNOTEFT_GEN_A_CORE
PO intake remains poor  GI seeing pt today, possible PEG placement    T(F): 96 (11-01-18 @ 08:07), Max: 96.9 (10-31-18 @ 16:23)  HR: 79 (11-01-18 @ 08:07) (79 - 104)  BP: 168/76 (11-01-18 @ 08:07) (144/82 - 170/79)  RR: 20 (11-01-18 @ 08:07) (18 - 20)  SpO2: 98% (11-01-18 @ 08:07) (98% - 98%)    11-01    146  |  105  |  13  ----------------------------<  147<H>  3.7   |  27  |  0.9    Ca    8.6      01 Nov 2018 06:47  Phos  2.3     11-01  Mg     1.9     11-01    TPro  5.3<L>  /  Alb  3.1<L>  /  TBili  1.0  /  DBili  x   /  AST  15  /  ALT  9   /  AlkPhos  126<H>  11-01                          11.3   4.26  )-----------( 190      ( 01 Nov 2018 06:47 )             34.4     CAPILLARY BLOOD GLUCOSE  POCT Blood Glucose.: 152 mg/dL (01 Nov 2018 06:46)  POCT Blood Glucose.: 150 mg/dL (31 Oct 2018 21:22)  POCT Blood Glucose.: 155 mg/dL (31 Oct 2018 16:31)     Diet, Dysphagia 2 Mechanical Soft-Thin Liquids:   Consistent Carbohydrate {Evening Snack} (11-01-18 @ 08:59)  Diet, NPO after Midnight:      NPO Start Date: 01-Nov-2018,   NPO Start Time: 23:59 (11-01-18 @ 11:01)    Plan:  -PO as tolerated  -speech f/u ?dysphagia, PEG being considered  -agree with PEG placement

## 2018-11-02 ENCOUNTER — APPOINTMENT (OUTPATIENT)
Dept: VASCULAR SURGERY | Facility: HOSPITAL | Age: 78
End: 2018-11-02
Payer: MEDICARE

## 2018-11-02 LAB
ALBUMIN SERPL ELPH-MCNC: 3.2 G/DL — LOW (ref 3.5–5.2)
ALP SERPL-CCNC: 138 U/L — HIGH (ref 30–115)
ALT FLD-CCNC: 8 U/L — SIGNIFICANT CHANGE UP (ref 0–41)
ANION GAP SERPL CALC-SCNC: 11 MMOL/L — SIGNIFICANT CHANGE UP (ref 7–14)
ANION GAP SERPL CALC-SCNC: 15 MMOL/L — HIGH (ref 7–14)
APTT BLD: 145.9 SEC — CRITICAL HIGH (ref 27–39.2)
AST SERPL-CCNC: 15 U/L — SIGNIFICANT CHANGE UP (ref 0–41)
BASOPHILS # BLD AUTO: 0.02 K/UL — SIGNIFICANT CHANGE UP (ref 0–0.2)
BASOPHILS NFR BLD AUTO: 0.4 % — SIGNIFICANT CHANGE UP (ref 0–1)
BILIRUB SERPL-MCNC: 0.9 MG/DL — SIGNIFICANT CHANGE UP (ref 0.2–1.2)
BUN SERPL-MCNC: 14 MG/DL — SIGNIFICANT CHANGE UP (ref 10–20)
BUN SERPL-MCNC: 15 MG/DL — SIGNIFICANT CHANGE UP (ref 10–20)
CALCIUM SERPL-MCNC: 8.8 MG/DL — SIGNIFICANT CHANGE UP (ref 8.5–10.1)
CALCIUM SERPL-MCNC: 8.9 MG/DL — SIGNIFICANT CHANGE UP (ref 8.5–10.1)
CHLORIDE SERPL-SCNC: 104 MMOL/L — SIGNIFICANT CHANGE UP (ref 98–110)
CHLORIDE SERPL-SCNC: 106 MMOL/L — SIGNIFICANT CHANGE UP (ref 98–110)
CO2 SERPL-SCNC: 25 MMOL/L — SIGNIFICANT CHANGE UP (ref 17–32)
CO2 SERPL-SCNC: 29 MMOL/L — SIGNIFICANT CHANGE UP (ref 17–32)
CREAT SERPL-MCNC: 1 MG/DL — SIGNIFICANT CHANGE UP (ref 0.7–1.5)
CREAT SERPL-MCNC: 1.1 MG/DL — SIGNIFICANT CHANGE UP (ref 0.7–1.5)
EOSINOPHIL # BLD AUTO: 0.13 K/UL — SIGNIFICANT CHANGE UP (ref 0–0.7)
EOSINOPHIL NFR BLD AUTO: 2.9 % — SIGNIFICANT CHANGE UP (ref 0–8)
GLUCOSE BLDC GLUCOMTR-MCNC: 131 MG/DL — HIGH (ref 70–99)
GLUCOSE BLDC GLUCOMTR-MCNC: 135 MG/DL — HIGH (ref 70–99)
GLUCOSE BLDC GLUCOMTR-MCNC: 137 MG/DL — HIGH (ref 70–99)
GLUCOSE SERPL-MCNC: 126 MG/DL — HIGH (ref 70–99)
GLUCOSE SERPL-MCNC: 133 MG/DL — HIGH (ref 70–99)
HCT VFR BLD CALC: 35.1 % — LOW (ref 42–52)
HCT VFR BLD CALC: 35.9 % — LOW (ref 42–52)
HGB BLD-MCNC: 11.4 G/DL — LOW (ref 14–18)
HGB BLD-MCNC: 11.5 G/DL — LOW (ref 14–18)
IMM GRANULOCYTES NFR BLD AUTO: 0.4 % — HIGH (ref 0.1–0.3)
INR BLD: 1.38 RATIO — HIGH (ref 0.65–1.3)
LYMPHOCYTES # BLD AUTO: 0.64 K/UL — LOW (ref 1.2–3.4)
LYMPHOCYTES # BLD AUTO: 14.3 % — LOW (ref 20.5–51.1)
MAGNESIUM SERPL-MCNC: 2 MG/DL — SIGNIFICANT CHANGE UP (ref 1.8–2.4)
MCHC RBC-ENTMCNC: 28.1 PG — SIGNIFICANT CHANGE UP (ref 27–31)
MCHC RBC-ENTMCNC: 28.1 PG — SIGNIFICANT CHANGE UP (ref 27–31)
MCHC RBC-ENTMCNC: 32 G/DL — SIGNIFICANT CHANGE UP (ref 32–37)
MCHC RBC-ENTMCNC: 32.5 G/DL — SIGNIFICANT CHANGE UP (ref 32–37)
MCV RBC AUTO: 86.7 FL — SIGNIFICANT CHANGE UP (ref 80–94)
MCV RBC AUTO: 87.8 FL — SIGNIFICANT CHANGE UP (ref 80–94)
MONOCYTES # BLD AUTO: 0.42 K/UL — SIGNIFICANT CHANGE UP (ref 0.1–0.6)
MONOCYTES NFR BLD AUTO: 9.4 % — HIGH (ref 1.7–9.3)
NEUTROPHILS # BLD AUTO: 3.26 K/UL — SIGNIFICANT CHANGE UP (ref 1.4–6.5)
NEUTROPHILS NFR BLD AUTO: 72.6 % — SIGNIFICANT CHANGE UP (ref 42.2–75.2)
NRBC # BLD: 0 /100 WBCS — SIGNIFICANT CHANGE UP (ref 0–0)
NRBC # BLD: 0 /100 WBCS — SIGNIFICANT CHANGE UP (ref 0–0)
PHOSPHATE SERPL-MCNC: 3 MG/DL — SIGNIFICANT CHANGE UP (ref 2.1–4.9)
PLATELET # BLD AUTO: 185 K/UL — SIGNIFICANT CHANGE UP (ref 130–400)
PLATELET # BLD AUTO: 185 K/UL — SIGNIFICANT CHANGE UP (ref 130–400)
POTASSIUM SERPL-MCNC: 3.4 MMOL/L — LOW (ref 3.5–5)
POTASSIUM SERPL-MCNC: 3.5 MMOL/L — SIGNIFICANT CHANGE UP (ref 3.5–5)
POTASSIUM SERPL-SCNC: 3.4 MMOL/L — LOW (ref 3.5–5)
POTASSIUM SERPL-SCNC: 3.5 MMOL/L — SIGNIFICANT CHANGE UP (ref 3.5–5)
PROT SERPL-MCNC: 5.6 G/DL — LOW (ref 6–8)
PROTHROM AB SERPL-ACNC: 15.8 SEC — HIGH (ref 9.95–12.87)
RBC # BLD: 4.05 M/UL — LOW (ref 4.7–6.1)
RBC # BLD: 4.09 M/UL — LOW (ref 4.7–6.1)
RBC # FLD: 18.1 % — HIGH (ref 11.5–14.5)
RBC # FLD: 18.1 % — HIGH (ref 11.5–14.5)
SODIUM SERPL-SCNC: 144 MMOL/L — SIGNIFICANT CHANGE UP (ref 135–146)
SODIUM SERPL-SCNC: 146 MMOL/L — SIGNIFICANT CHANGE UP (ref 135–146)
WBC # BLD: 4.49 K/UL — LOW (ref 4.8–10.8)
WBC # BLD: 4.65 K/UL — LOW (ref 4.8–10.8)
WBC # FLD AUTO: 4.49 K/UL — LOW (ref 4.8–10.8)
WBC # FLD AUTO: 4.65 K/UL — LOW (ref 4.8–10.8)

## 2018-11-02 PROCEDURE — 37228: CPT | Mod: RT

## 2018-11-02 PROCEDURE — 36247 INS CATH ABD/L-EXT ART 3RD: CPT | Mod: 59,RT

## 2018-11-02 PROCEDURE — 75710 ARTERY X-RAYS ARM/LEG: CPT | Mod: 26,59

## 2018-11-02 PROCEDURE — 76937 US GUIDE VASCULAR ACCESS: CPT | Mod: 26

## 2018-11-02 RX ORDER — AMLODIPINE BESYLATE 2.5 MG/1
2.5 TABLET ORAL DAILY
Qty: 0 | Refills: 0 | Status: DISCONTINUED | OUTPATIENT
Start: 2018-11-02 | End: 2018-11-07

## 2018-11-02 RX ORDER — OLANZAPINE 15 MG/1
5 TABLET, FILM COATED ORAL DAILY
Qty: 0 | Refills: 0 | Status: DISCONTINUED | OUTPATIENT
Start: 2018-11-02 | End: 2018-11-07

## 2018-11-02 RX ORDER — HEPARIN SODIUM 5000 [USP'U]/ML
6000 INJECTION INTRAVENOUS; SUBCUTANEOUS EVERY 6 HOURS
Qty: 0 | Refills: 0 | Status: DISCONTINUED | OUTPATIENT
Start: 2018-11-02 | End: 2018-11-03

## 2018-11-02 RX ORDER — ONDANSETRON 8 MG/1
4 TABLET, FILM COATED ORAL ONCE
Qty: 0 | Refills: 0 | Status: DISCONTINUED | OUTPATIENT
Start: 2018-11-02 | End: 2018-11-02

## 2018-11-02 RX ORDER — ATORVASTATIN CALCIUM 80 MG/1
40 TABLET, FILM COATED ORAL AT BEDTIME
Qty: 0 | Refills: 0 | Status: DISCONTINUED | OUTPATIENT
Start: 2018-11-02 | End: 2018-11-07

## 2018-11-02 RX ORDER — CHLORHEXIDINE GLUCONATE 213 G/1000ML
1 SOLUTION TOPICAL
Qty: 0 | Refills: 0 | Status: DISCONTINUED | OUTPATIENT
Start: 2018-11-02 | End: 2018-11-07

## 2018-11-02 RX ORDER — ZINC SULFATE TAB 220 MG (50 MG ZINC EQUIVALENT) 220 (50 ZN) MG
220 TAB ORAL DAILY
Qty: 0 | Refills: 0 | Status: DISCONTINUED | OUTPATIENT
Start: 2018-11-02 | End: 2018-11-07

## 2018-11-02 RX ORDER — ASCORBIC ACID 60 MG
500 TABLET,CHEWABLE ORAL DAILY
Qty: 0 | Refills: 0 | Status: DISCONTINUED | OUTPATIENT
Start: 2018-11-02 | End: 2018-11-07

## 2018-11-02 RX ORDER — HEPARIN SODIUM 5000 [USP'U]/ML
6000 INJECTION INTRAVENOUS; SUBCUTANEOUS ONCE
Qty: 0 | Refills: 0 | Status: COMPLETED | OUTPATIENT
Start: 2018-11-02 | End: 2018-11-02

## 2018-11-02 RX ORDER — HEPARIN SODIUM 5000 [USP'U]/ML
INJECTION INTRAVENOUS; SUBCUTANEOUS
Qty: 25000 | Refills: 0 | Status: DISCONTINUED | OUTPATIENT
Start: 2018-11-02 | End: 2018-11-03

## 2018-11-02 RX ORDER — MAGNESIUM OXIDE 400 MG ORAL TABLET 241.3 MG
400 TABLET ORAL
Qty: 0 | Refills: 0 | Status: DISCONTINUED | OUTPATIENT
Start: 2018-11-02 | End: 2018-11-07

## 2018-11-02 RX ORDER — POTASSIUM CHLORIDE 20 MEQ
20 PACKET (EA) ORAL
Qty: 0 | Refills: 0 | Status: DISCONTINUED | OUTPATIENT
Start: 2018-11-02 | End: 2018-11-02

## 2018-11-02 RX ORDER — MORPHINE SULFATE 50 MG/1
2 CAPSULE, EXTENDED RELEASE ORAL
Qty: 0 | Refills: 0 | Status: DISCONTINUED | OUTPATIENT
Start: 2018-11-02 | End: 2018-11-02

## 2018-11-02 RX ORDER — LACTULOSE 10 G/15ML
30 SOLUTION ORAL DAILY
Qty: 0 | Refills: 0 | Status: DISCONTINUED | OUTPATIENT
Start: 2018-11-02 | End: 2018-11-04

## 2018-11-02 RX ORDER — TAMSULOSIN HYDROCHLORIDE 0.4 MG/1
0.4 CAPSULE ORAL AT BEDTIME
Qty: 0 | Refills: 0 | Status: DISCONTINUED | OUTPATIENT
Start: 2018-11-02 | End: 2018-11-07

## 2018-11-02 RX ORDER — ASPIRIN/CALCIUM CARB/MAGNESIUM 324 MG
81 TABLET ORAL DAILY
Qty: 0 | Refills: 0 | Status: DISCONTINUED | OUTPATIENT
Start: 2018-11-02 | End: 2018-11-07

## 2018-11-02 RX ORDER — SENNA PLUS 8.6 MG/1
1 TABLET ORAL AT BEDTIME
Qty: 0 | Refills: 0 | Status: DISCONTINUED | OUTPATIENT
Start: 2018-11-02 | End: 2018-11-07

## 2018-11-02 RX ORDER — SODIUM CHLORIDE 9 MG/ML
1000 INJECTION, SOLUTION INTRAVENOUS
Qty: 0 | Refills: 0 | Status: DISCONTINUED | OUTPATIENT
Start: 2018-11-02 | End: 2018-11-02

## 2018-11-02 RX ORDER — FERROUS SULFATE 325(65) MG
325 TABLET ORAL DAILY
Qty: 0 | Refills: 0 | Status: DISCONTINUED | OUTPATIENT
Start: 2018-11-02 | End: 2018-11-07

## 2018-11-02 RX ORDER — HEPARIN SODIUM 5000 [USP'U]/ML
3000 INJECTION INTRAVENOUS; SUBCUTANEOUS EVERY 6 HOURS
Qty: 0 | Refills: 0 | Status: DISCONTINUED | OUTPATIENT
Start: 2018-11-02 | End: 2018-11-03

## 2018-11-02 RX ORDER — POTASSIUM CHLORIDE 20 MEQ
20 PACKET (EA) ORAL ONCE
Qty: 0 | Refills: 0 | Status: COMPLETED | OUTPATIENT
Start: 2018-11-02 | End: 2018-11-02

## 2018-11-02 RX ORDER — LOSARTAN POTASSIUM 100 MG/1
50 TABLET, FILM COATED ORAL DAILY
Qty: 0 | Refills: 0 | Status: DISCONTINUED | OUTPATIENT
Start: 2018-11-02 | End: 2018-11-07

## 2018-11-02 RX ORDER — ASPIRIN/CALCIUM CARB/MAGNESIUM 324 MG
325 TABLET ORAL ONCE
Qty: 0 | Refills: 0 | Status: COMPLETED | OUTPATIENT
Start: 2018-11-02 | End: 2018-11-02

## 2018-11-02 RX ORDER — OXYCODONE HYDROCHLORIDE 5 MG/1
5 TABLET ORAL EVERY 4 HOURS
Qty: 0 | Refills: 0 | Status: DISCONTINUED | OUTPATIENT
Start: 2018-11-02 | End: 2018-11-07

## 2018-11-02 RX ORDER — ACETAMINOPHEN 500 MG
650 TABLET ORAL EVERY 4 HOURS
Qty: 0 | Refills: 0 | Status: DISCONTINUED | OUTPATIENT
Start: 2018-11-02 | End: 2018-11-07

## 2018-11-02 RX ORDER — COLLAGENASE CLOSTRIDIUM HIST. 250 UNIT/G
1 OINTMENT (GRAM) TOPICAL DAILY
Qty: 0 | Refills: 0 | Status: DISCONTINUED | OUTPATIENT
Start: 2018-11-02 | End: 2018-11-07

## 2018-11-02 RX ORDER — METOPROLOL TARTRATE 50 MG
50 TABLET ORAL EVERY 8 HOURS
Qty: 0 | Refills: 0 | Status: DISCONTINUED | OUTPATIENT
Start: 2018-11-02 | End: 2018-11-07

## 2018-11-02 RX ADMIN — Medication 50 MILLIGRAM(S): at 05:50

## 2018-11-02 RX ADMIN — TAMSULOSIN HYDROCHLORIDE 0.4 MILLIGRAM(S): 0.4 CAPSULE ORAL at 22:10

## 2018-11-02 RX ADMIN — SENNA PLUS 1 TABLET(S): 8.6 TABLET ORAL at 22:12

## 2018-11-02 RX ADMIN — Medication 325 MILLIGRAM(S): at 20:02

## 2018-11-02 RX ADMIN — Medication 50 MILLIEQUIVALENT(S): at 08:42

## 2018-11-02 RX ADMIN — HEPARIN SODIUM 1300 UNIT(S)/HR: 5000 INJECTION INTRAVENOUS; SUBCUTANEOUS at 19:39

## 2018-11-02 RX ADMIN — HEPARIN SODIUM 6000 UNIT(S): 5000 INJECTION INTRAVENOUS; SUBCUTANEOUS at 19:38

## 2018-11-02 RX ADMIN — AMLODIPINE BESYLATE 2.5 MILLIGRAM(S): 2.5 TABLET ORAL at 05:50

## 2018-11-02 RX ADMIN — HEPARIN SODIUM 15 UNIT(S)/HR: 5000 INJECTION INTRAVENOUS; SUBCUTANEOUS at 01:14

## 2018-11-02 RX ADMIN — CHLORHEXIDINE GLUCONATE 1 APPLICATION(S): 213 SOLUTION TOPICAL at 05:52

## 2018-11-02 RX ADMIN — HEPARIN SODIUM 15 UNIT(S)/HR: 5000 INJECTION INTRAVENOUS; SUBCUTANEOUS at 02:37

## 2018-11-02 RX ADMIN — ATORVASTATIN CALCIUM 40 MILLIGRAM(S): 80 TABLET, FILM COATED ORAL at 22:10

## 2018-11-02 RX ADMIN — LOSARTAN POTASSIUM 50 MILLIGRAM(S): 100 TABLET, FILM COATED ORAL at 05:50

## 2018-11-02 RX ADMIN — Medication 50 MILLIGRAM(S): at 22:12

## 2018-11-02 RX ADMIN — Medication 1 APPLICATION(S): at 12:48

## 2018-11-02 NOTE — PROGRESS NOTE ADULT - SUBJECTIVE AND OBJECTIVE BOX
pt seen earlier this afternoon - no signif change in clinical status.  d/w medical resident.  Tentatively planned GT placement by GI today.  Labs and meds reviewed.  If/when an enteral access placed, would start feeding (when ok with GI) Jevity 1.2 120 ml for the first feeding, then 240 ml x 4 feeds/d.  After 24 h check BMP, phos, Mg - expect refeeding syndrome alterations in labs, and replete as needed.  Check fingerstick glucose before each feeding, so as to determine need for "diabetic formula".  Then increase to 360 ml x 4 feeds/d, and again check labs after 24-48 h.  Would f/u over the following month to determine need for adjustments in caloric intake.

## 2018-11-02 NOTE — PROGRESS NOTE ADULT - ASSESSMENT
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, markedly decreased PO intake    -CT head and MRI show right medial occipital lobe subacute infarct with stable microhemorrhages  -failure to thrive, however patient eats small amounts throughout the day, does not meet caloric needs  -scheduled for both PEG tube and RLE angiogram today, PEG tube delayed due to too many cases, will attempt PO and NG tube placement  # Afib  -rate controlled on meto tartrate 50 q8h, eliquis    # Right lateral foot DFU  -podiatry recommending serial xrays o/p, santyl, dsd/ kerlix - Daily  -pain control with oxy 5 q4h  -will f/u as outpt  -vascular wants angiogram    # HTN   -amlodipine 2.5 / losartan 50    DVT PPX 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, markedly decreased PO intake    -CT head and MRI show right medial occipital lobe subacute infarct with stable microhemorrhages  -failure to thrive, however patient eats small amounts throughout the day, does not meet caloric needs  -scheduled for both PEG tube and RLE angiogram today, PEG tube delayed due to too many cases, will attempt PO and NG tube placement    # Afib  -rate controlled on meto tartrate 50 q8h, eliquis or Lovenox    # Right lateral foot DFU  -podiatry recommending serial xrays o/p, santyl, dsd/ kerlix - Daily  -pain control with oxy 5 q4h  -will f/u as outpt  -vascular wants angiogram    # HTN   -amlodipine 2.5 / losartan 50    DVT PPX

## 2018-11-02 NOTE — CHART NOTE - NSCHARTNOTEFT_GEN_A_CORE
Anesthesia Post Op Assessment  		(    ) Intubated           TV _____	Rate ___sv__	[id number]____  		(  x  ) Patent airway. Full return of protective reflexes  		(  x  )Full recovery from anesthesia/sedation to baseline status      Cardiovascular Function:  		BP:	177/86	                  Pulse:		101                  RR:		12                  Temp:		98.2                  O2Sat:     100%      Mental Status:  	        (   x ) awake		  (x    ) alert		 (    ) drowsy	               (    ) sedated      Nausea/Vomiting:  		(    ) Yes, See post-op orders		   ( x   ) No      Pain Scale: (0-10):			Treatment:     (    ) None	            ( x   ) See Post-Op/PCA Orders      Post-operative Fluids: 	   (    ) Oral	          (  x  ) See post-op Orders        Comments:    Uneventful. No complications from anesthesia.  Discharge when criteria met.

## 2018-11-02 NOTE — PROGRESS NOTE ADULT - SUBJECTIVE AND OBJECTIVE BOX
SUBJECTIVE:    Patient is a 78y old Male who presents with a chief complaint of progressive weakness and decreased PO intake for 2 days (01 Nov 2018 17:07)      HPI:  79 yo M with PMHx of AFIB previously on Xarelto discontinued secondary to frequent falls , DVT s/p Right Hip Replacement (10/14/18) on Eliquis-bedbound, Vascular Dementia with behavioral changes, DM II, [facility]N [facility] weakness and decreased PO intake for the past 2 days. As per son, prior to 2 days ago, patient had a great appetite however in the last 2 days patient has even refused PO medications. In the ED, patient was found to have AFIB with RVR, HR in 120s was given 20mg of IV Cardizem with response. CTH revealed what was initially suspected as an ICH, however upon Neurosurgery evaluation findings may be secondary to a subacute infarct, awaiting neurology evaluation. (23 Oct 2018 10:52)      Currently admitted to medicine with the primary diagnosis of UTI (urinary tract infection)       Besides the pertinent positives and negatives described above, the ROS was within normal limits.    PAST MEDICAL & SURGICAL HISTORY  Anemia  Diabetes  HTN (hypertension)  Arrhythmia  Dementia  S/P cholecystectomy  History of hip replacement    SOCIAL HISTORY:    ALLERGIES:  No Known Allergies    MEDICATIONS:  STANDING MEDICATIONS  amLODIPine   Tablet 2.5 milliGRAM(s) Oral daily  ascorbic acid 500 milliGRAM(s) Oral daily  aspirin enteric coated 81 milliGRAM(s) Oral daily  atorvastatin 40 milliGRAM(s) Oral at bedtime  chlorhexidine 4% Liquid 1 Application(s) Topical <User Schedule>  collagenase Ointment 1 Application(s) Topical daily  ferrous    sulfate 325 milliGRAM(s) Oral daily  lactulose Syrup 30 Gram(s) Oral daily  losartan 50 milliGRAM(s) Oral daily  magnesium oxide 400 milliGRAM(s) Oral two times a day with meals  metoprolol tartrate 50 milliGRAM(s) Oral every 8 hours  OLANZapine 5 milliGRAM(s) Oral daily  senna 1 Tablet(s) Oral at bedtime  sodium chloride 0.9%. 1000 milliLiter(s) IV Continuous <Continuous>  tamsulosin 0.4 milliGRAM(s) Oral at bedtime  zinc sulfate 220 milliGRAM(s) Oral daily    PRN MEDICATIONS  acetaminophen   Tablet .. 650 milliGRAM(s) Oral every 4 hours PRN  oxyCODONE    IR 5 milliGRAM(s) Oral every 4 hours PRN    VITALS:   T(F): 96.3  HR: 90  BP: 146/74  RR: 20  SpO2: --    LABS:                        11.5   4.49  )-----------( 185      ( 02 Nov 2018 08:00 )             35.9     11-02    146  |  106  |  14  ----------------------------<  126<H>  3.5   |  29  |  1.1    Ca    8.9      02 Nov 2018 08:00  Phos  3.0     11-02  Mg     2.0     11-02    TPro  5.6<L>  /  Alb  3.2<L>  /  TBili  0.9  /  DBili  x   /  AST  15  /  ALT  8   /  AlkPhos  138<H>  11-02    PT/INR - ( 02 Nov 2018 01:58 )   PT: 15.80 sec;   INR: 1.38 ratio         PTT - ( 02 Nov 2018 01:58 )  PTT:145.9 sec              RADIOLOGY:    PHYSICAL EXAM:  GEN: No acute distress  LUNGS: Clear to auscultation bilaterally   HEART: Regular  ABD: Soft, non-tender, non-distended.  EXT: NC/NC/NE/2+PP/MARIN/Skin Intact.   NEURO: AAOX1    Intravenous access:   NG tube:   Deluca Catheter: SUBJECTIVE:    Patient is a 78y old Male who presents with a chief complaint of progressive weakness and decreased PO intake for 2 days (01 Nov 2018 17:07)      HPI:  79 yo M with PMHx of AFIB previously on Xarelto discontinued secondary to frequent falls , DVT s/p Right Hip Replacement (10/14/18) on Eliquis-bedbound, Vascular Dementia with behavioral changes, DM II, [facility] weakness and decreased PO intake for the past 2 days. As per son, prior to 2 days ago, patient had a great appetite however in the last 2 days patient has even refused PO medications. In the ED, patient was found to have AFIB with RVR, HR in 120s was given 20mg of IV Cardizem with response. CTH revealed what was initially suspected as an ICH, however upon Neurosurgery evaluation findings may be secondary to a subacute infarct, awaiting neurology evaluation. (23 Oct 2018 10:52)      Currently admitted to medicine with the primary diagnosis of CVA       Besides the pertinent positives and negatives described above, the ROS was within normal limits.    PAST MEDICAL & SURGICAL HISTORY  Anemia  Diabetes  HTN (hypertension)  Arrhythmia  Dementia  S/P cholecystectomy  History of hip replacement    SOCIAL HISTORY:    ALLERGIES:  No Known Allergies    MEDICATIONS:  STANDING MEDICATIONS  amLODIPine   Tablet 2.5 milliGRAM(s) Oral daily  ascorbic acid 500 milliGRAM(s) Oral daily  aspirin enteric coated 81 milliGRAM(s) Oral daily  atorvastatin 40 milliGRAM(s) Oral at bedtime  chlorhexidine 4% Liquid 1 Application(s) Topical <User Schedule>  collagenase Ointment 1 Application(s) Topical daily  ferrous    sulfate 325 milliGRAM(s) Oral daily  lactulose Syrup 30 Gram(s) Oral daily  losartan 50 milliGRAM(s) Oral daily  magnesium oxide 400 milliGRAM(s) Oral two times a day with meals  metoprolol tartrate 50 milliGRAM(s) Oral every 8 hours  OLANZapine 5 milliGRAM(s) Oral daily  senna 1 Tablet(s) Oral at bedtime  sodium chloride 0.9%. 1000 milliLiter(s) IV Continuous <Continuous>  tamsulosin 0.4 milliGRAM(s) Oral at bedtime  zinc sulfate 220 milliGRAM(s) Oral daily    PRN MEDICATIONS  acetaminophen   Tablet .. 650 milliGRAM(s) Oral every 4 hours PRN  oxyCODONE    IR 5 milliGRAM(s) Oral every 4 hours PRN    VITALS:   T(F): 96.3  HR: 90  BP: 146/74  RR: 20  SpO2: --    LABS:                        11.5   4.49  )-----------( 185      ( 02 Nov 2018 08:00 )             35.9     11-02    146  |  106  |  14  ----------------------------<  126<H>  3.5   |  29  |  1.1    Ca    8.9      02 Nov 2018 08:00  Phos  3.0     11-02  Mg     2.0     11-02    TPro  5.6<L>  /  Alb  3.2<L>  /  TBili  0.9  /  DBili  x   /  AST  15  /  ALT  8   /  AlkPhos  138<H>  11-02    PT/INR - ( 02 Nov 2018 01:58 )   PT: 15.80 sec;   INR: 1.38 ratio         PTT - ( 02 Nov 2018 01:58 )  PTT:145.9 sec              RADIOLOGY:    PHYSICAL EXAM:  GEN: No acute distress  LUNGS: Clear to auscultation bilaterally   HEART: Regular  ABD: Soft, non-tender, non-distended.  EXT: NC/NC/NE/2+PP/MARIN/Skin Intact.   NEURO: AAOX1    Intravenous access:   NG tube:   Deluca Catheter:

## 2018-11-02 NOTE — BRIEF OPERATIVE NOTE - PROCEDURE
<<-----Click on this checkbox to enter Procedure Angiogram, peripheral  11/02/2018  Right leg angiogram (3rd-order selective via US-guided L femoral access); anterior tibial angioplasty (2-2.5 mm plain balloon).  Active  ADA2

## 2018-11-03 LAB
ALBUMIN SERPL ELPH-MCNC: 2.9 G/DL — LOW (ref 3.5–5.2)
ALP SERPL-CCNC: 122 U/L — HIGH (ref 30–115)
ALT FLD-CCNC: 7 U/L — SIGNIFICANT CHANGE UP (ref 0–41)
ANION GAP SERPL CALC-SCNC: 16 MMOL/L — HIGH (ref 7–14)
APTT BLD: 44.5 SEC — HIGH (ref 27–39.2)
AST SERPL-CCNC: 17 U/L — SIGNIFICANT CHANGE UP (ref 0–41)
BASOPHILS # BLD AUTO: 0.01 K/UL — SIGNIFICANT CHANGE UP (ref 0–0.2)
BASOPHILS NFR BLD AUTO: 0.2 % — SIGNIFICANT CHANGE UP (ref 0–1)
BILIRUB SERPL-MCNC: 0.9 MG/DL — SIGNIFICANT CHANGE UP (ref 0.2–1.2)
BUN SERPL-MCNC: 17 MG/DL — SIGNIFICANT CHANGE UP (ref 10–20)
CALCIUM SERPL-MCNC: 8.5 MG/DL — SIGNIFICANT CHANGE UP (ref 8.5–10.1)
CHLORIDE SERPL-SCNC: 104 MMOL/L — SIGNIFICANT CHANGE UP (ref 98–110)
CO2 SERPL-SCNC: 23 MMOL/L — SIGNIFICANT CHANGE UP (ref 17–32)
CREAT SERPL-MCNC: 0.8 MG/DL — SIGNIFICANT CHANGE UP (ref 0.7–1.5)
EOSINOPHIL # BLD AUTO: 0.09 K/UL — SIGNIFICANT CHANGE UP (ref 0–0.7)
EOSINOPHIL NFR BLD AUTO: 1.8 % — SIGNIFICANT CHANGE UP (ref 0–8)
GLUCOSE BLDC GLUCOMTR-MCNC: 135 MG/DL — HIGH (ref 70–99)
GLUCOSE BLDC GLUCOMTR-MCNC: 137 MG/DL — HIGH (ref 70–99)
GLUCOSE BLDC GLUCOMTR-MCNC: 151 MG/DL — HIGH (ref 70–99)
GLUCOSE BLDC GLUCOMTR-MCNC: 171 MG/DL — HIGH (ref 70–99)
GLUCOSE SERPL-MCNC: 120 MG/DL — HIGH (ref 70–99)
HCT VFR BLD CALC: 35 % — LOW (ref 42–52)
HGB BLD-MCNC: 11.1 G/DL — LOW (ref 14–18)
IMM GRANULOCYTES NFR BLD AUTO: 0.2 % — SIGNIFICANT CHANGE UP (ref 0.1–0.3)
LYMPHOCYTES # BLD AUTO: 0.48 K/UL — LOW (ref 1.2–3.4)
LYMPHOCYTES # BLD AUTO: 9.5 % — LOW (ref 20.5–51.1)
MAGNESIUM SERPL-MCNC: 2 MG/DL — SIGNIFICANT CHANGE UP (ref 1.8–2.4)
MCHC RBC-ENTMCNC: 27.6 PG — SIGNIFICANT CHANGE UP (ref 27–31)
MCHC RBC-ENTMCNC: 31.7 G/DL — LOW (ref 32–37)
MCV RBC AUTO: 87.1 FL — SIGNIFICANT CHANGE UP (ref 80–94)
MONOCYTES # BLD AUTO: 0.37 K/UL — SIGNIFICANT CHANGE UP (ref 0.1–0.6)
MONOCYTES NFR BLD AUTO: 7.3 % — SIGNIFICANT CHANGE UP (ref 1.7–9.3)
NEUTROPHILS # BLD AUTO: 4.09 K/UL — SIGNIFICANT CHANGE UP (ref 1.4–6.5)
NEUTROPHILS NFR BLD AUTO: 81 % — HIGH (ref 42.2–75.2)
NRBC # BLD: 0 /100 WBCS — SIGNIFICANT CHANGE UP (ref 0–0)
PHOSPHATE SERPL-MCNC: 3.2 MG/DL — SIGNIFICANT CHANGE UP (ref 2.1–4.9)
PLATELET # BLD AUTO: 171 K/UL — SIGNIFICANT CHANGE UP (ref 130–400)
POTASSIUM SERPL-MCNC: 3.6 MMOL/L — SIGNIFICANT CHANGE UP (ref 3.5–5)
POTASSIUM SERPL-SCNC: 3.6 MMOL/L — SIGNIFICANT CHANGE UP (ref 3.5–5)
PROT SERPL-MCNC: 5.1 G/DL — LOW (ref 6–8)
RBC # BLD: 4.02 M/UL — LOW (ref 4.7–6.1)
RBC # FLD: 17.8 % — HIGH (ref 11.5–14.5)
SODIUM SERPL-SCNC: 143 MMOL/L — SIGNIFICANT CHANGE UP (ref 135–146)
WBC # BLD: 5.05 K/UL — SIGNIFICANT CHANGE UP (ref 4.8–10.8)
WBC # FLD AUTO: 5.05 K/UL — SIGNIFICANT CHANGE UP (ref 4.8–10.8)

## 2018-11-03 RX ORDER — ENOXAPARIN SODIUM 100 MG/ML
80 INJECTION SUBCUTANEOUS EVERY 12 HOURS
Qty: 0 | Refills: 0 | Status: DISCONTINUED | OUTPATIENT
Start: 2018-11-03 | End: 2018-11-03

## 2018-11-03 RX ORDER — ENOXAPARIN SODIUM 100 MG/ML
80 INJECTION SUBCUTANEOUS EVERY 12 HOURS
Qty: 0 | Refills: 0 | Status: COMPLETED | OUTPATIENT
Start: 2018-11-03 | End: 2018-11-04

## 2018-11-03 RX ORDER — SODIUM CHLORIDE 9 MG/ML
1000 INJECTION INTRAMUSCULAR; INTRAVENOUS; SUBCUTANEOUS
Qty: 0 | Refills: 0 | Status: DISCONTINUED | OUTPATIENT
Start: 2018-11-03 | End: 2018-11-07

## 2018-11-03 RX ADMIN — Medication 50 MILLIGRAM(S): at 05:21

## 2018-11-03 RX ADMIN — OLANZAPINE 5 MILLIGRAM(S): 15 TABLET, FILM COATED ORAL at 11:57

## 2018-11-03 RX ADMIN — Medication 500 MILLIGRAM(S): at 11:03

## 2018-11-03 RX ADMIN — CHLORHEXIDINE GLUCONATE 1 APPLICATION(S): 213 SOLUTION TOPICAL at 05:23

## 2018-11-03 RX ADMIN — ENOXAPARIN SODIUM 80 MILLIGRAM(S): 100 INJECTION SUBCUTANEOUS at 17:03

## 2018-11-03 RX ADMIN — Medication 81 MILLIGRAM(S): at 11:03

## 2018-11-03 RX ADMIN — Medication 50 MILLIGRAM(S): at 21:57

## 2018-11-03 RX ADMIN — ZINC SULFATE TAB 220 MG (50 MG ZINC EQUIVALENT) 220 MILLIGRAM(S): 220 (50 ZN) TAB at 11:57

## 2018-11-03 RX ADMIN — SODIUM CHLORIDE 50 MILLILITER(S): 9 INJECTION INTRAMUSCULAR; INTRAVENOUS; SUBCUTANEOUS at 12:32

## 2018-11-03 RX ADMIN — Medication 50 MILLIGRAM(S): at 13:05

## 2018-11-03 RX ADMIN — AMLODIPINE BESYLATE 2.5 MILLIGRAM(S): 2.5 TABLET ORAL at 05:21

## 2018-11-03 RX ADMIN — LACTULOSE 30 GRAM(S): 10 SOLUTION ORAL at 11:10

## 2018-11-03 RX ADMIN — Medication 325 MILLIGRAM(S): at 11:03

## 2018-11-03 RX ADMIN — SENNA PLUS 1 TABLET(S): 8.6 TABLET ORAL at 21:57

## 2018-11-03 RX ADMIN — MAGNESIUM OXIDE 400 MG ORAL TABLET 400 MILLIGRAM(S): 241.3 TABLET ORAL at 07:57

## 2018-11-03 RX ADMIN — LOSARTAN POTASSIUM 50 MILLIGRAM(S): 100 TABLET, FILM COATED ORAL at 05:21

## 2018-11-03 RX ADMIN — TAMSULOSIN HYDROCHLORIDE 0.4 MILLIGRAM(S): 0.4 CAPSULE ORAL at 21:57

## 2018-11-03 RX ADMIN — ATORVASTATIN CALCIUM 40 MILLIGRAM(S): 80 TABLET, FILM COATED ORAL at 21:57

## 2018-11-03 RX ADMIN — Medication 1 APPLICATION(S): at 11:03

## 2018-11-03 RX ADMIN — MAGNESIUM OXIDE 400 MG ORAL TABLET 400 MILLIGRAM(S): 241.3 TABLET ORAL at 17:03

## 2018-11-03 NOTE — PROGRESS NOTE ADULT - ASSESSMENT
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, markedly decreased PO intake    -CT head and MRI show right medial occipital lobe subacute infarct with stable microhemorrhages  -failure to thrive, however patient eats small amounts throughout the day, does not meet caloric needs  -RLE angiogram done yesterday, PEG tube has been delayed until Monday, back on heparin    # Afib  -rate controlled on meto tartrate 50 q8h, eliquis or Lovenox    # Right lateral foot DFU  -podiatry recommending serial xrays o/p, santyl, dsd/ kerlix - Daily  -pain control with oxy 5 q4h  -will f/u as outpt  -vascular wants angiogram    # HTN   -amlodipine 2.5 / losartan 50    DVT PPX 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, markedly decreased PO intake    -CT head and MRI show right medial occipital lobe subacute infarct with stable microhemorrhages  -failure to thrive, however patient eats small amounts throughout the day, does not meet caloric needs  -RLE angiogram done yesterday, PEG tube has been delayed until Monday as per GI, switching from heparin to lovenox until Sunday night, will start heparin Sunday night before peg tube    # Afib  -rate controlled on meto tartrate 50 q8h, on Lovenox now    # Right lateral foot DFU  -podiatry recommending serial xrays o/p, santyl, dsd/ kerlix - Daily  -pain control with oxy 5 q4h  -will f/u as outpt  -angiogram done    # HTN   -amlodipine 2.5 / losartan 50    DVT PPX 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, markedly decreased PO intake    -CT head and MRI show right medial occipital lobe subacute infarct with stable microhemorrhages  -failure to thrive, however patient eats small amounts throughout the day, does not meet caloric needs  -RLE angiogram done yesterday, PEG tube has been delayed until Monday as per GI, switching from heparin to lovenox until Sunday night, will start heparin Sunday night before peg tube  - family wants to wait on the NG tube today, because patient is having small amounts of food PO    # Afib  -rate controlled on meto tartrate 50 q8h, on Lovenox now    # Right lateral foot DFU  -podiatry recommending serial xrays o/p, santyl, dsd/ kerlix - Daily  -pain control with oxy 5 q4h  -will f/u as outpt  -angiogram done    # HTN   -amlodipine 2.5 / losartan 50    DVT PPX

## 2018-11-03 NOTE — PROGRESS NOTE ADULT - SUBJECTIVE AND OBJECTIVE BOX
SUBJECTIVE:    Patient is a 78y old Male who presents with a chief complaint of progressive weakness and decreased PO intake for 2 days (03 Nov 2018 01:06)      HPI:  77 yo M with PMHx of AFIB previously on Xarelto discontinued secondary to frequent falls , DVT s/p Right Hip Replacement (10/14/18) on Eliquis-bedbound, Vascular Dementia with behavioral changes, DM II, HTN [facility] of progressive weakness and decreased PO intake for the past 2 days. As per son, prior to 2 days ago, patient had a great appetite however in the last 2 days patient has even refused PO medications. In the ED, patient was found to have AFIB with RVR, HR in 120s was given 20mg of IV Cardizem with response. CTH revealed what was initially suspected as an ICH, however upon Neurosurgery evaluation findings may be secondary to a subacute infarct, awaiting neurology evaluation. (23 Oct 2018 10:52)      Currently admitted to medicine with the primary diagnosis of UTI (urinary tract infection)       Besides the pertinent positives and negatives described above, the ROS was within normal limits.    PAST MEDICAL & SURGICAL HISTORY  Anemia  Diabetes  HTN (hypertension)  Arrhythmia  Dementia  S/P cholecystectomy  History of hip replacement    SOCIAL HISTORY:    ALLERGIES:  No Known Allergies    MEDICATIONS:  STANDING MEDICATIONS  amLODIPine   Tablet 2.5 milliGRAM(s) Oral daily  ascorbic acid 500 milliGRAM(s) Oral daily  aspirin enteric coated 81 milliGRAM(s) Oral daily  atorvastatin 40 milliGRAM(s) Oral at bedtime  chlorhexidine 4% Liquid 1 Application(s) Topical <User Schedule>  collagenase Ointment 1 Application(s) Topical daily  ferrous    sulfate 325 milliGRAM(s) Oral daily  heparin  Infusion.  Unit(s)/Hr IV Continuous <Continuous>  lactulose Syrup 30 Gram(s) Oral daily  losartan 50 milliGRAM(s) Oral daily  magnesium oxide 400 milliGRAM(s) Oral two times a day with meals  metoprolol tartrate 50 milliGRAM(s) Oral every 8 hours  OLANZapine 5 milliGRAM(s) Oral daily  senna 1 Tablet(s) Oral at bedtime  tamsulosin 0.4 milliGRAM(s) Oral at bedtime  zinc sulfate 220 milliGRAM(s) Oral daily    PRN MEDICATIONS  acetaminophen   Tablet .. 650 milliGRAM(s) Oral every 4 hours PRN  heparin  Injectable 6000 Unit(s) IV Push every 6 hours PRN  heparin  Injectable 3000 Unit(s) IV Push every 6 hours PRN  oxyCODONE    IR 5 milliGRAM(s) Oral every 4 hours PRN    VITALS:   T(F): 96.2  HR: 89  BP: 158/79  RR: 19  SpO2: 97%    LABS:                        11.1   5.05  )-----------( 171      ( 03 Nov 2018 05:50 )             35.0     11-03    143  |  104  |  17  ----------------------------<  120<H>  3.6   |  23  |  0.8    Ca    8.5      03 Nov 2018 05:50  Phos  3.2     11-03  Mg     2.0     11-03    TPro  5.1<L>  /  Alb  2.9<L>  /  TBili  0.9  /  DBili  x   /  AST  17  /  ALT  7   /  AlkPhos  122<H>  11-03    PT/INR - ( 02 Nov 2018 01:58 )   PT: 15.80 sec;   INR: 1.38 ratio         PTT - ( 02 Nov 2018 01:58 )  PTT:145.9 sec              RADIOLOGY:    PHYSICAL EXAM:  GEN: No acute distress  LUNGS: Clear to auscultation bilaterally   HEART: Regular  ABD: Soft, non-tender, non-distended.  EXT: NC/NC/NE/2+PP/MARIN/Skin Intact.   NEURO: AAOX2, baseline    Intravenous access:   NG tube:   Deluca Catheter:

## 2018-11-03 NOTE — CHART NOTE - NSCHARTNOTEFT_GEN_A_CORE
Surgery post-op Note 11-03-18 @ 01:01    Procedure: Right leg angiogram, anterior tibial angioplasty     S: 79 y/o Male s/p Right leg angiogram, anterior tibial angioplasty Patient is laying comfortably in the bed. Denies any pain at the incision site, fever, chills, chest pain, shortness of breath.    O:   T(C): 35.6 (11-02-18 @ 23:57), Max: 36.8 (11-02-18 @ 18:56)  HR: 103 (11-02-18 @ 23:57) (86 - 108)  BP: 145/82 (11-02-18 @ 23:57) (122/69 - 181/94)  RR: 20 (11-02-18 @ 23:57) (12 - 20)  SpO2: 97% (11-02-18 @ 20:56) (97% - 100%)    General: AAOx 3. NAD  Heart: S1 and S2 noted  Lungs: Clear to auscultation bilaterally  Extremities: Normal in color and temperature. DP dopplerable in b/l LEs  Wound: No bleeding or discharge noted from the incision site in the groin    11-02-18 @ 07:01  -  11-03-18 @ 01:01  --------------------------------------------------------  IN: 253 mL / OUT: 0 mL / NET: 253 mL    acetaminophen   Tablet .. 650 milliGRAM(s) Oral every 4 hours PRN  amLODIPine   Tablet 2.5 milliGRAM(s) Oral daily  ascorbic acid 500 milliGRAM(s) Oral daily  aspirin enteric coated 81 milliGRAM(s) Oral daily  atorvastatin 40 milliGRAM(s) Oral at bedtime  chlorhexidine 4% Liquid 1 Application(s) Topical <User Schedule>  collagenase Ointment 1 Application(s) Topical daily  ferrous    sulfate 325 milliGRAM(s) Oral daily  heparin  Infusion.  Unit(s)/Hr IV Continuous <Continuous>  heparin  Injectable 6000 Unit(s) IV Push every 6 hours PRN  heparin  Injectable 3000 Unit(s) IV Push every 6 hours PRN  lactulose Syrup 30 Gram(s) Oral daily  losartan 50 milliGRAM(s) Oral daily  magnesium oxide 400 milliGRAM(s) Oral two times a day with meals  metoprolol tartrate 50 milliGRAM(s) Oral every 8 hours  OLANZapine 5 milliGRAM(s) Oral daily  oxyCODONE    IR 5 milliGRAM(s) Oral every 4 hours PRN  senna 1 Tablet(s) Oral at bedtime  tamsulosin 0.4 milliGRAM(s) Oral at bedtime  zinc sulfate 220 milliGRAM(s) Oral daily    Assessment:  78y Male s/p Right leg angiogram, anterior tibial angioplasty.    Plan:  Pain control  DVT/GI prophylaxis  Incentive spirometry  Heparin drip

## 2018-11-03 NOTE — PROGRESS NOTE ADULT - ASSESSMENT
Assessment:  77 y/o Male s/p Right leg angiogram, anterior tibial angioplasty. Doing fine post op. No post op pain.     Plan:  Pain control  DVT/GI prophylaxis  Incentive spirometry  Heparin drip.

## 2018-11-03 NOTE — PROGRESS NOTE ADULT - SUBJECTIVE AND OBJECTIVE BOX
Procedure: Right leg angiogram, anterior tibial angioplasty     S: 77 y/o Male s/p Right leg angiogram, anterior tibial angioplasty Patient is laying comfortably in the bed. Denies any pain at the incision site, fever, chills, chest pain, shortness of breath.    O:   T(C): 35.6 (11-02-18 @ 23:57), Max: 36.8 (11-02-18 @ 18:56)  HR: 103 (11-02-18 @ 23:57) (86 - 108)  BP: 145/82 (11-02-18 @ 23:57) (122/69 - 181/94)  RR: 20 (11-02-18 @ 23:57) (12 - 20)  SpO2: 97% (11-02-18 @ 20:56) (97% - 100%)    General: AAOx 3. NAD  Heart: S1 and S2 noted  Lungs: Clear to auscultation bilaterally  Extremities: Normal in color and temperature. DP dopplerable in b/l LEs  Wound: No bleeding or discharge noted from the incision site in the groin    11-02-18 @ 07:01  -  11-03-18 @ 01:01  --------------------------------------------------------  IN: 253 mL / OUT: 0 mL / NET: 253 mL    acetaminophen   Tablet .. 650 milliGRAM(s) Oral every 4 hours PRN  amLODIPine   Tablet 2.5 milliGRAM(s) Oral daily  ascorbic acid 500 milliGRAM(s) Oral daily  aspirin enteric coated 81 milliGRAM(s) Oral daily  atorvastatin 40 milliGRAM(s) Oral at bedtime  chlorhexidine 4% Liquid 1 Application(s) Topical <User Schedule>  collagenase Ointment 1 Application(s) Topical daily  ferrous    sulfate 325 milliGRAM(s) Oral daily  heparin  Infusion.  Unit(s)/Hr IV Continuous <Continuous>  heparin  Injectable 6000 Unit(s) IV Push every 6 hours PRN  heparin  Injectable 3000 Unit(s) IV Push every 6 hours PRN  lactulose Syrup 30 Gram(s) Oral daily  losartan 50 milliGRAM(s) Oral daily  magnesium oxide 400 milliGRAM(s) Oral two times a day with meals  metoprolol tartrate 50 milliGRAM(s) Oral every 8 hours  OLANZapine 5 milliGRAM(s) Oral daily  oxyCODONE    IR 5 milliGRAM(s) Oral every 4 hours PRN  senna 1 Tablet(s) Oral at bedtime  tamsulosin 0.4 milliGRAM(s) Oral at bedtime  zinc sulfate 220 milliGRAM(s) Oral daily

## 2018-11-04 LAB
ANION GAP SERPL CALC-SCNC: 12 MMOL/L — SIGNIFICANT CHANGE UP (ref 7–14)
APTT BLD: 158.5 SEC — CRITICAL HIGH (ref 27–39.2)
APTT BLD: 47.7 SEC — HIGH (ref 27–39.2)
APTT BLD: 95.1 SEC — CRITICAL HIGH (ref 27–39.2)
BUN SERPL-MCNC: 21 MG/DL — HIGH (ref 10–20)
CALCIUM SERPL-MCNC: 8.5 MG/DL — SIGNIFICANT CHANGE UP (ref 8.5–10.1)
CHLORIDE SERPL-SCNC: 103 MMOL/L — SIGNIFICANT CHANGE UP (ref 98–110)
CO2 SERPL-SCNC: 26 MMOL/L — SIGNIFICANT CHANGE UP (ref 17–32)
CREAT SERPL-MCNC: 1 MG/DL — SIGNIFICANT CHANGE UP (ref 0.7–1.5)
GLUCOSE BLDC GLUCOMTR-MCNC: 126 MG/DL — HIGH (ref 70–99)
GLUCOSE BLDC GLUCOMTR-MCNC: 146 MG/DL — HIGH (ref 70–99)
GLUCOSE BLDC GLUCOMTR-MCNC: 163 MG/DL — HIGH (ref 70–99)
GLUCOSE BLDC GLUCOMTR-MCNC: 185 MG/DL — HIGH (ref 70–99)
GLUCOSE SERPL-MCNC: 151 MG/DL — HIGH (ref 70–99)
HCT VFR BLD CALC: 34.1 % — LOW (ref 42–52)
HCT VFR BLD CALC: 34.7 % — LOW (ref 42–52)
HGB BLD-MCNC: 11 G/DL — LOW (ref 14–18)
HGB BLD-MCNC: 11.2 G/DL — LOW (ref 14–18)
INR BLD: 1.33 RATIO — HIGH (ref 0.65–1.3)
MCHC RBC-ENTMCNC: 28 PG — SIGNIFICANT CHANGE UP (ref 27–31)
MCHC RBC-ENTMCNC: 28.1 PG — SIGNIFICANT CHANGE UP (ref 27–31)
MCHC RBC-ENTMCNC: 32.3 G/DL — SIGNIFICANT CHANGE UP (ref 32–37)
MCHC RBC-ENTMCNC: 32.3 G/DL — SIGNIFICANT CHANGE UP (ref 32–37)
MCV RBC AUTO: 86.8 FL — SIGNIFICANT CHANGE UP (ref 80–94)
MCV RBC AUTO: 87 FL — SIGNIFICANT CHANGE UP (ref 80–94)
NRBC # BLD: 0 /100 WBCS — SIGNIFICANT CHANGE UP (ref 0–0)
NRBC # BLD: 0 /100 WBCS — SIGNIFICANT CHANGE UP (ref 0–0)
PLATELET # BLD AUTO: 166 K/UL — SIGNIFICANT CHANGE UP (ref 130–400)
PLATELET # BLD AUTO: 197 K/UL — SIGNIFICANT CHANGE UP (ref 130–400)
POTASSIUM SERPL-MCNC: 3.5 MMOL/L — SIGNIFICANT CHANGE UP (ref 3.5–5)
POTASSIUM SERPL-SCNC: 3.5 MMOL/L — SIGNIFICANT CHANGE UP (ref 3.5–5)
PROTHROM AB SERPL-ACNC: 15.2 SEC — HIGH (ref 9.95–12.87)
RBC # BLD: 3.93 M/UL — LOW (ref 4.7–6.1)
RBC # BLD: 3.99 M/UL — LOW (ref 4.7–6.1)
RBC # FLD: 17.7 % — HIGH (ref 11.5–14.5)
RBC # FLD: 17.7 % — HIGH (ref 11.5–14.5)
SODIUM SERPL-SCNC: 141 MMOL/L — SIGNIFICANT CHANGE UP (ref 135–146)
WBC # BLD: 5.77 K/UL — SIGNIFICANT CHANGE UP (ref 4.8–10.8)
WBC # BLD: 6.58 K/UL — SIGNIFICANT CHANGE UP (ref 4.8–10.8)
WBC # FLD AUTO: 5.77 K/UL — SIGNIFICANT CHANGE UP (ref 4.8–10.8)
WBC # FLD AUTO: 6.58 K/UL — SIGNIFICANT CHANGE UP (ref 4.8–10.8)

## 2018-11-04 RX ORDER — CEFAZOLIN SODIUM 1 G
2000 VIAL (EA) INJECTION ONCE
Qty: 0 | Refills: 0 | Status: DISCONTINUED | OUTPATIENT
Start: 2018-11-04 | End: 2018-11-05

## 2018-11-04 RX ORDER — LACTULOSE 10 G/15ML
20 SOLUTION ORAL
Qty: 0 | Refills: 0 | Status: DISCONTINUED | OUTPATIENT
Start: 2018-11-04 | End: 2018-11-07

## 2018-11-04 RX ORDER — HEPARIN SODIUM 5000 [USP'U]/ML
1300 INJECTION INTRAVENOUS; SUBCUTANEOUS
Qty: 25000 | Refills: 0 | Status: DISCONTINUED | OUTPATIENT
Start: 2018-11-04 | End: 2018-11-05

## 2018-11-04 RX ADMIN — AMLODIPINE BESYLATE 2.5 MILLIGRAM(S): 2.5 TABLET ORAL at 05:59

## 2018-11-04 RX ADMIN — CHLORHEXIDINE GLUCONATE 1 APPLICATION(S): 213 SOLUTION TOPICAL at 06:01

## 2018-11-04 RX ADMIN — TAMSULOSIN HYDROCHLORIDE 0.4 MILLIGRAM(S): 0.4 CAPSULE ORAL at 22:37

## 2018-11-04 RX ADMIN — SENNA PLUS 1 TABLET(S): 8.6 TABLET ORAL at 22:37

## 2018-11-04 RX ADMIN — MAGNESIUM OXIDE 400 MG ORAL TABLET 400 MILLIGRAM(S): 241.3 TABLET ORAL at 08:50

## 2018-11-04 RX ADMIN — HEPARIN SODIUM 13 UNIT(S)/HR: 5000 INJECTION INTRAVENOUS; SUBCUTANEOUS at 18:02

## 2018-11-04 RX ADMIN — LOSARTAN POTASSIUM 50 MILLIGRAM(S): 100 TABLET, FILM COATED ORAL at 05:59

## 2018-11-04 RX ADMIN — Medication 50 MILLIGRAM(S): at 05:59

## 2018-11-04 RX ADMIN — Medication 81 MILLIGRAM(S): at 11:21

## 2018-11-04 RX ADMIN — Medication 50 MILLIGRAM(S): at 22:37

## 2018-11-04 RX ADMIN — Medication 50 MILLIGRAM(S): at 13:06

## 2018-11-04 RX ADMIN — ATORVASTATIN CALCIUM 40 MILLIGRAM(S): 80 TABLET, FILM COATED ORAL at 22:37

## 2018-11-04 RX ADMIN — Medication 1 APPLICATION(S): at 11:14

## 2018-11-04 RX ADMIN — ENOXAPARIN SODIUM 80 MILLIGRAM(S): 100 INJECTION SUBCUTANEOUS at 06:00

## 2018-11-04 NOTE — PROGRESS NOTE ADULT - SUBJECTIVE AND OBJECTIVE BOX
SUBJECTIVE: no complaints     Patient is a 78y old Male who presents with a chief complaint of progressive weakness and decreased PO intake for 2 days (03 Nov 2018 01:06)      HPI:  77 yo M with PMHx of AFIB previously on Xarelto discontinued secondary to frequent falls , DVT s/p Right Hip Replacement (10/14/18) on Eliquis-bedbound, Vascular Dementia with behavioral changes, DM II, HTN [facility] of progressive weakness and decreased PO intake for the past 2 days. As per son, prior to 2 days ago, patient had a great appetite however in the last 2 days patient has even refused PO medications. In the ED, patient was found to have AFIB with RVR, HR in 120s was given 20mg of IV Cardizem with response. CTH revealed what was initially suspected as an ICH, however upon Neurosurgery evaluation findings may be secondary to a subacute infarct, awaiting neurology evaluation. (23 Oct 2018 10:52)      Currently admitted to medicine with the primary diagnosis of UTI (urinary tract infection)       Besides the pertinent positives and negatives described above, the ROS was within normal limits.    PAST MEDICAL & SURGICAL HISTORY  Anemia  Diabetes  HTN (hypertension)  Arrhythmia  Dementia  S/P cholecystectomy  History of hip replacement    SOCIAL HISTORY:    ALLERGIES:  No Known Allergies    MEDICATIONS:  STANDING MEDICATIONS  amLODIPine   Tablet 2.5 milliGRAM(s) Oral daily  ascorbic acid 500 milliGRAM(s) Oral daily  aspirin enteric coated 81 milliGRAM(s) Oral daily  atorvastatin 40 milliGRAM(s) Oral at bedtime  chlorhexidine 4% Liquid 1 Application(s) Topical <User Schedule>  collagenase Ointment 1 Application(s) Topical daily  ferrous    sulfate 325 milliGRAM(s) Oral daily  heparin  Infusion.  Unit(s)/Hr IV Continuous <Continuous>  lactulose Syrup 30 Gram(s) Oral daily  losartan 50 milliGRAM(s) Oral daily  magnesium oxide 400 milliGRAM(s) Oral two times a day with meals  metoprolol tartrate 50 milliGRAM(s) Oral every 8 hours  OLANZapine 5 milliGRAM(s) Oral daily  senna 1 Tablet(s) Oral at bedtime  tamsulosin 0.4 milliGRAM(s) Oral at bedtime  zinc sulfate 220 milliGRAM(s) Oral daily    PRN MEDICATIONS  acetaminophen   Tablet .. 650 milliGRAM(s) Oral every 4 hours PRN  heparin  Injectable 6000 Unit(s) IV Push every 6 hours PRN  heparin  Injectable 3000 Unit(s) IV Push every 6 hours PRN  oxyCODONE    IR 5 milliGRAM(s) Oral every 4 hours PRN    Vital Signs Last 24 Hrs  T(C): 36.1 (04 Nov 2018 07:21), Max: 36.4 (03 Nov 2018 16:00)  T(F): 97 (04 Nov 2018 07:21), Max: 97.6 (03 Nov 2018 23:46)  HR: 86 (04 Nov 2018 07:21) (72 - 111)  BP: 181/87 (04 Nov 2018 07:21) (141/71 - 181/87)  BP(mean): --  RR: 18 (04 Nov 2018 07:21) (17 - 18)  SpO2: --    LABS:                        11.1   5.05  )-----------( 171      ( 03 Nov 2018 05:50 )             35.0     11-03    143  |  104  |  17  ----------------------------<  120<H>  3.6   |  23  |  0.8    Ca    8.5      03 Nov 2018 05:50  Phos  3.2     11-03  Mg     2.0     11-03    TPro  5.1<L>  /  Alb  2.9<L>  /  TBili  0.9  /  DBili  x   /  AST  17  /  ALT  7   /  AlkPhos  122<H>  11-03    PT/INR - ( 02 Nov 2018 01:58 )   PT: 15.80 sec;   INR: 1.38 ratio         PTT - ( 02 Nov 2018 01:58 )  PTT:145.9 sec              RADIOLOGY:    PHYSICAL EXAM:  GEN: No acute distress  LUNGS: Clear to auscultation bilaterally   HEART: Regular  ABD: Soft, non-tender, non-distended.  EXT: NC/NC/NE/2+PP/MARIN/Skin Intact.   NEURO: AAOX2, baseline    Intravenous access:   NG tube:   Deluca Catheter:

## 2018-11-04 NOTE — PROGRESS NOTE ADULT - ASSESSMENT
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, markedly decreased PO intake    #Acute/subacute CVA  -CT head and MRI show right medial occipital lobe subacute infarct with stable microhemorrhages  -cont therapeutic A/c    #Failure to thrive/Mod-sev malnutrition, however patient eats small amounts throughout the day, does not meet caloric needs  -PEG in am  -start Heparin at 6pm and stop in am    #PVD/DFU  -s/p RLE angiogram 11/2  -podiatry recommending serial xrays o/p, santyl, dsd/ kerlix - Daily    # Afib  -rate controlled on meto tartrate 50 q8h, A/c    # HTN   -amlodipine 2.5 / losartan 50    High risk pt

## 2018-11-05 LAB
ALBUMIN SERPL ELPH-MCNC: 3 G/DL — LOW (ref 3.5–5.2)
ALBUMIN SERPL ELPH-MCNC: 3 G/DL — LOW (ref 3.5–5.2)
ALP SERPL-CCNC: 119 U/L — HIGH (ref 30–115)
ALP SERPL-CCNC: 120 U/L — HIGH (ref 30–115)
ALT FLD-CCNC: 8 U/L — SIGNIFICANT CHANGE UP (ref 0–41)
ALT FLD-CCNC: 9 U/L — SIGNIFICANT CHANGE UP (ref 0–41)
ANION GAP SERPL CALC-SCNC: 10 MMOL/L — SIGNIFICANT CHANGE UP (ref 7–14)
ANION GAP SERPL CALC-SCNC: 13 MMOL/L — SIGNIFICANT CHANGE UP (ref 7–14)
APTT BLD: >200 SEC — CRITICAL HIGH (ref 27–39.2)
AST SERPL-CCNC: 14 U/L — SIGNIFICANT CHANGE UP (ref 0–41)
AST SERPL-CCNC: 14 U/L — SIGNIFICANT CHANGE UP (ref 0–41)
BASOPHILS # BLD AUTO: 0.01 K/UL — SIGNIFICANT CHANGE UP (ref 0–0.2)
BASOPHILS # BLD AUTO: 0.01 K/UL — SIGNIFICANT CHANGE UP (ref 0–0.2)
BASOPHILS NFR BLD AUTO: 0.2 % — SIGNIFICANT CHANGE UP (ref 0–1)
BASOPHILS NFR BLD AUTO: 0.2 % — SIGNIFICANT CHANGE UP (ref 0–1)
BILIRUB SERPL-MCNC: 0.8 MG/DL — SIGNIFICANT CHANGE UP (ref 0.2–1.2)
BILIRUB SERPL-MCNC: 1 MG/DL — SIGNIFICANT CHANGE UP (ref 0.2–1.2)
BLD GP AB SCN SERPL QL: SIGNIFICANT CHANGE UP
BUN SERPL-MCNC: 18 MG/DL — SIGNIFICANT CHANGE UP (ref 10–20)
BUN SERPL-MCNC: 19 MG/DL — SIGNIFICANT CHANGE UP (ref 10–20)
CALCIUM SERPL-MCNC: 8.3 MG/DL — LOW (ref 8.5–10.1)
CALCIUM SERPL-MCNC: 8.4 MG/DL — LOW (ref 8.5–10.1)
CHLORIDE SERPL-SCNC: 102 MMOL/L — SIGNIFICANT CHANGE UP (ref 98–110)
CHLORIDE SERPL-SCNC: 103 MMOL/L — SIGNIFICANT CHANGE UP (ref 98–110)
CO2 SERPL-SCNC: 26 MMOL/L — SIGNIFICANT CHANGE UP (ref 17–32)
CO2 SERPL-SCNC: 28 MMOL/L — SIGNIFICANT CHANGE UP (ref 17–32)
CREAT SERPL-MCNC: 0.8 MG/DL — SIGNIFICANT CHANGE UP (ref 0.7–1.5)
CREAT SERPL-MCNC: 0.9 MG/DL — SIGNIFICANT CHANGE UP (ref 0.7–1.5)
EOSINOPHIL # BLD AUTO: 0.06 K/UL — SIGNIFICANT CHANGE UP (ref 0–0.7)
EOSINOPHIL # BLD AUTO: 0.07 K/UL — SIGNIFICANT CHANGE UP (ref 0–0.7)
EOSINOPHIL NFR BLD AUTO: 1.1 % — SIGNIFICANT CHANGE UP (ref 0–8)
EOSINOPHIL NFR BLD AUTO: 1.3 % — SIGNIFICANT CHANGE UP (ref 0–8)
GLUCOSE BLDC GLUCOMTR-MCNC: 128 MG/DL — HIGH (ref 70–99)
GLUCOSE BLDC GLUCOMTR-MCNC: 133 MG/DL — HIGH (ref 70–99)
GLUCOSE BLDC GLUCOMTR-MCNC: 138 MG/DL — HIGH (ref 70–99)
GLUCOSE BLDC GLUCOMTR-MCNC: 178 MG/DL — HIGH (ref 70–99)
GLUCOSE SERPL-MCNC: 139 MG/DL — HIGH (ref 70–99)
GLUCOSE SERPL-MCNC: 141 MG/DL — HIGH (ref 70–99)
HCT VFR BLD CALC: 32.5 % — LOW (ref 42–52)
HCT VFR BLD CALC: 33.5 % — LOW (ref 42–52)
HGB BLD-MCNC: 10.4 G/DL — LOW (ref 14–18)
HGB BLD-MCNC: 10.8 G/DL — LOW (ref 14–18)
IMM GRANULOCYTES NFR BLD AUTO: 0.4 % — HIGH (ref 0.1–0.3)
IMM GRANULOCYTES NFR BLD AUTO: 0.6 % — HIGH (ref 0.1–0.3)
INR BLD: 1.4 RATIO — HIGH (ref 0.65–1.3)
LYMPHOCYTES # BLD AUTO: 0.61 K/UL — LOW (ref 1.2–3.4)
LYMPHOCYTES # BLD AUTO: 0.86 K/UL — LOW (ref 1.2–3.4)
LYMPHOCYTES # BLD AUTO: 11.5 % — LOW (ref 20.5–51.1)
LYMPHOCYTES # BLD AUTO: 16.4 % — LOW (ref 20.5–51.1)
MAGNESIUM SERPL-MCNC: 1.8 MG/DL — SIGNIFICANT CHANGE UP (ref 1.8–2.4)
MCHC RBC-ENTMCNC: 27.9 PG — SIGNIFICANT CHANGE UP (ref 27–31)
MCHC RBC-ENTMCNC: 28 PG — SIGNIFICANT CHANGE UP (ref 27–31)
MCHC RBC-ENTMCNC: 32 G/DL — SIGNIFICANT CHANGE UP (ref 32–37)
MCHC RBC-ENTMCNC: 32.2 G/DL — SIGNIFICANT CHANGE UP (ref 32–37)
MCV RBC AUTO: 86.8 FL — SIGNIFICANT CHANGE UP (ref 80–94)
MCV RBC AUTO: 87.1 FL — SIGNIFICANT CHANGE UP (ref 80–94)
MONOCYTES # BLD AUTO: 0.4 K/UL — SIGNIFICANT CHANGE UP (ref 0.1–0.6)
MONOCYTES # BLD AUTO: 0.41 K/UL — SIGNIFICANT CHANGE UP (ref 0.1–0.6)
MONOCYTES NFR BLD AUTO: 7.5 % — SIGNIFICANT CHANGE UP (ref 1.7–9.3)
MONOCYTES NFR BLD AUTO: 7.8 % — SIGNIFICANT CHANGE UP (ref 1.7–9.3)
NEUTROPHILS # BLD AUTO: 3.88 K/UL — SIGNIFICANT CHANGE UP (ref 1.4–6.5)
NEUTROPHILS # BLD AUTO: 4.19 K/UL — SIGNIFICANT CHANGE UP (ref 1.4–6.5)
NEUTROPHILS NFR BLD AUTO: 73.9 % — SIGNIFICANT CHANGE UP (ref 42.2–75.2)
NEUTROPHILS NFR BLD AUTO: 79.1 % — HIGH (ref 42.2–75.2)
NRBC # BLD: 0 /100 WBCS — SIGNIFICANT CHANGE UP (ref 0–0)
NRBC # BLD: 0 /100 WBCS — SIGNIFICANT CHANGE UP (ref 0–0)
PHOSPHATE SERPL-MCNC: 2.6 MG/DL — SIGNIFICANT CHANGE UP (ref 2.1–4.9)
PLATELET # BLD AUTO: 153 K/UL — SIGNIFICANT CHANGE UP (ref 130–400)
PLATELET # BLD AUTO: 170 K/UL — SIGNIFICANT CHANGE UP (ref 130–400)
POTASSIUM SERPL-MCNC: 3.3 MMOL/L — LOW (ref 3.5–5)
POTASSIUM SERPL-MCNC: 3.4 MMOL/L — LOW (ref 3.5–5)
POTASSIUM SERPL-SCNC: 3.3 MMOL/L — LOW (ref 3.5–5)
POTASSIUM SERPL-SCNC: 3.4 MMOL/L — LOW (ref 3.5–5)
PROT SERPL-MCNC: 5.1 G/DL — LOW (ref 6–8)
PROT SERPL-MCNC: 5.2 G/DL — LOW (ref 6–8)
PROTHROM AB SERPL-ACNC: 16 SEC — HIGH (ref 9.95–12.87)
RBC # BLD: 3.73 M/UL — LOW (ref 4.7–6.1)
RBC # BLD: 3.86 M/UL — LOW (ref 4.7–6.1)
RBC # FLD: 17.5 % — HIGH (ref 11.5–14.5)
RBC # FLD: 17.7 % — HIGH (ref 11.5–14.5)
SODIUM SERPL-SCNC: 141 MMOL/L — SIGNIFICANT CHANGE UP (ref 135–146)
SODIUM SERPL-SCNC: 141 MMOL/L — SIGNIFICANT CHANGE UP (ref 135–146)
TYPE + AB SCN PNL BLD: SIGNIFICANT CHANGE UP
WBC # BLD: 5.25 K/UL — SIGNIFICANT CHANGE UP (ref 4.8–10.8)
WBC # BLD: 5.3 K/UL — SIGNIFICANT CHANGE UP (ref 4.8–10.8)
WBC # FLD AUTO: 5.25 K/UL — SIGNIFICANT CHANGE UP (ref 4.8–10.8)
WBC # FLD AUTO: 5.3 K/UL — SIGNIFICANT CHANGE UP (ref 4.8–10.8)

## 2018-11-05 RX ORDER — ENOXAPARIN SODIUM 100 MG/ML
80 INJECTION SUBCUTANEOUS EVERY 12 HOURS
Qty: 0 | Refills: 0 | Status: COMPLETED | OUTPATIENT
Start: 2018-11-05 | End: 2018-11-06

## 2018-11-05 RX ORDER — ENOXAPARIN SODIUM 100 MG/ML
80 INJECTION SUBCUTANEOUS ONCE
Qty: 0 | Refills: 0 | Status: DISCONTINUED | OUTPATIENT
Start: 2018-11-05 | End: 2018-11-05

## 2018-11-05 RX ORDER — ENOXAPARIN SODIUM 100 MG/ML
40 INJECTION SUBCUTANEOUS DAILY
Qty: 0 | Refills: 0 | Status: DISCONTINUED | OUTPATIENT
Start: 2018-11-05 | End: 2018-11-05

## 2018-11-05 RX ORDER — POTASSIUM CHLORIDE 20 MEQ
20 PACKET (EA) ORAL ONCE
Qty: 0 | Refills: 0 | Status: COMPLETED | OUTPATIENT
Start: 2018-11-05 | End: 2018-11-05

## 2018-11-05 RX ADMIN — Medication 50 MILLIGRAM(S): at 21:31

## 2018-11-05 RX ADMIN — Medication 50 MILLIEQUIVALENT(S): at 11:09

## 2018-11-05 RX ADMIN — TAMSULOSIN HYDROCHLORIDE 0.4 MILLIGRAM(S): 0.4 CAPSULE ORAL at 21:31

## 2018-11-05 RX ADMIN — CHLORHEXIDINE GLUCONATE 1 APPLICATION(S): 213 SOLUTION TOPICAL at 05:45

## 2018-11-05 RX ADMIN — Medication 81 MILLIGRAM(S): at 13:30

## 2018-11-05 RX ADMIN — Medication 500 MILLIGRAM(S): at 13:30

## 2018-11-05 RX ADMIN — ZINC SULFATE TAB 220 MG (50 MG ZINC EQUIVALENT) 220 MILLIGRAM(S): 220 (50 ZN) TAB at 13:30

## 2018-11-05 RX ADMIN — AMLODIPINE BESYLATE 2.5 MILLIGRAM(S): 2.5 TABLET ORAL at 05:44

## 2018-11-05 RX ADMIN — Medication 325 MILLIGRAM(S): at 13:30

## 2018-11-05 RX ADMIN — Medication 1 APPLICATION(S): at 13:31

## 2018-11-05 RX ADMIN — ENOXAPARIN SODIUM 80 MILLIGRAM(S): 100 INJECTION SUBCUTANEOUS at 13:32

## 2018-11-05 RX ADMIN — SENNA PLUS 1 TABLET(S): 8.6 TABLET ORAL at 21:31

## 2018-11-05 RX ADMIN — Medication 50 MILLIGRAM(S): at 13:32

## 2018-11-05 RX ADMIN — ATORVASTATIN CALCIUM 40 MILLIGRAM(S): 80 TABLET, FILM COATED ORAL at 21:31

## 2018-11-05 RX ADMIN — Medication 50 MILLIGRAM(S): at 05:44

## 2018-11-05 RX ADMIN — OLANZAPINE 5 MILLIGRAM(S): 15 TABLET, FILM COATED ORAL at 13:30

## 2018-11-05 RX ADMIN — SODIUM CHLORIDE 50 MILLILITER(S): 9 INJECTION INTRAMUSCULAR; INTRAVENOUS; SUBCUTANEOUS at 00:10

## 2018-11-05 RX ADMIN — LOSARTAN POTASSIUM 50 MILLIGRAM(S): 100 TABLET, FILM COATED ORAL at 05:44

## 2018-11-05 NOTE — PROGRESS NOTE ADULT - SUBJECTIVE AND OBJECTIVE BOX
LENGTH OF HOSPITAL STAY: 13d      CHIEF COMPLAINT:   Patient is a 78y old  Male who presents with a chief complaint of progressive weakness and decreased PO intake for 2 days (05 Nov 2018 13:11)      OVER Past 24hrs:  The patient was seen and examined at bedside there were no acute events no acute events over night. The patient denies fevers, chills, nausea, diarrhea. He still has decreased PO intake.       PAST MEDICAL & SURGICAL HISTORY  PAST MEDICAL & SURGICAL HISTORY:  Anemia  Diabetes  HTN (hypertension)  Arrhythmia  Dementia  S/P cholecystectomy  History of hip replacement        REVIEW OF SYSTEMS  RESPIRATORY: No cough, wheezing, chills or hemoptysis; No shortness of breath  CARDIOVASCULAR: No chest pain, palpitations, dizziness, or leg swelling        ALLERGIES:  No Known Allergies    MEDICATIONS:  STANDING MEDICATIONS  amLODIPine   Tablet 2.5 milliGRAM(s) Oral daily  ascorbic acid 500 milliGRAM(s) Oral daily  aspirin enteric coated 81 milliGRAM(s) Oral daily  atorvastatin 40 milliGRAM(s) Oral at bedtime  chlorhexidine 4% Liquid 1 Application(s) Topical <User Schedule>  collagenase Ointment 1 Application(s) Topical daily  enoxaparin Injectable 80 milliGRAM(s) SubCutaneous every 12 hours  ferrous    sulfate 325 milliGRAM(s) Oral daily  lactulose Syrup 20 Gram(s) Oral two times a day  losartan 50 milliGRAM(s) Oral daily  magnesium oxide 400 milliGRAM(s) Oral two times a day with meals  metoprolol tartrate 50 milliGRAM(s) Oral every 8 hours  OLANZapine 5 milliGRAM(s) Oral daily  senna 1 Tablet(s) Oral at bedtime  sodium chloride 0.9%. 1000 milliLiter(s) IV Continuous <Continuous>  tamsulosin 0.4 milliGRAM(s) Oral at bedtime  zinc sulfate 220 milliGRAM(s) Oral daily    PRN MEDICATIONS  acetaminophen   Tablet .. 650 milliGRAM(s) Oral every 4 hours PRN  oxyCODONE    IR 5 milliGRAM(s) Oral every 4 hours PRN    VITALS:   T(F): 97.2  HR: 100  BP: 152/75  RR: 18  SpO2: 97%    PHYSICAL EXAM:  General: No acute distress.  Alert, oriented, interactive, nonfocal. Cachetic male     HEENT: Pupils equal, reactive to light symmetrically.    PULM: Clear to auscultation bilaterally, no significant sputum production.    CVS: Regular rate and rhythm, no murmurs, rubs, or gallops.    ABD: Soft, nondistended, nontender, no masses.    EXT: No edema, nontender.    SKIN: Warm and well perfused, no rashes noted.    LABS:                        10.4   5.25  )-----------( 170      ( 05 Nov 2018 05:47 )             32.5     11-05    141  |  102  |  18  ----------------------------<  141<H>  3.4<L>   |  26  |  0.8    Ca    8.4<L>      05 Nov 2018 05:47  Phos  2.6     11-05  Mg     1.8     11-05    TPro  5.1<L>  /  Alb  3.0<L>  /  TBili  1.0  /  DBili  x   /  AST  14  /  ALT  8   /  AlkPhos  120<H>  11-05    PT/INR - ( 05 Nov 2018 02:47 )   PT: 16.00 sec;   INR: 1.40 ratio         PTT - ( 05 Nov 2018 02:47 )  PTT:>200.0 sec              RADIOLOGY:

## 2018-11-05 NOTE — CONSULT NOTE ADULT - CONSULT REQUESTED DATE/TIME
05-Nov-2018 10:40
05-Nov-2018 11:52
05-Nov-2018 20:41
23-Oct-2018 10:00
24-Oct-2018
24-Oct-2018 10:30
25-Oct-2018 15:24
01-Nov-2018 07:33
23-Oct-2018 11:40
31-Oct-2018 16:52

## 2018-11-05 NOTE — CONSULT NOTE ADULT - SUBJECTIVE AND OBJECTIVE BOX
INTERVENTIONAL RADIOLOGY CONSULT:     Procedure Requested: G tube placement     HPI:  77 yo M with PMHx of AFIB previously on Xarelto discontinued secondary to frequent falls , DVT s/p Right Hip Replacement (10/14/18) on Eliquis-bedbound, Vascular Dementia with behavioral changes, DM II, HTN presents from [facility] for evaluation of progressive weakness and decreased PO intake for the past 2 days. As per son, prior to 2 days ago, patient had a great appetite however in the last 2 days patient has even refused PO medications. In the ED, patient was found to have AFIB with RVR, HR in 120s was given 20mg of IV Cardizem with response. CTH revealed what was initially suspected as an ICH, however upon Neurosurgery evaluation findings may be secondary to a subacute infarct, awaiting neurology evaluation. (23 Oct 2018 10:52)      PAST MEDICAL & SURGICAL HISTORY:  Anemia  Diabetes  HTN (hypertension)  Arrhythmia  Dementia  S/P cholecystectomy  History of hip replacement      MEDICATIONS  (STANDING):  amLODIPine   Tablet 2.5 milliGRAM(s) Oral daily  ascorbic acid 500 milliGRAM(s) Oral daily  aspirin enteric coated 81 milliGRAM(s) Oral daily  atorvastatin 40 milliGRAM(s) Oral at bedtime  ceFAZolin   IVPB 2000 milliGRAM(s) IV Intermittent once  chlorhexidine 4% Liquid 1 Application(s) Topical <User Schedule>  collagenase Ointment 1 Application(s) Topical daily  ferrous    sulfate 325 milliGRAM(s) Oral daily  lactulose Syrup 20 Gram(s) Oral two times a day  losartan 50 milliGRAM(s) Oral daily  magnesium oxide 400 milliGRAM(s) Oral two times a day with meals  metoprolol tartrate 50 milliGRAM(s) Oral every 8 hours  OLANZapine 5 milliGRAM(s) Oral daily  potassium chloride  20 mEq/100 mL IVPB 20 milliEquivalent(s) IV Intermittent once  senna 1 Tablet(s) Oral at bedtime  sodium chloride 0.9%. 1000 milliLiter(s) (50 mL/Hr) IV Continuous <Continuous>  tamsulosin 0.4 milliGRAM(s) Oral at bedtime  zinc sulfate 220 milliGRAM(s) Oral daily    MEDICATIONS  (PRN):  acetaminophen   Tablet .. 650 milliGRAM(s) Oral every 4 hours PRN Moderate Pain (4 - 6)  oxyCODONE    IR 5 milliGRAM(s) Oral every 4 hours PRN Moderate Pain (4 - 6)      Allergies    No Known Allergies    Intolerances        Social History:   Smoking: Yes [ ]  No [ ]   ______pk yrs  ETOH  Yes [ ]  No [ ]  Social [ ]  DRUGS:  Yes [ ]  No [ ]  if so what______________    FAMILY HISTORY:  No pertinent family history in first degree relatives      Physical Exam:   Vital Signs Last 24 Hrs  T(C): 36.1 (05 Nov 2018 07:22), Max: 36.1 (05 Nov 2018 07:22)  T(F): 97 (05 Nov 2018 07:22), Max: 97 (05 Nov 2018 07:22)  HR: 89 (05 Nov 2018 07:22) (89 - 95)  BP: 142/70 (05 Nov 2018 07:22) (138/73 - 157/72)  BP(mean): --  RR: 18 (05 Nov 2018 07:22) (18 - 18)  SpO2: --      Labs:                         10.4   5.25  )-----------( 170      ( 05 Nov 2018 05:47 )             32.5     11-05    141  |  102  |  18  ----------------------------<  141<H>  3.4<L>   |  26  |  0.8    Ca    8.4<L>      05 Nov 2018 05:47  Phos  2.6     11-05  Mg     1.8     11-05    TPro  5.1<L>  /  Alb  3.0<L>  /  TBili  1.0  /  DBili  x   /  AST  14  /  ALT  8   /  AlkPhos  120<H>  11-05    PT/INR - ( 05 Nov 2018 02:47 )   PT: 16.00 sec;   INR: 1.40 ratio         PTT - ( 05 Nov 2018 02:47 )  PTT:>200.0 sec    Pertinent labs:                      10.4   5.25  )-----------( 170      ( 05 Nov 2018 05:47 )             32.5       11-05    141  |  102  |  18  ----------------------------<  141<H>  3.4<L>   |  26  |  0.8    Ca    8.4<L>      05 Nov 2018 05:47  Phos  2.6     11-05  Mg     1.8     11-05    TPro  5.1<L>  /  Alb  3.0<L>  /  TBili  1.0  /  DBili  x   /  AST  14  /  ALT  8   /  AlkPhos  120<H>  11-05      PT/INR - ( 05 Nov 2018 02:47 )   PT: 16.00 sec;   INR: 1.40 ratio         PTT - ( 05 Nov 2018 02:47 )  PTT:>200.0 sec    Radiology & Additional Studies:     < from: CT Head No Cont (10.26.18 @ 13:01) >  IMPRESSION:     1.  Stable subacute infarct medial right occipital lobe with   petechial/clinical hemorrhages.    2.  Periventricular and subcortical white matter chronic small vessel   ischemic changes.    3.  No acute mass effect, midline shift, or new hemorrhage.      < end of copied text >      Radiology imaging reviewed.       ASSESSMENT/ PLAN:   - consulted for G tube placement   - patient has recent history of subacute infarct, failure to thrive, does not meet caloric needs  - CT imaging reviewed with attending - no safe window for drainage   - G tube placement not warranted at this time     Thank you for the courtesy of this consult, please call [phone] with any further questions.

## 2018-11-05 NOTE — CONSULT NOTE ADULT - SUBJECTIVE AND OBJECTIVE BOX
SUSI MARTIN [id number]  78y Male    HPI:  79 yo M with PMHx of AFIB previously on Xarelto discontinued secondary to frequent falls , DVT s/p Right Hip Replacement (10/14/18) on Eliquis-bedbound, Vascular Dementia with behavioral changes, DM II, HTN presents from Rehab facility for evaluation of progressive weakness and decreased PO intake for the past 2 days. As per son, prior to 2 days ago, patient had a great appetite however in the last 2 days patient has even refused PO medications. pt was admitted for UTI and subacute infarct.       PAST MEDICAL & SURGICAL HISTORY:  Anemia  Diabetes  HTN (hypertension)  Arrhythmia  Dementia  S/P cholecystectomy  History of hip replacement        MEDICATIONS  (STANDING):  amLODIPine   Tablet 2.5 milliGRAM(s) Oral daily  ascorbic acid 500 milliGRAM(s) Oral daily  aspirin enteric coated 81 milliGRAM(s) Oral daily  atorvastatin 40 milliGRAM(s) Oral at bedtime  ceFAZolin   IVPB 2000 milliGRAM(s) IV Intermittent once  chlorhexidine 4% Liquid 1 Application(s) Topical <User Schedule>  collagenase Ointment 1 Application(s) Topical daily  enoxaparin Injectable 40 milliGRAM(s) SubCutaneous daily  ferrous    sulfate 325 milliGRAM(s) Oral daily  lactulose Syrup 20 Gram(s) Oral two times a day  losartan 50 milliGRAM(s) Oral daily  magnesium oxide 400 milliGRAM(s) Oral two times a day with meals  metoprolol tartrate 50 milliGRAM(s) Oral every 8 hours  OLANZapine 5 milliGRAM(s) Oral daily  senna 1 Tablet(s) Oral at bedtime  sodium chloride 0.9%. 1000 milliLiter(s) (50 mL/Hr) IV Continuous <Continuous>  tamsulosin 0.4 milliGRAM(s) Oral at bedtime  zinc sulfate 220 milliGRAM(s) Oral daily    MEDICATIONS  (PRN):  acetaminophen   Tablet .. 650 milliGRAM(s) Oral every 4 hours PRN Moderate Pain (4 - 6)  oxyCODONE    IR 5 milliGRAM(s) Oral every 4 hours PRN Moderate Pain (4 - 6)      Allergies    No Known Allergies    Intolerances        REVIEW OF SYSTEMS    [x] A ten-point review of systems was otherwise negative except as noted.  [ ] Due to altered mental status/intubation, subjective information were not able to be obtained from the patient. History was obtained, to the extent possible, from review of the chart and collateral sources of information.      Vital Signs Last 24 Hrs  T(C): 36.1 (05 Nov 2018 07:22), Max: 36.1 (05 Nov 2018 07:22)  T(F): 97 (05 Nov 2018 07:22), Max: 97 (05 Nov 2018 07:22)  HR: 89 (05 Nov 2018 07:22) (89 - 95)  BP: 142/70 (05 Nov 2018 07:22) (138/73 - 157/72)  RR: 18 (05 Nov 2018 07:22) (18 - 18)      PHYSICAL EXAM:  GENERAL: NAD, well-appearing  CHEST/LUNG: Clear to auscultation bilaterally  HEART: Regular rate and rhythm  ABDOMEN: Soft, Nontender, Nondistended;   EXTREMITIES:  No clubbing, cyanosis, or edema      LABS:    CAPILLARY BLOOD GLUCOSE    POCT Blood Glucose.: 128 mg/dL (05 Nov 2018 11:38)  POCT Blood Glucose.: 133 mg/dL (05 Nov 2018 06:28)  POCT Blood Glucose.: 163 mg/dL (04 Nov 2018 21:12)  POCT Blood Glucose.: 146 mg/dL (04 Nov 2018 17:14)                          10.4   5.25  )-----------( 170      ( 05 Nov 2018 05:47 )             32.5       Auto Neutrophil %: 73.9 % (11-05-18 @ 05:47)  Auto Immature Granulocyte %: 0.6 % (11-05-18 @ 05:47)  Auto Neutrophil %: 79.1 % (11-05-18 @ 02:47)  Auto Immature Granulocyte %: 0.4 % (11-05-18 @ 02:47)    11-05    141  |  102  |  18  ----------------------------<  141<H>  3.4<L>   |  26  |  0.8      Calcium, Total Serum: 8.4 mg/dL (11-05-18 @ 05:47)      LFTs:             5.1  | 1.0  | 14       ------------------[120     ( 05 Nov 2018 05:47 )  3.0  | x    | 8           Lipase:x      Amylase:x           Coags:     16.00  ----< 1.40    ( 05 Nov 2018 02:47 )     >200.0       RADIOLOGY & ADDITIONAL STUDIES:  < from: CT Abdomen and Pelvis w/ IV Cont (10.23.18 @ 04:23) >  IMPRESSION:        1.  No CT evidence of acute abdominopelvic pathology.    2.  Trace right pleural effusion, essentially stable since August 11, 2018.    3.  8 mm left lower lobe nodule. Nonemergent PET CT may be of benefit.    < end of copied text >    < from: CT Head No Cont (10.26.18 @ 13:01) >  IMPRESSION:     1.  Stable subacute infarct medial right occipital lobe with   petechial/clinical hemorrhages.    2.  Periventricular and subcortical white matter chronic small vessel   ischemic changes.    3.  No acute mass effect, midline shift, or new hemorrhage.      < end of copied text >

## 2018-11-05 NOTE — CONSULT NOTE ADULT - REASON FOR ADMISSION
Stroke on CTH
progressive weakness and decreased PO intake for 2 days

## 2018-11-05 NOTE — PROGRESS NOTE ADULT - ASSESSMENT
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, markedly decreased PO intake.     IMPRESSION   Failure to Thrive   Dementia   Decreased PO intake   DFU with PVD     Plan   #Acute/subacute CVA  -CT head and MRI show right medial occipital lobe subacute infarct with stable microhemorrhages  -cont therapeutic A/c with Lovenox for now    #Failure to thrive/Sev malnutrition, however patient eats small amounts throughout the day, does not meet caloric needs  -d/w GI/IR - unable to place PEG due to colon lying in front of the stomach   -D/w gen Sx, will attempt placement of PEG some time this week  -pt is mod risk for periop complications  -Gen Sx is asking for cardiac clearance, cardio consult placed  -start NGT feeds for now  -will give Lovenox today and switch to Heparin gtt in am    #PVD/DFU  -s/p RLE angiogram 11/2  -podiatry recommending serial xrays o/p, santyl, dsd/ kerlix - Daily    # Afib  -rate controlled on meto tartrate 50 q8h, A/c    # HTN   -amlodipine 2.5 / losartan 50    Activity: ambulate as tolerated   Diet Tube feeds pending   DISPO:  Continue to medically optimize

## 2018-11-05 NOTE — CONSULT NOTE ADULT - ATTENDING COMMENTS
I agree with findings and plan as above.  - no safe percutaneous window for IR placement of gastrostomy  - surgery consult recommended
Patient seen and examined agree with above except as noted.  Patient had recent right hip surgery and since then has been speaking less and not eating.  Was brought in for worsening mental status and decreased PO intake.    Found to have stroke on CTH with petechial hemorrhage.    Patient lethargic intermittently follows commands.  moving both upper extremities.  Difficult to assess lower extremities but responds to tactile stimuli.  Unable to test visual fields as too lethargic    A/P  Patient with confusion possible new stroke vs mass.  1. Admit to stroke unit  2. MRI brain w/w/o LEOBARDO  3. MRA H+N  4. Continue current meds  5. -140
Assessment and plan above were modified and discussed with residents, physician assistants, and nurses.
78 year old with non healing foot ulceration.    Duplex shows PVD. Will need angiogram in OR tomorrow.

## 2018-11-05 NOTE — CONSULT NOTE ADULT - PROVIDER SPECIALTY LIST ADULT
Cardiology
Intervent Radiology
Neurosurgery
Nutrition Support
Physiatry
Podiatry
Surgery
Gastroenterology
Neurology
Vascular Surgery

## 2018-11-05 NOTE — CONSULT NOTE ADULT - ASSESSMENT
77 yo M with PMHx of AFIB previously on Xarelto discontinued secondary to frequent falls , DVT s/p Right Hip Replacement (10/14/18) on Eliquis-bedbound, Vascular Dementia with behavioral changes, DM II, [facility] weakness and decreased PO intake found to have subacute ischemic stroke with petechial hemorrhage. pt is also s/[p angioram for RLE .  PEG is planned  to be done by Surgery ( LAP vs open ) .     pt has advanced dementia , unable to assess active symptoms and functional capacity , pt is bed bound.  RCRI : 1 ( recent stroke)    pt is at moderate risk for devante operative cardiac event , for an intermediate risk procedure  c/w AC devante procedure ( due to high risk of embolic events)  BP control , c/w metoprolol for rate control 77 yo M with PMHx of Afib, DVT s/p Right THR (10/14/18) on Eliquis, bedbound, Vascular Dementia with behavioral changes, DM II, [facility] weakness and decreased PO intake found to have subacute ischemic stroke with petechial hemorrhage. pt is also s/p RLE angiogram.  PEG is planned to be done by Surgery ( LAP vs open ).    pt has advanced dementia, unable to assess active symptoms and functional capacity, pt is bed bound.  RCRI : 1 ( recent stroke)    pt is at moderate-risk for perioperative cardiac event for an intermediate-risk procedure  c/w AC periprocedure (due to high risk of embolic events)    c/w metoprolol for rate control

## 2018-11-05 NOTE — CONSULT NOTE ADULT - SUBJECTIVE AND OBJECTIVE BOX
HPI:  77 yo M with PMHx of AFIB previously on Xarelto discontinued secondary to frequent falls , DVT s/p Right Hip Replacement (10/14/18) on Eliquis-bedbound, Vascular Dementia with behavioral changes, DM II, HTN [facility] weakness and decreased PO intake found to have subacute ischemic stroke with petechial hemorrhage.  PEG is planned  to be done by Surgery ( LAP vs open ) .   pt has advnaced dementia , unable to assess active symptoms.  as per the son , pt is bed bound .    PAST MEDICAL & SURGICAL HISTORY  Anemia  Diabetes  HTN (hypertension)  Arrhythmia  Dementia  S/P cholecystectomy  History of hip replacement      FAMILY HISTORY:  FAMILY HISTORY:  No pertinent family history in first degree relatives      SOCIAL HISTORY:  []smoker  []Alcohol  []Drug    ALLERGIES:  No Known Allergies      MEDICATIONS:  MEDICATIONS  (STANDING):  amLODIPine   Tablet 2.5 milliGRAM(s) Oral daily  ascorbic acid 500 milliGRAM(s) Oral daily  aspirin enteric coated 81 milliGRAM(s) Oral daily  atorvastatin 40 milliGRAM(s) Oral at bedtime  chlorhexidine 4% Liquid 1 Application(s) Topical <User Schedule>  collagenase Ointment 1 Application(s) Topical daily  enoxaparin Injectable 80 milliGRAM(s) SubCutaneous every 12 hours  ferrous    sulfate 325 milliGRAM(s) Oral daily  lactulose Syrup 20 Gram(s) Oral two times a day  losartan 50 milliGRAM(s) Oral daily  magnesium oxide 400 milliGRAM(s) Oral two times a day with meals  metoprolol tartrate 50 milliGRAM(s) Oral every 8 hours  OLANZapine 5 milliGRAM(s) Oral daily  senna 1 Tablet(s) Oral at bedtime  sodium chloride 0.9%. 1000 milliLiter(s) (50 mL/Hr) IV Continuous <Continuous>  tamsulosin 0.4 milliGRAM(s) Oral at bedtime  zinc sulfate 220 milliGRAM(s) Oral daily    MEDICATIONS  (PRN):  acetaminophen   Tablet .. 650 milliGRAM(s) Oral every 4 hours PRN Moderate Pain (4 - 6)  oxyCODONE    IR 5 milliGRAM(s) Oral every 4 hours PRN Moderate Pain (4 - 6)      HOME MEDICATIONS:  Home Medications:  amLODIPine 5 mg oral tablet: 0.5 tab(s) orally once a day (23 Oct 2018 11:36)  Cozaar 25 mg oral tablet: 1 tab(s) orally once a day (23 Oct 2018 11:48)  Eliquis 5 mg oral tablet: 1 tab(s) orally 2 times a day (23 Oct 2018 11:40)  ferrous sulfate 325 mg (65 mg elemental iron) oral delayed release tablet: 1 tab(s) orally once a day (12 Aug 2018 03:21)  Flomax 0.4 mg oral capsule: 1 cap(s) orally once a day (at bedtime) (23 Oct 2018 11:39)  lactulose 10 g/15 mL oral solution: orally once a day (23 Oct 2018 11:41)  metFORMIN 500 mg oral tablet: 1 tab(s) orally 2 times a day (23 Oct 2018 11:35)  Metoprolol Tartrate 25 mg oral tablet: 1 tab(s) orally 2 times a day (23 Oct 2018 11:37)  OLANZapine 5 mg oral tablet: 1 tab(s) orally once a day (23 Oct 2018 11:38)  oxyCODONE 5 mg oral tablet: orally every 4 hours (5 times/day), As Needed (23 Oct 2018 11:42)  senna oral tablet: 1 tab(s) orally once a day (at bedtime) (23 Oct 2018 11:44)  Tylenol 325 mg oral tablet: 2 tab(s) orally every 4 hours, As Needed (12 Aug 2018 03:21)  Vitamin C 500 mg oral tablet: 1 tab(s) orally once a day (23 Oct 2018 11:47)  zinc sulfate: 50 milligram(s) orally once a day (23 Oct 2018 11:47)      VITALS:   T(F): 97.2 (11-05 @ 15:30), Max: 98.2 (11-02 @ 18:56)  HR: 100 (11-05 @ 15:30) (72 - 111)  BP: 152/75 (11-05 @ 15:30) (122/69 - 181/94)  BP(mean): --  RR: 18 (11-05 @ 15:30) (12 - 20)  SpO2: 97% (11-05 @ 13:39) (97% - 100%)    I&O's Summary      REVIEW OF SYSTEMS:  unable to obtain full review of sys    PHYSICAL EXAM:  NEURO: patient is awake , follow commands , not oriented  GEN: Not in acute distress  NECK:  no JVD  LUNGS: Clear to auscultation bilaterally   CARDIOVASCULAR: irregular HR , diastolic murmur over aortic valve area  ABD: Soft, non-tender, non-distended  EXT: No BRIGETTE  moves all extremities    LABS:                        10.4   5.25  )-----------( 170      ( 05 Nov 2018 05:47 )             32.5     11-05    141  |  102  |  18  ----------------------------<  141<H>  3.4<L>   |  26  |  0.8    Ca    8.4<L>      05 Nov 2018 05:47  Phos  2.6     11-05  Mg     1.8     11-05    TPro  5.1<L>  /  Alb  3.0<L>  /  TBili  1.0  /  DBili  x   /  AST  14  /  ALT  8   /  AlkPhos  120<H>  11-05    PT/INR - ( 05 Nov 2018 02:47 )   PT: 16.00 sec;   INR: 1.40 ratio         PTT - ( 05 Nov 2018 02:47 )  PTT:>200.0 sec          RADIOLOGY:  -TTE:  < from: Transthoracic Echocardiogram (10.30.18 @ 12:53) >  Summary:   1. Left ventricular ejection fraction, by visual estimation, is 55 to   60%.   2. Normal global left ventricular systolic function.   3. Normal left ventricular internal cavity size.   4. Normal left atrial size.   5. Normal right atrial size.   6. Thickening of the anterior and posterior mitral valve leaflets.   7. Mild tricuspid regurgitation.   8. Moderate aortic regurgitation.   9. Dilatation of the aortic root.  10. Color flow doppler and intravenous injection of agitated saline   demonstrates the presence of an intact intra atrial septum    < end of copied text >    < from: MR Head w/wo IV Cont (10.23.18 @ 18:57) >  IMPRESSION:    1.  Signal abnormality with abnormal enhancement in the right medial   occipital lobe consistent with subacute infarct and   petechial/microhemorrhages.    2.  Prominent ventricles out of proportion to the sulcal pattern in the   appropriate clinical setting may be consistent with communicating   hydrocephalus.     3.  Scattered T2 and FLAIR hyperintensities periventricular and   subcortical white matter which are nonspecific and without mass effect   most likely consistent with chronicsmall vessel ischemic changes.    4.  Left greater than right maxillary sinus disease.    < end of copied text >      ECG:  from 10/23 : Afib with RVR HPI:  77 yo M with PMHx of Afib, DVT s/p Right THR (10/14/18) on Eliquis, bedbound, Vascular Dementia with behavioral changes, DM II, [facility] and decreased PO intake found to have subacute ischemic stroke with petechial hemorrhage.  PEG is planned to be done by Surgery ( LAP vs open ).  pt has advanced dementia , unable to assess active symptoms, bed bound.      PAST MEDICAL & SURGICAL HISTORY  Anemia  Diabetes  HTN (hypertension)  Arrhythmia  Dementia  S/P cholecystectomy  History of hip replacement    No pertinent family history of premature CAD in first degree relatives    SOCIAL HISTORY: Denies EtOH, smoking or drug use    ALLERGIES:  No Known Allergies      MEDICATIONS:  MEDICATIONS  (STANDING):  amLODIPine   Tablet 2.5 milliGRAM(s) Oral daily  ascorbic acid 500 milliGRAM(s) Oral daily  aspirin enteric coated 81 milliGRAM(s) Oral daily  atorvastatin 40 milliGRAM(s) Oral at bedtime  chlorhexidine 4% Liquid 1 Application(s) Topical <User Schedule>  collagenase Ointment 1 Application(s) Topical daily  enoxaparin Injectable 80 milliGRAM(s) SubCutaneous every 12 hours  ferrous    sulfate 325 milliGRAM(s) Oral daily  lactulose Syrup 20 Gram(s) Oral two times a day  losartan 50 milliGRAM(s) Oral daily  magnesium oxide 400 milliGRAM(s) Oral two times a day with meals  metoprolol tartrate 50 milliGRAM(s) Oral every 8 hours  OLANZapine 5 milliGRAM(s) Oral daily  senna 1 Tablet(s) Oral at bedtime  sodium chloride 0.9%. 1000 milliLiter(s) (50 mL/Hr) IV Continuous <Continuous>  tamsulosin 0.4 milliGRAM(s) Oral at bedtime  zinc sulfate 220 milliGRAM(s) Oral daily    MEDICATIONS  (PRN):  acetaminophen   Tablet .. 650 milliGRAM(s) Oral every 4 hours PRN Moderate Pain (4 - 6)  oxyCODONE    IR 5 milliGRAM(s) Oral every 4 hours PRN Moderate Pain (4 - 6)      HOME MEDICATIONS:  Home Medications:  amLODIPine 5 mg oral tablet: 0.5 tab(s) orally once a day (23 Oct 2018 11:36)  Cozaar 25 mg oral tablet: 1 tab(s) orally once a day (23 Oct 2018 11:48)  Eliquis 5 mg oral tablet: 1 tab(s) orally 2 times a day (23 Oct 2018 11:40)  ferrous sulfate 325 mg (65 mg elemental iron) oral delayed release tablet: 1 tab(s) orally once a day (12 Aug 2018 03:21)  Flomax 0.4 mg oral capsule: 1 cap(s) orally once a day (at bedtime) (23 Oct 2018 11:39)  lactulose 10 g/15 mL oral solution: orally once a day (23 Oct 2018 11:41)  metFORMIN 500 mg oral tablet: 1 tab(s) orally 2 times a day (23 Oct 2018 11:35)  Metoprolol Tartrate 25 mg oral tablet: 1 tab(s) orally 2 times a day (23 Oct 2018 11:37)  OLANZapine 5 mg oral tablet: 1 tab(s) orally once a day (23 Oct 2018 11:38)  oxyCODONE 5 mg oral tablet: orally every 4 hours (5 times/day), As Needed (23 Oct 2018 11:42)  senna oral tablet: 1 tab(s) orally once a day (at bedtime) (23 Oct 2018 11:44)  Tylenol 325 mg oral tablet: 2 tab(s) orally every 4 hours, As Needed (12 Aug 2018 03:21)  Vitamin C 500 mg oral tablet: 1 tab(s) orally once a day (23 Oct 2018 11:47)  zinc sulfate: 50 milligram(s) orally once a day (23 Oct 2018 11:47)      VITALS:   T(F): 97.2 (11-05 @ 15:30), Max: 98.2 (11-02 @ 18:56)  HR: 100 (11-05 @ 15:30) (72 - 111)  BP: 152/75 (11-05 @ 15:30) (122/69 - 181/94)  BP(mean): --  RR: 18 (11-05 @ 15:30) (12 - 20)  SpO2: 97% (11-05 @ 13:39) (97% - 100%)      REVIEW OF SYSTEMS:  unable to obtain    PHYSICAL EXAM:  NEURO: patient is awake , follow commands , not oriented  GEN: Not in acute distress  NECK:  no JVD  LUNGS: Clear to auscultation bilaterally   CARDIOVASCULAR: irregular HR , diastolic murmur over aortic valve area  ABD: Soft, non-tender, non-distended  EXT: No BRIGETTE, moves all extremities      LABS:                        10.4   5.25  )-----------( 170      ( 05 Nov 2018 05:47 )             32.5     11-05    141  |  102  |  18  ----------------------------<  141<H>  3.4<L>   |  26  |  0.8    Ca    8.4<L>      05 Nov 2018 05:47  Phos  2.6     11-05  Mg     1.8     11-05    TPro  5.1<L>  /  Alb  3.0<L>  /  TBili  1.0  /  DBili  x   /  AST  14  /  ALT  8   /  AlkPhos  120<H>  11-05    PT/INR - ( 05 Nov 2018 02:47 )   PT: 16.00 sec;   INR: 1.40 ratio      PTT - ( 05 Nov 2018 02:47 )  PTT:>200.0 sec          RADIOLOGY:  -TTE:  < from: Transthoracic Echocardiogram (10.30.18 @ 12:53) >  Summary:   1. Left ventricular ejection fraction, by visual estimation, is 55 to   60%.   2. Normal global left ventricular systolic function.   3. Normal left ventricular internal cavity size.   4. Normal left atrial size.   5. Normal right atrial size.   6. Thickening of the anterior and posterior mitral valve leaflets.   7. Mild tricuspid regurgitation.   8. Moderate aortic regurgitation.   9. Dilatation of the aortic root.  10. Color flow doppler and intravenous injection of agitated saline   demonstrates the presence of an intact intra atrial septum    < end of copied text >        < from: MR Head w/wo IV Cont (10.23.18 @ 18:57) >  IMPRESSION:    1.  Signal abnormality with abnormal enhancement in the right medial   occipital lobe consistent with subacute infarct and   petechial/microhemorrhages.    2.  Prominent ventricles out of proportion to the sulcal pattern in the   appropriate clinical setting may be consistent with communicating   hydrocephalus.     3.  Scattered T2 and FLAIR hyperintensities periventricular and   subcortical white matter which are nonspecific and without mass effect   most likely consistent with chronicsmall vessel ischemic changes.    4.  Left greater than right maxillary sinus disease.    < end of copied text >      ECG: from 10/23 : Afib with RVR

## 2018-11-05 NOTE — CONSULT NOTE ADULT - CONSULT REASON
G tube placement
Mass vs Subacute parenchymal hemorrhage
Right lateral foot DFU
gait dysfunction
peg placement
pre op risk stratification
progressive weaknessand decrease in p.o intake for 2 days
AMS
Non-healing foot wound with b/l arterial occlusion
PEG tube placement

## 2018-11-05 NOTE — CHART NOTE - NSCHARTNOTEFT_GEN_A_CORE
Gastroenterology Fellow Note , FY1   Pt was tentatively scheduled for PEG today , reviewed recent CT abdomen done approx 2 weeks ago which showed a colon anterior to stomach with no gastric window to place peg. Thus PEG procedure was cancelled and  recommended either Surgery or IR guided placement of G tube.   Discussed with . Gastroenterology Fellow Note , FY1   Pt was tentatively scheduled for PEG today , reviewed recent CT abdomen done approx 2 weeks ago which showed a colon anterior to stomach with no gastric window to place peg. Thus PEG procedure was cancelled and  recommended  Surgery consultation for  placement of G tube.   Discussed with .      Harpreet CAMERON

## 2018-11-05 NOTE — CONSULT NOTE ADULT - ASSESSMENT
79 yo M with PMHx of AFIB on Eliquis - bedbound present with progressive weakness and decreased PO intake, planned for PEG tube placed. GI and IR teams were consulted but procedure could not be done due to CT findings of transverse colon across the stomach.     Plan:    OR Thursday for PEG possible lap assisted possible open, discussed with family and medicine team in charge all of which agreed with plan.  pt is therapeutic lovenox which will be changed to heparin drip. heparin drip can be held 4 - 6 hrs before procedure  will continue to follow    D/W Dr. Anderson

## 2018-11-05 NOTE — PROGRESS NOTE ADULT - SUBJECTIVE AND OBJECTIVE BOX
SUBJECTIVE: no new complaints     Patient is a 78y old Male who presents with a chief complaint of progressive weakness and decreased PO intake for 2 days (03 Nov 2018 01:06)      HPI:  79 yo M with PMHx of AFIB previously on Xarelto discontinued secondary to frequent falls , DVT s/p Right Hip Replacement (10/14/18) on Eliquis-bedbound, Vascular Dementia with behavioral changes, DM II, HTN presents [facility] for evaluation of progressive weakness and decreased PO intake for the past 2 days. As per son, prior to 2 days ago, patient had a great appetite however in the last 2 days patient has even refused PO medications. In the ED, patient was found to have AFIB with RVR, HR in 120s was given 20mg of IV Cardizem with response. CTH revealed what was initially suspected as an ICH, however upon Neurosurgery evaluation findings may be secondary to a subacute infarct, awaiting neurology evaluation. (23 Oct 2018 10:52)    PAST MEDICAL & SURGICAL HISTORY  Anemia  Diabetes  HTN (hypertension)  Arrhythmia  Dementia  S/P cholecystectomy  History of hip replacement    SOCIAL HISTORY:    ALLERGIES:  No Known Allergies    PHYSICAL EXAM:  GEN: No acute distress  LUNGS: Clear to auscultation bilaterally   HEART: Regular  ABD: Soft, non-tender, non-distended.  EXT: NC/NC/NE/2+PP/MARIN/Skin Intact.   NEURO: AAOX2, baseline        ROS: Denies CP, SOB, AP    MEDICATIONS  (STANDING):  amLODIPine   Tablet 2.5 milliGRAM(s) Oral daily  ascorbic acid 500 milliGRAM(s) Oral daily  aspirin enteric coated 81 milliGRAM(s) Oral daily  atorvastatin 40 milliGRAM(s) Oral at bedtime  ceFAZolin   IVPB 2000 milliGRAM(s) IV Intermittent once  chlorhexidine 4% Liquid 1 Application(s) Topical <User Schedule>  collagenase Ointment 1 Application(s) Topical daily  enoxaparin Injectable 80 milliGRAM(s) SubCutaneous every 12 hours  ferrous    sulfate 325 milliGRAM(s) Oral daily  lactulose Syrup 20 Gram(s) Oral two times a day  losartan 50 milliGRAM(s) Oral daily  magnesium oxide 400 milliGRAM(s) Oral two times a day with meals  metoprolol tartrate 50 milliGRAM(s) Oral every 8 hours  OLANZapine 5 milliGRAM(s) Oral daily  senna 1 Tablet(s) Oral at bedtime  sodium chloride 0.9%. 1000 milliLiter(s) (50 mL/Hr) IV Continuous <Continuous>  tamsulosin 0.4 milliGRAM(s) Oral at bedtime  zinc sulfate 220 milliGRAM(s) Oral daily    MEDICATIONS  (PRN):  acetaminophen   Tablet .. 650 milliGRAM(s) Oral every 4 hours PRN Moderate Pain (4 - 6)  oxyCODONE    IR 5 milliGRAM(s) Oral every 4 hours PRN Moderate Pain (4 - 6)    Home Medications:  amLODIPine 5 mg oral tablet: 0.5 tab(s) orally once a day (23 Oct 2018 11:36)  Cozaar 25 mg oral tablet: 1 tab(s) orally once a day (23 Oct 2018 11:48)  Eliquis 5 mg oral tablet: 1 tab(s) orally 2 times a day (23 Oct 2018 11:40)  ferrous sulfate 325 mg (65 mg elemental iron) oral delayed release tablet: 1 tab(s) orally once a day (12 Aug 2018 03:21)  Flomax 0.4 mg oral capsule: 1 cap(s) orally once a day (at bedtime) (23 Oct 2018 11:39)  lactulose 10 g/15 mL oral solution: orally once a day (23 Oct 2018 11:41)  metFORMIN 500 mg oral tablet: 1 tab(s) orally 2 times a day (23 Oct 2018 11:35)  Metoprolol Tartrate 25 mg oral tablet: 1 tab(s) orally 2 times a day (23 Oct 2018 11:37)  OLANZapine 5 mg oral tablet: 1 tab(s) orally once a day (23 Oct 2018 11:38)  oxyCODONE 5 mg oral tablet: orally every 4 hours (5 times/day), As Needed (23 Oct 2018 11:42)  senna oral tablet: 1 tab(s) orally once a day (at bedtime) (23 Oct 2018 11:44)  Tylenol 325 mg oral tablet: 2 tab(s) orally every 4 hours, As Needed (12 Aug 2018 03:21)  Vitamin C 500 mg oral tablet: 1 tab(s) orally once a day (23 Oct 2018 11:47)  zinc sulfate: 50 milligram(s) orally once a day (23 Oct 2018 11:47)    Vital Signs Last 24 Hrs  T(C): 36.1 (05 Nov 2018 07:22), Max: 36.1 (05 Nov 2018 07:22)  T(F): 97 (05 Nov 2018 07:22), Max: 97 (05 Nov 2018 07:22)  HR: 89 (05 Nov 2018 07:22) (89 - 95)  BP: 142/70 (05 Nov 2018 07:22) (138/73 - 157/72)  BP(mean): --  RR: 18 (05 Nov 2018 07:22) (18 - 18)  SpO2: --  CAPILLARY BLOOD GLUCOSE      POCT Blood Glucose.: 128 mg/dL (05 Nov 2018 11:38)  POCT Blood Glucose.: 133 mg/dL (05 Nov 2018 06:28)  POCT Blood Glucose.: 163 mg/dL (04 Nov 2018 21:12)  POCT Blood Glucose.: 146 mg/dL (04 Nov 2018 17:14)    LABS:                        10.4   5.25  )-----------( 170      ( 05 Nov 2018 05:47 )             32.5     11-05    141  |  102  |  18  ----------------------------<  141<H>  3.4<L>   |  26  |  0.8    Ca    8.4<L>      05 Nov 2018 05:47  Phos  2.6     11-05  Mg     1.8     11-05    TPro  5.1<L>  /  Alb  3.0<L>  /  TBili  1.0  /  DBili  x   /  AST  14  /  ALT  8   /  AlkPhos  120<H>  11-05    LIVER FUNCTIONS - ( 05 Nov 2018 05:47 )  Alb: 3.0 g/dL / Pro: 5.1 g/dL / ALK PHOS: 120 U/L / ALT: 8 U/L / AST: 14 U/L / GGT: x               PT/INR - ( 05 Nov 2018 02:47 )   PT: 16.00 sec;   INR: 1.40 ratio         PTT - ( 05 Nov 2018 02:47 )  PTT:>200.0 sec            Consultant(s) Notes Reviewed:  [x ] YES  [ ] NO  Care Discussed with Consultants/Other Providers/ Housestaff [ x] YES  [ ] NO  Radiology personally reviewed.

## 2018-11-05 NOTE — PROGRESS NOTE ADULT - ASSESSMENT
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, markedly decreased PO intake    #Acute/subacute CVA  -CT head and MRI show right medial occipital lobe subacute infarct with stable microhemorrhages  -cont therapeutic A/c with Lovenox for now    #Failure to thrive/Sev malnutrition, however patient eats small amounts throughout the day, does not meet caloric needs  -d/w GI/IR - unable to place PEG due to colon lying in front of the stomach  -D/w gen Sx, will attempt placement of PEG some time this week  -pt is mod risk for periop complications  -Gen Sx is asking for cardiac clearance, cardio consult placed    #PVD/DFU  -s/p RLE angiogram 11/2  -podiatry recommending serial xrays o/p, santyl, dsd/ kerlix - Daily    # Afib  -rate controlled on meto tartrate 50 q8h, A/c    # HTN   -amlodipine 2.5 / losartan 50    High risk pt 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, markedly decreased PO intake    #Acute/subacute CVA  -CT head and MRI show right medial occipital lobe subacute infarct with stable microhemorrhages  -cont therapeutic A/c with Lovenox for now    #Failure to thrive/Sev malnutrition, however patient eats small amounts throughout the day, does not meet caloric needs  -d/w GI/IR - unable to place PEG due to colon lying in front of the stomach  -D/w gen Sx, will attempt placement of PEG some time this week  -pt is mod risk for periop complications  -Gen Sx is asking for cardiac clearance, cardio consult placed  -start NGT feeds for now  -will give Lovenox today and switch to Heparin gtt in am    #PVD/DFU  -s/p RLE angiogram 11/2  -podiatry recommending serial xrays o/p, santyl, dsd/ kerlix - Daily    # Afib  -rate controlled on meto tartrate 50 q8h, A/c    # HTN   -amlodipine 2.5 / losartan 50    High risk pt

## 2018-11-06 LAB
ALBUMIN SERPL ELPH-MCNC: 2.9 G/DL — LOW (ref 3.5–5.2)
ALP SERPL-CCNC: 131 U/L — HIGH (ref 30–115)
ALT FLD-CCNC: 11 U/L — SIGNIFICANT CHANGE UP (ref 0–41)
ANION GAP SERPL CALC-SCNC: 13 MMOL/L — SIGNIFICANT CHANGE UP (ref 7–14)
APTT BLD: 42.2 SEC — HIGH (ref 27–39.2)
AST SERPL-CCNC: 16 U/L — SIGNIFICANT CHANGE UP (ref 0–41)
BASOPHILS # BLD AUTO: 0.01 K/UL — SIGNIFICANT CHANGE UP (ref 0–0.2)
BASOPHILS NFR BLD AUTO: 0.1 % — SIGNIFICANT CHANGE UP (ref 0–1)
BILIRUB SERPL-MCNC: 1 MG/DL — SIGNIFICANT CHANGE UP (ref 0.2–1.2)
BUN SERPL-MCNC: 18 MG/DL — SIGNIFICANT CHANGE UP (ref 10–20)
CALCIUM SERPL-MCNC: 8.9 MG/DL — SIGNIFICANT CHANGE UP (ref 8.5–10.1)
CHLORIDE SERPL-SCNC: 105 MMOL/L — SIGNIFICANT CHANGE UP (ref 98–110)
CO2 SERPL-SCNC: 26 MMOL/L — SIGNIFICANT CHANGE UP (ref 17–32)
CREAT SERPL-MCNC: 0.9 MG/DL — SIGNIFICANT CHANGE UP (ref 0.7–1.5)
EOSINOPHIL # BLD AUTO: 0.06 K/UL — SIGNIFICANT CHANGE UP (ref 0–0.7)
EOSINOPHIL NFR BLD AUTO: 0.8 % — SIGNIFICANT CHANGE UP (ref 0–8)
GLUCOSE BLDC GLUCOMTR-MCNC: 120 MG/DL — HIGH (ref 70–99)
GLUCOSE BLDC GLUCOMTR-MCNC: 127 MG/DL — HIGH (ref 70–99)
GLUCOSE BLDC GLUCOMTR-MCNC: 143 MG/DL — HIGH (ref 70–99)
GLUCOSE BLDC GLUCOMTR-MCNC: 144 MG/DL — HIGH (ref 70–99)
GLUCOSE SERPL-MCNC: 117 MG/DL — HIGH (ref 70–99)
HCT VFR BLD CALC: 33.5 % — LOW (ref 42–52)
HGB BLD-MCNC: 10.7 G/DL — LOW (ref 14–18)
IMM GRANULOCYTES NFR BLD AUTO: 0.3 % — SIGNIFICANT CHANGE UP (ref 0.1–0.3)
INR BLD: 1.29 RATIO — SIGNIFICANT CHANGE UP (ref 0.65–1.3)
LYMPHOCYTES # BLD AUTO: 0.68 K/UL — LOW (ref 1.2–3.4)
LYMPHOCYTES # BLD AUTO: 9.5 % — LOW (ref 20.5–51.1)
MAGNESIUM SERPL-MCNC: 2 MG/DL — SIGNIFICANT CHANGE UP (ref 1.8–2.4)
MCHC RBC-ENTMCNC: 28.2 PG — SIGNIFICANT CHANGE UP (ref 27–31)
MCHC RBC-ENTMCNC: 31.9 G/DL — LOW (ref 32–37)
MCV RBC AUTO: 88.2 FL — SIGNIFICANT CHANGE UP (ref 80–94)
MONOCYTES # BLD AUTO: 0.46 K/UL — SIGNIFICANT CHANGE UP (ref 0.1–0.6)
MONOCYTES NFR BLD AUTO: 6.4 % — SIGNIFICANT CHANGE UP (ref 1.7–9.3)
NEUTROPHILS # BLD AUTO: 5.95 K/UL — SIGNIFICANT CHANGE UP (ref 1.4–6.5)
NEUTROPHILS NFR BLD AUTO: 82.9 % — HIGH (ref 42.2–75.2)
NRBC # BLD: 0 /100 WBCS — SIGNIFICANT CHANGE UP (ref 0–0)
PHOSPHATE SERPL-MCNC: 2.7 MG/DL — SIGNIFICANT CHANGE UP (ref 2.1–4.9)
PLATELET # BLD AUTO: 168 K/UL — SIGNIFICANT CHANGE UP (ref 130–400)
POTASSIUM SERPL-MCNC: 3.8 MMOL/L — SIGNIFICANT CHANGE UP (ref 3.5–5)
POTASSIUM SERPL-SCNC: 3.8 MMOL/L — SIGNIFICANT CHANGE UP (ref 3.5–5)
PROT SERPL-MCNC: 5.4 G/DL — LOW (ref 6–8)
PROTHROM AB SERPL-ACNC: 14.8 SEC — HIGH (ref 9.95–12.87)
RBC # BLD: 3.8 M/UL — LOW (ref 4.7–6.1)
RBC # FLD: 17.7 % — HIGH (ref 11.5–14.5)
SODIUM SERPL-SCNC: 144 MMOL/L — SIGNIFICANT CHANGE UP (ref 135–146)
WBC # BLD: 7.18 K/UL — SIGNIFICANT CHANGE UP (ref 4.8–10.8)
WBC # FLD AUTO: 7.18 K/UL — SIGNIFICANT CHANGE UP (ref 4.8–10.8)

## 2018-11-06 RX ORDER — HEPARIN SODIUM 5000 [USP'U]/ML
1200 INJECTION INTRAVENOUS; SUBCUTANEOUS
Qty: 25000 | Refills: 0 | Status: DISCONTINUED | OUTPATIENT
Start: 2018-11-06 | End: 2018-11-06

## 2018-11-06 RX ORDER — HEPARIN SODIUM 5000 [USP'U]/ML
1300 INJECTION INTRAVENOUS; SUBCUTANEOUS
Qty: 25000 | Refills: 0 | Status: DISCONTINUED | OUTPATIENT
Start: 2018-11-06 | End: 2018-11-06

## 2018-11-06 RX ORDER — HEPARIN SODIUM 5000 [USP'U]/ML
700 INJECTION INTRAVENOUS; SUBCUTANEOUS
Qty: 25000 | Refills: 0 | Status: DISCONTINUED | OUTPATIENT
Start: 2018-11-06 | End: 2018-11-07

## 2018-11-06 RX ADMIN — SODIUM CHLORIDE 50 MILLILITER(S): 9 INJECTION INTRAMUSCULAR; INTRAVENOUS; SUBCUTANEOUS at 06:55

## 2018-11-06 RX ADMIN — Medication 500 MILLIGRAM(S): at 11:36

## 2018-11-06 RX ADMIN — AMLODIPINE BESYLATE 2.5 MILLIGRAM(S): 2.5 TABLET ORAL at 05:53

## 2018-11-06 RX ADMIN — LACTULOSE 20 GRAM(S): 10 SOLUTION ORAL at 05:53

## 2018-11-06 RX ADMIN — Medication 50 MILLIGRAM(S): at 13:13

## 2018-11-06 RX ADMIN — HEPARIN SODIUM 7 UNIT(S)/HR: 5000 INJECTION INTRAVENOUS; SUBCUTANEOUS at 18:43

## 2018-11-06 RX ADMIN — ATORVASTATIN CALCIUM 40 MILLIGRAM(S): 80 TABLET, FILM COATED ORAL at 21:32

## 2018-11-06 RX ADMIN — MAGNESIUM OXIDE 400 MG ORAL TABLET 400 MILLIGRAM(S): 241.3 TABLET ORAL at 10:25

## 2018-11-06 RX ADMIN — ENOXAPARIN SODIUM 80 MILLIGRAM(S): 100 INJECTION SUBCUTANEOUS at 05:53

## 2018-11-06 RX ADMIN — Medication 325 MILLIGRAM(S): at 11:36

## 2018-11-06 RX ADMIN — OLANZAPINE 5 MILLIGRAM(S): 15 TABLET, FILM COATED ORAL at 11:36

## 2018-11-06 RX ADMIN — TAMSULOSIN HYDROCHLORIDE 0.4 MILLIGRAM(S): 0.4 CAPSULE ORAL at 21:32

## 2018-11-06 RX ADMIN — ZINC SULFATE TAB 220 MG (50 MG ZINC EQUIVALENT) 220 MILLIGRAM(S): 220 (50 ZN) TAB at 11:36

## 2018-11-06 RX ADMIN — LOSARTAN POTASSIUM 50 MILLIGRAM(S): 100 TABLET, FILM COATED ORAL at 05:53

## 2018-11-06 RX ADMIN — Medication 50 MILLIGRAM(S): at 05:53

## 2018-11-06 RX ADMIN — Medication 81 MILLIGRAM(S): at 11:36

## 2018-11-06 RX ADMIN — Medication 1 APPLICATION(S): at 11:37

## 2018-11-06 RX ADMIN — Medication 50 MILLIGRAM(S): at 21:32

## 2018-11-06 RX ADMIN — SENNA PLUS 1 TABLET(S): 8.6 TABLET ORAL at 21:32

## 2018-11-06 RX ADMIN — MAGNESIUM OXIDE 400 MG ORAL TABLET 400 MILLIGRAM(S): 241.3 TABLET ORAL at 18:44

## 2018-11-06 RX ADMIN — CHLORHEXIDINE GLUCONATE 1 APPLICATION(S): 213 SOLUTION TOPICAL at 05:56

## 2018-11-06 RX ADMIN — SODIUM CHLORIDE 50 MILLILITER(S): 9 INJECTION INTRAMUSCULAR; INTRAVENOUS; SUBCUTANEOUS at 18:43

## 2018-11-06 NOTE — PROGRESS NOTE ADULT - SUBJECTIVE AND OBJECTIVE BOX
Progress Note: General Surgery  Patient: SUSI MARTIN , 78y (1940)Male   MRN: [id number]  Location: [address]  Visit: 10-23-18 Inpatient  Date: 11-06-18 @ 03:23  Hospital Day: 16    Procedure/Diagnosis: PEG tube placement  Events over 24h: No acute overnight events. Surgery following for PEG tube placement 11/7    Vitals: T(F): 96.4 (11-06-18 @ 00:01), Max: 97.2 (11-05-18 @ 15:30)  HR: 81 (11-06-18 @ 00:01)  BP: 137/65 (11-06-18 @ 00:01) (137/65 - 155/88)  RR: 18 (11-06-18 @ 00:01)  SpO2: 97% (11-05-18 @ 13:39)    In:   11-05-18 @ 07:01  -  11-06-18 @ 03:23  --------------------------------------------------------  IN: 0 mL      Out:   11-05-18 @ 07:01  -  11-06-18 @ 03:23  --------------------------------------------------------  OUT:    Indwelling Catheter - Urethral: 1 mL  Total OUT: 1 mL        Net:   11-05-18 @ 07:01  -  11-06-18 @ 03:23  --------------------------------------------------------  NET: -1 mL      Diet: Diet, Dysphagia 2 Mechanical Soft-Thin Liquids:   Consistent Carbohydrate Evening Snack  Tube Feeding Modality: Nasogastric  Jevity 1.2 Trae  Total Volume for 24 Hours (mL): 960  Bolus   Total Volume of Bolus (mL):  240  Bolus Feed Rate (mL per Hour): 60   Bolus Feed Duration (in Hours): 4  Tube Feed Frequency: Every 6 hours   Tube Feed Start Time: 18:00 (11-05-18 @ 15:35)    IV Fluids: yes no , Type: ascorbic acid 500 milliGRAM(s) Oral daily  ferrous    sulfate 325 milliGRAM(s) Oral daily  magnesium oxide 400 milliGRAM(s) Oral two times a day with meals  sodium chloride 0.9%. 1000 milliLiter(s) (50 mL/Hr) IV Continuous <Continuous>  zinc sulfate 220 milliGRAM(s) Oral daily    Physical Examination:  General Appearance: NAD, alert and cooperative  HEENT: NCAT, WNL  Heart: S1 and S2. No murmurs. RRR  Lungs: Clear to auscultation BL without rales, rhonchi, wheezing, crackles or diminished breath sounds.  Abdomen: Soft, nondistended      Medications: [Standing]  amLODIPine   Tablet 2.5 milliGRAM(s) Oral daily  ascorbic acid 500 milliGRAM(s) Oral daily  aspirin enteric coated 81 milliGRAM(s) Oral daily  atorvastatin 40 milliGRAM(s) Oral at bedtime  chlorhexidine 4% Liquid 1 Application(s) Topical <User Schedule>  collagenase Ointment 1 Application(s) Topical daily  enoxaparin Injectable 80 milliGRAM(s) SubCutaneous every 12 hours  ferrous    sulfate 325 milliGRAM(s) Oral daily  lactulose Syrup 20 Gram(s) Oral two times a day  losartan 50 milliGRAM(s) Oral daily  magnesium oxide 400 milliGRAM(s) Oral two times a day with meals  metoprolol tartrate 50 milliGRAM(s) Oral every 8 hours  OLANZapine 5 milliGRAM(s) Oral daily  senna 1 Tablet(s) Oral at bedtime  sodium chloride 0.9%. 1000 milliLiter(s) (50 mL/Hr) IV Continuous <Continuous>  tamsulosin 0.4 milliGRAM(s) Oral at bedtime  zinc sulfate 220 milliGRAM(s) Oral daily    DVT Prophylaxis: enoxaparin Injectable 80 milliGRAM(s) SubCutaneous every 12 hours    GI Prophylaxis:   Antibiotics:   Anticoagulation:   Medications:[PRN]  acetaminophen   Tablet .. 650 milliGRAM(s) Oral every 4 hours PRN  oxyCODONE    IR 5 milliGRAM(s) Oral every 4 hours PRN    Labs:                        10.4   5.25  )-----------( 170      ( 05 Nov 2018 05:47 )             32.5     11-05    141  |  102  |  18  ----------------------------<  141<H>  3.4<L>   |  26  |  0.8    Ca    8.4<L>      05 Nov 2018 05:47  Phos  2.6     11-05  Mg     1.8     11-05    TPro  5.1<L>  /  Alb  3.0<L>  /  TBili  1.0  /  DBili  x   /  AST  14  /  ALT  8   /  AlkPhos  120<H>  11-05    LIVER FUNCTIONS - ( 05 Nov 2018 05:47 )  Alb: 3.0 g/dL / Pro: 5.1 g/dL / ALK PHOS: 120 U/L / ALT: 8 U/L / AST: 14 U/L / GGT: x           PT/INR - ( 05 Nov 2018 02:47 )   PT: 16.00 sec;   INR: 1.40 ratio    PTT - ( 05 Nov 2018 02:47 )  PTT:>200.0 sec

## 2018-11-06 NOTE — PROGRESS NOTE ADULT - ASSESSMENT
Assessment:  78y Male patient admitted for PEG tube placement    Plan:  Diet NPO after midnight for OR 11/7  IVF  Preop  Consent  On therapeutic LVX, switch to heparin drip  Hold heparin few hours before procedure (around 2AM on 11/7)      Date/Time: 11-06-18 @ 03:23

## 2018-11-06 NOTE — PROGRESS NOTE ADULT - SUBJECTIVE AND OBJECTIVE BOX
LENGTH OF HOSPITAL STAY: 14d      CHIEF COMPLAINT:   Patient is a 78y old  Male who presents with a chief complaint of progressive weakness and decreased PO intake for 2 days (06 Nov 2018 03:23)      OVER Past 24hrs:  The patient was seen and examined at bedside there no acute events over night patient failed multiple attempts to place NG tube. He eats small amounts.  Patient had one large darl stool movement but he is known to be on Iron supplementation.         PAST MEDICAL & SURGICAL HISTORY  PAST MEDICAL & SURGICAL HISTORY:  Anemia  Diabetes  HTN (hypertension)  Arrhythmia  Dementia  S/P cholecystectomy  History of hip replacement    SOCIAL HISTORY:    REVIEW OF SYSTEMS  RESPIRATORY: No cough, wheezing, chills or hemoptysis; No shortness of breath  CARDIOVASCULAR: No chest pain, palpitations, dizziness, or leg swelling    ALLERGIES:  No Known Allergies    MEDICATIONS:  STANDING MEDICATIONS  amLODIPine   Tablet 2.5 milliGRAM(s) Oral daily  ascorbic acid 500 milliGRAM(s) Oral daily  aspirin enteric coated 81 milliGRAM(s) Oral daily  atorvastatin 40 milliGRAM(s) Oral at bedtime  chlorhexidine 4% Liquid 1 Application(s) Topical <User Schedule>  collagenase Ointment 1 Application(s) Topical daily  ferrous    sulfate 325 milliGRAM(s) Oral daily  lactulose Syrup 20 Gram(s) Oral two times a day  losartan 50 milliGRAM(s) Oral daily  magnesium oxide 400 milliGRAM(s) Oral two times a day with meals  metoprolol tartrate 50 milliGRAM(s) Oral every 8 hours  OLANZapine 5 milliGRAM(s) Oral daily  senna 1 Tablet(s) Oral at bedtime  sodium chloride 0.9%. 1000 milliLiter(s) IV Continuous <Continuous>  tamsulosin 0.4 milliGRAM(s) Oral at bedtime  zinc sulfate 220 milliGRAM(s) Oral daily    PRN MEDICATIONS  acetaminophen   Tablet .. 650 milliGRAM(s) Oral every 4 hours PRN  oxyCODONE    IR 5 milliGRAM(s) Oral every 4 hours PRN    VITALS:   T(F): 96.8  HR: 101  BP: 126/58  RR: 18  SpO2: --    PHYSICAL EXAM:  General: No acute distress.  Alert, minimally  interactive, nonfocal. Cachetic male     HEENT: Pupils equal, reactive to light symmetrically.    PULM: Clear to auscultation bilaterally, no significant sputum production.    CVS: Regular rate and rhythm, no murmurs, rubs, or gallops.    ABD: Soft, nondistended, nontender, no masses.    EXT: No edema, nontender.    SKIN: Warm and well perfused, no rashes noted    LABS:                        10.7   7.18  )-----------( 168      ( 06 Nov 2018 05:42 )             33.5     11-06    144  |  105  |  18  ----------------------------<  117<H>  3.8   |  26  |  0.9    Ca    8.9      06 Nov 2018 05:42  Phos  2.7     11-06  Mg     2.0     11-06    TPro  5.4<L>  /  Alb  2.9<L>  /  TBili  1.0  /  DBili  x   /  AST  16  /  ALT  11  /  AlkPhos  131<H>  11-06    PT/INR - ( 06 Nov 2018 05:42 )   PT: 14.80 sec;   INR: 1.29 ratio         PTT - ( 06 Nov 2018 05:42 )  PTT:42.2 sec

## 2018-11-06 NOTE — PROGRESS NOTE ADULT - ASSESSMENT
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, markedly decreased PO intake.     IMPRESSION   Failure to Thrive   Dementia   Decreased PO intake - Pending PEG tube Placement 11/07/18   DFU with PVD     Plan   #Acute/subacute CVA  -CT head and MRI show right medial occipital lobe subacute infarct with stable microhemorrhages  -cont therapeutic A/c with Lovenox for now    #Failure to thrive/Sev malnutrition, however patient eats small amounts throughout the day, does not meet caloric needs  -d/w GI/IR - unable to place PEG due to colon lying in front of the stomach   -D/w gen Sx, will attempt placement of PEG some time this week  -pt is mod risk for periop complications  -Gen Sx is asking for cardiac clearance, cardio consult placed  -start NGT feeds for now  -will give Lovenox today and switch to Heparin gtt in PM    #PVD/DFU  -s/p RLE angiogram 11/2  -podiatry recommending serial xrays o/p, santyl, dsd/ kerlix - Daily    # Afib  -rate controlled on meto tartrate 50 q8h, A/c  - on therapeutic Lovenox will switch  heparin tonight and stope stop at 2am tomorrow  for OR procedure at 730  - PTT gaol 45-55    # HTN   -amlodipine 2.5 / losartan 50    Activity: ambulate as tolerated   Diet Dysphagia 2   DISPO:  Continue to medically optimize 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, markedly decreased PO intake.     IMPRESSION   Failure to Thrive   Dementia   Decreased PO intake - Pending PEG tube Placement 11/07/18   DFU with PVD     Plan   #Acute/subacute CVA  -CT head and MRI show right medial occipital lobe subacute infarct with stable microhemorrhages  -cont therapeutic A/c     #Failure to thrive/Sev malnutrition, however patient eats small amounts throughout the day, does not meet caloric needs  -d/w GI/IR - unable to place PEG due to colon lying in front of the stomach   -D/w gen Sx, will attempt placement of PEG some time this week  -pt is mod risk for periop complications  -Gen Sx is asking for cardiac clearance, cardio consult placed  -start NGT feeds for now  -will give Lovenox today and switch to Heparin gtt in PM    #PVD/DFU  -s/p RLE angiogram 11/2  -podiatry recommending serial xrays o/p, santyl, dsd/ kerlix - Daily    # Afib  -rate controlled on meto tartrate 50 q8h, A/c  - on therapeutic Lovenox will switch  heparin tonight and stope stop at 2am tomorrow  for OR procedure at 730  - PTT gaol 45-55    # HTN   -amlodipine 2.5 / losartan 50    Activity: ambulate as tolerated   Diet Dysphagia 2   DISPO:  Continue to medically optimize

## 2018-11-06 NOTE — CHART NOTE - NSCHARTNOTEFT_GEN_A_CORE
Three-Day Calorie Count Results:    11/3-11/5 3-day calorie count results    Day 1: 150 kcal, 8 g protein (no documentation for lunch & dinner)    Day 2: 0 kcal, 0 g protein (no documentation for any meal)    Day 3: 50 kcal, 1 g protein (documented NPO at breakfast, no documentation for dinner)    3-day average: 67 kcal, 3 g protein.

## 2018-11-07 LAB
ALBUMIN SERPL ELPH-MCNC: 2.8 G/DL — LOW (ref 3.5–5.2)
ALP SERPL-CCNC: 117 U/L — HIGH (ref 30–115)
ALT FLD-CCNC: 7 U/L — SIGNIFICANT CHANGE UP (ref 0–41)
ANION GAP SERPL CALC-SCNC: 11 MMOL/L — SIGNIFICANT CHANGE UP (ref 7–14)
APTT BLD: 69.9 SEC — HIGH (ref 27–39.2)
AST SERPL-CCNC: 20 U/L — SIGNIFICANT CHANGE UP (ref 0–41)
BASOPHILS # BLD AUTO: 0.01 K/UL — SIGNIFICANT CHANGE UP (ref 0–0.2)
BASOPHILS NFR BLD AUTO: 0.2 % — SIGNIFICANT CHANGE UP (ref 0–1)
BILIRUB SERPL-MCNC: 0.9 MG/DL — SIGNIFICANT CHANGE UP (ref 0.2–1.2)
BUN SERPL-MCNC: 15 MG/DL — SIGNIFICANT CHANGE UP (ref 10–20)
CALCIUM SERPL-MCNC: 8.4 MG/DL — LOW (ref 8.5–10.1)
CHLORIDE SERPL-SCNC: 107 MMOL/L — SIGNIFICANT CHANGE UP (ref 98–110)
CO2 SERPL-SCNC: 25 MMOL/L — SIGNIFICANT CHANGE UP (ref 17–32)
CREAT SERPL-MCNC: 0.7 MG/DL — SIGNIFICANT CHANGE UP (ref 0.7–1.5)
EOSINOPHIL # BLD AUTO: 0.07 K/UL — SIGNIFICANT CHANGE UP (ref 0–0.7)
EOSINOPHIL NFR BLD AUTO: 1.5 % — SIGNIFICANT CHANGE UP (ref 0–8)
GLUCOSE BLDC GLUCOMTR-MCNC: 105 MG/DL — HIGH (ref 70–99)
GLUCOSE BLDC GLUCOMTR-MCNC: 145 MG/DL — HIGH (ref 70–99)
GLUCOSE BLDC GLUCOMTR-MCNC: 148 MG/DL — HIGH (ref 70–99)
GLUCOSE BLDC GLUCOMTR-MCNC: 166 MG/DL — HIGH (ref 70–99)
GLUCOSE SERPL-MCNC: 116 MG/DL — HIGH (ref 70–99)
HCT VFR BLD CALC: 31.1 % — LOW (ref 42–52)
HGB BLD-MCNC: 9.8 G/DL — LOW (ref 14–18)
IMM GRANULOCYTES NFR BLD AUTO: 0.4 % — HIGH (ref 0.1–0.3)
LYMPHOCYTES # BLD AUTO: 0.82 K/UL — LOW (ref 1.2–3.4)
LYMPHOCYTES # BLD AUTO: 17.8 % — LOW (ref 20.5–51.1)
MAGNESIUM SERPL-MCNC: 2 MG/DL — SIGNIFICANT CHANGE UP (ref 1.8–2.4)
MCHC RBC-ENTMCNC: 27.7 PG — SIGNIFICANT CHANGE UP (ref 27–31)
MCHC RBC-ENTMCNC: 31.5 G/DL — LOW (ref 32–37)
MCV RBC AUTO: 87.9 FL — SIGNIFICANT CHANGE UP (ref 80–94)
MONOCYTES # BLD AUTO: 0.4 K/UL — SIGNIFICANT CHANGE UP (ref 0.1–0.6)
MONOCYTES NFR BLD AUTO: 8.7 % — SIGNIFICANT CHANGE UP (ref 1.7–9.3)
NEUTROPHILS # BLD AUTO: 3.28 K/UL — SIGNIFICANT CHANGE UP (ref 1.4–6.5)
NEUTROPHILS NFR BLD AUTO: 71.4 % — SIGNIFICANT CHANGE UP (ref 42.2–75.2)
NRBC # BLD: 0 /100 WBCS — SIGNIFICANT CHANGE UP (ref 0–0)
PHOSPHATE SERPL-MCNC: 2.4 MG/DL — SIGNIFICANT CHANGE UP (ref 2.1–4.9)
PLATELET # BLD AUTO: 144 K/UL — SIGNIFICANT CHANGE UP (ref 130–400)
POTASSIUM SERPL-MCNC: 3.4 MMOL/L — LOW (ref 3.5–5)
POTASSIUM SERPL-SCNC: 3.4 MMOL/L — LOW (ref 3.5–5)
PROT SERPL-MCNC: 5.1 G/DL — LOW (ref 6–8)
RBC # BLD: 3.54 M/UL — LOW (ref 4.7–6.1)
RBC # FLD: 17.6 % — HIGH (ref 11.5–14.5)
SODIUM SERPL-SCNC: 143 MMOL/L — SIGNIFICANT CHANGE UP (ref 135–146)
WBC # BLD: 4.6 K/UL — LOW (ref 4.8–10.8)
WBC # FLD AUTO: 4.6 K/UL — LOW (ref 4.8–10.8)

## 2018-11-07 PROCEDURE — 43241 EGD TUBE/CATH INSERTION: CPT

## 2018-11-07 RX ORDER — LACTULOSE 10 G/15ML
20 SOLUTION ORAL
Qty: 0 | Refills: 0 | Status: DISCONTINUED | OUTPATIENT
Start: 2018-11-07 | End: 2018-11-08

## 2018-11-07 RX ORDER — SODIUM CHLORIDE 9 MG/ML
1000 INJECTION INTRAMUSCULAR; INTRAVENOUS; SUBCUTANEOUS
Qty: 0 | Refills: 0 | Status: DISCONTINUED | OUTPATIENT
Start: 2018-11-07 | End: 2018-11-08

## 2018-11-07 RX ORDER — LOSARTAN POTASSIUM 100 MG/1
50 TABLET, FILM COATED ORAL DAILY
Qty: 0 | Refills: 0 | Status: DISCONTINUED | OUTPATIENT
Start: 2018-11-07 | End: 2018-11-08

## 2018-11-07 RX ORDER — AMLODIPINE BESYLATE 2.5 MG/1
2.5 TABLET ORAL DAILY
Qty: 0 | Refills: 0 | Status: DISCONTINUED | OUTPATIENT
Start: 2018-11-07 | End: 2018-11-08

## 2018-11-07 RX ORDER — ASCORBIC ACID 60 MG
500 TABLET,CHEWABLE ORAL DAILY
Qty: 0 | Refills: 0 | Status: DISCONTINUED | OUTPATIENT
Start: 2018-11-07 | End: 2018-11-08

## 2018-11-07 RX ORDER — ASPIRIN/CALCIUM CARB/MAGNESIUM 324 MG
81 TABLET ORAL DAILY
Qty: 0 | Refills: 0 | Status: DISCONTINUED | OUTPATIENT
Start: 2018-11-07 | End: 2018-11-08

## 2018-11-07 RX ORDER — ACETAMINOPHEN 500 MG
650 TABLET ORAL EVERY 4 HOURS
Qty: 0 | Refills: 0 | Status: DISCONTINUED | OUTPATIENT
Start: 2018-11-07 | End: 2018-11-08

## 2018-11-07 RX ORDER — METOPROLOL TARTRATE 50 MG
50 TABLET ORAL EVERY 8 HOURS
Qty: 0 | Refills: 0 | Status: DISCONTINUED | OUTPATIENT
Start: 2018-11-07 | End: 2018-11-08

## 2018-11-07 RX ORDER — TAMSULOSIN HYDROCHLORIDE 0.4 MG/1
0.4 CAPSULE ORAL AT BEDTIME
Qty: 0 | Refills: 0 | Status: DISCONTINUED | OUTPATIENT
Start: 2018-11-07 | End: 2018-11-08

## 2018-11-07 RX ORDER — ONDANSETRON 8 MG/1
4 TABLET, FILM COATED ORAL ONCE
Qty: 0 | Refills: 0 | Status: DISCONTINUED | OUTPATIENT
Start: 2018-11-07 | End: 2018-11-07

## 2018-11-07 RX ORDER — CHLORHEXIDINE GLUCONATE 213 G/1000ML
1 SOLUTION TOPICAL
Qty: 0 | Refills: 0 | Status: DISCONTINUED | OUTPATIENT
Start: 2018-11-07 | End: 2018-11-08

## 2018-11-07 RX ORDER — MORPHINE SULFATE 50 MG/1
2 CAPSULE, EXTENDED RELEASE ORAL ONCE
Qty: 0 | Refills: 0 | Status: DISCONTINUED | OUTPATIENT
Start: 2018-11-07 | End: 2018-11-07

## 2018-11-07 RX ORDER — MAGNESIUM OXIDE 400 MG ORAL TABLET 241.3 MG
400 TABLET ORAL
Qty: 0 | Refills: 0 | Status: DISCONTINUED | OUTPATIENT
Start: 2018-11-07 | End: 2018-11-08

## 2018-11-07 RX ORDER — FERROUS SULFATE 325(65) MG
325 TABLET ORAL DAILY
Qty: 0 | Refills: 0 | Status: DISCONTINUED | OUTPATIENT
Start: 2018-11-07 | End: 2018-11-08

## 2018-11-07 RX ORDER — OLANZAPINE 15 MG/1
5 TABLET, FILM COATED ORAL DAILY
Qty: 0 | Refills: 0 | Status: DISCONTINUED | OUTPATIENT
Start: 2018-11-07 | End: 2018-11-08

## 2018-11-07 RX ORDER — SENNA PLUS 8.6 MG/1
1 TABLET ORAL AT BEDTIME
Qty: 0 | Refills: 0 | Status: DISCONTINUED | OUTPATIENT
Start: 2018-11-07 | End: 2018-11-08

## 2018-11-07 RX ORDER — SODIUM CHLORIDE 9 MG/ML
1000 INJECTION, SOLUTION INTRAVENOUS
Qty: 0 | Refills: 0 | Status: DISCONTINUED | OUTPATIENT
Start: 2018-11-07 | End: 2018-11-07

## 2018-11-07 RX ORDER — ATORVASTATIN CALCIUM 80 MG/1
40 TABLET, FILM COATED ORAL AT BEDTIME
Qty: 0 | Refills: 0 | Status: DISCONTINUED | OUTPATIENT
Start: 2018-11-07 | End: 2018-11-08

## 2018-11-07 RX ORDER — HEPARIN SODIUM 5000 [USP'U]/ML
700 INJECTION INTRAVENOUS; SUBCUTANEOUS
Qty: 25000 | Refills: 0 | Status: DISCONTINUED | OUTPATIENT
Start: 2018-11-07 | End: 2018-11-08

## 2018-11-07 RX ORDER — OXYCODONE HYDROCHLORIDE 5 MG/1
5 TABLET ORAL EVERY 4 HOURS
Qty: 0 | Refills: 0 | Status: DISCONTINUED | OUTPATIENT
Start: 2018-11-07 | End: 2018-11-08

## 2018-11-07 RX ORDER — COLLAGENASE CLOSTRIDIUM HIST. 250 UNIT/G
1 OINTMENT (GRAM) TOPICAL DAILY
Qty: 0 | Refills: 0 | Status: DISCONTINUED | OUTPATIENT
Start: 2018-11-07 | End: 2018-11-08

## 2018-11-07 RX ADMIN — CHLORHEXIDINE GLUCONATE 1 APPLICATION(S): 213 SOLUTION TOPICAL at 06:05

## 2018-11-07 RX ADMIN — AMLODIPINE BESYLATE 2.5 MILLIGRAM(S): 2.5 TABLET ORAL at 06:03

## 2018-11-07 RX ADMIN — SENNA PLUS 1 TABLET(S): 8.6 TABLET ORAL at 21:02

## 2018-11-07 RX ADMIN — CHLORHEXIDINE GLUCONATE 1 APPLICATION(S): 213 SOLUTION TOPICAL at 11:08

## 2018-11-07 RX ADMIN — LOSARTAN POTASSIUM 50 MILLIGRAM(S): 100 TABLET, FILM COATED ORAL at 06:03

## 2018-11-07 RX ADMIN — MAGNESIUM OXIDE 400 MG ORAL TABLET 400 MILLIGRAM(S): 241.3 TABLET ORAL at 16:45

## 2018-11-07 RX ADMIN — OLANZAPINE 5 MILLIGRAM(S): 15 TABLET, FILM COATED ORAL at 11:11

## 2018-11-07 RX ADMIN — LACTULOSE 20 GRAM(S): 10 SOLUTION ORAL at 16:46

## 2018-11-07 RX ADMIN — Medication 325 MILLIGRAM(S): at 11:11

## 2018-11-07 RX ADMIN — Medication 50 MILLIGRAM(S): at 16:45

## 2018-11-07 RX ADMIN — SODIUM CHLORIDE 75 MILLILITER(S): 9 INJECTION, SOLUTION INTRAVENOUS at 08:45

## 2018-11-07 RX ADMIN — Medication 81 MILLIGRAM(S): at 11:08

## 2018-11-07 RX ADMIN — MAGNESIUM OXIDE 400 MG ORAL TABLET 400 MILLIGRAM(S): 241.3 TABLET ORAL at 11:09

## 2018-11-07 RX ADMIN — Medication 500 MILLIGRAM(S): at 11:12

## 2018-11-07 RX ADMIN — HEPARIN SODIUM 7 UNIT(S)/HR: 5000 INJECTION INTRAVENOUS; SUBCUTANEOUS at 21:02

## 2018-11-07 RX ADMIN — Medication 50 MILLIGRAM(S): at 21:02

## 2018-11-07 RX ADMIN — TAMSULOSIN HYDROCHLORIDE 0.4 MILLIGRAM(S): 0.4 CAPSULE ORAL at 21:02

## 2018-11-07 RX ADMIN — AMLODIPINE BESYLATE 2.5 MILLIGRAM(S): 2.5 TABLET ORAL at 11:09

## 2018-11-07 RX ADMIN — Medication 50 MILLIGRAM(S): at 06:03

## 2018-11-07 RX ADMIN — LOSARTAN POTASSIUM 50 MILLIGRAM(S): 100 TABLET, FILM COATED ORAL at 11:12

## 2018-11-07 RX ADMIN — ATORVASTATIN CALCIUM 40 MILLIGRAM(S): 80 TABLET, FILM COATED ORAL at 21:02

## 2018-11-07 RX ADMIN — SODIUM CHLORIDE 50 MILLILITER(S): 9 INJECTION INTRAMUSCULAR; INTRAVENOUS; SUBCUTANEOUS at 10:00

## 2018-11-07 RX ADMIN — Medication 1 APPLICATION(S): at 11:12

## 2018-11-07 NOTE — PRE-ANESTHESIA EVALUATION ADULT - NSANTHPMHFT_GEN_ALL_CORE
Afib previously on Xarelto d/c'd do to recent falls, DVT s/p R hip replacement on Eliquis, dementia, transferred from rehab facility due to weakness and decreased PO intake but found to be in RVR controlled with cardizem  Head CT with suspected ICH, but neurosurgery thought subacute infarct  Now for angio on R leg
77 yo M with PMHx of Afib, DVT s/p Right THR (10/14/18) on Eliquis, bedbound, Vascular Dementia with behavioral changes, DM II, HTN presents from [facility] facility for evaluation of progressive weakness and decreased PO intake found to have subacute ischemic stroke with petechial hemorrhage.  PEG is planned to be done by Surgery ( LAP vs open ).  pt has advanced dementia , unable to assess active symptoms, bed bound.
77 yo M with PMHx of AFIB previously on Xarelto discontinued secondary to frequent falls , DVT s/p Right Hip Replacement (10/14/18) on Eliquis-bedbound, Vascular Dementia with behavioral changes, DM II, HTN [facility] for evaluation of progressive weakness and decreased PO intake for the past 2 days. As per son, prior to 2 days ago, patient had a great appetite however in the last 2 days patient has even refused PO medications. In the ED, patient was found to have AFIB with RVR, HR in 120s was given 20mg of IV Cardizem with response. CTH revealed what was initially suspected as an ICH, however upon Neurosurgery evaluation findings may be secondary to a subacute infarct

## 2018-11-07 NOTE — PROGRESS NOTE ADULT - SUBJECTIVE AND OBJECTIVE BOX
Patient is a 78y old  Male who presents with a chief complaint of progressive weakness and decreased PO intake for 2 days (07 Nov 2018 15:05)  pt seen and evaluated resting comfortably   family member at bedside  s/p PEG placement    LABS  11-07    143  |  107  |  15  ----------------------------<  116<H>  3.4<L>   |  25  |  0.7    Ca    8.4<L>      07 Nov 2018 06:18  Phos  2.4     11-07  Mg     2.0     11-07    TPro  5.1<L>  /  Alb  2.8<L>  /  TBili  0.9  /  DBili  x   /  AST  20  /  ALT  7   /  AlkPhos  117<H>  11-07                          9.8    4.60  )-----------( 144      ( 07 Nov 2018 06:18 )             31.1     Drug Dosing Weight  Height (cm): 182.88 (07 Nov 2018 07:28)  Weight (kg): 74.3 (07 Nov 2018 07:28)  BMI (kg/m2): 22.2 (07 Nov 2018 07:28)  BSA (m2): 1.96 (07 Nov 2018 07:28)  Daily Height in cm: 182.88 (07 Nov 2018 07:28)      T(C): 36.1 (11-07-18 @ 10:41), Max: 36.8 (11-07-18 @ 08:42)  HR: 90 (11-07-18 @ 10:41) (74 - 107)  BP: 120/73 (11-07-18 @ 10:41) (120/73 - 174/84)  RR: 16 (11-07-18 @ 10:41) (10 - 24)  SpO2: 99% (11-07-18 @ 10:41) (93% - 100%)        acetaminophen   Tablet .. 650 milliGRAM(s) Oral every 4 hours PRN  amLODIPine   Tablet 2.5 milliGRAM(s) Oral daily  ascorbic acid 500 milliGRAM(s) Oral daily  aspirin enteric coated 81 milliGRAM(s) Oral daily  atorvastatin 40 milliGRAM(s) Oral at bedtime  chlorhexidine 4% Liquid 1 Application(s) Topical <User Schedule>  collagenase Ointment 1 Application(s) Topical daily  ferrous    sulfate 325 milliGRAM(s) Oral daily  lactulose Syrup 20 Gram(s) Oral two times a day  losartan 50 milliGRAM(s) Oral daily  magnesium oxide 400 milliGRAM(s) Oral two times a day with meals  metoprolol tartrate 50 milliGRAM(s) Oral every 8 hours  OLANZapine 5 milliGRAM(s) Oral daily  oxyCODONE    IR 5 milliGRAM(s) Oral every 4 hours PRN  senna 1 Tablet(s) Oral at bedtime  sodium chloride 0.9%. 1000 milliLiter(s) IV Continuous <Continuous>  tamsulosin 0.4 milliGRAM(s) Oral at bedtime      PHYSICAL EXAM:  Constitutional: pt is resting comfortably  Gastrointestinal: +peg tube in place  +abdominal binder  Extremities no edema  Skin: no lesion

## 2018-11-07 NOTE — PROGRESS NOTE ADULT - ASSESSMENT
A/P:  SUSI MARTIN is a 78yMale HD17 currently preopped for OR t/d.    Plan:   -preop for OR today w/ Dr. Anderson  -will see post-operatively  -NPO, NS@ 75  -continue DVT/GI ppx  -hep gtt held for OR today A/P:  SUSI MARTIN is a 78yMale HD17 currently preopped for OR t/d.    Plan:   -s/p PEG  - restart heparin in 12 hours, no boluses- if hgb stable for 24 hours on heparin may switch to PO   -feeds can be started 6 hours after surgery.

## 2018-11-07 NOTE — PRE-ANESTHESIA EVALUATION ADULT - MALLAMPATI CLASS
Class II - visualization of the soft palate, fauces, and uvula
Class III - visualization of the soft palate and the base of the uvula
Class III - visualization of the soft palate and the base of the uvula

## 2018-11-07 NOTE — CHART NOTE - NSCHARTNOTEFT_GEN_A_CORE
PACU ANESTHESIA ADMISSION NOTE      Procedure: PEG (percutaneous endoscopic gastrostomy)  Angiogram, peripheral: Right leg angiogram (3rd-order selective via US-guided L femoral access); anterior tibial angioplasty (2-2.5 mm plain balloon).    Post op diagnosis:  Failure to thrive in adult  Ulcer of right foot, unspecified ulcer stage      ____  Intubated  TV:______       Rate: ______      FiO2: ______    _x___  Patent Airway    _x___  Full return of protective reflexes    ___  Full recovery from anesthesia / back to baseline status    Vitals:  T97  HR: 113  BP: 145/44  RR: 18  SpO2: 99    Mental Status:  _x___ Awake   _____ Alert   _____ Drowsy   _____ Sedated    Nausea/Vomiting:  _x___  NO       ______Yes,   See Post - Op Orders         Pain Scale (0-10):  __0___    Treatment: _x___ None    ____ See Post - Op/PCA Orders    Post - Operative Fluids:   __x__ Oral   ____ See Post - Op Orders    Plan: Discharge:   ____Home       __x__Floor     _____Critical Care    _____  Other:_________________    Comments:  No anesthesia issues or complications noted.  Discharge to floor when criteria met.

## 2018-11-07 NOTE — CHART NOTE - NSCHARTNOTEFT_GEN_A_CORE
11/6/2018: Spoke with patient's wife fluently in Latvian to discuss the procedure. Wife, Gissel, stated she spoke with Dr. Anderson earlier that day and she understood the details and risks of the procedure. Consent was obtained over the phone.

## 2018-11-07 NOTE — PRE-ANESTHESIA EVALUATION ADULT - NSANTHOSAYNRD_GEN_A_CORE
No. CYRIL screening performed.  STOP BANG Legend: 0-2 = LOW Risk; 3-4 = INTERMEDIATE Risk; 5-8 = HIGH Risk

## 2018-11-07 NOTE — BRIEF OPERATIVE NOTE - PROCEDURE
<<-----Click on this checkbox to enter Procedure PEG (percutaneous endoscopic gastrostomy)  11/07/2018    Active  FELABBASY

## 2018-11-07 NOTE — BRIEF OPERATIVE NOTE - POST-OP DX
Ulcer of right foot, unspecified ulcer stage  11/02/2018    Active  Valeria Ambrocio
Failure to thrive in adult  11/07/2018    Active  Bennett Dixon  Ulcer of right foot, unspecified ulcer stage  11/02/2018    Active  Valeria Ambrocio

## 2018-11-07 NOTE — BRIEF OPERATIVE NOTE - OPERATION/FINDINGS
Significant tortuosity of the bilateral VENKATA/EIA. Multifocal, non-flow-limiting stenoses of R SFA and popliteal artery.  Single-vessel runoff (AT) to foot; chronic occlusion recanalized and angioplastied. Occlusion of distal PT and peroneal arteries.
transillumination was achieved

## 2018-11-07 NOTE — CHART NOTE - NSCHARTNOTEFT_GEN_A_CORE
Post Operative Check    Patient is post op from a PEG (percutaneous endoscopic gastrostomy) ([id number])   Procedure was without complications.      Vitals    T(C): 36.1 (11-07-18 @ 10:41), Max: 36.8 (11-07-18 @ 08:42)  HR: 90 (11-07-18 @ 10:41) (74 - 107)  BP: 120/73 (11-07-18 @ 10:41) (120/73 - 174/84)  RR: 16 (11-07-18 @ 10:41) (10 - 24)  SpO2: 99% (11-07-18 @ 10:41) (93% - 100%)  Wt(kg): --        Labs                        9.8    4.60  )-----------( 144      ( 07 Nov 2018 06:18 )             31.1       CBC Full  -  ( 07 Nov 2018 06:18 )  WBC Count : 4.60 K/uL  Hemoglobin : 9.8 g/dL  Hematocrit : 31.1 %  Platelet Count - Automated : 144 K/uL  Mean Cell Volume : 87.9 fL  Mean Cell Hemoglobin : 27.7 pg  Mean Cell Hemoglobin Concentration : 31.5 g/dL  Auto Neutrophil # : 3.28 K/uL  Auto Lymphocyte # : 0.82 K/uL  Auto Monocyte # : 0.40 K/uL  Auto Eosinophil # : 0.07 K/uL  Auto Basophil # : 0.01 K/uL  Auto Neutrophil % : 71.4 %  Auto Lymphocyte % : 17.8 %  Auto Monocyte % : 8.7 %  Auto Eosinophil % : 1.5 %  Auto Basophil % : 0.2 %      Physical Exam  General: NAD, AAOx3   Cards: Tachycardic, reg rhythm  Resp: CTAB, non-labored  Abdomen: soft, nontender, nondistended; PEG tube with foam tape dressing in place, c/d/i  Ext: NTBL    Patient is a 78y old Male s/p PEG tube placement. Wife at bedside. Spoke with patient and wife in [place]. Pt denied complaints. Denied pain at surgical site and elsewhere, denied CP, SOB, abd pain, fever/chills, n/v. States he feels well. Post Operative Check    Patient is post op from a PEG (percutaneous endoscopic gastrostomy) ([id number])   Procedure was without complications.      Vitals    T(C): 36.1 (11-07-18 @ 10:41), Max: 36.8 (11-07-18 @ 08:42)  HR: 90 (11-07-18 @ 10:41) (74 - 107)  BP: 120/73 (11-07-18 @ 10:41) (120/73 - 174/84)  RR: 16 (11-07-18 @ 10:41) (10 - 24)  SpO2: 99% (11-07-18 @ 10:41) (93% - 100%)  Wt(kg): --        Labs                        9.8    4.60  )-----------( 144      ( 07 Nov 2018 06:18 )             31.1       CBC Full  -  ( 07 Nov 2018 06:18 )  WBC Count : 4.60 K/uL  Hemoglobin : 9.8 g/dL  Hematocrit : 31.1 %  Platelet Count - Automated : 144 K/uL  Mean Cell Volume : 87.9 fL  Mean Cell Hemoglobin : 27.7 pg  Mean Cell Hemoglobin Concentration : 31.5 g/dL  Auto Neutrophil # : 3.28 K/uL  Auto Lymphocyte # : 0.82 K/uL  Auto Monocyte # : 0.40 K/uL  Auto Eosinophil # : 0.07 K/uL  Auto Basophil # : 0.01 K/uL  Auto Neutrophil % : 71.4 %  Auto Lymphocyte % : 17.8 %  Auto Monocyte % : 8.7 %  Auto Eosinophil % : 1.5 %  Auto Basophil % : 0.2 %      Physical Exam  General: NAD, AAOx3   Cards: Tachycardic, reg rhythm  Resp: CTAB, non-labored  Abdomen: soft, nontender, nondistended; PEG tube with foam tape dressing in place, c/d/i  Ext: NTBL    Patient is a 78y old Male s/p PEG tube placement. Wife at bedside. Spoke with patient and wife in [place]. Pt denied complaints. Denied pain at surgical site and elsewhere, denied CP, SOB, abd pain, fever/chills, n/v. States he feels well.  Heparin drip can be restarted 12 hrs post-op, at 9pm.  Tube feeds can be restarted at 3pm.

## 2018-11-07 NOTE — PROGRESS NOTE ADULT - SUBJECTIVE AND OBJECTIVE BOX
GENERAL SURGERY PROGRESS NOTE     OSVALDO MARTINL  [address] [place] [address] day :15d  Procedure: Angiogram, peripheral    Surgical Attending:  Matt Anderson  Overnight events: No acute events overnight. Patient preopped for OR t/d for PEG placement. Did not arouse for exam, unable assess subjectively.    T(F): 97.1 (11-07-18 @ 00:15), Max: 97.1 (11-07-18 @ 00:15)  HR: 82 (11-07-18 @ 00:15) (82 - 107)  BP: 141/77 (11-07-18 @ 00:15) (126/58 - 156/78)  ABP: --  ABP(mean): --  RR: 18 (11-07-18 @ 00:15) (18 - 18)  SpO2: --      11-05-18 @ 07:01  -  11-06-18 @ 07:00  --------------------------------------------------------  IN:  Total IN: 0 mL    OUT:    Indwelling Catheter - Urethral: 1 mL  Total OUT: 1 mL    Total NET: -1 mL        DIET/FLUIDS: ascorbic acid 500 milliGRAM(s) Oral daily  ferrous    sulfate 325 milliGRAM(s) Oral daily  magnesium oxide 400 milliGRAM(s) Oral two times a day with meals  sodium chloride 0.9%. 1000 milliLiter(s) IV Continuous <Continuous>  zinc sulfate 220 milliGRAM(s) Oral daily      URINE:   11-05-18 @ 07:01  -  11-06-18 @ 07:00  --------------------------------------------------------  OUT: 1 mL       GI proph:    AC/ proph: aspirin enteric coated 81 milliGRAM(s) Oral daily    ABx:     PHYSICAL EXAM:  GENERAL: NAD,sleeping  CHEST/LUNG: Clear to auscultation bilaterally  HEART: Regular rate and rhythm  ABDOMEN: Soft, Nontender, Nondistended;       LABS  Labs:  CAPILLARY BLOOD GLUCOSE      POCT Blood Glucose.: 143 mg/dL (06 Nov 2018 20:48)  POCT Blood Glucose.: 120 mg/dL (06 Nov 2018 16:27)  POCT Blood Glucose.: 144 mg/dL (06 Nov 2018 11:30)  POCT Blood Glucose.: 127 mg/dL (06 Nov 2018 07:17)                          10.7   7.18  )-----------( 168      ( 06 Nov 2018 05:42 )             33.5         11-06    144  |  105  |  18  ----------------------------<  117<H>  3.8   |  26  |  0.9          LFTs:             5.4  | 1.0  | 16       ------------------[131     ( 06 Nov 2018 05:42 )  2.9  | x    | 11          Lipase:x      Amylase:x             Coags:     x      ----< x       ( 06 Nov 2018 23:30 )     69.9

## 2018-11-07 NOTE — PROGRESS NOTE ADULT - ASSESSMENT
ASSESSMENT  -Right parietal lobe subacute infarct vs intracranial mass complicated by vascular dementia adn failure to thrive  -A fib rate controlled  -HTN   -DM  --BPH  -dysphagia  -Hypokalemia    PLAN  peg tube feed ordered   jevity1.2 at 240ml [id number]  will re-evaluate pt in am  check bmp/phos/mg and correct lytes

## 2018-11-07 NOTE — PROGRESS NOTE ADULT - SUBJECTIVE AND OBJECTIVE BOX
LENGTH OF HOSPITAL STAY: 15d      CHIEF COMPLAINT:   Patient is a 78y old  Male who presents with a chief complaint of progressive weakness and decreased PO intake for 2 days (07 Nov 2018 05:54)      OVER Past 24hrs:  The patient was seen and examined at bedside there were s/p Gtube placement he is stable.       PAST MEDICAL & SURGICAL HISTORY  PAST MEDICAL & SURGICAL HISTORY:  Anemia  Diabetes  HTN (hypertension)  Arrhythmia  Dementia  S/P cholecystectomy  History of hip replacement        REVIEW OF SYSTEMS  RESPIRATORY: No cough, wheezing, chills or hemoptysis; No shortness of breath  CARDIOVASCULAR: No chest pain, palpitations, dizziness, or leg swelling    ALLERGIES:  No Known Allergies    MEDICATIONS:  STANDING MEDICATIONS  amLODIPine   Tablet 2.5 milliGRAM(s) Oral daily  ascorbic acid 500 milliGRAM(s) Oral daily  aspirin enteric coated 81 milliGRAM(s) Oral daily  atorvastatin 40 milliGRAM(s) Oral at bedtime  chlorhexidine 4% Liquid 1 Application(s) Topical <User Schedule>  collagenase Ointment 1 Application(s) Topical daily  ferrous    sulfate 325 milliGRAM(s) Oral daily  lactulose Syrup 20 Gram(s) Oral two times a day  losartan 50 milliGRAM(s) Oral daily  magnesium oxide 400 milliGRAM(s) Oral two times a day with meals  metoprolol tartrate 50 milliGRAM(s) Oral every 8 hours  OLANZapine 5 milliGRAM(s) Oral daily  senna 1 Tablet(s) Oral at bedtime  sodium chloride 0.9%. 1000 milliLiter(s) IV Continuous <Continuous>  tamsulosin 0.4 milliGRAM(s) Oral at bedtime    PRN MEDICATIONS  acetaminophen   Tablet .. 650 milliGRAM(s) Oral every 4 hours PRN  oxyCODONE    IR 5 milliGRAM(s) Oral every 4 hours PRN    VITALS:   T(F): 97  HR: 90  BP: 120/73  RR: 16  SpO2: 99%    PHYSICAL EXAM:  General: No acute distress.  Alert, minimally  interactive, nonfocal. Cachetic male       PULM: Clear to auscultation bilaterally, no significant sputum production.    CVS: Regular rate and rhythm, no murmurs, rubs, or gallops.    ABD: Soft, nondistended, nontender, no masses. Gtube place site clean and dry no drainage     EXT: No edema, nontender. DFU LE    SKIN: Warm and well perfused, no rashes noted Scrotal ulcer     LABS:                        9.8    4.60  )-----------( 144      ( 07 Nov 2018 06:18 )             31.1     11-07    143  |  107  |  15  ----------------------------<  116<H>  3.4<L>   |  25  |  0.7    Ca    8.4<L>      07 Nov 2018 06:18  Phos  2.4     11-07  Mg     2.0     11-07    TPro  5.1<L>  /  Alb  2.8<L>  /  TBili  0.9  /  DBili  x   /  AST  20  /  ALT  7   /  AlkPhos  117<H>  11-07    PT/INR - ( 06 Nov 2018 05:42 )   PT: 14.80 sec;   INR: 1.29 ratio         PTT - ( 06 Nov 2018 23:30 )  PTT:69.9 sec

## 2018-11-08 ENCOUNTER — TRANSCRIPTION ENCOUNTER (OUTPATIENT)
Age: 78
End: 2018-11-08

## 2018-11-08 VITALS
SYSTOLIC BLOOD PRESSURE: 112 MMHG | TEMPERATURE: 98 F | DIASTOLIC BLOOD PRESSURE: 56 MMHG | RESPIRATION RATE: 18 BRPM | HEART RATE: 97 BPM

## 2018-11-08 LAB
ALBUMIN SERPL ELPH-MCNC: 2.7 G/DL — LOW (ref 3.5–5.2)
ALP SERPL-CCNC: 117 U/L — HIGH (ref 30–115)
ALT FLD-CCNC: 8 U/L — SIGNIFICANT CHANGE UP (ref 0–41)
ANION GAP SERPL CALC-SCNC: 10 MMOL/L — SIGNIFICANT CHANGE UP (ref 7–14)
APTT BLD: 38.8 SEC — SIGNIFICANT CHANGE UP (ref 27–39.2)
AST SERPL-CCNC: 16 U/L — SIGNIFICANT CHANGE UP (ref 0–41)
BASOPHILS # BLD AUTO: 0.01 K/UL — SIGNIFICANT CHANGE UP (ref 0–0.2)
BASOPHILS NFR BLD AUTO: 0.2 % — SIGNIFICANT CHANGE UP (ref 0–1)
BILIRUB SERPL-MCNC: 0.8 MG/DL — SIGNIFICANT CHANGE UP (ref 0.2–1.2)
BUN SERPL-MCNC: 16 MG/DL — SIGNIFICANT CHANGE UP (ref 10–20)
CALCIUM SERPL-MCNC: 8.6 MG/DL — SIGNIFICANT CHANGE UP (ref 8.5–10.1)
CHLORIDE SERPL-SCNC: 104 MMOL/L — SIGNIFICANT CHANGE UP (ref 98–110)
CO2 SERPL-SCNC: 31 MMOL/L — SIGNIFICANT CHANGE UP (ref 17–32)
CREAT SERPL-MCNC: 0.9 MG/DL — SIGNIFICANT CHANGE UP (ref 0.7–1.5)
EOSINOPHIL # BLD AUTO: 0.03 K/UL — SIGNIFICANT CHANGE UP (ref 0–0.7)
EOSINOPHIL NFR BLD AUTO: 0.6 % — SIGNIFICANT CHANGE UP (ref 0–8)
GLUCOSE BLDC GLUCOMTR-MCNC: 136 MG/DL — HIGH (ref 70–99)
GLUCOSE BLDC GLUCOMTR-MCNC: 189 MG/DL — HIGH (ref 70–99)
GLUCOSE BLDC GLUCOMTR-MCNC: 205 MG/DL — HIGH (ref 70–99)
GLUCOSE BLDC GLUCOMTR-MCNC: 280 MG/DL — HIGH (ref 70–99)
GLUCOSE SERPL-MCNC: 161 MG/DL — HIGH (ref 70–99)
HCT VFR BLD CALC: 32.4 % — LOW (ref 42–52)
HGB BLD-MCNC: 10.2 G/DL — LOW (ref 14–18)
IMM GRANULOCYTES NFR BLD AUTO: 0.8 % — HIGH (ref 0.1–0.3)
LYMPHOCYTES # BLD AUTO: 0.47 K/UL — LOW (ref 1.2–3.4)
LYMPHOCYTES # BLD AUTO: 9.9 % — LOW (ref 20.5–51.1)
MAGNESIUM SERPL-MCNC: 2.2 MG/DL — SIGNIFICANT CHANGE UP (ref 1.8–2.4)
MCHC RBC-ENTMCNC: 27.7 PG — SIGNIFICANT CHANGE UP (ref 27–31)
MCHC RBC-ENTMCNC: 31.5 G/DL — LOW (ref 32–37)
MCV RBC AUTO: 88 FL — SIGNIFICANT CHANGE UP (ref 80–94)
MONOCYTES # BLD AUTO: 0.27 K/UL — SIGNIFICANT CHANGE UP (ref 0.1–0.6)
MONOCYTES NFR BLD AUTO: 5.7 % — SIGNIFICANT CHANGE UP (ref 1.7–9.3)
NEUTROPHILS # BLD AUTO: 3.93 K/UL — SIGNIFICANT CHANGE UP (ref 1.4–6.5)
NEUTROPHILS NFR BLD AUTO: 82.8 % — HIGH (ref 42.2–75.2)
NRBC # BLD: 0 /100 WBCS — SIGNIFICANT CHANGE UP (ref 0–0)
PHOSPHATE SERPL-MCNC: 2 MG/DL — LOW (ref 2.1–4.9)
PLATELET # BLD AUTO: 158 K/UL — SIGNIFICANT CHANGE UP (ref 130–400)
POTASSIUM SERPL-MCNC: 3 MMOL/L — LOW (ref 3.5–5)
POTASSIUM SERPL-SCNC: 3 MMOL/L — LOW (ref 3.5–5)
PROT SERPL-MCNC: 5.1 G/DL — LOW (ref 6–8)
RBC # BLD: 3.68 M/UL — LOW (ref 4.7–6.1)
RBC # FLD: 17.6 % — HIGH (ref 11.5–14.5)
SODIUM SERPL-SCNC: 145 MMOL/L — SIGNIFICANT CHANGE UP (ref 135–146)
WBC # BLD: 4.75 K/UL — LOW (ref 4.8–10.8)
WBC # FLD AUTO: 4.75 K/UL — LOW (ref 4.8–10.8)

## 2018-11-08 RX ORDER — ASPIRIN/CALCIUM CARB/MAGNESIUM 324 MG
1 TABLET ORAL
Qty: 0 | Refills: 0 | COMMUNITY
Start: 2018-11-08

## 2018-11-08 RX ORDER — OXYCODONE HYDROCHLORIDE 5 MG/1
0 TABLET ORAL
Qty: 0 | Refills: 0 | COMMUNITY

## 2018-11-08 RX ORDER — COLLAGENASE CLOSTRIDIUM HIST. 250 UNIT/G
1 OINTMENT (GRAM) TOPICAL
Qty: 0 | Refills: 0 | COMMUNITY
Start: 2018-11-08

## 2018-11-08 RX ORDER — HEPARIN SODIUM 5000 [USP'U]/ML
500 INJECTION INTRAVENOUS; SUBCUTANEOUS
Qty: 25000 | Refills: 0 | Status: DISCONTINUED | OUTPATIENT
Start: 2018-11-08 | End: 2018-11-08

## 2018-11-08 RX ORDER — ATORVASTATIN CALCIUM 80 MG/1
1 TABLET, FILM COATED ORAL
Qty: 0 | Refills: 0 | COMMUNITY
Start: 2018-11-08

## 2018-11-08 RX ORDER — POTASSIUM CHLORIDE 20 MEQ
20 PACKET (EA) ORAL ONCE
Qty: 0 | Refills: 0 | Status: COMPLETED | OUTPATIENT
Start: 2018-11-08 | End: 2018-11-08

## 2018-11-08 RX ORDER — APIXABAN 2.5 MG/1
5 TABLET, FILM COATED ORAL EVERY 12 HOURS
Qty: 0 | Refills: 0 | Status: DISCONTINUED | OUTPATIENT
Start: 2018-11-08 | End: 2018-11-08

## 2018-11-08 RX ADMIN — OXYCODONE HYDROCHLORIDE 5 MILLIGRAM(S): 5 TABLET ORAL at 04:07

## 2018-11-08 RX ADMIN — Medication 81 MILLIGRAM(S): at 12:26

## 2018-11-08 RX ADMIN — APIXABAN 5 MILLIGRAM(S): 2.5 TABLET, FILM COATED ORAL at 12:26

## 2018-11-08 RX ADMIN — Medication 50 MILLIGRAM(S): at 05:35

## 2018-11-08 RX ADMIN — OLANZAPINE 5 MILLIGRAM(S): 15 TABLET, FILM COATED ORAL at 12:26

## 2018-11-08 RX ADMIN — MAGNESIUM OXIDE 400 MG ORAL TABLET 400 MILLIGRAM(S): 241.3 TABLET ORAL at 17:26

## 2018-11-08 RX ADMIN — Medication 100 MILLIEQUIVALENT(S): at 09:47

## 2018-11-08 RX ADMIN — CHLORHEXIDINE GLUCONATE 1 APPLICATION(S): 213 SOLUTION TOPICAL at 05:34

## 2018-11-08 RX ADMIN — Medication 50 MILLIGRAM(S): at 13:06

## 2018-11-08 RX ADMIN — Medication 325 MILLIGRAM(S): at 12:26

## 2018-11-08 RX ADMIN — HEPARIN SODIUM 5 UNIT(S)/HR: 5000 INJECTION INTRAVENOUS; SUBCUTANEOUS at 02:39

## 2018-11-08 RX ADMIN — LACTULOSE 20 GRAM(S): 10 SOLUTION ORAL at 17:26

## 2018-11-08 RX ADMIN — LOSARTAN POTASSIUM 50 MILLIGRAM(S): 100 TABLET, FILM COATED ORAL at 05:35

## 2018-11-08 RX ADMIN — Medication 500 MILLIGRAM(S): at 12:27

## 2018-11-08 RX ADMIN — APIXABAN 5 MILLIGRAM(S): 2.5 TABLET, FILM COATED ORAL at 17:26

## 2018-11-08 RX ADMIN — MAGNESIUM OXIDE 400 MG ORAL TABLET 400 MILLIGRAM(S): 241.3 TABLET ORAL at 08:38

## 2018-11-08 RX ADMIN — Medication 1 APPLICATION(S): at 12:26

## 2018-11-08 RX ADMIN — AMLODIPINE BESYLATE 2.5 MILLIGRAM(S): 2.5 TABLET ORAL at 05:35

## 2018-11-08 NOTE — CHART NOTE - NSCHARTNOTEFT_GEN_A_CORE
Patient seen and evaluated.   Patient c/o mild sore throat, no complications.  Patient recovering well

## 2018-11-08 NOTE — PROGRESS NOTE ADULT - ASSESSMENT
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, markedly decreased PO intake.     IMPRESSION   Failure to Thrive   Dementia   Decreased PO intake -  PEG tube Placed 11/07/18   DFU with PVD   Malnutrition     Plan   #Acute/subacute CVA  -CT head and MRI show right medial occipital lobe subacute infarct with stable microhemorrhages  -cont therapeutic A/c     #Failure to thrive/Sev malnutrition, however patient eats small amounts throughout the day, does not meet caloric needs  - Gtube placed successfully   -start GTube  feeds  as per [facility] recommendations     #PVD/DFU  -s/p RLE angiogram 11/2  -podiatry recommending serial xrays o/p, santyl, dsd/ kerlix - Daily    # Afib  -rate controlled on meto tartrate 50 q8h, A/c  -started  Eliquis   - dc heparin     # HTN   -amlodipine 2.5 / losartan 50    Activity: ambulate as tolerated   Diet tube feeds   [facility]   DC planing f/u hep panel 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, markedly decreased PO intake.     IMPRESSION   Failure to Thrive   Dementia   Decreased PO intake -  PEG tube Placed 11/07/18   DFU with PVD   Malnutrition     Plan   #Acute/subacute CVA  -CT head and MRI show right medial occipital lobe subacute infarct with stable microhemorrhages  -cont therapeutic A/c     #Failure to thrive/Sev malnutrition, however patient eats small amounts throughout the day, does not meet caloric needs  - Gtube placed successfully   -start GTube  feeds  as per [employer] recommendations     #PVD/DFU  -s/p RLE angiogram 11/2  -podiatry recommending serial xrays o/p, santyl, dsd/ kerlix - Daily    # Afib  -rate controlled on meto tartrate 50 q8h, A/c  -started  Eliquis   - dc heparin     # HTN   -amlodipine 2.5 / losartan 50    Activity: ambulate as tolerated   Diet tube feeds   [facility]   DC planing 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, markedly decreased PO intake.     IMPRESSION   CVA  Failure to Thrive   Dementia   Decreased PO intake -  PEG tube Placed 11/07/18   DFU with PVD   Malnutrition     Plan   #Acute/subacute CVA  -CT head and MRI show right medial occipital lobe subacute infarct with stable microhemorrhages  -cont therapeutic A/c     #Failure to thrive/Sev malnutrition, however patient eats small amounts throughout the day, does not meet caloric needs  - Gtube placed successfully   -start GTube  feeds  as per [employer] recommendations     #PVD/DFU  -s/p RLE angiogram 11/2  -podiatry recommending serial xrays o/p, santyl, dsd/ kerlix - Daily    # Afib  -rate controlled on meto tartrate 50 q8h, A/c  -started  Eliquis   - dc heparin     # HTN   -amlodipine 2.5 / losartan 50    Activity: ambulate as tolerated   Diet tube feeds   [facility]   DC planing 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, markedly decreased PO intake.     IMPRESSION   CVA  Failure to Thrive   Dementia   Decreased PO intake -  PEG tube Placed 11/07/18   DFU with PVD   Malnutrition     Plan   #Acute/subacute CVA  -CT head and MRI show right medial occipital lobe subacute infarct with stable microhemorrhages  -cont therapeutic A/c     #Failure to thrive/Sev malnutrition, however patient eats small amounts throughout the day, does not meet caloric needs  - Gtube placed successfully   -start GTube  feeds  as per [employer] recommendations     #PVD/DFU  -s/p RLE angiogram 11/2  -podiatry recommending serial xrays o/p, santyl, dsd/ kerlix - Daily    # Afib  -rate controlled on meto tartrate 50 q8h, A/c  -started  Eliquis   - dc heparin     # HTN   -amlodipine 2.5 / losartan 50    Activity: ambulate as tolerated   Diet tube feeds   [facility]   DC planing     FINAL DISCHARGE INTERVIEW:  Resident(s) Present: (Name:_____Ramirez Corrigan ________), RN Present: (Name:  Yvrose__________)    DISCHARGE MEDICATION RECONCILIATION  reviewed with Attending (Name:____Dr Barr _______)    DISPOSITION:   [  ] Home,    [  ] Home with Visiting Nursing Services,   [   X ]  SNF/ NH,    [   ] Acute Rehab (4A),   [   ] Other (Specify:_________)

## 2018-11-08 NOTE — DISCHARGE NOTE ADULT - CARE PLAN
Principal Discharge DX:	CVA (cerebral vascular accident)  Goal:	Evaluation and therapy  Assessment and plan of treatment:	Your were found to have a an acute/subacute stroke please follow up with  your neurologist with in two weeks of discharge.  Please  start taking Atorvastatin 40mg daily. please start taking Aspirin 81mg daily.  Please continue taking anticoagulation medication.  If you experience slurred speech, headaches associated with muscle weakness please seek immediate medical advice.  Secondary Diagnosis:	UTI (urinary tract infection)  Goal:	Evaluation and therapy  Assessment and plan of treatment:	No further intervention  Secondary Diagnosis:	Diabetes  Goal:	Evaluation and therapy  Assessment and plan of treatment:	Continue home medication as directed. follow up with pcp.  Secondary Diagnosis:	Afib  Goal:	Evaluation and therapy  Assessment and plan of treatment:	Continue taking Eliquis directed.  Follow up with cardiologist.  Secondary Diagnosis:	Scrotal bruise  Goal:	Evaluation and therapy  Assessment and plan of treatment:	Continue wound  degassing.  Secondary Diagnosis:	Foot ulcer  Goal:	Evaluation and therapy  Assessment and plan of treatment:	please follow up with Vascular surgeon Dr. BENJAMIN Bingham.  Please follow up with Podiatrist Dr. Herndon.  Please continue wound care as directed-santyl, dsd/ kerlix. Principal Discharge DX:	CVA (cerebral vascular accident)  Goal:	Evaluation and therapy  Assessment and plan of treatment:	Your were found to have a an acute/subacute stroke please follow up with  your neurologist with in two weeks of discharge.  Please start taking Atorvastatin 40mg daily. Cont Eliquis.  If you experience slurred speech, headaches associated with muscle weakness please seek immediate medical advice.  Secondary Diagnosis:	UTI (urinary tract infection)  Goal:	Evaluation and therapy  Assessment and plan of treatment:	No further intervention  Secondary Diagnosis:	Diabetes  Goal:	Evaluation and therapy  Assessment and plan of treatment:	Continue home medication as directed. follow up with pcp.  Secondary Diagnosis:	Afib  Goal:	Evaluation and therapy  Assessment and plan of treatment:	Continue taking Eliquis directed.  Follow up with cardiologist.  Secondary Diagnosis:	Scrotal bruise  Goal:	Evaluation and therapy  Assessment and plan of treatment:	Continue wound  degassing.  Secondary Diagnosis:	Foot ulcer  Goal:	Evaluation and therapy  Assessment and plan of treatment:	S/p baloon angioplasty. please follow up with Vascular surgeon Dr. BENJAMIN Bingham. Cont ASA for now. Discuss with vascular surgeon when aspirin can be stopped.  Please follow up with Podiatrist Dr. Herndon.  Please continue wound care as directed-santyl, dsd/ kerlix. Principal Discharge DX:	CVA (cerebral vascular accident)  Goal:	Evaluation and therapy  Assessment and plan of treatment:	Your were found to have a an acute/subacute stroke please follow up with  your neurologist with in two weeks of discharge.  Please start taking Atorvastatin 40mg daily. Cont Eliquis.  If you experience slurred speech, headaches associated with muscle weakness please seek immediate medical advice.  Secondary Diagnosis:	UTI (urinary tract infection)  Goal:	Evaluation and therapy  Assessment and plan of treatment:	No further intervention  Secondary Diagnosis:	Diabetes  Goal:	Evaluation and therapy  Assessment and plan of treatment:	Continue home medication as directed. follow up with pcp.  Secondary Diagnosis:	Afib  Goal:	Evaluation and therapy  Assessment and plan of treatment:	Continue taking Eliquis directed.  Follow up with cardiologist.  Secondary Diagnosis:	Scrotal bruise  Goal:	Evaluation and therapy  Assessment and plan of treatment:	Continue wound  degassing.  Secondary Diagnosis:	Foot ulcer  Goal:	Evaluation and therapy  Assessment and plan of treatment:	S/p baloon angioplasty. please follow up with Vascular surgeon Dr. BENJAMIN Bingham. Cont ASA for now. Discuss with vascular surgeon when aspirin can be stopped.  Please follow up with Podiatrist Dr. Herndon.  Please continue wound care as directed-santyl, dsd/ kerlix.  Secondary Diagnosis:	Pulmonary nodule  Assessment and plan of treatment:	repeat CT chest in 3-6 months

## 2018-11-08 NOTE — PROGRESS NOTE ADULT - REASON FOR ADMISSION
progressive weakness and decreased PO intake for 2 days
Generalized weakness; Failure to thrive
Generalizes weakness; Failure to thrive
progressive weakness and decreased PO intake for 2 days

## 2018-11-08 NOTE — PROGRESS NOTE ADULT - PROVIDER SPECIALTY LIST ADULT
Internal Medicine
Neurology
Nutrition Support
Nutrition Support
Podiatry
Podiatry
Surgery
Internal Medicine

## 2018-11-08 NOTE — DISCHARGE NOTE ADULT - MEDICATION SUMMARY - MEDICATIONS TO TAKE
I will START or STAY ON the medications listed below when I get home from the hospital:    Tylenol 325 mg oral tablet  -- 2 tab(s) by mouth every 4 hours, As Needed  -- Indication: For Pain     oxyCODONE 5 mg oral tablet  -- orally every 4 hours (5 times/day), As Needed  -- Indication: For PAin     aspirin 81 mg oral delayed release tablet  -- 1 tab(s) by mouth once a day  -- Indication: For Heart Health     Cozaar 25 mg oral tablet  -- 1 tab(s) by mouth once a day  -- Indication: For HTN    Flomax 0.4 mg oral capsule  -- 1 cap(s) by mouth once a day (at bedtime)  -- Indication: For BPH    Eliquis 5 mg oral tablet  -- 1 tab(s) by mouth 2 times a day  -- Indication: For Afib    metFORMIN 500 mg oral tablet  -- 1 tab(s) by mouth 2 times a day  -- Indication: For DM    atorvastatin 40 mg oral tablet  -- 1 tab(s) by mouth once a day (at bedtime)  -- Indication: For DLD    OLANZapine 5 mg oral tablet  -- 1 tab(s) by mouth once a day  -- Indication: For Psychiatric Health     Metoprolol Tartrate 25 mg oral tablet  -- 1 tab(s) by mouth 2 times a day  -- Indication: For Heart Health     amLODIPine 5 mg oral tablet  -- 0.5 tab(s) by mouth once a day  -- Indication: For HTN     collagenase 250 units/g topical ointment  -- 1 application on skin once a day  -- Indication: For Scrotal Pressure wound     ferrous sulfate 325 mg (65 mg elemental iron) oral delayed release tablet  -- 1 tab(s) by mouth once a day  -- Indication: For Anemia     lactulose 10 g/15 mL oral solution  -- orally once a day  -- Indication: For Constipation     senna oral tablet  -- 1 tab(s) by mouth once a day (at bedtime)  -- Indication: For Constipation     zinc sulfate  -- 50 milligram(s) by mouth once a day  -- Indication: For UTI (urinary tract infection)    Vitamin C 500 mg oral tablet  -- 1 tab(s) by mouth once a day  -- Indication: For vitamin I will START or STAY ON the medications listed below when I get home from the hospital:    Tylenol 325 mg oral tablet  -- 2 tab(s) by mouth every 4 hours, As Needed  -- Indication: For Pain     aspirin 81 mg oral delayed release tablet  -- 1 tab(s) by mouth once a day  -- Indication: For PVD    Cozaar 25 mg oral tablet  -- 1 tab(s) by mouth once a day  -- Indication: For HTN    Flomax 0.4 mg oral capsule  -- 1 cap(s) by mouth once a day (at bedtime)  -- Indication: For BPH    Eliquis 5 mg oral tablet  -- 1 tab(s) by mouth 2 times a day  -- Indication: For Afib    metFORMIN 500 mg oral tablet  -- 1 tab(s) by mouth 2 times a day  -- Indication: For DM    atorvastatin 40 mg oral tablet  -- 1 tab(s) by mouth once a day (at bedtime)  -- Indication: For DLD    OLANZapine 5 mg oral tablet  -- 1 tab(s) by mouth once a day  -- Indication: For Psychiatric Health     Metoprolol Tartrate 25 mg oral tablet  -- 1 tab(s) by mouth 2 times a day  -- Indication: For Heart Health     amLODIPine 5 mg oral tablet  -- 0.5 tab(s) by mouth once a day  -- Indication: For HTN     collagenase 250 units/g topical ointment  -- 1 application on skin once a day  -- Indication: For Scrotal Pressure wound     ferrous sulfate 325 mg (65 mg elemental iron) oral delayed release tablet  -- 1 tab(s) by mouth once a day  -- Indication: For Anemia     lactulose 10 g/15 mL oral solution  -- orally once a day  -- Indication: For Constipation     senna oral tablet  -- 1 tab(s) by mouth once a day (at bedtime)  -- Indication: For Constipation     zinc sulfate  -- 50 milligram(s) by mouth once a day  -- Indication: For UTI (urinary tract infection)    Vitamin C 500 mg oral tablet  -- 1 tab(s) by mouth once a day  -- Indication: For vitamin

## 2018-11-08 NOTE — DISCHARGE NOTE ADULT - PATIENT PORTAL LINK FT
You can access the [facility] Portal, offered by [facility], by registering with the following website: [email or web address]

## 2018-11-08 NOTE — DISCHARGE NOTE ADULT - CARE PROVIDER_API CALL
Eduin Boggs), EEGEpilepsy; Neurology  [address]  [address]  [place] [address]  Phone: [phone]  Fax: [phone]    Tamara Ward), Internal Medicine  [address]  [address]  [place] [address]  Phone: [phone]  Fax: [phone]    Maribell Eldridge)  [address]  [facility] [address]  Phone: [phone]  Fax: (   )    -    Casey Bingham), Surgery; Vascular Surgery  [address]  [address]  [place] [address]  Phone: [phone]  Fax: [phone]

## 2018-11-08 NOTE — PROGRESS NOTE ADULT - SUBJECTIVE AND OBJECTIVE BOX
LENGTH OF HOSPITAL STAY: 16d      CHIEF COMPLAINT:   Patient is a 78y old  Male who presents with a chief complaint of progressive weakness and decreased PO intake for 2 days (08 Nov 2018 04:23)      OVER Past 24hrs:  The patient was seen and examined at bedside there were no acute events over night. He has no fever or chills and is tolerating peg feeds.      PAST MEDICAL & SURGICAL HISTORY  PAST MEDICAL & SURGICAL HISTORY:  Anemia  Diabetes  HTN (hypertension)  Arrhythmia  Dementia  S/P cholecystectomy  History of hip replacement        REVIEW OF SYSTEMS  RESPIRATORY: No cough, wheezing, chills or hemoptysis; No shortness of breath  CARDIOVASCULAR: No chest pain, palpitations, dizziness, or leg swelling    ALLERGIES:  No Known Allergies    MEDICATIONS:  STANDING MEDICATIONS  amLODIPine   Tablet 2.5 milliGRAM(s) Oral daily  apixaban 5 milliGRAM(s) Oral every 12 hours  ascorbic acid 500 milliGRAM(s) Oral daily  aspirin enteric coated 81 milliGRAM(s) Oral daily  atorvastatin 40 milliGRAM(s) Oral at bedtime  chlorhexidine 4% Liquid 1 Application(s) Topical <User Schedule>  collagenase Ointment 1 Application(s) Topical daily  ferrous    sulfate 325 milliGRAM(s) Oral daily  lactulose Syrup 20 Gram(s) Oral two times a day  losartan 50 milliGRAM(s) Oral daily  magnesium oxide 400 milliGRAM(s) Oral two times a day with meals  metoprolol tartrate 50 milliGRAM(s) Oral every 8 hours  OLANZapine 5 milliGRAM(s) Oral daily  senna 1 Tablet(s) Oral at bedtime  tamsulosin 0.4 milliGRAM(s) Oral at bedtime    PRN MEDICATIONS  acetaminophen   Tablet .. 650 milliGRAM(s) Oral every 4 hours PRN  oxyCODONE    IR 5 milliGRAM(s) Oral every 4 hours PRN    VITALS:   T(F): 97  HR: 64  BP: 112/56  RR: 16  SpO2: --    PHYSICAL EXAM:  General: No acute distress.  Alert, minimally  interactive, nonfocal. Cachetic male       PULM: Clear to auscultation bilaterally, no significant sputum production.    CVS: Regular rate and rhythm, no murmurs, rubs, or gallops.    ABD: Soft, nondistended, nontender, no masses. Gtube place site clean and dry no drainage     EXT: No edema, nontender. DFU LE    SKIN: Warm and well perfused, no rashes noted Scrotal ulcer .    SKIN: Warm and well perfused, no rashes noted.    LABS:                        10.2   4.75  )-----------( 158      ( 08 Nov 2018 06:22 )             32.4     11-08    145  |  104  |  16  ----------------------------<  161<H>  3.0<L>   |  31  |  0.9    Ca    8.6      08 Nov 2018 06:22  Phos  2.0     11-08  Mg     2.2     11-08    TPro  5.1<L>  /  Alb  2.7<L>  /  TBili  0.8  /  DBili  x   /  AST  16  /  ALT  8   /  AlkPhos  117<H>  11-08    PTT - ( 08 Nov 2018 06:22 )  PTT:38.8 sec LENGTH OF HOSPITAL STAY: 16d      CHIEF COMPLAINT:   Patient is a 78y old  Male who presents with a chief complaint of progressive weakness and decreased PO intake for 2 days (08 Nov 2018 04:23)      OVER Past 24hrs:  The patient was seen and examined at bedside there were no acute events over night. He has no fever or chills and is tolerating peg feeds.      PAST MEDICAL & SURGICAL HISTORY  PAST MEDICAL & SURGICAL HISTORY:  Anemia  Diabetes  HTN (hypertension)  Arrhythmia  Dementia  S/P cholecystectomy  History of hip replacement        REVIEW OF SYSTEMS  RESPIRATORY: No cough, wheezing, chills or hemoptysis; No shortness of breath  CARDIOVASCULAR: No chest pain, palpitations, dizziness, or leg swelling    ALLERGIES:  No Known Allergies    MEDICATIONS:  STANDING MEDICATIONS  amLODIPine   Tablet 2.5 milliGRAM(s) Oral daily  apixaban 5 milliGRAM(s) Oral every 12 hours  ascorbic acid 500 milliGRAM(s) Oral daily  aspirin enteric coated 81 milliGRAM(s) Oral daily  atorvastatin 40 milliGRAM(s) Oral at bedtime  chlorhexidine 4% Liquid 1 Application(s) Topical <User Schedule>  collagenase Ointment 1 Application(s) Topical daily  ferrous    sulfate 325 milliGRAM(s) Oral daily  lactulose Syrup 20 Gram(s) Oral two times a day  losartan 50 milliGRAM(s) Oral daily  magnesium oxide 400 milliGRAM(s) Oral two times a day with meals  metoprolol tartrate 50 milliGRAM(s) Oral every 8 hours  OLANZapine 5 milliGRAM(s) Oral daily  senna 1 Tablet(s) Oral at bedtime  tamsulosin 0.4 milliGRAM(s) Oral at bedtime    PRN MEDICATIONS  acetaminophen   Tablet .. 650 milliGRAM(s) Oral every 4 hours PRN  oxyCODONE    IR 5 milliGRAM(s) Oral every 4 hours PRN    VITALS:   T(F): 97  HR: 64  BP: 112/56  RR: 16  SpO2: --    PHYSICAL EXAM:  General: No acute distress.  Alert, minimally  interactive, nonfocal. Cachetic male     PULM: Clear to auscultation bilaterally, no significant sputum production.    CVS: Regular rate and rhythm, no murmurs, rubs, or gallops.    ABD: Soft, nondistended, nontender, no masses. Gtube place site clean and dry no drainage     EXT: No edema, nontender. DFU LE    SKIN: Warm and well perfused, no rashes noted Scrotal ulcer .    SKIN: Warm and well perfused, no rashes noted.    LABS:                        10.2   4.75  )-----------( 158      ( 08 Nov 2018 06:22 )             32.4     11-08    145  |  104  |  16  ----------------------------<  161<H>  3.0<L>   |  31  |  0.9    Ca    8.6      08 Nov 2018 06:22  Phos  2.0     11-08  Mg     2.2     11-08    TPro  5.1<L>  /  Alb  2.7<L>  /  TBili  0.8  /  DBili  x   /  AST  16  /  ALT  8   /  AlkPhos  117<H>  11-08    PTT - ( 08 Nov 2018 06:22 )  PTT:38.8 sec

## 2018-11-08 NOTE — DISCHARGE NOTE ADULT - PROVIDER TOKENS
TOKEN:'[id number]',TOKEN:'[id number]',FREE:[LAST:[Chente],FIRST:[Maribell HIDALGO)],PHONE:[phone]:[(   [phone]    -],ADDRESS:[[address] [place] [address]  [place] [address]]],TOKEN:'[id number]'

## 2018-11-08 NOTE — PROGRESS NOTE ADULT - SUBJECTIVE AND OBJECTIVE BOX
GENERAL SURGERY PROGRESS NOTE     SUSI MARTIN  78y  Male  Hospital day :16d  POD: 1  Procedure: PEG (percutaneous endoscopic gastrostomy)  Angiogram, peripheral    OVERNIGHT EVENTS:  No acute events, patient tube feeds restarted at 3pm, heparin restarted at 8pm.  No bleeding around PEG site noted during night rounds.    T(F): 96.3 (11-07-18 @ 23:32), Max: 98.4 (11-07-18 @ 16:32)  HR: 80 (11-07-18 @ 23:32) (74 - 115)  BP: 132/83 (11-07-18 @ 23:32) (120/73 - 174/84)  RR: 18 (11-07-18 @ 23:32) (10 - 24)  SpO2: 99% (11-07-18 @ 10:41) (93% - 100%)    DIET/FLUIDS: ascorbic acid 500 milliGRAM(s) Oral daily  ferrous    sulfate 325 milliGRAM(s) Oral daily  magnesium oxide 400 milliGRAM(s) Oral two times a day with meals  sodium chloride 0.9%. 1000 milliLiter(s) IV Continuous <Continuous>    AC/ proph: aspirin enteric coated 81 milliGRAM(s) Oral daily  heparin  Infusion 500 Unit(s)/Hr IV Continuous <Continuous>    ABx:     PHYSICAL EXAM:  GENERAL: NAD,  CHEST/LUNG: Clear to auscultation bilaterally  HEART: Regular rate and rhythm  ABDOMEN: Soft, Nontender, Nondistended;   EXTREMITIES:  No clubbing, cyanosis, or edema      LABS  Labs:  CAPILLARY BLOOD GLUCOSE      POCT Blood Glucose.: 166 mg/dL (07 Nov 2018 21:31)  POCT Blood Glucose.: 148 mg/dL (07 Nov 2018 16:20)  POCT Blood Glucose.: 145 mg/dL (07 Nov 2018 12:14)  POCT Blood Glucose.: 105 mg/dL (07 Nov 2018 06:12)                          9.8    4.60  )-----------( 144      ( 07 Nov 2018 06:18 )             31.1       Auto Neutrophil %: 71.4 % (11-07-18 @ 06:18)  Auto Immature Granulocyte %: 0.4 % (11-07-18 @ 06:18)    11-07    143  |  107  |  15  ----------------------------<  116<H>  3.4<L>   |  25  |  0.7      Calcium, Total Serum: 8.4 mg/dL (11-07-18 @ 06:18)      LFTs:             5.1  | 0.9  | 20       ------------------[117     ( 07 Nov 2018 06:18 )  2.8  | x    | 7             Coags:     x      ----< x       ( 06 Nov 2018 23:30 )     69.9          RADIOLOGY & ADDITIONAL TESTS:  No pertinent imaging

## 2018-11-08 NOTE — PROGRESS NOTE ADULT - ATTENDING COMMENTS
Patient seen and discussed with NP and medical team during rounds this morning.  Patient improved more alert, answering questions naming objects and following commands.  No drift in UE and diffuse weakness in LE proximal and 4+/5 distal  Sensory symmetric    Patient presenting with right medial occipital infarct with some slight hemorrhagic conversion.  Likely post surgery and related to afib and emoblism    plan as above
Patient seen during rounds with NP.  Patient drowsy today and when waking he becomes agitated moving all extremities equally.  Has baseline dementia as per wife and sometimes he can get agitated.    Plan as above.  Etiology of stroke likely perioperative embolus formation off anticoagulation.
Assessment and plan above were modified and discussed with residents, physician assistants, and nurses.
Assessment and plan above were modified and discussed with residents, physician assistants, and nurses.
Patient seen and examined independently earlier today. Case discussed with housestaff, nursing, social work, patient. I agree with most of the resident's note, physical exam, and plan except as below. Pt pulled out NGT. Still with poor PO intake. D/w spouse and pt - they agree to proceed with PEG as pt currently will not be able to meet nutritional reqs without PEG. PEG will supplement PO intake. Eliquis changed to therapeutic Lovenox. GI c/s placed and hopefully PEG early next week.
Patient seen and examined independently. Agree with Dr Schmitz's note
Patient seen and examined. Failure to thrive and suspected vitamin efficiencies and hypomagnesemia - supplemented.    S/P Peg tube placement. will start feeding in 12 hours and advance.   clinically stable
Right foot necrotic eschar overlaying the styloid process. Recommend santyl dressing with gauze (gauze must be lightly moistened with saline to activate properties of santyl). Pulses non-palpable--> vascular recommendations appreciated. Podiatry will continue to monitor while patient is in-house. Will follow patient with serial xrays as outpatient to monitor findings of subcortical lucency. Patient should follow up with me as outpatient at [address] [phone]    Thank you for allowing me to participate in your patient care  Alex Herndon DPM
agree with above note   Cases discussed with patients family and resident  no intervention needed at this time
agree with above. Needs better HR and BP control. Px guarded. Family deciding on goals of care.
d/w medical resident after placement of surgical Gastrostomy tube  entered order for Jevity 1.2 240 ml q6h, once ok with surgery to use tube  once tolerated x 24 h, increase to 360 ml q6h  check fingerstick glucose prior to each feeding to determine need for insulin or change in formula
patient seen and examined independently
s/p AT angioplasty    palpable AT pulse  continue care per podiatry  follow up as outpatient 2 weeks
Assessment and plan above were modified and discussed with residents, physician assistants, and nurses.
Patient seen and examined independently earlier today. Case discussed with housestaff, nursing, social work, patient, spouse, GI team, Vascular. I agree with most of the resident's note, physical exam, and plan except as below. Patient not eating much as per staff and spouse. No new complaints. Denies CP, SOB, cough. D/w GI - they will do PEG tomorrow. Would prefer to do vascular procedure post PEG or Monday as not urgent. Pt/spouse agree with plan. May bridge with Heparin gtt.
Patient seen and examined independently earlier today. Case discussed with housestaff, nursing, social work, patient. I agree with most of the resident's note, physical exam, and plan except as below. Patient the same. No new complaints. Start Heparin gtt tonight at no more then 800 u/hr without bolus and d/c in am. Med and cardio clearance on chart. Pt pulled out NGT x2 overnight. Encourage PO and Ensure today. D/w spouse in details.
Patient seen and examined independently. Agree with Dr Schmitz's note.  She discussed with patient's wife about methods of feeding in detils and spent more than 30mins
Patient seen and examined independently earlier today. Case discussed with housestaff, nursing, social work, patient, spouse, GI, vascular. I agree with most of the resident's note, physical exam, and plan except as below. Patient without new complaints. Awaiting PEG and angiogram. Restart Lovenox post procedures if ok with GI. If GI unable to place PEG today, will need NGT for feeds.
Patient seen and examined independently earlier today. Case discussed with housestaff, nursing, social work, patient. I agree with most of the resident's note, physical exam, and plan except as below. S/p angiogram. No new complaints. Will give Lovenox until Sunday am and then Heparin gtt Sunday evening for PEG Monday. Pt not eating, needs NGT until Monday.
Patient seen and examined independently earlier today. Case discussed with housestaff, nursing, social work, patient. I agree with most of the resident's note, physical exam, and plan except as below. Patient doing well. No new complaints. D/c planning. D/w spouse in details.

## 2018-11-08 NOTE — DISCHARGE NOTE ADULT - MEDICATION SUMMARY - MEDICATIONS TO STOP TAKING
I will STOP taking the medications listed below when I get home from the hospital:  None I will STOP taking the medications listed below when I get home from the hospital:    NATEGLINIDE  TAB 120MG  -- 1 tab(s) by mouth 3 times a day (before meals)    PIOGLITAZONE TAB 15MG  -- 1 tab(s) by mouth once a day    RAPAFLO      CAP 8MG  -- 1 cap(s) by mouth once a day (before a meal)    BYSTOLIC     TAB 5MG  -- 1 tab(s) by mouth once a day (at bedtime)    Namzaric 28 mg-10 mg oral capsule, extended release  -- 1 cap(s) by mouth once a day    olmesartan 5 mg oral tablet  -- 2 tab(s) by mouth once a day    solifenacin 5 mg oral tablet  -- 1 tab(s) by mouth once a day

## 2018-11-08 NOTE — PROGRESS NOTE ADULT - ASSESSMENT
A/P  78 year old male s/p PEG placement in OR  -PEG ready for use  -Call surgery if any concerns or bleeding noted from insertion site.

## 2018-11-08 NOTE — DISCHARGE NOTE ADULT - INSTRUCTIONS
Please Limit daily sodium intake to 1.5 grams daily or 1/2 tea spoon of salt. Avoid foods high in cholesterol. Please make a concerted effort to stay hydrated at all times. Please calori count his daily intake. Please Limit daily sodium intake to 1.5 grams daily or 1/2 tea spoon of salt. Avoid foods high in cholesterol. Please make a concerted effort to stay hydrated at all times. Please calori count his daily intake. Gtube feeding: Jevity 1.2 Trae, 24hr 1440ml bolus feeding . Bolus 360ml every 6 hours

## 2018-11-08 NOTE — DISCHARGE NOTE ADULT - CARE PROVIDERS DIRECT ADDRESSES
[email or web address]

## 2018-11-08 NOTE — DISCHARGE NOTE ADULT - PLAN OF CARE
Evaluation and therapy Continue home medication as directed. follow up with pcp. Continue taking Eliquis directed.  Follow up with cardiologist. Continue wound  degassing. please follow up with Vascular surgeon Dr. BENJAMIN Bingham.  Please follow up with Podiatrist Dr. Herndon.  Please continue wound care as directed-santyl, dsd/ kerlix. Your were found to have a an acute/subacute stroke please follow up with  your neurologist with in two weeks of discharge.  Please  start taking Atorvastatin 40mg daily. please start taking Aspirin 81mg daily.  Please continue taking anticoagulation medication.  If you experience slurred speech, headaches associated with muscle weakness please seek immediate medical advice. No further intervention Your were found to have a an acute/subacute stroke please follow up with  your neurologist with in two weeks of discharge.  Please start taking Atorvastatin 40mg daily. Cont Eliquis.  If you experience slurred speech, headaches associated with muscle weakness please seek immediate medical advice. S/p baloon angioplasty. please follow up with Vascular surgeon Dr. BENJAMIN Bingham. Cont ASA for now. Discuss with vascular surgeon when aspirin can be stopped.  Please follow up with Podiatrist Dr. Herndon.  Please continue wound care as directed-santyl, dsd/ kerlix. repeat CT chest in 3-6 months

## 2018-11-08 NOTE — PROGRESS NOTE ADULT - NSHPATTENDINGPLANDISCUSS_GEN_ALL_CORE
d/w staff, spouse
d/w staff, spouse
patient, wife and housestaff
House staff
Patient and resident
patient

## 2018-11-08 NOTE — DISCHARGE NOTE ADULT - HOSPITAL COURSE
78 year old gentleman with a PMHx of AFIB previously on Xarelto discontinued secondary to frequent falls , DVT s/p Right Hip Replacement on Eliquis, Vascular Dementia with behavioral changes, DM II presents from Rehab facility for evaluation of worsening weakness and decreased PO intake. CTH revealed a right parietal lobe posteromedially, there is an ill-defined area with loss of gray-white matter differentiation with associated edematous changes as well as resolving subacute parenchymal hemorrhage. Signal abnormality with abnormal enhancement in the right medial occipital lobe consistent with subacute infarct and   petechial/microhemorrhages.  His stay was complicated by UTI which was treated and is now resolved. He has a foot ulcer that was  evaluated and treated  by vascular surgery and and wound therapy by podiatry.  He will follow up with is Cardiologist Dr. rikki Ward and neurologist dr. Garces on discharge.  He is current  has dementia and speak only [place]. Gtube was placed on 11/07/18 and he has since been tolerating feeds.  He is currently hemodynamically stable with out evidence of systemic infection.  He  will be discharged to a NH.

## 2018-11-09 ENCOUNTER — OUTPATIENT (OUTPATIENT)
Dept: OUTPATIENT SERVICES | Facility: HOSPITAL | Age: 78
LOS: 1 days | Discharge: HOME | End: 2018-11-09

## 2018-11-09 DIAGNOSIS — Z90.49 ACQUIRED ABSENCE OF OTHER SPECIFIED PARTS OF DIGESTIVE TRACT: Chronic | ICD-10-CM

## 2018-11-09 DIAGNOSIS — M62.81 MUSCLE WEAKNESS (GENERALIZED): ICD-10-CM

## 2018-11-09 DIAGNOSIS — Z96.649 PRESENCE OF UNSPECIFIED ARTIFICIAL HIP JOINT: Chronic | ICD-10-CM

## 2018-11-12 ENCOUNTER — OUTPATIENT (OUTPATIENT)
Dept: OUTPATIENT SERVICES | Facility: HOSPITAL | Age: 78
LOS: 1 days | Discharge: HOME | End: 2018-11-12

## 2018-11-12 DIAGNOSIS — Z90.49 ACQUIRED ABSENCE OF OTHER SPECIFIED PARTS OF DIGESTIVE TRACT: Chronic | ICD-10-CM

## 2018-11-12 DIAGNOSIS — Z96.649 PRESENCE OF UNSPECIFIED ARTIFICIAL HIP JOINT: Chronic | ICD-10-CM

## 2018-11-12 DIAGNOSIS — E87.6 HYPOKALEMIA: ICD-10-CM

## 2018-11-14 DIAGNOSIS — F01.51 VASCULAR DEMENTIA, UNSPECIFIED SEVERITY, WITH BEHAVIORAL DISTURBANCE: ICD-10-CM

## 2018-11-14 DIAGNOSIS — I10 ESSENTIAL (PRIMARY) HYPERTENSION: ICD-10-CM

## 2018-11-14 DIAGNOSIS — Z79.01 LONG TERM (CURRENT) USE OF ANTICOAGULANTS: ICD-10-CM

## 2018-11-14 DIAGNOSIS — Z79.84 LONG TERM (CURRENT) USE OF ORAL HYPOGLYCEMIC DRUGS: ICD-10-CM

## 2018-11-14 DIAGNOSIS — N40.0 BENIGN PROSTATIC HYPERPLASIA WITHOUT LOWER URINARY TRACT SYMPTOMS: ICD-10-CM

## 2018-11-14 DIAGNOSIS — B96.89 OTHER SPECIFIED BACTERIAL AGENTS AS THE CAUSE OF DISEASES CLASSIFIED ELSEWHERE: ICD-10-CM

## 2018-11-14 DIAGNOSIS — R53.1 WEAKNESS: ICD-10-CM

## 2018-11-14 DIAGNOSIS — R33.9 RETENTION OF URINE, UNSPECIFIED: ICD-10-CM

## 2018-11-14 DIAGNOSIS — I48.91 UNSPECIFIED ATRIAL FIBRILLATION: ICD-10-CM

## 2018-11-14 DIAGNOSIS — I82.4Z1 ACUTE EMBOLISM AND THROMBOSIS OF UNSPECIFIED DEEP VEINS OF RIGHT DISTAL LOWER EXTREMITY: ICD-10-CM

## 2018-11-14 DIAGNOSIS — D64.9 ANEMIA, UNSPECIFIED: ICD-10-CM

## 2018-11-14 DIAGNOSIS — I61.1 NONTRAUMATIC INTRACEREBRAL HEMORRHAGE IN HEMISPHERE, CORTICAL: ICD-10-CM

## 2018-11-14 DIAGNOSIS — K59.00 CONSTIPATION, UNSPECIFIED: ICD-10-CM

## 2018-11-14 DIAGNOSIS — Z74.01 BED CONFINEMENT STATUS: ICD-10-CM

## 2018-11-14 DIAGNOSIS — E87.6 HYPOKALEMIA: ICD-10-CM

## 2018-11-14 DIAGNOSIS — Z90.49 ACQUIRED ABSENCE OF OTHER SPECIFIED PARTS OF DIGESTIVE TRACT: ICD-10-CM

## 2018-11-14 DIAGNOSIS — L97.519 NON-PRESSURE CHRONIC ULCER OF OTHER PART OF RIGHT FOOT WITH UNSPECIFIED SEVERITY: ICD-10-CM

## 2018-11-14 DIAGNOSIS — E11.621 TYPE 2 DIABETES MELLITUS WITH FOOT ULCER: ICD-10-CM

## 2018-11-14 DIAGNOSIS — R13.10 DYSPHAGIA, UNSPECIFIED: ICD-10-CM

## 2018-11-14 DIAGNOSIS — Z96.641 PRESENCE OF RIGHT ARTIFICIAL HIP JOINT: ICD-10-CM

## 2018-11-14 DIAGNOSIS — I63.49 CEREBRAL INFARCTION DUE TO EMBOLISM OF OTHER CEREBRAL ARTERY: ICD-10-CM

## 2018-11-14 DIAGNOSIS — Z79.82 LONG TERM (CURRENT) USE OF ASPIRIN: ICD-10-CM

## 2018-11-14 DIAGNOSIS — N39.0 URINARY TRACT INFECTION, SITE NOT SPECIFIED: ICD-10-CM

## 2018-11-14 DIAGNOSIS — E43 UNSPECIFIED SEVERE PROTEIN-CALORIE MALNUTRITION: ICD-10-CM

## 2018-11-14 DIAGNOSIS — R62.7 ADULT FAILURE TO THRIVE: ICD-10-CM

## 2018-11-14 DIAGNOSIS — E11.51 TYPE 2 DIABETES MELLITUS WITH DIABETIC PERIPHERAL ANGIOPATHY WITHOUT GANGRENE: ICD-10-CM

## 2018-11-14 DIAGNOSIS — E83.42 HYPOMAGNESEMIA: ICD-10-CM

## 2018-11-14 DIAGNOSIS — I82.443 ACUTE EMBOLISM AND THROMBOSIS OF TIBIAL VEIN, BILATERAL: ICD-10-CM

## 2018-11-14 DIAGNOSIS — R91.1 SOLITARY PULMONARY NODULE: ICD-10-CM

## 2018-11-16 ENCOUNTER — OUTPATIENT (OUTPATIENT)
Dept: OUTPATIENT SERVICES | Facility: HOSPITAL | Age: 78
LOS: 1 days | Discharge: HOME | End: 2018-11-16

## 2018-11-16 DIAGNOSIS — Z90.49 ACQUIRED ABSENCE OF OTHER SPECIFIED PARTS OF DIGESTIVE TRACT: Chronic | ICD-10-CM

## 2018-11-16 DIAGNOSIS — Z96.649 PRESENCE OF UNSPECIFIED ARTIFICIAL HIP JOINT: Chronic | ICD-10-CM

## 2018-11-16 DIAGNOSIS — E87.6 HYPOKALEMIA: ICD-10-CM

## 2018-11-19 ENCOUNTER — OUTPATIENT (OUTPATIENT)
Dept: OUTPATIENT SERVICES | Facility: HOSPITAL | Age: 78
LOS: 1 days | Discharge: HOME | End: 2018-11-19

## 2018-11-19 ENCOUNTER — INPATIENT (INPATIENT)
Facility: HOSPITAL | Age: 78
LOS: 23 days | Discharge: NURSING, REHAB OR LONG-TERM CARE | End: 2018-12-13
Attending: INTERNAL MEDICINE | Admitting: INTERNAL MEDICINE
Payer: MEDICARE

## 2018-11-19 VITALS
WEIGHT: 158.95 LBS | RESPIRATION RATE: 24 BRPM | OXYGEN SATURATION: 100 % | HEART RATE: 176 BPM | SYSTOLIC BLOOD PRESSURE: 123 MMHG | DIASTOLIC BLOOD PRESSURE: 91 MMHG

## 2018-11-19 DIAGNOSIS — A78 Q FEVER: ICD-10-CM

## 2018-11-19 DIAGNOSIS — Z96.649 PRESENCE OF UNSPECIFIED ARTIFICIAL HIP JOINT: Chronic | ICD-10-CM

## 2018-11-19 DIAGNOSIS — A41.9 SEPSIS, UNSPECIFIED ORGANISM: ICD-10-CM

## 2018-11-19 DIAGNOSIS — N17.9 ACUTE KIDNEY FAILURE, UNSPECIFIED: ICD-10-CM

## 2018-11-19 DIAGNOSIS — E11.9 TYPE 2 DIABETES MELLITUS WITHOUT COMPLICATIONS: ICD-10-CM

## 2018-11-19 DIAGNOSIS — I82.409 ACUTE EMBOLISM AND THROMBOSIS OF UNSPECIFIED DEEP VEINS OF UNSPECIFIED LOWER EXTREMITY: ICD-10-CM

## 2018-11-19 DIAGNOSIS — I10 ESSENTIAL (PRIMARY) HYPERTENSION: ICD-10-CM

## 2018-11-19 DIAGNOSIS — R13.10 DYSPHAGIA, UNSPECIFIED: ICD-10-CM

## 2018-11-19 DIAGNOSIS — Z90.49 ACQUIRED ABSENCE OF OTHER SPECIFIED PARTS OF DIGESTIVE TRACT: Chronic | ICD-10-CM

## 2018-11-19 DIAGNOSIS — J18.9 PNEUMONIA, UNSPECIFIED ORGANISM: ICD-10-CM

## 2018-11-19 DIAGNOSIS — I48.91 UNSPECIFIED ATRIAL FIBRILLATION: ICD-10-CM

## 2018-11-19 LAB
ALBUMIN SERPL ELPH-MCNC: 3.5 G/DL — SIGNIFICANT CHANGE UP (ref 3.5–5.2)
ALP SERPL-CCNC: 160 U/L — HIGH (ref 30–115)
ALT FLD-CCNC: 14 U/L — SIGNIFICANT CHANGE UP (ref 0–41)
ANION GAP SERPL CALC-SCNC: 16 MMOL/L — HIGH (ref 7–14)
APPEARANCE UR: CLEAR — SIGNIFICANT CHANGE UP
APTT BLD: 34.2 SEC — SIGNIFICANT CHANGE UP (ref 27–39.2)
AST SERPL-CCNC: 12 U/L — SIGNIFICANT CHANGE UP (ref 0–41)
BASOPHILS # BLD AUTO: 0.01 K/UL — SIGNIFICANT CHANGE UP (ref 0–0.2)
BASOPHILS NFR BLD AUTO: 0.1 % — SIGNIFICANT CHANGE UP (ref 0–1)
BILIRUB SERPL-MCNC: 0.7 MG/DL — SIGNIFICANT CHANGE UP (ref 0.2–1.2)
BILIRUB UR-MCNC: NEGATIVE — SIGNIFICANT CHANGE UP
BUN SERPL-MCNC: 56 MG/DL — HIGH (ref 10–20)
CALCIUM SERPL-MCNC: 9.4 MG/DL — SIGNIFICANT CHANGE UP (ref 8.5–10.1)
CHLORIDE SERPL-SCNC: 107 MMOL/L — SIGNIFICANT CHANGE UP (ref 98–110)
CK MB CFR SERPL CALC: <1 NG/ML — SIGNIFICANT CHANGE UP (ref 0.6–6.3)
CK SERPL-CCNC: 20 U/L — SIGNIFICANT CHANGE UP (ref 0–225)
CO2 SERPL-SCNC: 27 MMOL/L — SIGNIFICANT CHANGE UP (ref 17–32)
COLOR SPEC: YELLOW — SIGNIFICANT CHANGE UP
CREAT SERPL-MCNC: 0.9 MG/DL — SIGNIFICANT CHANGE UP (ref 0.7–1.5)
DIFF PNL FLD: NEGATIVE — SIGNIFICANT CHANGE UP
EOSINOPHIL # BLD AUTO: 0.01 K/UL — SIGNIFICANT CHANGE UP (ref 0–0.7)
EOSINOPHIL NFR BLD AUTO: 0.1 % — SIGNIFICANT CHANGE UP (ref 0–8)
GAS PNL BLDV: SIGNIFICANT CHANGE UP
GAS PNL BLDV: SIGNIFICANT CHANGE UP
GLUCOSE BLDC GLUCOMTR-MCNC: 174 MG/DL — HIGH (ref 70–99)
GLUCOSE BLDC GLUCOMTR-MCNC: 266 MG/DL — HIGH (ref 70–99)
GLUCOSE BLDC GLUCOMTR-MCNC: 392 MG/DL — HIGH (ref 70–99)
GLUCOSE SERPL-MCNC: 310 MG/DL — HIGH (ref 70–99)
GLUCOSE UR QL: 500 MG/DL
HCT VFR BLD CALC: 35.9 % — LOW (ref 42–52)
HGB BLD-MCNC: 10.8 G/DL — LOW (ref 14–18)
IMM GRANULOCYTES NFR BLD AUTO: 0.5 % — HIGH (ref 0.1–0.3)
INR BLD: 2.45 RATIO — HIGH (ref 0.65–1.3)
KETONES UR-MCNC: NEGATIVE — SIGNIFICANT CHANGE UP
LACTATE SERPL-SCNC: 3.9 MMOL/L — HIGH (ref 0.5–2.2)
LEUKOCYTE ESTERASE UR-ACNC: ABNORMAL
LYMPHOCYTES # BLD AUTO: 0.58 K/UL — LOW (ref 1.2–3.4)
LYMPHOCYTES # BLD AUTO: 3.8 % — LOW (ref 20.5–51.1)
MCHC RBC-ENTMCNC: 27.8 PG — SIGNIFICANT CHANGE UP (ref 27–31)
MCHC RBC-ENTMCNC: 30.1 G/DL — LOW (ref 32–37)
MCV RBC AUTO: 92.3 FL — SIGNIFICANT CHANGE UP (ref 80–94)
MONOCYTES # BLD AUTO: 0.56 K/UL — SIGNIFICANT CHANGE UP (ref 0.1–0.6)
MONOCYTES NFR BLD AUTO: 3.6 % — SIGNIFICANT CHANGE UP (ref 1.7–9.3)
NEUTROPHILS # BLD AUTO: 14.21 K/UL — HIGH (ref 1.4–6.5)
NEUTROPHILS NFR BLD AUTO: 91.9 % — HIGH (ref 42.2–75.2)
NITRITE UR-MCNC: POSITIVE
PH UR: 7.5 — SIGNIFICANT CHANGE UP (ref 5–8)
PLATELET # BLD AUTO: 240 K/UL — SIGNIFICANT CHANGE UP (ref 130–400)
POTASSIUM SERPL-MCNC: 4.7 MMOL/L — SIGNIFICANT CHANGE UP (ref 3.5–5)
POTASSIUM SERPL-SCNC: 4.7 MMOL/L — SIGNIFICANT CHANGE UP (ref 3.5–5)
PROT SERPL-MCNC: 6.1 G/DL — SIGNIFICANT CHANGE UP (ref 6–8)
PROT UR-MCNC: 30 MG/DL
PROTHROM AB SERPL-ACNC: 27 SEC — HIGH (ref 9.95–12.87)
RBC # BLD: 3.89 M/UL — LOW (ref 4.7–6.1)
RBC # FLD: 17.3 % — HIGH (ref 11.5–14.5)
SODIUM SERPL-SCNC: 150 MMOL/L — HIGH (ref 135–146)
SP GR SPEC: 1.01 — SIGNIFICANT CHANGE UP (ref 1.01–1.03)
TROPONIN T SERPL-MCNC: 0.01 NG/ML — SIGNIFICANT CHANGE UP
TROPONIN T SERPL-MCNC: <0.01 NG/ML — SIGNIFICANT CHANGE UP
UROBILINOGEN FLD QL: 4 MG/DL (ref 0.2–0.2)
WBC # BLD: 15.45 K/UL — HIGH (ref 4.8–10.8)
WBC # FLD AUTO: 15.45 K/UL — HIGH (ref 4.8–10.8)

## 2018-11-19 RX ORDER — SENNA PLUS 8.6 MG/1
5 TABLET ORAL AT BEDTIME
Qty: 0 | Refills: 0 | Status: DISCONTINUED | OUTPATIENT
Start: 2018-11-19 | End: 2018-12-13

## 2018-11-19 RX ORDER — DEXTROSE 50 % IN WATER 50 %
15 SYRINGE (ML) INTRAVENOUS ONCE
Qty: 0 | Refills: 0 | Status: DISCONTINUED | OUTPATIENT
Start: 2018-11-19 | End: 2018-12-13

## 2018-11-19 RX ORDER — CEFEPIME 1 G/1
2000 INJECTION, POWDER, FOR SOLUTION INTRAMUSCULAR; INTRAVENOUS ONCE
Qty: 0 | Refills: 0 | Status: COMPLETED | OUTPATIENT
Start: 2018-11-19 | End: 2018-11-19

## 2018-11-19 RX ORDER — ATORVASTATIN CALCIUM 80 MG/1
40 TABLET, FILM COATED ORAL AT BEDTIME
Qty: 0 | Refills: 0 | Status: DISCONTINUED | OUTPATIENT
Start: 2018-11-19 | End: 2018-12-13

## 2018-11-19 RX ORDER — SODIUM CHLORIDE 9 MG/ML
1000 INJECTION, SOLUTION INTRAVENOUS
Qty: 0 | Refills: 0 | Status: DISCONTINUED | OUTPATIENT
Start: 2018-11-19 | End: 2018-12-08

## 2018-11-19 RX ORDER — TAMSULOSIN HYDROCHLORIDE 0.4 MG/1
0.4 CAPSULE ORAL AT BEDTIME
Qty: 0 | Refills: 0 | Status: DISCONTINUED | OUTPATIENT
Start: 2018-11-19 | End: 2018-11-19

## 2018-11-19 RX ORDER — ASPIRIN/CALCIUM CARB/MAGNESIUM 324 MG
81 TABLET ORAL DAILY
Qty: 0 | Refills: 0 | Status: DISCONTINUED | OUTPATIENT
Start: 2018-11-19 | End: 2018-11-23

## 2018-11-19 RX ORDER — ACETAMINOPHEN 500 MG
650 TABLET ORAL EVERY 6 HOURS
Qty: 0 | Refills: 0 | Status: DISCONTINUED | OUTPATIENT
Start: 2018-11-19 | End: 2018-11-23

## 2018-11-19 RX ORDER — DEXTROSE 50 % IN WATER 50 %
25 SYRINGE (ML) INTRAVENOUS ONCE
Qty: 0 | Refills: 0 | Status: DISCONTINUED | OUTPATIENT
Start: 2018-11-19 | End: 2018-12-13

## 2018-11-19 RX ORDER — LOSARTAN POTASSIUM 100 MG/1
1 TABLET, FILM COATED ORAL
Qty: 0 | Refills: 0 | COMMUNITY

## 2018-11-19 RX ORDER — VANCOMYCIN HCL 1 G
750 VIAL (EA) INTRAVENOUS EVERY 12 HOURS
Qty: 0 | Refills: 0 | Status: DISCONTINUED | OUTPATIENT
Start: 2018-11-19 | End: 2018-11-22

## 2018-11-19 RX ORDER — DEXTROSE 50 % IN WATER 50 %
12.5 SYRINGE (ML) INTRAVENOUS ONCE
Qty: 0 | Refills: 0 | Status: DISCONTINUED | OUTPATIENT
Start: 2018-11-19 | End: 2018-12-13

## 2018-11-19 RX ORDER — SODIUM CHLORIDE 9 MG/ML
2200 INJECTION, SOLUTION INTRAVENOUS ONCE
Qty: 0 | Refills: 0 | Status: COMPLETED | OUTPATIENT
Start: 2018-11-19 | End: 2018-11-19

## 2018-11-19 RX ORDER — FERROUS SULFATE 325(65) MG
325 TABLET ORAL DAILY
Qty: 0 | Refills: 0 | Status: DISCONTINUED | OUTPATIENT
Start: 2018-11-19 | End: 2018-11-19

## 2018-11-19 RX ORDER — LACTULOSE 10 G/15ML
20 SOLUTION ORAL DAILY
Qty: 0 | Refills: 0 | Status: DISCONTINUED | OUTPATIENT
Start: 2018-11-19 | End: 2018-12-13

## 2018-11-19 RX ORDER — INSULIN GLARGINE 100 [IU]/ML
8 INJECTION, SOLUTION SUBCUTANEOUS AT BEDTIME
Qty: 0 | Refills: 0 | Status: DISCONTINUED | OUTPATIENT
Start: 2018-11-19 | End: 2018-12-13

## 2018-11-19 RX ORDER — GLUCAGON INJECTION, SOLUTION 0.5 MG/.1ML
1 INJECTION, SOLUTION SUBCUTANEOUS ONCE
Qty: 0 | Refills: 0 | Status: DISCONTINUED | OUTPATIENT
Start: 2018-11-19 | End: 2018-12-13

## 2018-11-19 RX ORDER — OLANZAPINE 15 MG/1
5 TABLET, FILM COATED ORAL DAILY
Qty: 0 | Refills: 0 | Status: DISCONTINUED | OUTPATIENT
Start: 2018-11-19 | End: 2018-12-13

## 2018-11-19 RX ORDER — AMIODARONE HYDROCHLORIDE 400 MG/1
1 TABLET ORAL
Qty: 900 | Refills: 0 | Status: DISCONTINUED | OUTPATIENT
Start: 2018-11-19 | End: 2018-11-30

## 2018-11-19 RX ORDER — SODIUM CHLORIDE 9 MG/ML
1000 INJECTION INTRAMUSCULAR; INTRAVENOUS; SUBCUTANEOUS
Qty: 0 | Refills: 0 | Status: DISCONTINUED | OUTPATIENT
Start: 2018-11-19 | End: 2018-11-20

## 2018-11-19 RX ORDER — FERROUS SULFATE 325(65) MG
300 TABLET ORAL DAILY
Qty: 0 | Refills: 0 | Status: DISCONTINUED | OUTPATIENT
Start: 2018-11-19 | End: 2018-12-13

## 2018-11-19 RX ORDER — OLANZAPINE 15 MG/1
5 TABLET, FILM COATED ORAL DAILY
Qty: 0 | Refills: 0 | Status: DISCONTINUED | OUTPATIENT
Start: 2018-11-19 | End: 2018-11-19

## 2018-11-19 RX ORDER — PANTOPRAZOLE SODIUM 20 MG/1
40 TABLET, DELAYED RELEASE ORAL DAILY
Qty: 0 | Refills: 0 | Status: DISCONTINUED | OUTPATIENT
Start: 2018-11-19 | End: 2018-12-13

## 2018-11-19 RX ORDER — AMIODARONE HYDROCHLORIDE 400 MG/1
150 TABLET ORAL ONCE
Qty: 0 | Refills: 0 | Status: COMPLETED | OUTPATIENT
Start: 2018-11-19 | End: 2018-11-19

## 2018-11-19 RX ORDER — METOPROLOL TARTRATE 50 MG
25 TABLET ORAL
Qty: 0 | Refills: 0 | Status: DISCONTINUED | OUTPATIENT
Start: 2018-11-19 | End: 2018-11-20

## 2018-11-19 RX ORDER — SENNA PLUS 8.6 MG/1
1 TABLET ORAL AT BEDTIME
Qty: 0 | Refills: 0 | Status: DISCONTINUED | OUTPATIENT
Start: 2018-11-19 | End: 2018-11-19

## 2018-11-19 RX ORDER — ZINC SULFATE TAB 220 MG (50 MG ZINC EQUIVALENT) 220 (50 ZN) MG
220 TAB ORAL DAILY
Qty: 0 | Refills: 0 | Status: DISCONTINUED | OUTPATIENT
Start: 2018-11-19 | End: 2018-11-19

## 2018-11-19 RX ORDER — AMLODIPINE BESYLATE 2.5 MG/1
2.5 TABLET ORAL DAILY
Qty: 0 | Refills: 0 | Status: DISCONTINUED | OUTPATIENT
Start: 2018-11-19 | End: 2018-11-25

## 2018-11-19 RX ORDER — LOSARTAN POTASSIUM 100 MG/1
50 TABLET, FILM COATED ORAL DAILY
Qty: 0 | Refills: 0 | Status: DISCONTINUED | OUTPATIENT
Start: 2018-11-19 | End: 2018-12-13

## 2018-11-19 RX ORDER — ASCORBIC ACID 60 MG
500 TABLET,CHEWABLE ORAL DAILY
Qty: 0 | Refills: 0 | Status: DISCONTINUED | OUTPATIENT
Start: 2018-11-19 | End: 2018-12-13

## 2018-11-19 RX ORDER — APIXABAN 2.5 MG/1
5 TABLET, FILM COATED ORAL EVERY 12 HOURS
Qty: 0 | Refills: 0 | Status: DISCONTINUED | OUTPATIENT
Start: 2018-11-19 | End: 2018-11-24

## 2018-11-19 RX ORDER — CEFEPIME 1 G/1
1000 INJECTION, POWDER, FOR SOLUTION INTRAMUSCULAR; INTRAVENOUS EVERY 12 HOURS
Qty: 0 | Refills: 0 | Status: DISCONTINUED | OUTPATIENT
Start: 2018-11-19 | End: 2018-11-20

## 2018-11-19 RX ORDER — ASPIRIN/CALCIUM CARB/MAGNESIUM 324 MG
81 TABLET ORAL DAILY
Qty: 0 | Refills: 0 | Status: DISCONTINUED | OUTPATIENT
Start: 2018-11-19 | End: 2018-11-19

## 2018-11-19 RX ORDER — INSULIN LISPRO 100/ML
VIAL (ML) SUBCUTANEOUS EVERY 6 HOURS
Qty: 0 | Refills: 0 | Status: DISCONTINUED | OUTPATIENT
Start: 2018-11-19 | End: 2018-12-13

## 2018-11-19 RX ORDER — VANCOMYCIN HCL 1 G
1800 VIAL (EA) INTRAVENOUS ONCE
Qty: 0 | Refills: 0 | Status: COMPLETED | OUTPATIENT
Start: 2018-11-19 | End: 2018-11-19

## 2018-11-19 RX ORDER — METFORMIN HYDROCHLORIDE 850 MG/1
1 TABLET ORAL
Qty: 0 | Refills: 0 | COMMUNITY

## 2018-11-19 RX ADMIN — AMIODARONE HYDROCHLORIDE 618 MILLIGRAM(S): 400 TABLET ORAL at 13:16

## 2018-11-19 RX ADMIN — SENNA PLUS 5 MILLILITER(S): 8.6 TABLET ORAL at 22:56

## 2018-11-19 RX ADMIN — CEFEPIME 2000 MILLIGRAM(S): 1 INJECTION, POWDER, FOR SOLUTION INTRAMUSCULAR; INTRAVENOUS at 13:13

## 2018-11-19 RX ADMIN — CEFEPIME 100 MILLIGRAM(S): 1 INJECTION, POWDER, FOR SOLUTION INTRAMUSCULAR; INTRAVENOUS at 23:42

## 2018-11-19 RX ADMIN — Medication 5: at 18:24

## 2018-11-19 RX ADMIN — Medication 1: at 23:50

## 2018-11-19 RX ADMIN — APIXABAN 5 MILLIGRAM(S): 2.5 TABLET, FILM COATED ORAL at 18:37

## 2018-11-19 RX ADMIN — Medication 110 MILLIGRAM(S): at 13:36

## 2018-11-19 RX ADMIN — SODIUM CHLORIDE 2200 MILLILITER(S): 9 INJECTION, SOLUTION INTRAVENOUS at 12:16

## 2018-11-19 RX ADMIN — INSULIN GLARGINE 8 UNIT(S): 100 INJECTION, SOLUTION SUBCUTANEOUS at 22:58

## 2018-11-19 RX ADMIN — SODIUM CHLORIDE 2200 MILLILITER(S): 9 INJECTION, SOLUTION INTRAVENOUS at 13:16

## 2018-11-19 RX ADMIN — SODIUM CHLORIDE 100 MILLILITER(S): 9 INJECTION INTRAMUSCULAR; INTRAVENOUS; SUBCUTANEOUS at 18:08

## 2018-11-19 RX ADMIN — ATORVASTATIN CALCIUM 40 MILLIGRAM(S): 80 TABLET, FILM COATED ORAL at 22:55

## 2018-11-19 RX ADMIN — Medication 250 MILLIGRAM(S): at 14:26

## 2018-11-19 RX ADMIN — AMIODARONE HYDROCHLORIDE 33.33 MG/MIN: 400 TABLET ORAL at 13:30

## 2018-11-19 RX ADMIN — CEFEPIME 100 MILLIGRAM(S): 1 INJECTION, POWDER, FOR SOLUTION INTRAMUSCULAR; INTRAVENOUS at 12:46

## 2018-11-19 RX ADMIN — AMIODARONE HYDROCHLORIDE 150 MILLIGRAM(S): 400 TABLET ORAL at 13:36

## 2018-11-19 RX ADMIN — Medication 25 MILLIGRAM(S): at 18:37

## 2018-11-19 NOTE — H&P ADULT - PROBLEM SELECTOR PLAN 8
..  - currently on Glucerna 1.5 TF from NH; rate is 99/hr for 15h, starting at 1700  - TF product not available, will c/s Nutrition services for adequate alternative.  Will make NPO for now.

## 2018-11-19 NOTE — H&P ADULT - HISTORY OF PRESENT ILLNESS
Patient initially admitted to [facility] in october, discharged on 11/8/18 to SNF after having sustained a CVA, course complciated by UTI.  Wife reports that the patient was doing well but developed fever 4 days ago.  The patient is aphasic at baseline so was not complaining but she reports increased cough and worsening agitation.  Patient was sent to the ED for further evaluation and found to have new opacities on RLL.  Also in AFIB w/RVR, currently on Eliquis.

## 2018-11-19 NOTE — ED PROVIDER NOTE - OBJECTIVE STATEMENT
79yo M pmh of recent CVA  3 weeks ago now baseline non verbal with PEG in place. Was sent to rehab facility and found to be febrile today. As per wife, no increased coughing. He is able to squeeze her hand but does not communicate. As per wife patient is currently DNI/DNR.

## 2018-11-19 NOTE — ED ADULT NURSE NOTE - PMH
Anemia    Arrhythmia    Dementia    Diabetes    DVT (deep venous thrombosis)    HTN (hypertension)    Stroke

## 2018-11-19 NOTE — H&P ADULT - NSHPPHYSICALEXAM_GEN_ALL_CORE
PHYSICAL EXAM:  GENERAL: NAD, ill appearing  SKIN: dressing c/d/i  HEAD:  Atraumatic, Normocephalic  EYES: conjunctiva and sclera clear  NECK: No JVD  CHEST/LUNG: coarse b/s b/l  HEART: irregular   ABDOMEN: Soft, Nondistended; Bowel sounds present; + PEG  EXTREMITIES:  No clubbing, cyanosis, or edema  CNS: aphasic; drowsy

## 2018-11-19 NOTE — H&P ADULT - ASSESSMENT
78M witha cortez PMHX sent in from NH 2/2 fever, found to have PNA, AFIB w/RVR, denied admission unit 2/2 code status and admitted to low risk tele for further monitoring.

## 2018-11-19 NOTE — ED PROVIDER NOTE - SECONDARY DIAGNOSIS.
Pneumonia due to infectious organism, unspecified laterality, unspecified part of lung Urinary tract infection without hematuria, site unspecified Atrial fibrillation with RVR HCAP (healthcare-associated pneumonia)

## 2018-11-19 NOTE — ED PROVIDER NOTE - ATTENDING CONTRIBUTION TO CARE
77 y/o non-verbal M DNR/DNI with [facility] recent CVA  3 weeks ago now baseline non verbal with PEG in place. Was sent to rehab facility and found to be febrile today. As per wife, no increased coughing. He is able to squeeze her hand but does not communicate. As per wife patient is currently DNI/DNR.    Patient rushed into ED as a notification.  All hands team called. Patient in A Fib with HR up to 190's.  Blood pressure was always stable and mental status at new baseline per wife.  Pads placed on patient, large bore IV access x 3 placed.  Code sepsis called in ED and appropriate labs sent.  Patient given Cefepime and Vancomycin, Patient given a dose of Cardizem and later placed on an Amiodarone drip.    Patient found to have septic shock due to pneumonia.  ICU called and Dr. Castañeda does not want patient in the ICU as patient does not require vasopressors and is DNR/DNI.  Dr. Diane recommends low risk tele for admission.  ED work up reviewed.  Patient treated for HCAP.  Lactate repeated.  Will admit for further work up and treatment.

## 2018-11-19 NOTE — ED ADULT NURSE REASSESSMENT NOTE - NS ED NURSE REASSESS COMMENT FT1
In addition to sacrum pressure injury stage 2, pt has pressure injury stage 3 to R foot and laceration to R shin. Pictures taken/placed in chart

## 2018-11-19 NOTE — ED PROVIDER NOTE - UNABLE TO OBTAIN
Unresponsive Patient is baseline non verbal History, ROS, and exam limited by severity of illness and patient condition. Severe Illness/Injury

## 2018-11-19 NOTE — H&P ADULT - NSHPLABSRESULTS_GEN_ALL_CORE
10.8   15.45 )-----------( 240      ( 2018 11:40 )             35.9           150<H>  |  107  |  56<H>  ----------------------------<  310<H>  4.7   |  27  |  0.9    Ca    9.4      2018 11:40    TPro  6.1  /  Alb  3.5  /  TBili  0.7  /  DBili  x   /  AST  12  /  ALT  14  /  AlkPhos  160<H>              Urinalysis Basic - ( 2018 12:05 )    Color: Yellow / Appearance: Clear / S.015 / pH: x  Gluc: x / Ketone: Negative  / Bili: Negative / Urobili: 4.0 mg/dL   Blood: x / Protein: 30 mg/dL / Nitrite: Positive   Leuk Esterase: Trace / RBC: 3-5 /HPF / WBC 6-10 /HPF   Sq Epi: x / Non Sq Epi: x / Bacteria: Few        PT/INR - ( 2018 11:40 )   PT: 27.00 sec;   INR: 2.45 ratio         PTT - ( 2018 11:40 )  PTT:34.2 sec    Lactate Trend   @ 11:40 Lactate:3.9       CARDIAC MARKERS ( 2018 11:41 )  x     / 0.01 ng/mL / x     / x     / x          Culture Results:   >100,000 CFU/ml Serratia marcescens (10-24 @ 16:57)  Culture Results:   No growth at 5 days. (10-22 @ 21:12)  Culture Results:   No growth at 5 days. (10-22 @ 21:12)    CXR    Impression:      New right basilar opacities.

## 2018-11-19 NOTE — ED PROVIDER NOTE - CRITICAL CARE PROVIDED
direct patient care (not related to procedure)/interpretation of diagnostic studies/consultation with other physicians/additional history taking/documentation/consult w/ pt's family directly relating to pts condition

## 2018-11-19 NOTE — ED ADULT NURSE NOTE - NSIMPLEMENTINTERV_GEN_ALL_ED
Implemented All Fall with Harm Risk Interventions:  [place] to call system. Call bell, personal items and telephone within reach. Instruct patient to call for assistance. Room bathroom lighting operational. Non-slip footwear when patient is off stretcher. Physically safe environment: no spills, clutter or unnecessary equipment. Stretcher in lowest position, wheels locked, appropriate side rails in place. Provide visual cue, wrist band, yellow gown, etc. Monitor gait and stability. Monitor for mental status changes and reorient to person, place, and time. Review medications for side effects contributing to fall risk. Reinforce activity limits and safety measures with patient and family. Provide visual clues: red socks.

## 2018-11-19 NOTE — H&P ADULT - PROBLEM SELECTOR PLAN 3
..  - monitor on telemetry  - currently on amiodarone gtt  - Cardiology c/s  - consider increasing metoprolol vs adding diltiazem for rate control  - c/w Eliquis ..  - monitor on telemetry  - currently on amiodarone gtt  - Cardiology c/s  - consider increasing metoprolol vs adding diltiazem for rate control  - c/w Eliquis  - trend Tt x 2 more sets (1900 and am)

## 2018-11-19 NOTE — ED PROVIDER NOTE - CARE PLAN
Principal Discharge DX:	Sepsis  Secondary Diagnosis:	Pneumonia due to infectious organism, unspecified laterality, unspecified part of lung  Secondary Diagnosis:	Urinary tract infection without hematuria, site unspecified Principal Discharge DX:	Septic shock  Secondary Diagnosis:	Atrial fibrillation with RVR  Secondary Diagnosis:	HCAP (healthcare-associated pneumonia)  Secondary Diagnosis:	Urinary tract infection without hematuria, site unspecified

## 2018-11-19 NOTE — ED PROVIDER NOTE - PHYSICAL EXAMINATION
CONSTITUTIONAL: Well-developed; well-nourished; in no acute distress.   SKIN: warm, dry  HEAD: Normocephalic; atraumatic.  EYES: PERRL, no conjunctival erythema 2mm reactive bilaterally   ENT: No nasal discharge; airway clear. dry mucous membranes   NECK: Supple; non tender.  CARD: S1, S2 normal;  Regular rate and rhythm.   RESP: No wheezes, rales or rhonchi.  ABD: soft non tender, non distended, no rebound or guarding. Peg tube in place, no drainage or discharge.   EXT: moving all 4 extremities   LYMPH: No acute cervical adenopathy.  NEURO: responsive to painful stimuli. CONSTITUTIONAL: Well-developed; well-nourished; in no acute distress.   SKIN: warm, dry  HEAD: Normocephalic; atraumatic.  EYES: PERRL, no conjunctival erythema 2mm reactive bilaterally   ENT: No nasal discharge; airway clear. dry mucous membranes   NECK: Supple; non tender.  CARD: extreme tachycardia with irregularly irregular rhythm,  RESP: transmitted upper aiway sounds, bibasilar rhonchi  ABD: soft non tender, non distended, no rebound or guarding. Peg tube in place, no drainage or discharge.   EXT: moving all 4 extremities   LYMPH: No acute cervical adenopathy.  NEURO: responsive to painful stimuli. non-verbal

## 2018-11-19 NOTE — ED PROVIDER NOTE - PROGRESS NOTE DETAILS
discussed with Dr. Castañeda, patient is not an icu candidate due to his code status and functional status Discussed with Dr. Whyte states that patient is approved for low risk tele

## 2018-11-19 NOTE — ED PROVIDER NOTE - MEDICAL DECISION MAKING DETAILS
79 y/o non-verbal M DNR/DNI with [facility] recent CVA  3 weeks ago now baseline non verbal with PEG in place. Was sent to rehab facility and found to be febrile today. As per wife, no increased coughing. He is able to squeeze her hand but does not communicate. As per wife patient is currently DNI/DNR.    Patient rushed into ED as a notification.  All hands team called. Patient in A Fib with HR up to 190's.  Blood pressure was always stable and mental status at new baseline per wife.  Pads placed on patient, large bore IV access x 3 placed.  Code sepsis called in ED and appropriate labs sent.  Patient given Cefepime and Vancomycin, Patient given a dose of Cardizem and later placed on an Amiodarone drip.    Patient found to have septic shock due to pneumonia.  ICU called and Dr. Castañeda does not want patient in the ICU as patient does not require vasopressors and is DNR/DNI.  Dr. Diane recommends low risk tele for admission.  ED work up reviewed.  Patient treated for HCAP.  Lactate repeated.  Will admit for further work up and treatment.

## 2018-11-19 NOTE — H&P ADULT - PROBLEM SELECTOR PLAN 1
likely 2/2 HCAP  - admit to low risk telemetry  - Vanco 750mg IVPB q12h with trough q3rd dose  - Cefepime 1gm q12h   - Levaquin 500mg IVPB q24h  - Infectious disease c/s  - DVT/GI PPX (Eliquis/Protonix)

## 2018-11-20 DIAGNOSIS — A41.9 SEPSIS, UNSPECIFIED ORGANISM: ICD-10-CM

## 2018-11-20 DIAGNOSIS — Z02.9 ENCOUNTER FOR ADMINISTRATIVE EXAMINATIONS, UNSPECIFIED: ICD-10-CM

## 2018-11-20 LAB
ALBUMIN SERPL ELPH-MCNC: 2.9 G/DL — LOW (ref 3.5–5.2)
ALP SERPL-CCNC: 92 U/L — SIGNIFICANT CHANGE UP (ref 30–115)
ALT FLD-CCNC: 11 U/L — SIGNIFICANT CHANGE UP (ref 0–41)
AST SERPL-CCNC: 12 U/L — SIGNIFICANT CHANGE UP (ref 0–41)
BASOPHILS # BLD AUTO: 0.01 K/UL — SIGNIFICANT CHANGE UP (ref 0–0.2)
BASOPHILS NFR BLD AUTO: 0.1 % — SIGNIFICANT CHANGE UP (ref 0–1)
BILIRUB SERPL-MCNC: 1 MG/DL — SIGNIFICANT CHANGE UP (ref 0.2–1.2)
BUN SERPL-MCNC: 40 MG/DL — HIGH (ref 10–20)
CALCIUM SERPL-MCNC: 8.4 MG/DL — LOW (ref 8.5–10.1)
CHLORIDE SERPL-SCNC: 112 MMOL/L — HIGH (ref 98–110)
CK MB CFR SERPL CALC: 1.1 NG/ML — SIGNIFICANT CHANGE UP (ref 0.6–6.3)
CK SERPL-CCNC: 19 U/L — SIGNIFICANT CHANGE UP (ref 0–225)
CO2 SERPL-SCNC: 27 MMOL/L — SIGNIFICANT CHANGE UP (ref 17–32)
CREAT SERPL-MCNC: 0.9 MG/DL — SIGNIFICANT CHANGE UP (ref 0.7–1.5)
EOSINOPHIL # BLD AUTO: 0.07 K/UL — SIGNIFICANT CHANGE UP (ref 0–0.7)
EOSINOPHIL NFR BLD AUTO: 0.6 % — SIGNIFICANT CHANGE UP (ref 0–8)
GLUCOSE BLDC GLUCOMTR-MCNC: 124 MG/DL — HIGH (ref 70–99)
GLUCOSE BLDC GLUCOMTR-MCNC: 133 MG/DL — HIGH (ref 70–99)
GLUCOSE BLDC GLUCOMTR-MCNC: 150 MG/DL — HIGH (ref 70–99)
GLUCOSE BLDC GLUCOMTR-MCNC: 159 MG/DL — HIGH (ref 70–99)
GLUCOSE SERPL-MCNC: 118 MG/DL — HIGH (ref 70–99)
HCT VFR BLD CALC: 29 % — LOW (ref 42–52)
HGB BLD-MCNC: 8.9 G/DL — LOW (ref 14–18)
IMM GRANULOCYTES NFR BLD AUTO: 0.6 % — HIGH (ref 0.1–0.3)
LYMPHOCYTES # BLD AUTO: 0.79 K/UL — LOW (ref 1.2–3.4)
LYMPHOCYTES # BLD AUTO: 6.3 % — LOW (ref 20.5–51.1)
MCHC RBC-ENTMCNC: 28.2 PG — SIGNIFICANT CHANGE UP (ref 27–31)
MCHC RBC-ENTMCNC: 30.7 G/DL — LOW (ref 32–37)
MCV RBC AUTO: 91.8 FL — SIGNIFICANT CHANGE UP (ref 80–94)
MONOCYTES # BLD AUTO: 0.53 K/UL — SIGNIFICANT CHANGE UP (ref 0.1–0.6)
MONOCYTES NFR BLD AUTO: 4.2 % — SIGNIFICANT CHANGE UP (ref 1.7–9.3)
NEUTROPHILS # BLD AUTO: 11.04 K/UL — HIGH (ref 1.4–6.5)
NEUTROPHILS NFR BLD AUTO: 88.2 % — HIGH (ref 42.2–75.2)
NRBC # BLD: 0 /100 WBCS — SIGNIFICANT CHANGE UP (ref 0–0)
PLATELET # BLD AUTO: 183 K/UL — SIGNIFICANT CHANGE UP (ref 130–400)
POTASSIUM SERPL-MCNC: 4.1 MMOL/L — SIGNIFICANT CHANGE UP (ref 3.5–5)
POTASSIUM SERPL-SCNC: 4.1 MMOL/L — SIGNIFICANT CHANGE UP (ref 3.5–5)
PROT SERPL-MCNC: 4.9 G/DL — LOW (ref 6–8)
RBC # BLD: 3.16 M/UL — LOW (ref 4.7–6.1)
RBC # FLD: 16.7 % — HIGH (ref 11.5–14.5)
SODIUM SERPL-SCNC: 149 MMOL/L — HIGH (ref 135–146)
TROPONIN T SERPL-MCNC: <0.01 NG/ML — SIGNIFICANT CHANGE UP
WBC # BLD: 12.51 K/UL — HIGH (ref 4.8–10.8)
WBC # FLD AUTO: 12.51 K/UL — HIGH (ref 4.8–10.8)

## 2018-11-20 RX ORDER — METOPROLOL TARTRATE 50 MG
25 TABLET ORAL ONCE
Qty: 0 | Refills: 0 | Status: COMPLETED | OUTPATIENT
Start: 2018-11-20 | End: 2018-11-20

## 2018-11-20 RX ORDER — METOPROLOL TARTRATE 50 MG
50 TABLET ORAL EVERY 12 HOURS
Qty: 0 | Refills: 0 | Status: DISCONTINUED | OUTPATIENT
Start: 2018-11-20 | End: 2018-11-24

## 2018-11-20 RX ORDER — SODIUM CHLORIDE 9 MG/ML
1000 INJECTION, SOLUTION INTRAVENOUS
Qty: 0 | Refills: 0 | Status: DISCONTINUED | OUTPATIENT
Start: 2018-11-20 | End: 2018-11-22

## 2018-11-20 RX ORDER — CEFEPIME 1 G/1
1000 INJECTION, POWDER, FOR SOLUTION INTRAMUSCULAR; INTRAVENOUS EVERY 8 HOURS
Qty: 0 | Refills: 0 | Status: DISCONTINUED | OUTPATIENT
Start: 2018-11-20 | End: 2018-11-23

## 2018-11-20 RX ADMIN — Medication 250 MILLIGRAM(S): at 14:55

## 2018-11-20 RX ADMIN — Medication 500 MILLIGRAM(S): at 12:38

## 2018-11-20 RX ADMIN — AMIODARONE HYDROCHLORIDE 33.33 MG/MIN: 400 TABLET ORAL at 06:44

## 2018-11-20 RX ADMIN — AMIODARONE HYDROCHLORIDE 33.33 MG/MIN: 400 TABLET ORAL at 07:00

## 2018-11-20 RX ADMIN — ATORVASTATIN CALCIUM 40 MILLIGRAM(S): 80 TABLET, FILM COATED ORAL at 22:06

## 2018-11-20 RX ADMIN — OLANZAPINE 5 MILLIGRAM(S): 15 TABLET, FILM COATED ORAL at 12:42

## 2018-11-20 RX ADMIN — LOSARTAN POTASSIUM 50 MILLIGRAM(S): 100 TABLET, FILM COATED ORAL at 06:04

## 2018-11-20 RX ADMIN — PANTOPRAZOLE SODIUM 40 MILLIGRAM(S): 20 TABLET, DELAYED RELEASE ORAL at 12:41

## 2018-11-20 RX ADMIN — CEFEPIME 100 MILLIGRAM(S): 1 INJECTION, POWDER, FOR SOLUTION INTRAMUSCULAR; INTRAVENOUS at 15:08

## 2018-11-20 RX ADMIN — SODIUM CHLORIDE 75 MILLILITER(S): 9 INJECTION, SOLUTION INTRAVENOUS at 12:25

## 2018-11-20 RX ADMIN — Medication 300 MILLIGRAM(S): at 12:38

## 2018-11-20 RX ADMIN — Medication 25 MILLIGRAM(S): at 14:50

## 2018-11-20 RX ADMIN — CEFEPIME 100 MILLIGRAM(S): 1 INJECTION, POWDER, FOR SOLUTION INTRAMUSCULAR; INTRAVENOUS at 22:09

## 2018-11-20 RX ADMIN — Medication 81 MILLIGRAM(S): at 12:38

## 2018-11-20 RX ADMIN — APIXABAN 5 MILLIGRAM(S): 2.5 TABLET, FILM COATED ORAL at 06:04

## 2018-11-20 RX ADMIN — Medication 250 MILLIGRAM(S): at 02:16

## 2018-11-20 RX ADMIN — SENNA PLUS 5 MILLILITER(S): 8.6 TABLET ORAL at 22:07

## 2018-11-20 RX ADMIN — AMLODIPINE BESYLATE 2.5 MILLIGRAM(S): 2.5 TABLET ORAL at 06:04

## 2018-11-20 RX ADMIN — Medication 25 MILLIGRAM(S): at 06:05

## 2018-11-20 RX ADMIN — SODIUM CHLORIDE 100 MILLILITER(S): 9 INJECTION INTRAMUSCULAR; INTRAVENOUS; SUBCUTANEOUS at 07:00

## 2018-11-20 NOTE — CONSULT NOTE ADULT - SUBJECTIVE AND OBJECTIVE BOX
SUSI MARTIN 78yMalePatient is a 78y old  Male who presents with a chief complaint of Fever/Tachycardia (2018 15:30)      Patient has history of:  No Known Allergies    PMH - CVA    FSH - not relevant    ROS - UTO     Patient treated with:  cefepime   IVPB 1000 milliGRAM(s) IV Intermittent every 8 hours  vancomycin  IVPB 750 milliGRAM(s) IV Intermittent every 12 hours    PHYSICAL EXAM  T(F): 97.9 (18 @ 22:18), Max: 100.2 (18 @ 12:32)  HR: 132 (18 @ 22:18) (96 - 176)  BP: 148/70 (18 @ 22:18) (89/66 - 152/104)  RR: 20 (18 @ 22:18) (20 - 44)  SpO2: 99% (18 @ 14:30) (99% - 100%)  Daily Height in cm: 182.88 (2018 16:24)    Daily       HEENT: normal, no nuchal rigidity  Cor: S1S2 present   Lungs: clear  Decreased breath sounds at bases  Abdomen: Nontender, Nl BS, PEG in situ   Ext: no phlebitis. Right foot lateral ulcer- clean    LAB & RADIOLOGIC RESULTS:                        10.8   15.45 )-----------( 240      ( 2018 11:40 )             35.9             150<H>  |  107  |  56<H>  ----------------------------<  310<H>  4.7   |  27  |  0.9    Ca    9.4      2018 11:40    TPro  6.1  /  Alb  3.5  /  TBili  0.7  /  DBili  x   /  AST  12  /  ALT  14  /  AlkPhos  160<H>      Blood Gas Venous - Sodium: 148 mmoL/L (18 @ 14:05)  Blood Gas Venous - Sodium: 153 mmoL/L (18 @ 12:05)  Sodium, Serum: 150 mmol/L (18 @ 11:40)    Blood Gas Venous - Lactate: 3.3 mmoL/L (18 @ 14:05)  Blood Gas Venous - Lactate: 4.0 mmoL/L (18 @ 12:05)    HCO3, Venous: 30 mmoL/L (18 @ 14:05)  pH, Venous: 7.43 (18 @ 14:05)  HCO3, Venous: 31 mmoL/L (18 @ 12:05)  pH, Venous: 7.41 (18 @ 12:05)     eGFR if : 93 mL/min/1.73M2 (18 @ 11:40)  eGFR if Non African American: 80 mL/min/1.73M2 (18 @ 11:40)  Creatinine, Serum: 0.9 mg/dL (18 @ 11:40)    LIVER FUNCTIONS - ( 2018 11:40 )  Alb: 3.5 g/dL / Pro: 6.1 g/dL / ALK PHOS: 160 U/L / ALT: 14 U/L / AST: 12 U/L / GGT: x           PT/INR - ( 2018 11:40 )   PT: 27.00 sec;   INR: 2.45 ratio         PTT - ( 2018 11:40 )  PTT:34.2 sec  Patient Profile Adult [BENJAMIN Durand] (18 @ 16:55)  Pharmacy Intervention Note [JESUS Velasquez] (18 @ 16:13)  H&P Adult [BENJAMIN Rosa] (18 @ 15:30)  ED ADULT Nurse Reassessment Note [SARAH Mahan] (18 @ 15:22)  Outpatient Clinical Summary - Medical  Group [O. Provider] (18 @ 15:06)  ED Provider Note [TAMIE Chamberlain] (18 @ 14:40)  ED ADULT Triage Note [JESSICA Fisher] (18 @ 11:23)  Outpatient Clinical Summary - Medical  Group [O. Provider] (18 @ 11:19)  Discharge Note Adult [ALFONSO Nino I. Szelazek] (18 @ 17:12)  Progress Notes - Care Coordination [C. Provider] (18 @ 14:57)  Progress Notes - Care Coordination [C. Provider] (18 @ 12:25)  Progress Notes - Care Coordination [C. Provider] (18 @ 12:15)  Progress Note Adult-Internal Medicine Resident [ALFONSO Nino] (18 @ 11:45)  Chart Note-Anesthesia follow-up CRNA [SARTHAK Matthews] (18 @ 10:57)  Progress Note Adult-Surgery Resident/Attending [LIONEL Mcfarland] (18 @ 04:23)  Progress Note Adult-Nutrition Support PA/Attending [YUSUF Fonseca] (18 @ 15:55)  Progress Note Adult-Internal Medicine Resident/Attending [ALFONSO Michaels] (18 @ 15:05)  Chart Note-Event Note Post-op C PA [JOSELUIS Esparza] (18 @ 13:58)  Progress Notes - Care Coordination [C. Provider] (18 @ 13:54)  Chart Note-Event Note OR Consen PA [JOSELUIS Esparza] (18 @ 12:51)  Chart Note-Anesthesia CRNA [ROSI Hoffman] (18 @ 08:51)  Brief Operative Note [LIONEL Molina] (18 @ 08:46)  Pre-op Checklist [KEDAR Cummings] (18 @ 07:37)  Pre-Anesthesia Evaluation Adult [ERNIE Kingsley] (18 @ 07:28)  Progress Note Adult-Surgery Resident/Attending [LIONEL Kaminski] (18 @ 05:54)  Chart Note-Event Note 3-Day Trae RD [JESUS Crowell] (18 @ 19:27)  Progress Note Adult-Internal Medicine Resident/Attending [ALFONSO Nino] (18 @ 13:46)  Progress Note Adult-Surgery Resident/Attending [LIONEL Parada Sim] (18 @ 03:23)  Consult Note Adult-Cardiology Resident/Attending [BENJAMIN Mcwilliams] (18 @ 20:41)  Chart Note-Event Note Resident/Attending [JESSICA Deleon] (18 @ 18:11)  Progress Note Adult-Internal Medicine Resident [ALFONSO Corrigan] (18 @ 17:01)  Progress Note Adult-Internal Medicine Attending [JESUS Barr] (18 @ 13:11)  Consult Note Adult-Surgery Resident/Attending [LIONEL Molina] (18 @ 11:52)  Consult Note Adult-Intervent Radiology PA/Attending [E. Landau, K. Carbonara] (18 @ 10:39)  Progress Note Adult-Internal Medicine Attending [JESUS Barr] (18 @ 14:46)  Inpatient Certification for Medicare Patients [JESUS Barr] (18 @ 15:21)  Chart Note-Event Note RD [KAYLEIGH Chaves] (18 @ 10:03)  Progress Note Adult-Internal Medicine Resident/Attending [JOSELUIS Nino] (18 @ 08:06)  Progress Note Adult-Surgery PA/Attending [BENJAMIN Fry] (18 @ 01:06)  Chart Note-Event Note POST OP C PA [MANUEL Mello] (18 @ 01:00)  Brief Operative Note [YARELI Singleton] (18 @ 19:14)  Chart Note-Event Note anesthesi CRNA [KAYLEIGH Duque] (18 @ 19:08)  ASU Preop Checklist [MELONY Marshall] (18 @ 16:44)  Pre-Anesthesia Evaluation Adult [MELONY Valentin] (18 @ 16:33)  Progress Note Adult-Nutrition Support Attending [YUSUF Crespo] (18 @ 16:21)  Progress Notes - Care Coordination [C. Provider] (18 @ 15:36)  Progress Note Adult-Internal Medicine Resident/Attending [JOSELUIS Nino] (18 @ 15:29)  Progress Note Adult-Internal Medicine Resident/Attending [JOSELUIS Nino] (18 @ 17:07)  Swallow Bedside Assessment Adult [KAYLEIGH Goyal] (18 @ 14:55)  Chart Note-Nutrition Services N RD [YARELI Hernandez] (18 @ 13:43)  Progress Notes - Care Coordination [C. Provider] (18 @ 11:20)  Consult Note Adult-Gastroenterology Resident/Attending [BENJAMIN Deleon] (18 @ 07:32)  Consult Note Adult-Vascular Surgery Resident/Attending [YARELI Singleton R. Ali] (10-31-18 @ 16:52)  Progress Note Adult-Internal Medicine Resident/Attending [JOSELUIS Nino] (10-31-18 @ 14:42)  Chart Note-Nutrition Services N RD [YARELI Hernandez] (10-31-18 @ 14:32)  Swallow Bedside Assessment Adult [KAYLEIGH oGyal] (10-31-18 @ 10:09)  Progress Note Adult-Internal Medicine Attending [KEDAR Nino] (10-30-18 @ 15:56)  Progress Notes - Care Coordination [C. Provider] (10-30-18 @ 15:13)  Chart Note-Event Note Calorie C RD [KAYLEIGH Chandrakant] (10-30-18 @ 12:30)  Progress Note Adult-Internal Medicine Resident [JOSELUIS Baires] (10-30-18 @ 11:30)  Progress Note Adult-Podiatry Resident/Attending [TAMIE Arguelles] (10-30-18 @ 09:37)  Progress Note Adult-Internal Medicine Resident [JOSELUIS Baires] (10-29-18 @ 18:15)  Chart Note-Nutrition Services N RD/Attending [YARELI Hernandez] (10-29-18 @ 14:37)  Progress Note Adult-Internal Medicine Attending [KEDAR Mcgovern] (10-29-18 @ 14:08)  Progress Note Adult-Neurology NP/Attending [KEDAR Mckeon] (10-29-18 @ 13:07)  Progress Note Adult-Internal Medicine Attending [KEDAR Fuentes] (10-28-18 @ 11:50)  Progress Note Adult-Internal Medicine Resident [JOSELUIS Baires] (10-28-18 @ 08:42)  Progress Note Adult-Internal Medicine Attending [KEDAR Fuentes] (10-27-18 @ 12:41)  Progress Note Adult-Internal Medicine Resident [JOSELUIS Baires] (10-26-18 @ 17:39)  Chart Note-Nutrition Services N RD/Attending [YARELI Hernandez] (10-26-18 @ 14:32)  Goals of Care Conversation - Personal Advance Directive [KEDAR Mcgovern] (10-26-18 @ 14:19)  Progress Note Adult-Podiatry Resident/Attending [SMITHA Arguelles] (10-26-18 @ 13:43)  Progress Note Adult-Internal Medicine Attending [KEDAR Mcgovern] (10-26-18 @ 12:12)  Progress Notes - Care Coordination [C. Provider] (10-26-18 @ 12:06)  Physical Therapy Initial Evaluation Adult [DONALD Cesar] (10-26-18 @ 11:55)  Progress Note Adult-Neurology NP/Attending [KEDAR Mckeon] (10-26-18 @ 10:15)  Chart Note-Transfer Note Resident [BENJAMIN Shields] (10-25-18 @ 15:43)  Consult Note Adult-Podiatry Resident/Attending [SMITHA Jones] (10-25-18 @ 15:23)  Progress Note Adult-Internal Medicine Resident [BENJAMIN Langley] (10-25-18 @ 14:46)  Swallow Bedside Assessment Adult [KAYLEIGH Goyal] (10-25-18 @ 13:20)  Consult Note Adult-Nutrition Support PA/Attending [YUSUF Fonseca] (10-24-18 @ 14:12)  Progress Notes - Care Coordination [C. Provider] (10-24-18 @ 13:47)  Swallow Bedside Assessment Adult [KAYLEIGH Goyal] (10-24-18 @ 13:03)  Progress Note Adult-Neurology Attending [KEDAR Garces] (10-24-18 @ 11:15)  Progress Note Adult-Internal Medicine Resident [BENJAMIN Shields] (10-24-18 @ 10:38)  Consult Note Adult-Physiatry Attending [TAMIE Mays] (10-24-18 @ 10:29)  Patient Profile Adult [BENJAMIN Recinos KAYLEIGH Rueda] (10-23-18 @ 23:13)  Swallow Bedside Assessment Adult [KAYLEIHG Goyal] (10-23-18 @ 14:20)  Consult Note Adult-Neurology NP/Attending [KEDAR Mckeon] (10-23-18 @ 11:40)  H&P Adult [ERNIE Langley] (10-23-18 @ 10:52)  Outpatient Clinical Summary - Medical  Group [O. Provider] (10-23-18 @ 10:29)  Consult Note Adult-Neurosurgery PA [BENJAMIN Mari] (10-23-18 @ 09:59)  ED Clerical [JESSICA Sequeira] (10-23-18 @ 09:42)  ED ADULT Nurse Reassessment Note [DONALD Prescott] (10-23-18 @ 07:51)  ED ADULT Nurse Reassessment Note [ERNIE Liu] (10-23-18 @ 06:38)  ED ADULT Nurse Note [DONALD Peralta] (10-22-18 @ 22:24)  ED ADULT Triage Note [JESSICA Rebolledo] (10-22-18 @ 20:11)  Outpatient Clinical Summary - Medical  Group [O. Provider] (10-22-18 @ 19:38)  Discharge Note Adult [DONALD Michaels] (18 @ 16:36)  Progress Notes - Care Coordination [C. Provider] (18 @ 15:41)  Progress Notes - Care Coordination [C. Provider] (18 @ 14:45)  Progress Notes - Care Coordination [C. Provider] (18 @ 11:14)  Progress Notes - Care Coordination [C. Provider] (18 @ 11:00)  Progress Note Adult-Internal Medicine Resident/Attending [DONALD Michaels] (18 @ 09:48)  Progress Notes - Care Coordination [C. Provider] (08-15-18 @ 16:23)  Physical Therapy Initial Evaluation Adult [KAYLEIGH Arceo] (08-15-18 @ 15:10)  Progress Notes - Care Coordination [C. Provider] (08-15-18 @ 13:11)  Progress Notes - Care Coordination [C. Provider] (08-15-18 @ 12:30)  Progress Notes - Care Coordination [C. Provider] (08-15-18 @ 12:21)  Progress Note Adult-Internal Medicine Resident/Attending [DONALD Michaels] (08-15-18 @ 10:30)  Provider Contact Note (Other) [JESSICA Gamble] (18 @ 19:39)  Provider Contact Note (Other) [JESSICA Gamble] (18 @ 15:24)  Progress Note Adult-Internal Medicine Resident [DONALD Finnegan] (18 @ 10:52)  Consult Note Adult-Neurology NP/Attending [KAYLEIGH Marcano] (18 @ 05:48)  Care Coordination Assessment [C. Provider] (18 @ 15:35)  Progress Note Adult-Hospitalist Attending [JESSICA Phillips] (18 @ 11:47)  Consult Note Adult-Physiatry Attending [SARAH Almeida] (18 @ 10:53)  Progress Note Adult-Internal Medicine Resident [DONALD Finnegan] (18 @ 10:36)  Swallow Bedside Assessment Adult [G. D'Amico] (18 @ 10:20)  Provider Contact Note (Other) [NATALIO David] (18 @ 22:07)  H&P Adult [LIONEL Vides] (18 @ 03:02)  Patient Profile Adult [DJayce David] (18 @ 23:02)  Outpatient Clinical Summary - Medical  Group [O. Provider] (18 @ 21:21)  ED Provider Note [BENJAMIN Garcia] (18 @ 19:24)  ED ADULT Nurse Reassessment Note [JOSELUIS Houser] (18 @ 19:18)  ED ADULT Nurse Note [JOSELUIS Bowers] (18 @ 17:30)  ED ADULT Triage Note [JOSELUIS Hathaway] (18 @ 16:28)  Progress Notes - Care Coordination [C. Provider] (18 @ 14:07)  Provider Contact Note (Other) [JESSICA Mantilla] (18 @ 15:44)  Progress Note Adult-Internal Medicine Attending [YUSUF Aviles] (18 @ 15:28)  Dietitian Initial Evaluation Adult [KAYLEIGH Bhatt] (18 @ 15:03)  Progress Notes - Care Coordination [C. Provider] (18 @ 14:37)  Progress Notes - Care Coordination [C. Provider] (18 @ 12:35)  Progress Notes - Care Coordination [C. Provider] (18 @ 12:24)  Progress Notes - Care Coordination [C. Provider] (18 @ 12:09)  Provider Contact Note (Medication) [JESSICA Mantilla] (18 @ 08:45)  Progress Note Adult-Hospitalist Attending [ROSI Dow] (18 @ 18:27)  Progress Notes - Care Coordination [C. Provider] (18 @ 12:03)  Behavioral Health Assessment Note [JOSELUIS Gaston] (18 @ 10:16)  Care Coordination Assessment [C. Provider] (18 @ 10:06)  Progress Note Adult-Internal Medicine Resident [JESSICA Douglass] (18 @ 09:32)  Progress Note Adult-Hospitalist Attending [ROSI Dow] (18 @ 17:58)  Progress Notes - Care Coordination [C. Provider] (18 @ 15:29)  Chart Note-Event Note Resident [JESSICA Baires] (18 @ 16:18)  Progress Notes - Care Coordination [C. Provider] (18 @ 15:55)  Progress Note Adult-Internal Medicine Resident [JESSICA Baires] (18 @ 14:11)  Progress Note Adult-Hospitalist Attending [ROSI Dow] (18 @ 13:58)  Discharge Note Adult [JESSICA Ward, JESSICA Douglass, T. Gut] (18 @ 13:44)  Progress Note Adult-Pulmonology Attending [KAYLEIGH Quach] (18 @ 18:06)  Progress Notes - Care Coordination [C. Provider] (18 @ 17:43)  Progress Notes - Care Coordination [C. Provider] (18 @ 16:12)  Progress Notes - Care Coordination [C. Provider] (18 @ 13:14)  Progress Notes - Care Coordination [C. Provider] (18 @ 10:16)  Progress Note Adult-Internal Medicine Resident/Attending [ROSI Francisco] (18 @ 09:13)  Physical Therapy Initial Evaluation Adult [SARTHAK Calderón] (02-15-18 @ 14:53)  Provider Contact Note (Other) [NATALIO Lake] (02-15-18 @ 12:05)  Swallow Bedside Assessment Adult [KAYLEIGH Goyal] (02-15-18 @ 10:59)  Progress Note Adult-Internal Medicine Resident/Attending [ROSI Francisco] (02-15-18 @ 09:31)  Progress Note Adult-Internal Medicine Resident [JESSICA Pastor] (18 @ 15:44)  Consult Note Adult-Cardiology Fellow/Attending [YUSUF Mccallum] (18 @ 15:09)  Swallow Bedside Assessment Adult [KAYLEIGH Goyal] (18 @ 14:55)  Consult Note Adult-Gastroenterology Fellow/Attending [BENJAMIN Garcia] (18 @ 14:50)  Consult Note Adult-Physiatry Attending [TAMIE Mays] (18 @ 12:59)  Consult Note Adult-Critical Care Attending [KAYLEIGH Quach] (18 @ 08:28)  ED ADULT Nurse Reassessment Note [KAYLEIGH Santillan] (18 @ 01:43)  ED ADULT Nurse Reassessment Note [KAYLEIGH Santillan] (18 @ 23:35)  H&P Adult [JESSICA Rebolledo] (18 @ 23:26)  ED ADULT Nurse Reassessment Note [JESUS Moore] (18 @ 23:06)  Outpatient Clinical Summary - Medical  Group [O. Provider] (18 @ 22:32)  Consult Note Adult-Trauma Surgery Resident/Attending [LIONEL Dangelo] (18 @ 22:31)  ED Provider Note [KAYLEIGH Saenz] (18 @ 19:18)  ED ADULT Triage Note [DONALD Carson] (18 @ 16:38)  Outpatient Clinical Summary - Medical  Group [O. Provider] (18 @ 16:3    Color: Yellow / Appearance: Clear / S.015 / pH: x  Gluc: x / Ketone: Negative  / Bili: Negative / Urobili: 4.0 mg/dL   Blood: x / Protein: 30 mg/dL / Nitrite: Positive   Leuk Esterase: Trace / RBC: 3-5 /HPF / WBC 6-10 /HPF   Sq Epi: x / Non Sq Epi: x / Bacteria: Few

## 2018-11-20 NOTE — CONSULT NOTE ADULT - ASSESSMENT
Patient with probable pneumonia. He has long standing afib on eliquis. Need rx pneumonia. Complete iv amiodarone for rate control. Try continue beta. Define code status. Prognosis poor

## 2018-11-20 NOTE — CONSULT NOTE ADULT - ASSESSMENT
IMP:  - fever/ sepsis - new RLL infiltrate  - h/o vascular dementia and R parietal lobe infarct  - dysphagia  - DM - uncontrolled at the time of admission  - JOEL/ dehydration on admission    SUGGEST:  - would not feed by continuous drip into the stomach, dheeraj in pt who lies flat most of the time. Additionally, feedings do not "parallel" diabetes treatments/ meds, resulting in uneven glucose management.  - resume feeds with Glucerna 1.2 - would give 240 ml for the first feeding or 2, and once tolerated:  - Glucerna 1.2 480 at 7am, noon, 5pm, and 360 ml at 10pm - give each feed over 30-45 min.  - check POC glucose BEFORE each feeding, and give Lispro as needed before each feeding  - water 50 ml before and 100 ml after each feed for the next 24-48 h, then re-evaluate needed volumes. Remember that feeding formula itself is ~ 75% water.  - d/c PPI - he doesn't have an active UGI issue, and they mess up the tubes. If prophylaxis desired, use H2 blocker.  - can pt be OOB in chair?   - weigh pt q 2 days while here  - check inorganic phos and Mg levels with next labwork  - give liquid FeSO4 together with liquid vitamin C once a day  - at NH, can feed Glucerna 1.5, but suspect pt will do best with 480 ml x 3 meal-patterned feedings daily (dheeraj if he's gotten OOB and seated upright during feeds), with pre-meal POC glc and Lispro.

## 2018-11-20 NOTE — CONSULT NOTE ADULT - SUBJECTIVE AND OBJECTIVE BOX
NEPHROLOGY CONSULTATION NOTE    Patient initially admitted to [facility] in october, discharged on 18 to SNF after having sustained a CVA, course complciated by UTI.  Wife reports that the patient was doing well but developed fever 4 days ago.  The patient is aphasic at baseline so was not complaining but she reports increased cough and worsening agitation.  Patient was sent to the ED for further evaluation and found to have new opacities on RLL.  Also in AFIB w/RVR, currently on Eliquis.      Renal consulted for JOEL and hypernatremia.  Pt currently non-verbal.  Spoke with nursing and cardiology.    PAST MEDICAL & SURGICAL HISTORY:  Stroke  DVT (deep venous thrombosis)  Anemia  Diabetes  HTN (hypertension)  Arrhythmia  Dementia  S/P cholecystectomy  History of hip replacement    Allergies:  No Known Allergies    Home Medications Reviewed    SOCIAL HISTORY:  Denies ETOH,Smoking,   FAMILY HISTORY:  No pertinent family history in first degree relatives    Yes      REVIEW OF SYSTEMS:  unobtainable    PHYSICAL EXAM:  NAD  lethargic  pale  dry mm  no jvd  b/l bs  irreg  soft, + binder over peg  no edema    Hospital Medications:   MEDICATIONS  (STANDING):  amiodarone Infusion 1 mG/Min (33.333 mL/Hr) IV Continuous <Continuous>  amLODIPine   Tablet 2.5 milliGRAM(s) Oral daily  apixaban 5 milliGRAM(s) Oral every 12 hours  ascorbic acid 500 milliGRAM(s) Oral daily  aspirin  chewable 81 milliGRAM(s) Oral daily  atorvastatin 40 milliGRAM(s) Oral at bedtime  cefepime   IVPB 1000 milliGRAM(s) IV Intermittent every 8 hours  dextrose 5%. 1000 milliLiter(s) (50 mL/Hr) IV Continuous <Continuous>  dextrose 50% Injectable 12.5 Gram(s) IV Push once  dextrose 50% Injectable 25 Gram(s) IV Push once  dextrose 50% Injectable 25 Gram(s) IV Push once  ferrous    sulfate Liquid 300 milliGRAM(s) Enteral Tube daily  insulin glargine Injectable (LANTUS) 8 Unit(s) SubCutaneous at bedtime  insulin lispro (HumaLOG) corrective regimen sliding scale   SubCutaneous every 6 hours  losartan 50 milliGRAM(s) Oral daily  metoprolol tartrate 50 milliGRAM(s) Oral every 12 hours  OLANZapine Disintegrating Tablet 5 milliGRAM(s) Oral daily  pantoprazole   Suspension 40 milliGRAM(s) Oral daily  senna Syrup 5 milliLiter(s) Oral at bedtime  sodium chloride 0.45%. 1000 milliLiter(s) (75 mL/Hr) IV Continuous <Continuous>  vancomycin  IVPB 750 milliGRAM(s) IV Intermittent every 12 hours        VITALS:  T(F): 97.9 (18 @ 14:48), Max: 97.9 (18 @ 22:18)  HR: 84 (18 @ 14:48)  BP: 142/90 (18 @ 14:48)  RR: 16 (18 @ 14:48)  SpO2: --  Wt(kg): --     @ 07:01  -   @ 07:00  --------------------------------------------------------  IN: 2816.7 mL / OUT: 1220 mL / NET: 1596.7 mL     @ 07:01  -   @ 19:12  --------------------------------------------------------  IN: 300 mL / OUT: 100 mL / NET: 200 mL          LABS:      149<H>  |  112<H>  |  40<H>  ----------------------------<  118<H>  4.1   |  27  |  0.9    Ca    8.4<L>      2018 07:22    TPro  4.9<L>  /  Alb  2.9<L>  /  TBili  1.0  /  DBili      /  AST  12  /  ALT  11  /  AlkPhos  92                            8.9    12.51 )-----------( 183      ( 2018 07:22 )             29.0       Urine Studies:  Urinalysis Basic - ( 2018 12:05 )    Color: Yellow / Appearance: Clear / S.015 / pH:   Gluc:  / Ketone: Negative  / Bili: Negative / Urobili: 4.0 mg/dL   Blood:  / Protein: 30 mg/dL / Nitrite: Positive   Leuk Esterase: Trace / RBC: 3-5 /HPF / WBC 6-10 /HPF   Sq Epi:  / Non Sq Epi:  / Bacteria: Few          RADIOLOGY & ADDITIONAL STUDIES:

## 2018-11-20 NOTE — CONSULT NOTE ADULT - ASSESSMENT
JOEL  hypernatremia  PNA / sepsis  pseudomonas UTI  Afib with rvr  CVA 3 weeks ago, nonverbal, s/p PEG / dementia  DM  HTN    plan:  1/2NS @ 75 cc/hr  f/u cmp  peg feeds and free water per nutrition  f/u CX  cont iv abx  ID eval JOEL  hypernatremia  PNA / sepsis  pseudomonas UTI  Afib with rvr  CVA 3 weeks ago, nonverbal, s/p PEG / dementia  DM  HTN    plan:  1/2NS @ 75 cc/hr  f/u cmp  peg feeds and free water per nutrition  f/u CX  abx per ID  eliquis  amiodarone / lopressor  norvasc and losartan unless bp's low

## 2018-11-20 NOTE — CONSULT NOTE ADULT - SUBJECTIVE AND OBJECTIVE BOX
CARDIOLOGY CONSULT NOTE     CHIEF COMPLAINT/REASON FOR CONSULT:    HPI:  Patient initially admitted to [facility] in october, discharged on 11/8/18 to SNF after having sustained a CVA, course complciated by UTI.  Wife reports that the patient was doing well but developed fever 4 days ago.  The patient is aphasic at baseline so was not complaining but she reports increased cough and worsening agitation.  Patient was sent to the ED for further evaluation and found to have new opacities on RLL.  Also in AFIB w/RVR, currently on Eliquis. (19 Nov 2018 15:30)      PAST MEDICAL & SURGICAL HISTORY:  Stroke  DVT (deep venous thrombosis)  Anemia  Diabetes  HTN (hypertension)  Arrhythmia  Dementia  S/P cholecystectomy  History of hip replacement      Cardiac Risks:   [ ]HTN, [x ] DM, [ ] Smoking, [ ] FH,  [ ] Lipids        MEDICATIONS:  MEDICATIONS  (STANDING):  amiodarone Infusion 1 mG/Min (33.333 mL/Hr) IV Continuous <Continuous>  amLODIPine   Tablet 2.5 milliGRAM(s) Oral daily  apixaban 5 milliGRAM(s) Oral every 12 hours  ascorbic acid 500 milliGRAM(s) Oral daily  aspirin  chewable 81 milliGRAM(s) Oral daily  atorvastatin 40 milliGRAM(s) Oral at bedtime  cefepime   IVPB 1000 milliGRAM(s) IV Intermittent every 8 hours  dextrose 5%. 1000 milliLiter(s) (50 mL/Hr) IV Continuous <Continuous>  dextrose 50% Injectable 12.5 Gram(s) IV Push once  dextrose 50% Injectable 25 Gram(s) IV Push once  dextrose 50% Injectable 25 Gram(s) IV Push once  ferrous    sulfate Liquid 300 milliGRAM(s) Enteral Tube daily  insulin glargine Injectable (LANTUS) 8 Unit(s) SubCutaneous at bedtime  insulin lispro (HumaLOG) corrective regimen sliding scale   SubCutaneous every 6 hours  losartan 50 milliGRAM(s) Oral daily  metoprolol tartrate 25 milliGRAM(s) Oral two times a day  OLANZapine Disintegrating Tablet 5 milliGRAM(s) Oral daily  pantoprazole   Suspension 40 milliGRAM(s) Oral daily  senna Syrup 5 milliLiter(s) Oral at bedtime  sodium chloride 0.9%. 1000 milliLiter(s) (100 mL/Hr) IV Continuous <Continuous>  vancomycin  IVPB 750 milliGRAM(s) IV Intermittent every 12 hours      FAMILY HISTORY:  No pertinent family history in first degree relatives      SOCIAL HISTORY:      [ ] Marital status    Allergies    No Known Allergies      	    REVIEW OF SYSTEMS: Patient aphasic.           PHYSICAL EXAM:  T(C): 36.6 (11-20-18 @ 06:00), Max: 37.9 (11-19-18 @ 12:32)  HR: 145 (11-20-18 @ 06:00) (96 - 176)  BP: 126/93 (11-20-18 @ 06:00) (89/66 - 152/104)  RR: 20 (11-20-18 @ 06:00) (20 - 44)  SpO2: 99% (11-19-18 @ 14:30) (99% - 100%)  Wt(kg): --  I&O's Summary    19 Nov 2018 07:01  -  20 Nov 2018 07:00  --------------------------------------------------------  IN: 2816.7 mL / OUT: 1220 mL / NET: 1596.7 mL        Appearance: Normal	  Psychiatry: A & O x 3, Mood & affect appropriate  HEENT:   Normal oral mucosa, PERRL, EOMI	  Lymphatic: No lymphadenopathy  Cardiovascular: Normal S1 S2, irreg , No JVD, No murmurs  Respiratory: Lungs clear to auscultation	  Gastrointestinal:  Soft, Non-tender, + BS	  Skin: No rashes, No ecchymoses, No cyanosis	  Neurologic: Non-focal  Extremities: Normal range of motion, No clubbing, cyanosis or edema  Vascular: Peripheral pulses palpable 2+ bilaterally      ECG:  	< from: 12 Lead ECG (10.23.18 @ 09:35) >    Diagnosis Line Atrial fibrillation with rapid ventricular response  Septal infarct , age undetermined  Inferior infarct , age undetermined  Abnormal ECG    Confirmed by RANDY TAYLOR MD ([id number]) on 10/23/2018 11:02:29 AM    < end of copied text >      	  LABS:	 	    CARDIAC MARKERS:                                    8.9    12.51 )-----------( 183      ( 20 Nov 2018 07:22 )             29.0     11-20    x   |  x   |  40<H>  ----------------------------<  118<H>  x    |  27  |  0.9    Ca    8.4<L>      20 Nov 2018 07:22    TPro  4.9<L>  /  Alb  2.9<L>  /  TBili  1.0  /  DBili  x   /  AST  12  /  ALT  11  /  AlkPhos  92  11-20    PT/INR - ( 19 Nov 2018 11:40 )   PT: 27.00 sec;   INR: 2.45 ratio         PTT - ( 19 Nov 2018 11:40 )  PTT:34.2 sec

## 2018-11-20 NOTE — CONSULT NOTE ADULT - SUBJECTIVE AND OBJECTIVE BOX
78M sent in from NH 2/2 fever, found to have PNA, AFIB w/RVR, denied admission unit 2/2 code status and admitted to low risk [facility] for further monitoring.    pt recently discharged from [facility] after UTI. Had GT placed due to inability/ refusal to eat consistently.  Note markedly elevated glucose on admission, as well as BUN 56 - was 15 on discharge on 11/7.    Past Medical History:  Anemia    Arrhythmia    Dementia    Diabetes    DVT (deep venous thrombosis)    HTN (hypertension)    Stroke.    Past Surgical History:  History of hip replacement    S/P cholecystectomy.  GT insertion    T(C): 36.6 (20 Nov 2018 06:00), Max: 37.9 (19 Nov 2018 12:32)  T(F): 97.9 (20 Nov 2018 06:00), Max: 100.2 (19 Nov 2018 12:32)  HR: 145 (20 Nov 2018 06:00) (96 - 176)  BP: 126/93 (20 Nov 2018 06:00) (89/66 - 152/104)  RR: 20 (20 Nov 2018 06:00) (20 - 44)  Height (cm): 182.88 (19 Nov 2018 16:24)  Weight (kg): 74.2 (19 Nov 2018 16:24)   ---   74.3 on 11/7  BMI (kg/m2): 22.2 (19 Nov 2018 16:24)  BSA (m2): 1.96 (19 Nov 2018 16:24)  awake, nonverbal to me (wife says he sometimes speaks a word or 2 to her)  skin turgor poor  abd soft, GT site with overly large and tapes dressing, NT, ND, no drainage or erythema at GT site  no LE edema  + loss of LBM primarily due to being bedbound    MEDICATIONS  (STANDING):  amiodarone Infusion 1 mG/Min (33.333 mL/Hr) IV Continuous <Continuous>  amLODIPine   Tablet 2.5 milliGRAM(s) Oral daily  apixaban 5 milliGRAM(s) Oral every 12 hours  ascorbic acid 500 milliGRAM(s) Oral daily  aspirin  chewable 81 milliGRAM(s) Oral daily  atorvastatin 40 milliGRAM(s) Oral at bedtime  cefepime   IVPB 1000 milliGRAM(s) IV Intermittent every 8 hours  ferrous    sulfate Liquid 300 milliGRAM(s) Enteral Tube daily  insulin glargine Injectable (LANTUS) 8 Unit(s) SubCutaneous at bedtime  insulin lispro (HumaLOG) corrective regimen sliding scale   SubCutaneous every 6 hours  losartan 50 milliGRAM(s) Oral daily  metoprolol tartrate 25 milliGRAM(s) Oral two times a day  OLANZapine Disintegrating Tablet 5 milliGRAM(s) Oral daily  pantoprazole   Suspension 40 milliGRAM(s) Oral daily  senna Syrup 5 milliLiter(s) Oral at bedtime  sodium chloride 0.9%. 1000 milliLiter(s) (100 mL/Hr) IV Continuous <Continuous>  vancomycin  IVPB 750 milliGRAM(s) IV Intermittent every 12 hours  at NH he was given Januvia and pioglitazone, and was fed by pump at 99 ml/h overnight, Glucerna 1.5, total 1440 ml/d                         8.9    12.51 )-----------( 183      ( 20 Nov 2018 07:22 )             29.0   11-20    149<H>  |  112<H>  |  40<H>  ----------------------------<  118<H>  4.1   |  27  |  0.9    Ca    8.4<L>      20 Nov 2018 07:22    TPro  4.9<L>  /  Alb  2.9<L>  /  TBili  1.0  /  DBili  x   /  AST  12  /  ALT  11  /  AlkPhos  92  11-20

## 2018-11-21 LAB
-  AMIKACIN: SIGNIFICANT CHANGE UP
-  AZTREONAM: SIGNIFICANT CHANGE UP
-  CEFEPIME: SIGNIFICANT CHANGE UP
-  CEFTAZIDIME: SIGNIFICANT CHANGE UP
-  CIPROFLOXACIN: SIGNIFICANT CHANGE UP
-  GENTAMICIN: SIGNIFICANT CHANGE UP
-  IMIPENEM: SIGNIFICANT CHANGE UP
-  LEVOFLOXACIN: SIGNIFICANT CHANGE UP
-  MEROPENEM: SIGNIFICANT CHANGE UP
-  PIPERACILLIN/TAZOBACTAM: SIGNIFICANT CHANGE UP
-  TOBRAMYCIN: SIGNIFICANT CHANGE UP
CULTURE RESULTS: SIGNIFICANT CHANGE UP
GLUCOSE BLDC GLUCOMTR-MCNC: 154 MG/DL — HIGH (ref 70–99)
GLUCOSE BLDC GLUCOMTR-MCNC: 175 MG/DL — HIGH (ref 70–99)
GLUCOSE BLDC GLUCOMTR-MCNC: 183 MG/DL — HIGH (ref 70–99)
GLUCOSE BLDC GLUCOMTR-MCNC: 189 MG/DL — HIGH (ref 70–99)
GLUCOSE BLDC GLUCOMTR-MCNC: 202 MG/DL — HIGH (ref 70–99)
METHOD TYPE: SIGNIFICANT CHANGE UP
ORGANISM # SPEC MICROSCOPIC CNT: SIGNIFICANT CHANGE UP
ORGANISM # SPEC MICROSCOPIC CNT: SIGNIFICANT CHANGE UP
SPECIMEN SOURCE: SIGNIFICANT CHANGE UP

## 2018-11-21 RX ADMIN — CEFEPIME 100 MILLIGRAM(S): 1 INJECTION, POWDER, FOR SOLUTION INTRAMUSCULAR; INTRAVENOUS at 14:05

## 2018-11-21 RX ADMIN — PANTOPRAZOLE SODIUM 40 MILLIGRAM(S): 20 TABLET, DELAYED RELEASE ORAL at 12:01

## 2018-11-21 RX ADMIN — AMLODIPINE BESYLATE 2.5 MILLIGRAM(S): 2.5 TABLET ORAL at 06:11

## 2018-11-21 RX ADMIN — Medication 250 MILLIGRAM(S): at 15:54

## 2018-11-21 RX ADMIN — Medication 300 MILLIGRAM(S): at 12:00

## 2018-11-21 RX ADMIN — ATORVASTATIN CALCIUM 40 MILLIGRAM(S): 80 TABLET, FILM COATED ORAL at 21:37

## 2018-11-21 RX ADMIN — CEFEPIME 100 MILLIGRAM(S): 1 INJECTION, POWDER, FOR SOLUTION INTRAMUSCULAR; INTRAVENOUS at 06:15

## 2018-11-21 RX ADMIN — APIXABAN 5 MILLIGRAM(S): 2.5 TABLET, FILM COATED ORAL at 17:07

## 2018-11-21 RX ADMIN — Medication 250 MILLIGRAM(S): at 06:17

## 2018-11-21 RX ADMIN — Medication 81 MILLIGRAM(S): at 12:00

## 2018-11-21 RX ADMIN — Medication 1: at 13:17

## 2018-11-21 RX ADMIN — Medication 50 MILLIGRAM(S): at 06:12

## 2018-11-21 RX ADMIN — SODIUM CHLORIDE 75 MILLILITER(S): 9 INJECTION, SOLUTION INTRAVENOUS at 08:51

## 2018-11-21 RX ADMIN — SENNA PLUS 5 MILLILITER(S): 8.6 TABLET ORAL at 21:39

## 2018-11-21 RX ADMIN — CEFEPIME 100 MILLIGRAM(S): 1 INJECTION, POWDER, FOR SOLUTION INTRAMUSCULAR; INTRAVENOUS at 21:37

## 2018-11-21 RX ADMIN — Medication 50 MILLIGRAM(S): at 17:07

## 2018-11-21 RX ADMIN — LOSARTAN POTASSIUM 50 MILLIGRAM(S): 100 TABLET, FILM COATED ORAL at 06:12

## 2018-11-21 RX ADMIN — INSULIN GLARGINE 8 UNIT(S): 100 INJECTION, SOLUTION SUBCUTANEOUS at 21:37

## 2018-11-21 RX ADMIN — SODIUM CHLORIDE 75 MILLILITER(S): 9 INJECTION, SOLUTION INTRAVENOUS at 17:06

## 2018-11-21 RX ADMIN — Medication 500 MILLIGRAM(S): at 12:00

## 2018-11-21 RX ADMIN — APIXABAN 5 MILLIGRAM(S): 2.5 TABLET, FILM COATED ORAL at 06:12

## 2018-11-21 RX ADMIN — OLANZAPINE 5 MILLIGRAM(S): 15 TABLET, FILM COATED ORAL at 12:01

## 2018-11-21 RX ADMIN — Medication 2: at 17:08

## 2018-11-21 NOTE — PROGRESS NOTE ADULT - ASSESSMENT
JOEL  hypernatremia  PNA / sepsis  pseudomonas UTI  Afib with rvr  CVA 3 weeks ago, nonverbal, s/p PEG / dementia  DM  HTN    plan:------------------------------------------------------------------------------  1/2NS @ 75 cc/hr  f/u cmp  peg feeds and free water per nutrition  f/u CX  abx per ID  eliquis  amiodarone / lopressor  DNR AND DNI  norvasc and losartan unless bp's low

## 2018-11-21 NOTE — PROGRESS NOTE ADULT - SUBJECTIVE AND OBJECTIVE BOX
NEPHROLOGY FOLLOW UP NOTE    pt seen and examined  nonverbal  still on ivf  tolerating peg feeds and h20 flushes  no overnight events    PAST MEDICAL & SURGICAL HISTORY:  Stroke  DVT (deep venous thrombosis)  Anemia  Diabetes  HTN (hypertension)  Arrhythmia  Dementia  S/P cholecystectomy  History of hip replacement    Allergies:  No Known Allergies    Home Medications Reviewed    SOCIAL HISTORY:  Denies ETOH,Smoking,   FAMILY HISTORY:  No pertinent family history in first degree relatives    Yes      REVIEW OF SYSTEMS:  unobtainable    advance care planning and advance care directives reviewed    PHYSICAL EXAM:  NAD  lethargic  pale  dry mm  no jvd  b/l bs  irreg  soft, + binder over peg  no edema    Hospital Medications:   MEDICATIONS  (STANDING):  amiodarone Infusion 1 mG/Min (33.333 mL/Hr) IV Continuous <Continuous>  amLODIPine   Tablet 2.5 milliGRAM(s) Oral daily  apixaban 5 milliGRAM(s) Oral every 12 hours  ascorbic acid 500 milliGRAM(s) Oral daily  aspirin  chewable 81 milliGRAM(s) Oral daily  atorvastatin 40 milliGRAM(s) Oral at bedtime  cefepime   IVPB 1000 milliGRAM(s) IV Intermittent every 8 hours  dextrose 5%. 1000 milliLiter(s) (50 mL/Hr) IV Continuous <Continuous>  dextrose 50% Injectable 12.5 Gram(s) IV Push once  dextrose 50% Injectable 25 Gram(s) IV Push once  dextrose 50% Injectable 25 Gram(s) IV Push once  ferrous    sulfate Liquid 300 milliGRAM(s) Enteral Tube daily  insulin glargine Injectable (LANTUS) 8 Unit(s) SubCutaneous at bedtime  insulin lispro (HumaLOG) corrective regimen sliding scale   SubCutaneous every 6 hours  losartan 50 milliGRAM(s) Oral daily  metoprolol tartrate 50 milliGRAM(s) Oral every 12 hours  OLANZapine Disintegrating Tablet 5 milliGRAM(s) Oral daily  pantoprazole   Suspension 40 milliGRAM(s) Oral daily  senna Syrup 5 milliLiter(s) Oral at bedtime  sodium chloride 0.45%. 1000 milliLiter(s) (75 mL/Hr) IV Continuous <Continuous>  vancomycin  IVPB 750 milliGRAM(s) IV Intermittent every 12 hours        VITALS:  T(F): 96.6 (18 @ 14:03), Max: 96.6 (18 @ 14:03)  HR: 118 (18 @ 14:03)  BP: 142/84 (18 @ 14:03)  RR: 20 (18 @ 14:03)  SpO2: --  Wt(kg): --     @ 07:01  -   @ 07:00  --------------------------------------------------------  IN: 2816.7 mL / OUT: 1220 mL / NET: 1596.7 mL     @ 07:01  -   @ 07:00  --------------------------------------------------------  IN: 300 mL / OUT: 1100 mL / NET: -800 mL     @ 07:01  -   @ 16:11  --------------------------------------------------------  IN: 390 mL / OUT: 400 mL / NET: -10 mL      Height (cm): 182.88 ( @ 06:54)  Weight (kg): 74.2 ( @ 06:54)  BMI (kg/m2): 22.2 ( @ 06:54)  BSA (m2): 1.96 ( @ 06:54)    LABS:      149<H>  |  112<H>  |  40<H>  ----------------------------<  118<H>  4.1   |  27  |  0.9    Ca    8.4<L>      2018 07:22    TPro  4.9<L>  /  Alb  2.9<L>  /  TBili  1.0  /  DBili      /  AST  12  /  ALT  11  /  AlkPhos  92                            8.9    12.51 )-----------( 183      ( 2018 07:22 )             29.0       Urine Studies:  Urinalysis Basic - ( 2018 12:05 )    Color: Yellow / Appearance: Clear / S.015 / pH:   Gluc:  / Ketone: Negative  / Bili: Negative / Urobili: 4.0 mg/dL   Blood:  / Protein: 30 mg/dL / Nitrite: Positive   Leuk Esterase: Trace / RBC: 3-5 /HPF / WBC 6-10 /HPF   Sq Epi:  / Non Sq Epi:  / Bacteria: Few          RADIOLOGY & ADDITIONAL STUDIES:

## 2018-11-21 NOTE — PROGRESS NOTE ADULT - SUBJECTIVE AND OBJECTIVE BOX
Patient is a 78y old  Male who presents with a chief complaint of Fever/Tachycardia (21 Nov 2018 16:10)      INTERVAL HPI/OVERNIGHT EVENTS:    MEDICATIONS  (STANDING):  amiodarone Infusion 1 mG/Min (33.333 mL/Hr) IV Continuous <Continuous>  amLODIPine   Tablet 2.5 milliGRAM(s) Oral daily  apixaban 5 milliGRAM(s) Oral every 12 hours  ascorbic acid 500 milliGRAM(s) Oral daily  aspirin  chewable 81 milliGRAM(s) Oral daily  atorvastatin 40 milliGRAM(s) Oral at bedtime  cefepime   IVPB 1000 milliGRAM(s) IV Intermittent every 8 hours  dextrose 5%. 1000 milliLiter(s) (50 mL/Hr) IV Continuous <Continuous>  dextrose 50% Injectable 12.5 Gram(s) IV Push once  dextrose 50% Injectable 25 Gram(s) IV Push once  dextrose 50% Injectable 25 Gram(s) IV Push once  ferrous    sulfate Liquid 300 milliGRAM(s) Enteral Tube daily  insulin glargine Injectable (LANTUS) 8 Unit(s) SubCutaneous at bedtime  insulin lispro (HumaLOG) corrective regimen sliding scale   SubCutaneous every 6 hours  losartan 50 milliGRAM(s) Oral daily  metoprolol tartrate 50 milliGRAM(s) Oral every 12 hours  OLANZapine Disintegrating Tablet 5 milliGRAM(s) Oral daily  pantoprazole   Suspension 40 milliGRAM(s) Oral daily  senna Syrup 5 milliLiter(s) Oral at bedtime  sodium chloride 0.45%. 1000 milliLiter(s) (75 mL/Hr) IV Continuous <Continuous>  vancomycin  IVPB 750 milliGRAM(s) IV Intermittent every 12 hours    MEDICATIONS  (PRN):  acetaminophen   Tablet .. 650 milliGRAM(s) Oral every 6 hours PRN Temp greater or equal to 38C (100.4F)  dextrose 40% Gel 15 Gram(s) Oral once PRN Blood Glucose LESS THAN 70 milliGRAM(s)/deciliter  glucagon  Injectable 1 milliGRAM(s) IntraMuscular once PRN Glucose LESS THAN 70 milligrams/deciliter  lactulose Syrup 20 Gram(s) Oral daily PRN constipation      Allergies    No Known Allergies    Intolerances        REVIEW OF SYSTEMS:  CONSTITUTIONAL: No fever, weight loss, or fatigue  EYES: No eye pain, visual disturbances, or discharge  ENMT:  No difficulty hearing, tinnitus, vertigo; No sinus or throat pain  NECK: No pain or stiffness  BREASTS: No pain, masses, or nipple discharge  RESPIRATORY: No cough, wheezing, chills or hemoptysis; No shortness of breath  CARDIOVASCULAR: No chest pain, palpitations, dizziness, or leg swelling  GASTROINTESTINAL: No abdominal or epigastric pain. No nausea, vomiting, or hematemesis; No diarrhea or constipation. No melena or hematochezia.  GENITOURINARY: No dysuria, frequency, hematuria, or incontinence  NEUROLOGICAL: No headaches, memory loss, loss of strength, numbness, or tremors  SKIN: No itching, burning, rashes, or lesions   LYMPH NODES: No enlarged glands  ENDOCRINE: No heat or cold intolerance; No hair loss  MUSCULOSKELETAL: No joint pain or swelling; No muscle, back, or extremity pain  PSYCHIATRIC: No depression, anxiety, mood swings, or difficulty sleeping  HEME/LYMPH: No easy bruising, or bleeding gums  ALLERGY AND IMMUNOLOGIC: No hives or eczema    Vital Signs Last 24 Hrs  T(C): 36.5 (21 Nov 2018 22:06), Max: 36.5 (21 Nov 2018 22:06)  T(F): 97.7 (21 Nov 2018 22:06), Max: 97.7 (21 Nov 2018 22:06)  HR: 106 (21 Nov 2018 22:06) (106 - 119)  BP: 124/87 (21 Nov 2018 22:06) (124/87 - 142/84)  BP(mean): --  RR: 20 (21 Nov 2018 22:06) (18 - 20)  SpO2: --    PHYSICAL EXAM:  GENERAL: NAD, well-groomed, well-developed  HEAD:  Atraumatic, Normocephalic  EYES: EOMI, PERRLA, conjunctiva and sclera clear  ENMT: No tonsillar erythema, exudates, or enlargement; Moist mucous membranes, Good dentition, No lesions  NECK: Supple, No JVD, Normal thyroid  NERVOUS SYSTEM:  Alert & Oriented X3, Good concentration; Motor Strength 5/5 B/L upper and lower extremities; DTRs 2+ intact and symmetric  CHEST/LUNG: Clear to percussion bilaterally; No rales, rhonchi, wheezing, or rubs  HEART: Regular rate and rhythm; No murmurs, rubs, or gallops  ABDOMEN: Soft, Nontender, Nondistended; Bowel sounds present  EXTREMITIES:  2+ Peripheral Pulses, No clubbing, cyanosis, or edema  LYMPH: No lymphadenopathy noted  SKIN: No rashes or lesions    LABS:                        8.9    12.51 )-----------( 183      ( 20 Nov 2018 07:22 )             29.0     11-20    149<H>  |  112<H>  |  40<H>  ----------------------------<  118<H>  4.1   |  27  |  0.9    Ca    8.4<L>      20 Nov 2018 07:22    TPro  4.9<L>  /  Alb  2.9<L>  /  TBili  1.0  /  DBili  x   /  AST  12  /  ALT  11  /  AlkPhos  92  11-20        CAPILLARY BLOOD GLUCOSE      POCT Blood Glucose.: 183 mg/dL (21 Nov 2018 20:40)  POCT Blood Glucose.: 202 mg/dL (21 Nov 2018 16:48)  POCT Blood Glucose.: 189 mg/dL (21 Nov 2018 11:54)  POCT Blood Glucose.: 154 mg/dL (21 Nov 2018 07:26)      RADIOLOGY & ADDITIONAL TESTS:    Imaging Personally Reviewed:  [ ] YES  [ ] NO    Consultant(s) Notes Reviewed:  [ ] YES  [ ] NO    Care Discussed with Consultants/Other Providers [ ] YES  [ ] NO Patient is seen and examined at the bed side, is afebrile.          MEDICATIONS  (STANDING):  amiodarone Infusion 1 mG/Min (33.333 mL/Hr) IV Continuous <Continuous>  amLODIPine   Tablet 2.5 milliGRAM(s) Oral daily  apixaban 5 milliGRAM(s) Oral every 12 hours  ascorbic acid 500 milliGRAM(s) Oral daily  aspirin  chewable 81 milliGRAM(s) Oral daily  atorvastatin 40 milliGRAM(s) Oral at bedtime  cefepime   IVPB 1000 milliGRAM(s) IV Intermittent every 8 hours  dextrose 5%. 1000 milliLiter(s) (50 mL/Hr) IV Continuous <Continuous>  dextrose 50% Injectable 12.5 Gram(s) IV Push once  dextrose 50% Injectable 25 Gram(s) IV Push once  dextrose 50% Injectable 25 Gram(s) IV Push once  ferrous    sulfate Liquid 300 milliGRAM(s) Enteral Tube daily  insulin glargine Injectable (LANTUS) 8 Unit(s) SubCutaneous at bedtime  insulin lispro (HumaLOG) corrective regimen sliding scale   SubCutaneous every 6 hours  losartan 50 milliGRAM(s) Oral daily  metoprolol tartrate 50 milliGRAM(s) Oral every 12 hours  OLANZapine Disintegrating Tablet 5 milliGRAM(s) Oral daily  pantoprazole   Suspension 40 milliGRAM(s) Oral daily  senna Syrup 5 milliLiter(s) Oral at bedtime  sodium chloride 0.45%. 1000 milliLiter(s) (75 mL/Hr) IV Continuous <Continuous>  vancomycin  IVPB 750 milliGRAM(s) IV Intermittent every 12 hours    MEDICATIONS  (PRN):  acetaminophen   Tablet .. 650 milliGRAM(s) Oral every 6 hours PRN Temp greater or equal to 38C (100.4F)  dextrose 40% Gel 15 Gram(s) Oral once PRN Blood Glucose LESS THAN 70 milliGRAM(s)/deciliter  glucagon  Injectable 1 milliGRAM(s) IntraMuscular once PRN Glucose LESS THAN 70 milligrams/deciliter  lactulose Syrup 20 Gram(s) Oral daily PRN constipation      Allergies    No Known Allergies           REVIEW OF SYSTEMS: All other review systems are negative        Vital Signs Last 24 Hrs  T(C): 36.5 (21 Nov 2018 22:06), Max: 36.5 (21 Nov 2018 22:06)  T(F): 97.7 (21 Nov 2018 22:06), Max: 97.7 (21 Nov 2018 22:06)  HR: 106 (21 Nov 2018 22:06) (106 - 119)  BP: 124/87 (21 Nov 2018 22:06) (124/87 - 142/84)  BP(mean): --  RR: 20 (21 Nov 2018 22:06) (18 - 20)  SpO2: --    PHYSICAL EXAM:  GENERAL: Not in distress, on oxygen via NC  CHEST/LUNG: Air entry bilaterally  HEART: s1 and s2 present  ABDOMEN:  Nontender and Nondistended  : Deluca catheter in placed  EXTREMITIES:  No pedal edema  CNS: Awake and alert        LABS:                        8.9    12.51 )-----------( 183      ( 20 Nov 2018 07:22 )             29.0         11-20    149<H>  |  112<H>  |  40<H>  ----------------------------<  118<H>  4.1   |  27  |  0.9    Ca    8.4<L>      20 Nov 2018 07:22    TPro  4.9<L>  /  Alb  2.9<L>  /  TBili  1.0  /  DBili  x   /  AST  12  /  ALT  11  /  AlkPhos  92  11-20        CAPILLARY BLOOD GLUCOSE      POCT Blood Glucose.: 183 mg/dL (21 Nov 2018 20:40)  POCT Blood Glucose.: 202 mg/dL (21 Nov 2018 16:48)  POCT Blood Glucose.: 189 mg/dL (21 Nov 2018 11:54)  POCT Blood Glucose.: 154 mg/dL (21 Nov 2018 07:26)      RADIOLOGY & ADDITIONAL TESTS:      < from: Xray Chest 1 View-PORTABLE IMMEDIATE (11.19.18 @ 12:07) >  New right basilar opacities.    < end of copied text >      MICROBIOLOGY DATA:      Culture - Blood (11.19.18 @ 12:30)    Specimen Source: .Blood Blood-Peripheral    Culture Results:   No growth to date.      Culture - Blood (11.19.18 @ 12:30)    Specimen Source: .Blood Blood-Peripheral    Culture Results:   No growth to date.        Culture - Urine (11.19.18 @ 11:40)    -  Cefepime: S <=2    -  Amikacin: S <=8    -  Aztreonam: I 16    -  Tobramycin: S <=2    -  Piperacillin/Tazobactam: S <=8    -  Meropenem: S <=1    -  Gentamicin: S 4    -  Imipenem: S 2    -  Levofloxacin: S <=1    -  Ciprofloxacin: S <=0.5    -  Ceftazidime: S 4    Specimen Source: .Urine Clean Catch (Midstream)    Culture Results:   >100,000 CFU/ml Pseudomonas aeruginosa    Organism Identification: Pseudomonas aeruginosa    Organism: Pseudomonas aeruginosa    Method Type: NANCY

## 2018-11-21 NOTE — PROGRESS NOTE ADULT - ASSESSMENT
Problem: Pneumonia due to infectious organism, unspecified laterality, unspecified part of lung. Recommendation: IV Cefepime for now   once wbc down   Po abx  at least 7-10 days of abx - ? vantin 200 mg Q12  recurrence expected - poor airway protection. # Pneumonia- Suspected  Gram Negative pneumonia  # Complicated UTI- Pseudomonas    would recommend:    1. Obtain MRSA/MSSA PCR if  Negative then discontinue Vancomycin  2. Continue Cefepime   3. Supplemental oxygenation and bronchodilator as needed    -will follow up

## 2018-11-21 NOTE — PROGRESS NOTE ADULT - ASSESSMENT
JOEL - better  hypernatremia  dehydration  PNA / sepsis  pseudomonas UTI  Afib with rvr  CVA 3 weeks ago, nonverbal, s/p PEG / dementia  DM  HTN    plan:  cont 1/2NS @ 75 cc/hr another 24 hours  f/u cmp  peg feeds and free water per nutrition  abx per ID  eliquis  amiodarone / lopressor  norvasc and losartan   DNR / DNI

## 2018-11-21 NOTE — PROGRESS NOTE ADULT - SUBJECTIVE AND OBJECTIVE BOX
SUSI MARTIN  78y  Male    Patient is a 78y old  Male who presents with a chief complaint of Fever/Tachycardia (20 Nov 2018 19:11)    ALLERGIES:  No Known Allergies      INTERVAL HPI/OVERNIGHT EVENTS:    VITALS:  T(F): 96.5 (11-21-18 @ 05:35), Max: 97.9 (11-20-18 @ 14:48)  HR: 119 (11-21-18 @ 05:35) (84 - 127)  BP: 137/92 (11-21-18 @ 05:35) (137/92 - 170/75)  RR: 18 (11-21-18 @ 05:35) (16 - 18)  SpO2: --    LABS:  11-20    149<H>  |  112<H>  |  40<H>  ----------------------------<  118<H>  4.1   |  27  |  0.9    Ca    8.4<L>      20 Nov 2018 07:22    TPro  4.9<L>  /  Alb  2.9<L>  /  TBili  1.0  /  DBili  x   /  AST  12  /  ALT  11  /  AlkPhos  92  11-20    MICROBIOLOGY:    Culture - Blood (collected 11-19-18 @ 12:30)  Source: .Blood Blood-Peripheral  Preliminary Report (11-20-18 @ 23:01):    No growth to date.    Culture - Blood (collected 11-19-18 @ 12:30)  Source: .Blood Blood-Peripheral  Preliminary Report (11-20-18 @ 23:01):    No growth to date.      MEDICATION:  acetaminophen   Tablet .. 650 milliGRAM(s) Oral every 6 hours PRN  amiodarone Infusion 1 mG/Min IV Continuous <Continuous>  amLODIPine   Tablet 2.5 milliGRAM(s) Oral daily  apixaban 5 milliGRAM(s) Oral every 12 hours  ascorbic acid 500 milliGRAM(s) Oral daily  aspirin  chewable 81 milliGRAM(s) Oral daily  atorvastatin 40 milliGRAM(s) Oral at bedtime  cefepime   IVPB 1000 milliGRAM(s) IV Intermittent every 8 hours  dextrose 40% Gel 15 Gram(s) Oral once PRN  dextrose 5%. 1000 milliLiter(s) IV Continuous <Continuous>  dextrose 50% Injectable 12.5 Gram(s) IV Push once  dextrose 50% Injectable 25 Gram(s) IV Push once  dextrose 50% Injectable 25 Gram(s) IV Push once  ferrous    sulfate Liquid 300 milliGRAM(s) Enteral Tube daily  glucagon  Injectable 1 milliGRAM(s) IntraMuscular once PRN  insulin glargine Injectable (LANTUS) 8 Unit(s) SubCutaneous at bedtime  insulin lispro (HumaLOG) corrective regimen sliding scale   SubCutaneous every 6 hours  lactulose Syrup 20 Gram(s) Oral daily PRN  losartan 50 milliGRAM(s) Oral daily  metoprolol tartrate 50 milliGRAM(s) Oral every 12 hours  OLANZapine Disintegrating Tablet 5 milliGRAM(s) Oral daily  pantoprazole   Suspension 40 milliGRAM(s) Oral daily  senna Syrup 5 milliLiter(s) Oral at bedtime  sodium chloride 0.45%. 1000 milliLiter(s) IV Continuous <Continuous>  vancomycin  IVPB 750 milliGRAM(s) IV Intermittent every 12 hours    RADIOLOGY & ADDITIONAL TESTS:

## 2018-11-22 DIAGNOSIS — N39.0 URINARY TRACT INFECTION, SITE NOT SPECIFIED: ICD-10-CM

## 2018-11-22 LAB
GLUCOSE BLDC GLUCOMTR-MCNC: 164 MG/DL — HIGH (ref 70–99)
GLUCOSE BLDC GLUCOMTR-MCNC: 189 MG/DL — HIGH (ref 70–99)
GLUCOSE BLDC GLUCOMTR-MCNC: 224 MG/DL — HIGH (ref 70–99)
GLUCOSE BLDC GLUCOMTR-MCNC: 232 MG/DL — HIGH (ref 70–99)
HCT VFR BLD CALC: 28.3 % — LOW (ref 42–52)
HGB BLD-MCNC: 8.7 G/DL — LOW (ref 14–18)
MCHC RBC-ENTMCNC: 27.3 PG — SIGNIFICANT CHANGE UP (ref 27–31)
MCHC RBC-ENTMCNC: 30.7 G/DL — LOW (ref 32–37)
MCV RBC AUTO: 88.7 FL — SIGNIFICANT CHANGE UP (ref 80–94)
NRBC # BLD: 0 /100 WBCS — SIGNIFICANT CHANGE UP (ref 0–0)
PLATELET # BLD AUTO: 176 K/UL — SIGNIFICANT CHANGE UP (ref 130–400)
RBC # BLD: 3.19 M/UL — LOW (ref 4.7–6.1)
RBC # FLD: 16.3 % — HIGH (ref 11.5–14.5)
WBC # BLD: 4.03 K/UL — LOW (ref 4.8–10.8)
WBC # FLD AUTO: 4.03 K/UL — LOW (ref 4.8–10.8)

## 2018-11-22 RX ADMIN — APIXABAN 5 MILLIGRAM(S): 2.5 TABLET, FILM COATED ORAL at 06:27

## 2018-11-22 RX ADMIN — Medication 500 MILLIGRAM(S): at 12:02

## 2018-11-22 RX ADMIN — APIXABAN 5 MILLIGRAM(S): 2.5 TABLET, FILM COATED ORAL at 18:04

## 2018-11-22 RX ADMIN — CEFEPIME 100 MILLIGRAM(S): 1 INJECTION, POWDER, FOR SOLUTION INTRAMUSCULAR; INTRAVENOUS at 21:58

## 2018-11-22 RX ADMIN — SODIUM CHLORIDE 75 MILLILITER(S): 9 INJECTION, SOLUTION INTRAVENOUS at 06:28

## 2018-11-22 RX ADMIN — Medication 50 MILLIGRAM(S): at 18:04

## 2018-11-22 RX ADMIN — OLANZAPINE 5 MILLIGRAM(S): 15 TABLET, FILM COATED ORAL at 12:02

## 2018-11-22 RX ADMIN — AMLODIPINE BESYLATE 2.5 MILLIGRAM(S): 2.5 TABLET ORAL at 06:27

## 2018-11-22 RX ADMIN — LOSARTAN POTASSIUM 50 MILLIGRAM(S): 100 TABLET, FILM COATED ORAL at 14:53

## 2018-11-22 RX ADMIN — ATORVASTATIN CALCIUM 40 MILLIGRAM(S): 80 TABLET, FILM COATED ORAL at 21:58

## 2018-11-22 RX ADMIN — Medication 300 MILLIGRAM(S): at 12:02

## 2018-11-22 RX ADMIN — Medication 50 MILLIGRAM(S): at 06:27

## 2018-11-22 RX ADMIN — CEFEPIME 100 MILLIGRAM(S): 1 INJECTION, POWDER, FOR SOLUTION INTRAMUSCULAR; INTRAVENOUS at 06:26

## 2018-11-22 RX ADMIN — CEFEPIME 100 MILLIGRAM(S): 1 INJECTION, POWDER, FOR SOLUTION INTRAMUSCULAR; INTRAVENOUS at 14:55

## 2018-11-22 RX ADMIN — PANTOPRAZOLE SODIUM 40 MILLIGRAM(S): 20 TABLET, DELAYED RELEASE ORAL at 12:03

## 2018-11-22 RX ADMIN — INSULIN GLARGINE 8 UNIT(S): 100 INJECTION, SOLUTION SUBCUTANEOUS at 21:59

## 2018-11-22 RX ADMIN — Medication 250 MILLIGRAM(S): at 02:37

## 2018-11-22 RX ADMIN — Medication 81 MILLIGRAM(S): at 12:01

## 2018-11-22 RX ADMIN — Medication 2: at 12:06

## 2018-11-22 RX ADMIN — Medication 2: at 18:03

## 2018-11-22 RX ADMIN — Medication 1: at 00:05

## 2018-11-22 NOTE — PROGRESS NOTE ADULT - SUBJECTIVE AND OBJECTIVE BOX
NEPHROLOGY FOLLOW UP NOTE    pt seen and examined  nonverbal  still on ivf  tolerating peg feeds and h20 flushes  no overnight events  spoke with family    PAST MEDICAL & SURGICAL HISTORY:  Stroke  DVT (deep venous thrombosis)  Anemia  Diabetes  HTN (hypertension)  Arrhythmia  Dementia  S/P cholecystectomy  History of hip replacement    Allergies:  No Known Allergies    Home Medications Reviewed    SOCIAL HISTORY:  Denies ETOH,Smoking,   FAMILY HISTORY:  No pertinent family history in first degree relatives    Yes      REVIEW OF SYSTEMS:  unobtainable      PHYSICAL EXAM:  NAD  lethargic  pale  dry mm  no jvd  b/l bs  irreg  soft, + binder over peg  no edema    Hospital Medications:   MEDICATIONS  (STANDING):  amiodarone Infusion 1 mG/Min (33.333 mL/Hr) IV Continuous <Continuous>  amLODIPine   Tablet 2.5 milliGRAM(s) Oral daily  apixaban 5 milliGRAM(s) Oral every 12 hours  ascorbic acid 500 milliGRAM(s) Oral daily  aspirin  chewable 81 milliGRAM(s) Oral daily  atorvastatin 40 milliGRAM(s) Oral at bedtime  cefepime   IVPB 1000 milliGRAM(s) IV Intermittent every 8 hours  dextrose 5%. 1000 milliLiter(s) (50 mL/Hr) IV Continuous <Continuous>  dextrose 50% Injectable 12.5 Gram(s) IV Push once  dextrose 50% Injectable 25 Gram(s) IV Push once  dextrose 50% Injectable 25 Gram(s) IV Push once  ferrous    sulfate Liquid 300 milliGRAM(s) Enteral Tube daily  insulin glargine Injectable (LANTUS) 8 Unit(s) SubCutaneous at bedtime  insulin lispro (HumaLOG) corrective regimen sliding scale   SubCutaneous every 6 hours  losartan 50 milliGRAM(s) Oral daily  metoprolol tartrate 50 milliGRAM(s) Oral every 12 hours  OLANZapine Disintegrating Tablet 5 milliGRAM(s) Oral daily  pantoprazole   Suspension 40 milliGRAM(s) Oral daily  senna Syrup 5 milliLiter(s) Oral at bedtime        VITALS:  T(F): 96.8 (18 @ 06:39), Max: 97.7 (18 @ 22:06)  HR: 101 (18 @ 07:53)  BP: 128/85 (18 @ 06:39)  RR: 20 (18 @ 06:39)  SpO2: 98% (18 @ 07:53)  Wt(kg): --     @ 07:01  -   @ 07:00  --------------------------------------------------------  IN: 300 mL / OUT: 1100 mL / NET: -800 mL     @ 07:01  -   @ 07:00  --------------------------------------------------------  IN: 1940 mL / OUT: 1000 mL / NET: 940 mL     @ 07:01  -   @ 12:18  --------------------------------------------------------  IN: 530 mL / OUT: 0 mL / NET: 530 mL      Height (cm): 182.88 ( @ 10:11)  Weight (kg): 74.2 ( @ 10:11)  BMI (kg/m2): 22.2 ( @ 10:11)  BSA (m2): 1.96 ( @ 10:11)    LABS:            Urine Studies:  Urinalysis Basic - ( 2018 12:05 )    Color: Yellow / Appearance: Clear / S.015 / pH:   Gluc:  / Ketone: Negative  / Bili: Negative / Urobili: 4.0 mg/dL   Blood:  / Protein: 30 mg/dL / Nitrite: Positive   Leuk Esterase: Trace / RBC: 3-5 /HPF / WBC 6-10 /HPF   Sq Epi:  / Non Sq Epi:  / Bacteria: Few          RADIOLOGY & ADDITIONAL STUDIES:

## 2018-11-22 NOTE — PROGRESS NOTE ADULT - SUBJECTIVE AND OBJECTIVE BOX
SUSI MARTIN  78y  Male    Patient is a 78y old  Male who presents with a chief complaint of Fever/Tachycardia (21 Nov 2018 22:46)    ALLERGIES:  No Known Allergies      INTERVAL HPI/OVERNIGHT EVENTS:    VITALS:  T(F): 96.8 (11-22-18 @ 06:39), Max: 97.7 (11-21-18 @ 22:06)  HR: 101 (11-22-18 @ 07:53) (101 - 118)  BP: 128/85 (11-22-18 @ 06:39) (124/87 - 142/84)  RR: 20 (11-22-18 @ 06:39) (20 - 20)  SpO2: 98% (11-22-18 @ 07:53) (98% - 98%)    LABS:        MICROBIOLOGY:    Culture - Blood (collected 11-19-18 @ 12:30)  Source: .Blood Blood-Peripheral  Preliminary Report (11-20-18 @ 23:01):    No growth to date.    Culture - Blood (collected 11-19-18 @ 12:30)  Source: .Blood Blood-Peripheral  Preliminary Report (11-20-18 @ 23:01):    No growth to date.      MEDICATION:  acetaminophen   Tablet .. 650 milliGRAM(s) Oral every 6 hours PRN  amiodarone Infusion 1 mG/Min IV Continuous <Continuous>  amLODIPine   Tablet 2.5 milliGRAM(s) Oral daily  apixaban 5 milliGRAM(s) Oral every 12 hours  ascorbic acid 500 milliGRAM(s) Oral daily  aspirin  chewable 81 milliGRAM(s) Oral daily  atorvastatin 40 milliGRAM(s) Oral at bedtime  cefepime   IVPB 1000 milliGRAM(s) IV Intermittent every 8 hours  dextrose 40% Gel 15 Gram(s) Oral once PRN  dextrose 5%. 1000 milliLiter(s) IV Continuous <Continuous>  dextrose 50% Injectable 12.5 Gram(s) IV Push once  dextrose 50% Injectable 25 Gram(s) IV Push once  dextrose 50% Injectable 25 Gram(s) IV Push once  ferrous    sulfate Liquid 300 milliGRAM(s) Enteral Tube daily  glucagon  Injectable 1 milliGRAM(s) IntraMuscular once PRN  insulin glargine Injectable (LANTUS) 8 Unit(s) SubCutaneous at bedtime  insulin lispro (HumaLOG) corrective regimen sliding scale   SubCutaneous every 6 hours  lactulose Syrup 20 Gram(s) Oral daily PRN  losartan 50 milliGRAM(s) Oral daily  metoprolol tartrate 50 milliGRAM(s) Oral every 12 hours  OLANZapine Disintegrating Tablet 5 milliGRAM(s) Oral daily  pantoprazole   Suspension 40 milliGRAM(s) Oral daily  senna Syrup 5 milliLiter(s) Oral at bedtime  sodium chloride 0.45%. 1000 milliLiter(s) IV Continuous <Continuous>    RADIOLOGY & ADDITIONAL TESTS:

## 2018-11-22 NOTE — PROGRESS NOTE ADULT - ASSESSMENT
JOEL - better  hypernatremia  dehydration  PNA / sepsis  pseudomonas UTI  Afib with rvr  CVA 3 weeks ago, nonverbal, s/p PEG / dementia  DM  HTN    plan:-----------------------------------------------------------------------------------  cont 1/2NS @ 75 cc/hr another 24 hours  f/u cmp  peg feeds and free water per nutrition  abx per ID cefepime   eliquis  amiodarone / lopressor  norvasc and losartan   DNR / DNI

## 2018-11-22 NOTE — PROGRESS NOTE ADULT - SUBJECTIVE AND OBJECTIVE BOX
infectious diseases progress note:  SUSI MARTIN is a 78yMale patient    SEPTIC SHOCK;ATRIAL FIBRILLATION WITH RVR;HCAP;URINARY TRACT INFECTION  SEPSIS PNEUMONIA UTI WITHOUT HEMATURIA    Sepsis, due to unspecified organism  Dysphagia  JOEL (acute kidney injury)  HTN (hypertension)  Diabetes  DVT (deep venous thrombosis)  Atrial fibrillation with RVR  Pneumonia due to infectious organism, unspecified laterality, unspecified part of lung  Sepsis      ROS:  UTO     Allergies    No Known Allergies    Intolerances        ANTIBIOTICS/RELEVANT:  antimicrobials  cefepime   IVPB 1000 milliGRAM(s) IV Intermittent every 8 hours    immunologic:    OTHER:  acetaminophen   Tablet .. 650 milliGRAM(s) Oral every 6 hours PRN  amiodarone Infusion 1 mG/Min IV Continuous <Continuous>  amLODIPine   Tablet 2.5 milliGRAM(s) Oral daily  apixaban 5 milliGRAM(s) Oral every 12 hours  ascorbic acid 500 milliGRAM(s) Oral daily  aspirin  chewable 81 milliGRAM(s) Oral daily  atorvastatin 40 milliGRAM(s) Oral at bedtime  dextrose 40% Gel 15 Gram(s) Oral once PRN  dextrose 5%. 1000 milliLiter(s) IV Continuous <Continuous>  dextrose 50% Injectable 12.5 Gram(s) IV Push once  dextrose 50% Injectable 25 Gram(s) IV Push once  dextrose 50% Injectable 25 Gram(s) IV Push once  ferrous    sulfate Liquid 300 milliGRAM(s) Enteral Tube daily  glucagon  Injectable 1 milliGRAM(s) IntraMuscular once PRN  insulin glargine Injectable (LANTUS) 8 Unit(s) SubCutaneous at bedtime  insulin lispro (HumaLOG) corrective regimen sliding scale   SubCutaneous every 6 hours  lactulose Syrup 20 Gram(s) Oral daily PRN  losartan 50 milliGRAM(s) Oral daily  metoprolol tartrate 50 milliGRAM(s) Oral every 12 hours  OLANZapine Disintegrating Tablet 5 milliGRAM(s) Oral daily  pantoprazole   Suspension 40 milliGRAM(s) Oral daily  senna Syrup 5 milliLiter(s) Oral at bedtime  sodium chloride 0.45%. 1000 milliLiter(s) IV Continuous <Continuous>      Objective:  T(F): 96.8 (11-22-18 @ 06:39), Max: 97.7 (11-21-18 @ 22:06)  HR: 101 (11-22-18 @ 07:53) (101 - 118)  BP: 128/85 (11-22-18 @ 06:39) (124/87 - 142/84)  RR: 20 (11-22-18 @ 06:39) (20 - 20)  SpO2: 98% (11-22-18 @ 07:53) (98% - 98%)    PHYSICAL EXAM:  lethargic   Constitutional:Well-developed, well nourished	  Neck:no JVD, no lymphadenopathy, supple  Respiratory: few rhonchi  germain  Cardiovascular: S1S2 RRR  Gastrointestinal:soft, (+) BS, no HSM  Extremities:no phlebitis         LABS:                  MICROBIOLOGY:    Culture - Blood (collected 11-19-18 @ 12:30)  Source: .Blood Blood-Peripheral  Preliminary Report (11-20-18 @ 23:01):    No growth to date.    Culture - Blood (collected 11-19-18 @ 12:30)  Source: .Blood Blood-Peripheral  Preliminary Report (11-20-18 @ 23:01):    No growth to date.    Culture - Urine (collected 11-19-18 @ 11:40)  Source: .Urine Clean Catch (Midstream)  Final Report (11-21-18 @ 16:24):    >100,000 CFU/ml Pseudomonas aeruginosa  Organism: Pseudomonas aeruginosa (11-21-18 @ 16:24)  Organism: Pseudomonas aeruginosa (11-21-18 @ 16:24)      -  Amikacin: S <=8      -  Aztreonam: I 16      -  Cefepime: S <=2      -  Ceftazidime: S 4      -  Ciprofloxacin: S <=0.5      -  Gentamicin: S 4      -  Imipenem: S 2      -  Levofloxacin: S <=1      -  Meropenem: S <=1      -  Piperacillin/Tazobactam: S <=8      -  Tobramycin: S <=2      Method Type: NANCY        Culture - Blood (collected 19 Nov 2018 12:30)  Source: .Blood Blood-Peripheral  Preliminary Report (20 Nov 2018 23:01):    No growth to date.    Culture - Blood (collected 19 Nov 2018 12:30)  Source: .Blood Blood-Peripheral  Preliminary Report (20 Nov 2018 23:01):    No growth to date.    Culture - Urine (collected 19 Nov 2018 11:40)  Source: .Urine Clean Catch (Midstream)  Final Report (21 Nov 2018 16:24):    >100,000 CFU/ml Pseudomonas aeruginosa  Organism: Pseudomonas aeruginosa (21 Nov 2018 16:24)  Organism: Pseudomonas aeruginosa (21 Nov 2018 16:24)

## 2018-11-22 NOTE — PROGRESS NOTE ADULT - ASSESSMENT
JOEL - better  hypernatremia  dehydration  PNA / sepsis  pseudomonas UTI  Afib with rvr  CVA 3 weeks ago, nonverbal, s/p PEG / dementia  DM  HTN    plan:  dc ivf  f/u cmp, mg, phos  peg feeds and free water per nutrition  abx per ID  eliquis  amiodarone / lopressor  norvasc and losartan   DNR / DNI

## 2018-11-23 LAB
ALBUMIN SERPL ELPH-MCNC: 2.6 G/DL — LOW (ref 3.5–5.2)
ALP SERPL-CCNC: 122 U/L — HIGH (ref 30–115)
ALT FLD-CCNC: 14 U/L — SIGNIFICANT CHANGE UP (ref 0–41)
ANION GAP SERPL CALC-SCNC: 10 MMOL/L — SIGNIFICANT CHANGE UP (ref 7–14)
AST SERPL-CCNC: 19 U/L — SIGNIFICANT CHANGE UP (ref 0–41)
BILIRUB SERPL-MCNC: 0.6 MG/DL — SIGNIFICANT CHANGE UP (ref 0.2–1.2)
BUN SERPL-MCNC: 31 MG/DL — HIGH (ref 10–20)
CALCIUM SERPL-MCNC: 7.7 MG/DL — LOW (ref 8.5–10.1)
CHLORIDE SERPL-SCNC: 107 MMOL/L — SIGNIFICANT CHANGE UP (ref 98–110)
CO2 SERPL-SCNC: 24 MMOL/L — SIGNIFICANT CHANGE UP (ref 17–32)
CREAT SERPL-MCNC: 0.7 MG/DL — SIGNIFICANT CHANGE UP (ref 0.7–1.5)
GLUCOSE BLDC GLUCOMTR-MCNC: 140 MG/DL — HIGH (ref 70–99)
GLUCOSE BLDC GLUCOMTR-MCNC: 185 MG/DL — HIGH (ref 70–99)
GLUCOSE BLDC GLUCOMTR-MCNC: 187 MG/DL — HIGH (ref 70–99)
GLUCOSE BLDC GLUCOMTR-MCNC: 202 MG/DL — HIGH (ref 70–99)
GLUCOSE BLDC GLUCOMTR-MCNC: 205 MG/DL — HIGH (ref 70–99)
GLUCOSE BLDC GLUCOMTR-MCNC: 205 MG/DL — HIGH (ref 70–99)
GLUCOSE SERPL-MCNC: 180 MG/DL — HIGH (ref 70–99)
HCT VFR BLD CALC: 26.9 % — LOW (ref 42–52)
HGB BLD-MCNC: 8.2 G/DL — LOW (ref 14–18)
MAGNESIUM SERPL-MCNC: 2.2 MG/DL — SIGNIFICANT CHANGE UP (ref 1.8–2.4)
MCHC RBC-ENTMCNC: 27.3 PG — SIGNIFICANT CHANGE UP (ref 27–31)
MCHC RBC-ENTMCNC: 30.5 G/DL — LOW (ref 32–37)
MCV RBC AUTO: 89.7 FL — SIGNIFICANT CHANGE UP (ref 80–94)
NRBC # BLD: 0 /100 WBCS — SIGNIFICANT CHANGE UP (ref 0–0)
PHOSPHATE SERPL-MCNC: 2.8 MG/DL — SIGNIFICANT CHANGE UP (ref 2.1–4.9)
PLATELET # BLD AUTO: 213 K/UL — SIGNIFICANT CHANGE UP (ref 130–400)
POTASSIUM SERPL-MCNC: 4 MMOL/L — SIGNIFICANT CHANGE UP (ref 3.5–5)
POTASSIUM SERPL-SCNC: 4 MMOL/L — SIGNIFICANT CHANGE UP (ref 3.5–5)
PROT SERPL-MCNC: 4.6 G/DL — LOW (ref 6–8)
RBC # BLD: 3 M/UL — LOW (ref 4.7–6.1)
RBC # FLD: 16.3 % — HIGH (ref 11.5–14.5)
SODIUM SERPL-SCNC: 141 MMOL/L — SIGNIFICANT CHANGE UP (ref 135–146)
WBC # BLD: 4.92 K/UL — SIGNIFICANT CHANGE UP (ref 4.8–10.8)
WBC # FLD AUTO: 4.92 K/UL — SIGNIFICANT CHANGE UP (ref 4.8–10.8)

## 2018-11-23 RX ORDER — ACETAMINOPHEN 500 MG
650 TABLET ORAL EVERY 6 HOURS
Qty: 0 | Refills: 0 | Status: DISCONTINUED | OUTPATIENT
Start: 2018-11-23 | End: 2018-12-13

## 2018-11-23 RX ORDER — AMLODIPINE BESYLATE 2.5 MG/1
2.5 TABLET ORAL ONCE
Qty: 0 | Refills: 0 | Status: COMPLETED | OUTPATIENT
Start: 2018-11-23 | End: 2018-11-23

## 2018-11-23 RX ADMIN — SENNA PLUS 5 MILLILITER(S): 8.6 TABLET ORAL at 23:26

## 2018-11-23 RX ADMIN — Medication 500 MILLIGRAM(S): at 12:49

## 2018-11-23 RX ADMIN — Medication 300 MILLIGRAM(S): at 12:49

## 2018-11-23 RX ADMIN — Medication 81 MILLIGRAM(S): at 12:49

## 2018-11-23 RX ADMIN — LOSARTAN POTASSIUM 50 MILLIGRAM(S): 100 TABLET, FILM COATED ORAL at 05:29

## 2018-11-23 RX ADMIN — Medication 2: at 00:30

## 2018-11-23 RX ADMIN — APIXABAN 5 MILLIGRAM(S): 2.5 TABLET, FILM COATED ORAL at 05:29

## 2018-11-23 RX ADMIN — Medication 50 MILLIGRAM(S): at 17:12

## 2018-11-23 RX ADMIN — PANTOPRAZOLE SODIUM 40 MILLIGRAM(S): 20 TABLET, DELAYED RELEASE ORAL at 12:50

## 2018-11-23 RX ADMIN — INSULIN GLARGINE 8 UNIT(S): 100 INJECTION, SOLUTION SUBCUTANEOUS at 21:32

## 2018-11-23 RX ADMIN — CEFEPIME 100 MILLIGRAM(S): 1 INJECTION, POWDER, FOR SOLUTION INTRAMUSCULAR; INTRAVENOUS at 05:29

## 2018-11-23 RX ADMIN — Medication 50 MILLIGRAM(S): at 05:29

## 2018-11-23 RX ADMIN — Medication 2: at 17:12

## 2018-11-23 RX ADMIN — OLANZAPINE 5 MILLIGRAM(S): 15 TABLET, FILM COATED ORAL at 12:51

## 2018-11-23 RX ADMIN — ATORVASTATIN CALCIUM 40 MILLIGRAM(S): 80 TABLET, FILM COATED ORAL at 21:32

## 2018-11-23 RX ADMIN — AMLODIPINE BESYLATE 2.5 MILLIGRAM(S): 2.5 TABLET ORAL at 05:29

## 2018-11-23 RX ADMIN — Medication 1: at 13:03

## 2018-11-23 RX ADMIN — APIXABAN 5 MILLIGRAM(S): 2.5 TABLET, FILM COATED ORAL at 17:12

## 2018-11-23 RX ADMIN — AMLODIPINE BESYLATE 2.5 MILLIGRAM(S): 2.5 TABLET ORAL at 22:03

## 2018-11-23 NOTE — PROGRESS NOTE ADULT - ASSESSMENT
JOEL - better  hypernatremia  dehydration  PNA / sepsis  pseudomonas UTI  Afib with rvr  CVA 3 weeks ago, nonverbal, s/p PEG / dementia  DM  HTN    plan:  off ivf  f/u cmp, mg, phos  peg feeds and free water per nutrition  abx per ID  eliquis  amiodarone / lopressor  norvasc and losartan   DNR / DNI

## 2018-11-23 NOTE — PROGRESS NOTE ADULT - SUBJECTIVE AND OBJECTIVE BOX
NEPHROLOGY FOLLOW UP NOTE    pt seen and examined  nonverbal  off ivf  tolerating peg feeds and h20 flushes  bp and hr controlled  no overnight events      PAST MEDICAL & SURGICAL HISTORY:  Stroke  DVT (deep venous thrombosis)  Anemia  Diabetes  HTN (hypertension)  Arrhythmia  Dementia  S/P cholecystectomy  History of hip replacement    Allergies:  No Known Allergies    Home Medications Reviewed    SOCIAL HISTORY:  Denies ETOH,Smoking,   FAMILY HISTORY:  No pertinent family history in first degree relatives    Yes      REVIEW OF SYSTEMS:  unobtainable      PHYSICAL EXAM:  NAD  lethargic  pale  dry mm  no jvd  b/l bs  irreg  soft, + binder over peg  no edema    Hospital Medications:   MEDICATIONS  (STANDING):  amiodarone Infusion 1 mG/Min (33.333 mL/Hr) IV Continuous <Continuous>  amLODIPine   Tablet 2.5 milliGRAM(s) Oral daily  apixaban 5 milliGRAM(s) Oral every 12 hours  ascorbic acid 500 milliGRAM(s) Oral daily  aspirin  chewable 81 milliGRAM(s) Oral daily  atorvastatin 40 milliGRAM(s) Oral at bedtime  dextrose 5%. 1000 milliLiter(s) (50 mL/Hr) IV Continuous <Continuous>  dextrose 50% Injectable 12.5 Gram(s) IV Push once  dextrose 50% Injectable 25 Gram(s) IV Push once  dextrose 50% Injectable 25 Gram(s) IV Push once  ferrous    sulfate Liquid 300 milliGRAM(s) Enteral Tube daily  insulin glargine Injectable (LANTUS) 8 Unit(s) SubCutaneous at bedtime  insulin lispro (HumaLOG) corrective regimen sliding scale   SubCutaneous every 6 hours  levoFLOXacin  Tablet 750 milliGRAM(s) Oral every 24 hours  losartan 50 milliGRAM(s) Oral daily  metoprolol tartrate 50 milliGRAM(s) Oral every 12 hours  OLANZapine Disintegrating Tablet 5 milliGRAM(s) Oral daily  pantoprazole   Suspension 40 milliGRAM(s) Oral daily  senna Syrup 5 milliLiter(s) Oral at bedtime        VITALS:  T(F): 97 (18 @ 06:08), Max: 97.4 (18 @ 14:20)  HR: 82 (18 @ 06:08)  BP: 148/82 (18 @ 06:08)  RR: 18 (18 @ 06:08)  SpO2: --  Wt(kg): --     @ 07:01  -   @ 07:00  --------------------------------------------------------  IN: 1940 mL / OUT: 1000 mL / NET: 940 mL     @ 07:  -   @ 07:00  --------------------------------------------------------  IN: 1460 mL / OUT: 1425 mL / NET: 35 mL      Height (cm): 182.88 ( 10:11)  Weight (kg): 74.2 ( @ 10:11)  BMI (kg/m2): 22.2 ( 10:11)  BSA (m2): 1.96 ( 10:11)    LABS:                              8.2    4.92  )-----------( 213      ( 2018 07:58 )             26.9       Urine Studies:  Urinalysis Basic - ( 2018 12:05 )    Color: Yellow / Appearance: Clear / S.015 / pH:   Gluc:  / Ketone: Negative  / Bili: Negative / Urobili: 4.0 mg/dL   Blood:  / Protein: 30 mg/dL / Nitrite: Positive   Leuk Esterase: Trace / RBC: 3-5 /HPF / WBC 6-10 /HPF   Sq Epi:  / Non Sq Epi:  / Bacteria: Few          RADIOLOGY & ADDITIONAL STUDIES:

## 2018-11-23 NOTE — PROGRESS NOTE ADULT - SUBJECTIVE AND OBJECTIVE BOX
SUSI MARTIN  78y  Male    Patient is a 78y old  Male who presents with a chief complaint of Fever/Tachycardia (23 Nov 2018 08:22)    ALLERGIES:  No Known Allergies      INTERVAL HPI/OVERNIGHT EVENTS:    VITALS:  T(F): 97 (11-23-18 @ 06:08), Max: 97.4 (11-22-18 @ 14:20)  HR: 82 (11-23-18 @ 06:08) (82 - 107)  BP: 148/82 (11-23-18 @ 06:08) (126/86 - 148/82)  RR: 18 (11-23-18 @ 06:08) (18 - 18)  SpO2: --    LABS:  11-23    141  |  107  |  31<H>  ----------------------------<  180<H>  4.0   |  24  |  0.7    Ca    7.7<L>      23 Nov 2018 07:58  Phos  2.8     11-23  Mg     2.2     11-23    TPro  4.6<L>  /  Alb  2.6<L>  /  TBili  0.6  /  DBili  x   /  AST  19  /  ALT  14  /  AlkPhos  122<H>  11-23    MICROBIOLOGY:    MEDICATION:  acetaminophen   Tablet .. 650 milliGRAM(s) Oral every 6 hours PRN  amiodarone Infusion 1 mG/Min IV Continuous <Continuous>  amLODIPine   Tablet 2.5 milliGRAM(s) Oral daily  apixaban 5 milliGRAM(s) Oral every 12 hours  ascorbic acid 500 milliGRAM(s) Oral daily  aspirin  chewable 81 milliGRAM(s) Oral daily  atorvastatin 40 milliGRAM(s) Oral at bedtime  dextrose 40% Gel 15 Gram(s) Oral once PRN  dextrose 5%. 1000 milliLiter(s) IV Continuous <Continuous>  dextrose 50% Injectable 12.5 Gram(s) IV Push once  dextrose 50% Injectable 25 Gram(s) IV Push once  dextrose 50% Injectable 25 Gram(s) IV Push once  ferrous    sulfate Liquid 300 milliGRAM(s) Enteral Tube daily  glucagon  Injectable 1 milliGRAM(s) IntraMuscular once PRN  insulin glargine Injectable (LANTUS) 8 Unit(s) SubCutaneous at bedtime  insulin lispro (HumaLOG) corrective regimen sliding scale   SubCutaneous every 6 hours  lactulose Syrup 20 Gram(s) Oral daily PRN  levoFLOXacin  Tablet 750 milliGRAM(s) Oral every 24 hours  losartan 50 milliGRAM(s) Oral daily  metoprolol tartrate 50 milliGRAM(s) Oral every 12 hours  OLANZapine Disintegrating Tablet 5 milliGRAM(s) Oral daily  pantoprazole   Suspension 40 milliGRAM(s) Oral daily  senna Syrup 5 milliLiter(s) Oral at bedtime    RADIOLOGY & ADDITIONAL TESTS:

## 2018-11-23 NOTE — PROGRESS NOTE ADULT - SUBJECTIVE AND OBJECTIVE BOX
infectious diseases progress note:  SUSI MARTIN is a 78yMale patient    SEPTIC SHOCK;ATRIAL FIBRILLATION WITH RVR;HCAP;URINARY TRACT INFECTION  SEPSIS PNEUMONIA UTI WITHOUT HEMATURIA    Urinary tract infection without hematuria, site unspecified  Sepsis, due to unspecified organism  Dysphagia  JOEL (acute kidney injury)  HTN (hypertension)  Diabetes  DVT (deep venous thrombosis)  Atrial fibrillation with RVR  Pneumonia due to infectious organism, unspecified laterality, unspecified part of lung  Sepsis      ROS:  UTO     Allergies    No Known Allergies    Intolerances        ANTIBIOTICS/RELEVANT:  antimicrobials  cefepime   IVPB 1000 milliGRAM(s) IV Intermittent every 8 hours    immunologic:    OTHER:  acetaminophen   Tablet .. 650 milliGRAM(s) Oral every 6 hours PRN  amiodarone Infusion 1 mG/Min IV Continuous <Continuous>  amLODIPine   Tablet 2.5 milliGRAM(s) Oral daily  apixaban 5 milliGRAM(s) Oral every 12 hours  ascorbic acid 500 milliGRAM(s) Oral daily  aspirin  chewable 81 milliGRAM(s) Oral daily  atorvastatin 40 milliGRAM(s) Oral at bedtime  dextrose 40% Gel 15 Gram(s) Oral once PRN  dextrose 5%. 1000 milliLiter(s) IV Continuous <Continuous>  dextrose 50% Injectable 12.5 Gram(s) IV Push once  dextrose 50% Injectable 25 Gram(s) IV Push once  dextrose 50% Injectable 25 Gram(s) IV Push once  ferrous    sulfate Liquid 300 milliGRAM(s) Enteral Tube daily  glucagon  Injectable 1 milliGRAM(s) IntraMuscular once PRN  insulin glargine Injectable (LANTUS) 8 Unit(s) SubCutaneous at bedtime  insulin lispro (HumaLOG) corrective regimen sliding scale   SubCutaneous every 6 hours  lactulose Syrup 20 Gram(s) Oral daily PRN  losartan 50 milliGRAM(s) Oral daily  metoprolol tartrate 50 milliGRAM(s) Oral every 12 hours  OLANZapine Disintegrating Tablet 5 milliGRAM(s) Oral daily  pantoprazole   Suspension 40 milliGRAM(s) Oral daily  senna Syrup 5 milliLiter(s) Oral at bedtime      Objective:  T(F): 97 (11-23-18 @ 06:08), Max: 97.4 (11-22-18 @ 14:20)  HR: 82 (11-23-18 @ 06:08) (82 - 107)  BP: 148/82 (11-23-18 @ 06:08) (126/86 - 148/82)  RR: 18 (11-23-18 @ 06:08) (18 - 20)  SpO2: 98% (11-22-18 @ 07:53) (98% - 98%)    PHYSICAL EXAM:  Constitutional:Well-developed, well nourished  Ear/Nose/Throat: no oral lesion, no sinus tenderness on percussion	  Neck:no JVD, no lymphadenopathy, supple  Respiratory: Clear germain  Cardiovascular: S1S2 RRR  Gastrointestinal:soft, (+) BS, no HSM. PEG in situ   Extremities:no phlebitis       LABS:                        8.7    4.03  )-----------( 176      ( 22 Nov 2018 19:20 )             28.3                   MICROBIOLOGY:    Culture - Blood (collected 11-19-18 @ 12:30)  Source: .Blood Blood-Peripheral  Preliminary Report (11-20-18 @ 23:01):    No growth to date.    Culture - Blood (collected 11-19-18 @ 12:30)  Source: .Blood Blood-Peripheral  Preliminary Report (11-20-18 @ 23:01):    No growth to date.    Culture - Urine (collected 11-19-18 @ 11:40)  Source: .Urine Clean Catch (Midstream)  Final Report (11-21-18 @ 16:24):    >100,000 CFU/ml Pseudomonas aeruginosa  Organism: Pseudomonas aeruginosa (11-21-18 @ 16:24)  Organism: Pseudomonas aeruginosa (11-21-18 @ 16:24)      -  Amikacin: S <=8      -  Aztreonam: I 16      -  Cefepime: S <=2      -  Ceftazidime: S 4      -  Ciprofloxacin: S <=0.5      -  Gentamicin: S 4      -  Imipenem: S 2      -  Levofloxacin: S <=1      -  Meropenem: S <=1      -  Piperacillin/Tazobactam: S <=8      -  Tobramycin: S <=2      Method Type: NANCY

## 2018-11-24 LAB
ALBUMIN SERPL ELPH-MCNC: 2.6 G/DL — LOW (ref 3.5–5.2)
ALP SERPL-CCNC: 118 U/L — HIGH (ref 30–115)
ALT FLD-CCNC: 14 U/L — SIGNIFICANT CHANGE UP (ref 0–41)
ANION GAP SERPL CALC-SCNC: 10 MMOL/L — SIGNIFICANT CHANGE UP (ref 7–14)
AST SERPL-CCNC: 29 U/L — SIGNIFICANT CHANGE UP (ref 0–41)
BILIRUB SERPL-MCNC: 0.5 MG/DL — SIGNIFICANT CHANGE UP (ref 0.2–1.2)
BUN SERPL-MCNC: 28 MG/DL — HIGH (ref 10–20)
CALCIUM SERPL-MCNC: 8.1 MG/DL — LOW (ref 8.5–10.1)
CHLORIDE SERPL-SCNC: 109 MMOL/L — SIGNIFICANT CHANGE UP (ref 98–110)
CO2 SERPL-SCNC: 25 MMOL/L — SIGNIFICANT CHANGE UP (ref 17–32)
CREAT SERPL-MCNC: 0.7 MG/DL — SIGNIFICANT CHANGE UP (ref 0.7–1.5)
CULTURE RESULTS: SIGNIFICANT CHANGE UP
CULTURE RESULTS: SIGNIFICANT CHANGE UP
GLUCOSE BLDC GLUCOMTR-MCNC: 120 MG/DL — HIGH (ref 70–99)
GLUCOSE BLDC GLUCOMTR-MCNC: 132 MG/DL — HIGH (ref 70–99)
GLUCOSE BLDC GLUCOMTR-MCNC: 156 MG/DL — HIGH (ref 70–99)
GLUCOSE BLDC GLUCOMTR-MCNC: 169 MG/DL — HIGH (ref 70–99)
GLUCOSE BLDC GLUCOMTR-MCNC: 170 MG/DL — HIGH (ref 70–99)
GLUCOSE BLDC GLUCOMTR-MCNC: 176 MG/DL — HIGH (ref 70–99)
GLUCOSE BLDC GLUCOMTR-MCNC: 183 MG/DL — HIGH (ref 70–99)
GLUCOSE SERPL-MCNC: 139 MG/DL — HIGH (ref 70–99)
HCT VFR BLD CALC: 26.7 % — LOW (ref 42–52)
HGB BLD-MCNC: 8.4 G/DL — LOW (ref 14–18)
MCHC RBC-ENTMCNC: 27.3 PG — SIGNIFICANT CHANGE UP (ref 27–31)
MCHC RBC-ENTMCNC: 31.5 G/DL — LOW (ref 32–37)
MCV RBC AUTO: 86.7 FL — SIGNIFICANT CHANGE UP (ref 80–94)
NRBC # BLD: 0 /100 WBCS — SIGNIFICANT CHANGE UP (ref 0–0)
PLATELET # BLD AUTO: 192 K/UL — SIGNIFICANT CHANGE UP (ref 130–400)
POTASSIUM SERPL-MCNC: 4.7 MMOL/L — SIGNIFICANT CHANGE UP (ref 3.5–5)
POTASSIUM SERPL-SCNC: 4.7 MMOL/L — SIGNIFICANT CHANGE UP (ref 3.5–5)
PROT SERPL-MCNC: 4.8 G/DL — LOW (ref 6–8)
RBC # BLD: 3.08 M/UL — LOW (ref 4.7–6.1)
RBC # FLD: 16.4 % — HIGH (ref 11.5–14.5)
SODIUM SERPL-SCNC: 144 MMOL/L — SIGNIFICANT CHANGE UP (ref 135–146)
SPECIMEN SOURCE: SIGNIFICANT CHANGE UP
SPECIMEN SOURCE: SIGNIFICANT CHANGE UP
WBC # BLD: 4.84 K/UL — SIGNIFICANT CHANGE UP (ref 4.8–10.8)
WBC # FLD AUTO: 4.84 K/UL — SIGNIFICANT CHANGE UP (ref 4.8–10.8)

## 2018-11-24 RX ORDER — METOPROLOL TARTRATE 50 MG
50 TABLET ORAL EVERY 8 HOURS
Qty: 0 | Refills: 0 | Status: DISCONTINUED | OUTPATIENT
Start: 2018-11-24 | End: 2018-11-26

## 2018-11-24 RX ORDER — IRON SUCROSE 20 MG/ML
100 INJECTION, SOLUTION INTRAVENOUS EVERY 24 HOURS
Qty: 0 | Refills: 0 | Status: COMPLETED | OUTPATIENT
Start: 2018-11-24 | End: 2018-11-26

## 2018-11-24 RX ADMIN — PANTOPRAZOLE SODIUM 40 MILLIGRAM(S): 20 TABLET, DELAYED RELEASE ORAL at 12:03

## 2018-11-24 RX ADMIN — OLANZAPINE 5 MILLIGRAM(S): 15 TABLET, FILM COATED ORAL at 12:04

## 2018-11-24 RX ADMIN — INSULIN GLARGINE 8 UNIT(S): 100 INJECTION, SOLUTION SUBCUTANEOUS at 21:40

## 2018-11-24 RX ADMIN — Medication 1: at 00:12

## 2018-11-24 RX ADMIN — Medication 50 MILLIGRAM(S): at 15:01

## 2018-11-24 RX ADMIN — Medication 300 MILLIGRAM(S): at 12:03

## 2018-11-24 RX ADMIN — Medication 50 MILLIGRAM(S): at 21:38

## 2018-11-24 RX ADMIN — Medication 1: at 17:50

## 2018-11-24 RX ADMIN — ATORVASTATIN CALCIUM 40 MILLIGRAM(S): 80 TABLET, FILM COATED ORAL at 21:38

## 2018-11-24 RX ADMIN — IRON SUCROSE 210 MILLIGRAM(S): 20 INJECTION, SOLUTION INTRAVENOUS at 10:12

## 2018-11-24 RX ADMIN — SENNA PLUS 5 MILLILITER(S): 8.6 TABLET ORAL at 21:40

## 2018-11-24 RX ADMIN — Medication 500 MILLIGRAM(S): at 12:05

## 2018-11-24 RX ADMIN — LOSARTAN POTASSIUM 50 MILLIGRAM(S): 100 TABLET, FILM COATED ORAL at 05:35

## 2018-11-24 RX ADMIN — AMLODIPINE BESYLATE 2.5 MILLIGRAM(S): 2.5 TABLET ORAL at 05:35

## 2018-11-24 RX ADMIN — Medication 1: at 06:02

## 2018-11-24 RX ADMIN — Medication 1: at 12:04

## 2018-11-24 RX ADMIN — Medication 50 MILLIGRAM(S): at 05:35

## 2018-11-24 RX ADMIN — APIXABAN 5 MILLIGRAM(S): 2.5 TABLET, FILM COATED ORAL at 05:35

## 2018-11-24 NOTE — PROGRESS NOTE ADULT - SUBJECTIVE AND OBJECTIVE BOX
SUSI MARTIN  78y  Male    Patient is a 78y old  Male who presents with a chief complaint of Fever/Tachycardia (24 Nov 2018 07:11)  large rectal bleed -will hold Eliquis	   ALLERGIES:  No Known Allergies      INTERVAL HPI/OVERNIGHT EVENTS:    VITALS:  T(F): 96 (11-24-18 @ 05:17), Max: 98.9 (11-23-18 @ 21:54)  HR: 109 (11-24-18 @ 05:17) (85 - 110)  BP: 140/80 (11-24-18 @ 05:17) (138/65 - 140/90)  RR: 18 (11-24-18 @ 05:17) (18 - 20)  SpO2: 98% (11-23-18 @ 19:55) (98% - 98%)    LABS:  11-24    144  |  109  |  28<H>  ----------------------------<  139<H>  4.7   |  25  |  0.7    Ca    8.1<L>      24 Nov 2018 07:25  Phos  2.8     11-23  Mg     2.2     11-23    TPro  4.8<L>  /  Alb  2.6<L>  /  TBili  0.5  /  DBili  x   /  AST  29  /  ALT  14  /  AlkPhos  118<H>  11-24    MICROBIOLOGY:    MEDICATION:  acetaminophen   Tablet .. 650 milliGRAM(s) Oral every 6 hours PRN  amiodarone Infusion 1 mG/Min IV Continuous <Continuous>  amLODIPine   Tablet 2.5 milliGRAM(s) Oral daily  ascorbic acid 500 milliGRAM(s) Oral daily  atorvastatin 40 milliGRAM(s) Oral at bedtime  dextrose 40% Gel 15 Gram(s) Oral once PRN  dextrose 5%. 1000 milliLiter(s) IV Continuous <Continuous>  dextrose 50% Injectable 12.5 Gram(s) IV Push once  dextrose 50% Injectable 25 Gram(s) IV Push once  dextrose 50% Injectable 25 Gram(s) IV Push once  ferrous    sulfate Liquid 300 milliGRAM(s) Enteral Tube daily  glucagon  Injectable 1 milliGRAM(s) IntraMuscular once PRN  insulin glargine Injectable (LANTUS) 8 Unit(s) SubCutaneous at bedtime  insulin lispro (HumaLOG) corrective regimen sliding scale   SubCutaneous every 6 hours  iron sucrose IVPB 100 milliGRAM(s) IV Intermittent every 24 hours  lactulose Syrup 20 Gram(s) Oral daily PRN  levoFLOXacin  Tablet 750 milliGRAM(s) Oral every 24 hours  losartan 50 milliGRAM(s) Oral daily  metoprolol tartrate 50 milliGRAM(s) Oral every 8 hours  OLANZapine Disintegrating Tablet 5 milliGRAM(s) Oral daily  pantoprazole   Suspension 40 milliGRAM(s) Oral daily  senna Syrup 5 milliLiter(s) Oral at bedtime    RADIOLOGY & ADDITIONAL TESTS:

## 2018-11-24 NOTE — PROGRESS NOTE ADULT - SUBJECTIVE AND OBJECTIVE BOX
NEPHROLOGY FOLLOW UP NOTE    pt seen and examined  nonverbal  no overnight events  mostly lethargic  no fever  tachycardic this am      PAST MEDICAL & SURGICAL HISTORY:  Stroke  DVT (deep venous thrombosis)  Anemia  Diabetes  HTN (hypertension)  Arrhythmia  Dementia  S/P cholecystectomy  History of hip replacement    Allergies:  No Known Allergies    Home Medications Reviewed    SOCIAL HISTORY:  Denies ETOH,Smoking,   FAMILY HISTORY:  No pertinent family history in first degree relatives    Yes      REVIEW OF SYSTEMS:  unobtainable      PHYSICAL EXAM:  NAD  lethargic  pale  dry mm  no jvd  b/l bs  irreg  soft, + binder over peg  no edema    Hospital Medications:   MEDICATIONS  (STANDING):  amiodarone Infusion 1 mG/Min (33.333 mL/Hr) IV Continuous <Continuous>  amLODIPine   Tablet 2.5 milliGRAM(s) Oral daily  apixaban 5 milliGRAM(s) Oral every 12 hours  ascorbic acid 500 milliGRAM(s) Oral daily  atorvastatin 40 milliGRAM(s) Oral at bedtime  dextrose 5%. 1000 milliLiter(s) (50 mL/Hr) IV Continuous <Continuous>  dextrose 50% Injectable 12.5 Gram(s) IV Push once  dextrose 50% Injectable 25 Gram(s) IV Push once  dextrose 50% Injectable 25 Gram(s) IV Push once  ferrous    sulfate Liquid 300 milliGRAM(s) Enteral Tube daily  insulin glargine Injectable (LANTUS) 8 Unit(s) SubCutaneous at bedtime  insulin lispro (HumaLOG) corrective regimen sliding scale   SubCutaneous every 6 hours  levoFLOXacin  Tablet 750 milliGRAM(s) Oral every 24 hours  losartan 50 milliGRAM(s) Oral daily  metoprolol tartrate 50 milliGRAM(s) Oral every 12 hours  OLANZapine Disintegrating Tablet 5 milliGRAM(s) Oral daily  pantoprazole   Suspension 40 milliGRAM(s) Oral daily  senna Syrup 5 milliLiter(s) Oral at bedtime        VITALS:  T(F): 96 (18 @ 05:17), Max: 98.9 (18 @ 21:54)  HR: 109 (18 @ 05:17)  BP: 140/80 (18 @ 05:17)  RR: 18 (18 @ 05:17)  SpO2: 98% (18 @ 19:55)  Wt(kg): --     @ 07:01  -   @ 07:00  --------------------------------------------------------  IN: 1460 mL / OUT: 1425 mL / NET: 35 mL     @ 07:01  -   @ 07:00  --------------------------------------------------------  IN: 1940 mL / OUT: 0 mL / NET: 1940 mL      Height (cm): 182.88 (:)  Weight (kg): 74.2 (:)  BMI (kg/m2): 22.2 (:)  BSA (m2): 1.96 (:)    LABS:      141  |  107  |  31<H>  ----------------------------<  180<H>  4.0   |  24  |  0.7    Ca    7.7<L>      2018 07:58  Phos  2.8       Mg     2.2         TPro  4.6<L>  /  Alb  2.6<L>  /  TBili  0.6  /  DBili      /  AST  19  /  ALT  14  /  AlkPhos  122<H>                            8.2    4.92  )-----------( 213      ( 2018 07:58 )             26.9       Urine Studies:  Urinalysis Basic - ( 2018 12:05 )    Color: Yellow / Appearance: Clear / S.015 / pH:   Gluc:  / Ketone: Negative  / Bili: Negative / Urobili: 4.0 mg/dL   Blood:  / Protein: 30 mg/dL / Nitrite: Positive   Leuk Esterase: Trace / RBC: 3-5 /HPF / WBC 6-10 /HPF   Sq Epi:  / Non Sq Epi:  / Bacteria: Few          RADIOLOGY & ADDITIONAL STUDIES:

## 2018-11-24 NOTE — CONSULT NOTE ADULT - ASSESSMENT
79 yo M with a rectal Hge complicated by xarelto.  Likely etiology LGIB (AVM/Hemorrhoids/Adenoma/adenoca/SRUS.  Hemodynamically and blood count stable.    1)Hematochezia- ?LGIB  2)Afib with High CHADVASC    Rec:  1)Close monitoring (CBC q8 and vital sign checks)  2)Transfuse to above 8 if needed  3)Endoscopic work up next week? 77 yo M with a rectal Hge complicated by xarelto.  Likely etiology LGIB (AVM/Hemorrhoids/Adenoma/adenoca/SRUS.  Hemodynamically and blood count stable.    1)Hematochezia- ?LGIB  2)Afib with High CHADVASC    Rec:  1)Close monitoring (CBC q8 and vital sign checks)  2)Transfuse to above 8 if needed  3)Endoscopic work up early next week or sooner if there is evidence of active GI bleeding

## 2018-11-24 NOTE — CONSULT NOTE ADULT - SUBJECTIVE AND OBJECTIVE BOX
Gastroenterology Consultation    79yo Male with   Patient is a 78y old  Male who presents with a chief complaint of Fever/Tachycardia (24 Nov 2018 09:39)    HPI:  Patient initially admitted to [facility] in october, discharged on 11/8/18 to SNF after having sustained a CVA, course complciated by UTI.  Wife reports that the patient was doing well but developed fever 4 days ago.  The patient is aphasic at baseline so was not complaining but she reports increased cough and worsening agitation.  Patient was sent to the ED for further evaluation and found to have new opacities on RLL.  Also in AFIB w/RVR, currently on Eliquis. (19 Nov 2018 15:30)      GI Hx:  79 yo m Hx of Afib on Xarelto recently diagnosed with a CV admitted to the hospital for pyelonephritis.  Hospital course got complicated by a large rectal bleed.  AC held.    Previous colonoscopy(ies): none on file  Previous EGD(s): none on file    REVIEW OF SYSTEMS  General:  No weight loss, fevers, or chills.  Eyes:  No reported pain or visual changes  ENT:  No sore throat or runny nose.  NECK: No stiffness or lymphadenopathy  CV:  No chest pain or palpitations.  Resp:  No shortness of breath, cough, wheezing or hemoptysis  GI:  No abdominal pain, nausea, vomiting, dysphagia, diarrhea or constipation. No rectal bleeding, melena, or hematemesis.  :  No dysuria, urinary incontinence or hematuria.  Muscle:  No aches or weakness  Neuro:  No tingling, numbness or vertigo  Psych:  No mood problems or irritability.  Endocrine:  No polyuria, polydipsia or cold/heat intolerance  Heme:  No ecchymosis or easy bruisability  All other review of systems is negative unless indicated above.    PHYSICAL EXAM:  GENERAL: AAOx3, no acute distress.  HEAD:  Atraumatic, Normocephalic  EYES: EOMI, PERRLA, conjunctiva and sclera clear  NECK: Supple, no JVD or thyromegaly  NODES: No palpable cervical or supraclavicular lymphadenopathy   CHEST/LUNG: Clear to auscultation bilaterally; No wheeze, rhonchi, or rales  HEART: Regular rate and rhythm; normal S1, S2, No murmurs.  ABDOMEN: Soft, nontender, nondistended; Bowel sounds present, no abdominal bruit, masses, or hepatosplenomegaly  EXTREMITIES:  2+ Peripheral Pulses. No clubbing, cyanosis, or edema  PSYCH:  Cooperative and appropriate, normal affect.  NEUROLOGY: No asterixis or tremor. Motor and sensory functions grossly intact  SKIN: Intact, no jaundice  EXTREMITIES: warm, no periph edema.  Rectal exam: not done.    PAST MEDICAL & SURGICAL HISTORY:  Stroke  DVT (deep venous thrombosis)  Anemia  Diabetes  HTN (hypertension)  Arrhythmia  Dementia  S/P cholecystectomy  History of hip replacement      amLODIPine 5 mg oral tablet: 0.5 tab(s) orally once a day  aspirin 81 mg oral delayed release tablet: 1 tab(s) orally once a day  atorvastatin 40 mg oral tablet: 1 tab(s) orally once a day (at bedtime)  collagenase 250 units/g topical ointment: 1 application topically once a day  Eliquis 5 mg oral tablet: 1 tab(s) orally 2 times a day  ferrous sulfate 325 mg (65 mg elemental iron) oral delayed release tablet: 1 tab(s) orally once a day  Flomax 0.4 mg oral capsule: 1 cap(s) orally once a day (at bedtime)  lactulose 10 g/15 mL oral solution: orally once a day  Lantus 100 units/mL subcutaneous solution: 8 unit(s) subcutaneous once a day (at bedtime)  losartan 50 mg oral tablet: 1 tab(s) orally once a day  metFORMIN 1000 mg oral tablet: 1 tab(s) orally 2 times a day  Metoprolol Tartrate 25 mg oral tablet: 1 tab(s) orally 2 times a day  OLANZapine 5 mg oral tablet: 1 tab(s) orally once a day  senna oral tablet: 1 tab(s) orally once a day (at bedtime)  Tylenol 325 mg oral tablet: 2 tab(s) orally every 4 hours, As Needed  Vitamin C 500 mg oral tablet: 1 tab(s) orally once a day  zinc sulfate: 50 milligram(s) orally once a day      Allergies: No Known Allergies    Medications:   acetaminophen   Tablet .. 650 milliGRAM(s) Oral every 6 hours PRN  amiodarone Infusion 1 mG/Min IV Continuous <Continuous>  amLODIPine   Tablet 2.5 milliGRAM(s) Oral daily  ascorbic acid 500 milliGRAM(s) Oral daily  atorvastatin 40 milliGRAM(s) Oral at bedtime  dextrose 40% Gel 15 Gram(s) Oral once PRN  dextrose 5%. 1000 milliLiter(s) IV Continuous <Continuous>  dextrose 50% Injectable 12.5 Gram(s) IV Push once  dextrose 50% Injectable 25 Gram(s) IV Push once  dextrose 50% Injectable 25 Gram(s) IV Push once  ferrous    sulfate Liquid 300 milliGRAM(s) Enteral Tube daily  glucagon  Injectable 1 milliGRAM(s) IntraMuscular once PRN  insulin glargine Injectable (LANTUS) 8 Unit(s) SubCutaneous at bedtime  insulin lispro (HumaLOG) corrective regimen sliding scale   SubCutaneous every 6 hours  iron sucrose IVPB 100 milliGRAM(s) IV Intermittent every 24 hours  lactulose Syrup 20 Gram(s) Oral daily PRN  levoFLOXacin  Tablet 750 milliGRAM(s) Oral every 24 hours  losartan 50 milliGRAM(s) Oral daily  metoprolol tartrate 50 milliGRAM(s) Oral every 8 hours  OLANZapine Disintegrating Tablet 5 milliGRAM(s) Oral daily  pantoprazole   Suspension 40 milliGRAM(s) Oral daily  senna Syrup 5 milliLiter(s) Oral at bedtime        Physical Examination:  T(C): 35.6 (11-24-18 @ 05:17), Max: 37.2 (11-23-18 @ 21:54)  HR: 115 (11-24-18 @ 14:00) (85 - 115)  BP: 115/67 (11-24-18 @ 14:00) (115/67 - 140/80)  RR: 18 (11-24-18 @ 14:00) (18 - 18)  SpO2: 98% (11-23-18 @ 19:55) (98% - 98%)      Data:                        8.4    4.84  )-----------( 192      ( 24 Nov 2018 07:25 )             26.7     MCV 86.7 (11-24-18)    RDW 16.4 (11-24-18)    HGB trend:  8.4  11-24-18 @ 07:25  8.2  11-23-18 @ 07:58  8.7  11-22-18 @ 19:20        11-24    144  |  109  |  28<H>  ----------------------------<  139<H>  4.7   |  25  |  0.7    Ca    8.1<L>      24 Nov 2018 07:25  Phos  2.8     11-23  Mg     2.2     11-23    TPro  4.8<L>  /  Alb  2.6<L>  /  TBili  0.5  /  DBili  x   /  AST  29  /  ALT  14  /  AlkPhos  118<H>  11-24    Liver panel trend:  TBili 0.5   /   AST 29   /   ALT 14   /   AlkP 118   /   Tptn 4.8   /   Alb 2.6    /   DBili --      11-24  TBili 0.6   /   AST 19   /   ALT 14   /   AlkP 122   /   Tptn 4.6   /   Alb 2.6    /   DBili --      11-23  TBili 1.0   /   AST 12   /   ALT 11   /   AlkP 92   /   Tptn 4.9   /   Alb 2.9    /   DBili --      11-20  TBili 0.7   /   AST 12   /   ALT 14   /   AlkP 160   /   Tptn 6.1   /   Alb 3.5    /   DBili --      11-19      < from: CT Abdomen and Pelvis w/ IV Cont (10.23.18 @ 04:23) >  FINDINGS:    LOWER CHEST: Trace right pleural effusion. Bibasilar dependent   subsegmental atelectasis. Coronary artery calcifications. 8 mm nodule in   the left lower lobe (image 6 series 2)    HEPATOBILIARY: No focal hepatic lesion. Cholecystectomy with stable mild   associated intrahepatic biliary ductal dilatation. Common bile duct   within normal limits.    SPLEEN: Unremarkable.    PANCREAS: Unremarkable.    ADRENAL GLANDS: Unremarkable.    KIDNEYS: No hydronephrosis.    ABDOMINOPELVIC NODES: Unremarkable.    PELVIC ORGANS: Urinary bladder is decompressed with a Deluca catheter.   Extensive streak artifact from bilateral hip hardware limits evaluation   of the pelvis.    PERITONEUM/MESENTERY/BOWEL: No bowel obstruction. No free air.    BONES/SOFT TISSUES: Bones are osteopenic. Multilevel degenerative changes   in the spine. Partially imaged left hip hemiarthroplasty. Interval right   femoral neck fracture status post percutaneous pinning. Small bilateral   fat-containing inguinal hernias.    OTHER: Normal caliber mildly atherosclerotic abdominal aorta with   coronary atherosclerosis.      IMPRESSION:        1.  No CT evidence of acute abdominopelvic pathology.    2.  Trace right pleural effusion, essentially stable since August 11, 2018.    3.  8 mm left lower lobe nodule. Nonemergent PET CT may be of benefit.    < end of copied text >

## 2018-11-24 NOTE — PROGRESS NOTE ADULT - ASSESSMENT
JOEL - resolved  hypernatremia - better  dehydration  PNA / sepsis  pseudomonas UTI  Afib with rvr  CVA 3 weeks ago, nonverbal, s/p PEG / dementia  DM  HTN  anemia    plan:  give venofer 100mg iv q24h x 3 days  increase lopressor 50mg via peg q8h  off ivf  peg feeds and free water per nutrition  complete abx course  eliquis  amiodarone   norvasc and losartan   DNR / DNI  dc planning

## 2018-11-25 LAB
ALLERGY+IMMUNOLOGY DIAG STUDY NOTE: SIGNIFICANT CHANGE UP
GLUCOSE BLDC GLUCOMTR-MCNC: 148 MG/DL — HIGH (ref 70–99)
GLUCOSE BLDC GLUCOMTR-MCNC: 172 MG/DL — HIGH (ref 70–99)
GLUCOSE BLDC GLUCOMTR-MCNC: 190 MG/DL — HIGH (ref 70–99)
GLUCOSE BLDC GLUCOMTR-MCNC: 192 MG/DL — HIGH (ref 70–99)
GLUCOSE BLDC GLUCOMTR-MCNC: 197 MG/DL — HIGH (ref 70–99)
GLUCOSE BLDC GLUCOMTR-MCNC: 255 MG/DL — HIGH (ref 70–99)
HCT VFR BLD CALC: 24.6 % — LOW (ref 42–52)
HCT VFR BLD CALC: 26 % — LOW (ref 42–52)
HGB BLD-MCNC: 7.6 G/DL — LOW (ref 14–18)
HGB BLD-MCNC: 8 G/DL — LOW (ref 14–18)
MCHC RBC-ENTMCNC: 26.9 PG — LOW (ref 27–31)
MCHC RBC-ENTMCNC: 27.3 PG — SIGNIFICANT CHANGE UP (ref 27–31)
MCHC RBC-ENTMCNC: 30.8 G/DL — LOW (ref 32–37)
MCHC RBC-ENTMCNC: 30.9 G/DL — LOW (ref 32–37)
MCV RBC AUTO: 87.5 FL — SIGNIFICANT CHANGE UP (ref 80–94)
MCV RBC AUTO: 88.5 FL — SIGNIFICANT CHANGE UP (ref 80–94)
NRBC # BLD: 0 /100 WBCS — SIGNIFICANT CHANGE UP (ref 0–0)
NRBC # BLD: 0 /100 WBCS — SIGNIFICANT CHANGE UP (ref 0–0)
PLATELET # BLD AUTO: 176 K/UL — SIGNIFICANT CHANGE UP (ref 130–400)
PLATELET # BLD AUTO: 211 K/UL — SIGNIFICANT CHANGE UP (ref 130–400)
RBC # BLD: 2.78 M/UL — LOW (ref 4.7–6.1)
RBC # BLD: 2.97 M/UL — LOW (ref 4.7–6.1)
RBC # FLD: 16.3 % — HIGH (ref 11.5–14.5)
RBC # FLD: 16.6 % — HIGH (ref 11.5–14.5)
TYPE + AB SCN PNL BLD: SIGNIFICANT CHANGE UP
WBC # BLD: 3.74 K/UL — LOW (ref 4.8–10.8)
WBC # BLD: 5.87 K/UL — SIGNIFICANT CHANGE UP (ref 4.8–10.8)
WBC # FLD AUTO: 3.74 K/UL — LOW (ref 4.8–10.8)
WBC # FLD AUTO: 5.87 K/UL — SIGNIFICANT CHANGE UP (ref 4.8–10.8)

## 2018-11-25 RX ORDER — DILTIAZEM HCL 120 MG
120 CAPSULE, EXT RELEASE 24 HR ORAL DAILY
Qty: 0 | Refills: 0 | Status: DISCONTINUED | OUTPATIENT
Start: 2018-11-25 | End: 2018-11-25

## 2018-11-25 RX ADMIN — ATORVASTATIN CALCIUM 40 MILLIGRAM(S): 80 TABLET, FILM COATED ORAL at 22:17

## 2018-11-25 RX ADMIN — Medication 50 MILLIGRAM(S): at 15:46

## 2018-11-25 RX ADMIN — Medication 300 MILLIGRAM(S): at 11:34

## 2018-11-25 RX ADMIN — OLANZAPINE 5 MILLIGRAM(S): 15 TABLET, FILM COATED ORAL at 11:37

## 2018-11-25 RX ADMIN — AMLODIPINE BESYLATE 2.5 MILLIGRAM(S): 2.5 TABLET ORAL at 05:43

## 2018-11-25 RX ADMIN — Medication 500 MILLIGRAM(S): at 11:34

## 2018-11-25 RX ADMIN — Medication 3: at 12:08

## 2018-11-25 RX ADMIN — INSULIN GLARGINE 8 UNIT(S): 100 INJECTION, SOLUTION SUBCUTANEOUS at 22:17

## 2018-11-25 RX ADMIN — Medication 50 MILLIGRAM(S): at 05:43

## 2018-11-25 RX ADMIN — PANTOPRAZOLE SODIUM 40 MILLIGRAM(S): 20 TABLET, DELAYED RELEASE ORAL at 11:37

## 2018-11-25 RX ADMIN — Medication 1: at 05:57

## 2018-11-25 RX ADMIN — Medication 50 MILLIGRAM(S): at 22:17

## 2018-11-25 RX ADMIN — IRON SUCROSE 210 MILLIGRAM(S): 20 INJECTION, SOLUTION INTRAVENOUS at 10:09

## 2018-11-25 RX ADMIN — Medication 1: at 23:48

## 2018-11-25 RX ADMIN — SENNA PLUS 5 MILLILITER(S): 8.6 TABLET ORAL at 22:18

## 2018-11-25 NOTE — PROGRESS NOTE ADULT - SUBJECTIVE AND OBJECTIVE BOX
SUSI MARTIN  78y  Male    Patient is a 78y old  Male who presents with a chief complaint of Fever/Tachycardia (25 Nov 2018 07:11)  still bleeding will give i unit 	  ALLERGIES:  No Known Allergies      INTERVAL HPI/OVERNIGHT EVENTS:    VITALS:  T(F): 101.8 (11-25-18 @ 10:43), Max: 101.8 (11-25-18 @ 10:43)  HR: 110 (11-25-18 @ 05:33) (101 - 115)  BP: 122/70 (11-25-18 @ 05:33) (115/67 - 123/59)  RR: 16 (11-25-18 @ 05:33) (16 - 18)  SpO2: --    LABS:  11-24    144  |  109  |  28<H>  ----------------------------<  139<H>  4.7   |  25  |  0.7    Ca    8.1<L>      24 Nov 2018 07:25    TPro  4.8<L>  /  Alb  2.6<L>  /  TBili  0.5  /  DBili  x   /  AST  29  /  ALT  14  /  AlkPhos  118<H>  11-24    MICROBIOLOGY:    MEDICATION:  acetaminophen   Tablet .. 650 milliGRAM(s) Oral every 6 hours PRN  diltiazem    Tablet 30 milliGRAM(s) Enteral Tube every 6 hours  iron sucrose IVPB 100 milliGRAM(s) IV Intermittent every 24 hours  levoFLOXacin  Tablet 750 milliGRAM(s) Oral every 24 hours  metoprolol tartrate 50 milliGRAM(s) Oral every 8 hours    RADIOLOGY & ADDITIONAL TESTS:

## 2018-11-25 NOTE — PROGRESS NOTE ADULT - ASSESSMENT
JOEL - resolved  hypernatremia - better  dehydration  PNA / sepsis still spiking   pseudomonas UTI  Afib with rvr - still tachy  bloody BM  CVA 3 weeks ago, nonverbal, s/p PEG / dementia  DM  HTN  anemia    plan: ------------------------------------------------------------------------------------------------------  complete venofer 100mg iv q24h x 3 days  cont lopressor 50mg via peg q8h  dc norvasc  add cardizem cd 120mg peg qd  transfuse 1 unit PRBS  poor prognosis  peg feeds and free water per nutrition  complete abx course  eliquis  off amiodarone gtt  cont losartan   f/u H/H, recheck stool guiaic  DNR / DNI

## 2018-11-25 NOTE — PROGRESS NOTE ADULT - SUBJECTIVE AND OBJECTIVE BOX
NEPHROLOGY FOLLOW UP NOTE    pt seen and examined  nonverbal  bloody bm per nursing, but stool guiaic negative  still tachy  talavera out  nonverbal      PAST MEDICAL & SURGICAL HISTORY:  Stroke  DVT (deep venous thrombosis)  Anemia  Diabetes  HTN (hypertension)  Arrhythmia  Dementia  S/P cholecystectomy  History of hip replacement    Allergies:  No Known Allergies    Home Medications Reviewed    SOCIAL HISTORY:  Denies ETOH,Smoking,   FAMILY HISTORY:  No pertinent family history in first degree relatives    Yes      REVIEW OF SYSTEMS:  unobtainable      PHYSICAL EXAM:  NAD  lethargic  pale  dry mm  no jvd  b/l bs  irreg  soft, + binder over peg  no edema    Hospital Medications:   MEDICATIONS  (STANDING):  amiodarone Infusion 1 mG/Min (33.333 mL/Hr) IV Continuous <Continuous>  amLODIPine   Tablet 2.5 milliGRAM(s) Oral daily  ascorbic acid 500 milliGRAM(s) Oral daily  atorvastatin 40 milliGRAM(s) Oral at bedtime  dextrose 5%. 1000 milliLiter(s) (50 mL/Hr) IV Continuous <Continuous>  dextrose 50% Injectable 12.5 Gram(s) IV Push once  dextrose 50% Injectable 25 Gram(s) IV Push once  dextrose 50% Injectable 25 Gram(s) IV Push once  ferrous    sulfate Liquid 300 milliGRAM(s) Enteral Tube daily  insulin glargine Injectable (LANTUS) 8 Unit(s) SubCutaneous at bedtime  insulin lispro (HumaLOG) corrective regimen sliding scale   SubCutaneous every 6 hours  iron sucrose IVPB 100 milliGRAM(s) IV Intermittent every 24 hours  levoFLOXacin  Tablet 750 milliGRAM(s) Oral every 24 hours  losartan 50 milliGRAM(s) Oral daily  metoprolol tartrate 50 milliGRAM(s) Oral every 8 hours  OLANZapine Disintegrating Tablet 5 milliGRAM(s) Oral daily  pantoprazole   Suspension 40 milliGRAM(s) Oral daily  senna Syrup 5 milliLiter(s) Oral at bedtime        VITALS:  T(F): 96.9 (18 @ 05:33), Max: 98 (18 @ 22:12)  HR: 110 (18 @ 05:33)  BP: 122/70 (18 @ 05:33)  RR: 16 (18 @ 05:33)  SpO2: --  Wt(kg): --     @ 07:01  -   @ 07:00  --------------------------------------------------------  IN: 1940 mL / OUT: 0 mL / NET: 1940 mL     @ 07:01  -   @ 07:00  --------------------------------------------------------  IN: 1260 mL / OUT: 0 mL / NET: 1260 mL      Height (cm): 182.88 ( 09:39)  Weight (kg): 74.2 ( 09:39)  BMI (kg/m2): 22.2 ( 09:39)  BSA (m2): 1.96 ( 09:39)    LABS:      144  |  109  |  28<H>  ----------------------------<  139<H>  4.7   |  25  |  0.7    Ca    8.1<L>      2018 07:25  Phos  2.8       Mg     2.2         TPro  4.8<L>  /  Alb  2.6<L>  /  TBili  0.5  /  DBili      /  AST  29  /  ALT  14  /  AlkPhos  118<H>                            8.4    4.84  )-----------( 192      ( 2018 07:25 )             26.7       Urine Studies:  Urinalysis Basic - ( 2018 12:05 )    Color: Yellow / Appearance: Clear / S.015 / pH:   Gluc:  / Ketone: Negative  / Bili: Negative / Urobili: 4.0 mg/dL   Blood:  / Protein: 30 mg/dL / Nitrite: Positive   Leuk Esterase: Trace / RBC: 3-5 /HPF / WBC 6-10 /HPF   Sq Epi:  / Non Sq Epi:  / Bacteria: Few          RADIOLOGY & ADDITIONAL STUDIES:

## 2018-11-25 NOTE — PROGRESS NOTE ADULT - ASSESSMENT
JOEL - resolved  hypernatremia - better  dehydration  PNA / sepsis  pseudomonas UTI  Afib with rvr - still tachy  bloody BM  CVA 3 weeks ago, nonverbal, s/p PEG / dementia  DM  HTN  anemia    plan:  complete venofer 100mg iv q24h x 3 days  cont lopressor 50mg via peg q8h  dc norvasc  add cardizem cd 120mg peg qd  peg feeds and free water per nutrition  complete abx course  eliquis  off amiodarone gtt  cont losartan   f/u H/H, recheck stool guiaic  DNR / DNI

## 2018-11-26 LAB
ALBUMIN SERPL ELPH-MCNC: 2.6 G/DL — LOW (ref 3.5–5.2)
ALP SERPL-CCNC: 124 U/L — HIGH (ref 30–115)
ALT FLD-CCNC: 12 U/L — SIGNIFICANT CHANGE UP (ref 0–41)
ANION GAP SERPL CALC-SCNC: 10 MMOL/L — SIGNIFICANT CHANGE UP (ref 7–14)
AST SERPL-CCNC: 17 U/L — SIGNIFICANT CHANGE UP (ref 0–41)
BILIRUB SERPL-MCNC: 0.5 MG/DL — SIGNIFICANT CHANGE UP (ref 0.2–1.2)
BUN SERPL-MCNC: 27 MG/DL — HIGH (ref 10–20)
CALCIUM SERPL-MCNC: 7.9 MG/DL — LOW (ref 8.5–10.1)
CHLORIDE SERPL-SCNC: 107 MMOL/L — SIGNIFICANT CHANGE UP (ref 98–110)
CO2 SERPL-SCNC: 27 MMOL/L — SIGNIFICANT CHANGE UP (ref 17–32)
CREAT SERPL-MCNC: 0.9 MG/DL — SIGNIFICANT CHANGE UP (ref 0.7–1.5)
GLUCOSE BLDC GLUCOMTR-MCNC: 152 MG/DL — HIGH (ref 70–99)
GLUCOSE BLDC GLUCOMTR-MCNC: 176 MG/DL — HIGH (ref 70–99)
GLUCOSE BLDC GLUCOMTR-MCNC: 188 MG/DL — HIGH (ref 70–99)
GLUCOSE BLDC GLUCOMTR-MCNC: 208 MG/DL — HIGH (ref 70–99)
GLUCOSE BLDC GLUCOMTR-MCNC: 222 MG/DL — HIGH (ref 70–99)
GLUCOSE SERPL-MCNC: 183 MG/DL — HIGH (ref 70–99)
HCT VFR BLD CALC: 25.8 % — LOW (ref 42–52)
HGB BLD-MCNC: 7.9 G/DL — LOW (ref 14–18)
MCHC RBC-ENTMCNC: 26.8 PG — LOW (ref 27–31)
MCHC RBC-ENTMCNC: 30.6 G/DL — LOW (ref 32–37)
MCV RBC AUTO: 87.5 FL — SIGNIFICANT CHANGE UP (ref 80–94)
NRBC # BLD: 0 /100 WBCS — SIGNIFICANT CHANGE UP (ref 0–0)
PLATELET # BLD AUTO: 180 K/UL — SIGNIFICANT CHANGE UP (ref 130–400)
POTASSIUM SERPL-MCNC: 4.5 MMOL/L — SIGNIFICANT CHANGE UP (ref 3.5–5)
POTASSIUM SERPL-SCNC: 4.5 MMOL/L — SIGNIFICANT CHANGE UP (ref 3.5–5)
PROT SERPL-MCNC: 4.6 G/DL — LOW (ref 6–8)
RBC # BLD: 2.95 M/UL — LOW (ref 4.7–6.1)
RBC # FLD: 16.8 % — HIGH (ref 11.5–14.5)
SODIUM SERPL-SCNC: 144 MMOL/L — SIGNIFICANT CHANGE UP (ref 135–146)
WBC # BLD: 4.15 K/UL — LOW (ref 4.8–10.8)
WBC # FLD AUTO: 4.15 K/UL — LOW (ref 4.8–10.8)

## 2018-11-26 RX ORDER — METOPROLOL TARTRATE 50 MG
100 TABLET ORAL
Qty: 0 | Refills: 0 | Status: DISCONTINUED | OUTPATIENT
Start: 2018-11-26 | End: 2018-12-13

## 2018-11-26 RX ORDER — SOD SULF/SODIUM/NAHCO3/KCL/PEG
4000 SOLUTION, RECONSTITUTED, ORAL ORAL ONCE
Qty: 0 | Refills: 0 | Status: DISCONTINUED | OUTPATIENT
Start: 2018-11-26 | End: 2018-11-26

## 2018-11-26 RX ORDER — SODIUM CHLORIDE 9 MG/ML
1000 INJECTION, SOLUTION INTRAVENOUS
Qty: 0 | Refills: 0 | Status: DISCONTINUED | OUTPATIENT
Start: 2018-11-26 | End: 2018-12-04

## 2018-11-26 RX ORDER — MORPHINE SULFATE 50 MG/1
2 CAPSULE, EXTENDED RELEASE ORAL ONCE
Qty: 0 | Refills: 0 | Status: DISCONTINUED | OUTPATIENT
Start: 2018-11-26 | End: 2018-11-26

## 2018-11-26 RX ADMIN — SODIUM CHLORIDE 60 MILLILITER(S): 9 INJECTION, SOLUTION INTRAVENOUS at 11:32

## 2018-11-26 RX ADMIN — Medication 1: at 06:00

## 2018-11-26 RX ADMIN — Medication 1: at 17:33

## 2018-11-26 RX ADMIN — Medication 500 MILLIGRAM(S): at 11:31

## 2018-11-26 RX ADMIN — LOSARTAN POTASSIUM 50 MILLIGRAM(S): 100 TABLET, FILM COATED ORAL at 06:00

## 2018-11-26 RX ADMIN — OLANZAPINE 5 MILLIGRAM(S): 15 TABLET, FILM COATED ORAL at 11:31

## 2018-11-26 RX ADMIN — Medication 300 MILLIGRAM(S): at 11:30

## 2018-11-26 RX ADMIN — MORPHINE SULFATE 2 MILLIGRAM(S): 50 CAPSULE, EXTENDED RELEASE ORAL at 11:30

## 2018-11-26 RX ADMIN — SENNA PLUS 5 MILLILITER(S): 8.6 TABLET ORAL at 21:28

## 2018-11-26 RX ADMIN — PANTOPRAZOLE SODIUM 40 MILLIGRAM(S): 20 TABLET, DELAYED RELEASE ORAL at 11:30

## 2018-11-26 RX ADMIN — ATORVASTATIN CALCIUM 40 MILLIGRAM(S): 80 TABLET, FILM COATED ORAL at 21:28

## 2018-11-26 RX ADMIN — Medication 100 MILLIGRAM(S): at 17:33

## 2018-11-26 RX ADMIN — Medication 1: at 11:31

## 2018-11-26 RX ADMIN — MORPHINE SULFATE 2 MILLIGRAM(S): 50 CAPSULE, EXTENDED RELEASE ORAL at 12:00

## 2018-11-26 RX ADMIN — INSULIN GLARGINE 8 UNIT(S): 100 INJECTION, SOLUTION SUBCUTANEOUS at 21:28

## 2018-11-26 RX ADMIN — Medication 50 MILLIGRAM(S): at 06:00

## 2018-11-26 RX ADMIN — IRON SUCROSE 210 MILLIGRAM(S): 20 INJECTION, SOLUTION INTRAVENOUS at 11:37

## 2018-11-26 NOTE — PROGRESS NOTE ADULT - ASSESSMENT
77 yo M with a rectal Hge complicated by xarelto.  Likely etiology LGIB (AVM/Hemorrhoids/Adenoma/adenoca/SRUS.      1)Hematochezia- ?LGIB  2)Afib with High CHADVASC  Apixiban has been held     Rec:  Patient will undergo Colonoscopy Wednesday   Go Lytely 4L through PEG tube Tuesday NPO after midnight Tuesday night for Wednesday   INR Ordered for Tomorrow AM  1)Close monitoring (CBC q8 and vital sign checks)  2)Transfuse to above 8 if needed  3)Endoscopic work up early next week or sooner if there is evidence of active GI bleeding 77 yo M with a rectal bleeding, recently on xarelto.    1)Hematochezia- ?LGIB  2)Afib with High CHADVASC  Apixiban has been held     Rec:  Patient will undergo Colonoscopy Wednesday   Go Lytely 4L through PEG tube Tuesday NPO after midnight Tuesday night for Wednesday   INR Ordered for Tomorrow AM  1)Close monitoring (CBC q8 and vital sign checks)  2)Transfuse to above 8 if needed

## 2018-11-26 NOTE — PROGRESS NOTE ADULT - ASSESSMENT
JOEL - resolved  hypernatremia - better  dehydration  PNA / sepsis  pseudomonas UTI  Afib with rvr - still tachycardic  bloody BM again  CVA 3 weeks ago, nonverbal, s/p PEG / dementia  DM  HTN  anemia      plan:  resume 1/2NS @ 60 cc/hr  complete venofer course  increase lopressor 100mg peg q12h  off norvasc, cont cardizem 30mg peg q6h  peg feeds and free water per nutrition  complete abx course  eliquis  cont losartan   GI eval pending  f/u labs  DNR / DNI

## 2018-11-26 NOTE — PROGRESS NOTE ADULT - SUBJECTIVE AND OBJECTIVE BOX
NEPHROLOGY FOLLOW UP NOTE    still with bloody bm's  poorly responsive  remains tachycardic      PAST MEDICAL & SURGICAL HISTORY:  Stroke  DVT (deep venous thrombosis)  Anemia  Diabetes  HTN (hypertension)  Arrhythmia  Dementia  S/P cholecystectomy  History of hip replacement    Allergies:  No Known Allergies    Home Medications Reviewed    SOCIAL HISTORY:  Denies ETOH,Smoking,   FAMILY HISTORY:  No pertinent family history in first degree relatives    Yes      REVIEW OF SYSTEMS:  unobtainable      PHYSICAL EXAM:  NAD  lethargic  pale  dry mm  no jvd  b/l bs  irreg  soft, + binder over peg  no edema    Hospital Medications:   MEDICATIONS  (STANDING):  amiodarone Infusion 1 mG/Min (33.333 mL/Hr) IV Continuous <Continuous>  ascorbic acid 500 milliGRAM(s) Oral daily  atorvastatin 40 milliGRAM(s) Oral at bedtime  dextrose 5%. 1000 milliLiter(s) (50 mL/Hr) IV Continuous <Continuous>  dextrose 50% Injectable 12.5 Gram(s) IV Push once  dextrose 50% Injectable 25 Gram(s) IV Push once  dextrose 50% Injectable 25 Gram(s) IV Push once  diltiazem    Tablet 30 milliGRAM(s) Enteral Tube every 6 hours  ferrous    sulfate Liquid 300 milliGRAM(s) Enteral Tube daily  insulin glargine Injectable (LANTUS) 8 Unit(s) SubCutaneous at bedtime  insulin lispro (HumaLOG) corrective regimen sliding scale   SubCutaneous every 6 hours  iron sucrose IVPB 100 milliGRAM(s) IV Intermittent every 24 hours  levoFLOXacin  Tablet 750 milliGRAM(s) Oral every 24 hours  losartan 50 milliGRAM(s) Oral daily  metoprolol tartrate 100 milliGRAM(s) Oral two times a day  OLANZapine Disintegrating Tablet 5 milliGRAM(s) Oral daily  pantoprazole   Suspension 40 milliGRAM(s) Oral daily  senna Syrup 5 milliLiter(s) Oral at bedtime        VITALS:  T(F): 96.3 (18 @ 05:32), Max: 101.8 (18 @ 10:43)  HR: 128 (18 @ 06:00)  BP: 166/79 (18 @ 06:00)  RR: 16 (18 @ 05:32)  SpO2: 98% (18 @ 06:00)  Wt(kg): --     @ 07:01  -   @ 07:00  --------------------------------------------------------  IN: 1890 mL / OUT: 0 mL / NET: 1890 mL     @ 07:01  -   @ 07:00  --------------------------------------------------------  IN: 1260 mL / OUT: 0 mL / NET: 1260 mL      Height (cm): 182.88 ( @ 12:37)  Weight (kg): 74.2 ( 12:37)  BMI (kg/m2): 22.2 ( 12:37)  BSA (m2): 1.96 ( 12:37)    LABS:                              8.0    3.74  )-----------( 176      ( 2018 21:16 )             26.0       Urine Studies:  Urinalysis Basic - ( 2018 12:05 )    Color: Yellow / Appearance: Clear / S.015 / pH:   Gluc:  / Ketone: Negative  / Bili: Negative / Urobili: 4.0 mg/dL   Blood:  / Protein: 30 mg/dL / Nitrite: Positive   Leuk Esterase: Trace / RBC: 3-5 /HPF / WBC 6-10 /HPF   Sq Epi:  / Non Sq Epi:  / Bacteria: Few          RADIOLOGY & ADDITIONAL STUDIES:

## 2018-11-26 NOTE — PROGRESS NOTE ADULT - SUBJECTIVE AND OBJECTIVE BOX
79 yo M with a rectal Hge complicated by xarelto.  Likely etiology LGIB (AVM/Hemorrhoids/Adenoma/adenoca/SRUS.  78yMale  Being followed for possible LGIB   Interval history: Patient was given one unit PRBCS yesterday after an episode of bloody stools in the morning. Patient had one episode of blood in stool in the morning at about 11am. Patients hemoglobin is 7.9 from 8.0.      PMHX/PSHX:  PAST MEDICAL & SURGICAL HISTORY:  Stroke  DVT (deep venous thrombosis)  Anemia  Diabetes  HTN (hypertension)  Arrhythmia  Dementia  S/P cholecystectomy  History of hip replacement      Social History: No smoking. No alcohol. No illegal drug use.    MEDICATIONS  MEDICATIONS  (STANDING):  amiodarone Infusion 1 mG/Min (33.333 mL/Hr) IV Continuous <Continuous>  ascorbic acid 500 milliGRAM(s) Oral daily  atorvastatin 40 milliGRAM(s) Oral at bedtime  dextrose 5%. 1000 milliLiter(s) (50 mL/Hr) IV Continuous <Continuous>  dextrose 50% Injectable 12.5 Gram(s) IV Push once  dextrose 50% Injectable 25 Gram(s) IV Push once  dextrose 50% Injectable 25 Gram(s) IV Push once  diltiazem    Tablet 30 milliGRAM(s) Enteral Tube every 6 hours  ferrous    sulfate Liquid 300 milliGRAM(s) Enteral Tube daily  insulin glargine Injectable (LANTUS) 8 Unit(s) SubCutaneous at bedtime  insulin lispro (HumaLOG) corrective regimen sliding scale   SubCutaneous every 6 hours  levoFLOXacin  Tablet 750 milliGRAM(s) Oral every 24 hours  losartan 50 milliGRAM(s) Oral daily  metoprolol tartrate 100 milliGRAM(s) Oral two times a day  OLANZapine Disintegrating Tablet 5 milliGRAM(s) Oral daily  pantoprazole   Suspension 40 milliGRAM(s) Oral daily  senna Syrup 5 milliLiter(s) Oral at bedtime  sodium chloride 0.45%. 1000 milliLiter(s) (60 mL/Hr) IV Continuous <Continuous>    MEDICATIONS  (PRN):  acetaminophen   Tablet .. 650 milliGRAM(s) Oral every 6 hours PRN Temp greater or equal to 38C (100.4F), Mild Pain (1 - 3)  dextrose 40% Gel 15 Gram(s) Oral once PRN Blood Glucose LESS THAN 70 milliGRAM(s)/deciliter  glucagon  Injectable 1 milliGRAM(s) IntraMuscular once PRN Glucose LESS THAN 70 milligrams/deciliter  lactulose Syrup 20 Gram(s) Oral daily PRN constipation        Allergies:  Allergies    No Known Allergies    Intolerances          REVIEW OF SYSTEMS:  REVIEW OF SYSTEMS:  Constitutional: No fever, weight loss   Neck-No pain  Eyes-No icterus or pain  Cardiovascular: No chest pain, palpitations  Gastrointestinal: No abdominal pain.. No nausea, vomiting or hematemesis; +Bloody stool episode yesterday morning No constipation. No melena or hematochezia.  Skin: No itching, rashes or lesions   Lower extremities-No edema   Neuro-No dizziness     VITAL SIGNS:   T(F): 96.3 (11-26-18 @ 05:32), Max: 97.9 (11-25-18 @ 14:14)  HR: 128 (11-26-18 @ 06:00) (106 - 128)  BP: 166/79 (11-26-18 @ 06:00) (99/56 - 166/79)  RR: 16 (11-26-18 @ 05:32) (16 - 18)  SpO2: 98% (11-26-18 @ 06:00) (98% - 98%)    PHYSICAL EXAM:  PHYSICAL EXAM  General: Well developed;  in no acute distress  HEENT: MMM, conjunctiva and sclera clear  Neck-No thyromegaly   Lungs: Clear, no Rhonchi  Heart-Normal s1/s2  Gastrointestinal: Soft non-tender non-distended; Normal bowel sounds; No hepatosplenomegaly. No rebound or guarding +PEG tube   Skin: Warm and dry. No obvious rash  LE-No edema         LABS:                        7.9    4.15  )-----------( 180      ( 26 Nov 2018 08:00 )             25.8     11-26    144  |  107  |  27<H>  ----------------------------<  183<H>  4.5   |  27  |  0.9    Ca    7.9<L>      26 Nov 2018 08:00    TPro  4.6<L>  /  Alb  2.6<L>  /  TBili  0.5  /  DBili  x   /  AST  17  /  ALT  12  /  AlkPhos  124<H>  11-26    LIVER FUNCTIONS - ( 26 Nov 2018 08:00 )  Alb: 2.6 g/dL / Pro: 4.6 g/dL / ALK PHOS: 124 U/L / ALT: 12 U/L / AST: 17 U/L / GGT: x               IMAGING:          Old medical records were reviewed through [facility], including medical notes, labs, pathology results, radiographic and endoscopic  images. 79 yo M with a rectal bleeding, recently on  xarelto.  Likely etiology LGIB (AVM/Hemorrhoids/Adenoma/neoplasm.    Interval history: Patient was given one unit PRBCS yesterday after an episode of bloody stools in the morning. Patient had one episode of blood in stool in the morning at about 11am.      PMHX/PSHX:  PAST MEDICAL & SURGICAL HISTORY:  Stroke  DVT (deep venous thrombosis)  Anemia  Diabetes  HTN (hypertension)  Arrhythmia  Dementia  S/P cholecystectomy  History of hip replacement      Social History: No smoking. No alcohol. No illegal drug use.      MEDICATIONS  (STANDING):  amiodarone Infusion 1 mG/Min (33.333 mL/Hr) IV Continuous <Continuous>  ascorbic acid 500 milliGRAM(s) Oral daily  atorvastatin 40 milliGRAM(s) Oral at bedtime  dextrose 5%. 1000 milliLiter(s) (50 mL/Hr) IV Continuous <Continuous>  dextrose 50% Injectable 12.5 Gram(s) IV Push once  dextrose 50% Injectable 25 Gram(s) IV Push once  dextrose 50% Injectable 25 Gram(s) IV Push once  diltiazem    Tablet 30 milliGRAM(s) Enteral Tube every 6 hours  ferrous    sulfate Liquid 300 milliGRAM(s) Enteral Tube daily  insulin glargine Injectable (LANTUS) 8 Unit(s) SubCutaneous at bedtime  insulin lispro (HumaLOG) corrective regimen sliding scale   SubCutaneous every 6 hours  levoFLOXacin  Tablet 750 milliGRAM(s) Oral every 24 hours  losartan 50 milliGRAM(s) Oral daily  metoprolol tartrate 100 milliGRAM(s) Oral two times a day  OLANZapine Disintegrating Tablet 5 milliGRAM(s) Oral daily  pantoprazole   Suspension 40 milliGRAM(s) Oral daily  senna Syrup 5 milliLiter(s) Oral at bedtime  sodium chloride 0.45%. 1000 milliLiter(s) (60 mL/Hr) IV Continuous <Continuous>    MEDICATIONS  (PRN):  acetaminophen   Tablet .. 650 milliGRAM(s) Oral every 6 hours PRN Temp greater or equal to 38C (100.4F), Mild Pain (1 - 3)  dextrose 40% Gel 15 Gram(s) Oral once PRN Blood Glucose LESS THAN 70 milliGRAM(s)/deciliter  glucagon  Injectable 1 milliGRAM(s) IntraMuscular once PRN Glucose LESS THAN 70 milligrams/deciliter  lactulose Syrup 20 Gram(s) Oral daily PRN constipation      Allergies  No Known Allergies    REVIEW OF SYSTEMS:  Constitutional: No fever, weight loss   Neck-No pain  Eyes-No icterus or pain  Cardiovascular: No chest pain, palpitations  Gastrointestinal: No abdominal pain.. No nausea, vomiting or hematemesis; +Bloody stool episode yesterday morning No constipation. No melena or hematochezia.  Skin: No itching, rashes or lesions   Lower extremities-No edema   Neuro-No dizziness     VITAL SIGNS:   T(F): 96.3 (11-26-18 @ 05:32), Max: 97.9 (11-25-18 @ 14:14)  HR: 128 (11-26-18 @ 06:00) (106 - 128)  BP: 166/79 (11-26-18 @ 06:00) (99/56 - 166/79)  RR: 16 (11-26-18 @ 05:32) (16 - 18)  SpO2: 98% (11-26-18 @ 06:00) (98% - 98%)      PHYSICAL EXAM  General: Well developed;  in no acute distress  HEENT: MMM, conjunctiva and sclera clear  Neck-No thyromegaly   Lungs: Clear, no Rhonchi  Heart-Normal s1/s2  Gastrointestinal: Soft non-tender non-distended; Normal bowel sounds; No hepatosplenomegaly. No rebound or guarding +PEG tube   Skin: Warm and dry. No obvious rash  LE-No edema         LABS:                        7.9    4.15  )-----------( 180      ( 26 Nov 2018 08:00 )             25.8     11-26    144  |  107  |  27<H>  ----------------------------<  183<H>  4.5   |  27  |  0.9    Ca    7.9<L>      26 Nov 2018 08:00    TPro  4.6<L>  /  Alb  2.6<L>  /  TBili  0.5  /  DBili  x   /  AST  17  /  ALT  12  /  AlkPhos  124<H>  11-26    LIVER FUNCTIONS - ( 26 Nov 2018 08:00 )  Alb: 2.6 g/dL / Pro: 4.6 g/dL / ALK PHOS: 124 U/L / ALT: 12 U/L / AST: 17 U/L / GGT: x               IMAGING:  Old medical records were reviewed through [facility], including medical notes, labs, pathology results, radiographic and endoscopic  images.

## 2018-11-26 NOTE — PROGRESS NOTE ADULT - SUBJECTIVE AND OBJECTIVE BOX
SUSI MARTIN  78y  Male    Patient is a 78y old  Male who presents with a chief complaint of Fever/Tachycardia (26 Nov 2018 07:56)    ALLERGIES:  No Known Allergies      INTERVAL HPI/OVERNIGHT EVENTS:    VITALS:  T(F): 96.3 (11-26-18 @ 05:32), Max: 97.9 (11-25-18 @ 14:14)  HR: 128 (11-26-18 @ 06:00) (106 - 128)  BP: 166/79 (11-26-18 @ 06:00) (99/56 - 166/79)  RR: 16 (11-26-18 @ 05:32) (16 - 18)  SpO2: 98% (11-26-18 @ 06:00) (98% - 98%)    LABS:  11-26    144  |  107  |  27<H>  ----------------------------<  183<H>  4.5   |  27  |  0.9    Ca    7.9<L>      26 Nov 2018 08:00    TPro  4.6<L>  /  Alb  2.6<L>  /  TBili  0.5  /  DBili  x   /  AST  17  /  ALT  12  /  AlkPhos  124<H>  11-26    MICROBIOLOGY:    MEDICATION:  acetaminophen   Tablet .. 650 milliGRAM(s) Oral every 6 hours PRN  diltiazem    Tablet 30 milliGRAM(s) Enteral Tube every 6 hours  levoFLOXacin  Tablet 750 milliGRAM(s) Oral every 24 hours  metoprolol tartrate 100 milliGRAM(s) Oral two times a day  polyethylene glycol/electrolyte Solution. 4000 milliLiter(s) Enteral Tube once  sodium chloride 0.45%. 1000 milliLiter(s) IV Continuous <Continuous>    RADIOLOGY & ADDITIONAL TESTS:

## 2018-11-27 LAB
ALBUMIN SERPL ELPH-MCNC: 2.7 G/DL — LOW (ref 3.5–5.2)
ALP SERPL-CCNC: 140 U/L — HIGH (ref 30–115)
ALT FLD-CCNC: 15 U/L — SIGNIFICANT CHANGE UP (ref 0–41)
ANION GAP SERPL CALC-SCNC: 10 MMOL/L — SIGNIFICANT CHANGE UP (ref 7–14)
APTT BLD: 31.5 SEC — SIGNIFICANT CHANGE UP (ref 27–39.2)
AST SERPL-CCNC: 21 U/L — SIGNIFICANT CHANGE UP (ref 0–41)
BILIRUB SERPL-MCNC: 0.5 MG/DL — SIGNIFICANT CHANGE UP (ref 0.2–1.2)
BUN SERPL-MCNC: 24 MG/DL — HIGH (ref 10–20)
CALCIUM SERPL-MCNC: 8.1 MG/DL — LOW (ref 8.5–10.1)
CHLORIDE SERPL-SCNC: 106 MMOL/L — SIGNIFICANT CHANGE UP (ref 98–110)
CO2 SERPL-SCNC: 27 MMOL/L — SIGNIFICANT CHANGE UP (ref 17–32)
CREAT SERPL-MCNC: 0.8 MG/DL — SIGNIFICANT CHANGE UP (ref 0.7–1.5)
GLUCOSE BLDC GLUCOMTR-MCNC: 156 MG/DL — HIGH (ref 70–99)
GLUCOSE BLDC GLUCOMTR-MCNC: 169 MG/DL — HIGH (ref 70–99)
GLUCOSE BLDC GLUCOMTR-MCNC: 172 MG/DL — HIGH (ref 70–99)
GLUCOSE BLDC GLUCOMTR-MCNC: 178 MG/DL — HIGH (ref 70–99)
GLUCOSE BLDC GLUCOMTR-MCNC: 203 MG/DL — HIGH (ref 70–99)
GLUCOSE BLDC GLUCOMTR-MCNC: 214 MG/DL — HIGH (ref 70–99)
GLUCOSE BLDC GLUCOMTR-MCNC: 219 MG/DL — HIGH (ref 70–99)
GLUCOSE SERPL-MCNC: 185 MG/DL — HIGH (ref 70–99)
HCT VFR BLD CALC: 27.7 % — LOW (ref 42–52)
HGB BLD-MCNC: 8.2 G/DL — LOW (ref 14–18)
INR BLD: 1.43 RATIO — HIGH (ref 0.65–1.3)
MAGNESIUM SERPL-MCNC: 2.4 MG/DL — SIGNIFICANT CHANGE UP (ref 1.8–2.4)
MCHC RBC-ENTMCNC: 26.4 PG — LOW (ref 27–31)
MCHC RBC-ENTMCNC: 29.6 G/DL — LOW (ref 32–37)
MCV RBC AUTO: 89.1 FL — SIGNIFICANT CHANGE UP (ref 80–94)
NRBC # BLD: 1 /100 WBCS — HIGH (ref 0–0)
PHOSPHATE SERPL-MCNC: 2.6 MG/DL — SIGNIFICANT CHANGE UP (ref 2.1–4.9)
PLATELET # BLD AUTO: 196 K/UL — SIGNIFICANT CHANGE UP (ref 130–400)
POTASSIUM SERPL-MCNC: 4.2 MMOL/L — SIGNIFICANT CHANGE UP (ref 3.5–5)
POTASSIUM SERPL-SCNC: 4.2 MMOL/L — SIGNIFICANT CHANGE UP (ref 3.5–5)
PROT SERPL-MCNC: 4.9 G/DL — LOW (ref 6–8)
PROTHROM AB SERPL-ACNC: 15.6 SEC — HIGH (ref 9.95–12.87)
RBC # BLD: 3.11 M/UL — LOW (ref 4.7–6.1)
RBC # FLD: 17.4 % — HIGH (ref 11.5–14.5)
SODIUM SERPL-SCNC: 143 MMOL/L — SIGNIFICANT CHANGE UP (ref 135–146)
WBC # BLD: 3.78 K/UL — LOW (ref 4.8–10.8)
WBC # FLD AUTO: 3.78 K/UL — LOW (ref 4.8–10.8)

## 2018-11-27 RX ORDER — SOD SULF/SODIUM/NAHCO3/KCL/PEG
4000 SOLUTION, RECONSTITUTED, ORAL ORAL ONCE
Qty: 0 | Refills: 0 | Status: COMPLETED | OUTPATIENT
Start: 2018-11-27 | End: 2018-11-27

## 2018-11-27 RX ADMIN — ATORVASTATIN CALCIUM 40 MILLIGRAM(S): 80 TABLET, FILM COATED ORAL at 21:18

## 2018-11-27 RX ADMIN — Medication 2: at 05:46

## 2018-11-27 RX ADMIN — Medication 100 MILLIGRAM(S): at 05:49

## 2018-11-27 RX ADMIN — LOSARTAN POTASSIUM 50 MILLIGRAM(S): 100 TABLET, FILM COATED ORAL at 05:40

## 2018-11-27 RX ADMIN — OLANZAPINE 5 MILLIGRAM(S): 15 TABLET, FILM COATED ORAL at 11:57

## 2018-11-27 RX ADMIN — Medication 2: at 00:05

## 2018-11-27 RX ADMIN — Medication 100 MILLIGRAM(S): at 17:10

## 2018-11-27 RX ADMIN — Medication 4000 MILLILITER(S): at 14:47

## 2018-11-27 RX ADMIN — PANTOPRAZOLE SODIUM 40 MILLIGRAM(S): 20 TABLET, DELAYED RELEASE ORAL at 11:57

## 2018-11-27 RX ADMIN — Medication 500 MILLIGRAM(S): at 11:56

## 2018-11-27 RX ADMIN — Medication 300 MILLIGRAM(S): at 11:57

## 2018-11-27 NOTE — PROGRESS NOTE ADULT - SUBJECTIVE AND OBJECTIVE BOX
78yMale  Being followed for Hematochezia  Interval history: As per nursing team and patient's wife patient did not have any further bloody bowel movements since Sunday. Patient did not require any blood transfusions last night. Patient received one unit of PRBCs Sunday after he had an episode of bloody stools. Patient's hemoglobin is currently 8.2 from  and patient's INR is 1.43    PMHX/PSHX:  PAST MEDICAL & SURGICAL HISTORY:  Stroke  DVT (deep venous thrombosis)  Anemia  Diabetes  HTN (hypertension)  Arrhythmia  Dementia  S/P cholecystectomy  History of hip replacement      Social History: No smoking. No alcohol. No illegal drug use.    MEDICATIONS:  MEDICATIONS  (STANDING):  amiodarone Infusion 1 mG/Min (33.333 mL/Hr) IV Continuous <Continuous>  ascorbic acid 500 milliGRAM(s) Oral daily  atorvastatin 40 milliGRAM(s) Oral at bedtime  dextrose 5%. 1000 milliLiter(s) (50 mL/Hr) IV Continuous <Continuous>  dextrose 50% Injectable 12.5 Gram(s) IV Push once  dextrose 50% Injectable 25 Gram(s) IV Push once  dextrose 50% Injectable 25 Gram(s) IV Push once  diltiazem    Tablet 30 milliGRAM(s) Enteral Tube every 6 hours  ferrous    sulfate Liquid 300 milliGRAM(s) Enteral Tube daily  insulin glargine Injectable (LANTUS) 8 Unit(s) SubCutaneous at bedtime  insulin lispro (HumaLOG) corrective regimen sliding scale   SubCutaneous every 6 hours  levoFLOXacin  Tablet 750 milliGRAM(s) Oral every 24 hours  losartan 50 milliGRAM(s) Oral daily  metoprolol tartrate 100 milliGRAM(s) Oral two times a day  OLANZapine Disintegrating Tablet 5 milliGRAM(s) Oral daily  pantoprazole   Suspension 40 milliGRAM(s) Oral daily  senna Syrup 5 milliLiter(s) Oral at bedtime  sodium chloride 0.45%. 1000 milliLiter(s) (60 mL/Hr) IV Continuous <Continuous>    MEDICATIONS  (PRN):  acetaminophen   Tablet .. 650 milliGRAM(s) Oral every 6 hours PRN Temp greater or equal to 38C (100.4F), Mild Pain (1 - 3)  dextrose 40% Gel 15 Gram(s) Oral once PRN Blood Glucose LESS THAN 70 milliGRAM(s)/deciliter  glucagon  Injectable 1 milliGRAM(s) IntraMuscular once PRN Glucose LESS THAN 70 milligrams/deciliter  lactulose Syrup 20 Gram(s) Oral daily PRN constipation        Allergies:  Allergies    No Known Allergies    Intolerances    REVIEW OF SYSTEMS:  Pt was resting patient's wife preferred not to wake patient up       VITAL SIGNS:   T(F): 99.1 (11-27-18 @ 07:00), Max: 107 (11-26-18 @ 22:15)  HR: 108 (11-27-18 @ 07:00) (92 - 108)  BP: 131/74 (11-27-18 @ 07:00) (103/60 - 152/77)  RR: 16 (11-27-18 @ 07:00) (16 - 16)  SpO2: 99% (11-26-18 @ 16:16) (99% - 99%)    PHYSICAL EXAM:  PHYSICAL EXAM  General: in no acute distress  HEENT: MMM, conjunctiva and sclera clear  Neck-No thyromegaly   Lungs: Clear, no Rhonchi  Heart-Normal s1/s2  Gastrointestinal: Soft non-tender non-distended; Normal bowel sounds; No hepatosplenomegaly. No rebound or guarding +PEG  Skin: Warm and dry. No obvious rash  LE-No edema         LABS:                        8.2    3.78  )-----------( 196      ( 27 Nov 2018 07:05 )             27.7     11-27    143  |  106  |  24<H>  ----------------------------<  185<H>  4.2   |  27  |  0.8    Ca    8.1<L>      27 Nov 2018 07:05  Phos  2.6     11-27  Mg     2.4     11-27    TPro  4.9<L>  /  Alb  2.7<L>  /  TBili  0.5  /  DBili  x   /  AST  21  /  ALT  15  /  AlkPhos  140<H>  11-27    LIVER FUNCTIONS - ( 27 Nov 2018 07:05 )  Alb: 2.7 g/dL / Pro: 4.9 g/dL / ALK PHOS: 140 U/L / ALT: 15 U/L / AST: 21 U/L / GGT: x           PT/INR - ( 27 Nov 2018 07:05 )   PT: 15.60 sec;   INR: 1.43 ratio         PTT - ( 27 Nov 2018 07:05 )  PTT:31.5 sec    IMAGING: 78yMale  Being followed for Hematochezia  Interval history: As per nursing team and patient's wife patient did not have any further bloody bowel movements since Sunday. Patient did not require any blood transfusions last night. Patient received one unit of PRBCs Sunday after he had an episode of bloody stools. Patient's hemoglobin is currently 8.2 from 7.9 and patient's INR is 1.43    PMHX/PSHX:  PAST MEDICAL & SURGICAL HISTORY:  Stroke  DVT (deep venous thrombosis)  Anemia  Diabetes  HTN (hypertension)  Arrhythmia  Dementia  S/P cholecystectomy  History of hip replacement      Social History: No smoking. No alcohol. No illegal drug use.    MEDICATIONS:  MEDICATIONS  (STANDING):  amiodarone Infusion 1 mG/Min (33.333 mL/Hr) IV Continuous <Continuous>  ascorbic acid 500 milliGRAM(s) Oral daily  atorvastatin 40 milliGRAM(s) Oral at bedtime  dextrose 5%. 1000 milliLiter(s) (50 mL/Hr) IV Continuous <Continuous>  dextrose 50% Injectable 12.5 Gram(s) IV Push once  dextrose 50% Injectable 25 Gram(s) IV Push once  dextrose 50% Injectable 25 Gram(s) IV Push once  diltiazem    Tablet 30 milliGRAM(s) Enteral Tube every 6 hours  ferrous    sulfate Liquid 300 milliGRAM(s) Enteral Tube daily  insulin glargine Injectable (LANTUS) 8 Unit(s) SubCutaneous at bedtime  insulin lispro (HumaLOG) corrective regimen sliding scale   SubCutaneous every 6 hours  levoFLOXacin  Tablet 750 milliGRAM(s) Oral every 24 hours  losartan 50 milliGRAM(s) Oral daily  metoprolol tartrate 100 milliGRAM(s) Oral two times a day  OLANZapine Disintegrating Tablet 5 milliGRAM(s) Oral daily  pantoprazole   Suspension 40 milliGRAM(s) Oral daily  senna Syrup 5 milliLiter(s) Oral at bedtime  sodium chloride 0.45%. 1000 milliLiter(s) (60 mL/Hr) IV Continuous <Continuous>    MEDICATIONS  (PRN):  acetaminophen   Tablet .. 650 milliGRAM(s) Oral every 6 hours PRN Temp greater or equal to 38C (100.4F), Mild Pain (1 - 3)  dextrose 40% Gel 15 Gram(s) Oral once PRN Blood Glucose LESS THAN 70 milliGRAM(s)/deciliter  glucagon  Injectable 1 milliGRAM(s) IntraMuscular once PRN Glucose LESS THAN 70 milligrams/deciliter  lactulose Syrup 20 Gram(s) Oral daily PRN constipation        Allergies:  Allergies    No Known Allergies    Intolerances    REVIEW OF SYSTEMS:  Pt was resting patient's wife preferred not to wake patient up       VITAL SIGNS:   T(F): 99.1 (11-27-18 @ 07:00), Max: 107 (11-26-18 @ 22:15)  HR: 108 (11-27-18 @ 07:00) (92 - 108)  BP: 131/74 (11-27-18 @ 07:00) (103/60 - 152/77)  RR: 16 (11-27-18 @ 07:00) (16 - 16)  SpO2: 99% (11-26-18 @ 16:16) (99% - 99%)    PHYSICAL EXAM:  PHYSICAL EXAM  General: in no acute distress  HEENT: MMM, conjunctiva and sclera clear  Neck-No thyromegaly   Lungs: Clear, no Rhonchi  Heart-Normal s1/s2  Gastrointestinal: Soft non-tender non-distended; Normal bowel sounds; No hepatosplenomegaly. No rebound or guarding +PEG  Skin: Warm and dry. No obvious rash  LE-No edema         LABS:                        8.2    3.78  )-----------( 196      ( 27 Nov 2018 07:05 )             27.7     11-27    143  |  106  |  24<H>  ----------------------------<  185<H>  4.2   |  27  |  0.8    Ca    8.1<L>      27 Nov 2018 07:05  Phos  2.6     11-27  Mg     2.4     11-27    TPro  4.9<L>  /  Alb  2.7<L>  /  TBili  0.5  /  DBili  x   /  AST  21  /  ALT  15  /  AlkPhos  140<H>  11-27    LIVER FUNCTIONS - ( 27 Nov 2018 07:05 )  Alb: 2.7 g/dL / Pro: 4.9 g/dL / ALK PHOS: 140 U/L / ALT: 15 U/L / AST: 21 U/L / GGT: x           PT/INR - ( 27 Nov 2018 07:05 )   PT: 15.60 sec;   INR: 1.43 ratio         PTT - ( 27 Nov 2018 07:05 )  PTT:31.5 sec    IMAGING:

## 2018-11-27 NOTE — PROGRESS NOTE ADULT - ASSESSMENT
ASSESSMENT  - fever/ sepsis   - h/o vascular dementia and R parietal lobe infarct  - dysphagia  - DM  -LGIB  PLAN ASSESSMENT  - fever/ sepsis   - h/o vascular dementia and R parietal lobe infarct  - dysphagia  - DM  - LGIB      PLAN  - for colonoscopy later today  - tube feeds inconsistently documented, generally only 2 feeds per day documented  - pt should receive Glucerna 1.2  480 ml at breakfast, lunch, and dinnertime, and 240ml at hs (NOT Q6H)  - fingerstick glucose and insulin treatments should be given immediately BEFORE each feeding  pt seen and d/w wife at bedside, d/w PA and above reviewed and amended.

## 2018-11-27 NOTE — PROGRESS NOTE ADULT - ASSESSMENT
79 yo M with a rectal bleeding, recently on xarelto.    Problem Hematochezia- ?LGIB  Afib with High CHADVASC  Apixiban has been held     Rec:  Patient will undergo Colonoscopy Wednesday   1)Go Lytely 4L through PEG tube Tuesday NPO after midnight Tuesday night for Wednesday   2)Close monitoring (CBC q8 and vital sign checks)  3)Transfuse to above 8 if needed 79 yo M with a rectal bleeding, recently on xarelto.    Problem Hematochezia- Possible LGIB  Afib with High CHADVASC  Apixiban has been held     Rec:  Patient will undergo Colonoscopy Wednesday   1)Go Lytely 4L through PEG tube Tuesday NPO after midnight Tuesday night for Wednesday   2)Close monitoring (CBC q8 and vital sign checks)  3)Transfuse to above 8 if needed

## 2018-11-27 NOTE — PROGRESS NOTE ADULT - SUBJECTIVE AND OBJECTIVE BOX
SUSI MARTIN  78y  Male    Patient is a 78y old  Male who presents with a chief complaint of Fever/Tachycardia (27 Nov 2018 09:50)    ALLERGIES:  No Known Allergies      INTERVAL HPI/OVERNIGHT EVENTS:    VITALS:  T(F): 97.8 (11-27-18 @ 14:00), Max: 107 (11-26-18 @ 22:15)  HR: 101 (11-27-18 @ 14:00) (101 - 108)  BP: 130/58 (11-27-18 @ 14:00) (130/58 - 152/77)  RR: 18 (11-27-18 @ 14:00) (16 - 18)  SpO2: 99% (11-26-18 @ 16:16) (99% - 99%)    LABS:  11-27    143  |  106  |  24<H>  ----------------------------<  185<H>  4.2   |  27  |  0.8    Ca    8.1<L>      27 Nov 2018 07:05  Phos  2.6     11-27  Mg     2.4     11-27    TPro  4.9<L>  /  Alb  2.7<L>  /  TBili  0.5  /  DBili  x   /  AST  21  /  ALT  15  /  AlkPhos  140<H>  11-27    MICROBIOLOGY:    MEDICATION:  acetaminophen   Tablet .. 650 milliGRAM(s) Oral every 6 hours PRN  diltiazem    Tablet 30 milliGRAM(s) Enteral Tube every 6 hours  levoFLOXacin  Tablet 750 milliGRAM(s) Oral every 24 hours  metoprolol tartrate 100 milliGRAM(s) Oral two times a day  sodium chloride 0.45%. 1000 milliLiter(s) IV Continuous <Continuous>    RADIOLOGY & ADDITIONAL TESTS:

## 2018-11-27 NOTE — PROGRESS NOTE ADULT - SUBJECTIVE AND OBJECTIVE BOX
NEPHROLOGY FOLLOW UP NOTE    bloody bm yesterday  s/p prbc transfusion  on ivf  for colonoscopy      PAST MEDICAL & SURGICAL HISTORY:  Stroke  DVT (deep venous thrombosis)  Anemia  Diabetes  HTN (hypertension)  Arrhythmia  Dementia  S/P cholecystectomy  History of hip replacement    Allergies:  No Known Allergies    Home Medications Reviewed    SOCIAL HISTORY:  Denies ETOH,Smoking,   FAMILY HISTORY:  No pertinent family history in first degree relatives    Yes      REVIEW OF SYSTEMS:  unobtainable      PHYSICAL EXAM:  NAD  lethargic  pale  dry mm  no jvd  b/l bs  irreg  soft, + binder over peg  no edema    Hospital Medications:   MEDICATIONS  (STANDING):  amiodarone Infusion 1 mG/Min (33.333 mL/Hr) IV Continuous <Continuous>  ascorbic acid 500 milliGRAM(s) Oral daily  atorvastatin 40 milliGRAM(s) Oral at bedtime  dextrose 5%. 1000 milliLiter(s) (50 mL/Hr) IV Continuous <Continuous>  dextrose 50% Injectable 12.5 Gram(s) IV Push once  dextrose 50% Injectable 25 Gram(s) IV Push once  dextrose 50% Injectable 25 Gram(s) IV Push once  diltiazem    Tablet 30 milliGRAM(s) Enteral Tube every 6 hours  ferrous    sulfate Liquid 300 milliGRAM(s) Enteral Tube daily  insulin glargine Injectable (LANTUS) 8 Unit(s) SubCutaneous at bedtime  insulin lispro (HumaLOG) corrective regimen sliding scale   SubCutaneous every 6 hours  levoFLOXacin  Tablet 750 milliGRAM(s) Oral every 24 hours  losartan 50 milliGRAM(s) Oral daily  metoprolol tartrate 100 milliGRAM(s) Oral two times a day  OLANZapine Disintegrating Tablet 5 milliGRAM(s) Oral daily  pantoprazole   Suspension 40 milliGRAM(s) Oral daily  senna Syrup 5 milliLiter(s) Oral at bedtime  sodium chloride 0.45%. 1000 milliLiter(s) (60 mL/Hr) IV Continuous <Continuous>        VITALS:  T(F): 99.1 (11-27-18 @ 07:00), Max: 107 (11-26-18 @ 22:15)  HR: 108 (11-27-18 @ 07:00)  BP: 131/74 (11-27-18 @ 07:00)  RR: 16 (11-27-18 @ 07:00)  SpO2: 99% (11-26-18 @ 16:16)  Wt(kg): --    11-25 @ 07:01  -  11-26 @ 07:00  --------------------------------------------------------  IN: 1260 mL / OUT: 0 mL / NET: 1260 mL    11-26 @ 07:01  -  11-27 @ 07:00  --------------------------------------------------------  IN: 1060 mL / OUT: 0 mL / NET: 1060 mL      Height (cm): 182.88 (11-26 @ 12:42)  Weight (kg): 74.2 (11-26 @ 12:42)  BMI (kg/m2): 22.2 (11-26 @ 12:42)  BSA (m2): 1.96 (11-26 @ 12:42)    LABS:  11-26    144  |  107  |  27<H>  ----------------------------<  183<H>  4.5   |  27  |  0.9    Ca    7.9<L>      26 Nov 2018 08:00    TPro  4.6<L>  /  Alb  2.6<L>  /  TBili  0.5  /  DBili      /  AST  17  /  ALT  12  /  AlkPhos  124<H>  11-26                          8.2    3.78  )-----------( 196      ( 27 Nov 2018 07:05 )             27.7       Urine Studies:        RADIOLOGY & ADDITIONAL STUDIES:          RADIOLOGY & ADDITIONAL STUDIES:

## 2018-11-27 NOTE — PROGRESS NOTE ADULT - ASSESSMENT
JOEL - resolved  hypernatremia - better  dehydration  PNA / sepsis  pseudomonas UTI  Afib with rvr   bloody BM again  CVA 3 weeks ago, nonverbal, s/p PEG / dementia  DM  HTN  anemia      plan:    cont 1/2NS @ 60 cc/hr  completed venofer course  feso4 with vit c  s/p prbc tx  for colonoscopy  cont lopressor 100mg peg q12h  cont cardizem 30mg peg q6h  peg feeds and free water per nutrition - though npo and bowel prep for colonoscopy on Wed  complete abx course  f/u bmp  off eliquis  cont losartan   DNR / DNI

## 2018-11-27 NOTE — PROGRESS NOTE ADULT - SUBJECTIVE AND OBJECTIVE BOX
Patient is a 78y old  Male who presents with a chief complaint of Fever/Tachycardia (27 Nov 2018 07:43)  pt seen and evaluated  laying in bed comfortably  family at bedside  + one episode of blood in stool and receive 1 unit of PRBC  GI f/u noted   on peg tube feed      ICU Vital Signs Last 24 Hrs  T(C): 37.3 (27 Nov 2018 07:00), Max: 41.7 (26 Nov 2018 22:15)  T(F): 99.1 (27 Nov 2018 07:00), Max: 107 (26 Nov 2018 22:15)  HR: 108 (27 Nov 2018 07:00) (92 - 108)  BP: 131/74 (27 Nov 2018 07:00) (103/60 - 152/77)  RR: 16 (27 Nov 2018 07:00) (16 - 16)  SpO2: 99% (26 Nov 2018 16:16) (99% - 99%)      Drug Dosing Weight  Height (cm): 182.88 (26 Nov 2018 12:42)  Weight (kg): 74.2 (26 Nov 2018 12:42)  BMI (kg/m2): 22.2 (26 Nov 2018 12:42)  BSA (m2): 1.96 (26 Nov 2018 12:42)    I&O's Detail    26 Nov 2018 07:01  -  27 Nov 2018 07:00  --------------------------------------------------------  IN:    Free Water: 100 mL    Glucerna: 960 mL  Total IN: 1060 mL    OUT:  Total OUT: 0 mL    Total NET: 1060 mL       PHYSICAL EXAM:  Constitutional: pt is laying in bed comfortably  Gastrointestinal: + peg tube in place site c/d/i  Extremities no edema  Skin: no lesion    MEDICATIONS  (STANDING):  amiodarone Infusion 1 mG/Min (33.333 mL/Hr) IV Continuous <Continuous>  ascorbic acid 500 milliGRAM(s) Oral daily  atorvastatin 40 milliGRAM(s) Oral at bedtime  dextrose 5%. 1000 milliLiter(s) (50 mL/Hr) IV Continuous <Continuous>  dextrose 50% Injectable 12.5 Gram(s) IV Push once  dextrose 50% Injectable 25 Gram(s) IV Push once  dextrose 50% Injectable 25 Gram(s) IV Push once  diltiazem    Tablet 30 milliGRAM(s) Enteral Tube every 6 hours  ferrous    sulfate Liquid 300 milliGRAM(s) Enteral Tube daily  insulin glargine Injectable (LANTUS) 8 Unit(s) SubCutaneous at bedtime  insulin lispro (HumaLOG) corrective regimen sliding scale   SubCutaneous every 6 hours  levoFLOXacin  Tablet 750 milliGRAM(s) Oral every 24 hours  losartan 50 milliGRAM(s) Oral daily  metoprolol tartrate 100 milliGRAM(s) Oral two times a day  OLANZapine Disintegrating Tablet 5 milliGRAM(s) Oral daily  pantoprazole   Suspension 40 milliGRAM(s) Oral daily  senna Syrup 5 milliLiter(s) Oral at bedtime  sodium chloride 0.45%. 1000 milliLiter(s) (60 mL/Hr) IV Continuous <Continuous>      Diet, NPO with Tube Feed:   Tube Feeding Modality: Gastrostomy  Glucerna 1.2 Trae  Total Volume for 24 Hours (mL): 1920  Bolus   Total Volume of Bolus (mL):  480  Bolus Feed Rate (mL per Hour): 480   Bolus Feed Duration (in Hours): 1  Tube Feed Frequency: Every 6 hours   Tube Feed Start Time: 06:00 (11-22-18 @ 10:33)      LABS  11-27    143  |  106  |  24<H>  ----------------------------<  185<H>  4.2   |  27  |  0.8    Ca    8.1<L>      27 Nov 2018 07:05  Phos  2.6     11-27  Mg     2.4     11-27    TPro  4.9<L>  /  Alb  2.7<L>  /  TBili  0.5  /  DBili  x   /  AST  21  /  ALT  15  /  AlkPhos  140<H>  11-27                          8.2    3.78  )-----------( 196      ( 27 Nov 2018 07:05 )             27.7     CAPILLARY BLOOD GLUCOSE  POCT Blood Glucose.: 172 mg/dL (27 Nov 2018 07:49)  POCT Blood Glucose.: 214 mg/dL (27 Nov 2018 05:45)  POCT Blood Glucose.: 222 mg/dL (26 Nov 2018 21:21)  POCT Blood Glucose.: 152 mg/dL (26 Nov 2018 17:04)  POCT Blood Glucose.: 188 mg/dL (26 Nov 2018 11:26) Patient is a 78y old  Male who presents with a chief complaint of Fever/Tachycardia (27 Nov 2018 07:43)  pt seen and evaluated  laying in bed comfortably  family at bedside  + one episode of blood in stool and receive 1 unit of PRBC  GI f/u noted   on peg tube feed      ICU Vital Signs Last 24 Hrs  T(C): 37.3 (27 Nov 2018 07:00), Max: 41.7 (26 Nov 2018 22:15)  T(F): 99.1 (27 Nov 2018 07:00), Max: 107 (26 Nov 2018 22:15)  HR: 108 (27 Nov 2018 07:00) (92 - 108)  BP: 131/74 (27 Nov 2018 07:00) (103/60 - 152/77)  RR: 16 (27 Nov 2018 07:00) (16 - 16)  SpO2: 99% (26 Nov 2018 16:16) (99% - 99%)      Drug Dosing Weight  Height (cm): 182.88 (26 Nov 2018 12:42)  Weight (kg): 74.2 (26 Nov 2018 12:42)  BMI (kg/m2): 22.2 (26 Nov 2018 12:42)  BSA (m2): 1.96 (26 Nov 2018 12:42)    I&O's Detail    26 Nov 2018 07:01  -  27 Nov 2018 07:00  --------------------------------------------------------  IN:    Free Water: 100 mL    Glucerna: 960 mL  Total IN: 1060 mL    OUT:  Total OUT: 0 mL    Total NET: 1060 mL       PHYSICAL EXAM:  pt seen with wife at bedside  Constitutional: pt is laying in bed comfortably, not responsive to me verbally, not following commands but moves all extrem,  Gastrointestinal: + peg tube in place site c/d/i  Extremities no edema  Skin: no lesion    MEDICATIONS  (STANDING):  amiodarone Infusion 1 mG/Min (33.333 mL/Hr) IV Continuous <Continuous>  ascorbic acid 500 milliGRAM(s) Oral daily  atorvastatin 40 milliGRAM(s) Oral at bedtime  dextrose 5%. 1000 milliLiter(s) (50 mL/Hr) IV Continuous <Continuous>  dextrose 50% Injectable 12.5 Gram(s) IV Push once  dextrose 50% Injectable 25 Gram(s) IV Push once  dextrose 50% Injectable 25 Gram(s) IV Push once  diltiazem    Tablet 30 milliGRAM(s) Enteral Tube every 6 hours  ferrous    sulfate Liquid 300 milliGRAM(s) Enteral Tube daily  insulin glargine Injectable (LANTUS) 8 Unit(s) SubCutaneous at bedtime  insulin lispro (HumaLOG) corrective regimen sliding scale   SubCutaneous every 6 hours  levoFLOXacin  Tablet 750 milliGRAM(s) Oral every 24 hours  losartan 50 milliGRAM(s) Oral daily  metoprolol tartrate 100 milliGRAM(s) Oral two times a day  OLANZapine Disintegrating Tablet 5 milliGRAM(s) Oral daily  pantoprazole   Suspension 40 milliGRAM(s) Oral daily  senna Syrup 5 milliLiter(s) Oral at bedtime  sodium chloride 0.45%. 1000 milliLiter(s) (60 mL/Hr) IV Continuous <Continuous>      Diet, NPO with Tube Feed:   Tube Feeding Modality: Gastrostomy  Glucerna 1.2 Trae  Total Volume for 24 Hours (mL): 1920  Bolus   Total Volume of Bolus (mL):  480  Bolus Feed Rate (mL per Hour): 480   Bolus Feed Duration (in Hours): 1  Tube Feed Frequency: Every 6 hours   Tube Feed Start Time: 06:00 (11-22-18 @ 10:33)      LABS  11-27    143  |  106  |  24<H>  ----------------------------<  185<H>  4.2   |  27  |  0.8    Ca    8.1<L>      27 Nov 2018 07:05  Phos  2.6     11-27  Mg     2.4     11-27    TPro  4.9<L>  /  Alb  2.7<L>  /  TBili  0.5  /  DBili  x   /  AST  21  /  ALT  15  /  AlkPhos  140<H>  11-27                          8.2    3.78  )-----------( 196      ( 27 Nov 2018 07:05 )             27.7     CAPILLARY BLOOD GLUCOSE  POCT Blood Glucose.: 172 mg/dL (27 Nov 2018 07:49)  POCT Blood Glucose.: 214 mg/dL (27 Nov 2018 05:45)  POCT Blood Glucose.: 222 mg/dL (26 Nov 2018 21:21)  POCT Blood Glucose.: 152 mg/dL (26 Nov 2018 17:04)  POCT Blood Glucose.: 188 mg/dL (26 Nov 2018 11:26)

## 2018-11-28 LAB
ALLERGY+IMMUNOLOGY DIAG STUDY NOTE: SIGNIFICANT CHANGE UP
GLUCOSE BLDC GLUCOMTR-MCNC: 149 MG/DL — HIGH (ref 70–99)
GLUCOSE BLDC GLUCOMTR-MCNC: 150 MG/DL — HIGH (ref 70–99)
GLUCOSE BLDC GLUCOMTR-MCNC: 153 MG/DL — HIGH (ref 70–99)
GLUCOSE BLDC GLUCOMTR-MCNC: 172 MG/DL — HIGH (ref 70–99)
HCT VFR BLD CALC: 32 % — LOW (ref 42–52)
HGB BLD-MCNC: 9.8 G/DL — LOW (ref 14–18)
MCHC RBC-ENTMCNC: 27.5 PG — SIGNIFICANT CHANGE UP (ref 27–31)
MCHC RBC-ENTMCNC: 30.6 G/DL — LOW (ref 32–37)
MCV RBC AUTO: 89.6 FL — SIGNIFICANT CHANGE UP (ref 80–94)
NRBC # BLD: 0 /100 WBCS — SIGNIFICANT CHANGE UP (ref 0–0)
PLATELET # BLD AUTO: 247 K/UL — SIGNIFICANT CHANGE UP (ref 130–400)
RBC # BLD: 3.57 M/UL — LOW (ref 4.7–6.1)
RBC # FLD: 18.5 % — HIGH (ref 11.5–14.5)
TYPE + AB SCN PNL BLD: SIGNIFICANT CHANGE UP
WBC # BLD: 4.64 K/UL — LOW (ref 4.8–10.8)
WBC # FLD AUTO: 4.64 K/UL — LOW (ref 4.8–10.8)

## 2018-11-28 RX ADMIN — ATORVASTATIN CALCIUM 40 MILLIGRAM(S): 80 TABLET, FILM COATED ORAL at 21:53

## 2018-11-28 RX ADMIN — SENNA PLUS 5 MILLILITER(S): 8.6 TABLET ORAL at 21:55

## 2018-11-28 RX ADMIN — LOSARTAN POTASSIUM 50 MILLIGRAM(S): 100 TABLET, FILM COATED ORAL at 05:58

## 2018-11-28 RX ADMIN — Medication 100 MILLIGRAM(S): at 05:58

## 2018-11-28 RX ADMIN — Medication 100 MILLIGRAM(S): at 18:23

## 2018-11-28 NOTE — PROGRESS NOTE ADULT - SUBJECTIVE AND OBJECTIVE BOX
SUSI MARTIN  78y  Male    Patient is a 78y old  Male who presents with a chief complaint of Fever/Tachycardia (28 Nov 2018 07:53)    ALLERGIES:  No Known Allergies      INTERVAL HPI/OVERNIGHT EVENTS:    VITALS:  T(F): 96.1 (11-28-18 @ 13:59), Max: 99.1 (11-27-18 @ 22:00)  HR: 57 (11-28-18 @ 13:59) (57 - 109)  BP: 177/82 (11-28-18 @ 13:59) (126/87 - 177/82)  RR: 16 (11-28-18 @ 13:59) (16 - 16)  SpO2: --    LABS:  11-27    143  |  106  |  24<H>  ----------------------------<  185<H>  4.2   |  27  |  0.8    Ca    8.1<L>      27 Nov 2018 07:05  Phos  2.6     11-27  Mg     2.4     11-27    TPro  4.9<L>  /  Alb  2.7<L>  /  TBili  0.5  /  DBili  x   /  AST  21  /  ALT  15  /  AlkPhos  140<H>  11-27    MICROBIOLOGY:    MEDICATION:  diltiazem    Tablet 30 milliGRAM(s) Enteral Tube every 6 hours  metoprolol tartrate 100 milliGRAM(s) Oral two times a day  sodium chloride 0.45%. 1000 milliLiter(s) IV Continuous <Continuous>    RADIOLOGY & ADDITIONAL TESTS:

## 2018-11-28 NOTE — PROGRESS NOTE ADULT - SUBJECTIVE AND OBJECTIVE BOX
NEPHROLOGY FOLLOW UP NOTE    on ivf  awaiting colonoscopy  no new events  nutrition and gi inputs noted      PAST MEDICAL & SURGICAL HISTORY:  Stroke  DVT (deep venous thrombosis)  Anemia  Diabetes  HTN (hypertension)  Arrhythmia  Dementia  S/P cholecystectomy  History of hip replacement    Allergies:  No Known Allergies    Home Medications Reviewed    SOCIAL HISTORY:  Denies ETOH,Smoking,   FAMILY HISTORY:  No pertinent family history in first degree relatives    Yes      REVIEW OF SYSTEMS:  unobtainable      PHYSICAL EXAM:  NAD  lethargic  pale  dry mm  no jvd  b/l bs  irreg  soft, + binder over peg  no edema    Hospital Medications:   MEDICATIONS  (STANDING):  amiodarone Infusion 1 mG/Min (33.333 mL/Hr) IV Continuous <Continuous>  ascorbic acid 500 milliGRAM(s) Oral daily  atorvastatin 40 milliGRAM(s) Oral at bedtime  dextrose 5%. 1000 milliLiter(s) (50 mL/Hr) IV Continuous <Continuous>  dextrose 50% Injectable 12.5 Gram(s) IV Push once  dextrose 50% Injectable 25 Gram(s) IV Push once  dextrose 50% Injectable 25 Gram(s) IV Push once  diltiazem    Tablet 30 milliGRAM(s) Enteral Tube every 6 hours  ferrous    sulfate Liquid 300 milliGRAM(s) Enteral Tube daily  insulin glargine Injectable (LANTUS) 8 Unit(s) SubCutaneous at bedtime  insulin lispro (HumaLOG) corrective regimen sliding scale   SubCutaneous every 6 hours  levoFLOXacin  Tablet 750 milliGRAM(s) Oral every 24 hours  losartan 50 milliGRAM(s) Oral daily  metoprolol tartrate 100 milliGRAM(s) Oral two times a day  OLANZapine Disintegrating Tablet 5 milliGRAM(s) Oral daily  pantoprazole   Suspension 40 milliGRAM(s) Oral daily  senna Syrup 5 milliLiter(s) Oral at bedtime  sodium chloride 0.45%. 1000 milliLiter(s) (60 mL/Hr) IV Continuous <Continuous>        VITALS:  T(F): 96 (11-28-18 @ 05:15), Max: 99.1 (11-27-18 @ 22:00)  HR: 109 (11-28-18 @ 05:15)  BP: 133/91 (11-28-18 @ 05:15)  RR: 16 (11-28-18 @ 05:15)  SpO2: --  Wt(kg): --    11-26 @ 07:01  -  11-27 @ 07:00  --------------------------------------------------------  IN: 1060 mL / OUT: 0 mL / NET: 1060 mL      Height (cm): 182.88 (11-27 @ 15:04)  Weight (kg): 74.2 (11-27 @ 15:04)  BMI (kg/m2): 22.2 (11-27 @ 15:04)  BSA (m2): 1.96 (11-27 @ 15:04)    LABS:  11-27    143  |  106  |  24<H>  ----------------------------<  185<H>  4.2   |  27  |  0.8    Ca    8.1<L>      27 Nov 2018 07:05  Phos  2.6     11-27  Mg     2.4     11-27    TPro  4.9<L>  /  Alb  2.7<L>  /  TBili  0.5  /  DBili      /  AST  21  /  ALT  15  /  AlkPhos  140<H>  11-27                          8.2    3.78  )-----------( 196      ( 27 Nov 2018 07:05 )             27.7       Urine Studies:        RADIOLOGY & ADDITIONAL STUDIES:

## 2018-11-28 NOTE — PROGRESS NOTE ADULT - ASSESSMENT
JOEL - resolved  hypernatremia - resolved  dehydration  PNA / sepsis  pseudomonas UTI  Afib with rvr   hematochezia  CVA 3 weeks ago, nonverbal, s/p PEG / dementia  DM  HTN  anemia      plan:    cont 1/2NS @ 60 cc/hr today  completed venofer course  feso4 with vit c  s/p prbc tx  for colonoscopy today  cont lopressor 100mg peg q12h  cont cardizem 30mg peg q6h  peg feeds and free water per nutrition - though npo and bowel prep for colonoscopy on Wed  complete abx course  f/u bmp  off eliquis  cont losartan   DNR / DNI

## 2018-11-29 ENCOUNTER — RESULT REVIEW (OUTPATIENT)
Age: 78
End: 2018-11-29

## 2018-11-29 LAB
GLUCOSE BLDC GLUCOMTR-MCNC: 156 MG/DL — HIGH (ref 70–99)
GLUCOSE BLDC GLUCOMTR-MCNC: 159 MG/DL — HIGH (ref 70–99)
GLUCOSE BLDC GLUCOMTR-MCNC: 163 MG/DL — HIGH (ref 70–99)
GLUCOSE BLDC GLUCOMTR-MCNC: 198 MG/DL — HIGH (ref 70–99)
GLUCOSE BLDC GLUCOMTR-MCNC: 208 MG/DL — HIGH (ref 70–99)
GLUCOSE BLDC GLUCOMTR-MCNC: 228 MG/DL — HIGH (ref 70–99)
HCT VFR BLD CALC: 30.7 % — LOW (ref 42–52)
HGB BLD-MCNC: 9.3 G/DL — LOW (ref 14–18)
MCHC RBC-ENTMCNC: 27 PG — SIGNIFICANT CHANGE UP (ref 27–31)
MCHC RBC-ENTMCNC: 30.3 G/DL — LOW (ref 32–37)
MCV RBC AUTO: 89 FL — SIGNIFICANT CHANGE UP (ref 80–94)
NRBC # BLD: 0 /100 WBCS — SIGNIFICANT CHANGE UP (ref 0–0)
PLATELET # BLD AUTO: 214 K/UL — SIGNIFICANT CHANGE UP (ref 130–400)
RBC # BLD: 3.45 M/UL — LOW (ref 4.7–6.1)
RBC # FLD: 18.5 % — HIGH (ref 11.5–14.5)
WBC # BLD: 4.16 K/UL — LOW (ref 4.8–10.8)
WBC # FLD AUTO: 4.16 K/UL — LOW (ref 4.8–10.8)

## 2018-11-29 RX ADMIN — OLANZAPINE 5 MILLIGRAM(S): 15 TABLET, FILM COATED ORAL at 11:34

## 2018-11-29 RX ADMIN — Medication 100 MILLIGRAM(S): at 17:33

## 2018-11-29 RX ADMIN — PANTOPRAZOLE SODIUM 40 MILLIGRAM(S): 20 TABLET, DELAYED RELEASE ORAL at 11:34

## 2018-11-29 RX ADMIN — Medication 300 MILLIGRAM(S): at 11:34

## 2018-11-29 RX ADMIN — Medication 500 MILLIGRAM(S): at 11:34

## 2018-11-29 RX ADMIN — Medication 1: at 07:45

## 2018-11-29 RX ADMIN — LOSARTAN POTASSIUM 50 MILLIGRAM(S): 100 TABLET, FILM COATED ORAL at 05:29

## 2018-11-29 RX ADMIN — Medication 1: at 17:33

## 2018-11-29 RX ADMIN — INSULIN GLARGINE 8 UNIT(S): 100 INJECTION, SOLUTION SUBCUTANEOUS at 22:15

## 2018-11-29 RX ADMIN — ATORVASTATIN CALCIUM 40 MILLIGRAM(S): 80 TABLET, FILM COATED ORAL at 22:14

## 2018-11-29 RX ADMIN — SENNA PLUS 5 MILLILITER(S): 8.6 TABLET ORAL at 23:18

## 2018-11-29 RX ADMIN — Medication 100 MILLIGRAM(S): at 05:29

## 2018-11-29 NOTE — PROGRESS NOTE ADULT - SUBJECTIVE AND OBJECTIVE BOX
SUSI MARTIN  78y  Male    Patient is a 78y old  Male who presents with a chief complaint of Fever/Tachycardia (28 Nov 2018 14:24)    ALLERGIES:  No Known Allergies      INTERVAL HPI/OVERNIGHT EVENTS:    VITALS:  T(F): 96 (11-29-18 @ 05:15), Max: 96.4 (11-28-18 @ 18:26)  HR: 112 (11-29-18 @ 05:15) (57 - 136)  BP: 124/70 (11-29-18 @ 05:15) (124/70 - 177/82)  RR: 16 (11-29-18 @ 05:15) (16 - 16)  SpO2: 99% (11-28-18 @ 17:34) (99% - 99%)    LABS:        MICROBIOLOGY:    MEDICATION:  diltiazem    Tablet 30 milliGRAM(s) Enteral Tube every 6 hours  metoprolol tartrate 100 milliGRAM(s) Oral two times a day  sodium chloride 0.45%. 1000 milliLiter(s) IV Continuous <Continuous>    RADIOLOGY & ADDITIONAL TESTS:

## 2018-11-29 NOTE — PRE-ANESTHESIA EVALUATION ADULT - NSANTHPEFT_GEN_ALL_CORE
Appears ill  Pale,fatigued  Responds to stimulation  Breath sounds-equal  Heart-Irregular, no murmur

## 2018-11-29 NOTE — PROGRESS NOTE ADULT - CARDIOVASCULAR DETAILS
irregular rate and rhythm
110/irregular rate and rhythm

## 2018-11-29 NOTE — PRE-ANESTHESIA EVALUATION ADULT - NSANTHPMHFT_GEN_ALL_CORE
Debilitated patient  Admitted for GI bleed  Confined to nursing facility  Receiving feeds via gastric feeding tube

## 2018-11-30 LAB
GLUCOSE BLDC GLUCOMTR-MCNC: 156 MG/DL — HIGH (ref 70–99)
GLUCOSE BLDC GLUCOMTR-MCNC: 161 MG/DL — HIGH (ref 70–99)
GLUCOSE BLDC GLUCOMTR-MCNC: 173 MG/DL — HIGH (ref 70–99)
GLUCOSE BLDC GLUCOMTR-MCNC: 244 MG/DL — HIGH (ref 70–99)

## 2018-11-30 PROCEDURE — 99221 1ST HOSP IP/OBS SF/LOW 40: CPT

## 2018-11-30 RX ADMIN — ATORVASTATIN CALCIUM 40 MILLIGRAM(S): 80 TABLET, FILM COATED ORAL at 22:47

## 2018-11-30 RX ADMIN — Medication 3: at 17:22

## 2018-11-30 RX ADMIN — Medication 500 MILLIGRAM(S): at 11:54

## 2018-11-30 RX ADMIN — LOSARTAN POTASSIUM 50 MILLIGRAM(S): 100 TABLET, FILM COATED ORAL at 05:41

## 2018-11-30 RX ADMIN — Medication 100 MILLIGRAM(S): at 05:43

## 2018-11-30 RX ADMIN — Medication 300 MILLIGRAM(S): at 17:22

## 2018-11-30 RX ADMIN — SENNA PLUS 5 MILLILITER(S): 8.6 TABLET ORAL at 22:48

## 2018-11-30 RX ADMIN — OLANZAPINE 5 MILLIGRAM(S): 15 TABLET, FILM COATED ORAL at 11:54

## 2018-11-30 RX ADMIN — PANTOPRAZOLE SODIUM 40 MILLIGRAM(S): 20 TABLET, DELAYED RELEASE ORAL at 11:54

## 2018-11-30 RX ADMIN — INSULIN GLARGINE 8 UNIT(S): 100 INJECTION, SOLUTION SUBCUTANEOUS at 22:47

## 2018-11-30 RX ADMIN — Medication 650 MILLIGRAM(S): at 15:11

## 2018-11-30 RX ADMIN — Medication 100 MILLIGRAM(S): at 17:22

## 2018-11-30 RX ADMIN — SODIUM CHLORIDE 60 MILLILITER(S): 9 INJECTION, SOLUTION INTRAVENOUS at 08:52

## 2018-11-30 RX ADMIN — Medication 1: at 00:03

## 2018-11-30 NOTE — PROGRESS NOTE ADULT - SUBJECTIVE AND OBJECTIVE BOX
78yMale  Being followed for Hematochezia   Interval history: Patient is s/p colonoscopy and had no bowel movement after colonoscopy. As per wife and nursing team patient had no bowel movement last night. Patient is sleeping when seen at bedside with patient's wife. As per nursing team and patient's wife no episodes of vomiting, hematemesis, last night after colonoscopy.       PMHX/PSHX:  PAST MEDICAL & SURGICAL HISTORY:  Stroke  DVT (deep venous thrombosis)  Anemia  Diabetes  HTN (hypertension)  Arrhythmia  Dementia  S/P cholecystectomy  History of hip replacement      Social History: No smoking. No alcohol. No illegal drug use.    MEDICATIONS:  MEDICATIONS  (STANDING):  amiodarone Infusion 1 mG/Min (33.333 mL/Hr) IV Continuous <Continuous>  ascorbic acid 500 milliGRAM(s) Oral daily  atorvastatin 40 milliGRAM(s) Oral at bedtime  dextrose 5%. 1000 milliLiter(s) (50 mL/Hr) IV Continuous <Continuous>  dextrose 50% Injectable 12.5 Gram(s) IV Push once  dextrose 50% Injectable 25 Gram(s) IV Push once  dextrose 50% Injectable 25 Gram(s) IV Push once  diltiazem    Tablet 30 milliGRAM(s) Enteral Tube every 6 hours  ferrous    sulfate Liquid 300 milliGRAM(s) Enteral Tube daily  insulin glargine Injectable (LANTUS) 8 Unit(s) SubCutaneous at bedtime  insulin lispro (HumaLOG) corrective regimen sliding scale   SubCutaneous every 6 hours  losartan 50 milliGRAM(s) Oral daily  metoprolol tartrate 100 milliGRAM(s) Oral two times a day  OLANZapine Disintegrating Tablet 5 milliGRAM(s) Oral daily  pantoprazole   Suspension 40 milliGRAM(s) Oral daily  senna Syrup 5 milliLiter(s) Oral at bedtime  sodium chloride 0.45%. 1000 milliLiter(s) (60 mL/Hr) IV Continuous <Continuous>    MEDICATIONS  (PRN):  acetaminophen   Tablet .. 650 milliGRAM(s) Oral every 6 hours PRN Temp greater or equal to 38C (100.4F), Mild Pain (1 - 3)  dextrose 40% Gel 15 Gram(s) Oral once PRN Blood Glucose LESS THAN 70 milliGRAM(s)/deciliter  glucagon  Injectable 1 milliGRAM(s) IntraMuscular once PRN Glucose LESS THAN 70 milligrams/deciliter  lactulose Syrup 20 Gram(s) Oral daily PRN constipation      Allergies:  Allergies    No Known Allergies    Intolerances          REVIEW OF SYSTEMS:  Unable to obtain Patient's wife wanted patient to rest and did not want me to wake him up      VITAL SIGNS:   T(F): 97.3 (11-30-18 @ 05:15), Max: 97.3 (11-30-18 @ 05:15)  HR: 101 (11-30-18 @ 05:15) (86 - 110)  BP: 143/77 (11-30-18 @ 05:15) (124/70 - 167/83)  RR: 16 (11-30-18 @ 05:15) (16 - 18)  SpO2: --    PHYSICAL EXAM:  PHYSICAL EXAM  General: in no acute distress  HEENT: MMM, conjunctiva and sclera clear  Neck-No thyromegaly   Lungs: Clear, no Rhonchi  Heart-Normal s1/s2  Gastrointestinal: Soft non-distended; Normal bowel sounds; No hepatosplenomegaly. No rebound or guarding +PEG  Skin: Warm and dry. No obvious rash  LE-No edema             LABS:                        9.3    4.16  )-----------( 214      ( 29 Nov 2018 12:43 )             30.7                 IMAGING: 78yMale  Being followed for Hematochezia   Interval history: Patient is s/p colonoscopy and had no bowel movement after colonoscopy. As per wife and nursing team patient had no bowel movement last night. Patient is sleeping when seen at bedside with patient's wife. As per nursing team and patient's wife no episodes of vomiting, hematemesis, last night after colonoscopy.       PMHX/PSHX:  PAST MEDICAL & SURGICAL HISTORY:  Stroke  DVT (deep venous thrombosis)  Anemia  Diabetes  HTN (hypertension)  Arrhythmia  Dementia  S/P cholecystectomy  History of hip replacement      Social History: No smoking. No alcohol. No illegal drug use.    MEDICATIONS:  MEDICATIONS  (STANDING):  amiodarone Infusion 1 mG/Min (33.333 mL/Hr) IV Continuous <Continuous>  ascorbic acid 500 milliGRAM(s) Oral daily  atorvastatin 40 milliGRAM(s) Oral at bedtime  dextrose 5%. 1000 milliLiter(s) (50 mL/Hr) IV Continuous <Continuous>  dextrose 50% Injectable 12.5 Gram(s) IV Push once  dextrose 50% Injectable 25 Gram(s) IV Push once  dextrose 50% Injectable 25 Gram(s) IV Push once  diltiazem    Tablet 30 milliGRAM(s) Enteral Tube every 6 hours  ferrous    sulfate Liquid 300 milliGRAM(s) Enteral Tube daily  insulin glargine Injectable (LANTUS) 8 Unit(s) SubCutaneous at bedtime  insulin lispro (HumaLOG) corrective regimen sliding scale   SubCutaneous every 6 hours  losartan 50 milliGRAM(s) Oral daily  metoprolol tartrate 100 milliGRAM(s) Oral two times a day  OLANZapine Disintegrating Tablet 5 milliGRAM(s) Oral daily  pantoprazole   Suspension 40 milliGRAM(s) Oral daily  senna Syrup 5 milliLiter(s) Oral at bedtime  sodium chloride 0.45%. 1000 milliLiter(s) (60 mL/Hr) IV Continuous <Continuous>    MEDICATIONS  (PRN):  acetaminophen   Tablet .. 650 milliGRAM(s) Oral every 6 hours PRN Temp greater or equal to 38C (100.4F), Mild Pain (1 - 3)  dextrose 40% Gel 15 Gram(s) Oral once PRN Blood Glucose LESS THAN 70 milliGRAM(s)/deciliter  glucagon  Injectable 1 milliGRAM(s) IntraMuscular once PRN Glucose LESS THAN 70 milligrams/deciliter  lactulose Syrup 20 Gram(s) Oral daily PRN constipation      Allergies:  Allergies    No Known Allergies    Intolerances        REVIEW OF SYSTEMS:  Unable to obtain Patient's wife wanted patient to rest and did not want me to wake him up      VITAL SIGNS:   T(F): 97.3 (11-30-18 @ 05:15), Max: 97.3 (11-30-18 @ 05:15)  HR: 101 (11-30-18 @ 05:15) (86 - 110)  BP: 143/77 (11-30-18 @ 05:15) (124/70 - 167/83)  RR: 16 (11-30-18 @ 05:15) (16 - 18)  SpO2: --    PHYSICAL EXAM:  PHYSICAL EXAM  General: in no acute distress  HEENT: MMM, conjunctiva and sclera clear  Neck-No thyromegaly   Lungs: Clear, no Rhonchi  Heart-Normal s1/s2  Gastrointestinal: Soft non-distended; Normal bowel sounds; No hepatosplenomegaly. No rebound or guarding +PEG  Skin: Warm and dry. No obvious rash  LE-No edema             LABS:                        9.3    4.16  )-----------( 214      ( 29 Nov 2018 12:43 )             30.7                 IMAGING: 78yMale  Being followed for Hematochezia   Interval history: Patient is s/p colonoscopy and had no bowel movement after colonoscopy. As per wife and nursing team patient had no bowel movement last night. Patient is sleeping when seen at bedside with patient's wife. As per nursing team and patient's wife no episodes of vomiting, hematemesis, last night after colonoscopy.       PMHX/PSHX:  PAST MEDICAL & SURGICAL HISTORY:  Stroke  DVT (deep venous thrombosis)  Anemia  Diabetes  HTN (hypertension)  Arrhythmia  Dementia  S/P cholecystectomy  History of hip replacement      Social History: No smoking. No alcohol. No illegal drug use.    MEDICATIONS:  MEDICATIONS  (STANDING):  amiodarone Infusion 1 mG/Min (33.333 mL/Hr) IV Continuous <Continuous>  ascorbic acid 500 milliGRAM(s) Oral daily  atorvastatin 40 milliGRAM(s) Oral at bedtime  dextrose 5%. 1000 milliLiter(s) (50 mL/Hr) IV Continuous <Continuous>  dextrose 50% Injectable 12.5 Gram(s) IV Push once  dextrose 50% Injectable 25 Gram(s) IV Push once  dextrose 50% Injectable 25 Gram(s) IV Push once  diltiazem    Tablet 30 milliGRAM(s) Enteral Tube every 6 hours  ferrous    sulfate Liquid 300 milliGRAM(s) Enteral Tube daily  insulin glargine Injectable (LANTUS) 8 Unit(s) SubCutaneous at bedtime  insulin lispro (HumaLOG) corrective regimen sliding scale   SubCutaneous every 6 hours  losartan 50 milliGRAM(s) Oral daily  metoprolol tartrate 100 milliGRAM(s) Oral two times a day  OLANZapine Disintegrating Tablet 5 milliGRAM(s) Oral daily  pantoprazole   Suspension 40 milliGRAM(s) Oral daily  senna Syrup 5 milliLiter(s) Oral at bedtime  sodium chloride 0.45%. 1000 milliLiter(s) (60 mL/Hr) IV Continuous <Continuous>    MEDICATIONS  (PRN):  acetaminophen   Tablet .. 650 milliGRAM(s) Oral every 6 hours PRN Temp greater or equal to 38C (100.4F), Mild Pain (1 - 3)  dextrose 40% Gel 15 Gram(s) Oral once PRN Blood Glucose LESS THAN 70 milliGRAM(s)/deciliter  glucagon  Injectable 1 milliGRAM(s) IntraMuscular once PRN Glucose LESS THAN 70 milligrams/deciliter  lactulose Syrup 20 Gram(s) Oral daily PRN constipation      Allergies:  Allergies    No Known Allergies    Intolerances        REVIEW OF SYSTEMS:  Unable to obtain due to patient's dementia       VITAL SIGNS:   T(F): 97.3 (11-30-18 @ 05:15), Max: 97.3 (11-30-18 @ 05:15)  HR: 101 (11-30-18 @ 05:15) (86 - 110)  BP: 143/77 (11-30-18 @ 05:15) (124/70 - 167/83)  RR: 16 (11-30-18 @ 05:15) (16 - 18)  SpO2: --    PHYSICAL EXAM:  PHYSICAL EXAM  General: in no acute distress  HEENT: MMM, conjunctiva and sclera clear  Neck-No thyromegaly   Lungs: Clear, no Rhonchi  Heart-Normal s1/s2  Gastrointestinal: Soft non-distended; Normal bowel sounds; No hepatosplenomegaly. No rebound or guarding +PEG  Skin: Warm and dry. No obvious rash  LE-No edema             LABS:                        9.3    4.16  )-----------( 214      ( 29 Nov 2018 12:43 )             30.7                 IMAGING: 78yMale  Being followed for Hematochezia, recently discovered rectal mass  Interval history: Patient is s/p colonoscopy and had no bowel movement after colonoscopy. As per wife and nursing team patient had no bowel movement last night. Patient is sleeping when seen at bedside with patient's wife. As per nursing team and patient's wife no episodes of vomiting, hematemesis, last night after colonoscopy.       PMHX/PSHX:  PAST MEDICAL & SURGICAL HISTORY:  Stroke  DVT (deep venous thrombosis)  Anemia  Diabetes  HTN (hypertension)  Arrhythmia  Dementia  S/P cholecystectomy  History of hip replacement      Social History: No smoking. No alcohol. No illegal drug use.    MEDICATIONS:  MEDICATIONS  (STANDING):  amiodarone Infusion 1 mG/Min (33.333 mL/Hr) IV Continuous <Continuous>  ascorbic acid 500 milliGRAM(s) Oral daily  atorvastatin 40 milliGRAM(s) Oral at bedtime  dextrose 5%. 1000 milliLiter(s) (50 mL/Hr) IV Continuous <Continuous>  dextrose 50% Injectable 12.5 Gram(s) IV Push once  dextrose 50% Injectable 25 Gram(s) IV Push once  dextrose 50% Injectable 25 Gram(s) IV Push once  diltiazem    Tablet 30 milliGRAM(s) Enteral Tube every 6 hours  ferrous    sulfate Liquid 300 milliGRAM(s) Enteral Tube daily  insulin glargine Injectable (LANTUS) 8 Unit(s) SubCutaneous at bedtime  insulin lispro (HumaLOG) corrective regimen sliding scale   SubCutaneous every 6 hours  losartan 50 milliGRAM(s) Oral daily  metoprolol tartrate 100 milliGRAM(s) Oral two times a day  OLANZapine Disintegrating Tablet 5 milliGRAM(s) Oral daily  pantoprazole   Suspension 40 milliGRAM(s) Oral daily  senna Syrup 5 milliLiter(s) Oral at bedtime  sodium chloride 0.45%. 1000 milliLiter(s) (60 mL/Hr) IV Continuous <Continuous>    MEDICATIONS  (PRN):  acetaminophen   Tablet .. 650 milliGRAM(s) Oral every 6 hours PRN Temp greater or equal to 38C (100.4F), Mild Pain (1 - 3)  dextrose 40% Gel 15 Gram(s) Oral once PRN Blood Glucose LESS THAN 70 milliGRAM(s)/deciliter  glucagon  Injectable 1 milliGRAM(s) IntraMuscular once PRN Glucose LESS THAN 70 milligrams/deciliter  lactulose Syrup 20 Gram(s) Oral daily PRN constipation      Allergies:  Allergies    No Known Allergies        REVIEW OF SYSTEMS:  Unable to obtain due to patient's dementia       VITAL SIGNS:   T(F): 97.3 (11-30-18 @ 05:15), Max: 97.3 (11-30-18 @ 05:15)  HR: 101 (11-30-18 @ 05:15) (86 - 110)  BP: 143/77 (11-30-18 @ 05:15) (124/70 - 167/83)  RR: 16 (11-30-18 @ 05:15) (16 - 18)  SpO2: --    PHYSICAL EXAM:  PHYSICAL EXAM  General: in no acute distress  HEENT: MMM, conjunctiva and sclera clear  Neck-No thyromegaly   Lungs: Clear, no Rhonchi  Heart-Normal s1/s2  Gastrointestinal: Soft non-distended; Normal bowel sounds; No hepatosplenomegaly. No rebound or guarding +PEG  Skin: Warm and dry. No obvious rash  LE-No edema             LABS:                        9.3    4.16  )-----------( 214      ( 29 Nov 2018 12:43 )             30.7                 IMAGING:

## 2018-11-30 NOTE — PROGRESS NOTE ADULT - ASSESSMENT
78 yoa male being followed for hematochezia Colonoscopy report in chart    S/P Colonoscopy   Colonoscopy report in full detail in patient's chart    Impression   1.Two polyps were found in the transverse colon and removed  2.A tumor was found in the ascending colon. Multiple Biopsies taken.  3.Cannot rule out additional lesions due to very poor prep  .After decision on further care colonoscopy may be attempted after good prep to rule out additional lesions and to remove polyps    Recc  1.Please read full colonoscopy report in patient's chart  2.Follow up on the results of the biopsy specimen in 3 days  3.Please obtain Surgical Consultation  4.Please Obtain Oncology Consultation 78 yoa male being followed for hematochezia Colonoscopy report in chart    S/P Colonoscopy   Colonoscopy report in full detail in patient's chart    Impression   1.Two polyps were found in the transverse colon and removed  2.A tumor was found in the ascending colon. Multiple Biopsies taken.  3.Cannot rule out additional lesions due to very poor prep  4.After decision on further care colonoscopy may be attempted after good prep to rule out additional lesions and to remove polyps    Recc  1.Please read full colonoscopy report in patient's chart  2.Follow up on the results of the biopsy specimen in 3 days  3.Please obtain Surgical Consultation  4.Please Obtain Oncology Consultation   5.If anticoagulation must be restarted please consider Coumadin as this can be reversed. 78 yoa male being followed for hematochezia s/p Colonoscopy Full report in chart    S/P Colonoscopy   Colonoscopy report in full detail in patient's chart    Impression   1.Two polyps were found in the transverse colon and removed  2.A tumor was found in the ascending colon. Multiple Biopsies taken.  3.Cannot rule out additional lesions due to very poor prep  4.After decision on further care colonoscopy may be attempted after good prep to rule out additional lesions and to remove polyps    Recc  1.Please read full colonoscopy report in patient's chart  2.Follow up on the results of the biopsy specimen in 3 days  3.Please obtain Surgical Consultation  4.Please Obtain Oncology Consultation   5.If anticoagulation must be restarted please consider Coumadin as this can be reversed. 78 yoa male being followed for hematochezia s/p Colonoscopy Full report in chart    S/P Colonoscopy   Colonoscopy report in full detail in patient's chart    Impression   1.Two polyps were found in the transverse colon and removed  2.A tumor was found in the ascending colon. Multiple Biopsies taken.  3.Cannot rule out additional lesions due to very poor prep  4.After decision on further care colonoscopy may be attempted after good prep to rule out additional lesions and to remove polyps    Problem-Tumor in the ascending colon  Case thoroughly discussed with patient's wife  ID #[id number]   Recc  1.Please read full colonoscopy report in patient's chart  2.Follow up on the results of the biopsy specimen in 3 days  3.Please obtain Surgical Consultation  4.Please Obtain Oncology Consultation   5.If anticoagulation must be restarted please consider Coumadin as this can be reversed.   6.If pt is not a surgical candidate consider Colonic Stent  7.If patient is a surgical candidate consider CT of the Chest abdomen and pelvis for staging if this will  78 yoa male being followed for hematochezia s/p Colonoscopy Full report in chart    S/P Colonoscopy   Colonoscopy report in full detail in patient's chart    Impression   1.Two polyps were found in the transverse colon and removed  2.A tumor was found in the ascending colon. Multiple Biopsies taken.  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 (though given the patient's tumor, he is likely to bleed)  6.If pt is not a surgical candidate consider Colonic Stent  7.If patient is a surgical candidate orders CT of the Chest abdomen and pelvis for staging   8.After decision on further care colonoscopy may be attempted after good prep to rule out additional lesions and to remove polyps

## 2018-11-30 NOTE — PROGRESS NOTE ADULT - ASSESSMENT
78 yoa male being followed for hematochezia Colonoscopy report in chart    S/P Colonoscopy   Colonoscopy report in full detail in patient's chart    Impression   1.Two polyps were found in the transverse colon and removed  2.A tumor was found in the ascending colon. Multiple Biopsies taken.  3.Cannot rule out additional lesions due to very poor prep  .After decision on further care colonoscopy may be attempted after good prep to rule out additional lesions and to remove polyps  plan==========  poor prognosis  surgery and oncho logy consultation

## 2018-11-30 NOTE — CONSULT NOTE ADULT - SUBJECTIVE AND OBJECTIVE BOX
SUSI MARTIN [id number]  78y Male  11d    HPI:  Patient initially admitted to [facility] in october, discharged on 11/8/18 to SNF after having sustained a CVA, course complciated by UTI.  Wife reports that the patient was doing well but developed fever 4 days ago.  The patient is aphasic at baseline so was not complaining but she reports increased cough and worsening agitation.  Patient was sent to the ED for further evaluation and found to have new opacities on RLL.  Also in AFIB w/RVR, currently on Eliquis. (19 Nov 2018 15:30)    Pt had colonscopy yesterday due to hematochezia -- found tumor in ascending colon; awaiting biopsy result. Eliquis being held. Pt on PEG feeds - no BM yesterday after colonoscopy      PAST MEDICAL & SURGICAL HISTORY:  Stroke  DVT (deep venous thrombosis)  Anemia  Diabetes  HTN (hypertension)  Arrhythmia  Dementia  S/P cholecystectomy  History of hip replacement    Home Medications:  amLODIPine 5 mg oral tablet: 0.5 tab(s) orally once a day (19 Nov 2018 15:40)  aspirin 81 mg oral delayed release tablet: 1 tab(s) orally once a day (19 Nov 2018 15:40)  atorvastatin 40 mg oral tablet: 1 tab(s) orally once a day (at bedtime) (19 Nov 2018 15:40)  collagenase 250 units/g topical ointment: 1 application topically once a day (19 Nov 2018 15:40)  Eliquis 5 mg oral tablet: 1 tab(s) orally 2 times a day (19 Nov 2018 15:40)  ferrous sulfate 325 mg (65 mg elemental iron) oral delayed release tablet: 1 tab(s) orally once a day (19 Nov 2018 15:40)  Flomax 0.4 mg oral capsule: 1 cap(s) orally once a day (at bedtime) (19 Nov 2018 15:40)  lactulose 10 g/15 mL oral solution: orally once a day (19 Nov 2018 15:40)  Lantus 100 units/mL subcutaneous solution: 8 unit(s) subcutaneous once a day (at bedtime) (19 Nov 2018 15:40)  losartan 50 mg oral tablet: 1 tab(s) orally once a day (19 Nov 2018 15:40)  metFORMIN 1000 mg oral tablet: 1 tab(s) orally 2 times a day (19 Nov 2018 15:40)  Metoprolol Tartrate 25 mg oral tablet: 1 tab(s) orally 2 times a day (19 Nov 2018 15:40)  OLANZapine 5 mg oral tablet: 1 tab(s) orally once a day (19 Nov 2018 15:40)  senna oral tablet: 1 tab(s) orally once a day (at bedtime) (19 Nov 2018 15:40)  Tylenol 325 mg oral tablet: 2 tab(s) orally every 4 hours, As Needed (19 Nov 2018 15:40)  Vitamin C 500 mg oral tablet: 1 tab(s) orally once a day (19 Nov 2018 15:40)  zinc sulfate: 50 milligram(s) orally once a day (19 Nov 2018 15:40)        MEDICATIONS  (STANDING):  ascorbic acid 500 milliGRAM(s) Oral daily  atorvastatin 40 milliGRAM(s) Oral at bedtime  dextrose 5%. 1000 milliLiter(s) (50 mL/Hr) IV Continuous <Continuous>  dextrose 50% Injectable 12.5 Gram(s) IV Push once  dextrose 50% Injectable 25 Gram(s) IV Push once  dextrose 50% Injectable 25 Gram(s) IV Push once  diltiazem    Tablet 30 milliGRAM(s) Enteral Tube every 6 hours  ferrous    sulfate Liquid 300 milliGRAM(s) Enteral Tube daily  insulin glargine Injectable (LANTUS) 8 Unit(s) SubCutaneous at bedtime  insulin lispro (HumaLOG) corrective regimen sliding scale   SubCutaneous every 6 hours  losartan 50 milliGRAM(s) Oral daily  metoprolol tartrate 100 milliGRAM(s) Oral two times a day  OLANZapine Disintegrating Tablet 5 milliGRAM(s) Oral daily  pantoprazole   Suspension 40 milliGRAM(s) Oral daily  senna Syrup 5 milliLiter(s) Oral at bedtime  sodium chloride 0.45%. 1000 milliLiter(s) (60 mL/Hr) IV Continuous <Continuous>    MEDICATIONS  (PRN):  acetaminophen   Tablet .. 650 milliGRAM(s) Oral every 6 hours PRN Temp greater or equal to 38C (100.4F), Mild Pain (1 - 3)  dextrose 40% Gel 15 Gram(s) Oral once PRN Blood Glucose LESS THAN 70 milliGRAM(s)/deciliter  glucagon  Injectable 1 milliGRAM(s) IntraMuscular once PRN Glucose LESS THAN 70 milligrams/deciliter  lactulose Syrup 20 Gram(s) Oral daily PRN constipation      Allergies    No Known Allergies    Intolerances        REVIEW OF SYSTEMS    [ ] A ten-point review of systems was otherwise negative except as noted.  [x ] Due to altered mental status/intubation, subjective information were not able to be obtained from the patient. History was obtained, to the extent possible, from review of the chart and collateral sources of information.      Vital Signs Last 24 Hrs  T(C): 36.4 (30 Nov 2018 14:33), Max: 36.4 (30 Nov 2018 14:33)  T(F): 97.5 (30 Nov 2018 14:33), Max: 97.5 (30 Nov 2018 14:33)  HR: 148 (30 Nov 2018 14:33) (86 - 148)  BP: 124/96 (30 Nov 2018 14:33) (124/96 - 143/77)  BP(mean): --  RR: 16 (30 Nov 2018 14:33) (16 - 16)  SpO2: --    PHYSICAL EXAM:  ABDOMEN: Soft, Nontender, Nondistended; (+) PEG      LABS:    POCT  Blood Glucose (11.30.18 @ 11:08)    POCT Blood Glucose.: 161: RNNotify RB Clnd mtr mg/dL                          9.3    4.16  )-----------( 214      ( 29 Nov 2018 12:43 )             30.7       RADIOLOGY & ADDITIONAL STUDIES:     Colonoscopy 11/29:   Impression   1.Two polyps were found in the transverse colon and removed  2.A tumor was found in the ascending colon. Multiple Biopsies taken.  3.Cannot rule out additional lesions due to very poor prep

## 2018-11-30 NOTE — PROGRESS NOTE ADULT - SUBJECTIVE AND OBJECTIVE BOX
SUSI MARTIN  78y  Male    Patient is a 78y old  Male who presents with a chief complaint of Fever/Tachycardia (30 Nov 2018 09:28)    ALLERGIES:  No Known Allergies      INTERVAL HPI/OVERNIGHT EVENTS:    VITALS:  T(F): 97.3 (11-30-18 @ 05:15), Max: 97.3 (11-30-18 @ 05:15)  HR: 101 (11-30-18 @ 05:15) (86 - 110)  BP: 143/77 (11-30-18 @ 05:15) (124/70 - 167/83)  RR: 16 (11-30-18 @ 05:15) (16 - 18)  SpO2: --    LABS:        MICROBIOLOGY:    MEDICATION:  metoprolol tartrate 100 milliGRAM(s) Oral two times a day  sodium chloride 0.45%. 1000 milliLiter(s) IV Continuous <Continuous>    RADIOLOGY & ADDITIONAL TESTS:

## 2018-11-30 NOTE — CONSULT NOTE ADULT - ASSESSMENT
· Assessment		  78M witha cortez PMHX sent in from [facility] 2/2 fever, found to have PNA, AFIB w/RVR, denied admission unit 2/2 code status and admitted to low risk tele for further monitoring.  Pt also with hematochezia, s/p colonoscopy, now with tumor noted in ascending colon

## 2018-12-01 LAB
GLUCOSE BLDC GLUCOMTR-MCNC: 179 MG/DL — HIGH (ref 70–99)
GLUCOSE BLDC GLUCOMTR-MCNC: 184 MG/DL — HIGH (ref 70–99)
GLUCOSE BLDC GLUCOMTR-MCNC: 193 MG/DL — HIGH (ref 70–99)
GLUCOSE BLDC GLUCOMTR-MCNC: 209 MG/DL — HIGH (ref 70–99)
GLUCOSE BLDC GLUCOMTR-MCNC: 211 MG/DL — HIGH (ref 70–99)

## 2018-12-01 RX ADMIN — Medication 1: at 23:27

## 2018-12-01 RX ADMIN — Medication 100 MILLIGRAM(S): at 06:41

## 2018-12-01 RX ADMIN — Medication 100 MILLIGRAM(S): at 17:58

## 2018-12-01 RX ADMIN — LOSARTAN POTASSIUM 50 MILLIGRAM(S): 100 TABLET, FILM COATED ORAL at 06:41

## 2018-12-01 RX ADMIN — SODIUM CHLORIDE 60 MILLILITER(S): 9 INJECTION, SOLUTION INTRAVENOUS at 17:59

## 2018-12-01 RX ADMIN — OLANZAPINE 5 MILLIGRAM(S): 15 TABLET, FILM COATED ORAL at 12:53

## 2018-12-01 RX ADMIN — PANTOPRAZOLE SODIUM 40 MILLIGRAM(S): 20 TABLET, DELAYED RELEASE ORAL at 12:54

## 2018-12-01 RX ADMIN — INSULIN GLARGINE 8 UNIT(S): 100 INJECTION, SOLUTION SUBCUTANEOUS at 23:26

## 2018-12-01 RX ADMIN — SENNA PLUS 5 MILLILITER(S): 8.6 TABLET ORAL at 23:24

## 2018-12-01 RX ADMIN — Medication 1: at 06:42

## 2018-12-01 RX ADMIN — ATORVASTATIN CALCIUM 40 MILLIGRAM(S): 80 TABLET, FILM COATED ORAL at 23:25

## 2018-12-01 RX ADMIN — SODIUM CHLORIDE 60 MILLILITER(S): 9 INJECTION, SOLUTION INTRAVENOUS at 01:43

## 2018-12-01 RX ADMIN — Medication 2: at 12:56

## 2018-12-01 RX ADMIN — Medication 500 MILLIGRAM(S): at 12:54

## 2018-12-01 RX ADMIN — Medication 300 MILLIGRAM(S): at 12:53

## 2018-12-01 RX ADMIN — Medication 2: at 17:57

## 2018-12-01 NOTE — PROGRESS NOTE ADULT - SUBJECTIVE AND OBJECTIVE BOX
SUSI MARTIN  78y  Male    Patient is a 78y old  Male who presents with a chief complaint of Fever/Tachycardia (30 Nov 2018 15:28)    ALLERGIES:  No Known Allergies      INTERVAL HPI/OVERNIGHT EVENTS:    VITALS:  T(F): 97.8 (12-01-18 @ 05:15), Max: 97.8 (11-30-18 @ 22:50)  HR: 92 (12-01-18 @ 05:15) (92 - 148)  BP: 123/64 (12-01-18 @ 05:15) (123/64 - 155/75)  RR: 16 (12-01-18 @ 05:15) (16 - 16)  SpO2: 97% (11-30-18 @ 21:00) (97% - 97%)    LABS:        MICROBIOLOGY:    MEDICATION:    RADIOLOGY & ADDITIONAL TESTS:

## 2018-12-01 NOTE — CONSULT NOTE ADULT - SUBJECTIVE AND OBJECTIVE BOX
SUSI MARTIN [id number]  78y Male  11d    HPI:  Patient initially admitted to [facility] in october, discharged on 11/8/18 to SNF after having sustained a CVA, course complciated by UTI.  Wife reports that the patient was doing well but developed fever 4 days prior to admission. The patient is aphasic at baseline.  Patient was sent to the ED for further evaluation and found to have new opacities on RLL.  Also in AFIB w/RVR, currently on Eliquis. (19 Nov 2018 15:30)    Pt had colonscopy due to hematochezia -- found tumor in ascending colon; awaiting biopsy result. Eliquis being held. Pt on PEG feeds -   PAST MEDICAL & SURGICAL HISTORY:  Stroke  DVT (deep venous thrombosis)  Anemia  Diabetes  HTN (hypertension)  Arrhythmia  Dementia  S/P cholecystectomy  History of hip replacement    Home Medications:  amLODIPine 5 mg oral tablet: 0.5 tab(s) orally once a day (19 Nov 2018 15:40)  aspirin 81 mg oral delayed release tablet: 1 tab(s) orally once a day (19 Nov 2018 15:40)  atorvastatin 40 mg oral tablet: 1 tab(s) orally once a day (at bedtime) (19 Nov 2018 15:40)  collagenase 250 units/g topical ointment: 1 application topically once a day (19 Nov 2018 15:40)  Eliquis 5 mg oral tablet: 1 tab(s) orally 2 times a day (19 Nov 2018 15:40)  ferrous sulfate 325 mg (65 mg elemental iron) oral delayed release tablet: 1 tab(s) orally once a day (19 Nov 2018 15:40)  Flomax 0.4 mg oral capsule: 1 cap(s) orally once a day (at bedtime) (19 Nov 2018 15:40)  lactulose 10 g/15 mL oral solution: orally once a day (19 Nov 2018 15:40)  Lantus 100 units/mL subcutaneous solution: 8 unit(s) subcutaneous once a day (at bedtime) (19 Nov 2018 15:40)  losartan 50 mg oral tablet: 1 tab(s) orally once a day (19 Nov 2018 15:40)  metFORMIN 1000 mg oral tablet: 1 tab(s) orally 2 times a day (19 Nov 2018 15:40)  Metoprolol Tartrate 25 mg oral tablet: 1 tab(s) orally 2 times a day (19 Nov 2018 15:40)  OLANZapine 5 mg oral tablet: 1 tab(s) orally once a day (19 Nov 2018 15:40)  senna oral tablet: 1 tab(s) orally once a day (at bedtime) (19 Nov 2018 15:40)  Tylenol 325 mg oral tablet: 2 tab(s) orally every 4 hours, As Needed (19 Nov 2018 15:40)  Vitamin C 500 mg oral tablet: 1 tab(s) orally once a day (19 Nov 2018 15:40)  zinc sulfate: 50 milligram(s) orally once a day (19 Nov 2018 15:40)        MEDICATIONS  (STANDING):  ascorbic acid 500 milliGRAM(s) Oral daily  atorvastatin 40 milliGRAM(s) Oral at bedtime  dextrose 5%. 1000 milliLiter(s) (50 mL/Hr) IV Continuous <Continuous>  dextrose 50% Injectable 12.5 Gram(s) IV Push once  dextrose 50% Injectable 25 Gram(s) IV Push once  dextrose 50% Injectable 25 Gram(s) IV Push once  diltiazem    Tablet 30 milliGRAM(s) Enteral Tube every 6 hours  ferrous    sulfate Liquid 300 milliGRAM(s) Enteral Tube daily  insulin glargine Injectable (LANTUS) 8 Unit(s) SubCutaneous at bedtime  insulin lispro (HumaLOG) corrective regimen sliding scale   SubCutaneous every 6 hours  losartan 50 milliGRAM(s) Oral daily  metoprolol tartrate 100 milliGRAM(s) Oral two times a day  OLANZapine Disintegrating Tablet 5 milliGRAM(s) Oral daily  pantoprazole   Suspension 40 milliGRAM(s) Oral daily  senna Syrup 5 milliLiter(s) Oral at bedtime  sodium chloride 0.45%. 1000 milliLiter(s) (60 mL/Hr) IV Continuous <Continuous>    MEDICATIONS  (PRN):  acetaminophen   Tablet .. 650 milliGRAM(s) Oral every 6 hours PRN Temp greater or equal to 38C (100.4F), Mild Pain (1 - 3)  dextrose 40% Gel 15 Gram(s) Oral once PRN Blood Glucose LESS THAN 70 milliGRAM(s)/deciliter  glucagon  Injectable 1 milliGRAM(s) IntraMuscular once PRN Glucose LESS THAN 70 milligrams/deciliter  lactulose Syrup 20 Gram(s) Oral daily PRN constipation      Allergies    No Known Allergies    Intolerances        REVIEW OF SYSTEMS    [ ] A ten-point review of systems was otherwise negative except as noted.  [x ] Due to altered mental status/intubation, subjective information were not able to be obtained from the patient. History was obtained, to the extent possible, from review of the chart . No family at bedside        Vital Signs Last 24 Hrs  T(C): 36.7 (01 Dec 2018 14:01), Max: 36.7 (01 Dec 2018 14:01)  T(F): 98 (01 Dec 2018 14:01), Max: 98 (01 Dec 2018 14:01)  HR: 92 (01 Dec 2018 14:01) (92 - 92)  BP: 144/48 (01 Dec 2018 14:01) (123/64 - 155/75)  BP(mean): --  RR: 16 (01 Dec 2018 14:01) (16 - 16)  SpO2: 97% (30 Nov 2018 21:00) (97% - 97%)    PHYSICAL EXAM:  GENERAL: Aphasic, no acute distress.  HEAD:  Atraumatic, Normocephalic  EYES: EOMI, PERRLA, conjunctiva and sclera clear  NECK: Supple, no JVD or thyromegaly  NODES: No palpable cervical or supraclavicular lymphadenopathy   CHEST/LUNG: Clear to auscultation bilaterally; No wheeze, rhonchi, or rales  HEART: Regular rate and rhythm; normal S1, S2, No murmurs.  EXTREMITIES:  2+ Peripheral Pulses. No clubbing, cyanosis, or edema  ABDOMEN: Soft, Nontender, Nondistended; (+) PEG      LABS:  Labs:                                 9.3    4.16  )-----------( 214      ( 29 Nov 2018 12:43 )             30.7       RADIOLOGY & ADDITIONAL STUDIES:     Colonoscopy 11/29:   Impression   1.Two polyps were found in the transverse colon and removed  2.A tumor was found in the ascending colon. Multiple Biopsies taken.  3.Cannot rule out additional lesions due to very poor prep    < from: CT Abdomen and Pelvis w/ IV Cont (10.23.18 @ 04:23) >  IMPRESSION:        1.  No CT evidence of acute abdominopelvic pathology.    2.  Trace right pleural effusion, essentially stable since August 11, 2018.    3.  8 mm left lower lobe nodule. Nonemergent PET CT may be of benefit.

## 2018-12-01 NOTE — PROGRESS NOTE ADULT - ASSESSMENT
surgery inputs appreciated    await biopsy result .no definite mass on ct   colonoscopy reveled tumor     d/w wife w [place]

## 2018-12-01 NOTE — CONSULT NOTE ADULT - ASSESSMENT
· Assessment		  78M with above PMHX sent in from NH 2/2 fever, found to have PNA, AFIB w/RVR,   Pt is  s/p colonoscopy, noted to have tumor  in ascending colon.  Pathology pending.    PLAN  Follow up biopsy results.  check CEA, LFTs and CT chest.  If no metastases proceed with surgery, if pt cleared medically.

## 2018-12-02 LAB
GLUCOSE BLDC GLUCOMTR-MCNC: 176 MG/DL — HIGH (ref 70–99)
GLUCOSE BLDC GLUCOMTR-MCNC: 215 MG/DL — HIGH (ref 70–99)
GLUCOSE BLDC GLUCOMTR-MCNC: 225 MG/DL — HIGH (ref 70–99)

## 2018-12-02 RX ADMIN — Medication 500 MILLIGRAM(S): at 11:38

## 2018-12-02 RX ADMIN — OLANZAPINE 5 MILLIGRAM(S): 15 TABLET, FILM COATED ORAL at 11:38

## 2018-12-02 RX ADMIN — INSULIN GLARGINE 8 UNIT(S): 100 INJECTION, SOLUTION SUBCUTANEOUS at 22:07

## 2018-12-02 RX ADMIN — Medication 100 MILLIGRAM(S): at 05:43

## 2018-12-02 RX ADMIN — Medication 100 MILLIGRAM(S): at 17:10

## 2018-12-02 RX ADMIN — SODIUM CHLORIDE 60 MILLILITER(S): 9 INJECTION, SOLUTION INTRAVENOUS at 05:43

## 2018-12-02 RX ADMIN — ATORVASTATIN CALCIUM 40 MILLIGRAM(S): 80 TABLET, FILM COATED ORAL at 21:47

## 2018-12-02 RX ADMIN — Medication 2: at 12:06

## 2018-12-02 RX ADMIN — SODIUM CHLORIDE 60 MILLILITER(S): 9 INJECTION, SOLUTION INTRAVENOUS at 23:58

## 2018-12-02 RX ADMIN — LOSARTAN POTASSIUM 50 MILLIGRAM(S): 100 TABLET, FILM COATED ORAL at 05:43

## 2018-12-02 RX ADMIN — Medication 300 MILLIGRAM(S): at 11:39

## 2018-12-02 RX ADMIN — SENNA PLUS 5 MILLILITER(S): 8.6 TABLET ORAL at 21:47

## 2018-12-02 RX ADMIN — Medication 2: at 05:42

## 2018-12-02 RX ADMIN — Medication 1: at 17:10

## 2018-12-02 RX ADMIN — PANTOPRAZOLE SODIUM 40 MILLIGRAM(S): 20 TABLET, DELAYED RELEASE ORAL at 11:39

## 2018-12-02 NOTE — PROGRESS NOTE ADULT - SUBJECTIVE AND OBJECTIVE BOX
SUSI MARTIN  78y  Male    Patient is a 78y old  Male who presents with a chief complaint of Fever/Tachycardia (01 Dec 2018 16:32)    ALLERGIES:  No Known Allergies      INTERVAL HPI/OVERNIGHT EVENTS:    VITALS:  T(F): 97.5 (12-02-18 @ 06:00), Max: 98 (12-01-18 @ 14:01)  HR: 92 (12-02-18 @ 06:00) (85 - 106)  BP: 160/81 (12-02-18 @ 06:00) (129/70 - 175/76)  RR: 16 (12-02-18 @ 06:00) (16 - 16)  SpO2: --    LABS:        MICROBIOLOGY:    MEDICATION:    RADIOLOGY & ADDITIONAL TESTS:

## 2018-12-02 NOTE — PROGRESS NOTE ADULT - ASSESSMENT
· Assessment		  78M with above PMHX sent in from NH 2/2 fever, found to have PNA, AFIB w/RVR,   Pt is  s/p colonoscopy, noted to have tumor  in ascending colon.  Pathology pending.    PLAN---------------------------------------------------------------  Follow up biopsy results.  check CEA, LFTs and CT chest.  If no metastases proceed with surgery  need medical clearance

## 2018-12-03 LAB
CEA SERPL-MCNC: 7 NG/ML — HIGH (ref 0–3.8)
GLUCOSE BLDC GLUCOMTR-MCNC: 138 MG/DL — HIGH (ref 70–99)
GLUCOSE BLDC GLUCOMTR-MCNC: 162 MG/DL — HIGH (ref 70–99)
GLUCOSE BLDC GLUCOMTR-MCNC: 206 MG/DL — HIGH (ref 70–99)
GLUCOSE BLDC GLUCOMTR-MCNC: 207 MG/DL — HIGH (ref 70–99)
SURGICAL PATHOLOGY STUDY: SIGNIFICANT CHANGE UP

## 2018-12-03 RX ADMIN — LOSARTAN POTASSIUM 50 MILLIGRAM(S): 100 TABLET, FILM COATED ORAL at 06:00

## 2018-12-03 RX ADMIN — SENNA PLUS 5 MILLILITER(S): 8.6 TABLET ORAL at 21:55

## 2018-12-03 RX ADMIN — OLANZAPINE 5 MILLIGRAM(S): 15 TABLET, FILM COATED ORAL at 11:52

## 2018-12-03 RX ADMIN — Medication 2: at 17:54

## 2018-12-03 RX ADMIN — ATORVASTATIN CALCIUM 40 MILLIGRAM(S): 80 TABLET, FILM COATED ORAL at 21:55

## 2018-12-03 RX ADMIN — INSULIN GLARGINE 8 UNIT(S): 100 INJECTION, SOLUTION SUBCUTANEOUS at 21:56

## 2018-12-03 RX ADMIN — Medication 100 MILLIGRAM(S): at 06:00

## 2018-12-03 RX ADMIN — Medication 500 MILLIGRAM(S): at 11:52

## 2018-12-03 RX ADMIN — Medication 1: at 00:24

## 2018-12-03 RX ADMIN — Medication 100 MILLIGRAM(S): at 17:55

## 2018-12-03 RX ADMIN — Medication 2: at 21:56

## 2018-12-03 RX ADMIN — Medication 300 MILLIGRAM(S): at 11:51

## 2018-12-03 RX ADMIN — PANTOPRAZOLE SODIUM 40 MILLIGRAM(S): 20 TABLET, DELAYED RELEASE ORAL at 11:52

## 2018-12-03 NOTE — PROGRESS NOTE ADULT - SUBJECTIVE AND OBJECTIVE BOX
SUSI MARTIN  78y  Male    Patient is a 78y old  Male who presents with a chief complaint of Fever/Tachycardia (02 Dec 2018 12:44)    ALLERGIES:  No Known Allergies      INTERVAL HPI/OVERNIGHT EVENTS:    VITALS:  T(F): 96.8 (12-03-18 @ 06:13), Max: 96.9 (12-02-18 @ 14:16)  HR: 69 (12-03-18 @ 06:13) (69 - 95)  BP: 137/90 (12-03-18 @ 06:13) (128/62 - 164/90)  RR: 16 (12-03-18 @ 06:13) (16 - 16)  SpO2: --    LABS:        MICROBIOLOGY:    MEDICATION:    RADIOLOGY & ADDITIONAL TESTS:

## 2018-12-03 NOTE — PROGRESS NOTE ADULT - SUBJECTIVE AND OBJECTIVE BOX
78yMale  Being followed for Tumor in Ascending Colon    Interval history: As per nursing team patient has not had further episodes of blood in stool over the weekend. Patient is resting at bedside with wife. As per nursing team and patient's wife patient has not had any episodes of hematochezia over night. Nursing team and patient's wife deny hematemesis       PMHX/PSHX:    PAST MEDICAL & SURGICAL HISTORY:  Stroke  DVT (deep venous thrombosis)  Anemia  Diabetes  HTN (hypertension)  Arrhythmia  Dementia  S/P cholecystectomy  History of hip replacement      Social History: No smoking. No alcohol. No illegal drug use.    MEDICATIONS:  MEDICATIONS  (STANDING):  ascorbic acid 500 milliGRAM(s) Oral daily  atorvastatin 40 milliGRAM(s) Oral at bedtime  dextrose 5%. 1000 milliLiter(s) (50 mL/Hr) IV Continuous <Continuous>  dextrose 50% Injectable 12.5 Gram(s) IV Push once  dextrose 50% Injectable 25 Gram(s) IV Push once  dextrose 50% Injectable 25 Gram(s) IV Push once  diltiazem    Tablet 30 milliGRAM(s) Enteral Tube every 6 hours  ferrous    sulfate Liquid 300 milliGRAM(s) Enteral Tube daily  insulin glargine Injectable (LANTUS) 8 Unit(s) SubCutaneous at bedtime  insulin lispro (HumaLOG) corrective regimen sliding scale   SubCutaneous every 6 hours  losartan 50 milliGRAM(s) Oral daily  metoprolol tartrate 100 milliGRAM(s) Oral two times a day  OLANZapine Disintegrating Tablet 5 milliGRAM(s) Oral daily  pantoprazole   Suspension 40 milliGRAM(s) Oral daily  senna Syrup 5 milliLiter(s) Oral at bedtime  sodium chloride 0.45%. 1000 milliLiter(s) (60 mL/Hr) IV Continuous <Continuous>    MEDICATIONS  (PRN):  acetaminophen   Tablet .. 650 milliGRAM(s) Oral every 6 hours PRN Temp greater or equal to 38C (100.4F), Mild Pain (1 - 3)  dextrose 40% Gel 15 Gram(s) Oral once PRN Blood Glucose LESS THAN 70 milliGRAM(s)/deciliter  glucagon  Injectable 1 milliGRAM(s) IntraMuscular once PRN Glucose LESS THAN 70 milligrams/deciliter  lactulose Syrup 20 Gram(s) Oral daily PRN constipation        Allergies:  Allergies    No Known Allergies    Intolerances          REVIEW OF SYSTEMS:  Unable to obtain due to patient's dementia       VITAL SIGNS:   T(F): 96.8 (12-03-18 @ 06:13), Max: 96.9 (12-02-18 @ 14:16)  HR: 69 (12-03-18 @ 06:13) (69 - 95)  BP: 137/90 (12-03-18 @ 06:13) (128/62 - 164/90)  RR: 16 (12-03-18 @ 06:13) (16 - 16)  SpO2: --    PHYSICAL EXAM:  GENERAL: no acute distress.  HEAD:  Atraumatic, Normocephalic  EYES: conjunctiva and sclera clear  NECK: Supple, no JVD or thyromegaly  CHEST/LUNG: Clear to auscultation bilaterally; No wheeze, rhonchi, or rales  HEART: Regular rate and rhythm; normal S1, S2, No murmurs.  ABDOMEN: Soft, nontender, nondistended; Bowel sounds present, no abdominal bruit, masses, or hepatosplenomegaly +PEG tube   EXTREMITIES:  2+ Peripheral Pulses. No clubbing, cyanosis, or edema  NEUROLOGY: No asterixis    SKIN: Intact, no jaundice  EXTREMITIES: warm, no periph edema.          LABS:                IMAGING: 78yMale  Being followed for Tumor in Ascending Colon    Interval history: As per nursing team patient has not had further episodes of blood in stool over the weekend. Patient is resting at bedside with wife. As per nursing team and patient's wife patient has not had any episodes of hematochezia over night.     Further history unobtainable    PMHX/PSHX:    PAST MEDICAL & SURGICAL HISTORY:  Stroke  DVT (deep venous thrombosis)  Anemia  Diabetes  HTN (hypertension)  Arrhythmia  Dementia  S/P cholecystectomy  History of hip replacement      Social History: No smoking. No alcohol. No illegal drug use.    MEDICATIONS:  MEDICATIONS  (STANDING):  ascorbic acid 500 milliGRAM(s) Oral daily  atorvastatin 40 milliGRAM(s) Oral at bedtime  dextrose 5%. 1000 milliLiter(s) (50 mL/Hr) IV Continuous <Continuous>  dextrose 50% Injectable 12.5 Gram(s) IV Push once  dextrose 50% Injectable 25 Gram(s) IV Push once  dextrose 50% Injectable 25 Gram(s) IV Push once  diltiazem    Tablet 30 milliGRAM(s) Enteral Tube every 6 hours  ferrous    sulfate Liquid 300 milliGRAM(s) Enteral Tube daily  insulin glargine Injectable (LANTUS) 8 Unit(s) SubCutaneous at bedtime  insulin lispro (HumaLOG) corrective regimen sliding scale   SubCutaneous every 6 hours  losartan 50 milliGRAM(s) Oral daily  metoprolol tartrate 100 milliGRAM(s) Oral two times a day  OLANZapine Disintegrating Tablet 5 milliGRAM(s) Oral daily  pantoprazole   Suspension 40 milliGRAM(s) Oral daily  senna Syrup 5 milliLiter(s) Oral at bedtime  sodium chloride 0.45%. 1000 milliLiter(s) (60 mL/Hr) IV Continuous <Continuous>    MEDICATIONS  (PRN):  acetaminophen   Tablet .. 650 milliGRAM(s) Oral every 6 hours PRN Temp greater or equal to 38C (100.4F), Mild Pain (1 - 3)  dextrose 40% Gel 15 Gram(s) Oral once PRN Blood Glucose LESS THAN 70 milliGRAM(s)/deciliter  glucagon  Injectable 1 milliGRAM(s) IntraMuscular once PRN Glucose LESS THAN 70 milligrams/deciliter  lactulose Syrup 20 Gram(s) Oral daily PRN constipation        Allergies:  Allergies    No Known Allergies    Intolerances          REVIEW OF SYSTEMS:  Unable to obtain due to patient's dementia       VITAL SIGNS:   T(F): 96.8 (12-03-18 @ 06:13), Max: 96.9 (12-02-18 @ 14:16)  HR: 69 (12-03-18 @ 06:13) (69 - 95)  BP: 137/90 (12-03-18 @ 06:13) (128/62 - 164/90)  RR: 16 (12-03-18 @ 06:13) (16 - 16)  SpO2: --    PHYSICAL EXAM:  GENERAL: no acute distress.  HEAD:  Atraumatic, Normocephalic  EYES: conjunctiva and sclera clear  NECK: Supple, no JVD or thyromegaly  CHEST/LUNG: Clear to auscultation bilaterally; No wheeze, rhonchi, or rales  HEART: Regular rate and rhythm; normal S1, S2, No murmurs.  ABDOMEN: Soft, nontender, nondistended; Bowel sounds present, no abdominal bruit, masses, or hepatosplenomegaly +PEG tube   EXTREMITIES:  2+ Peripheral Pulses. No clubbing, cyanosis, or edema  NEUROLOGY: No asterixis    SKIN: Intact, no jaundice  EXTREMITIES: warm, no periph edema.

## 2018-12-03 NOTE — PROGRESS NOTE ADULT - ASSESSMENT
78 yoa male pmh of anemia, diabetes, dementia with    Problem 1-Mass in Ascending Colon  Recc  Will discuss with attending   F/U Biopsy results  F/U CT Chest results  F/U LFTs, F/U CEA  Surgery Following   Oncology Following   Monitor H and H 78 yoa male pmh of anemia, diabetes, dementia with      Problem-Tumor in the ascending colon  Recc  Follow up on the results of the biopsy specimen  Surgery consult appreciated team following   Oncology consult appreciated team following   F/U CT Chest, LFTS, CEA,  If anticoagulation must be restarted please consider Coumadin as this can be reversed. (though given the patient's tumor, he is likely to bleed)  If pt is not a surgical candidate consider Colonic Stent  After decision on further care colonoscopy may be attempted after good prep to rule out additional lesions and to remove polyps 78 yoa male pmh of anemia, diabetes, dementia with      Problem-Tumor in the ascending colon  Recc  Follow up on the results of the biopsy specimen  Surgery consult appreciated team following   Oncology consult appreciated team following   F/U CT Chest, LFTS, CEA,  If anticoagulation must be restarted please consider Coumadin as this can be reversed. (though given the patient's tumor, he is likely to bleed)  If pt is not a surgical candidate consider Colonic Stent  After decision on further care colonoscopy may be attempted after good prep to rule out additional lesions and to remove polyps  Call back GI PRN 78 yoa male pmh of anemia, diabetes, dementia with      Problem-Tumor in the ascending colon  Recc  Follow up on the results of the biopsy specimen  Surgery consult appreciated team following   Oncology consult appreciated team following   F/U CT Chest, LFTS, CEA,  If anticoagulation must be restarted please consider Coumadin as this can be reversed. (though given the patient's tumor, he is likely to bleed)  If pt is not a surgical candidate consider Colonic Stent  After decision on further care colonoscopy may be attempted after good prep to rule out additional lesions and to remove polyps  Call back GI if need for colonic stent 78 yoa male pmh of anemia, diabetes, dementia with      Problem-Tumor in the ascending colon  Recc  Follow up on the results of the biopsy specimen  Surgery consult appreciated team following   Oncology consult appreciated team following   F/U CT Chest abdomen and pelvis, LFTS, CEA,  If pt is not a surgical candidate consider Colonic Stent  If patient is going to undergo surgery, repeat  colonoscopy may be attempted beforehand ( after good prep) to rule out additional lesions and to remove polyps  Call back GI if need for colonic stent

## 2018-12-03 NOTE — PROGRESS NOTE ADULT - ASSESSMENT
· Assessment		  78M with above PMHX sent in from NH 2/2 fever, found to have PNA, AFIB w/RVR,   Pt is  s/p colonoscopy, noted to have tumor  in ascending colon.  Pathology pending.    PLAN---------------------------------------------------------------  Follow up biopsy results.  check CEA, LFTs and CT chest.  If no metastases proceed with surgery  need medical clearance   ct chest ordered

## 2018-12-04 LAB
ANION GAP SERPL CALC-SCNC: 10 MMOL/L — SIGNIFICANT CHANGE UP (ref 7–14)
APTT BLD: 34.3 SEC — SIGNIFICANT CHANGE UP (ref 27–39.2)
BASOPHILS # BLD AUTO: 0.02 K/UL — SIGNIFICANT CHANGE UP (ref 0–0.2)
BASOPHILS NFR BLD AUTO: 0.4 % — SIGNIFICANT CHANGE UP (ref 0–1)
BUN SERPL-MCNC: 19 MG/DL — SIGNIFICANT CHANGE UP (ref 10–20)
CALCIUM SERPL-MCNC: 8.8 MG/DL — SIGNIFICANT CHANGE UP (ref 8.5–10.1)
CHLORIDE SERPL-SCNC: 105 MMOL/L — SIGNIFICANT CHANGE UP (ref 98–110)
CO2 SERPL-SCNC: 26 MMOL/L — SIGNIFICANT CHANGE UP (ref 17–32)
CREAT SERPL-MCNC: 0.8 MG/DL — SIGNIFICANT CHANGE UP (ref 0.7–1.5)
EOSINOPHIL # BLD AUTO: 0.09 K/UL — SIGNIFICANT CHANGE UP (ref 0–0.7)
EOSINOPHIL NFR BLD AUTO: 1.8 % — SIGNIFICANT CHANGE UP (ref 0–8)
GLUCOSE BLDC GLUCOMTR-MCNC: 165 MG/DL — HIGH (ref 70–99)
GLUCOSE BLDC GLUCOMTR-MCNC: 166 MG/DL — HIGH (ref 70–99)
GLUCOSE BLDC GLUCOMTR-MCNC: 197 MG/DL — HIGH (ref 70–99)
GLUCOSE BLDC GLUCOMTR-MCNC: 201 MG/DL — HIGH (ref 70–99)
GLUCOSE SERPL-MCNC: 199 MG/DL — HIGH (ref 70–99)
HCT VFR BLD CALC: 32 % — LOW (ref 42–52)
HGB BLD-MCNC: 9.6 G/DL — LOW (ref 14–18)
IMM GRANULOCYTES NFR BLD AUTO: 0.4 % — HIGH (ref 0.1–0.3)
INR BLD: 1.33 RATIO — HIGH (ref 0.65–1.3)
LYMPHOCYTES # BLD AUTO: 0.56 K/UL — LOW (ref 1.2–3.4)
LYMPHOCYTES # BLD AUTO: 11.5 % — LOW (ref 20.5–51.1)
MAGNESIUM SERPL-MCNC: 2.3 MG/DL — SIGNIFICANT CHANGE UP (ref 1.8–2.4)
MCHC RBC-ENTMCNC: 26.6 PG — LOW (ref 27–31)
MCHC RBC-ENTMCNC: 30 G/DL — LOW (ref 32–37)
MCV RBC AUTO: 88.6 FL — SIGNIFICANT CHANGE UP (ref 80–94)
MONOCYTES # BLD AUTO: 0.39 K/UL — SIGNIFICANT CHANGE UP (ref 0.1–0.6)
MONOCYTES NFR BLD AUTO: 8 % — SIGNIFICANT CHANGE UP (ref 1.7–9.3)
NEUTROPHILS # BLD AUTO: 3.8 K/UL — SIGNIFICANT CHANGE UP (ref 1.4–6.5)
NEUTROPHILS NFR BLD AUTO: 77.9 % — HIGH (ref 42.2–75.2)
PHOSPHATE SERPL-MCNC: 3.9 MG/DL — SIGNIFICANT CHANGE UP (ref 2.1–4.9)
PLATELET # BLD AUTO: 209 K/UL — SIGNIFICANT CHANGE UP (ref 130–400)
POTASSIUM SERPL-MCNC: 4.2 MMOL/L — SIGNIFICANT CHANGE UP (ref 3.5–5)
POTASSIUM SERPL-SCNC: 4.2 MMOL/L — SIGNIFICANT CHANGE UP (ref 3.5–5)
PROTHROM AB SERPL-ACNC: 14.4 SEC — HIGH (ref 9.95–12.87)
RBC # BLD: 3.61 M/UL — LOW (ref 4.7–6.1)
RBC # FLD: 17.7 % — HIGH (ref 11.5–14.5)
SODIUM SERPL-SCNC: 141 MMOL/L — SIGNIFICANT CHANGE UP (ref 135–146)
WBC # BLD: 4.88 K/UL — SIGNIFICANT CHANGE UP (ref 4.8–10.8)
WBC # FLD AUTO: 4.88 K/UL — SIGNIFICANT CHANGE UP (ref 4.8–10.8)

## 2018-12-04 RX ORDER — CHLORHEXIDINE GLUCONATE 213 G/1000ML
1 SOLUTION TOPICAL
Qty: 0 | Refills: 0 | Status: DISCONTINUED | OUTPATIENT
Start: 2018-12-04 | End: 2018-12-13

## 2018-12-04 RX ADMIN — Medication 300 MILLIGRAM(S): at 14:37

## 2018-12-04 RX ADMIN — Medication 1: at 12:42

## 2018-12-04 RX ADMIN — PANTOPRAZOLE SODIUM 40 MILLIGRAM(S): 20 TABLET, DELAYED RELEASE ORAL at 14:37

## 2018-12-04 RX ADMIN — Medication 100 MILLIGRAM(S): at 18:00

## 2018-12-04 RX ADMIN — ATORVASTATIN CALCIUM 40 MILLIGRAM(S): 80 TABLET, FILM COATED ORAL at 22:20

## 2018-12-04 RX ADMIN — LOSARTAN POTASSIUM 50 MILLIGRAM(S): 100 TABLET, FILM COATED ORAL at 05:27

## 2018-12-04 RX ADMIN — Medication 1: at 22:21

## 2018-12-04 RX ADMIN — INSULIN GLARGINE 8 UNIT(S): 100 INJECTION, SOLUTION SUBCUTANEOUS at 22:20

## 2018-12-04 RX ADMIN — Medication 100 MILLIGRAM(S): at 05:27

## 2018-12-04 RX ADMIN — SENNA PLUS 5 MILLILITER(S): 8.6 TABLET ORAL at 22:21

## 2018-12-04 RX ADMIN — Medication 2: at 18:00

## 2018-12-04 RX ADMIN — Medication 1: at 06:48

## 2018-12-04 RX ADMIN — OLANZAPINE 5 MILLIGRAM(S): 15 TABLET, FILM COATED ORAL at 14:37

## 2018-12-04 RX ADMIN — Medication 500 MILLIGRAM(S): at 14:37

## 2018-12-04 NOTE — PROGRESS NOTE ADULT - ASSESSMENT
abnormal  CT scan of chest non specific  new dx of colon mass -await pathology result    plan pulmonary consult  continue current meds

## 2018-12-04 NOTE — PROGRESS NOTE ADULT - SUBJECTIVE AND OBJECTIVE BOX
Patient is a 78y old  Male who presents with a chief complaint of Fever/Tachycardia (04 Dec 2018 09:20)  pt seen and evaluated resting comfortable  s/p colonoscopy + tumor in the ascending colon /GI f/u noted  on peg tube feed       ICU Vital Signs Last 24 Hrs  T(C): 35.9 (04 Dec 2018 05:15), Max: 36.4 (03 Dec 2018 22:15)  T(F): 96.7 (04 Dec 2018 05:15), Max: 97.5 (03 Dec 2018 22:15)  HR: 94 (04 Dec 2018 05:15) (80 - 94)  BP: 168/72 (04 Dec 2018 05:15) (138/78 - 168/72)  RR: 16 (04 Dec 2018 05:15) (16 - 16)    Drug Dosing Weight  Height (cm): 182.88 (03 Dec 2018 09:51)  Weight (kg): 74.2 (03 Dec 2018 09:51)  BMI (kg/m2): 22.2 (03 Dec 2018 09:51)  BSA (m2): 1.96 (03 Dec 2018 09:51)    I&O's Detail    03 Dec 2018 07:01  -  04 Dec 2018 07:00  --------------------------------------------------------  IN:    Free Water: 350 mL    Glucerna: 1440 mL  Total IN: 1790 mL    OUT:  Total OUT: 0 mL    Total NET: 1790 mL      PHYSICAL EXAM:  Constitutional: resting comfortably  Gastrointestinal: soft n/t n/d + peg tube in place site c/d/i  Extremities + skin changes lower extremities, + LLE wound  IV access:    MEDICATIONS  (STANDING):  ascorbic acid 500 milliGRAM(s) Oral daily  atorvastatin 40 milliGRAM(s) Oral at bedtime  dextrose 5%. 1000 milliLiter(s) (50 mL/Hr) IV Continuous <Continuous>  dextrose 50% Injectable 12.5 Gram(s) IV Push once  dextrose 50% Injectable 25 Gram(s) IV Push once  dextrose 50% Injectable 25 Gram(s) IV Push once  diltiazem    Tablet 30 milliGRAM(s) Enteral Tube every 6 hours  ferrous    sulfate Liquid 300 milliGRAM(s) Enteral Tube daily  insulin glargine Injectable (LANTUS) 8 Unit(s) SubCutaneous at bedtime  insulin lispro (HumaLOG) corrective regimen sliding scale   SubCutaneous every 6 hours  losartan 50 milliGRAM(s) Oral daily  metoprolol tartrate 100 milliGRAM(s) Oral two times a day  OLANZapine Disintegrating Tablet 5 milliGRAM(s) Oral daily  pantoprazole   Suspension 40 milliGRAM(s) Oral daily  senna Syrup 5 milliLiter(s) Oral at bedtime      Diet, NPO with Tube Feed:   Tube Feeding Modality: Gastrostomy  Glucerna 1.2 Trae  Total Volume for 24 Hours (mL): 2304  Intermittent  Starting Tube Feed Rate mL per Hour: 480  Until Goal Tube Feed Rate (mL per Hour): 480  Tube Feeding Hours ON: 1  Tube Feeding OFF (Hours): 4  Tube Feed Start Time: 14:00 (11-29-18 @ 16:59)      LABS  Comprehensive Metabolic Panel in AM (11.27.18 @ 07:05)    Sodium, Serum: 143 mmol/L    Potassium, Serum: 4.2 mmol/L    Chloride, Serum: 106 mmol/L    Carbon Dioxide, Serum: 27 mmol/L    Anion Gap, Serum: 10 mmol/L    Blood Urea Nitrogen, Serum: 24 mg/dL    Creatinine, Serum: 0.8 mg/dL    Glucose, Serum: 185 mg/dL    Calcium, Total Serum: 8.1 mg/dL    Protein Total, Serum: 4.9 g/dL    Albumin, Serum: 2.7 g/dL    Bilirubin Total, Serum: 0.5 mg/dL    Alkaline Phosphatase, Serum: 140 U/L    Aspartate Aminotransferase (AST/SGOT): 21 U/L    Alanine Aminotransferase (ALT/SGPT): 15 U/L    Complete Blood Count (11.29.18 @ 12:43)    Nucleated RBC: 0 /100 WBCs    WBC Count: 4.16 K/uL    RBC Count: 3.45 M/uL    Hemoglobin: 9.3 g/dL    Hematocrit: 30.7 %    Mean Cell Volume: 89.0 fL    Mean Cell Hemoglobin: 27.0 pg    Mean Cell Hemoglobin Conc: 30.3 g/dL    Red Cell Distrib Width: 18.5 %    Platelet Count - Automated: 214 K/uL  CAPILLARY BLOOD GLUCOSE  POCT Blood Glucose.: 165 mg/dL (04 Dec 2018 06:40)  POCT Blood Glucose.: 206 mg/dL (03 Dec 2018 21:39)  POCT Blood Glucose.: 207 mg/dL (03 Dec 2018 16:43)  POCT Blood Glucose.: 138 mg/dL (03 Dec 2018 11:10)     RADIOLOGY STUDIES  < from: CT Chest No Cont (12.02.18 @ 18:01) >  IMPRESSION:  Since August 2018.  New bilateral, right greater than left pleural effusions with associated   compressive atelectasis.  In addition, bilateral relatively symmetric groundglass opacities with   septal thickening raising the possibility of underlying CHF. Correlate   with clinical history last presentation..  Consider follow-up imaging following treatment of pleural effusions.  Multiple, stable bilateral pulmonary nodules containing central calcium,   likely representing benign granulomas.  Stable nodular thickening of the left adrenal gland.     Colonoscopy 11/29:   Impression   1.Two polyps were found in the transverse colon and removed  2.A tumor was found in the ascending colon. Multiple Biopsies taken.  3.Cannot rule out additional lesions due to very poor prep

## 2018-12-04 NOTE — PROGRESS NOTE ADULT - ASSESSMENT
ASSESSMENT  ASSESSMENT  - fever/ sepsis   - h/o vascular dementia and R parietal lobe infarct  - dysphagia  - DM  - LGIB/ ABNORMAL; COLONOSCOPY  RESULT    PLAN  continue with current nutrition jevity 1.2 at 480ml x3 feeds and 240ml at 10pm  check bmp/phos/mg and correct lytes

## 2018-12-04 NOTE — PROGRESS NOTE ADULT - SUBJECTIVE AND OBJECTIVE BOX
NEPHROLOGY FOLLOW UP NOTE    still on ivf  tolerating tf's  no overnight events  GI input noted  nonverbal      PAST MEDICAL & SURGICAL HISTORY:  Stroke  DVT (deep venous thrombosis)  Anemia  Diabetes  HTN (hypertension)  Arrhythmia  Dementia  S/P cholecystectomy  History of hip replacement    Allergies:  No Known Allergies    Home Medications Reviewed    SOCIAL HISTORY:  Denies ETOH,Smoking,   FAMILY HISTORY:  No pertinent family history in first degree relatives    Yes      REVIEW OF SYSTEMS:  unobtainable      PHYSICAL EXAM:  NAD  lethargic  pale  dry mm  no jvd  b/l bs  irreg  soft, + binder over peg  no edema    Hospital Medications:   MEDICATIONS  (STANDING):  ascorbic acid 500 milliGRAM(s) Oral daily  atorvastatin 40 milliGRAM(s) Oral at bedtime  dextrose 5%. 1000 milliLiter(s) (50 mL/Hr) IV Continuous <Continuous>  dextrose 50% Injectable 12.5 Gram(s) IV Push once  dextrose 50% Injectable 25 Gram(s) IV Push once  dextrose 50% Injectable 25 Gram(s) IV Push once  diltiazem    Tablet 30 milliGRAM(s) Enteral Tube every 6 hours  ferrous    sulfate Liquid 300 milliGRAM(s) Enteral Tube daily  insulin glargine Injectable (LANTUS) 8 Unit(s) SubCutaneous at bedtime  insulin lispro (HumaLOG) corrective regimen sliding scale   SubCutaneous every 6 hours  losartan 50 milliGRAM(s) Oral daily  metoprolol tartrate 100 milliGRAM(s) Oral two times a day  OLANZapine Disintegrating Tablet 5 milliGRAM(s) Oral daily  pantoprazole   Suspension 40 milliGRAM(s) Oral daily  senna Syrup 5 milliLiter(s) Oral at bedtime  sodium chloride 0.45%. 1000 milliLiter(s) (60 mL/Hr) IV Continuous <Continuous>        VITALS:  T(F): 96.7 (12-04-18 @ 05:15), Max: 97.5 (12-03-18 @ 22:15)  HR: 94 (12-04-18 @ 05:15)  BP: 168/72 (12-04-18 @ 05:15)  RR: 16 (12-04-18 @ 05:15)  SpO2: 99% (12-03-18 @ 09:51)  Wt(kg): --    12-02 @ 07:01  -  12-03 @ 07:00  --------------------------------------------------------  IN: 1840 mL / OUT: 0 mL / NET: 1840 mL    12-03 @ 07:01  -  12-04 @ 07:00  --------------------------------------------------------  IN: 1790 mL / OUT: 0 mL / NET: 1790 mL      Height (cm): 182.88 (12-03 @ 09:51)  Weight (kg): 74.2 (12-03 @ 09:51)  BMI (kg/m2): 22.2 (12-03 @ 09:51)  BSA (m2): 1.96 (12-03 @ 09:51)

## 2018-12-04 NOTE — PROGRESS NOTE ADULT - SUBJECTIVE AND OBJECTIVE BOX
SUSI MARTIN  78y  Male    Patient is a 78y old  Male who presents with a chief complaint of Fever/Tachycardia (04 Dec 2018 10:06)    ALLERGIES:  No Known Allergies      INTERVAL HPI/OVERNIGHT EVENTS:    VITALS:  T(F): 96.7 (12-04-18 @ 05:15), Max: 97.5 (12-03-18 @ 22:15)  HR: 94 (12-04-18 @ 05:15) (80 - 94)  BP: 168/72 (12-04-18 @ 05:15) (138/78 - 168/72)  RR: 16 (12-04-18 @ 05:15) (16 - 16)  SpO2: --    LABS:        MICROBIOLOGY:    MEDICATION:  chlorhexidine 4% Liquid 1 Application(s) Topical <User Schedule> PRN    RADIOLOGY & ADDITIONAL TESTS:

## 2018-12-04 NOTE — PROGRESS NOTE ADULT - ASSESSMENT
JOEL - resolved  hypernatremia - resolved  dehydration  colon mass  PNA / sepsis  pseudomonas UTI  Afib with rvr   hematochezia  CVA 3 weeks ago, nonverbal, s/p PEG / dementia  DM  HTN  anemia      plan:    dc ivf  f/u GI  PEG feeds  obtain new labs  cont lopressor 100mg peg q12h  cont cardizem 30mg peg q6h and losartan  completed abx course  f/u bmp  off eliquis

## 2018-12-05 LAB
ALBUMIN SERPL ELPH-MCNC: 3 G/DL — LOW (ref 3.5–5.2)
ALP SERPL-CCNC: 146 U/L — HIGH (ref 30–115)
ALT FLD-CCNC: 12 U/L — SIGNIFICANT CHANGE UP (ref 0–41)
ANION GAP SERPL CALC-SCNC: 11 MMOL/L — SIGNIFICANT CHANGE UP (ref 7–14)
AST SERPL-CCNC: 13 U/L — SIGNIFICANT CHANGE UP (ref 0–41)
BASOPHILS # BLD AUTO: 0.02 K/UL — SIGNIFICANT CHANGE UP (ref 0–0.2)
BASOPHILS NFR BLD AUTO: 0.4 % — SIGNIFICANT CHANGE UP (ref 0–1)
BILIRUB SERPL-MCNC: 0.8 MG/DL — SIGNIFICANT CHANGE UP (ref 0.2–1.2)
BUN SERPL-MCNC: 21 MG/DL — HIGH (ref 10–20)
CALCIUM SERPL-MCNC: 9 MG/DL — SIGNIFICANT CHANGE UP (ref 8.5–10.1)
CHLORIDE SERPL-SCNC: 105 MMOL/L — SIGNIFICANT CHANGE UP (ref 98–110)
CO2 SERPL-SCNC: 26 MMOL/L — SIGNIFICANT CHANGE UP (ref 17–32)
CREAT SERPL-MCNC: 0.8 MG/DL — SIGNIFICANT CHANGE UP (ref 0.7–1.5)
EOSINOPHIL # BLD AUTO: 0.07 K/UL — SIGNIFICANT CHANGE UP (ref 0–0.7)
EOSINOPHIL NFR BLD AUTO: 1.4 % — SIGNIFICANT CHANGE UP (ref 0–8)
GLUCOSE BLDC GLUCOMTR-MCNC: 110 MG/DL — HIGH (ref 70–99)
GLUCOSE BLDC GLUCOMTR-MCNC: 192 MG/DL — HIGH (ref 70–99)
GLUCOSE BLDC GLUCOMTR-MCNC: 199 MG/DL — HIGH (ref 70–99)
GLUCOSE BLDC GLUCOMTR-MCNC: 222 MG/DL — HIGH (ref 70–99)
GLUCOSE BLDC GLUCOMTR-MCNC: 227 MG/DL — HIGH (ref 70–99)
GLUCOSE BLDC GLUCOMTR-MCNC: 236 MG/DL — HIGH (ref 70–99)
GLUCOSE SERPL-MCNC: 215 MG/DL — HIGH (ref 70–99)
HCT VFR BLD CALC: 33.6 % — LOW (ref 42–52)
HGB BLD-MCNC: 10.1 G/DL — LOW (ref 14–18)
IMM GRANULOCYTES NFR BLD AUTO: 0.2 % — SIGNIFICANT CHANGE UP (ref 0.1–0.3)
LYMPHOCYTES # BLD AUTO: 0.49 K/UL — LOW (ref 1.2–3.4)
LYMPHOCYTES # BLD AUTO: 9.6 % — LOW (ref 20.5–51.1)
MAGNESIUM SERPL-MCNC: 2.4 MG/DL — SIGNIFICANT CHANGE UP (ref 1.8–2.4)
MCHC RBC-ENTMCNC: 26.6 PG — LOW (ref 27–31)
MCHC RBC-ENTMCNC: 30.1 G/DL — LOW (ref 32–37)
MCV RBC AUTO: 88.4 FL — SIGNIFICANT CHANGE UP (ref 80–94)
MONOCYTES # BLD AUTO: 0.44 K/UL — SIGNIFICANT CHANGE UP (ref 0.1–0.6)
MONOCYTES NFR BLD AUTO: 8.6 % — SIGNIFICANT CHANGE UP (ref 1.7–9.3)
NEUTROPHILS # BLD AUTO: 4.06 K/UL — SIGNIFICANT CHANGE UP (ref 1.4–6.5)
NEUTROPHILS NFR BLD AUTO: 79.8 % — HIGH (ref 42.2–75.2)
NRBC # BLD: 0 /100 WBCS — SIGNIFICANT CHANGE UP (ref 0–0)
PHOSPHATE SERPL-MCNC: 4.2 MG/DL — SIGNIFICANT CHANGE UP (ref 2.1–4.9)
PLATELET # BLD AUTO: 245 K/UL — SIGNIFICANT CHANGE UP (ref 130–400)
POTASSIUM SERPL-MCNC: 4.4 MMOL/L — SIGNIFICANT CHANGE UP (ref 3.5–5)
POTASSIUM SERPL-SCNC: 4.4 MMOL/L — SIGNIFICANT CHANGE UP (ref 3.5–5)
PROT SERPL-MCNC: 5.2 G/DL — LOW (ref 6–8)
RBC # BLD: 3.8 M/UL — LOW (ref 4.7–6.1)
RBC # FLD: 17.7 % — HIGH (ref 11.5–14.5)
SODIUM SERPL-SCNC: 142 MMOL/L — SIGNIFICANT CHANGE UP (ref 135–146)
WBC # BLD: 5.09 K/UL — SIGNIFICANT CHANGE UP (ref 4.8–10.8)
WBC # FLD AUTO: 5.09 K/UL — SIGNIFICANT CHANGE UP (ref 4.8–10.8)

## 2018-12-05 RX ADMIN — Medication 100 MILLIGRAM(S): at 06:17

## 2018-12-05 RX ADMIN — INSULIN GLARGINE 8 UNIT(S): 100 INJECTION, SOLUTION SUBCUTANEOUS at 21:31

## 2018-12-05 RX ADMIN — Medication 2: at 23:12

## 2018-12-05 RX ADMIN — SENNA PLUS 5 MILLILITER(S): 8.6 TABLET ORAL at 21:31

## 2018-12-05 RX ADMIN — Medication 1: at 14:00

## 2018-12-05 RX ADMIN — Medication 2: at 06:18

## 2018-12-05 RX ADMIN — Medication 100 MILLIGRAM(S): at 19:00

## 2018-12-05 RX ADMIN — PANTOPRAZOLE SODIUM 40 MILLIGRAM(S): 20 TABLET, DELAYED RELEASE ORAL at 14:03

## 2018-12-05 RX ADMIN — Medication 500 MILLIGRAM(S): at 14:03

## 2018-12-05 RX ADMIN — OLANZAPINE 5 MILLIGRAM(S): 15 TABLET, FILM COATED ORAL at 14:03

## 2018-12-05 RX ADMIN — Medication 300 MILLIGRAM(S): at 14:03

## 2018-12-05 RX ADMIN — ATORVASTATIN CALCIUM 40 MILLIGRAM(S): 80 TABLET, FILM COATED ORAL at 21:31

## 2018-12-05 RX ADMIN — LOSARTAN POTASSIUM 50 MILLIGRAM(S): 100 TABLET, FILM COATED ORAL at 06:17

## 2018-12-05 NOTE — PROGRESS NOTE ADULT - ASSESSMENT
Patient may need colon surgery. Echo lv function normal Need check echo. Cardiac risk surgery appears moderate. But overall prognosi appears poor. Contnue meds

## 2018-12-05 NOTE — PROGRESS NOTE ADULT - SUBJECTIVE AND OBJECTIVE BOX
SUSI MARTIN  78y  Male    Patient is a 78y old  Male who presents with a chief complaint of Fever/Tachycardia (05 Dec 2018 09:58)    ALLERGIES:  No Known Allergies      INTERVAL HPI/OVERNIGHT EVENTS:    VITALS:  T(F): 97.7 (12-05-18 @ 14:05), Max: 97.7 (12-05-18 @ 14:05)  HR: 102 (12-05-18 @ 14:05) (83 - 102)  BP: 140/75 (12-05-18 @ 14:05) (132/71 - 183/74)  RR: 16 (12-05-18 @ 14:05) (16 - 17)  SpO2: 96% (12-05-18 @ 07:41) (96% - 96%)    LABS:  12-05    142  |  105  |  21<H>  ----------------------------<  215<H>  4.4   |  26  |  0.8    Ca    9.0      05 Dec 2018 07:05  Phos  4.2     12-05  Mg     2.4     12-05    TPro  5.2<L>  /  Alb  3.0<L>  /  TBili  0.8  /  DBili  x   /  AST  13  /  ALT  12  /  AlkPhos  146<H>  12-05    MICROBIOLOGY:    MEDICATION:  chlorhexidine 4% Liquid 1 Application(s) Topical <User Schedule> PRN    RADIOLOGY & ADDITIONAL TESTS:

## 2018-12-05 NOTE — PROGRESS NOTE ADULT - ASSESSMENT
JOEL - resolved  hypernatremia - resolved  dehydration - better  radiographic CHF on Ct chest, but clinically no resp distress  colon mass  PNA / sepsis  pseudomonas UTI  Afib with rvr   hematochezia  CVA 3 weeks ago, nonverbal, s/p PEG / dementia  DM  HTN  anemia      plan:    off ivf  f/u path from colon mass  f/u GI  PEG feeds  cont lopressor 100mg peg q12h  cont cardizem 30mg peg q6h and losartan  completed abx course  f/u bmp  off eliquis due to GIB per primary / GI team

## 2018-12-05 NOTE — PROGRESS NOTE ADULT - SUBJECTIVE AND OBJECTIVE BOX
DIAGNOSIS:   HOSPITAL DAY #:    STATUS POST:    POST OPERATIVE DAY #:     Vital Signs Last 24 Hrs  T(C): 36.1 (05 Dec 2018 22:18), Max: 36.5 (05 Dec 2018 14:05)  T(F): 96.9 (05 Dec 2018 22:18), Max: 97.7 (05 Dec 2018 14:05)  HR: 108 (05 Dec 2018 22:18) (83 - 108)  BP: 121/68 (05 Dec 2018 22:18) (121/68 - 140/75)  BP(mean): --  RR: 16 (05 Dec 2018 22:18) (16 - 16)  SpO2: 96% (05 Dec 2018 07:41) (96% - 96%)    SUBJECTIVE: Pt seen    Pain: YES  [ ]   NO [ ]   Nausea: [ ] YES [ ] NO           Vomiting: [ ] YES [ ] NO  Flatus: [ ] YES [ ] NO             Bowel Movement: [ ] YES [ ] NO     Void: [ ]YES [ ]No      BRITTNY DRAINAGE: SIGNIFICANT [ ]   NOT SIGNIFICANT [ ]   NOT APPLICABLE [ ]  YES [ ] NO    General Appearance: Appears well, NAD  Neck: Supple  Chest: Equal expansion bilaterally, equal breath sounds  CV: Pulse regular presently  Abdomen: Soft [x ] YES [ ]NO  DISTENDED [ ] YES [ ] NO TENDERNESS [ ]YES [ ]NO  INCISIONS: HEALING WELL [ ] YES  [ ] NO ERYTHEMA [ ] YES [ ] NO DRAINAGE [ ] YES  [ ] NO  Extremities: Grossly symmetric, CALF TENDERNESS [ ] YES  [ ] NO  discussed case with pt familly and they agree for surgery    LABS:                        10.1   5.09  )-----------( 245      ( 05 Dec 2018 07:05 )             33.6     12-05    142  |  105  |  21<H>  ----------------------------<  215<H>  4.4   |  26  |  0.8    Ca    9.0      05 Dec 2018 07:05  Phos  4.2     12-05  Mg     2.4     12-05    TPro  5.2<L>  /  Alb  3.0<L>  /  TBili  0.8  /  DBili  x   /  AST  13  /  ALT  12  /  AlkPhos  146<H>  12-05    PT/INR - ( 04 Dec 2018 12:33 )   PT: 14.40 sec;   INR: 1.33 ratio         PTT - ( 04 Dec 2018 12:33 )  PTT:34.3 sec        ASSESSMENT:     GOOD POST OP COURSE [ ]  YES  [ ] NO  CONDITION IMPROVING  []  YES  [ ]  NO          PLAN:    CONTINUE PRESENT MANAGEMENT  [ ] YES  [ ] NO

## 2018-12-05 NOTE — PROGRESS NOTE ADULT - SUBJECTIVE AND OBJECTIVE BOX
NEPHROLOGY FOLLOW UP NOTE    off ivf  tolerating PEG feeds and H2O  mostly lethargic  spoke with wife at bedside  nonverbal        PAST MEDICAL & SURGICAL HISTORY:  Stroke  DVT (deep venous thrombosis)  Anemia  Diabetes  HTN (hypertension)  Arrhythmia  Dementia  S/P cholecystectomy  History of hip replacement    Allergies:  No Known Allergies    Home Medications Reviewed  Hospital Medications:   MEDICATIONS  (STANDING):  ascorbic acid 500 milliGRAM(s) Oral daily  atorvastatin 40 milliGRAM(s) Oral at bedtime  dextrose 5%. 1000 milliLiter(s) (50 mL/Hr) IV Continuous <Continuous>  dextrose 50% Injectable 12.5 Gram(s) IV Push once  dextrose 50% Injectable 25 Gram(s) IV Push once  dextrose 50% Injectable 25 Gram(s) IV Push once  diltiazem    Tablet 30 milliGRAM(s) Enteral Tube every 6 hours  ferrous    sulfate Liquid 300 milliGRAM(s) Enteral Tube daily  insulin glargine Injectable (LANTUS) 8 Unit(s) SubCutaneous at bedtime  insulin lispro (HumaLOG) corrective regimen sliding scale   SubCutaneous every 6 hours  losartan 50 milliGRAM(s) Oral daily  metoprolol tartrate 100 milliGRAM(s) Oral two times a day  OLANZapine Disintegrating Tablet 5 milliGRAM(s) Oral daily  pantoprazole   Suspension 40 milliGRAM(s) Oral daily  senna Syrup 5 milliLiter(s) Oral at bedtime        VITALS:  T(F): 97 (12-05-18 @ 07:00), Max: 97.5 (12-04-18 @ 21:36)  HR: 83 (12-05-18 @ 07:00)  BP: 132/71 (12-05-18 @ 07:00)  RR: 16 (12-05-18 @ 07:00)  SpO2: --  Wt(kg): --    12-03 @ 07:01  -  12-04 @ 07:00  --------------------------------------------------------  IN: 1790 mL / OUT: 0 mL / NET: 1790 mL    12-04 @ 07:01  -  12-05 @ 07:00  --------------------------------------------------------  IN: 1690 mL / OUT: 0 mL / NET: 1690 mL      Height (cm): 182.88 (12-04 @ 11:51)  Weight (kg): 74.2 (12-04 @ 11:51)  BMI (kg/m2): 22.2 (12-04 @ 11:51)  BSA (m2): 1.96 (12-04 @ 11:51)    LABS:  12-05    142  |  105  |  21<H>  ----------------------------<  215<H>  4.4   |  26  |  0.8    Ca    9.0      05 Dec 2018 07:05  Phos  4.2     12-05  Mg     2.4     12-05    TPro  5.2<L>  /  Alb  3.0<L>  /  TBili  0.8  /  DBili      /  AST  13  /  ALT  12  /  AlkPhos  146<H>  12-05                          10.1   5.09  )-----------( 245      ( 05 Dec 2018 07:05 )             33.6       Urine Studies:        RADIOLOGY & ADDITIONAL STUDIES:    SOCIAL HISTORY:  Denies ETOH,Smoking,   FAMILY HISTORY:  No pertinent family history in first degree relatives    Yes      REVIEW OF SYSTEMS:  unobtainable      PHYSICAL EXAM:  NAD  lethargic  pale  dry mm  no jvd  b/l bs  irreg  soft, + binder over peg  no edema      Height (cm): 182.88 (12-03 @ 09:51)  Weight (kg): 74.2 (12-03 @ 09:51)  BMI (kg/m2): 22.2 (12-03 @ 09:51)  BSA (m2): 1.96 (12-03 @ 09:51)

## 2018-12-05 NOTE — PROGRESS NOTE ADULT - SUBJECTIVE AND OBJECTIVE BOX
Patient is a 78y old  Male who presents with a chief complaint of Fever/Tachycardia (05 Dec 2018 08:58)      T(F): 97 (12-05-18 @ 07:00), Max: 97.5 (12-04-18 @ 21:36)  HR: 83 (12-05-18 @ 07:00)  BP: 132/71 (12-05-18 @ 07:00)  RR: 16 (12-05-18 @ 07:00)  SpO2: --    PHYSICAL EXAM:  GENERAL: NAD, well-groomed, well-developed  HEAD:  Atraumatic, Normocephalic  EYES: EOMI, PERRLA, conjunctiva and sclera clear  ENMT: No tonsillar erythema, exudates, or enlargement; Moist mucous membranes, Good dentition, No lesions  NECK: Supple, No JVD, Normal thyroid  NERVOUS SYSTEM:  Alert & Oriented X3,  Motor Strength 5/5 B/L upper and lower extremities  CHEST/LUNG: Clear to percussion bilaterally; No rales, rhonchi, wheezing, or rubs  HEART: Regular rate and rhythm; No murmurs, rubs, or gallops  ABDOMEN: Soft, Nontender, Nondistended; Bowel sounds present  EXTREMITIES:   No clubbing, cyanosis, or edema  LYMPH: No lymphadenopathy noted  SKIN: No rashes or lesions    labs  12-05    142  |  105  |  21<H>  ----------------------------<  215<H>  4.4   |  26  |  0.8    Ca    9.0      05 Dec 2018 07:05  Phos  4.2     12-05  Mg     2.4     12-05    TPro  5.2<L>  /  Alb  3.0<L>  /  TBili  0.8  /  DBili  x   /  AST  13  /  ALT  12  /  AlkPhos  146<H>  12-05                          10.1   5.09  )-----------( 245      ( 05 Dec 2018 07:05 )             33.6       PT/INR - ( 04 Dec 2018 12:33 )   PT: 14.40 sec;   INR: 1.33 ratio         PTT - ( 04 Dec 2018 12:33 )  PTT:34.3 sec        acetaminophen   Tablet .. 650 milliGRAM(s) Oral every 6 hours PRN  ascorbic acid 500 milliGRAM(s) Oral daily  atorvastatin 40 milliGRAM(s) Oral at bedtime  chlorhexidine 4% Liquid 1 Application(s) Topical <User Schedule> PRN  dextrose 40% Gel 15 Gram(s) Oral once PRN  dextrose 5%. 1000 milliLiter(s) IV Continuous <Continuous>  dextrose 50% Injectable 12.5 Gram(s) IV Push once  dextrose 50% Injectable 25 Gram(s) IV Push once  dextrose 50% Injectable 25 Gram(s) IV Push once  diltiazem    Tablet 30 milliGRAM(s) Enteral Tube every 6 hours  ferrous    sulfate Liquid 300 milliGRAM(s) Enteral Tube daily  glucagon  Injectable 1 milliGRAM(s) IntraMuscular once PRN  insulin glargine Injectable (LANTUS) 8 Unit(s) SubCutaneous at bedtime  insulin lispro (HumaLOG) corrective regimen sliding scale   SubCutaneous every 6 hours  lactulose Syrup 20 Gram(s) Oral daily PRN  losartan 50 milliGRAM(s) Oral daily  metoprolol tartrate 100 milliGRAM(s) Oral two times a day  OLANZapine Disintegrating Tablet 5 milliGRAM(s) Oral daily  pantoprazole   Suspension 40 milliGRAM(s) Oral daily  senna Syrup 5 milliLiter(s) Oral at bedtime

## 2018-12-06 LAB
ANION GAP SERPL CALC-SCNC: 8 MMOL/L — SIGNIFICANT CHANGE UP (ref 7–14)
APTT BLD: 34.3 SEC — SIGNIFICANT CHANGE UP (ref 27–39.2)
BUN SERPL-MCNC: 26 MG/DL — HIGH (ref 10–20)
CALCIUM SERPL-MCNC: 8.7 MG/DL — SIGNIFICANT CHANGE UP (ref 8.5–10.1)
CHLORIDE SERPL-SCNC: 105 MMOL/L — SIGNIFICANT CHANGE UP (ref 98–110)
CO2 SERPL-SCNC: 30 MMOL/L — SIGNIFICANT CHANGE UP (ref 17–32)
CREAT SERPL-MCNC: 0.7 MG/DL — SIGNIFICANT CHANGE UP (ref 0.7–1.5)
GLUCOSE BLDC GLUCOMTR-MCNC: 170 MG/DL — HIGH (ref 70–99)
GLUCOSE BLDC GLUCOMTR-MCNC: 188 MG/DL — HIGH (ref 70–99)
GLUCOSE BLDC GLUCOMTR-MCNC: 194 MG/DL — HIGH (ref 70–99)
GLUCOSE BLDC GLUCOMTR-MCNC: 209 MG/DL — HIGH (ref 70–99)
GLUCOSE SERPL-MCNC: 148 MG/DL — HIGH (ref 70–99)
HCT VFR BLD CALC: 31.2 % — LOW (ref 42–52)
HGB BLD-MCNC: 9.2 G/DL — LOW (ref 14–18)
INR BLD: 1.24 RATIO — SIGNIFICANT CHANGE UP (ref 0.65–1.3)
MAGNESIUM SERPL-MCNC: 2.4 MG/DL — SIGNIFICANT CHANGE UP (ref 1.8–2.4)
MCHC RBC-ENTMCNC: 26.4 PG — LOW (ref 27–31)
MCHC RBC-ENTMCNC: 29.5 G/DL — LOW (ref 32–37)
MCV RBC AUTO: 89.4 FL — SIGNIFICANT CHANGE UP (ref 80–94)
NRBC # BLD: 0 /100 WBCS — SIGNIFICANT CHANGE UP (ref 0–0)
PHOSPHATE SERPL-MCNC: 3.9 MG/DL — SIGNIFICANT CHANGE UP (ref 2.1–4.9)
PLATELET # BLD AUTO: 209 K/UL — SIGNIFICANT CHANGE UP (ref 130–400)
POTASSIUM SERPL-MCNC: 4.2 MMOL/L — SIGNIFICANT CHANGE UP (ref 3.5–5)
POTASSIUM SERPL-SCNC: 4.2 MMOL/L — SIGNIFICANT CHANGE UP (ref 3.5–5)
PROTHROM AB SERPL-ACNC: 13.4 SEC — HIGH (ref 9.95–12.87)
RBC # BLD: 3.49 M/UL — LOW (ref 4.7–6.1)
RBC # FLD: 17.4 % — HIGH (ref 11.5–14.5)
SODIUM SERPL-SCNC: 143 MMOL/L — SIGNIFICANT CHANGE UP (ref 135–146)
TYPE + AB SCN PNL BLD: SIGNIFICANT CHANGE UP
WBC # BLD: 4.28 K/UL — LOW (ref 4.8–10.8)
WBC # FLD AUTO: 4.28 K/UL — LOW (ref 4.8–10.8)

## 2018-12-06 RX ORDER — SODIUM CHLORIDE 9 MG/ML
1000 INJECTION INTRAMUSCULAR; INTRAVENOUS; SUBCUTANEOUS
Qty: 0 | Refills: 0 | Status: DISCONTINUED | OUTPATIENT
Start: 2018-12-06 | End: 2018-12-06

## 2018-12-06 RX ORDER — SODIUM CHLORIDE 9 MG/ML
1000 INJECTION, SOLUTION INTRAVENOUS
Qty: 0 | Refills: 0 | Status: DISCONTINUED | OUTPATIENT
Start: 2018-12-06 | End: 2018-12-07

## 2018-12-06 RX ORDER — SOD SULF/SODIUM/NAHCO3/KCL/PEG
4000 SOLUTION, RECONSTITUTED, ORAL ORAL ONCE
Qty: 0 | Refills: 0 | Status: COMPLETED | OUTPATIENT
Start: 2018-12-06 | End: 2018-12-06

## 2018-12-06 RX ORDER — SODIUM CHLORIDE 9 MG/ML
1000 INJECTION, SOLUTION INTRAVENOUS
Qty: 0 | Refills: 0 | Status: DISCONTINUED | OUTPATIENT
Start: 2018-12-06 | End: 2018-12-08

## 2018-12-06 RX ORDER — NEOMYCIN SULFATE 500 MG/1
1 TABLET ORAL
Qty: 0 | Refills: 0 | Status: DISCONTINUED | OUTPATIENT
Start: 2018-12-06 | End: 2018-12-07

## 2018-12-06 RX ORDER — METRONIDAZOLE 500 MG
500 TABLET ORAL
Qty: 0 | Refills: 0 | Status: COMPLETED | OUTPATIENT
Start: 2018-12-06 | End: 2018-12-06

## 2018-12-06 RX ADMIN — Medication 1: at 17:48

## 2018-12-06 RX ADMIN — Medication 300 MILLIGRAM(S): at 11:27

## 2018-12-06 RX ADMIN — LOSARTAN POTASSIUM 50 MILLIGRAM(S): 100 TABLET, FILM COATED ORAL at 05:08

## 2018-12-06 RX ADMIN — SENNA PLUS 5 MILLILITER(S): 8.6 TABLET ORAL at 21:53

## 2018-12-06 RX ADMIN — OLANZAPINE 5 MILLIGRAM(S): 15 TABLET, FILM COATED ORAL at 11:27

## 2018-12-06 RX ADMIN — Medication 500 MILLIGRAM(S): at 20:45

## 2018-12-06 RX ADMIN — Medication 4000 MILLILITER(S): at 20:46

## 2018-12-06 RX ADMIN — Medication 500 MILLIGRAM(S): at 17:47

## 2018-12-06 RX ADMIN — Medication 1: at 05:11

## 2018-12-06 RX ADMIN — Medication 100 MILLIGRAM(S): at 05:08

## 2018-12-06 RX ADMIN — ATORVASTATIN CALCIUM 40 MILLIGRAM(S): 80 TABLET, FILM COATED ORAL at 20:46

## 2018-12-06 RX ADMIN — PANTOPRAZOLE SODIUM 40 MILLIGRAM(S): 20 TABLET, DELAYED RELEASE ORAL at 14:20

## 2018-12-06 RX ADMIN — Medication 2: at 11:25

## 2018-12-06 RX ADMIN — Medication 100 MILLIGRAM(S): at 17:46

## 2018-12-06 RX ADMIN — Medication 500 MILLIGRAM(S): at 17:46

## 2018-12-06 RX ADMIN — Medication 10 MILLIGRAM(S): at 23:16

## 2018-12-06 RX ADMIN — Medication 500 MILLIGRAM(S): at 14:21

## 2018-12-06 NOTE — CONSULT NOTE ADULT - ASSESSMENT
IMPRESSION:  79 yo male with history of CVA and newly diagnosed colon cancer  CT findings noted consistent with combination of pulmonary edema fluid overload and prior granulomatous disease  Pneumonia is within the DDx however the patient remains afebrile without leukocytosis off antibiotics  No respiratory distress noted on room air    PLAN:  Monitor O2 saturation on room air  Avoid excess IVF, consider gentle diuresis is clinically necessary  Follow up CXR  Continue anticoagulation for AFIB  GI prophylaxis  Aspiration precautions  Discuss options for management of colon cancer with family and goals of care

## 2018-12-06 NOTE — PROGRESS NOTE ADULT - SUBJECTIVE AND OBJECTIVE BOX
SUSI MARTIN  78y  Male    Patient is a 78y old  Male who presents with a chief complaint of Fever/Tachycardia (06 Dec 2018 09:18)    ALLERGIES:  No Known Allergies      INTERVAL HPI/OVERNIGHT EVENTS:    VITALS:  T(F): 98 (12-06-18 @ 05:48), Max: 98 (12-06-18 @ 05:48)  HR: 98 (12-06-18 @ 05:48) (98 - 108)  BP: 159/84 (12-06-18 @ 05:48) (121/68 - 159/84)  RR: 16 (12-06-18 @ 05:48) (16 - 16)  SpO2: --    LABS:  12-06    143  |  105  |  26<H>  ----------------------------<  148<H>  4.2   |  30  |  0.7    Ca    8.7      06 Dec 2018 06:38  Phos  3.9     12-06  Mg     2.4     12-06    TPro  5.2<L>  /  Alb  3.0<L>  /  TBili  0.8  /  DBili  x   /  AST  13  /  ALT  12  /  AlkPhos  146<H>  12-05    MICROBIOLOGY:    MEDICATION:  bisacodyl 5 milliGRAM(s) Oral once  chlorhexidine 4% Liquid 1 Application(s) Topical <User Schedule> PRN  metroNIDAZOLE    Tablet 500 milliGRAM(s) Oral every 3 hours  neomycin Solution 1 Gram(s) Oral every 3 hours  polyethylene glycol/electrolyte Solution. 4000 milliLiter(s) Enteral Tube once  sodium chloride 0.9%. 1000 milliLiter(s) IV Continuous <Continuous>    RADIOLOGY & ADDITIONAL TESTS:

## 2018-12-06 NOTE — PROGRESS NOTE ADULT - SUBJECTIVE AND OBJECTIVE BOX
NEPHROLOGY FOLLOW UP NOTE    remains off ivf  tolerating PEG feeds and H2O  no change clinically  nonverbal        PAST MEDICAL & SURGICAL HISTORY:  Stroke  DVT (deep venous thrombosis)  Anemia  Diabetes  HTN (hypertension)  Arrhythmia  Dementia  S/P cholecystectomy  History of hip replacement    Allergies:  No Known Allergies      SOCIAL HISTORY:  Denies ETOH,Smoking,   FAMILY HISTORY:  No pertinent family history in first degree relatives    Yes      REVIEW OF SYSTEMS:  unobtainable      PHYSICAL EXAM:  NAD  lethargic  pale  dry mm  no jvd  b/l bs  irreg  soft, + binder over peg  no edema    Hospital Medications:   MEDICATIONS  (STANDING):  ascorbic acid 500 milliGRAM(s) Oral daily  atorvastatin 40 milliGRAM(s) Oral at bedtime  dextrose 5%. 1000 milliLiter(s) (50 mL/Hr) IV Continuous <Continuous>  dextrose 50% Injectable 12.5 Gram(s) IV Push once  dextrose 50% Injectable 25 Gram(s) IV Push once  dextrose 50% Injectable 25 Gram(s) IV Push once  diltiazem    Tablet 30 milliGRAM(s) Enteral Tube every 6 hours  ferrous    sulfate Liquid 300 milliGRAM(s) Enteral Tube daily  insulin glargine Injectable (LANTUS) 8 Unit(s) SubCutaneous at bedtime  insulin lispro (HumaLOG) corrective regimen sliding scale   SubCutaneous every 6 hours  losartan 50 milliGRAM(s) Oral daily  metoprolol tartrate 100 milliGRAM(s) Oral two times a day  OLANZapine Disintegrating Tablet 5 milliGRAM(s) Oral daily  pantoprazole   Suspension 40 milliGRAM(s) Oral daily  senna Syrup 5 milliLiter(s) Oral at bedtime        VITALS:  T(F): 98 (12-06-18 @ 05:48), Max: 98 (12-06-18 @ 05:48)  HR: 98 (12-06-18 @ 05:48)  BP: 159/84 (12-06-18 @ 05:48)  RR: 16 (12-06-18 @ 05:48)  SpO2: --  Wt(kg): --    12-04 @ 07:01  -  12-05 @ 07:00  --------------------------------------------------------  IN: 1690 mL / OUT: 0 mL / NET: 1690 mL    12-05 @ 07:01  -  12-06 @ 07:00  --------------------------------------------------------  IN: 2420 mL / OUT: 0 mL / NET: 2420 mL      Height (cm): 182.88 (12-05 @ 14:36)  Weight (kg): 74.2 (12-05 @ 14:36)  BMI (kg/m2): 22.2 (12-05 @ 14:36)  BSA (m2): 1.96 (12-05 @ 14:36)    LABS:  12-06    143  |  105  |  26<H>  ----------------------------<  148<H>  4.2   |  30  |  0.7    Ca    8.7      06 Dec 2018 06:38  Phos  3.9     12-06  Mg     2.4     12-06    TPro  5.2<L>  /  Alb  3.0<L>  /  TBili  0.8  /  DBili      /  AST  13  /  ALT  12  /  AlkPhos  146<H>  12-05                          9.2    4.28  )-----------( 209      ( 06 Dec 2018 06:38 )             31.2       Urine Studies:        RADIOLOGY & ADDITIONAL STUDIES:      Height (cm): 182.88 (12-03 @ 09:51)  Weight (kg): 74.2 (12-03 @ 09:51)  BMI (kg/m2): 22.2 (12-03 @ 09:51)  BSA (m2): 1.96 (12-03 @ 09:51)

## 2018-12-06 NOTE — PROGRESS NOTE ADULT - SUBJECTIVE AND OBJECTIVE BOX
DIAGNOSIS:   HOSPITAL DAY #:    STATUS POST:    POST OPERATIVE DAY #:     Vital Signs Last 24 Hrs  T(C): 36.2 (06 Dec 2018 14:40), Max: 36.7 (06 Dec 2018 05:48)  T(F): 97.2 (06 Dec 2018 14:40), Max: 98 (06 Dec 2018 05:48)  HR: 102 (06 Dec 2018 14:40) (98 - 102)  BP: 136/65 (06 Dec 2018 14:40) (136/65 - 159/84)  BP(mean): --  RR: 16 (06 Dec 2018 14:40) (16 - 16)  SpO2: --    SUBJECTIVE: Pt seen    Pain: YES  [ ]   NO [ ]   Nausea: [ ] YES [ ] NO           Vomiting: [ ] YES [ ] NO  Flatus: [ ] YES [ ] NO             Bowel Movement: [ ] YES [ ] NO     Void: [ ]YES [ ]No      BRITTNY DRAINAGE: SIGNIFICANT [ ]   NOT SIGNIFICANT [ ]   NOT APPLICABLE [ ]  YES [ ] NO    General Appearance: Appears well, NAD  Neck: Supple  Chest: Equal expansion bilaterally, equal breath sounds  CV: Pulse regular presently  Abdomen: Soft [ ] YES [ ]NO  DISTENDED [ ] YES [ ] NO TENDERNESS [ ]YES [ ]NO  INCISIONS: HEALING WELL [ ] YES  [ ] NO ERYTHEMA [ ] YES [ ] NO DRAINAGE [ ] YES  [ ] NO  Extremities: Grossly symmetric, CALF TENDERNESS [ ] YES  [ ] NO      LABS:                        9.2    4.28  )-----------( 209      ( 06 Dec 2018 06:38 )             31.2     12-06    143  |  105  |  26<H>  ----------------------------<  148<H>  4.2   |  30  |  0.7    Ca    8.7      06 Dec 2018 06:38  Phos  3.9     12-06  Mg     2.4     12-06    TPro  5.2<L>  /  Alb  3.0<L>  /  TBili  0.8  /  DBili  x   /  AST  13  /  ALT  12  /  AlkPhos  146<H>  12-05    PT/INR - ( 06 Dec 2018 06:38 )   PT: 13.40 sec;   INR: 1.24 ratio         PTT - ( 06 Dec 2018 06:38 )  PTT:34.3 sec        ASSESSMENT: discussed case with pt wife and anesthesia    GOOD POST OP COURSE [ ]  YES  [ ] NO  CONDITION IMPROVING  []  YES  [ ]  NO          PLAN:    CONTINUE PRESENT MANAGEMENT  [ ] YES  [ ] NO

## 2018-12-06 NOTE — PROGRESS NOTE ADULT - ASSESSMENT
JOEL - resolved  hypernatremia - resolved  dehydration - better  radiographic CHF on Ct chest, but clinically no resp distress  colon mass  PNA / sepsis  pseudomonas UTI  Afib with rvr   hematochezia  CVA 3 weeks ago, nonverbal, s/p PEG / dementia  DM  HTN  anemia      plan:    off ivf  f/u path from colon mass  f/u GI  PEG feeds  cont lopressor 100mg peg q12h  cont cardizem 30mg peg q6h and losartan  completed abx course  f/u bmp  off eliquis due to GIB per primary / GI team --> further anticoagulation mgmt per gi/cardio/primary

## 2018-12-06 NOTE — PROGRESS NOTE ADULT - ASSESSMENT
pathology reports----------------------ascending colon --invasive adenocarcinoma      plan----------------------surgery follow up and plan pathology reports----------------------ascending colon --invasive adenocarcinoma    reports in chart   plan----------------------surgery follow up and plan

## 2018-12-06 NOTE — CONSULT NOTE ADULT - SUBJECTIVE AND OBJECTIVE BOX
Patient is a 78y old male who presents with a chief complaint of Fever/Tachycardia (06 Dec 2018 08:40)      HPI:  Patient initially admitted to [facility] in october, discharged on 11/8/18 to SNF after having sustained a CVA, course complciated by UTI.    Wife reports that the patient was doing well but developed fever 4 days PTA.    The patient is aphasic at baseline so was not complaining but she reports increased cough and worsening agitation.    Patient was sent to the ED for further evaluation and found to have new opacities on RLL.    Also in AFIB w/RVR, currently on Eliquis. (19 Nov 2018 15:30)      PAST MEDICAL & SURGICAL HISTORY:  Stroke  DVT (deep venous thrombosis)  Anemia  Diabetes  HTN (hypertension)  Arrhythmia  Dementia  S/P cholecystectomy  History of hip replacement      Smoking History:  Non smoker    Review of Systems:  Fever, cough, agitation    Allergies:  No Known Allergies      MEDICATIONS  (STANDING):  ascorbic acid 500 milliGRAM(s) Oral daily  atorvastatin 40 milliGRAM(s) Oral at bedtime  diltiazem    Tablet 30 milliGRAM(s) Enteral Tube every 6 hours  ferrous    sulfate Liquid 300 milliGRAM(s) Enteral Tube daily  insulin glargine Injectable (LANTUS) 8 Unit(s) SubCutaneous at bedtime  insulin lispro (HumaLOG) corrective regimen sliding scale   SubCutaneous every 6 hours  losartan 50 milliGRAM(s) Oral daily  metoprolol tartrate 100 milliGRAM(s) Oral two times a day  OLANZapine Disintegrating Tablet 5 milliGRAM(s) Oral daily  pantoprazole   Suspension 40 milliGRAM(s) Oral daily  senna Syrup 5 milliLiter(s) Oral at bedtime  acetaminophen   Tablet .. 650 milliGRAM(s) Oral every 6 hours PRN Temp greater or equal to 38C (100.4F), Mild Pain (1 - 3)  chlorhexidine 4% Liquid 1 Application(s) Topical <User Schedule> PRN infection control  dextrose 40% Gel 15 Gram(s) Oral once PRN Blood Glucose LESS THAN 70 milliGRAM(s)/deciliter  glucagon  Injectable 1 milliGRAM(s) IntraMuscular once PRN Glucose LESS THAN 70 milligrams/deciliter  lactulose Syrup 20 Gram(s) Oral daily PRN constipation      PHYSICAL EXAM  Vital Signs Last 24 Hrs  T(F): 98 (06 Dec 2018 05:48), Max: 98 (06 Dec 2018 05:48)  HR: 98 (06 Dec 2018 05:48) (98 - 108)  BP: 159/84 (06 Dec 2018 05:48) (121/68 - 159/84)  RR: 16 (06 Dec 2018 05:48) (16 - 16)  SpO2: -- NC O2    LABS:                        9.2    4.28  )-----------( 209      ( 06 Dec 2018 06:38 )             31.2                                               12-06    143  |  105  |  26<H>  ----------------------------<  148<H>  4.2   |  30  |  0.7    Ca    8.7      06 Dec 2018 06:38  Phos  3.9     12-06  Mg     2.4     12-06    TPro  5.2<L>  /  Alb  3.0<L>  /  TBili  0.8  /  DBili  x   /  AST  13  /  ALT  12  /  AlkPhos  146<H>  12-05    PT/INR - ( 06 Dec 2018 06:38 )     PT: 13.40 sec;   INR: 1.24 ratio     PTT - ( 06 Dec 2018 06:38 )    PTT:34.3 sec                                           Surgical Pathology Report:   Ascending colon mass  ACCESSION No:  [id number]  Immunohistochemical studies have been performed at [facility], [address] [place] [address], using antibodies to the mismatch repairproteins and the results are as follows:    hMLH1 (clone [id number])   Positive  hMSH2 (clone [id number]) Positive  hMSH6 (clone [id number])                Positive  PMS2 (clone [id number])       Positive    Normal nuclear expression of all proteins is seen in tumor cell nuclei.  There is no evidence of mismatch repair deficiency byimmunohistochemical evaluation.  This is a somatic test being performed to evaluate the tumor phenotype.  Mismatch repair deficient tumors may respond differently to specific chemotherapeutic agents compared to  tumors that are not mismatch repair deficient.  All controls show appropriate reactivity.    Final Diagnosis  Ascending colon mass, biopsy:  Fragments of invasive adenocarcinoma, moderately differentiated, with focal necrosis, ulceration, and surfacebacteria.                                      RADIOGRAPHS:  from: CT Chest No Cont (12.02.18 @ 18:01)   ·	Bilateral, right greater the left pleural effusion with atelectasis, representing significant interval change from previous study.  ·	New, likely posterior tracheal debris (series 4, image 58-70).   ·	Bilateral relatively symmetric airspace opacities with scattered areas of interlobular septal thickening raising the possibility of underlying CHF.  ·	Left upper lobe 1.1 cm calcified pulmonary nodule (series 4, image 85).  ·	Left upper lobe devante-fissural 6 mm calcified nodule (series 4, image 47).  ·	Right lower lobe 1.3 cm calcified pulmonary nodule (series 4, image 106).  ·	Right middle lobe 2 mm calcified pulmonary nodule (series 4, image 122).  ·	Scattered mediastinal/paratracheal adenopathy. For example, lower paratracheal node measuring 1.3 x 1 cm (series 4, image 97) and 3.5 x 2 cm node measuring (coronal series 602, image 57).  ·	Stable calcified right hilar lymph nodes.   ·	Partially visualized enlargement of the left adrenal gland measuring 1.1 x 2.2 cm (series 4, image 209), stable since CT abdomen/pelvis from February 13, 2018. Postcholecystectomy

## 2018-12-07 LAB
GLUCOSE BLDC GLUCOMTR-MCNC: 126 MG/DL — HIGH (ref 70–99)
GLUCOSE BLDC GLUCOMTR-MCNC: 154 MG/DL — HIGH (ref 70–99)
GLUCOSE BLDC GLUCOMTR-MCNC: 157 MG/DL — HIGH (ref 70–99)
GLUCOSE BLDC GLUCOMTR-MCNC: 215 MG/DL — HIGH (ref 70–99)

## 2018-12-07 PROCEDURE — 93970 EXTREMITY STUDY: CPT | Mod: 26

## 2018-12-07 RX ORDER — NEOMYCIN SULFATE 500 MG/1
1000 TABLET ORAL ONCE
Qty: 0 | Refills: 0 | Status: COMPLETED | OUTPATIENT
Start: 2018-12-07 | End: 2018-12-07

## 2018-12-07 RX ADMIN — Medication 300 MILLIGRAM(S): at 12:20

## 2018-12-07 RX ADMIN — SODIUM CHLORIDE 100 MILLILITER(S): 9 INJECTION, SOLUTION INTRAVENOUS at 01:03

## 2018-12-07 RX ADMIN — ATORVASTATIN CALCIUM 40 MILLIGRAM(S): 80 TABLET, FILM COATED ORAL at 22:44

## 2018-12-07 RX ADMIN — Medication 100 MILLIGRAM(S): at 17:12

## 2018-12-07 RX ADMIN — OLANZAPINE 5 MILLIGRAM(S): 15 TABLET, FILM COATED ORAL at 12:20

## 2018-12-07 RX ADMIN — NEOMYCIN SULFATE 1000 MILLIGRAM(S): 500 TABLET ORAL at 12:22

## 2018-12-07 RX ADMIN — SODIUM CHLORIDE 100 MILLILITER(S): 9 INJECTION, SOLUTION INTRAVENOUS at 01:09

## 2018-12-07 RX ADMIN — SODIUM CHLORIDE 100 MILLILITER(S): 9 INJECTION, SOLUTION INTRAVENOUS at 22:37

## 2018-12-07 RX ADMIN — LOSARTAN POTASSIUM 50 MILLIGRAM(S): 100 TABLET, FILM COATED ORAL at 05:08

## 2018-12-07 RX ADMIN — Medication 100 MILLIGRAM(S): at 05:08

## 2018-12-07 RX ADMIN — Medication 500 MILLIGRAM(S): at 12:20

## 2018-12-07 RX ADMIN — PANTOPRAZOLE SODIUM 40 MILLIGRAM(S): 20 TABLET, DELAYED RELEASE ORAL at 12:20

## 2018-12-07 RX ADMIN — INSULIN GLARGINE 8 UNIT(S): 100 INJECTION, SOLUTION SUBCUTANEOUS at 22:44

## 2018-12-07 NOTE — CONSULT NOTE ADULT - ATTENDING COMMENTS
Patient seen and examined with surgery PA on rounds and discussed management plans with surgery team. Patient nonverbal lying in bed with tube feedings via gastrostomy. Recent small scar right upper thigh for fracture neck femur. CT from October reviewed no liver mets. nodefinite mass on CT. Await final pathology results the will discuss with family regarding surgical options of resection.
As per PA note, 78 yr old male 2 mo s/p CVA  admitted due to hematochezia -- Colonoscopy found tumor in ascending colon  pathology c/w invasive adenocarcinoma. CEA=7.0,CT abd pelvis not done but CT chest w/o evidence of mets. Pt has hx of Afib and was on  Eliquis, now held since 11/19. Per wife, pt had stroke in October, was recovering fairly well (ambulating, talkative) but then broke his hip and had it repaired and for the past month or so, has been bed bound and minimally responsive.  Pt was scheduled for surgery today but 2nd opinion now requested.    Pt is awake but minimally responsive  afeb vss  abd benign    labs H/H has been stable, now 9.2/31  wbc 4.2    Imp: 78yr old male s/p CVA now minimally responsive and with documented ascending colon cancer.    Rec: If he needs to resume Eliquis for cardiac reasons, then there is increased risk of re-bleed and surgery to remove tumor would be indicated. However, if he does not need to resume anticoagulation, then based on his overall prognosis, surgery to remove tumor would not be recommended at this time  WIfe expresses understanding of the above, and if he does not need Eliquis, would not want the surgery.    ALso recommend...  if surgery to proceed, consider transfer to [facility] for surgical intensive care post op.  Consider CT abd/pelvis for evaluation of metastatic dis (CEA is 7)

## 2018-12-07 NOTE — PROGRESS NOTE ADULT - ASSESSMENT
IMPRESSION:  77 yo male with history of CVA and newly diagnosed colon cancer  CT and CXR findings noted consistent with combination of pulmonary edema fluid overload and prior granulomatous disease  Pneumonia is within the DDx however the patient remains afebrile without leukocytosis off antibiotics  No respiratory distress noted on room air    PLAN:  Monitor O2 saturation on room air  Avoid excess IVF, consider gentle diuresis is clinically necessary  Continue anticoagulation for AFIB  GI prophylaxis  Aspiration precautions  Surgery following to consider options regarding colorectal cancer

## 2018-12-07 NOTE — PROGRESS NOTE ADULT - SUBJECTIVE AND OBJECTIVE BOX
SUSI MARTIN  78y  Male    Patient is a 78y old  Male who presents with a chief complaint of Fever/Tachycardia (07 Dec 2018 06:58)    ALLERGIES:  No Known Allergies      INTERVAL HPI/OVERNIGHT EVENTS:    VITALS:  T(F): 97.3 (12-07-18 @ 06:16), Max: 97.9 (12-06-18 @ 22:35)  HR: 100 (12-07-18 @ 06:16) (96 - 102)  BP: 160/87 (12-07-18 @ 06:16) (136/65 - 183/77)  RR: 16 (12-07-18 @ 06:16) (16 - 16)  SpO2: --    LABS:  12-06    143  |  105  |  26<H>  ----------------------------<  148<H>  4.2   |  30  |  0.7    Ca    8.7      06 Dec 2018 06:38  Phos  3.9     12-06  Mg     2.4     12-06      MICROBIOLOGY:    MEDICATION:  chlorhexidine 4% Liquid 1 Application(s) Topical <User Schedule> PRN  lactated ringers. 1000 milliLiter(s) IV Continuous <Continuous>  neomycin 1000 milliGRAM(s) Oral once    RADIOLOGY & ADDITIONAL TESTS:

## 2018-12-07 NOTE — PROGRESS NOTE ADULT - ASSESSMENT
JOEL - resolved  electrolyte derangements - corrected  colon mass  PNA / sepsis  pseudomonas UTI  Afib with rvr   hematochezia  CVA 3 weeks ago, nonverbal, s/p PEG / dementia  DM  HTN  anemia      plan:    f/u surgery  LR while npo  f/u path from colon mass  f/u GI / surgery  cont lopressor 100mg peg q12h  cont cardizem 30mg peg q6h and losartan  completed abx course  f/u bmp  off eliquis due to GIB per primary / GI team --> further anticoagulation mgmt per gi/cardio/primary

## 2018-12-07 NOTE — CONSULT NOTE ADULT - ASSESSMENT
· Assessment		  78M witha cortez PMHX sent in from NH 2/2 fever, found to have PNA, AFIB w/RVR, denied admission unit 2/2 code status and admitted to low risk tele for further monitoring.  Pt also with hematochezia, s/p colonoscopy, now with tumor noted in ascending colon · Assessment		  78M withviolet toro PMHX sent in from NH 2/2 fever, found to have PNA, AFIB w/RVR, denied admission unit 2/2 code status and admitted to low risk [facility] for further monitoring.  Pt also with hematochezia, s/p colonoscopy, now with tumor noted in ascending colon -- invasive adenocarcinoma, no evidence of lung mets (abdomen/pelvis not evaluated)    Pt seen and examined with Dr. Woods, who spoke with patient's wife via :   - pt had episodes of hematochezia likely due to use of Eliquis which likely caused tumor bleeding. He is now off Eliquis. If he needs to resume Eliquis for cardiac reasons, then there is increased risk of re-bleed and surgery to remove tumor would be recommended. However, if he does not need to resume anticoagulation, then based on his overall prognosis, surgery to remove tumor would not be recommended at this time  WIfe expresses understanding of the above, and if he does not need Eliquis, would not want the surgery.  - Per last GI note, if patient is not surgical candidate, then colonic stent may be a consideration (would need repeat colonoscopy with better prep to r/o additional lesions). Consider GI re-consult   - ALso recommend, if surgery to be done, he be [place] for rthe operation   - Will discuss with medical.cardiac team regarding anticoagulation     - will resume tube feeds today   - will follow

## 2018-12-07 NOTE — CONSULT NOTE ADULT - REASON FOR ADMISSION
Fever/Tachycardia

## 2018-12-07 NOTE — CONSULT NOTE ADULT - CONSULT REASON
enteral feeds
2nd opinion for colonic tumor
Abnormal CAT scan
Colon mass
Rectal bleeding
afib
ascending colon tumor
hypernatremia
pneumonia

## 2018-12-07 NOTE — CONSULT NOTE ADULT - SUBJECTIVE AND OBJECTIVE BOX
SUSI MARTIN [id number]  78y Male  11d    HPI:  Patient initially admitted to [facility] in october, discharged on 11/8/18 to SNF after having sustained a CVA, course complciated by UTI.  Wife reports that the patient was doing well but developed fever 4 days ago.  The patient is aphasic at baseline so was not complaining but she reports increased cough and worsening agitation.  Patient was sent to the ED for further evaluation and found to have new opacities on RLL.  Also in AFIB w/RVR, currently on Eliquis. 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 He has hx of Afib and was on  Eliquis, being held since 11/19. Per wife, pt had stroke in October, was recovering fairly well (ambulating, talkative) but then broke his hip and had it repaired and for the past month or so, has become less functional/talkative, mostly bed bound.  Pt was to ahve tumor removed today but 2nd opinion requested      PAST MEDICAL & SURGICAL HISTORY:  Stroke  DVT (deep venous thrombosis)  Anemia  Diabetes  HTN (hypertension)  Arrhythmia  Dementia  S/P cholecystectomy  History of hip replacement    Allergies  No Known Allergies    Home Medications:  amLODIPine 5 mg oral tablet: 0.5 tab(s) orally once a day (19 Nov 2018 15:40)  aspirin 81 mg oral delayed release tablet: 1 tab(s) orally once a day (19 Nov 2018 15:40)  atorvastatin 40 mg oral tablet: 1 tab(s) orally once a day (at bedtime) (19 Nov 2018 15:40)  collagenase 250 units/g topical ointment: 1 application topically once a day (19 Nov 2018 15:40)  Eliquis 5 mg oral tablet: 1 tab(s) orally 2 times a day (19 Nov 2018 15:40)  ferrous sulfate 325 mg (65 mg elemental iron) oral delayed release tablet: 1 tab(s) orally once a day (19 Nov 2018 15:40)  Flomax 0.4 mg oral capsule: 1 cap(s) orally once a day (at bedtime) (19 Nov 2018 15:40)  lactulose 10 g/15 mL oral solution: orally once a day (19 Nov 2018 15:40)  Lantus 100 units/mL subcutaneous solution: 8 unit(s) subcutaneous once a day (at bedtime) (19 Nov 2018 15:40)  losartan 50 mg oral tablet: 1 tab(s) orally once a day (19 Nov 2018 15:40)  metFORMIN 1000 mg oral tablet: 1 tab(s) orally 2 times a day (19 Nov 2018 15:40)  Metoprolol Tartrate 25 mg oral tablet: 1 tab(s) orally 2 times a day (19 Nov 2018 15:40)  OLANZapine 5 mg oral tablet: 1 tab(s) orally once a day (19 Nov 2018 15:40)  senna oral tablet: 1 tab(s) orally once a day (at bedtime) (19 Nov 2018 15:40)  Tylenol 325 mg oral tablet: 2 tab(s) orally every 4 hours, As Needed (19 Nov 2018 15:40)  Vitamin C 500 mg oral tablet: 1 tab(s) orally once a day (19 Nov 2018 15:40)  zinc sulfate: 50 milligram(s) orally once a day (19 Nov 2018 15:40)    MEDICATIONS  (STANDING):  ascorbic acid 500 milliGRAM(s) Oral daily  atorvastatin 40 milliGRAM(s) Oral at bedtime  dextrose 5%. 1000 milliLiter(s) (50 mL/Hr) IV Continuous <Continuous>  dextrose 50% Injectable 12.5 Gram(s) IV Push once  dextrose 50% Injectable 25 Gram(s) IV Push once  dextrose 50% Injectable 25 Gram(s) IV Push once  diltiazem    Tablet 30 milliGRAM(s) Enteral Tube every 6 hours  ferrous    sulfate Liquid 300 milliGRAM(s) Enteral Tube daily  insulin glargine Injectable (LANTUS) 8 Unit(s) SubCutaneous at bedtime  insulin lispro (HumaLOG) corrective regimen sliding scale   SubCutaneous every 6 hours  lactated ringers. 1000 milliLiter(s) (100 mL/Hr) IV Continuous <Continuous>  losartan 50 milliGRAM(s) Oral daily  metoprolol tartrate 100 milliGRAM(s) Oral two times a day  OLANZapine Disintegrating Tablet 5 milliGRAM(s) Oral daily  pantoprazole   Suspension 40 milliGRAM(s) Oral daily  senna Syrup 5 milliLiter(s) Oral at bedtime    MEDICATIONS  (PRN):  acetaminophen   Tablet .. 650 milliGRAM(s) Oral every 6 hours PRN Temp greater or equal to 38C (100.4F), Mild Pain (1 - 3)  chlorhexidine 4% Liquid 1 Application(s) Topical <User Schedule> PRN infection control  dextrose 40% Gel 15 Gram(s) Oral once PRN Blood Glucose LESS THAN 70 milliGRAM(s)/deciliter  glucagon  Injectable 1 milliGRAM(s) IntraMuscular once PRN Glucose LESS THAN 70 milligrams/deciliter  lactulose Syrup 20 Gram(s) Oral daily PRN constipation          RADIOLOGY & ADDITIONAL STUDIES:     Colonoscopy 11/29:   Impression   1.Two polyps were found in the transverse colon and removed  2.A tumor was found in the ascending colon. Multiple Biopsies taken.  3.Cannot rule out additional lesions due to very poor prep SUSI MARTIN [id number]  78y Male  11d    HPI:  Patient initially admitted to [facility] in october, discharged on 11/8/18 to SNF after having sustained a CVA, course complciated by UTI.  Wife reports that the patient was doing well but developed fever 4 days ago.  The patient is aphasic at baseline so was not complaining but she reports increased cough and worsening agitation.  Patient was sent to the ED for further evaluation and found to have new opacities on RLL.  Also in AFIB w/RVR, currently on Eliquis. 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 He has hx of Afib and was on  Eliquis, being held since 11/19. Per wife, pt had stroke in October, was recovering fairly well (ambulating, talkative) but then broke his hip and had it repaired and for the past month or so, has become less functional/talkative, mostly bed bound.  Pt was to ahve tumor removed today but 2nd opinion requested      PAST MEDICAL & SURGICAL HISTORY:  Stroke  DVT (deep venous thrombosis)  Anemia  Diabetes  HTN (hypertension)  Arrhythmia  Dementia  S/P cholecystectomy  History of hip replacement    Allergies  No Known Allergies    Home Medications:  amLODIPine 5 mg oral tablet: 0.5 tab(s) orally once a day (19 Nov 2018 15:40)  aspirin 81 mg oral delayed release tablet: 1 tab(s) orally once a day (19 Nov 2018 15:40)  atorvastatin 40 mg oral tablet: 1 tab(s) orally once a day (at bedtime) (19 Nov 2018 15:40)  collagenase 250 units/g topical ointment: 1 application topically once a day (19 Nov 2018 15:40)  Eliquis 5 mg oral tablet: 1 tab(s) orally 2 times a day (19 Nov 2018 15:40)  ferrous sulfate 325 mg (65 mg elemental iron) oral delayed release tablet: 1 tab(s) orally once a day (19 Nov 2018 15:40)  Flomax 0.4 mg oral capsule: 1 cap(s) orally once a day (at bedtime) (19 Nov 2018 15:40)  lactulose 10 g/15 mL oral solution: orally once a day (19 Nov 2018 15:40)  Lantus 100 units/mL subcutaneous solution: 8 unit(s) subcutaneous once a day (at bedtime) (19 Nov 2018 15:40)  losartan 50 mg oral tablet: 1 tab(s) orally once a day (19 Nov 2018 15:40)  metFORMIN 1000 mg oral tablet: 1 tab(s) orally 2 times a day (19 Nov 2018 15:40)  Metoprolol Tartrate 25 mg oral tablet: 1 tab(s) orally 2 times a day (19 Nov 2018 15:40)  OLANZapine 5 mg oral tablet: 1 tab(s) orally once a day (19 Nov 2018 15:40)  senna oral tablet: 1 tab(s) orally once a day (at bedtime) (19 Nov 2018 15:40)  Tylenol 325 mg oral tablet: 2 tab(s) orally every 4 hours, As Needed (19 Nov 2018 15:40)  Vitamin C 500 mg oral tablet: 1 tab(s) orally once a day (19 Nov 2018 15:40)  zinc sulfate: 50 milligram(s) orally once a day (19 Nov 2018 15:40)    MEDICATIONS  (STANDING):  ascorbic acid 500 milliGRAM(s) Oral daily  atorvastatin 40 milliGRAM(s) Oral at bedtime  dextrose 5%. 1000 milliLiter(s) (50 mL/Hr) IV Continuous <Continuous>  dextrose 50% Injectable 12.5 Gram(s) IV Push once  dextrose 50% Injectable 25 Gram(s) IV Push once  dextrose 50% Injectable 25 Gram(s) IV Push once  diltiazem    Tablet 30 milliGRAM(s) Enteral Tube every 6 hours  ferrous    sulfate Liquid 300 milliGRAM(s) Enteral Tube daily  insulin glargine Injectable (LANTUS) 8 Unit(s) SubCutaneous at bedtime  insulin lispro (HumaLOG) corrective regimen sliding scale   SubCutaneous every 6 hours  lactated ringers. 1000 milliLiter(s) (100 mL/Hr) IV Continuous <Continuous>  losartan 50 milliGRAM(s) Oral daily  metoprolol tartrate 100 milliGRAM(s) Oral two times a day  OLANZapine Disintegrating Tablet 5 milliGRAM(s) Oral daily  pantoprazole   Suspension 40 milliGRAM(s) Oral daily  senna Syrup 5 milliLiter(s) Oral at bedtime    MEDICATIONS  (PRN):  acetaminophen   Tablet .. 650 milliGRAM(s) Oral every 6 hours PRN Temp greater or equal to 38C (100.4F), Mild Pain (1 - 3)  chlorhexidine 4% Liquid 1 Application(s) Topical <User Schedule> PRN infection control  dextrose 40% Gel 15 Gram(s) Oral once PRN Blood Glucose LESS THAN 70 milliGRAM(s)/deciliter  glucagon  Injectable 1 milliGRAM(s) IntraMuscular once PRN Glucose LESS THAN 70 milligrams/deciliter  lactulose Syrup 20 Gram(s) Oral daily PRN constipation    LABS:                          9.2    4.28  )-----------( 209      ( 06 Dec 2018 06:38 )             31.2   12-06    143  |  105  |  26<H>  ----------------------------<  148<H>  4.2   |  30  |  0.7    Ca    8.7      06 Dec 2018 06:38  Phos  3.9     12-06  Mg     2.4     12-06    PT/INR - ( 06 Dec 2018 06:38 )   PT: 13.40 sec;   INR: 1.24 ratio    PTT - ( 06 Dec 2018 06:38 )  PTT:34.3 sec    Carcinoembryonic Antigen (12.02.18 @ 15:22)    Carcinoembryonic Antigen: 7.0: METHOD: Roche EIA   The CEA assay should not be used as a cancer screening test. Serum CEA  concentrations should only be used in conjunction with   information available from the clinical evaluation of the patien and  from other diagnostic procedures.   CEA Normal Ranges   _________________   Non-smoker: less than 3.9 ng/mL       Smoker: less than 5.5 ng/mL ng/mL    Surgical Pathology Report:   ACCESSION No:  [id number]  Final Diagnosis  Ascending colon mass, biopsy:  - Fragments of invasive adenocarcinoma, moderately  differentiated, with focal necrosis, ulceration, and surface  bacteria.       Colonoscopy 11/29:   Impression   1.Two polyps were found in the transverse colon and removed  2.A tumor was found in the ascending colon. Multiple Biopsies taken.  3.Cannot rule out additional lesions due to very poor prep SUSI MARTIN [id number]  78y Male  11d    HPI:  Patient initially admitted to [facility] in october, discharged on 11/8/18 to SNF after having sustained a CVA, course complciated by UTI.  Wife reports that the patient was doing well but developed fever 4 days ago.  The patient is aphasic at baseline so was not complaining but she reports increased cough and worsening agitation.  Patient was sent to the ED for further evaluation and found to have new opacities on RLL.  Also in AFIB w/RVR, currently on Eliquis. 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 He has hx of Afib and was on  Eliquis, being held since 11/19. Per wife, pt had stroke in October, was recovering fairly well (ambulating, talkative) but then broke his hip and had it repaired and for the past month or so, has become less functional/talkative, mostly bed bound.  Pt was to ahve tumor removed today but 2nd opinion requested      PAST MEDICAL & SURGICAL HISTORY:  Stroke  DVT (deep venous thrombosis)  Anemia  Diabetes  HTN (hypertension)  Arrhythmia  Dementia  S/P cholecystectomy  History of hip replacement    Allergies  No Known Allergies    Home Medications:  amLODIPine 5 mg oral tablet: 0.5 tab(s) orally once a day (19 Nov 2018 15:40)  aspirin 81 mg oral delayed release tablet: 1 tab(s) orally once a day (19 Nov 2018 15:40)  atorvastatin 40 mg oral tablet: 1 tab(s) orally once a day (at bedtime) (19 Nov 2018 15:40)  collagenase 250 units/g topical ointment: 1 application topically once a day (19 Nov 2018 15:40)  Eliquis 5 mg oral tablet: 1 tab(s) orally 2 times a day (19 Nov 2018 15:40)  ferrous sulfate 325 mg (65 mg elemental iron) oral delayed release tablet: 1 tab(s) orally once a day (19 Nov 2018 15:40)  Flomax 0.4 mg oral capsule: 1 cap(s) orally once a day (at bedtime) (19 Nov 2018 15:40)  lactulose 10 g/15 mL oral solution: orally once a day (19 Nov 2018 15:40)  Lantus 100 units/mL subcutaneous solution: 8 unit(s) subcutaneous once a day (at bedtime) (19 Nov 2018 15:40)  losartan 50 mg oral tablet: 1 tab(s) orally once a day (19 Nov 2018 15:40)  metFORMIN 1000 mg oral tablet: 1 tab(s) orally 2 times a day (19 Nov 2018 15:40)  Metoprolol Tartrate 25 mg oral tablet: 1 tab(s) orally 2 times a day (19 Nov 2018 15:40)  OLANZapine 5 mg oral tablet: 1 tab(s) orally once a day (19 Nov 2018 15:40)  senna oral tablet: 1 tab(s) orally once a day (at bedtime) (19 Nov 2018 15:40)  Tylenol 325 mg oral tablet: 2 tab(s) orally every 4 hours, As Needed (19 Nov 2018 15:40)  Vitamin C 500 mg oral tablet: 1 tab(s) orally once a day (19 Nov 2018 15:40)  zinc sulfate: 50 milligram(s) orally once a day (19 Nov 2018 15:40)    MEDICATIONS  (STANDING):  ascorbic acid 500 milliGRAM(s) Oral daily  atorvastatin 40 milliGRAM(s) Oral at bedtime  dextrose 5%. 1000 milliLiter(s) (50 mL/Hr) IV Continuous <Continuous>  dextrose 50% Injectable 12.5 Gram(s) IV Push once  dextrose 50% Injectable 25 Gram(s) IV Push once  dextrose 50% Injectable 25 Gram(s) IV Push once  diltiazem    Tablet 30 milliGRAM(s) Enteral Tube every 6 hours  ferrous    sulfate Liquid 300 milliGRAM(s) Enteral Tube daily  insulin glargine Injectable (LANTUS) 8 Unit(s) SubCutaneous at bedtime  insulin lispro (HumaLOG) corrective regimen sliding scale   SubCutaneous every 6 hours  lactated ringers. 1000 milliLiter(s) (100 mL/Hr) IV Continuous <Continuous>  losartan 50 milliGRAM(s) Oral daily  metoprolol tartrate 100 milliGRAM(s) Oral two times a day  OLANZapine Disintegrating Tablet 5 milliGRAM(s) Oral daily  pantoprazole   Suspension 40 milliGRAM(s) Oral daily  senna Syrup 5 milliLiter(s) Oral at bedtime    MEDICATIONS  (PRN):  acetaminophen   Tablet .. 650 milliGRAM(s) Oral every 6 hours PRN Temp greater or equal to 38C (100.4F), Mild Pain (1 - 3)  chlorhexidine 4% Liquid 1 Application(s) Topical <User Schedule> PRN infection control  dextrose 40% Gel 15 Gram(s) Oral once PRN Blood Glucose LESS THAN 70 milliGRAM(s)/deciliter  glucagon  Injectable 1 milliGRAM(s) IntraMuscular once PRN Glucose LESS THAN 70 milligrams/deciliter  lactulose Syrup 20 Gram(s) Oral daily PRN constipation    LABS:                          9.2    4.28  )-----------( 209      ( 06 Dec 2018 06:38 )             31.2   12-06    143  |  105  |  26<H>  ----------------------------<  148<H>  4.2   |  30  |  0.7    Ca    8.7      06 Dec 2018 06:38  Phos  3.9     12-06  Mg     2.4     12-06    PT/INR - ( 06 Dec 2018 06:38 )   PT: 13.40 sec;   INR: 1.24 ratio    PTT - ( 06 Dec 2018 06:38 )  PTT:34.3 sec    Carcinoembryonic Antigen (12.02.18 @ 15:22)    Carcinoembryonic Antigen: 7.0: METHOD: Roche EIA   The CEA assay should not be used as a cancer screening test. Serum CEA  concentrations should only be used in conjunction with   information available from the clinical evaluation of the patien and  from other diagnostic procedures.   CEA Normal Ranges   _________________   Non-smoker: less than 3.9 ng/mL       Smoker: less than 5.5 ng/mL ng/mL    Surgical Pathology Report:   ACCESSION No:  [id number]  Final Diagnosis  Ascending colon mass, biopsy:  - Fragments of invasive adenocarcinoma, moderately  differentiated, with focal necrosis, ulceration, and surface  bacteria.       Colonoscopy 11/29:   Impression   1.Two polyps were found in the transverse colon and removed  2.A tumor was found in the ascending colon. Multiple Biopsies taken.  3.Cannot rule out additional lesions due to very poor prep    < from: CT Chest No Cont (12.02.18 @ 18:01) >  IMPRESSION:  Since August 2018.    New bilateral, right greater than left pleural effusions with associated   compressive atelectasis.    In addition, bilateral relatively symmetric groundglass opacities with   septal thickening raising the possibility of underlying CHF. Correlate   with clinical history last presentation..    Consider follow-up imaging following treatment of pleural effusions.    Multiple, stable bilateral pulmonary nodules containing central calcium,   likely representing benign granulomas.    Stable nodular thickening of the left adrenal gland.

## 2018-12-07 NOTE — PROGRESS NOTE ADULT - SUBJECTIVE AND OBJECTIVE BOX
Interval history and ROS:    No respiratory distress or symptoms    MEDICATIONS:  acetaminophen   Tablet .. 650 milliGRAM(s) Oral every 6 hours PRN  ascorbic acid 500 milliGRAM(s) Oral daily  atorvastatin 40 milliGRAM(s) Oral at bedtime  chlorhexidine 4% Liquid 1 Application(s) Topical <User Schedule> PRN  dextrose 40% Gel 15 Gram(s) Oral once PRN  dextrose 5%. 1000 milliLiter(s) IV Continuous <Continuous>  dextrose 50% Injectable 12.5 Gram(s) IV Push once  dextrose 50% Injectable 25 Gram(s) IV Push once  dextrose 50% Injectable 25 Gram(s) IV Push once  diltiazem    Tablet 30 milliGRAM(s) Enteral Tube every 6 hours  ferrous    sulfate Liquid 300 milliGRAM(s) Enteral Tube daily  glucagon  Injectable 1 milliGRAM(s) IntraMuscular once PRN  insulin glargine Injectable (LANTUS) 8 Unit(s) SubCutaneous at bedtime  insulin lispro (HumaLOG) corrective regimen sliding scale   SubCutaneous every 6 hours  lactated ringers. 1000 milliLiter(s) IV Continuous <Continuous>  lactulose Syrup 20 Gram(s) Oral daily PRN  losartan 50 milliGRAM(s) Oral daily  metoprolol tartrate 100 milliGRAM(s) Oral two times a day  neomycin Solution 1 Gram(s) Oral every 3 hours  OLANZapine Disintegrating Tablet 5 milliGRAM(s) Oral daily  pantoprazole   Suspension 40 milliGRAM(s) Oral daily  senna Syrup 5 milliLiter(s) Oral at bedtime      VITAL SIGNS:  Vital Signs Last 24 Hrs  T(C): 36.3 (07 Dec 2018 06:16), Max: 36.6 (06 Dec 2018 22:35)  T(F): 97.3 (07 Dec 2018 06:16), Max: 97.9 (06 Dec 2018 22:35)  HR: 100 (07 Dec 2018 06:16) (96 - 102)  BP: 160/87 (07 Dec 2018 06:16) (136/65 - 183/77)  BP(mean): --  RR: 16 (07 Dec 2018 06:16) (16 - 16)  SpO2: --        PHYSICAL EXAM:  Neck supple, no LN  S1 and S2 no murmur  Lungs diminished BS  Abdomen is soft and non tender  There is no edema  Neurologically non focal      LABS:                        9.2    4.28  )-----------( 209      ( 06 Dec 2018 06:38 )             31.2     12-06    143  |  105  |  26<H>  ----------------------------<  148<H>  4.2   |  30  |  0.7    Ca    8.7      06 Dec 2018 06:38  Phos  3.9     12-06  Mg     2.4     12-06    TPro  5.2<L>  /  Alb  3.0<L>  /  TBili  0.8  /  DBili  x   /  AST  13  /  ALT  12  /  AlkPhos  146<H>  12-05      PT/INR - ( 06 Dec 2018 06:38 )   PT: 13.40 sec;   INR: 1.24 ratio     PTT - ( 06 Dec 2018 06:38 )  PTT:34.3 sec        < frXray Chest 1 View- PORTABLE-Urgent (12.06.18 @ 15:00)   Right lower lobe opacity/pleural effusion. No air leak.

## 2018-12-07 NOTE — PROGRESS NOTE ADULT - SUBJECTIVE AND OBJECTIVE BOX
NEPHROLOGY FOLLOW UP NOTE    more alert today  d/w nursing and wife  on ivf  awaiting OR  no new events        PAST MEDICAL & SURGICAL HISTORY:  Stroke  DVT (deep venous thrombosis)  Anemia  Diabetes  HTN (hypertension)  Arrhythmia  Dementia  S/P cholecystectomy  History of hip replacement    Allergies:  No Known Allergies      SOCIAL HISTORY:  Denies ETOH,Smoking,   FAMILY HISTORY:  No pertinent family history in first degree relatives    Yes      REVIEW OF SYSTEMS:  unobtainable      PHYSICAL EXAM:  NAD  lethargic  pale  dry mm  no jvd  b/l bs  irreg  soft, + binder over peg  no edema    Hospital Medications:   MEDICATIONS  (STANDING):  ascorbic acid 500 milliGRAM(s) Oral daily  atorvastatin 40 milliGRAM(s) Oral at bedtime  dextrose 5%. 1000 milliLiter(s) (50 mL/Hr) IV Continuous <Continuous>  dextrose 50% Injectable 12.5 Gram(s) IV Push once  dextrose 50% Injectable 25 Gram(s) IV Push once  dextrose 50% Injectable 25 Gram(s) IV Push once  diltiazem    Tablet 30 milliGRAM(s) Enteral Tube every 6 hours  ferrous    sulfate Liquid 300 milliGRAM(s) Enteral Tube daily  insulin glargine Injectable (LANTUS) 8 Unit(s) SubCutaneous at bedtime  insulin lispro (HumaLOG) corrective regimen sliding scale   SubCutaneous every 6 hours  lactated ringers. 1000 milliLiter(s) (100 mL/Hr) IV Continuous <Continuous>  losartan 50 milliGRAM(s) Oral daily  metoprolol tartrate 100 milliGRAM(s) Oral two times a day  OLANZapine Disintegrating Tablet 5 milliGRAM(s) Oral daily  pantoprazole   Suspension 40 milliGRAM(s) Oral daily  senna Syrup 5 milliLiter(s) Oral at bedtime        VITALS:  T(F): 97.4 (12-07-18 @ 12:40), Max: 97.9 (12-06-18 @ 22:35)  HR: 89 (12-07-18 @ 12:40)  BP: 150/69 (12-07-18 @ 12:40)  RR: 16 (12-07-18 @ 12:40)  SpO2: --  Wt(kg): --    12-05 @ 07:01  -  12-06 @ 07:00  --------------------------------------------------------  IN: 2420 mL / OUT: 0 mL / NET: 2420 mL    12-06 @ 07:01  -  12-07 @ 07:00  --------------------------------------------------------  IN: 3260 mL / OUT: 0 mL / NET: 3260 mL    12-07 @ 07:01  -  12-07 @ 13:46  --------------------------------------------------------  IN: 700 mL / OUT: 0 mL / NET: 700 mL      Height (cm): 182.88 (12-07 @ 11:01)  Weight (kg): 74.2 (12-07 @ 11:01)  BMI (kg/m2): 22.2 (12-07 @ 11:01)  BSA (m2): 1.96 (12-07 @ 11:01)    LABS:  12-06    143  |  105  |  26<H>  ----------------------------<  148<H>  4.2   |  30  |  0.7    Ca    8.7      06 Dec 2018 06:38  Phos  3.9     12-06  Mg     2.4     12-06                            9.2    4.28  )-----------( 209      ( 06 Dec 2018 06:38 )             31.2       Urine Studies:        RADIOLOGY & ADDITIONAL STUDIES:

## 2018-12-07 NOTE — CONSULT NOTE ADULT - CONSULT REQUESTED DATE/TIME
20-Nov-2018 09:44
01-Dec-2018 12:11
06-Dec-2018 09:19
07-Dec-2018 16:09
20-Nov-2018 06:38
20-Nov-2018 08:59
20-Nov-2018 19:11
24-Nov-2018 16:46
30-Nov-2018 15:28

## 2018-12-08 DIAGNOSIS — C18.9 MALIGNANT NEOPLASM OF COLON, UNSPECIFIED: ICD-10-CM

## 2018-12-08 LAB
GLUCOSE BLDC GLUCOMTR-MCNC: 146 MG/DL — HIGH (ref 70–99)
GLUCOSE BLDC GLUCOMTR-MCNC: 165 MG/DL — HIGH (ref 70–99)
GLUCOSE BLDC GLUCOMTR-MCNC: 184 MG/DL — HIGH (ref 70–99)
GLUCOSE BLDC GLUCOMTR-MCNC: 194 MG/DL — HIGH (ref 70–99)
GLUCOSE BLDC GLUCOMTR-MCNC: 225 MG/DL — HIGH (ref 70–99)
GLUCOSE BLDC GLUCOMTR-MCNC: 228 MG/DL — HIGH (ref 70–99)

## 2018-12-08 RX ADMIN — SODIUM CHLORIDE 100 MILLILITER(S): 9 INJECTION, SOLUTION INTRAVENOUS at 15:47

## 2018-12-08 RX ADMIN — INSULIN GLARGINE 8 UNIT(S): 100 INJECTION, SOLUTION SUBCUTANEOUS at 22:09

## 2018-12-08 RX ADMIN — SODIUM CHLORIDE 100 MILLILITER(S): 9 INJECTION, SOLUTION INTRAVENOUS at 07:44

## 2018-12-08 RX ADMIN — Medication 1: at 12:05

## 2018-12-08 RX ADMIN — Medication 100 MILLIGRAM(S): at 18:16

## 2018-12-08 RX ADMIN — Medication 500 MILLIGRAM(S): at 12:06

## 2018-12-08 RX ADMIN — Medication 1: at 18:05

## 2018-12-08 RX ADMIN — Medication 1: at 00:52

## 2018-12-08 RX ADMIN — OLANZAPINE 5 MILLIGRAM(S): 15 TABLET, FILM COATED ORAL at 12:07

## 2018-12-08 RX ADMIN — Medication 100 MILLIGRAM(S): at 05:41

## 2018-12-08 RX ADMIN — Medication 2: at 23:40

## 2018-12-08 RX ADMIN — Medication 300 MILLIGRAM(S): at 15:47

## 2018-12-08 RX ADMIN — PANTOPRAZOLE SODIUM 40 MILLIGRAM(S): 20 TABLET, DELAYED RELEASE ORAL at 12:07

## 2018-12-08 RX ADMIN — ATORVASTATIN CALCIUM 40 MILLIGRAM(S): 80 TABLET, FILM COATED ORAL at 22:09

## 2018-12-08 RX ADMIN — SENNA PLUS 5 MILLILITER(S): 8.6 TABLET ORAL at 22:09

## 2018-12-08 RX ADMIN — LOSARTAN POTASSIUM 50 MILLIGRAM(S): 100 TABLET, FILM COATED ORAL at 05:41

## 2018-12-08 NOTE — PROGRESS NOTE ADULT - SUBJECTIVE AND OBJECTIVE BOX
SUSI MARTIN  78y, Male  Allergy: No Known Allergies      LAST 24-Hr EVENTS:  patient appears comfortable  VITALS:  T(F): 97.1 (12-08-18 @ 05:41), Max: 97.4 (12-07-18 @ 12:40)  HR: 88 (12-08-18 @ 05:41)  BP: 140/96 (12-08-18 @ 05:41) (140/96 - 167/75)  RR: 16 (12-08-18 @ 05:41)  SpO2: --    PHYSICAL EXAM:    GENERAL: NAD, well-developed  HEAD:  Atraumatic, Normocephalic  NECK: Supple, No JVD  CHEST/LUNG: Clear to auscultation bilaterally; No wheeze  HEART: Regular rate and rhythm; No murmurs, rubs, or gallops  ABDOMEN: Soft, Nontender, Nondistended; Bowel sounds present  EXTREMITIES:  2+ Peripheral Pulses, No clubbing, cyanosis, or edema        TESTS & MEASUREMENTS:    IN: 3260 mL / OUT: 0 mL / NET: 3260 mL    IN: 2610 mL / OUT: 0 mL / NET: 2610 mL    IN: 900 mL / OUT: 0 mL / NET: 900 mL                            ABG & VENT SETTINGS: (when applicable)    n/a    RADIOLOGY & ADDITIONAL TESTS:    MEDICATIONS:  MEDICATIONS  (STANDING):  ascorbic acid 500 milliGRAM(s) Oral daily  atorvastatin 40 milliGRAM(s) Oral at bedtime  dextrose 5%. 1000 milliLiter(s) (50 mL/Hr) IV Continuous <Continuous>  dextrose 50% Injectable 12.5 Gram(s) IV Push once  dextrose 50% Injectable 25 Gram(s) IV Push once  dextrose 50% Injectable 25 Gram(s) IV Push once  diltiazem    Tablet 30 milliGRAM(s) Enteral Tube every 6 hours  ferrous    sulfate Liquid 300 milliGRAM(s) Enteral Tube daily  insulin glargine Injectable (LANTUS) 8 Unit(s) SubCutaneous at bedtime  insulin lispro (HumaLOG) corrective regimen sliding scale   SubCutaneous every 6 hours  lactated ringers. 1000 milliLiter(s) (100 mL/Hr) IV Continuous <Continuous>  losartan 50 milliGRAM(s) Oral daily  metoprolol tartrate 100 milliGRAM(s) Oral two times a day  OLANZapine Disintegrating Tablet 5 milliGRAM(s) Oral daily  pantoprazole   Suspension 40 milliGRAM(s) Oral daily  senna Syrup 5 milliLiter(s) Oral at bedtime    MEDICATIONS  (PRN):  acetaminophen   Tablet .. 650 milliGRAM(s) Oral every 6 hours PRN Temp greater or equal to 38C (100.4F), Mild Pain (1 - 3)  chlorhexidine 4% Liquid 1 Application(s) Topical <User Schedule> PRN infection control  dextrose 40% Gel 15 Gram(s) Oral once PRN Blood Glucose LESS THAN 70 milliGRAM(s)/deciliter  glucagon  Injectable 1 milliGRAM(s) IntraMuscular once PRN Glucose LESS THAN 70 milligrams/deciliter  lactulose Syrup 20 Gram(s) Oral daily PRN constipation

## 2018-12-08 NOTE — PROGRESS NOTE ADULT - ASSESSMENT
pleural effusion  nonspecific abnormalities of chest x-ray  suspect acute/chronic diastolic chf      continue to monitor SpO2 on room air  continue to monitor off antibiotics  taper iv fluid, renal following  monitor labs  antihypertensives/cardiac medications  patient is off anticoagulation, consider dvt prophylaxis

## 2018-12-08 NOTE — PROGRESS NOTE ADULT - ASSESSMENT
78M withviolet toro PMHX sent in from NH 2/2 fever, found to have PNA, AFIB w/RVR, denied admission unit 2/2 code status and admitted to low risk [facility] for further monitoring.  Pt also with hematochezia, s/p colonoscopy, now with tumor noted in ascending colon -- invasive adenocarcinoma, no evidence of lung mets (abdomen/pelvis not evaluated)    Pt seen and examined with Dr. Woods, who spoke with patient's wife via :   - pt had episodes of hematochezia likely due to use of Eliquis which likely caused tumor bleeding. He is now off Eliquis. If he needs to resume Eliquis for cardiac reasons, then there is increased risk of re-bleed and surgery to remove tumor would be recommended. However, if he does not need to resume anticoagulation, then based on his overall prognosis, surgery to remove tumor would not be recommended at this time  WIfe expresses understanding of the above, and if he does not need Eliquis, would not want the surgery.  - Per last GI note, if patient is not surgical candidate, then colonic stent may be a consideration (would need repeat colonoscopy with better prep to r/o additional lesions). Consider GI re-consult   - ALso recommend, if surgery to be done, he be transferred north for rthe operation   - Will discuss with medical.cardiac team regarding anticoagulation     - will resume tube feeds today   - will follow

## 2018-12-08 NOTE — PROGRESS NOTE ADULT - SUBJECTIVE AND OBJECTIVE BOX
HPI:  2 BM recorded on flowsheet last nite.     PSHX:  S/P cholecystectomy  History of hip replacement      Allergies    No Known Allergies      MEDICATIONS  (STANDING):  ascorbic acid 500 milliGRAM(s) Oral daily  atorvastatin 40 milliGRAM(s) Oral at bedtime  dextrose 5%. 1000 milliLiter(s) (50 mL/Hr) IV Continuous <Continuous>  dextrose 50% Injectable 12.5 Gram(s) IV Push once  dextrose 50% Injectable 25 Gram(s) IV Push once  dextrose 50% Injectable 25 Gram(s) IV Push once  diltiazem    Tablet 30 milliGRAM(s) Enteral Tube every 6 hours  ferrous    sulfate Liquid 300 milliGRAM(s) Enteral Tube daily  insulin glargine Injectable (LANTUS) 8 Unit(s) SubCutaneous at bedtime  insulin lispro (HumaLOG) corrective regimen sliding scale   SubCutaneous every 6 hours  lactated ringers. 1000 milliLiter(s) (100 mL/Hr) IV Continuous <Continuous>  losartan 50 milliGRAM(s) Oral daily  metoprolol tartrate 100 milliGRAM(s) Oral two times a day  OLANZapine Disintegrating Tablet 5 milliGRAM(s) Oral daily  pantoprazole   Suspension 40 milliGRAM(s) Oral daily  senna Syrup 5 milliLiter(s) Oral at bedtime    MEDICATIONS  (PRN):  acetaminophen   Tablet .. 650 milliGRAM(s) Oral every 6 hours PRN Temp greater or equal to 38C (100.4F), Mild Pain (1 - 3)  chlorhexidine 4% Liquid 1 Application(s) Topical <User Schedule> PRN infection control  dextrose 40% Gel 15 Gram(s) Oral once PRN Blood Glucose LESS THAN 70 milliGRAM(s)/deciliter  glucagon  Injectable 1 milliGRAM(s) IntraMuscular once PRN Glucose LESS THAN 70 milligrams/deciliter  lactulose Syrup 20 Gram(s) Oral daily PRN constipation    ROS:  General:	no fever, weight loss,  chills  GI: no nausea or vomiting      Vital Signs Last 24 Hrs  T(F): 97.1 (08 Dec 2018 05:41), Max: 97.4 (07 Dec 2018 12:40)  HR: 88 (08 Dec 2018 05:41) (88 - 109)  BP: 140/96 (08 Dec 2018 05:41) (140/96 - 167/75)  RR: 16 (08 Dec 2018 05:41) (16 - 16)      PHYSICAL EXAM:      Constitutional: resting comfortable in bed, sleepy  Gastrointestinal: soft nt  nd + bs no rebound or guarding  Extremities: normal rom, no edema, calf tenderness

## 2018-12-08 NOTE — PROGRESS NOTE ADULT - SUBJECTIVE AND OBJECTIVE BOX
SUSI MARTIN  78y  Male    Patient is a 78y old  Male who presents with a chief complaint of Fever/Tachycardia (08 Dec 2018 10:00)    ALLERGIES:  No Known Allergies      INTERVAL HPI/OVERNIGHT EVENTS:    VITALS:  T(F): 96.2 (12-08-18 @ 14:05), Max: 97.1 (12-08-18 @ 05:41)  HR: 94 (12-08-18 @ 14:05) (88 - 109)  BP: 134/71 (12-08-18 @ 14:05) (127/89 - 167/75)  RR: 16 (12-08-18 @ 14:05) (16 - 16)  SpO2: 97% (12-08-18 @ 15:44) (97% - 100%)    LABS:        MICROBIOLOGY:    MEDICATION:  chlorhexidine 4% Liquid 1 Application(s) Topical <User Schedule> PRN  lactated ringers. 1000 milliLiter(s) IV Continuous <Continuous>    RADIOLOGY & ADDITIONAL TESTS:

## 2018-12-08 NOTE — CHART NOTE - NSCHARTNOTEFT_GEN_A_CORE
Called by RN; Patient still with persistent rectal bleeding.  Hgb stable this am compared to yesterday.  GI consult already called yesterday and pending.  d/w attending, will hold Eliquis until GI evaluation.
Called by RN; patient with another bloody BM.  GI note reviewed.  Will order CBC at 1100 and again at 1900
Patient presently receiving tube feeds, will D/C iv fluids.
Patient seen for wound assessment and treatment recommendation      Findings    Sacral stage 3 Pu about 3x2x1 in size.  wound base pink, no drainage or foul smell noted    R ankle ulcer about 2x2x1 in size  wound base yellow, no drainage or foul smell noted    Recommendation    Triad hydrophilic dressing with moist saline dressing  skin care per protocol  pressure ulcer preventive measures  case discussed with primary Rn   WOCN to f/u as needed
AS AS PER RN BRBPR  serial h/h  transfuse prn  stool occult blood  gi consult  npo, ivf  monitor pt  will d/w attending

## 2018-12-08 NOTE — PROGRESS NOTE ADULT - ASSESSMENT
· Assessment		  78M withviolet toro PMHX sent in from NH 2/2 fever, found to have PNA, AFIB w/RVR, denied admission unit 2/2 code status and admitted to low risk [facility] for further monitoring.  Pt also with hematochezia, s/p colonoscopy, now with tumor noted in ascending colon -- invasive adenocarcinoma, no evidence of lung mets (abdomen/pelvis not evaluated)    Pt seen and examined by  Dr. Woods, who spoke with patient's wife via :   - pt had episodes of hematochezia likely due to use of Eliquis which likely caused tumor bleeding. He is now off Eliquis. If he needs to resume Eliquis for cardiac reasons, then there is increased risk of re-bleed and surgery to remove tumor would be recommended. However, if he does not need to resume anticoagulation, then based on his overall prognosis, surgery to remove tumor would not be recommended at this time  WIfe expresses understanding of the above, and if he does not need Eliquis, would not want the surgery.  - Per last GI note, if patient is not surgical candidate, then colonic stent may be a consideration (would need repeat colonoscopy with better prep to r/o additional lesions). Consider GI re-consult   - ALso recommend, if surgery to be done, he be transferred north for rthe operation   - Will discuss with medical.cardiac team regarding anticoagulation     - will resume tube feeds today   - Surgery inputs appreciated

## 2018-12-09 LAB
GLUCOSE BLDC GLUCOMTR-MCNC: 137 MG/DL — HIGH (ref 70–99)
GLUCOSE BLDC GLUCOMTR-MCNC: 159 MG/DL — HIGH (ref 70–99)
GLUCOSE BLDC GLUCOMTR-MCNC: 160 MG/DL — HIGH (ref 70–99)
GLUCOSE BLDC GLUCOMTR-MCNC: 174 MG/DL — HIGH (ref 70–99)
GLUCOSE BLDC GLUCOMTR-MCNC: 211 MG/DL — HIGH (ref 70–99)
GLUCOSE BLDC GLUCOMTR-MCNC: 254 MG/DL — HIGH (ref 70–99)

## 2018-12-09 RX ADMIN — Medication 500 MILLIGRAM(S): at 12:07

## 2018-12-09 RX ADMIN — ATORVASTATIN CALCIUM 40 MILLIGRAM(S): 80 TABLET, FILM COATED ORAL at 21:43

## 2018-12-09 RX ADMIN — Medication 3: at 12:07

## 2018-12-09 RX ADMIN — LOSARTAN POTASSIUM 50 MILLIGRAM(S): 100 TABLET, FILM COATED ORAL at 05:59

## 2018-12-09 RX ADMIN — INSULIN GLARGINE 8 UNIT(S): 100 INJECTION, SOLUTION SUBCUTANEOUS at 21:43

## 2018-12-09 RX ADMIN — Medication 300 MILLIGRAM(S): at 18:01

## 2018-12-09 RX ADMIN — OLANZAPINE 5 MILLIGRAM(S): 15 TABLET, FILM COATED ORAL at 12:07

## 2018-12-09 RX ADMIN — Medication 100 MILLIGRAM(S): at 18:04

## 2018-12-09 RX ADMIN — Medication 2: at 18:03

## 2018-12-09 RX ADMIN — Medication 100 MILLIGRAM(S): at 05:59

## 2018-12-09 RX ADMIN — PANTOPRAZOLE SODIUM 40 MILLIGRAM(S): 20 TABLET, DELAYED RELEASE ORAL at 12:07

## 2018-12-09 RX ADMIN — Medication 1: at 23:37

## 2018-12-09 RX ADMIN — SENNA PLUS 5 MILLILITER(S): 8.6 TABLET ORAL at 21:43

## 2018-12-09 NOTE — PROGRESS NOTE ADULT - ASSESSMENT
pleural effusion  nonspecific abnormalities of chest x-ray  acute/chronic diastolic chf    plan-------------------------------------------------------------------------------------------------------------------------------------------    seems stable off antibiotics, no clinical evidence for infection at this time  iv fluids per renal  no labs found since 12/6, would repeat  repeat chest x-ray as indicated  continue cardiac/blood pressure medications  awaiting decision as to how to proceed regarding colon tumor  dnr in effect

## 2018-12-09 NOTE — PROGRESS NOTE ADULT - SUBJECTIVE AND OBJECTIVE BOX
SUSI MARTIN  78y, Male  Allergy: No Known Allergies      LAST 24-Hr EVENTS:  patient in no distress  VITALS:  T(F): 98 (12-09-18 @ 06:03), Max: 98 (12-09-18 @ 06:03)  HR: 106 (12-09-18 @ 06:03)  BP: 137/84 (12-09-18 @ 06:03) (127/89 - 179/81)  RR: 16 (12-09-18 @ 06:03)  SpO2: 97% (12-08-18 @ 15:44)room air    PHYSICAL EXAM:    GENERAL: NAD, well-developed  HEAD:  Atraumatic, Normocephalic  NECK: Supple, No JVD  CHEST/LUNG: Clear to auscultation bilaterally; No wheeze  HEART: Regular rate and rhythm; No murmurs, rubs, or gallops  ABDOMEN: Soft, Nontender, Nondistended; Bowel sounds present  EXTREMITIES:  2+ Peripheral Pulses, No clubbing, cyanosis, or edema        TESTS & MEASUREMENTS:    IN: 2610 mL / OUT: 0 mL / NET: 2610 mL    IN: 3890 mL / OUT: 0 mL / NET: 3890 mL                            ABG & VENT SETTINGS: (when applicable)  n/a      RADIOLOGY & ADDITIONAL TESTS:    MEDICATIONS:  MEDICATIONS  (STANDING):  ascorbic acid 500 milliGRAM(s) Oral daily  atorvastatin 40 milliGRAM(s) Oral at bedtime  dextrose 50% Injectable 12.5 Gram(s) IV Push once  dextrose 50% Injectable 25 Gram(s) IV Push once  dextrose 50% Injectable 25 Gram(s) IV Push once  diltiazem    Tablet 30 milliGRAM(s) Enteral Tube every 6 hours  ferrous    sulfate Liquid 300 milliGRAM(s) Enteral Tube daily  insulin glargine Injectable (LANTUS) 8 Unit(s) SubCutaneous at bedtime  insulin lispro (HumaLOG) corrective regimen sliding scale   SubCutaneous every 6 hours  losartan 50 milliGRAM(s) Oral daily  metoprolol tartrate 100 milliGRAM(s) Oral two times a day  OLANZapine Disintegrating Tablet 5 milliGRAM(s) Oral daily  pantoprazole   Suspension 40 milliGRAM(s) Oral daily  senna Syrup 5 milliLiter(s) Oral at bedtime    MEDICATIONS  (PRN):  acetaminophen   Tablet .. 650 milliGRAM(s) Oral every 6 hours PRN Temp greater or equal to 38C (100.4F), Mild Pain (1 - 3)  chlorhexidine 4% Liquid 1 Application(s) Topical <User Schedule> PRN infection control  dextrose 40% Gel 15 Gram(s) Oral once PRN Blood Glucose LESS THAN 70 milliGRAM(s)/deciliter  glucagon  Injectable 1 milliGRAM(s) IntraMuscular once PRN Glucose LESS THAN 70 milligrams/deciliter  lactulose Syrup 20 Gram(s) Oral daily PRN constipation

## 2018-12-09 NOTE — PROGRESS NOTE ADULT - ASSESSMENT
78M withviolet toro PMHX sent in from NH 2/2 fever, found to have PNA, AFIB w/RVR, denied admission unit 2/2 code status and admitted to low risk tele for further monitoring.  Pt also with hematochezia, s/p colonoscopy, now with tumor noted in ascending colon -- invasive adenocarcinoma, no evidence of lung mets (abdomen/pelvis not evaluated)    -Case d/w dr Yarbrough, he will speak with cardiology regarding resuming anticoagulation for the patient which will help determine the need for surgery.  -c/w feeds 78M withviolet toro PMHX sent in from NH 2/2 fever, found to have PNA, AFIB w/RVR, denied admission unit 2/2 code status and admitted to low risk tele for further monitoring.  Pt also with hematochezia, s/p colonoscopy, now with tumor noted in ascending colon -- invasive adenocarcinoma, no evidence of lung mets (abdomen/pelvis not evaluated)    -Case d/w dr Yarbrough, he will speak with cardiology regarding resuming anticoagulation for the patient which will determine whether to proceed with surgery.  -c/w feeds

## 2018-12-09 NOTE — PROGRESS NOTE ADULT - PROBLEM SELECTOR PROBLEM 1
Pneumonia due to infectious organism, unspecified laterality, unspecified part of lung
Pneumonia due to infectious organism, unspecified laterality, unspecified part of lung
Malignant neoplasm of colon, unspecified part of colon
Malignant neoplasm of colon, unspecified part of colon

## 2018-12-09 NOTE — PROGRESS NOTE ADULT - ASSESSMENT
pleural effusion  nonspecific abnormalities of chest x-ray  acute/chronic diastolic chf        seems stable off antibiotics, no clinical evidence for infection at this time  iv fluids per renal  no labs found since 12/6, would repeat  repeat chest x-ray as indicated  continue cardiac/blood pressure medications  awaiting decision as to how to proceed regarding colon tumor  dnr in effect

## 2018-12-09 NOTE — PROGRESS NOTE ADULT - SUBJECTIVE AND OBJECTIVE BOX
HPI:  2 BM recorded on flowsheet last nite.     PSHX:  S/P cholecystectomy  History of hip replacement      Allergies    No Known Allergies      MEDICATIONS  (STANDING):  ascorbic acid 500 milliGRAM(s) Oral daily  atorvastatin 40 milliGRAM(s) Oral at bedtime  dextrose 5%. 1000 milliLiter(s) (50 mL/Hr) IV Continuous <Continuous>  dextrose 50% Injectable 12.5 Gram(s) IV Push once  dextrose 50% Injectable 25 Gram(s) IV Push once  dextrose 50% Injectable 25 Gram(s) IV Push once  diltiazem    Tablet 30 milliGRAM(s) Enteral Tube every 6 hours  ferrous    sulfate Liquid 300 milliGRAM(s) Enteral Tube daily  insulin glargine Injectable (LANTUS) 8 Unit(s) SubCutaneous at bedtime  insulin lispro (HumaLOG) corrective regimen sliding scale   SubCutaneous every 6 hours  lactated ringers. 1000 milliLiter(s) (100 mL/Hr) IV Continuous <Continuous>  losartan 50 milliGRAM(s) Oral daily  metoprolol tartrate 100 milliGRAM(s) Oral two times a day  OLANZapine Disintegrating Tablet 5 milliGRAM(s) Oral daily  pantoprazole   Suspension 40 milliGRAM(s) Oral daily  senna Syrup 5 milliLiter(s) Oral at bedtime    MEDICATIONS  (PRN):  acetaminophen   Tablet .. 650 milliGRAM(s) Oral every 6 hours PRN Temp greater or equal to 38C (100.4F), Mild Pain (1 - 3)  chlorhexidine 4% Liquid 1 Application(s) Topical <User Schedule> PRN infection control  dextrose 40% Gel 15 Gram(s) Oral once PRN Blood Glucose LESS THAN 70 milliGRAM(s)/deciliter  glucagon  Injectable 1 milliGRAM(s) IntraMuscular once PRN Glucose LESS THAN 70 milligrams/deciliter  lactulose Syrup 20 Gram(s) Oral daily PRN constipation    ROS:  General:	no fever, weight loss,  chills  GI: no nausea or vomiting      Vital Signs Last 24 Hrs  T(F): 96.5 (09 Dec 2018 13:55), Max: 98 (09 Dec 2018 06:03)  HR: 94 (09 Dec 2018 13:55) (84 - 106)  BP: 118/87 (09 Dec 2018 13:55) (118/87 - 179/81)  RR: 16 (09 Dec 2018 13:55) (16 - 16)  SpO2: 97% (08 Dec 2018 15:44) (97% - 97%)      PHYSICAL EXAM:      Constitutional: resting comfortable in bed, sleepy  Gastrointestinal: soft nt  nd + bs no rebound or guarding  Extremities: normal rom, no edema, calf tenderness

## 2018-12-09 NOTE — PROGRESS NOTE ADULT - PROBLEM SELECTOR PLAN 1
continue IV Cefepime  at least 10 days of abx  needs aggressive ambulation  follow wbc
Resolving   Po abx - 6 days of abx  DC planning   recall if needed
as above
as above

## 2018-12-09 NOTE — PROGRESS NOTE ADULT - SUBJECTIVE AND OBJECTIVE BOX
SUSI MARTIN  78y  Male    Patient is a 78y old  Male who presents with a chief complaint of Fever/Tachycardia (09 Dec 2018 08:13)    ALLERGIES:  No Known Allergies      INTERVAL HPI/OVERNIGHT EVENTS:    VITALS:  T(F): 98 (12-09-18 @ 06:03), Max: 98 (12-09-18 @ 06:03)  HR: 106 (12-09-18 @ 06:03) (84 - 106)  BP: 137/84 (12-09-18 @ 06:03) (127/89 - 179/81)  RR: 16 (12-09-18 @ 06:03) (16 - 16)  SpO2: 97% (12-08-18 @ 15:44) (97% - 100%)    LABS:        MICROBIOLOGY:    MEDICATION:    RADIOLOGY & ADDITIONAL TESTS:

## 2018-12-10 LAB
GLUCOSE BLDC GLUCOMTR-MCNC: 153 MG/DL — HIGH (ref 70–99)
GLUCOSE BLDC GLUCOMTR-MCNC: 154 MG/DL — HIGH (ref 70–99)
GLUCOSE BLDC GLUCOMTR-MCNC: 158 MG/DL — HIGH (ref 70–99)
GLUCOSE BLDC GLUCOMTR-MCNC: 173 MG/DL — HIGH (ref 70–99)
GLUCOSE BLDC GLUCOMTR-MCNC: 185 MG/DL — HIGH (ref 70–99)
GLUCOSE BLDC GLUCOMTR-MCNC: 205 MG/DL — HIGH (ref 70–99)
GLUCOSE BLDC GLUCOMTR-MCNC: 219 MG/DL — HIGH (ref 70–99)

## 2018-12-10 RX ADMIN — Medication 100 MILLIGRAM(S): at 05:29

## 2018-12-10 RX ADMIN — OLANZAPINE 5 MILLIGRAM(S): 15 TABLET, FILM COATED ORAL at 11:37

## 2018-12-10 RX ADMIN — Medication 1: at 05:44

## 2018-12-10 RX ADMIN — Medication 1: at 12:26

## 2018-12-10 RX ADMIN — PANTOPRAZOLE SODIUM 40 MILLIGRAM(S): 20 TABLET, DELAYED RELEASE ORAL at 11:38

## 2018-12-10 RX ADMIN — Medication 500 MILLIGRAM(S): at 11:37

## 2018-12-10 RX ADMIN — Medication 2: at 17:47

## 2018-12-10 RX ADMIN — Medication 300 MILLIGRAM(S): at 11:38

## 2018-12-10 RX ADMIN — Medication 100 MILLIGRAM(S): at 17:48

## 2018-12-10 RX ADMIN — Medication 2: at 23:20

## 2018-12-10 RX ADMIN — INSULIN GLARGINE 8 UNIT(S): 100 INJECTION, SOLUTION SUBCUTANEOUS at 23:22

## 2018-12-10 RX ADMIN — LOSARTAN POTASSIUM 50 MILLIGRAM(S): 100 TABLET, FILM COATED ORAL at 05:28

## 2018-12-10 RX ADMIN — SENNA PLUS 5 MILLILITER(S): 8.6 TABLET ORAL at 23:19

## 2018-12-10 RX ADMIN — ATORVASTATIN CALCIUM 40 MILLIGRAM(S): 80 TABLET, FILM COATED ORAL at 23:19

## 2018-12-10 NOTE — PROGRESS NOTE ADULT - SUBJECTIVE AND OBJECTIVE BOX
NEPHROLOGY FOLLOW UP NOTE    pt seen this AM  no events over the weekend  no surgery so far  tolerating PEG feeds with H2O, off ivf        PAST MEDICAL & SURGICAL HISTORY:  Stroke  DVT (deep venous thrombosis)  Anemia  Diabetes  HTN (hypertension)  Arrhythmia  Dementia  S/P cholecystectomy  History of hip replacement    Allergies:  No Known Allergies      SOCIAL HISTORY:  Denies ETOH,Smoking,   FAMILY HISTORY:  No pertinent family history in first degree relatives    Yes      REVIEW OF SYSTEMS:  unobtainable      PHYSICAL EXAM:  NAD  lethargic  pale  dry mm  no jvd  b/l bs  irreg  soft, + binder over peg  no edema    Hospital Medications:   MEDICATIONS  (STANDING):  ascorbic acid 500 milliGRAM(s) Oral daily  atorvastatin 40 milliGRAM(s) Oral at bedtime  dextrose 50% Injectable 12.5 Gram(s) IV Push once  dextrose 50% Injectable 25 Gram(s) IV Push once  dextrose 50% Injectable 25 Gram(s) IV Push once  diltiazem    Tablet 30 milliGRAM(s) Enteral Tube every 6 hours  ferrous    sulfate Liquid 300 milliGRAM(s) Enteral Tube daily  insulin glargine Injectable (LANTUS) 8 Unit(s) SubCutaneous at bedtime  insulin lispro (HumaLOG) corrective regimen sliding scale   SubCutaneous every 6 hours  losartan 50 milliGRAM(s) Oral daily  metoprolol tartrate 100 milliGRAM(s) Oral two times a day  OLANZapine Disintegrating Tablet 5 milliGRAM(s) Oral daily  pantoprazole   Suspension 40 milliGRAM(s) Oral daily  senna Syrup 5 milliLiter(s) Oral at bedtime        VITALS:  T(F): 97 (12-10-18 @ 04:58), Max: 97 (12-10-18 @ 04:58)  HR: 105 (12-10-18 @ 04:58)  BP: 135/81 (12-10-18 @ 04:58)  RR: 18 (12-10-18 @ 04:58)  SpO2: --  Wt(kg): --    12-08 @ 07:01  -  12-09 @ 07:00  --------------------------------------------------------  IN: 3890 mL / OUT: 0 mL / NET: 3890 mL    12-09 @ 07:01  -  12-10 @ 07:00  --------------------------------------------------------  IN: 1020 mL / OUT: 0 mL / NET: 1020 mL      Height (cm): 182.88 (12-09 @ 11:42)  Weight (kg): 74.2 (12-09 @ 11:42)  BMI (kg/m2): 22.2 (12-09 @ 11:42)  BSA (m2): 1.96 (12-09 @ 11:42)    no labs

## 2018-12-10 NOTE — PROGRESS NOTE ADULT - SUBJECTIVE AND OBJECTIVE BOX
GENERAL SURGERY PROGRESS NOTE    Patient: SUSI MARTIN , 78y ([birth year])Male   MRN: [id number]  Location: [address] [place] [address]  Visit: 11-19-18 Inpatient  Date: 12-10-18 @ 14:26    Procedure/Dx/Injuries:  Ascending colon -- invasive adenocarcinoma,    Events of past 24 hours: Patient seen and evaluated w/ Dr. Yarbrough, patients wife at bedside, patient appears to be deteriorating, Not a good surgical candidate, extensive conversation with patients wife who was at bedside regarding the matter.  # [id number] .    PAST MEDICAL & SURGICAL HISTORY:  Stroke  DVT (deep venous thrombosis)  Anemia  Diabetes  HTN (hypertension)  Arrhythmia  Dementia  S/P cholecystectomy  History of hip replacement      Vitals: T(F): 97 (12-10-18 @ 04:58), Max: 97 (12-10-18 @ 04:58)  HR: 105 (12-10-18 @ 04:58)  BP: 135/81 (12-10-18 @ 04:58)  RR: 18 (12-10-18 @ 04:58)    Diet, NPO with Tube Feed:   Tube Feeding Modality: Gastrostomy  Glucerna 1.2 Trae  Total Volume for 24 Hours (mL): 2304  Intermittent  Starting Tube Feed Rate mL per Hour: 480  Until Goal Tube Feed Rate (mL per Hour): 480  Tube Feeding Hours ON: 1  Tube Feeding OFF (Hours): 4  Tube Feed Start Time: 14:00  Diet, NPO with Tube Feed:   Tube Feeding Modality: Gastrostomy  Glucerna 1.2 Trae  Total Volume for 24 Hours (mL): 2304  Intermittent  Starting Tube Feed Rate mL per Hour: 480  Until Goal Tube Feed Rate (mL per Hour): 480  Tube Feeding Hours ON: 1  Tube Feeding OFF (Hours): 4  Tube Feed Start Time: 14:00    12-09-18 @ 07:01  -  12-10-18 @ 07:00  --------------------------------------------------------  IN:    Free Water: 300 mL    Glucerna: 720 mL  Total IN: 1020 mL    OUT:  Total OUT: 0 mL    Total NET: 1020 mL    PHYSICAL EXAM  GEN: NAD  CV/ LUNGS: CTAB  ABD: soft, non-distended. no rebound, no guarding. +BS, no evidence rectal bleeding.    MEDICATIONS  (STANDING):  ascorbic acid 500 milliGRAM(s) Oral daily  atorvastatin 40 milliGRAM(s) Oral at bedtime  dextrose 50% Injectable 12.5 Gram(s) IV Push once  dextrose 50% Injectable 25 Gram(s) IV Push once  dextrose 50% Injectable 25 Gram(s) IV Push once  diltiazem    Tablet 30 milliGRAM(s) Enteral Tube every 6 hours  ferrous    sulfate Liquid 300 milliGRAM(s) Enteral Tube daily  insulin glargine Injectable (LANTUS) 8 Unit(s) SubCutaneous at bedtime  insulin lispro (HumaLOG) corrective regimen sliding scale   SubCutaneous every 6 hours  losartan 50 milliGRAM(s) Oral daily  metoprolol tartrate 100 milliGRAM(s) Oral two times a day  OLANZapine Disintegrating Tablet 5 milliGRAM(s) Oral daily  pantoprazole   Suspension 40 milliGRAM(s) Oral daily  senna Syrup 5 milliLiter(s) Oral at bedtime    MEDICATIONS  (PRN):  acetaminophen   Tablet .. 650 milliGRAM(s) Oral every 6 hours PRN Temp greater or equal to 38C (100.4F), Mild Pain (1 - 3)  chlorhexidine 4% Liquid 1 Application(s) Topical <User Schedule> PRN infection control  dextrose 40% Gel 15 Gram(s) Oral once PRN Blood Glucose LESS THAN 70 milliGRAM(s)/deciliter  glucagon  Injectable 1 milliGRAM(s) IntraMuscular once PRN Glucose LESS THAN 70 milligrams/deciliter  lactulose Syrup 20 Gram(s) Oral daily PRN constipation

## 2018-12-10 NOTE — PROGRESS NOTE ADULT - ASSESSMENT
78M witha cortez PMHX sent in from NH 2/2 fever, found to have PNA, AFIB w/RVR, denied admission unit 2/2 code status and admitted to low risk [facility] for further monitoring.  Pt also with hematochezia, s/p colonoscopy, now with tumor noted in ascending colon -- invasive adenocarcinoma, no evidence of lung mets. Patient seen and evaluated w/ Dr. Yarbrough, patients wife at bedside  # [id number] used., patient appears to be deteriorating, Not a good surgical candidate, extensive conversation with patients wife who was at bedside regarding the matter.

## 2018-12-10 NOTE — PROGRESS NOTE ADULT - SUBJECTIVE AND OBJECTIVE BOX
Interval history and ROS:    No respiratory distress reported    MEDICATIONS:  acetaminophen   Tablet .. 650 milliGRAM(s) Oral every 6 hours PRN  ascorbic acid 500 milliGRAM(s) Oral daily  atorvastatin 40 milliGRAM(s) Oral at bedtime  chlorhexidine 4% Liquid 1 Application(s) Topical <User Schedule> PRN  dextrose 40% Gel 15 Gram(s) Oral once PRN  dextrose 50% Injectable 12.5 Gram(s) IV Push once  dextrose 50% Injectable 25 Gram(s) IV Push once  dextrose 50% Injectable 25 Gram(s) IV Push once  diltiazem    Tablet 30 milliGRAM(s) Enteral Tube every 6 hours  ferrous    sulfate Liquid 300 milliGRAM(s) Enteral Tube daily  glucagon  Injectable 1 milliGRAM(s) IntraMuscular once PRN  insulin glargine Injectable (LANTUS) 8 Unit(s) SubCutaneous at bedtime  insulin lispro (HumaLOG) corrective regimen sliding scale   SubCutaneous every 6 hours  lactulose Syrup 20 Gram(s) Oral daily PRN  losartan 50 milliGRAM(s) Oral daily  metoprolol tartrate 100 milliGRAM(s) Oral two times a day  OLANZapine Disintegrating Tablet 5 milliGRAM(s) Oral daily  pantoprazole   Suspension 40 milliGRAM(s) Oral daily  senna Syrup 5 milliLiter(s) Oral at bedtime      VITAL SIGNS:  Vital Signs Last 24 Hrs  T(C): 36.1 (10 Dec 2018 04:58), Max: 36.1 (10 Dec 2018 04:58)  T(F): 97 (10 Dec 2018 04:58), Max: 97 (10 Dec 2018 04:58)  HR: 105 (10 Dec 2018 04:58) (94 - 120)  BP: 135/81 (10 Dec 2018 04:58) (118/87 - 177/78)  BP(mean): --  RR: 18 (10 Dec 2018 04:58) (16 - 18)  SpO2: --        PHYSICAL EXAM:  Awake and alert NAD  Neck supple, no LN  S1 and S2 irregular no murmur  Lungs are clear without wheeze, rhonchi or rales  Abdomen is soft and non tender  There is no edema  Neurologically non focal

## 2018-12-10 NOTE — PROGRESS NOTE ADULT - SUBJECTIVE AND OBJECTIVE BOX
SUSI MARTIN  78y  Male    Patient is a 78y old  Male who presents with a chief complaint of Fever/Tachycardia (10 Dec 2018 09:34)    ALLERGIES:  No Known Allergies      INTERVAL HPI/OVERNIGHT EVENTS:    VITALS:  T(F): 97 (12-10-18 @ 04:58), Max: 97 (12-10-18 @ 04:58)  HR: 105 (12-10-18 @ 04:58) (94 - 120)  BP: 135/81 (12-10-18 @ 04:58) (118/87 - 177/78)  RR: 18 (12-10-18 @ 04:58) (16 - 18)  SpO2: --    LABS:        MICROBIOLOGY:    MEDICATION:    RADIOLOGY & ADDITIONAL TESTS:

## 2018-12-10 NOTE — PROGRESS NOTE ADULT - ASSESSMENT
JOEL - resolved  electrolyte derangements - no recent labs  colon adenocarcinoma  resolved PNA and UTI  Afib   hematochezia - better off AC  CVA 3 weeks ago, nonverbal, s/p PEG / dementia  DM  HTN  anemia      plan:    recheck labs  off ivf  peg feeds / h2o  f/u surgery   anticoagulation??  cont lopressor 100mg peg q12h  cont cardizem 30mg peg q6h and losartan  completed abx course  will follow

## 2018-12-10 NOTE — PROGRESS NOTE ADULT - ASSESSMENT
IMPRESSION:  77 yo male with history of CVA and newly diagnosed colon cancer  CT and CXR findings noted consistent with combination of pulmonary edema fluid overload and prior granulomatous disease  No respiratory distress noted on room air    PLAN:  Monitor O2 saturation on room air  Continue anticoagulation for AFIB  GI prophylaxis  Aspiration precautions  Surgery following to consider options regarding colorectal cancer  DNR

## 2018-12-10 NOTE — PROGRESS NOTE ADULT - ATTENDING COMMENTS
above noted abdomen soft no tenderness significant change in pt condition discussed case with pt wife regarding comfort care only she fully understands and agrees
Pt seen and examined with RENETTA Evans.  No further bleeding according to Nsg.  Wife at the bedside.  The patient is an 79 yo M with a h/o AF (Eliquis stopped 11/24, GFR 84), CVA 2017, who presented with hematochezia.  Hb now 8.2, PLT 196K, INR 1.4.  Pt has a PEG tube in place.  Consent obtained by Dr Mullins yesterday for colonoscopy tomorrow with Dr Vale.  Will give clear liquids today through PEG and Collette in preparation for colonoscopy tomorrow.  We will keep NPO after midnight.
above noted abdomen soft

## 2018-12-10 NOTE — PROGRESS NOTE ADULT - ASSESSMENT
JOEL - resolved  electrolyte derangements - no recent labs  colon adenocarcinoma  resolved PNA and UTI  Afib   hematochezia - better off AC  CVA 3 weeks ago, nonverbal, s/p PEG / dementia  DM  HTN  anemia      plan:-----------------------------------------------------------------------------------------------------------------    recheck labs  off ivf  peg feeds / h2o  f/u surgery  and plan -----------------------------[place]   anticoagulation??  cont lopressor 100mg peg q12h  cont cardizem 30mg peg q6h and losartan  completed abx course

## 2018-12-11 LAB
ALBUMIN SERPL ELPH-MCNC: 3.1 G/DL — LOW (ref 3.5–5.2)
ALP SERPL-CCNC: 146 U/L — HIGH (ref 30–115)
ALT FLD-CCNC: 11 U/L — SIGNIFICANT CHANGE UP (ref 0–41)
ANION GAP SERPL CALC-SCNC: 7 MMOL/L — SIGNIFICANT CHANGE UP (ref 7–14)
AST SERPL-CCNC: 13 U/L — SIGNIFICANT CHANGE UP (ref 0–41)
BILIRUB SERPL-MCNC: 0.5 MG/DL — SIGNIFICANT CHANGE UP (ref 0.2–1.2)
BUN SERPL-MCNC: 28 MG/DL — HIGH (ref 10–20)
CALCIUM SERPL-MCNC: 8.7 MG/DL — SIGNIFICANT CHANGE UP (ref 8.5–10.1)
CHLORIDE SERPL-SCNC: 102 MMOL/L — SIGNIFICANT CHANGE UP (ref 98–110)
CO2 SERPL-SCNC: 35 MMOL/L — HIGH (ref 17–32)
CREAT SERPL-MCNC: 0.9 MG/DL — SIGNIFICANT CHANGE UP (ref 0.7–1.5)
GLUCOSE BLDC GLUCOMTR-MCNC: 153 MG/DL — HIGH (ref 70–99)
GLUCOSE BLDC GLUCOMTR-MCNC: 156 MG/DL — HIGH (ref 70–99)
GLUCOSE BLDC GLUCOMTR-MCNC: 172 MG/DL — HIGH (ref 70–99)
GLUCOSE BLDC GLUCOMTR-MCNC: 191 MG/DL — HIGH (ref 70–99)
GLUCOSE BLDC GLUCOMTR-MCNC: 196 MG/DL — HIGH (ref 70–99)
GLUCOSE BLDC GLUCOMTR-MCNC: 210 MG/DL — HIGH (ref 70–99)
GLUCOSE SERPL-MCNC: 138 MG/DL — HIGH (ref 70–99)
HCT VFR BLD CALC: 34.7 % — LOW (ref 42–52)
HGB BLD-MCNC: 10.3 G/DL — LOW (ref 14–18)
MAGNESIUM SERPL-MCNC: 2.6 MG/DL — HIGH (ref 1.8–2.4)
MCHC RBC-ENTMCNC: 26.3 PG — LOW (ref 27–31)
MCHC RBC-ENTMCNC: 29.7 G/DL — LOW (ref 32–37)
MCV RBC AUTO: 88.7 FL — SIGNIFICANT CHANGE UP (ref 80–94)
NRBC # BLD: 0 /100 WBCS — SIGNIFICANT CHANGE UP (ref 0–0)
PHOSPHATE SERPL-MCNC: 3.7 MG/DL — SIGNIFICANT CHANGE UP (ref 2.1–4.9)
PLATELET # BLD AUTO: 221 K/UL — SIGNIFICANT CHANGE UP (ref 130–400)
POTASSIUM SERPL-MCNC: 4.5 MMOL/L — SIGNIFICANT CHANGE UP (ref 3.5–5)
POTASSIUM SERPL-SCNC: 4.5 MMOL/L — SIGNIFICANT CHANGE UP (ref 3.5–5)
PROT SERPL-MCNC: 5.4 G/DL — LOW (ref 6–8)
RBC # BLD: 3.91 M/UL — LOW (ref 4.7–6.1)
RBC # FLD: 16.5 % — HIGH (ref 11.5–14.5)
SODIUM SERPL-SCNC: 144 MMOL/L — SIGNIFICANT CHANGE UP (ref 135–146)
WBC # BLD: 4.88 K/UL — SIGNIFICANT CHANGE UP (ref 4.8–10.8)
WBC # FLD AUTO: 4.88 K/UL — SIGNIFICANT CHANGE UP (ref 4.8–10.8)

## 2018-12-11 RX ADMIN — ATORVASTATIN CALCIUM 40 MILLIGRAM(S): 80 TABLET, FILM COATED ORAL at 21:24

## 2018-12-11 RX ADMIN — Medication 500 MILLIGRAM(S): at 11:22

## 2018-12-11 RX ADMIN — SENNA PLUS 5 MILLILITER(S): 8.6 TABLET ORAL at 21:23

## 2018-12-11 RX ADMIN — Medication 100 MILLIGRAM(S): at 05:26

## 2018-12-11 RX ADMIN — LOSARTAN POTASSIUM 50 MILLIGRAM(S): 100 TABLET, FILM COATED ORAL at 05:26

## 2018-12-11 RX ADMIN — INSULIN GLARGINE 8 UNIT(S): 100 INJECTION, SOLUTION SUBCUTANEOUS at 21:24

## 2018-12-11 RX ADMIN — Medication 1: at 05:29

## 2018-12-11 RX ADMIN — Medication 1: at 18:15

## 2018-12-11 RX ADMIN — PANTOPRAZOLE SODIUM 40 MILLIGRAM(S): 20 TABLET, DELAYED RELEASE ORAL at 11:23

## 2018-12-11 RX ADMIN — Medication 1: at 11:31

## 2018-12-11 RX ADMIN — Medication 300 MILLIGRAM(S): at 11:23

## 2018-12-11 RX ADMIN — Medication 100 MILLIGRAM(S): at 18:13

## 2018-12-11 RX ADMIN — OLANZAPINE 5 MILLIGRAM(S): 15 TABLET, FILM COATED ORAL at 11:22

## 2018-12-11 NOTE — PROGRESS NOTE ADULT - ASSESSMENT
JOEL - resolved  electrolyte derangements - resolved  colon adenocarcinoma  resolved PNA and UTI  hip, lower extremity decubiti  Afib   hematochezia - better off AC  CVA 3 weeks ago, nonverbal, s/p PEG / dementia  DM  HTN  anemia  functionally fully dependent      plan:    no surgery  peg feeds / h2o  off anticoagulation per primary team  cont lopressor 100mg peg q12h  cont cardizem 30mg peg q6h and losartan  completed abx course  decubiti care  will follow

## 2018-12-11 NOTE — PROGRESS NOTE ADULT - SUBJECTIVE AND OBJECTIVE BOX
SUSI MARTIN  78y  Male    Patient is a 78y old  Male who presents with a chief complaint of Fever/Tachycardia (11 Dec 2018 12:48)    ALLERGIES:  No Known Allergies      INTERVAL HPI/OVERNIGHT EVENTS:    VITALS:  T(F): 96.3 (12-11-18 @ 14:39), Max: 97.2 (12-10-18 @ 21:05)  HR: 100 (12-11-18 @ 14:39) (87 - 100)  BP: 133/60 (12-11-18 @ 14:39) (133/60 - 169/72)  RR: 16 (12-11-18 @ 14:39) (16 - 16)  SpO2: 99% (12-11-18 @ 11:22) (96% - 99%)    LABS:  12-11    144  |  102  |  28<H>  ----------------------------<  138<H>  4.5   |  35<H>  |  0.9    Ca    8.7      11 Dec 2018 06:24  Phos  3.7     12-11  Mg     2.6     12-11    TPro  5.4<L>  /  Alb  3.1<L>  /  TBili  0.5  /  DBili  x   /  AST  13  /  ALT  11  /  AlkPhos  146<H>  12-11    MICROBIOLOGY:    MEDICATION:    RADIOLOGY & ADDITIONAL TESTS:

## 2018-12-11 NOTE — PROGRESS NOTE ADULT - SUBJECTIVE AND OBJECTIVE BOX
NEPHROLOGY FOLLOW UP NOTE    spoke with wife at bedside  family declined surgery  wife concerned over multiple decubiti  pt mostly lethargic  tolerating peg feeds        PAST MEDICAL & SURGICAL HISTORY:  Stroke  DVT (deep venous thrombosis)  Anemia  Diabetes  HTN (hypertension)  Arrhythmia  Dementia  S/P cholecystectomy  History of hip replacement    Allergies:  No Known Allergies      SOCIAL HISTORY:  Denies ETOH,Smoking,   FAMILY HISTORY:  No pertinent family history in first degree relatives    Yes      REVIEW OF SYSTEMS:  unobtainable      PHYSICAL EXAM:  NAD  lethargic  pale  dry mm  no jvd  b/l bs  irreg  soft, + binder over peg  no edema    Hospital Medications:   MEDICATIONS  (STANDING):  ascorbic acid 500 milliGRAM(s) Oral daily  atorvastatin 40 milliGRAM(s) Oral at bedtime  dextrose 50% Injectable 12.5 Gram(s) IV Push once  dextrose 50% Injectable 25 Gram(s) IV Push once  dextrose 50% Injectable 25 Gram(s) IV Push once  diltiazem    Tablet 30 milliGRAM(s) Enteral Tube every 6 hours  ferrous    sulfate Liquid 300 milliGRAM(s) Enteral Tube daily  insulin glargine Injectable (LANTUS) 8 Unit(s) SubCutaneous at bedtime  insulin lispro (HumaLOG) corrective regimen sliding scale   SubCutaneous every 6 hours  losartan 50 milliGRAM(s) Oral daily  metoprolol tartrate 100 milliGRAM(s) Oral two times a day  OLANZapine Disintegrating Tablet 5 milliGRAM(s) Oral daily  pantoprazole   Suspension 40 milliGRAM(s) Oral daily  senna Syrup 5 milliLiter(s) Oral at bedtime        VITALS:  T(F): 96.1 (12-11-18 @ 05:38), Max: 97.2 (12-10-18 @ 21:05)  HR: 87 (12-11-18 @ 11:22)  BP: 147/76 (12-11-18 @ 11:22)  RR: 16 (12-11-18 @ 05:38)  SpO2: 99% (12-11-18 @ 11:22)  Wt(kg): --    12-09 @ 07:01  -  12-10 @ 07:00  --------------------------------------------------------  IN: 1020 mL / OUT: 0 mL / NET: 1020 mL    12-10 @ 07:01  -  12-11 @ 07:00  --------------------------------------------------------  IN: 920 mL / OUT: 0 mL / NET: 920 mL          LABS:  12-11    144  |  102  |  28<H>  ----------------------------<  138<H>  4.5   |  35<H>  |  0.9    Ca    8.7      11 Dec 2018 06:24  Phos  3.7     12-11  Mg     2.6     12-11    TPro  5.4<L>  /  Alb  3.1<L>  /  TBili  0.5  /  DBili      /  AST  13  /  ALT  11  /  AlkPhos  146<H>  12-11                          10.3   4.88  )-----------( 221      ( 11 Dec 2018 06:24 )             34.7       Urine Studies:        RADIOLOGY & ADDITIONAL STUDIES:

## 2018-12-11 NOTE — PROGRESS NOTE ADULT - ASSESSMENT
IMPRESSION:  77 yo male with history of CVA and newly diagnosed colon cancer  CT and CXR findings noted consistent with combination of pulmonary edema fluid overload and prior granulomatous disease  No respiratory distress noted on room air    PLAN:  Monitor O2 saturation on room air  Continue anticoagulation for AFIB  GI prophylaxis  Aspiration precautions  DNR  Medical and surgical management being discussed with family  Poor prognosis

## 2018-12-11 NOTE — PROGRESS NOTE ADULT - SUBJECTIVE AND OBJECTIVE BOX
Interval history and ROS:    No dyspnea or coughing reported    MEDICATIONS:  acetaminophen   Tablet .. 650 milliGRAM(s) Oral every 6 hours PRN  ascorbic acid 500 milliGRAM(s) Oral daily  atorvastatin 40 milliGRAM(s) Oral at bedtime  chlorhexidine 4% Liquid 1 Application(s) Topical <User Schedule> PRN  dextrose 40% Gel 15 Gram(s) Oral once PRN  dextrose 50% Injectable 12.5 Gram(s) IV Push once  dextrose 50% Injectable 25 Gram(s) IV Push once  dextrose 50% Injectable 25 Gram(s) IV Push once  diltiazem    Tablet 30 milliGRAM(s) Enteral Tube every 6 hours  ferrous    sulfate Liquid 300 milliGRAM(s) Enteral Tube daily  glucagon  Injectable 1 milliGRAM(s) IntraMuscular once PRN  insulin glargine Injectable (LANTUS) 8 Unit(s) SubCutaneous at bedtime  insulin lispro (HumaLOG) corrective regimen sliding scale   SubCutaneous every 6 hours  lactulose Syrup 20 Gram(s) Oral daily PRN  losartan 50 milliGRAM(s) Oral daily  metoprolol tartrate 100 milliGRAM(s) Oral two times a day  OLANZapine Disintegrating Tablet 5 milliGRAM(s) Oral daily  pantoprazole   Suspension 40 milliGRAM(s) Oral daily  senna Syrup 5 milliLiter(s) Oral at bedtime      VITAL SIGNS:  Vital Signs Last 24 Hrs  T(C): 35.6 (11 Dec 2018 05:38), Max: 36.2 (10 Dec 2018 21:05)  T(F): 96.1 (11 Dec 2018 05:38), Max: 97.2 (10 Dec 2018 21:05)  HR: 99 (11 Dec 2018 05:38) (97 - 99)  BP: 154/80 (11 Dec 2018 05:38) (141/84 - 169/72)  BP(mean): --  RR: 16 (11 Dec 2018 05:38) (16 - 16)  SpO2: -- on room air        PHYSICAL EXAM:  Neck supple, no LN  S1 and S2 no murmur  Lungs diminished BS  Abdomen is soft and non tender  There is no edema  Neurologically non focal              LABS:                        10.3   4.88  )-----------( 221      ( 11 Dec 2018 06:24 )             34.7

## 2018-12-12 ENCOUNTER — TRANSCRIPTION ENCOUNTER (OUTPATIENT)
Age: 78
End: 2018-12-12

## 2018-12-12 LAB
GLUCOSE BLDC GLUCOMTR-MCNC: 157 MG/DL — HIGH (ref 70–99)
GLUCOSE BLDC GLUCOMTR-MCNC: 197 MG/DL — HIGH (ref 70–99)
GLUCOSE BLDC GLUCOMTR-MCNC: 202 MG/DL — HIGH (ref 70–99)
GLUCOSE BLDC GLUCOMTR-MCNC: 295 MG/DL — HIGH (ref 70–99)
GLUCOSE BLDC GLUCOMTR-MCNC: 377 MG/DL — HIGH (ref 70–99)

## 2018-12-12 RX ORDER — SENNA PLUS 8.6 MG/1
5 TABLET ORAL
Qty: 0 | Refills: 0 | COMMUNITY
Start: 2018-12-12

## 2018-12-12 RX ORDER — PANTOPRAZOLE SODIUM 20 MG/1
1 TABLET, DELAYED RELEASE ORAL
Qty: 0 | Refills: 0 | COMMUNITY
Start: 2018-12-12

## 2018-12-12 RX ORDER — DILTIAZEM HCL 120 MG
1 CAPSULE, EXT RELEASE 24 HR ORAL
Qty: 0 | Refills: 0 | COMMUNITY
Start: 2018-12-12

## 2018-12-12 RX ORDER — FERROUS SULFATE 325(65) MG
5 TABLET ORAL
Qty: 0 | Refills: 0 | COMMUNITY
Start: 2018-12-12

## 2018-12-12 RX ORDER — LOSARTAN POTASSIUM 100 MG/1
1 TABLET, FILM COATED ORAL
Qty: 0 | Refills: 0 | COMMUNITY

## 2018-12-12 RX ORDER — METFORMIN HYDROCHLORIDE 850 MG/1
1 TABLET ORAL
Qty: 0 | Refills: 0 | COMMUNITY

## 2018-12-12 RX ORDER — LACTULOSE 10 G/15ML
0 SOLUTION ORAL
Qty: 0 | Refills: 0 | COMMUNITY

## 2018-12-12 RX ORDER — METOPROLOL TARTRATE 50 MG
1 TABLET ORAL
Qty: 0 | Refills: 0 | COMMUNITY

## 2018-12-12 RX ORDER — ACETAMINOPHEN 500 MG
2 TABLET ORAL
Qty: 0 | Refills: 0 | COMMUNITY

## 2018-12-12 RX ORDER — METOPROLOL TARTRATE 50 MG
1 TABLET ORAL
Qty: 0 | Refills: 0 | COMMUNITY
Start: 2018-12-12

## 2018-12-12 RX ORDER — ACETAMINOPHEN 500 MG
2 TABLET ORAL
Qty: 0 | Refills: 0 | COMMUNITY
Start: 2018-12-12

## 2018-12-12 RX ORDER — LOSARTAN POTASSIUM 100 MG/1
1 TABLET, FILM COATED ORAL
Qty: 0 | Refills: 0 | COMMUNITY
Start: 2018-12-12

## 2018-12-12 RX ORDER — ZINC SULFATE TAB 220 MG (50 MG ZINC EQUIVALENT) 220 (50 ZN) MG
50 TAB ORAL
Qty: 0 | Refills: 0 | COMMUNITY

## 2018-12-12 RX ORDER — APIXABAN 2.5 MG/1
1 TABLET, FILM COATED ORAL
Qty: 0 | Refills: 0 | COMMUNITY

## 2018-12-12 RX ORDER — ASCORBIC ACID 60 MG
1 TABLET,CHEWABLE ORAL
Qty: 0 | Refills: 0 | COMMUNITY

## 2018-12-12 RX ORDER — OLANZAPINE 15 MG/1
1 TABLET, FILM COATED ORAL
Qty: 0 | Refills: 0 | COMMUNITY
Start: 2018-12-12

## 2018-12-12 RX ORDER — FERROUS SULFATE 325(65) MG
1 TABLET ORAL
Qty: 0 | Refills: 0 | COMMUNITY

## 2018-12-12 RX ORDER — ASCORBIC ACID 60 MG
1 TABLET,CHEWABLE ORAL
Qty: 0 | Refills: 0 | COMMUNITY
Start: 2018-12-12

## 2018-12-12 RX ORDER — LACTULOSE 10 G/15ML
30 SOLUTION ORAL
Qty: 0 | Refills: 0 | COMMUNITY
Start: 2018-12-12

## 2018-12-12 RX ORDER — TAMSULOSIN HYDROCHLORIDE 0.4 MG/1
1 CAPSULE ORAL
Qty: 0 | Refills: 0 | COMMUNITY

## 2018-12-12 RX ORDER — ATORVASTATIN CALCIUM 80 MG/1
1 TABLET, FILM COATED ORAL
Qty: 0 | Refills: 0 | COMMUNITY
Start: 2018-12-12

## 2018-12-12 RX ORDER — AMLODIPINE BESYLATE 2.5 MG/1
0.5 TABLET ORAL
Qty: 0 | Refills: 0 | COMMUNITY

## 2018-12-12 RX ADMIN — SENNA PLUS 5 MILLILITER(S): 8.6 TABLET ORAL at 22:43

## 2018-12-12 RX ADMIN — PANTOPRAZOLE SODIUM 40 MILLIGRAM(S): 20 TABLET, DELAYED RELEASE ORAL at 12:06

## 2018-12-12 RX ADMIN — ATORVASTATIN CALCIUM 40 MILLIGRAM(S): 80 TABLET, FILM COATED ORAL at 22:42

## 2018-12-12 RX ADMIN — INSULIN GLARGINE 8 UNIT(S): 100 INJECTION, SOLUTION SUBCUTANEOUS at 22:42

## 2018-12-12 RX ADMIN — Medication 1: at 12:05

## 2018-12-12 RX ADMIN — Medication 2: at 00:15

## 2018-12-12 RX ADMIN — Medication 500 MILLIGRAM(S): at 12:07

## 2018-12-12 RX ADMIN — Medication 300 MILLIGRAM(S): at 12:06

## 2018-12-12 RX ADMIN — Medication 3: at 18:20

## 2018-12-12 RX ADMIN — OLANZAPINE 5 MILLIGRAM(S): 15 TABLET, FILM COATED ORAL at 12:06

## 2018-12-12 RX ADMIN — Medication 100 MILLIGRAM(S): at 18:22

## 2018-12-12 NOTE — DISCHARGE NOTE ADULT - HOSPITAL COURSE
to be completed by attending · Chief Complaint: The patient is a 78y Male complaining of fever.	  · Unable to Obtain: Severe Illness/Injury 	  · Details: History, ROS, and exam limited by severity of illness and patient condition.	  · HPI Objective Statement: 77yo M pmh of recent CVA  3 weeks ago now baseline non verbal with PEG in place. Was sent to rehab facility and found to be febrile today. As per wife, no increased coughing. He is able to squeeze her hand but does not communicate. As per wife patient is currently DNI/DNR.	    PAST MEDICAL/SURGICAL/FAMILY/SOCIAL HISTORY:   Tobacco Usage:  · Tobacco Usage	Unknown if ever smoked	    ALLERGIES AND HOME MEDICATIONS:   Allergies:        Allergies:  	No Known Allergies:     Home Medications:   * Patient Currently Takes Medications as of 08-Nov-2018 18:40 documented in Structured Notes  · 	collagenase 250 units/g topical ointment: 1 application topically once a day  · 	aspirin 81 mg oral delayed release tablet: 1 tab(s) orally once a day  · 	atorvastatin 40 mg oral tablet: 1 tab(s) orally once a day (at bedtime)  · 	senna oral tablet: 1 tab(s) orally once a day (at bedtime)  · 	OLANZapine 5 mg oral tablet: 1 tab(s) orally once a day  · 	metFORMIN 500 mg oral tablet: 1 tab(s) orally 2 times a day  · 	amLODIPine 5 mg oral tablet: 0.5 tab(s) orally once a day  · 	Metoprolol Tartrate 25 mg oral tablet: 1 tab(s) orally 2 times a day  · 	Flomax 0.4 mg oral capsule: 1 cap(s) orally once a day (at bedtime)  · 	Eliquis 5 mg oral tablet: 1 tab(s) orally 2 times a day  · 	lactulose 10 g/15 mL oral solution: orally once a day  · 	Vitamin C 500 mg oral tablet: 1 tab(s) orally once a day  · 	zinc sulfate: 50 milligram(s) orally once a day  · 	Cozaar 25 mg oral tablet: 1 tab(s) orally once a day  · 	ferrous sulfate 325 mg (65 mg elemental iron) oral delayed release tablet: 1 tab(s) orally once a day  · 	Tylenol 325 mg oral tablet: 2 tab(s) orally every 4 hours, As Needed    --------------------------------------------    during hospital course patient had colonoscopy found to have colon cancer .as per surgery not candidate for surgery .  discharge back to NH

## 2018-12-12 NOTE — PROGRESS NOTE ADULT - ASSESSMENT
JOEL - resolved  electrolyte derangements - resolved  colon adenocarcinoma  resolved PNA and UTI  hip, lower extremity decubiti  Afib   hematochezia - better off AC  CVA 3 weeks ago, nonverbal, s/p PEG / dementia  DM  HTN  anemia  functionally fully dependent      plan:    no surgery  peg feeds / h2o  off anticoagulation per primary team  cont lopressor 100mg peg q12h  cont cardizem 30mg peg q6h and losartan  completed abx course  decubiti care  dc to snf today

## 2018-12-12 NOTE — DISCHARGE NOTE ADULT - PATIENT PORTAL LINK FT
You can access the [facility], offered by [facility], by registering with the following website: [email or web address]

## 2018-12-12 NOTE — PROGRESS NOTE ADULT - RESPIRATORY
Breath Sounds equal & clear to percussion & auscultation, no accessory muscle use

## 2018-12-12 NOTE — PROGRESS NOTE ADULT - SUBJECTIVE AND OBJECTIVE BOX
SUSI MARTIN  78y  Male    Patient is a 78y old  Male who presents with a chief complaint of Fever/Tachycardia (12 Dec 2018 06:50)    ALLERGIES:  No Known Allergies      INTERVAL HPI/OVERNIGHT EVENTS:    VITALS:  T(F): 96.9 (12-12-18 @ 06:00), Max: 97.7 (12-11-18 @ 21:50)  HR: 113 (12-12-18 @ 06:00) (87 - 113)  BP: 158/88 (12-12-18 @ 06:00) (116/64 - 158/88)  RR: 16 (12-12-18 @ 06:00) (16 - 16)  SpO2: 97% (12-11-18 @ 18:13) (96% - 99%)    LABS:  12-11    144  |  102  |  28<H>  ----------------------------<  138<H>  4.5   |  35<H>  |  0.9    Ca    8.7      11 Dec 2018 06:24  Phos  3.7     12-11  Mg     2.6     12-11    TPro  5.4<L>  /  Alb  3.1<L>  /  TBili  0.5  /  DBili  x   /  AST  13  /  ALT  11  /  AlkPhos  146<H>  12-11    MICROBIOLOGY:    MEDICATION:    RADIOLOGY & ADDITIONAL TESTS:

## 2018-12-12 NOTE — PROGRESS NOTE ADULT - SUBJECTIVE AND OBJECTIVE BOX
NEPHROLOGY FOLLOW UP NOTE    pt seen earlier  for dc to snf  no new events  d/w team  nonverbal        PAST MEDICAL & SURGICAL HISTORY:  Stroke  DVT (deep venous thrombosis)  Anemia  Diabetes  HTN (hypertension)  Arrhythmia  Dementia  S/P cholecystectomy  History of hip replacement    Allergies:  No Known Allergies      SOCIAL HISTORY:  Denies ETOH,Smoking,   FAMILY HISTORY:  No pertinent family history in first degree relatives    Yes      REVIEW OF SYSTEMS:  unobtainable      PHYSICAL EXAM:  NAD  lethargic  pale  dry mm  no jvd  b/l bs  irreg  soft, + binder over peg  no edema                Hospital Medications:   MEDICATIONS  (STANDING):  ascorbic acid 500 milliGRAM(s) Oral daily  atorvastatin 40 milliGRAM(s) Oral at bedtime  dextrose 50% Injectable 12.5 Gram(s) IV Push once  dextrose 50% Injectable 25 Gram(s) IV Push once  dextrose 50% Injectable 25 Gram(s) IV Push once  diltiazem    Tablet 30 milliGRAM(s) Enteral Tube every 6 hours  ferrous    sulfate Liquid 300 milliGRAM(s) Enteral Tube daily  insulin glargine Injectable (LANTUS) 8 Unit(s) SubCutaneous at bedtime  insulin lispro (HumaLOG) corrective regimen sliding scale   SubCutaneous every 6 hours  losartan 50 milliGRAM(s) Oral daily  metoprolol tartrate 100 milliGRAM(s) Oral two times a day  OLANZapine Disintegrating Tablet 5 milliGRAM(s) Oral daily  pantoprazole   Suspension 40 milliGRAM(s) Oral daily  senna Syrup 5 milliLiter(s) Oral at bedtime        VITALS:  T(F): 96.1 (12-12-18 @ 14:00), Max: 97.7 (12-11-18 @ 21:50)  HR: 113 (12-12-18 @ 14:00)  BP: 147/80 (12-12-18 @ 14:00)  RR: 16 (12-12-18 @ 14:00)  SpO2: 99% (12-12-18 @ 12:06)  Wt(kg): --    12-10 @ 07:01  -  12-11 @ 07:00  --------------------------------------------------------  IN: 920 mL / OUT: 0 mL / NET: 920 mL    12-11 @ 07:01  -  12-12 @ 07:00  --------------------------------------------------------  IN: 1260 mL / OUT: 0 mL / NET: 1260 mL      Height (cm): 182.88 (12-12 @ 07:28)  Weight (kg): 74.2 (12-12 @ 07:28)  BMI (kg/m2): 22.2 (12-12 @ 07:28)  BSA (m2): 1.96 (12-12 @ 07:28)    LABS:  12-11    144  |  102  |  28<H>  ----------------------------<  138<H>  4.5   |  35<H>  |  0.9    Ca    8.7      11 Dec 2018 06:24  Phos  3.7     12-11  Mg     2.6     12-11    TPro  5.4<L>  /  Alb  3.1<L>  /  TBili  0.5  /  DBili      /  AST  13  /  ALT  11  /  AlkPhos  146<H>  12-11                          10.3   4.88  )-----------( 221      ( 11 Dec 2018 06:24 )             34.7       Urine Studies:        RADIOLOGY & ADDITIONAL STUDIES:

## 2018-12-12 NOTE — PROGRESS NOTE ADULT - ASSESSMENT
IMPRESSION:  79 yo male with history of CVA and newly diagnosed colon cancer  CT and CXR findings noted consistent with combination of pulmonary edema fluid overload and prior granulomatous disease  No respiratory distress noted on room air    PLAN:  Monitor O2 saturation on room air  Continue anticoagulation for AFIB  GI prophylaxis  Aspiration precautions  DNR  No surgery planned  Continue present medical management as per medical team

## 2018-12-12 NOTE — PROGRESS NOTE ADULT - CARDIOVASCULAR
Regular rate & rhythm, normal S1, S2; no murmurs, gallops or rubs; no S3, S4
detailed exam
Regular rate & rhythm, normal S1, S2; no murmurs, gallops or rubs; no S3, S4
detailed exam

## 2018-12-12 NOTE — DISCHARGE NOTE ADULT - SECONDARY DIAGNOSIS.
Gastrointestinal hemorrhage, unspecified gastrointestinal hemorrhage type Malignant neoplasm of colon, unspecified part of colon

## 2018-12-12 NOTE — PROGRESS NOTE ADULT - GASTROINTESTINAL
detailed exam
detailed exam
Soft, non-tender, no hepatosplenomegaly, normal bowel sounds
detailed exam
Soft, non-tender, no hepatosplenomegaly, normal bowel sounds
detailed exam

## 2018-12-12 NOTE — DISCHARGE NOTE ADULT - CARE PROVIDER_API CALL
Lloyd August), Internal Medicine  [facility] [place] [address]  Phone: [phone]  Fax: [phone]

## 2018-12-12 NOTE — PROGRESS NOTE ADULT - SUBJECTIVE AND OBJECTIVE BOX
Interval history and ROS:    Respiratory status remains unchanged    MEDICATIONS:  acetaminophen   Tablet .. 650 milliGRAM(s) Oral every 6 hours PRN  ascorbic acid 500 milliGRAM(s) Oral daily  atorvastatin 40 milliGRAM(s) Oral at bedtime  chlorhexidine 4% Liquid 1 Application(s) Topical <User Schedule> PRN  dextrose 40% Gel 15 Gram(s) Oral once PRN  dextrose 50% Injectable 12.5 Gram(s) IV Push once  dextrose 50% Injectable 25 Gram(s) IV Push once  dextrose 50% Injectable 25 Gram(s) IV Push once  diltiazem    Tablet 30 milliGRAM(s) Enteral Tube every 6 hours  ferrous    sulfate Liquid 300 milliGRAM(s) Enteral Tube daily  glucagon  Injectable 1 milliGRAM(s) IntraMuscular once PRN  insulin glargine Injectable (LANTUS) 8 Unit(s) SubCutaneous at bedtime  insulin lispro (HumaLOG) corrective regimen sliding scale   SubCutaneous every 6 hours  lactulose Syrup 20 Gram(s) Oral daily PRN  losartan 50 milliGRAM(s) Oral daily  metoprolol tartrate 100 milliGRAM(s) Oral two times a day  OLANZapine Disintegrating Tablet 5 milliGRAM(s) Oral daily  pantoprazole   Suspension 40 milliGRAM(s) Oral daily  senna Syrup 5 milliLiter(s) Oral at bedtime      VITAL SIGNS:  Vital Signs Last 24 Hrs  T(C): 36.5 (11 Dec 2018 21:50), Max: 36.5 (11 Dec 2018 21:50)  T(F): 97.7 (11 Dec 2018 21:50), Max: 97.7 (11 Dec 2018 21:50)  HR: 98 (11 Dec 2018 21:50) (87 - 101)  BP: 116/64 (11 Dec 2018 21:50) (116/64 - 147/76)  BP(mean): --  RR: 16 (11 Dec 2018 14:39) (16 - 16)  SpO2: 97% (11 Dec 2018 18:13) (96% - 99%)        PHYSICAL EXAM:  Neck supple, no LN  S1 and S2 no murmur  Lungs are clear without wheeze, rhonchi or rales  Abdomen is soft and non tender, PEG  There is no edema  Neurologically non focal            LABS:                        10.3   4.88  )-----------( 221      ( 11 Dec 2018 06:24 )             34.7     12-11    144  |  102  |  28<H>  ----------------------------<  138<H>  4.5   |  35<H>  |  0.9    Ca    8.7      11 Dec 2018 06:24  Phos  3.7     12-11  Mg     2.6     12-11    TPro  5.4<L>  /  Alb  3.1<L>  /  TBili  0.5  /  DBili  x   /  AST  13  /  ALT  11  /  AlkPhos  146<H>  12-11

## 2018-12-12 NOTE — DISCHARGE NOTE ADULT - CARE PLAN
Principal Discharge DX:	Pneumonia due to infectious organism, unspecified laterality, unspecified part of lung  Goal:	resolved  Assessment and plan of treatment:	completed antibiotics  Secondary Diagnosis:	Gastrointestinal hemorrhage, unspecified gastrointestinal hemorrhage type  Assessment and plan of treatment:	off anticoagulation resolved  Secondary Diagnosis:	Malignant neoplasm of colon, unspecified part of colon  Goal:	normal colon function  Assessment and plan of treatment:	not surgical canddiate  watch for obstructive symptoms nausea vomiting

## 2018-12-12 NOTE — DISCHARGE NOTE ADULT - MEDICATION SUMMARY - MEDICATIONS TO TAKE
I will START or STAY ON the medications listed below when I get home from the hospital:    acetaminophen 325 mg oral tablet  -- 2 tab(s) by gastrostomy tube every 6 hours, As Needed - 3)  -- Indication: For Pain    losartan 50 mg oral tablet  -- 1 tab(s) by gastrostomy tube once a day  -- Indication: For HTN (hypertension)    dilTIAZem 30 mg oral tablet  -- 1 tab(s) by gastrostomy tube every 6 hours  -- Indication: For HTN (hypertension)    insulin aspart 100 units/mL subcutaneous solution  -- 1 unit(s) blood sugar 150-200   2 unit(s) blood sugar 200-250   3 unit(s) blood sugar  250-300   4 unit(s) blood sugar 300-350   5 unit(s) blood sugar 350-400   subcutaneous 4 times a day with peg feed  -- Indication: For Diabetes    Lantus 100 units/mL subcutaneous solution  -- 8 unit(s) subcutaneous once a day (at bedtime)  -- Indication: For Diabetes    atorvastatin 40 mg oral tablet  -- 1 tab(s) by gastrostomy tube once a day (at bedtime)  -- Indication: For Hyperlipidemia    OLANZapine 5 mg oral tablet, disintegrating  -- 1 tab(s) by mouth once a day  -- Indication: For Depresison    metoprolol tartrate 100 mg oral tablet  -- 1 tab(s) by gastrostomy tube 2 times a day  -- Indication: For HTN (hypertension)    ferrous sulfate 300 mg/5 mL (60 mg elemental iron) oral liquid  -- 5 milliliter(s) by gastrostomy tube once a day  -- Indication: For Anemia    lactulose 10 g/15 mL oral syrup  -- 30 milliliter(s) by gastrostomy tube once a day, As Needed  -- Indication: For constipation    senna 8.8 mg/5 mL oral syrup  -- 5 milliliter(s) by gastrostomy tube once a day (at bedtime)  -- Indication: For constipation    pantoprazole 40 mg oral granule, delayed release  -- 1  by gastrostomy tube once a day  -- Indication: For gi bleed    ascorbic acid 500 mg oral tablet  -- 1 tab(s) by gastrostomy tube once a day  -- Indication: For vitamin

## 2018-12-12 NOTE — DISCHARGE NOTE ADULT - MEDICATION SUMMARY - MEDICATIONS TO CHANGE
I will SWITCH the dose or number of times a day I take the medications listed below when I get home from the hospital:    Metoprolol Tartrate 25 mg oral tablet  -- 1 tab(s) by mouth 2 times a day    lactulose 10 g/15 mL oral solution  -- orally once a day    senna oral tablet  -- 1 tab(s) by mouth once a day (at bedtime)

## 2018-12-12 NOTE — DISCHARGE NOTE ADULT - MEDICATION SUMMARY - MEDICATIONS TO STOP TAKING
I will STOP taking the medications listed below when I get home from the hospital:    amLODIPine 5 mg oral tablet  -- 0.5 tab(s) by mouth once a day    Flomax 0.4 mg oral capsule  -- 1 cap(s) by mouth once a day (at bedtime)    Eliquis 5 mg oral tablet  -- 1 tab(s) by mouth 2 times a day    zinc sulfate  -- 50 milligram(s) by mouth once a day    metFORMIN 1000 mg oral tablet  -- 1 tab(s) by mouth 2 times a day

## 2018-12-12 NOTE — DISCHARGE NOTE ADULT - PLAN OF CARE
resolved completed antibiotics off anticoagulation resolved normal colon function not surgical canddiate  watch for obstructive symptoms nausea vomiting

## 2018-12-12 NOTE — PROGRESS NOTE ADULT - EXTREMITIES
No cyanosis, clubbing or edema

## 2018-12-12 NOTE — PROGRESS NOTE ADULT - ASSESSMENT
IMPRESSION:  79 yo male with history of CVA and newly diagnosed colon cancer  CT and CXR findings noted consistent with combination of pulmonary edema fluid overload and prior granulomatous disease  No respiratory distress noted on room air  atrial fib   PLAN: ---------------------------------------------------------------------------------------  Monitor O2 saturation on room air  d/c Eliquis   GI prophylaxis  Aspiration precautions  DNR  No surgery planned  Continue present medical management as per medical team

## 2018-12-13 VITALS
SYSTOLIC BLOOD PRESSURE: 105 MMHG | RESPIRATION RATE: 16 BRPM | TEMPERATURE: 98 F | DIASTOLIC BLOOD PRESSURE: 54 MMHG | HEART RATE: 87 BPM

## 2018-12-13 LAB
GLUCOSE BLDC GLUCOMTR-MCNC: 260 MG/DL — HIGH (ref 70–99)
GLUCOSE BLDC GLUCOMTR-MCNC: 284 MG/DL — HIGH (ref 70–99)

## 2018-12-13 RX ADMIN — PANTOPRAZOLE SODIUM 40 MILLIGRAM(S): 20 TABLET, DELAYED RELEASE ORAL at 11:27

## 2018-12-13 RX ADMIN — Medication 500 MILLIGRAM(S): at 11:25

## 2018-12-13 RX ADMIN — Medication 650 MILLIGRAM(S): at 12:00

## 2018-12-13 RX ADMIN — Medication 100 MILLIGRAM(S): at 05:57

## 2018-12-13 RX ADMIN — Medication 3: at 11:55

## 2018-12-13 RX ADMIN — Medication 5: at 00:02

## 2018-12-13 RX ADMIN — Medication 3: at 08:15

## 2018-12-13 RX ADMIN — Medication 100 MILLIGRAM(S): at 17:09

## 2018-12-13 RX ADMIN — LOSARTAN POTASSIUM 50 MILLIGRAM(S): 100 TABLET, FILM COATED ORAL at 05:57

## 2018-12-13 RX ADMIN — Medication 300 MILLIGRAM(S): at 11:27

## 2018-12-13 RX ADMIN — OLANZAPINE 5 MILLIGRAM(S): 15 TABLET, FILM COATED ORAL at 11:28

## 2018-12-13 RX ADMIN — Medication 650 MILLIGRAM(S): at 11:24

## 2018-12-13 NOTE — PROGRESS NOTE ADULT - WEIGHT IN KG
74.2

## 2018-12-13 NOTE — PROGRESS NOTE ADULT - WEIGHT IN LBS
163.5

## 2018-12-13 NOTE — PROGRESS NOTE ADULT - ASSESSMENT
JOEL - resolved  electrolyte derangements - resolved  colon adenocarcinoma  resolved PNA and UTI  hip, lower extremity decubiti  Afib   hematochezia - better off AC  CVA 3 weeks ago, nonverbal, s/p PEG / dementia  DM  HTN  anemia  functionally fully dependent      plan:    no surgery  peg feeds / h2o  off anticoagulation per primary team  cont lopressor 100mg peg q12h  cont cardizem 30mg peg q6h and losartan  completed abx course  decubiti care /wound care  dc to snf today  poor overall prognosis

## 2018-12-13 NOTE — PROGRESS NOTE ADULT - ASSESSMENT
IMPRESSION:  79 yo male with history of CVA and newly diagnosed colon cancer  CT and CXR findings noted consistent with combination of pulmonary edema fluid overload and prior granulomatous disease  No respiratory distress noted   DNR  No surgical management planned    PLAN:  Aspiration precautions  Continue present medical management  DNR  Planned for SNF

## 2018-12-13 NOTE — PROGRESS NOTE ADULT - SUBJECTIVE AND OBJECTIVE BOX
NEPHROLOGY FOLLOW UP NOTE    pt seen earlier  for dc to snf  no new events  d/w team / nursing  nonverbal  no fever   off ivf  tolerating feeds        PAST MEDICAL & SURGICAL HISTORY:  Stroke  DVT (deep venous thrombosis)  Anemia  Diabetes  HTN (hypertension)  Arrhythmia  Dementia  S/P cholecystectomy  History of hip replacement    Allergies:  No Known Allergies      SOCIAL HISTORY:  Denies ETOH,Smoking,   FAMILY HISTORY:  No pertinent family history in first degree relatives    Yes      REVIEW OF SYSTEMS:  unobtainable      PHYSICAL EXAM:  NAD  lethargic  pale  dry mm  no jvd  b/l bs  irreg  soft, + binder over peg  no edema     Hospital Medications:   MEDICATIONS  (STANDING):  ascorbic acid 500 milliGRAM(s) Oral daily  atorvastatin 40 milliGRAM(s) Oral at bedtime  dextrose 50% Injectable 12.5 Gram(s) IV Push once  dextrose 50% Injectable 25 Gram(s) IV Push once  dextrose 50% Injectable 25 Gram(s) IV Push once  diltiazem    Tablet 30 milliGRAM(s) Enteral Tube every 6 hours  ferrous    sulfate Liquid 300 milliGRAM(s) Enteral Tube daily  insulin glargine Injectable (LANTUS) 8 Unit(s) SubCutaneous at bedtime  insulin lispro (HumaLOG) corrective regimen sliding scale   SubCutaneous every 6 hours  losartan 50 milliGRAM(s) Oral daily  metoprolol tartrate 100 milliGRAM(s) Oral two times a day  OLANZapine Disintegrating Tablet 5 milliGRAM(s) Oral daily  pantoprazole   Suspension 40 milliGRAM(s) Oral daily  senna Syrup 5 milliLiter(s) Oral at bedtime        VITALS:  T(F): 98.4 (12-13-18 @ 14:41), Max: 98.4 (12-13-18 @ 14:41)  HR: 87 (12-13-18 @ 14:41)  BP: 105/54 (12-13-18 @ 14:41)  RR: 16 (12-13-18 @ 14:41)  SpO2: --  Wt(kg): --    12-11 @ 07:01  -  12-12 @ 07:00  --------------------------------------------------------  IN: 1260 mL / OUT: 0 mL / NET: 1260 mL    12-12 @ 07:01  -  12-13 @ 07:00  --------------------------------------------------------  IN: 1260 mL / OUT: 0 mL / NET: 1260 mL      Height (cm): 182.88 (12-13 @ 11:07)  Weight (kg): 74.2 (12-13 @ 11:07)  BMI (kg/m2): 22.2 (12-13 @ 11:07)  BSA (m2): 1.96 (12-13 @ 11:07)    LABS:            Urine Studies:        RADIOLOGY & ADDITIONAL STUDIES:

## 2018-12-13 NOTE — PROGRESS NOTE ADULT - SUBJECTIVE AND OBJECTIVE BOX
Interval history and ROS:    No new events or respiratory distress    MEDICATIONS:  acetaminophen   Tablet .. 650 milliGRAM(s) Oral every 6 hours PRN  ascorbic acid 500 milliGRAM(s) Oral daily  atorvastatin 40 milliGRAM(s) Oral at bedtime  chlorhexidine 4% Liquid 1 Application(s) Topical <User Schedule> PRN  diltiazem    Tablet 30 milliGRAM(s) Enteral Tube every 6 hours  ferrous    sulfate Liquid 300 milliGRAM(s) Enteral Tube daily  glucagon  Injectable 1 milliGRAM(s) IntraMuscular once PRN  insulin glargine Injectable (LANTUS) 8 Unit(s) SubCutaneous at bedtime  insulin lispro (HumaLOG) corrective regimen sliding scale   SubCutaneous every 6 hours  lactulose Syrup 20 Gram(s) Oral daily PRN  losartan 50 milliGRAM(s) Oral daily  metoprolol tartrate 100 milliGRAM(s) Oral two times a day  OLANZapine Disintegrating Tablet 5 milliGRAM(s) Oral daily  pantoprazole   Suspension 40 milliGRAM(s) Oral daily  senna Syrup 5 milliLiter(s) Oral at bedtime      VITAL SIGNS:  Vital Signs Last 24 Hrs  T(C): 36.1 (13 Dec 2018 05:49), Max: 36.1 (13 Dec 2018 05:49)  T(F): 97 (13 Dec 2018 05:49), Max: 97 (13 Dec 2018 05:49)  HR: 97 (13 Dec 2018 05:49) (97 - 113)  BP: 137/76 (13 Dec 2018 05:49) (137/76 - 170/100)  BP(mean): --  RR: 16 (13 Dec 2018 05:49) (16 - 16)  SpO2: 99% (12 Dec 2018 12:06) (99% - 99%)        PHYSICAL EXAM:  Neck supple, no LN  S1 and S2 no murmur  Lungs decreased BS  Abdomen is soft and non tender, PEG  There is no edema  Neurologically non focal

## 2018-12-13 NOTE — PROGRESS NOTE ADULT - REASON FOR ADMISSION
Fever/Tachycardia

## 2018-12-13 NOTE — PROGRESS NOTE ADULT - SUBJECTIVE AND OBJECTIVE BOX
SUSI MARTIN  78y  Male    Patient is a 78y old  Male who presents with a chief complaint of Fever/Tachycardia (13 Dec 2018 06:56)    ALLERGIES:  No Known Allergies      INTERVAL HPI/OVERNIGHT EVENTS:    VITALS:  T(F): 97 (12-13-18 @ 05:49), Max: 97 (12-13-18 @ 05:49)  HR: 97 (12-13-18 @ 05:49) (97 - 113)  BP: 137/76 (12-13-18 @ 05:49) (137/76 - 170/100)  RR: 16 (12-13-18 @ 05:49) (16 - 16)  SpO2: 99% (12-12-18 @ 12:06) (99% - 99%)    LABS:        MICROBIOLOGY:    MEDICATION:    RADIOLOGY & ADDITIONAL TESTS:

## 2018-12-13 NOTE — PROGRESS NOTE ADULT - HEIGHT IN CM
182.88

## 2018-12-14 ENCOUNTER — OUTPATIENT (OUTPATIENT)
Dept: OUTPATIENT SERVICES | Facility: HOSPITAL | Age: 78
LOS: 1 days | Discharge: HOME | End: 2018-12-14

## 2018-12-14 DIAGNOSIS — Z90.49 ACQUIRED ABSENCE OF OTHER SPECIFIED PARTS OF DIGESTIVE TRACT: Chronic | ICD-10-CM

## 2018-12-14 DIAGNOSIS — Z96.649 PRESENCE OF UNSPECIFIED ARTIFICIAL HIP JOINT: Chronic | ICD-10-CM

## 2018-12-14 DIAGNOSIS — E11.8 TYPE 2 DIABETES MELLITUS WITH UNSPECIFIED COMPLICATIONS: ICD-10-CM

## 2018-12-14 PROBLEM — I63.9 CEREBRAL INFARCTION, UNSPECIFIED: Chronic | Status: ACTIVE | Noted: 2018-11-19

## 2018-12-14 PROBLEM — I82.409 ACUTE EMBOLISM AND THROMBOSIS OF UNSPECIFIED DEEP VEINS OF UNSPECIFIED LOWER EXTREMITY: Chronic | Status: ACTIVE | Noted: 2018-11-19

## 2018-12-14 LAB — GLUCOSE BLDC GLUCOMTR-MCNC: 180 MG/DL — HIGH (ref 70–99)

## 2018-12-20 LAB — GLUCOSE BLDC GLUCOMTR-MCNC: 143 MG/DL — HIGH (ref 70–99)

## 2018-12-24 DIAGNOSIS — F01.50 VASCULAR DEMENTIA WITHOUT BEHAVIORAL DISTURBANCE: ICD-10-CM

## 2018-12-24 DIAGNOSIS — R00.0 TACHYCARDIA, UNSPECIFIED: ICD-10-CM

## 2018-12-24 DIAGNOSIS — N39.0 URINARY TRACT INFECTION, SITE NOT SPECIFIED: ICD-10-CM

## 2018-12-24 DIAGNOSIS — J18.9 PNEUMONIA, UNSPECIFIED ORGANISM: ICD-10-CM

## 2018-12-24 DIAGNOSIS — Z93.1 GASTROSTOMY STATUS: ICD-10-CM

## 2018-12-24 DIAGNOSIS — L89.153 PRESSURE ULCER OF SACRAL REGION, STAGE 3: ICD-10-CM

## 2018-12-24 DIAGNOSIS — E86.0 DEHYDRATION: ICD-10-CM

## 2018-12-24 DIAGNOSIS — Z79.82 LONG TERM (CURRENT) USE OF ASPIRIN: ICD-10-CM

## 2018-12-24 DIAGNOSIS — E87.0 HYPEROSMOLALITY AND HYPERNATREMIA: ICD-10-CM

## 2018-12-24 DIAGNOSIS — R65.20 SEVERE SEPSIS WITHOUT SEPTIC SHOCK: ICD-10-CM

## 2018-12-24 DIAGNOSIS — Z79.01 LONG TERM (CURRENT) USE OF ANTICOAGULANTS: ICD-10-CM

## 2018-12-24 DIAGNOSIS — I69.991 DYSPHAGIA FOLLOWING UNSPECIFIED CEREBROVASCULAR DISEASE: ICD-10-CM

## 2018-12-24 DIAGNOSIS — C18.2 MALIGNANT NEOPLASM OF ASCENDING COLON: ICD-10-CM

## 2018-12-24 DIAGNOSIS — A41.9 SEPSIS, UNSPECIFIED ORGANISM: ICD-10-CM

## 2018-12-24 DIAGNOSIS — Z86.718 PERSONAL HISTORY OF OTHER VENOUS THROMBOSIS AND EMBOLISM: ICD-10-CM

## 2018-12-24 DIAGNOSIS — Z66 DO NOT RESUSCITATE: ICD-10-CM

## 2018-12-24 DIAGNOSIS — D64.9 ANEMIA, UNSPECIFIED: ICD-10-CM

## 2018-12-24 DIAGNOSIS — B96.5 PSEUDOMONAS (AERUGINOSA) (MALLEI) (PSEUDOMALLEI) AS THE CAUSE OF DISEASES CLASSIFIED ELSEWHERE: ICD-10-CM

## 2018-12-24 DIAGNOSIS — Z79.4 LONG TERM (CURRENT) USE OF INSULIN: ICD-10-CM

## 2018-12-24 DIAGNOSIS — I50.33 ACUTE ON CHRONIC DIASTOLIC (CONGESTIVE) HEART FAILURE: ICD-10-CM

## 2018-12-24 DIAGNOSIS — R13.19 OTHER DYSPHAGIA: ICD-10-CM

## 2018-12-24 DIAGNOSIS — Z51.5 ENCOUNTER FOR PALLIATIVE CARE: ICD-10-CM

## 2018-12-24 DIAGNOSIS — Z28.21 IMMUNIZATION NOT CARRIED OUT BECAUSE OF PATIENT REFUSAL: ICD-10-CM

## 2018-12-24 DIAGNOSIS — Z96.649 PRESENCE OF UNSPECIFIED ARTIFICIAL HIP JOINT: ICD-10-CM

## 2018-12-24 DIAGNOSIS — E11.9 TYPE 2 DIABETES MELLITUS WITHOUT COMPLICATIONS: ICD-10-CM

## 2018-12-24 DIAGNOSIS — I48.0 PAROXYSMAL ATRIAL FIBRILLATION: ICD-10-CM

## 2018-12-24 DIAGNOSIS — I11.0 HYPERTENSIVE HEART DISEASE WITH HEART FAILURE: ICD-10-CM

## 2018-12-24 DIAGNOSIS — R50.9 FEVER, UNSPECIFIED: ICD-10-CM

## 2018-12-24 DIAGNOSIS — I69.920 APHASIA FOLLOWING UNSPECIFIED CEREBROVASCULAR DISEASE: ICD-10-CM

## 2018-12-24 DIAGNOSIS — K63.5 POLYP OF COLON: ICD-10-CM

## 2019-01-01 ENCOUNTER — INPATIENT (INPATIENT)
Facility: HOSPITAL | Age: 79
LOS: 3 days | Discharge: NURSING, REHAB OR LONG-TERM CARE | End: 2019-01-05
Attending: INTERNAL MEDICINE | Admitting: INTERNAL MEDICINE

## 2019-01-01 VITALS
HEART RATE: 178 BPM | SYSTOLIC BLOOD PRESSURE: 72 MMHG | TEMPERATURE: 101 F | RESPIRATION RATE: 36 BRPM | OXYGEN SATURATION: 94 % | DIASTOLIC BLOOD PRESSURE: 59 MMHG

## 2019-01-01 DIAGNOSIS — Z86.718 PERSONAL HISTORY OF OTHER VENOUS THROMBOSIS AND EMBOLISM: ICD-10-CM

## 2019-01-01 DIAGNOSIS — Z66 DO NOT RESUSCITATE: ICD-10-CM

## 2019-01-01 DIAGNOSIS — R64 CACHEXIA: ICD-10-CM

## 2019-01-01 DIAGNOSIS — D64.9 ANEMIA, UNSPECIFIED: ICD-10-CM

## 2019-01-01 DIAGNOSIS — Z86.73 PERSONAL HISTORY OF TRANSIENT ISCHEMIC ATTACK (TIA), AND CEREBRAL INFARCTION WITHOUT RESIDUAL DEFICITS: ICD-10-CM

## 2019-01-01 DIAGNOSIS — N39.0 URINARY TRACT INFECTION, SITE NOT SPECIFIED: ICD-10-CM

## 2019-01-01 DIAGNOSIS — Z90.49 ACQUIRED ABSENCE OF OTHER SPECIFIED PARTS OF DIGESTIVE TRACT: Chronic | ICD-10-CM

## 2019-01-01 DIAGNOSIS — J69.0 PNEUMONITIS DUE TO INHALATION OF FOOD AND VOMIT: ICD-10-CM

## 2019-01-01 DIAGNOSIS — C18.9 MALIGNANT NEOPLASM OF COLON, UNSPECIFIED: ICD-10-CM

## 2019-01-01 DIAGNOSIS — I48.91 UNSPECIFIED ATRIAL FIBRILLATION: ICD-10-CM

## 2019-01-01 DIAGNOSIS — E86.0 DEHYDRATION: ICD-10-CM

## 2019-01-01 DIAGNOSIS — N17.9 ACUTE KIDNEY FAILURE, UNSPECIFIED: ICD-10-CM

## 2019-01-01 DIAGNOSIS — Z93.1 GASTROSTOMY STATUS: ICD-10-CM

## 2019-01-01 DIAGNOSIS — I95.9 HYPOTENSION, UNSPECIFIED: ICD-10-CM

## 2019-01-01 DIAGNOSIS — R06.02 SHORTNESS OF BREATH: ICD-10-CM

## 2019-01-01 DIAGNOSIS — L89.620 PRESSURE ULCER OF LEFT HEEL, UNSTAGEABLE: ICD-10-CM

## 2019-01-01 DIAGNOSIS — Z87.440 PERSONAL HISTORY OF URINARY (TRACT) INFECTIONS: ICD-10-CM

## 2019-01-01 DIAGNOSIS — E87.0 HYPEROSMOLALITY AND HYPERNATREMIA: ICD-10-CM

## 2019-01-01 DIAGNOSIS — Z74.01 BED CONFINEMENT STATUS: ICD-10-CM

## 2019-01-01 DIAGNOSIS — Z96.649 PRESENCE OF UNSPECIFIED ARTIFICIAL HIP JOINT: ICD-10-CM

## 2019-01-01 DIAGNOSIS — Z79.4 LONG TERM (CURRENT) USE OF INSULIN: ICD-10-CM

## 2019-01-01 DIAGNOSIS — Z96.649 PRESENCE OF UNSPECIFIED ARTIFICIAL HIP JOINT: Chronic | ICD-10-CM

## 2019-01-01 DIAGNOSIS — L89.610 PRESSURE ULCER OF RIGHT HEEL, UNSTAGEABLE: ICD-10-CM

## 2019-01-01 DIAGNOSIS — E11.9 TYPE 2 DIABETES MELLITUS WITHOUT COMPLICATIONS: ICD-10-CM

## 2019-01-01 DIAGNOSIS — K21.9 GASTRO-ESOPHAGEAL REFLUX DISEASE WITHOUT ESOPHAGITIS: ICD-10-CM

## 2019-01-01 DIAGNOSIS — J96.00 ACUTE RESPIRATORY FAILURE, UNSPECIFIED WHETHER WITH HYPOXIA OR HYPERCAPNIA: ICD-10-CM

## 2019-01-01 DIAGNOSIS — Z90.49 ACQUIRED ABSENCE OF OTHER SPECIFIED PARTS OF DIGESTIVE TRACT: ICD-10-CM

## 2019-01-01 DIAGNOSIS — R06.03 ACUTE RESPIRATORY DISTRESS: ICD-10-CM

## 2019-01-01 DIAGNOSIS — L89.154 PRESSURE ULCER OF SACRAL REGION, STAGE 4: ICD-10-CM

## 2019-01-01 DIAGNOSIS — I49.9 CARDIAC ARRHYTHMIA, UNSPECIFIED: ICD-10-CM

## 2019-01-01 DIAGNOSIS — Z51.5 ENCOUNTER FOR PALLIATIVE CARE: ICD-10-CM

## 2019-01-01 DIAGNOSIS — F01.51 VASCULAR DEMENTIA, UNSPECIFIED SEVERITY, WITH BEHAVIORAL DISTURBANCE: ICD-10-CM

## 2019-01-01 DIAGNOSIS — R47.89 OTHER SPEECH DISTURBANCES: ICD-10-CM

## 2019-01-01 LAB
ALBUMIN SERPL ELPH-MCNC: 2.5 G/DL — LOW (ref 3.5–5.2)
ALP SERPL-CCNC: 225 U/L — HIGH (ref 30–115)
ALT FLD-CCNC: 28 U/L — SIGNIFICANT CHANGE UP (ref 0–41)
ANION GAP SERPL CALC-SCNC: 13 MMOL/L — SIGNIFICANT CHANGE UP (ref 7–14)
ANION GAP SERPL CALC-SCNC: 19 MMOL/L — HIGH (ref 7–14)
APPEARANCE UR: ABNORMAL
APTT BLD: 33.2 SEC — SIGNIFICANT CHANGE UP (ref 27–39.2)
AST SERPL-CCNC: 48 U/L — HIGH (ref 0–41)
BACTERIA # UR AUTO: ABNORMAL /HPF
BASE EXCESS BLDV CALC-SCNC: 8.2 MMOL/L — HIGH (ref -2–2)
BASE EXCESS BLDV CALC-SCNC: 8.8 MMOL/L — HIGH (ref -2–2)
BASOPHILS # BLD AUTO: 0.01 K/UL — SIGNIFICANT CHANGE UP (ref 0–0.2)
BASOPHILS NFR BLD AUTO: 0.2 % — SIGNIFICANT CHANGE UP (ref 0–1)
BILIRUB SERPL-MCNC: 0.5 MG/DL — SIGNIFICANT CHANGE UP (ref 0.2–1.2)
BILIRUB UR-MCNC: NEGATIVE — SIGNIFICANT CHANGE UP
BUN SERPL-MCNC: 88 MG/DL — CRITICAL HIGH (ref 10–20)
BUN SERPL-MCNC: 89 MG/DL — CRITICAL HIGH (ref 10–20)
CA-I SERPL-SCNC: 1.23 MMOL/L — SIGNIFICANT CHANGE UP (ref 1.12–1.3)
CA-I SERPL-SCNC: 1.23 MMOL/L — SIGNIFICANT CHANGE UP (ref 1.12–1.3)
CALCIUM SERPL-MCNC: 8.7 MG/DL — SIGNIFICANT CHANGE UP (ref 8.5–10.1)
CALCIUM SERPL-MCNC: 9.1 MG/DL — SIGNIFICANT CHANGE UP (ref 8.5–10.1)
CHLORIDE SERPL-SCNC: 111 MMOL/L — HIGH (ref 98–110)
CHLORIDE SERPL-SCNC: 117 MMOL/L — HIGH (ref 98–110)
CO2 SERPL-SCNC: 23 MMOL/L — SIGNIFICANT CHANGE UP (ref 17–32)
CO2 SERPL-SCNC: 27 MMOL/L — SIGNIFICANT CHANGE UP (ref 17–32)
COLOR SPEC: SIGNIFICANT CHANGE UP
CREAT SERPL-MCNC: 1.3 MG/DL — SIGNIFICANT CHANGE UP (ref 0.7–1.5)
CREAT SERPL-MCNC: 1.5 MG/DL — SIGNIFICANT CHANGE UP (ref 0.7–1.5)
DIFF PNL FLD: ABNORMAL
EOSINOPHIL # BLD AUTO: 0.05 K/UL — SIGNIFICANT CHANGE UP (ref 0–0.7)
EOSINOPHIL NFR BLD AUTO: 0.8 % — SIGNIFICANT CHANGE UP (ref 0–8)
EPI CELLS # UR: ABNORMAL /HPF
GAS PNL BLDV: 159 MMOL/L — HIGH (ref 136–145)
GAS PNL BLDV: 159 MMOL/L — HIGH (ref 136–145)
GAS PNL BLDV: SIGNIFICANT CHANGE UP
GAS PNL BLDV: SIGNIFICANT CHANGE UP
GLUCOSE BLDC GLUCOMTR-MCNC: 212 MG/DL — HIGH (ref 70–99)
GLUCOSE SERPL-MCNC: 156 MG/DL — HIGH (ref 70–99)
GLUCOSE SERPL-MCNC: 209 MG/DL — HIGH (ref 70–99)
GLUCOSE UR QL: NEGATIVE — SIGNIFICANT CHANGE UP
HCO3 BLDV-SCNC: 34 MMOL/L — HIGH (ref 22–29)
HCO3 BLDV-SCNC: 35 MMOL/L — HIGH (ref 22–29)
HCT VFR BLD CALC: 39.2 % — LOW (ref 42–52)
HCT VFR BLDA CALC: 33.7 % — LOW (ref 34–44)
HCT VFR BLDA CALC: 34 % — SIGNIFICANT CHANGE UP (ref 34–44)
HGB BLD CALC-MCNC: 11 G/DL — LOW (ref 14–18)
HGB BLD CALC-MCNC: 11 G/DL — LOW (ref 14–18)
HGB BLD-MCNC: 11.1 G/DL — LOW (ref 14–18)
IMM GRANULOCYTES NFR BLD AUTO: 0.5 % — HIGH (ref 0.1–0.3)
INR BLD: 1.59 RATIO — HIGH (ref 0.65–1.3)
KETONES UR-MCNC: NEGATIVE — SIGNIFICANT CHANGE UP
LACTATE BLDV-MCNC: 1.5 MMOL/L — SIGNIFICANT CHANGE UP (ref 0.5–1.6)
LACTATE BLDV-MCNC: 2.7 MMOL/L — HIGH (ref 0.5–1.6)
LEUKOCYTE ESTERASE UR-ACNC: ABNORMAL
LYMPHOCYTES # BLD AUTO: 0.31 K/UL — LOW (ref 1.2–3.4)
LYMPHOCYTES # BLD AUTO: 4.8 % — LOW (ref 20.5–51.1)
MCHC RBC-ENTMCNC: 23.9 PG — LOW (ref 27–31)
MCHC RBC-ENTMCNC: 28.3 G/DL — LOW (ref 32–37)
MCV RBC AUTO: 84.5 FL — SIGNIFICANT CHANGE UP (ref 80–94)
MONOCYTES # BLD AUTO: 0.21 K/UL — SIGNIFICANT CHANGE UP (ref 0.1–0.6)
MONOCYTES NFR BLD AUTO: 3.3 % — SIGNIFICANT CHANGE UP (ref 1.7–9.3)
NEUTROPHILS # BLD AUTO: 5.79 K/UL — SIGNIFICANT CHANGE UP (ref 1.4–6.5)
NEUTROPHILS NFR BLD AUTO: 90.4 % — HIGH (ref 42.2–75.2)
NITRITE UR-MCNC: NEGATIVE — SIGNIFICANT CHANGE UP
NRBC # BLD: 0 /100 WBCS — SIGNIFICANT CHANGE UP (ref 0–0)
OSMOLALITY SERPL: 349 MOS/KG — HIGH (ref 289–308)
PCO2 BLDV: 49 MMHG — SIGNIFICANT CHANGE UP (ref 41–51)
PCO2 BLDV: 56 MMHG — HIGH (ref 41–51)
PH BLDV: 7.41 — SIGNIFICANT CHANGE UP (ref 7.26–7.43)
PH BLDV: 7.44 — HIGH (ref 7.26–7.43)
PH UR: 6 — SIGNIFICANT CHANGE UP (ref 5–8)
PLATELET # BLD AUTO: 186 K/UL — SIGNIFICANT CHANGE UP (ref 130–400)
PO2 BLDV: 30 MMHG — SIGNIFICANT CHANGE UP (ref 20–40)
PO2 BLDV: 41 MMHG — HIGH (ref 20–40)
POTASSIUM BLDV-SCNC: 3.8 MMOL/L — SIGNIFICANT CHANGE UP (ref 3.3–5.6)
POTASSIUM BLDV-SCNC: 4.3 MMOL/L — SIGNIFICANT CHANGE UP (ref 3.3–5.6)
POTASSIUM SERPL-MCNC: 4.9 MMOL/L — SIGNIFICANT CHANGE UP (ref 3.5–5)
POTASSIUM SERPL-MCNC: 5.5 MMOL/L — HIGH (ref 3.5–5)
POTASSIUM SERPL-SCNC: 4.9 MMOL/L — SIGNIFICANT CHANGE UP (ref 3.5–5)
POTASSIUM SERPL-SCNC: 5.5 MMOL/L — HIGH (ref 3.5–5)
PROT SERPL-MCNC: 5.6 G/DL — LOW (ref 6–8)
PROT UR-MCNC: 30
PROTHROM AB SERPL-ACNC: 18.2 SEC — HIGH (ref 9.95–12.87)
RBC # BLD: 4.64 M/UL — LOW (ref 4.7–6.1)
RBC # FLD: 17.3 % — HIGH (ref 11.5–14.5)
RBC CASTS # UR COMP ASSIST: ABNORMAL /HPF
SAO2 % BLDV: 50 % — SIGNIFICANT CHANGE UP
SAO2 % BLDV: 68 % — SIGNIFICANT CHANGE UP
SODIUM SERPL-SCNC: 151 MMOL/L — HIGH (ref 135–146)
SODIUM SERPL-SCNC: 159 MMOL/L — HIGH (ref 135–146)
SP GR SPEC: >=1.03 — SIGNIFICANT CHANGE UP (ref 1.01–1.03)
UROBILINOGEN FLD QL: 1 (ref 0.2–0.2)
WBC # BLD: 6.4 K/UL — SIGNIFICANT CHANGE UP (ref 4.8–10.8)
WBC # FLD AUTO: 6.4 K/UL — SIGNIFICANT CHANGE UP (ref 4.8–10.8)
WBC UR QL: ABNORMAL /HPF

## 2019-01-01 RX ORDER — INSULIN ASPART 100 [IU]/ML
1 INJECTION, SOLUTION SUBCUTANEOUS
Qty: 0 | Refills: 0 | COMMUNITY

## 2019-01-01 RX ORDER — DEXTROSE 50 % IN WATER 50 %
25 SYRINGE (ML) INTRAVENOUS ONCE
Qty: 0 | Refills: 0 | Status: DISCONTINUED | OUTPATIENT
Start: 2019-01-01 | End: 2019-01-05

## 2019-01-01 RX ORDER — INSULIN LISPRO 100/ML
VIAL (ML) SUBCUTANEOUS
Qty: 0 | Refills: 0 | Status: DISCONTINUED | OUTPATIENT
Start: 2019-01-01 | End: 2019-01-02

## 2019-01-01 RX ORDER — ENOXAPARIN SODIUM 100 MG/ML
40 INJECTION SUBCUTANEOUS DAILY
Qty: 0 | Refills: 0 | Status: DISCONTINUED | OUTPATIENT
Start: 2019-01-01 | End: 2019-01-05

## 2019-01-01 RX ORDER — COLLAGENASE CLOSTRIDIUM HIST. 250 UNIT/G
1 OINTMENT (GRAM) TOPICAL
Qty: 0 | Refills: 0 | COMMUNITY

## 2019-01-01 RX ORDER — FAMOTIDINE 10 MG/ML
40 INJECTION INTRAVENOUS DAILY
Qty: 0 | Refills: 0 | Status: DISCONTINUED | OUTPATIENT
Start: 2019-01-01 | End: 2019-01-05

## 2019-01-01 RX ORDER — DEXTROSE 50 % IN WATER 50 %
12.5 SYRINGE (ML) INTRAVENOUS ONCE
Qty: 0 | Refills: 0 | Status: DISCONTINUED | OUTPATIENT
Start: 2019-01-01 | End: 2019-01-05

## 2019-01-01 RX ORDER — FAMOTIDINE 10 MG/ML
1 INJECTION INTRAVENOUS
Qty: 0 | Refills: 0 | COMMUNITY

## 2019-01-01 RX ORDER — ACETAMINOPHEN 500 MG
650 TABLET ORAL EVERY 6 HOURS
Qty: 0 | Refills: 0 | Status: DISCONTINUED | OUTPATIENT
Start: 2019-01-01 | End: 2019-01-05

## 2019-01-01 RX ORDER — METOPROLOL TARTRATE 50 MG
50 TABLET ORAL EVERY 12 HOURS
Qty: 0 | Refills: 0 | Status: DISCONTINUED | OUTPATIENT
Start: 2019-01-01 | End: 2019-01-05

## 2019-01-01 RX ORDER — SODIUM CHLORIDE 9 MG/ML
1000 INJECTION, SOLUTION INTRAVENOUS
Qty: 0 | Refills: 0 | Status: DISCONTINUED | OUTPATIENT
Start: 2019-01-01 | End: 2019-01-01

## 2019-01-01 RX ORDER — SENNA PLUS 8.6 MG/1
5 TABLET ORAL AT BEDTIME
Qty: 0 | Refills: 0 | Status: DISCONTINUED | OUTPATIENT
Start: 2019-01-01 | End: 2019-01-05

## 2019-01-01 RX ORDER — CIPROFLOXACIN LACTATE 400MG/40ML
1 VIAL (ML) INTRAVENOUS
Qty: 0 | Refills: 0 | COMMUNITY
Start: 2019-01-01

## 2019-01-01 RX ORDER — VANCOMYCIN HCL 1 G
1000 VIAL (EA) INTRAVENOUS ONCE
Qty: 0 | Refills: 0 | Status: COMPLETED | OUTPATIENT
Start: 2019-01-01 | End: 2019-01-01

## 2019-01-01 RX ORDER — SODIUM CHLORIDE 9 MG/ML
1000 INJECTION, SOLUTION INTRAVENOUS
Qty: 0 | Refills: 0 | Status: DISCONTINUED | OUTPATIENT
Start: 2019-01-01 | End: 2019-01-02

## 2019-01-01 RX ORDER — LACTULOSE 10 G/15ML
20 SOLUTION ORAL DAILY
Qty: 0 | Refills: 0 | Status: DISCONTINUED | OUTPATIENT
Start: 2019-01-01 | End: 2019-01-05

## 2019-01-01 RX ORDER — SODIUM CHLORIDE 9 MG/ML
1000 INJECTION, SOLUTION INTRAVENOUS ONCE
Qty: 0 | Refills: 0 | Status: COMPLETED | OUTPATIENT
Start: 2019-01-01 | End: 2019-01-01

## 2019-01-01 RX ORDER — POVIDONE-IODINE 5 %
1 AEROSOL (ML) TOPICAL
Qty: 0 | Refills: 0 | COMMUNITY

## 2019-01-01 RX ORDER — OLANZAPINE 15 MG/1
5 TABLET, FILM COATED ORAL DAILY
Qty: 0 | Refills: 0 | Status: DISCONTINUED | OUTPATIENT
Start: 2019-01-01 | End: 2019-01-05

## 2019-01-01 RX ORDER — CEFTRIAXONE 500 MG/1
1 INJECTION, POWDER, FOR SOLUTION INTRAMUSCULAR; INTRAVENOUS EVERY 24 HOURS
Qty: 0 | Refills: 0 | Status: DISCONTINUED | OUTPATIENT
Start: 2019-01-01 | End: 2019-01-02

## 2019-01-01 RX ORDER — INSULIN GLARGINE 100 [IU]/ML
8 INJECTION, SOLUTION SUBCUTANEOUS
Qty: 0 | Refills: 0 | COMMUNITY

## 2019-01-01 RX ORDER — COLLAGENASE CLOSTRIDIUM HIST. 250 UNIT/G
1 OINTMENT (GRAM) TOPICAL DAILY
Qty: 0 | Refills: 0 | Status: DISCONTINUED | OUTPATIENT
Start: 2019-01-01 | End: 2019-01-05

## 2019-01-01 RX ORDER — CEFEPIME 1 G/1
2000 INJECTION, POWDER, FOR SOLUTION INTRAMUSCULAR; INTRAVENOUS ONCE
Qty: 0 | Refills: 0 | Status: COMPLETED | OUTPATIENT
Start: 2019-01-01 | End: 2019-01-01

## 2019-01-01 RX ORDER — FERROUS SULFATE 325(65) MG
300 TABLET ORAL DAILY
Qty: 0 | Refills: 0 | Status: DISCONTINUED | OUTPATIENT
Start: 2019-01-01 | End: 2019-01-05

## 2019-01-01 RX ORDER — ASCORBIC ACID 60 MG
500 TABLET,CHEWABLE ORAL DAILY
Qty: 0 | Refills: 0 | Status: DISCONTINUED | OUTPATIENT
Start: 2019-01-01 | End: 2019-01-05

## 2019-01-01 RX ORDER — INSULIN GLARGINE 100 [IU]/ML
8 INJECTION, SOLUTION SUBCUTANEOUS AT BEDTIME
Qty: 0 | Refills: 0 | Status: DISCONTINUED | OUTPATIENT
Start: 2019-01-01 | End: 2019-01-05

## 2019-01-01 RX ADMIN — SODIUM CHLORIDE 2000 MILLILITER(S): 9 INJECTION, SOLUTION INTRAVENOUS at 12:22

## 2019-01-01 RX ADMIN — SODIUM CHLORIDE 75 MILLILITER(S): 9 INJECTION, SOLUTION INTRAVENOUS at 22:01

## 2019-01-01 RX ADMIN — CEFEPIME 100 MILLIGRAM(S): 1 INJECTION, POWDER, FOR SOLUTION INTRAMUSCULAR; INTRAVENOUS at 12:28

## 2019-01-01 RX ADMIN — SODIUM CHLORIDE 100 MILLILITER(S): 9 INJECTION, SOLUTION INTRAVENOUS at 20:01

## 2019-01-01 RX ADMIN — SODIUM CHLORIDE 100 MILLILITER(S): 9 INJECTION, SOLUTION INTRAVENOUS at 17:17

## 2019-01-01 RX ADMIN — Medication 250 MILLIGRAM(S): at 12:22

## 2019-01-01 RX ADMIN — INSULIN GLARGINE 8 UNIT(S): 100 INJECTION, SOLUTION SUBCUTANEOUS at 21:51

## 2019-01-01 RX ADMIN — SODIUM CHLORIDE 2000 MILLILITER(S): 9 INJECTION, SOLUTION INTRAVENOUS at 13:34

## 2019-01-01 NOTE — ED PROVIDER NOTE - MEDICAL DECISION MAKING DETAILS
Pt presented from NH for worsening dyspnea and sputum production being treated with levofloxacin for pneumonia, here tachypneic and moaning (although baseline confusion per wife) and CXR redmonstrating RLL opacity seen on previous admission. Hypernatremic with prerenal injury likely from hypovolemia. Covered with broad spectrum, fluid resuscitated, still tachy after fluids so rate controlled back to 110s, stable for admission to [facility].

## 2019-01-01 NOTE — ED PROVIDER NOTE - OBJECTIVE STATEMENT
77yo M pmh of recent CVA, recent diagnosis of pneumonia treated with Levaquin, PEG tube. Pt was sent to ED from [facility] today for increased SOB. As per wife patient is currently DNI/DNR.

## 2019-01-01 NOTE — ED PROVIDER NOTE - PHYSICAL EXAMINATION
CONSTITUTIONAL:   SKIN: warm, diaphoretic.   HEAD: Normocephalic; atraumatic.  EYES: no conjunctival injection. PERRL.   ENT: No nasal discharge; airway clear.  NECK: Supple; non tender.  CARD: S1, S2 normal; no murmurs, gallops, or rubs. Regular rate and rhythm.   RESP: No wheezes, rales or rhonchi.  ABD: soft ntnd + peg tube with no signs of infection.   EXT: Normal ROM.  No clubbing, cyanosis or edema.   LYMPH: No acute cervical adenopathy.  NEURO: responds to verbal stimuli.   PSYCH: Cooperative, appropriate.

## 2019-01-01 NOTE — ED PROVIDER NOTE - PROGRESS NOTE DETAILS
Pt from [facility], [place], operated by [facility], prior admission for [facility]. Discussed with Dr. geronimo for dr. perry who statespt is a [facility] pt now. admission changed to [facility] and page to Dr. guevara. Bellis - HR improved with cardizem push to 110-120, gave po dose via PEG tube. Hemodynamically stable but still on NRB, will transition off as tolerated.

## 2019-01-01 NOTE — ED PROVIDER NOTE - ATTENDING CONTRIBUTION TO CARE
78 year old man PMH HTN, DM, GERD, CVA, dementia p/w dyspnea. From [facility] being treated for PNA with levofloxacin but today worsening resp distress, tachypnea, thick secretions. No documented fever. History and HPI unobtainable from pt due to acuity. On exam thin, chronically ill appearing, diaphoretic, moves arms but not responding to voice or noxious stimuli, lungs rhonchi b/l, abd soft ntnd +PEG tube, contracted legs, no LE edema, heart tachy/irreular, peripheral pulses equal throughout, skin hot damp no rash. A/p: presentation concerning for sepsis/PNA failing outpatient treatment - broaden abx coverage, sepsis labs, fluids, rate control after resusc as needed. DNR/DNI as per paperwork and wife at bedside. 78 year old man PMH HTN, DM, GERD, CVA, dementia p/w dyspnea. From [facility] being treated for PNA with levofloxacin but today worsening resp distress, tachypnea, thick secretions. No documented fever. History and HPI unobtainable from pt due to acuity. On exam thin, chronically ill appearing, diaphoretic, moves arms but not responding to voice or noxious stimuli, lungs rhonchi b/l, abd soft ntnd +PEG tube, contracted legs, no LE edema, heart tachy/irreular, peripheral pulses equal throughout, skin hot damp no rash. A/p: presentation concerning for sepsis/PNA failing outpatient treatment with fever and tachycardia (rapid AFib) - broaden abx coverage, sepsis labs, fluids, rate control after resusc as needed. DNR/DNI as per paperwork and wife at bedside. 78 year old man PMH HTN, DM, GERD, CVA, afib, dementia p/w dyspnea. From [facility] being treated for PNA with levofloxacin but today worsening resp distress, tachypnea, thick secretions. No documented fever. History and HPI unobtainable from pt due to acuity. On exam thin, chronically ill appearing, diaphoretic, moves arms but not responding to voice or noxious stimuli, lungs rhonchi b/l, abd soft ntnd +PEG tube, contracted legs, no LE edema, heart tachy/irreular, peripheral pulses equal throughout, skin hot damp no rash. A/p: presentation concerning for sepsis/PNA failing outpatient treatment with fever and tachycardia (rapid AFib) - broaden abx coverage, sepsis labs, fluids, rate control after resusc as needed. DNR/DNI as per paperwork and wife at bedside. 78 year old man PMH HTN, DM, GERD, CVA, afib, dementia p/w dyspnea. From [facility] being treated for PNA with levofloxacin but today worsening resp distress, tachypnea, thick secretions. No documented fever. History and HPI unobtainable from pt due to acuity. On exam thin, chronically ill appearing, diaphoretic, moves arms but not responding to voice or noxious stimuli, lungs rhonchi b/l, abd soft ntnd +PEG tube, contracted legs, no LE edema, heart tachy/irreular, peripheral pulses equal throughout, skin hot damp no rash. A/p: presentation concerning for sepsis/PNA (HCAP) failing outpatient treatment with fever and tachycardia (rapid AFib) - broaden abx coverage, sepsis labs, fluids, rate control after resusc as needed. DNR/DNI as per paperwork and wife at bedside. 78 year old man PMH HTN, DM, GERD, CVA, afib, dementia p/w dyspnea. From [facility] being treated for PNA with levofloxacin but today worsening resp distress, tachypnea, thick secretions. No documented fever. History and HPI unobtainable from pt due to acuity. On exam thin, chronically ill appearing, diaphoretic, moves arms but not responding to voice or noxious stimuli, dry oral mucosa, lungs rhonchi b/l, abd soft ntnd +PEG tube, contracted legs, no LE edema, heart tachy/irreular, peripheral pulses equal throughout, skin hot damp no rash. A/p: presentation concerning for sepsis/PNA (HCAP) failing outpatient treatment with fever and tachycardia (rapid AFib) - broaden abx coverage, sepsis labs, fluids, rate control after resusc as needed. DNR/DNI as per paperwork and wife at bedside.

## 2019-01-01 NOTE — H&P ADULT - NSHPSOCIALHISTORY_GEN_ALL_CORE
Never smoked. No alcohol or illicit drug use. Currently, bed-bound, dependent on all ADLs. Non-verbal at baseline per chart records, but patient's wife states he is minimally verbal at baseline. Has chronic skin tears and pressure ulcers

## 2019-01-01 NOTE — ED PROVIDER NOTE - CARE PLAN
Principal Discharge DX:	Sepsis  Secondary Diagnosis:	Pneumonia  Secondary Diagnosis:	Rapid atrial fibrillation  Secondary Diagnosis:	JOEL (acute kidney injury)

## 2019-01-01 NOTE — H&P ADULT - NSHPPHYSICALEXAM_GEN_ALL_CORE
T(F): 98 (01-01-19 @ 17:07), Max: 100.9 (01-01-19 @ 12:20)  HR: 158 (01-01-19 @ 17:07) (120 - 178)  BP: 157/60 (01-01-19 @ 17:07) (72/59 - 167/73)  RR: 20 (01-01-19 @ 17:07) (20 - 36)  SpO2: 100% (01-01-19 @ 17:07) (94% - 100%)    Physical Exam:  General: Not in distress. Cachectic appearing.  HEENT: Dry mucus membranes. PERRLA.  Cardio: Irregularly irregular rhythm, S1, S2, no murmur, rub, or gallop.  Pulm: Decreased air entry at bilateral lung bases.  Abdomen: Soft, non-tender, non-distended. Normoactive bowel sounds. PEG site appears clean, no sign of infection.  Sacrum: Sacral ulcer present.  Extremities: No cyanosis or edema bilaterally. No calf tenderness to palpation. Bilateral heel ulcers. No sign of active infection.   Neuro: Not awake or alert. Does not respond to commands. Moves extremities and opens eyes in response to noxious stimuli. Moves all four extremities spontaneously.

## 2019-01-01 NOTE — CHART NOTE - NSCHARTNOTEFT_GEN_A_CORE
As MAR, was notified about patient's need for admission secondary to diagnosis of sepsis secondary to pneumonia and AFIB with RVR; during endorsement from ED Resident no electrolyte abnormality was stated. Upon further review, I discovered the patient's sodium level of 159, confirmed on VBG and on immediate stat repeat VBG as well.     Patient seen and examined by bedside. Patient was lethargic and not alert nor oriented, and was on 100% nonrebreather. Upon questioning the ED Attending for the reason of patient requiring 100% nonrebreather (as was not previously mentioned during endorsement), was told the patient might have just been left on it since EMS brought him in. Despite ED documentation that the patient was "cooperative, attentive, and responds to verbal stimuli", the patient was lethargic and somnolent, as per wife by bedside, patient's mental status declined during the past week, where he was able to vocalize "yes" and "no" and to tell her when he was in pain, he has not been vocal or responsive for the past week. Respiratory therapist notified, with successful transition onto nasal cannula, patient currently saturating 96% on 4L.     Case discussed with the pulmonary fellow with acceptance to ICU in light of hypernatremia. As MAR, was notified about patient's need for admission secondary to diagnosis of sepsis secondary to pneumonia and AFIB with RVR; during endorsement from ED Resident no electrolyte abnormality was stated. Upon further review, I discovered the patient's sodium level of 159, confirmed on VBG during the time of ED evaluation and on immediate stat repeat VBG as well.     Patient seen and examined by bedside. Patient was lethargic and not alert nor oriented, and was on 100% nonrebreather. Upon questioning the ED Attending for the reason of patient requiring 100% nonrebreather (as was not previously mentioned during endorsement), was told the patient might have just been left on it since EMS brought him in. Despite ED documentation that the patient was "cooperative, attentive, and responds to verbal stimuli", the patient was lethargic and somnolent, as per wife by bedside, patient's mental status declined during the past week, where he was able to vocalize "yes" and "no" and to tell her when he was in pain, he has not been vocal or responsive for the past week. Respiratory therapist notified, with successful transition onto nasal cannula, patient currently saturating 96% on 4L.     Case discussed with the pulmonary fellow with acceptance to ICU in light of hypernatremia. As MAR, was notified about patient's need for admission secondary to diagnosis of sepsis secondary to pneumonia and AFIB with RVR; during endorsement from ED Resident no electrolyte abnormality was stated. Upon further review, I discovered the patient's sodium level of 159, confirmed on VBG during the time of ED evaluation and on immediate stat repeat VBG as well.     Patient seen and examined by bedside. Patient was lethargic, non-oriented, and found on 100% nonrebreather. Upon questioning the ED Attending for the reason of patient requiring 100% nonrebreather (as was not previously mentioned during endorsement), was told the patient might have just been left on it since EMS brought him in. Despite ED documentation that the patient was "cooperative, attentive, and responds to verbal stimuli", the patient was lethargic and somnolent, as per wife by bedside, patient's mental status declined during the past week, where he was able to vocalize "yes" and "no" and to tell her when he was in pain, he has not been vocal or responsive for the past week. Respiratory therapist notified, with successful transition onto nasal cannula, patient currently saturating 96% on 4L.     Case discussed with the pulmonary fellow with acceptance to ICU in light of hypernatremia.

## 2019-01-01 NOTE — H&P ADULT - HISTORY OF PRESENT ILLNESS
79 y/o M with PMH of A-fib, not on anticoagulation (see below), vascular dementia,      77 yo M with PMHx of AFIB previously on Xarelto discontinued secondary to frequent falls , DVT s/p Right Hip Replacement (10/14/18) on Eliquis-bedbound, Vascular Dementia with behavioral changes, DM II, HTN  79yo M pmh of recent CVA, recent diagnosis of pneumonia treated with Levaquin, PEG tube. Pt was sent to ED from [facility] today for increased SOB. As per wife patient is currently DNI/DNR.  78 yo M with  history of dementia, afib on xarelto, HTN, DM II, anemia      Admitted Feb 13, 2018 for multiple falls and combative behavior at home.  Diagnosed with PNA and flu. d/c SNF for PT. 2/20/18. Xarelto held for Hgb of 6, no GI bleed found.    August 12th, 2018, presents for decreased ambulation and urinary frequency. Needs aid for ADLs. Discharged on Aug 16th, decreased ambulation thought to be secondary to worsening dementia. Back to SNF for short term      October 2018, weakness and decreased PO intake. Stroke. UTI. PEG placed 11/7. Chronic foot ulcers, s/p angiogram --> SNF for long term. Dsicharged 11/8.  Admitted 11/19 for fever. Diagnosed with PNA. A-fib with RVR. complicated by LGIB. colonoscopy found colon cancer - invasive adenocarcinoma, moderately differentiated. Not candidate for surgery. Discharged back to SNF. Non-verbal, but followed commands at this time. 79 y/o M with PMH of A-fib, not on anticoagulation (see hospitalization history below), vascular dementia, recently diagnosed colon cancer, DM II, and HTN was sent in from [facility] for desaturation. The patient has a history of advanced dementia and CVA and per documentation has been non-verbal since november of 2018. Currently he is obtunded and cannot provide any history. All history was obtained from patient's wife and daughter, nursing home papers, and previous hospital charts.     The nursing home writes that the patient desaturated to 88-90% on 3L O2 and was noted to have thick secretions. After being suctioned, the O2 saturation improved to 92-96%. However, the patient's wife wanted him sent to the hospital for an evaluation.     In the ED, the patient was found to be hypotensive to 70s/40s with a heart rate in the 180s. ECG showed atrial fibrillation with rapid ventricular response. 10mg cardizem IV was given, followed by 30 cardizem PO in the PEG tube. Heart rate improved to 110s and blood pressure improved to 120s/70s.    Upon assessment of the patient he was on 100% non-rebreather for undocumented reasons. There was no pulse oximetry data available in the system at the time of admission. The medicine weaned off oxygen support and the patient was saturating 92-93% on room air. He improved to 98% with 2L via nasal canula.    Patient's wife visits him daily in the nursing home. She states that at baseline he is bed-bound, minimally verbal, and unable to swallow (he has a PEG placed in November, 2018). However, she states that at baseline he is able to nod in response to questions and follow simple commands. She states that one week ago, his mental status changed. He is not responding to questions or commands, with no improvement over the week. Sodium in the ED was found to be 159, verified with a repeat VBG. On 12/14/18, his sodium was 146.     The patient has an extensive medical history with multiple hospitalizations. For convenience I have summarized his previous hospital courses below:    Admission on 2/13/2018: Presented from home for multiple falls and combative behavior at home. Was diagnosed with PNA and influenza infection. Was incidentally found to have a Hgb of 6.0, but no identifiable source of bleeding was discovered. Xarelto (being taken for A-fib) was held on discharge. He was discharged to a SNF for short term rehab.    Admission on 8/12/2018 for decreased ambulation and urinary frequency. At this time it was documented that at baseline he was requiring assistance with ADLs and had a home health aid for assistance. The inpatient work-up did not find any reversible causes or infections. The decreased ambulation was thought to be secondary to worsening dementia. He was discharged back to SNF for further short term rehab.    Admission 10/24/2018 for weakness and decreased PO intake. Found to have a subacute CVA and a urinary tract infection. Also presented with non-healing foot ulcers. After the stroke work-up was completed, he was cleared for an unmodified diet by speech pathology, however, the patient was hardly eating based on a 3 day calorie count. His wife decided to have a PEG tube placed, which was performed by the surgery team on 11/7/2018. He was also noted to have non-healing foot ulcers, and had an angiogram on 11/2/2018 with angioplasty to the anterior tibial artery. He was discharged on 11/8/2018 to long term placement in a nursing home.     Admission 11/19/2018 for fever noted at the nursing home. Also found to be in A-fib with RVR. Hospital course complicated by lower gastrointestinal bleeding. Colonoscopy performed at the time found a mass in the ascending colon and biopsies resulted as invasive adenocarcinoma, moderately differentiated. He was deemed not a candidate for surgery. There was no documentation available about what treatment options the patient's wife was considering. He was then discharged back to the nursing home. On discharge, was was noted to be non-verbal, but followed commands. 77 y/o M with PMH of A-fib, not on anticoagulation (see hospitalization history below), vascular dementia, recently diagnosed colon cancer, DM II, and HTN was sent in from [facility] for desaturation. The patient has a history of advanced dementia and CVA and per documentation has been non-verbal since november of 2018. Currently he is obtunded and cannot provide any history. All history was obtained from patient's wife and daughter, nursing home papers, and previous hospital charts.     The nursing home writes that the patient desaturated to 88-90% on 3L O2 and was noted to have thick secretions. After being suctioned, the O2 saturation improved to 92-96%. However, the patient's wife wanted him sent to the hospital for an evaluation.     In the ED, the patient was found to be hypotensive to 70s/40s with a heart rate in the 180s. ECG showed atrial fibrillation with rapid ventricular response. 10mg cardizem IV was given, followed by 30 cardizem PO in the PEG tube. Heart rate improved to 110s and blood pressure improved to 120s/70s.    Upon assessment of the patient he was on 100% non-rebreather for undocumented reasons. There was no pulse oximetry data available in the system at the time of admission. The medicine weaned off oxygen support and the patient was saturating 92-93% on room air. He improved to 98% with 2L via nasal canula.    Patient's wife visits him daily in the nursing home. She states that at baseline he is bed-bound, minimally verbal, and unable to swallow (he has a PEG placed in November, 2018). However, she states that at baseline he is able to nod in response to questions and follow simple commands. She states that one week ago, his mental status changed. He is not responding to questions or commands, with no improvement over the week. Sodium in the ED was found to be 159, verified with a repeat VBG. On 12/14/18, his sodium was 146.     The patient has an extensive medical history with multiple hospitalizations. For convenience I have summarized his previous hospital courses below:    Admission on 2/13/2018: Presented from home for multiple falls and combative behavior at home. Was diagnosed with PNA and influenza infection. Was incidentally found to have a Hgb of 6.0, but no identifiable source of bleeding was discovered. Xarelto (being taken for A-fib) was held on discharge. He was discharged to a SNF for short term rehab.    Admission on 8/12/2018 for decreased ambulation and urinary frequency. At this time it was documented that at baseline he was requiring assistance with ADLs and had a home health aid for assistance. The inpatient work-up did not find any reversible causes or infections. The decreased ambulation was thought to be secondary to worsening dementia. He was discharged back to SNF for further short term rehab.    Admission 10/24/2018 for weakness and decreased PO intake. Found to have a subacute CVA and a urinary tract infection. Also presented with non-healing foot ulcers. After the stroke work-up was completed, he was cleared for an unmodified diet by speech pathology, however, the patient was hardly eating based on a 3 day calorie count. His wife decided to have a PEG tube placed, which was performed by the surgery team on 11/7/2018. He was also noted to have non-healing foot ulcers, and had an angiogram on 11/2/2018 with angioplasty to the anterior tibial artery. He was discharged on 11/8/2018 to long term placement in a nursing home.     Admission 11/19/2018 for fever noted at the nursing home. Also found to be in A-fib with RVR. Hospital course complicated by lower gastrointestinal bleeding. Colonoscopy performed at the time found a mass in the ascending colon and biopsies resulted as invasive adenocarcinoma, moderately differentiated. He was deemed not a candidate for surgery. There was no documentation available about what treatment options the patient's wife was considering. He was then discharged back to the nursing home. On discharge, was was noted to be non-verbal, but followed commands. The patient's wife had signed a DNR/DNI. 79 y/o M with PMH of A-fib, not on anticoagulation (see hospitalization history below), vascular dementia, recently diagnosed colon cancer, DM II, and HTN was sent in from [facility] for desaturation. The patient has a history of advanced dementia and CVA and per documentation has been non-verbal since november of 2018. Currently he is obtunded and cannot provide any history. All history was obtained from patient's wife and daughter, nursing home papers, and previous hospital charts.     The nursing home writes that the patient desaturated to 88-90% on 3L O2 and was noted to have thick secretions. After being suctioned, the O2 saturation improved to 92-96%. However, the patient's wife wanted him sent to the hospital for an evaluation.     In the ED, the patient was found to be hypotensive to 70s/40s with a heart rate in the 180s. ECG showed atrial fibrillation with rapid ventricular response. 10mg cardizem IV was given, followed by 30 cardizem PO in the PEG tube. Heart rate improved to 110s and blood pressure improved to 120s/70s.    Upon assessment of the patient he was on 100% non-rebreather without documentation of the reason(s). There was no pulse oximetry data available in the system at the time of admission. The medicine team then weaned off oxygen support and the patient was saturating 92-93% on room air. He improved to 98% with 2L via nasal canula.    Patient's wife visits him daily in the nursing home. She states that at baseline he is bed-bound, minimally verbal, and unable to swallow (he has a PEG placed in November, 2018). However, she states that at baseline he is able to nod in response to questions and follow simple commands. She states that one week ago, his mental status changed. He is not responding to questions or commands, with no improvement over the week. Sodium in the ED was found to be 159, verified with a repeat VBG. On 12/14/18, his sodium was 146.     The patient has an extensive medical history with multiple hospitalizations. For convenience I have summarized his previous hospital courses below:    Admission on 2/13/2018: Presented from home for multiple falls and combative behavior at home. Was diagnosed with PNA and influenza infection. Was incidentally found to have a Hgb of 6.0, but no identifiable source of bleeding was discovered. Xarelto (being taken for A-fib) was held on discharge. He was discharged to a SNF for short term rehab.    Admission on 8/12/2018 for decreased ambulation and urinary frequency. At this time it was documented that at baseline he was requiring assistance with ADLs and had a home health aid for assistance. The inpatient work-up did not find any reversible causes or infections. The decreased ambulation was thought to be secondary to worsening dementia. He was discharged back to SNF for further short term rehab.    Admission 10/24/2018 for weakness and decreased PO intake. Found to have a subacute CVA and a urinary tract infection. Also presented with non-healing foot ulcers. After the stroke work-up was completed, he was cleared for an unmodified diet by speech pathology, however, the patient was hardly eating based on a 3 day calorie count. His wife decided to have a PEG tube placed, which was performed by the surgery team on 11/7/2018. He was also noted to have non-healing foot ulcers, and had an angiogram on 11/2/2018 with angioplasty to the anterior tibial artery. He was discharged on 11/8/2018 to long term placement in a nursing home.     Admission 11/19/2018 for fever noted at the nursing home. Also found to be in A-fib with RVR. Hospital course complicated by lower gastrointestinal bleeding. Colonoscopy performed at the time found a mass in the ascending colon and biopsies resulted as invasive adenocarcinoma, moderately differentiated. He was deemed not a candidate for surgery. There was no documentation available about what treatment options the patient's wife was considering. He was then discharged back to the nursing home. On discharge, was was noted to be non-verbal, but followed commands. The patient's wife had signed a DNR/DNI.

## 2019-01-01 NOTE — ED ADULT NURSE NOTE - NSIMPLEMENTINTERV_GEN_ALL_ED
Implemented All Fall with Harm Risk Interventions:  [place] to call system. Call bell, personal items and telephone within reach. Instruct patient to call for assistance. Room bathroom lighting operational. Non-slip footwear when patient is off stretcher. Physically safe environment: no spills, clutter or unnecessary equipment. Stretcher in lowest position, wheels locked, appropriate side rails in place. Provide visual cue, wrist band, yellow gown, etc. Monitor gait and stability. Monitor for mental status changes and reorient to person, place, and time. Review medications for side effects contributing to fall risk. Reinforce activity limits and safety measures with patient and family. Provide visual clues: red socks.

## 2019-01-01 NOTE — H&P ADULT - NSHPLABSRESULTS_GEN_ALL_CORE
CBC Full  -  ( 01 Jan 2019 12:13 )  WBC Count : 6.40 K/uL  Hemoglobin : 11.1 g/dL  Hematocrit : 39.2 %  Platelet Count - Automated : 186 K/uL  Mean Cell Volume : 84.5 fL  Mean Cell Hemoglobin : 23.9 pg  Mean Cell Hemoglobin Concentration : 28.3 g/dL  Auto Neutrophil # : 5.79 K/uL  Auto Lymphocyte # : 0.31 K/uL  Auto Monocyte # : 0.21 K/uL  Auto Eosinophil # : 0.05 K/uL  Auto Basophil # : 0.01 K/uL  Auto Neutrophil % : 90.4 %  Auto Lymphocyte % : 4.8 %  Auto Monocyte % : 3.3 %  Auto Eosinophil % : 0.8 %  Auto Basophil % : 0.2 %    BMP: 01-01-19 @ 12:13  159 | 117 | 89   -----------------< 156  5.5  | 23 | 1.5  eGFR(AA): 50, eGFR (non-AA): 43  Ca 9.1, Mg --, P --    LFTs: 01-01-19 @ 12:13  TP  5.6  | 2.5 Alb   ---------------  TB  0.5  | --  DB   ---------------  ALT 28  | 48  AST            ^          225 ALK    PT/INR/PTT: 01-01-19 @ 12:16  18.20 | 33.2        ^      1.59    Blood Gas Venous - Lactate: 2.7 mmoL/L (01.01.19 @ 13:22)  Blood Gas Venous - Lactate: 1.5 mmoL/L (01.01.19 @ 16:09)    Urinalysis (01.01.19 @ 14:20)    pH Urine: 6.0    Blood, Urine: Small    Glucose Qualitative, Urine: Negative    Color: Dark Yellow    Urine Appearance: Turbid    Bilirubin: Negative    Ketone - Urine: Negative    Specific Gravity: >=1.030    Protein, Urine: 30    Urobilinogen: 1.0    Nitrite: Negative    Leukocyte Esterase Concentration: Large  Urine Microscopic-Add On (NC) (01.01.19 @ 14:20)    Epithelial Cells: Occasional /HPF    Bacteria: Moderate /HPF    White Blood Cell - Urine: 6-10 /HPF    Red Blood Cell - Urine: 6-10 /HPF     X-ray Chest 1 View-PORTABLE IMMEDIATE (01.01.19 @ 13:40):  Compared with 12/6/2018, stable bilateral lung opacities/effusions. No new consolidation.

## 2019-01-01 NOTE — H&P ADULT - PMH
Anemia    Arrhythmia    Colon cancer    Dementia    Diabetes    DVT (deep venous thrombosis)    HTN (hypertension)    Stroke

## 2019-01-02 LAB
ALBUMIN SERPL ELPH-MCNC: 2.4 G/DL — LOW (ref 3.5–5.2)
ALP SERPL-CCNC: 167 U/L — HIGH (ref 30–115)
ALT FLD-CCNC: 20 U/L — SIGNIFICANT CHANGE UP (ref 0–41)
ANION GAP SERPL CALC-SCNC: 11 MMOL/L — SIGNIFICANT CHANGE UP (ref 7–14)
ANION GAP SERPL CALC-SCNC: 12 MMOL/L — SIGNIFICANT CHANGE UP (ref 7–14)
ANION GAP SERPL CALC-SCNC: 17 MMOL/L — HIGH (ref 7–14)
ANION GAP SERPL CALC-SCNC: 9 MMOL/L — SIGNIFICANT CHANGE UP (ref 7–14)
ANION GAP SERPL CALC-SCNC: 9 MMOL/L — SIGNIFICANT CHANGE UP (ref 7–14)
AST SERPL-CCNC: 18 U/L — SIGNIFICANT CHANGE UP (ref 0–41)
BASOPHILS # BLD AUTO: 0 K/UL — SIGNIFICANT CHANGE UP (ref 0–0.2)
BASOPHILS NFR BLD AUTO: 0 % — SIGNIFICANT CHANGE UP (ref 0–1)
BILIRUB DIRECT SERPL-MCNC: 0.2 MG/DL — SIGNIFICANT CHANGE UP (ref 0–0.2)
BILIRUB INDIRECT FLD-MCNC: 0.3 MG/DL — SIGNIFICANT CHANGE UP (ref 0.2–1.2)
BILIRUB SERPL-MCNC: 0.5 MG/DL — SIGNIFICANT CHANGE UP (ref 0.2–1.2)
BUN SERPL-MCNC: 62 MG/DL — CRITICAL HIGH (ref 10–20)
BUN SERPL-MCNC: 69 MG/DL — CRITICAL HIGH (ref 10–20)
BUN SERPL-MCNC: 73 MG/DL — CRITICAL HIGH (ref 10–20)
BUN SERPL-MCNC: 77 MG/DL — CRITICAL HIGH (ref 10–20)
BUN SERPL-MCNC: 83 MG/DL — CRITICAL HIGH (ref 10–20)
CALCIUM SERPL-MCNC: 8.4 MG/DL — LOW (ref 8.5–10.1)
CALCIUM SERPL-MCNC: 8.8 MG/DL — SIGNIFICANT CHANGE UP (ref 8.5–10.1)
CALCIUM SERPL-MCNC: 9.4 MG/DL — SIGNIFICANT CHANGE UP (ref 8.5–10.1)
CHLORIDE SERPL-SCNC: 113 MMOL/L — HIGH (ref 98–110)
CHLORIDE SERPL-SCNC: 114 MMOL/L — HIGH (ref 98–110)
CHLORIDE SERPL-SCNC: 115 MMOL/L — HIGH (ref 98–110)
CHLORIDE SERPL-SCNC: 116 MMOL/L — HIGH (ref 98–110)
CHLORIDE SERPL-SCNC: 118 MMOL/L — HIGH (ref 98–110)
CO2 SERPL-SCNC: 30 MMOL/L — SIGNIFICANT CHANGE UP (ref 17–32)
CO2 SERPL-SCNC: 30 MMOL/L — SIGNIFICANT CHANGE UP (ref 17–32)
CO2 SERPL-SCNC: 31 MMOL/L — SIGNIFICANT CHANGE UP (ref 17–32)
CO2 SERPL-SCNC: 32 MMOL/L — SIGNIFICANT CHANGE UP (ref 17–32)
CO2 SERPL-SCNC: 32 MMOL/L — SIGNIFICANT CHANGE UP (ref 17–32)
CREAT ?TM UR-MCNC: 52 MG/DL — SIGNIFICANT CHANGE UP
CREAT SERPL-MCNC: 1 MG/DL — SIGNIFICANT CHANGE UP (ref 0.7–1.5)
CREAT SERPL-MCNC: 1 MG/DL — SIGNIFICANT CHANGE UP (ref 0.7–1.5)
CREAT SERPL-MCNC: 1.2 MG/DL — SIGNIFICANT CHANGE UP (ref 0.7–1.5)
CULTURE RESULTS: NO GROWTH — SIGNIFICANT CHANGE UP
EOSINOPHIL # BLD AUTO: 0.13 K/UL — SIGNIFICANT CHANGE UP (ref 0–0.7)
EOSINOPHIL NFR BLD AUTO: 2 % — SIGNIFICANT CHANGE UP (ref 0–8)
GLUCOSE BLDC GLUCOMTR-MCNC: 125 MG/DL — HIGH (ref 70–99)
GLUCOSE BLDC GLUCOMTR-MCNC: 147 MG/DL — HIGH (ref 70–99)
GLUCOSE BLDC GLUCOMTR-MCNC: 156 MG/DL — HIGH (ref 70–99)
GLUCOSE BLDC GLUCOMTR-MCNC: 169 MG/DL — HIGH (ref 70–99)
GLUCOSE SERPL-MCNC: 123 MG/DL — HIGH (ref 70–99)
GLUCOSE SERPL-MCNC: 133 MG/DL — HIGH (ref 70–99)
GLUCOSE SERPL-MCNC: 146 MG/DL — HIGH (ref 70–99)
GLUCOSE SERPL-MCNC: 198 MG/DL — HIGH (ref 70–99)
GLUCOSE SERPL-MCNC: 83 MG/DL — SIGNIFICANT CHANGE UP (ref 70–99)
HCT VFR BLD CALC: 34.6 % — LOW (ref 42–52)
HGB BLD-MCNC: 9.8 G/DL — LOW (ref 14–18)
IMM GRANULOCYTES NFR BLD AUTO: 0.3 % — SIGNIFICANT CHANGE UP (ref 0.1–0.3)
LYMPHOCYTES # BLD AUTO: 0.3 K/UL — LOW (ref 1.2–3.4)
LYMPHOCYTES # BLD AUTO: 4.7 % — LOW (ref 20.5–51.1)
MAGNESIUM SERPL-MCNC: 3 MG/DL — HIGH (ref 1.8–2.4)
MCHC RBC-ENTMCNC: 24.1 PG — LOW (ref 27–31)
MCHC RBC-ENTMCNC: 28.3 G/DL — LOW (ref 32–37)
MCV RBC AUTO: 85.2 FL — SIGNIFICANT CHANGE UP (ref 80–94)
MONOCYTES # BLD AUTO: 0.21 K/UL — SIGNIFICANT CHANGE UP (ref 0.1–0.6)
MONOCYTES NFR BLD AUTO: 3.3 % — SIGNIFICANT CHANGE UP (ref 1.7–9.3)
NEUTROPHILS # BLD AUTO: 5.69 K/UL — SIGNIFICANT CHANGE UP (ref 1.4–6.5)
NEUTROPHILS NFR BLD AUTO: 89.7 % — HIGH (ref 42.2–75.2)
OSMOLALITY UR: 776 MOS/KG — SIGNIFICANT CHANGE UP (ref 50–1400)
PLATELET # BLD AUTO: 170 K/UL — SIGNIFICANT CHANGE UP (ref 130–400)
POTASSIUM SERPL-MCNC: 3.6 MMOL/L — SIGNIFICANT CHANGE UP (ref 3.5–5)
POTASSIUM SERPL-MCNC: 4 MMOL/L — SIGNIFICANT CHANGE UP (ref 3.5–5)
POTASSIUM SERPL-MCNC: 4 MMOL/L — SIGNIFICANT CHANGE UP (ref 3.5–5)
POTASSIUM SERPL-MCNC: 4.8 MMOL/L — SIGNIFICANT CHANGE UP (ref 3.5–5)
POTASSIUM SERPL-MCNC: 4.9 MMOL/L — SIGNIFICANT CHANGE UP (ref 3.5–5)
POTASSIUM SERPL-SCNC: 3.6 MMOL/L — SIGNIFICANT CHANGE UP (ref 3.5–5)
POTASSIUM SERPL-SCNC: 4 MMOL/L — SIGNIFICANT CHANGE UP (ref 3.5–5)
POTASSIUM SERPL-SCNC: 4 MMOL/L — SIGNIFICANT CHANGE UP (ref 3.5–5)
POTASSIUM SERPL-SCNC: 4.8 MMOL/L — SIGNIFICANT CHANGE UP (ref 3.5–5)
POTASSIUM SERPL-SCNC: 4.9 MMOL/L — SIGNIFICANT CHANGE UP (ref 3.5–5)
PROT SERPL-MCNC: 5.1 G/DL — LOW (ref 6–8)
RAPID RVP RESULT: SIGNIFICANT CHANGE UP
RBC # BLD: 4.06 M/UL — LOW (ref 4.7–6.1)
RBC # FLD: 17.3 % — HIGH (ref 11.5–14.5)
SODIUM SERPL-SCNC: 154 MMOL/L — HIGH (ref 135–146)
SODIUM SERPL-SCNC: 157 MMOL/L — HIGH (ref 135–146)
SODIUM SERPL-SCNC: 157 MMOL/L — HIGH (ref 135–146)
SODIUM SERPL-SCNC: 159 MMOL/L — HIGH (ref 135–146)
SODIUM SERPL-SCNC: 162 MMOL/L — CRITICAL HIGH (ref 135–146)
SODIUM UR-SCNC: 20 MMOL/L — SIGNIFICANT CHANGE UP
SPECIMEN SOURCE: SIGNIFICANT CHANGE UP
WBC # BLD: 6.35 K/UL — SIGNIFICANT CHANGE UP (ref 4.8–10.8)
WBC # FLD AUTO: 6.35 K/UL — SIGNIFICANT CHANGE UP (ref 4.8–10.8)

## 2019-01-02 RX ORDER — MEROPENEM 1 G/30ML
1000 INJECTION INTRAVENOUS EVERY 8 HOURS
Qty: 0 | Refills: 0 | Status: DISCONTINUED | OUTPATIENT
Start: 2019-01-02 | End: 2019-01-05

## 2019-01-02 RX ORDER — DILTIAZEM HCL 120 MG
10 CAPSULE, EXT RELEASE 24 HR ORAL
Qty: 125 | Refills: 0 | Status: DISCONTINUED | OUTPATIENT
Start: 2019-01-02 | End: 2019-01-02

## 2019-01-02 RX ORDER — MEROPENEM 1 G/30ML
INJECTION INTRAVENOUS
Qty: 0 | Refills: 0 | Status: DISCONTINUED | OUTPATIENT
Start: 2019-01-02 | End: 2019-01-05

## 2019-01-02 RX ORDER — COLLAGENASE CLOSTRIDIUM HIST. 250 UNIT/G
1 OINTMENT (GRAM) TOPICAL
Qty: 0 | Refills: 0 | Status: DISCONTINUED | OUTPATIENT
Start: 2019-01-02 | End: 2019-01-05

## 2019-01-02 RX ORDER — DILTIAZEM HCL 120 MG
7.5 CAPSULE, EXT RELEASE 24 HR ORAL
Qty: 125 | Refills: 0 | Status: DISCONTINUED | OUTPATIENT
Start: 2019-01-02 | End: 2019-01-03

## 2019-01-02 RX ORDER — DILTIAZEM HCL 120 MG
5 CAPSULE, EXT RELEASE 24 HR ORAL
Qty: 125 | Refills: 0 | Status: DISCONTINUED | OUTPATIENT
Start: 2019-01-02 | End: 2019-01-02

## 2019-01-02 RX ORDER — MEROPENEM 1 G/30ML
1000 INJECTION INTRAVENOUS ONCE
Qty: 0 | Refills: 0 | Status: COMPLETED | OUTPATIENT
Start: 2019-01-02 | End: 2019-01-02

## 2019-01-02 RX ORDER — DILTIAZEM HCL 120 MG
7.5 CAPSULE, EXT RELEASE 24 HR ORAL
Qty: 125 | Refills: 0 | Status: DISCONTINUED | OUTPATIENT
Start: 2019-01-02 | End: 2019-01-02

## 2019-01-02 RX ORDER — SODIUM CHLORIDE 9 MG/ML
500 INJECTION, SOLUTION INTRAVENOUS ONCE
Qty: 0 | Refills: 0 | Status: COMPLETED | OUTPATIENT
Start: 2019-01-02 | End: 2019-01-02

## 2019-01-02 RX ORDER — INSULIN LISPRO 100/ML
3 VIAL (ML) SUBCUTANEOUS EVERY 6 HOURS
Qty: 0 | Refills: 0 | Status: DISCONTINUED | OUTPATIENT
Start: 2019-01-02 | End: 2019-01-05

## 2019-01-02 RX ORDER — SODIUM HYPOCHLORITE 0.125 %
1 SOLUTION, NON-ORAL MISCELLANEOUS
Qty: 0 | Refills: 0 | Status: DISCONTINUED | OUTPATIENT
Start: 2019-01-02 | End: 2019-01-05

## 2019-01-02 RX ORDER — SODIUM CHLORIDE 9 MG/ML
1000 INJECTION, SOLUTION INTRAVENOUS
Qty: 0 | Refills: 0 | Status: DISCONTINUED | OUTPATIENT
Start: 2019-01-02 | End: 2019-01-02

## 2019-01-02 RX ORDER — SODIUM CHLORIDE 9 MG/ML
1000 INJECTION, SOLUTION INTRAVENOUS
Qty: 0 | Refills: 0 | Status: DISCONTINUED | OUTPATIENT
Start: 2019-01-02 | End: 2019-01-03

## 2019-01-02 RX ADMIN — OLANZAPINE 5 MILLIGRAM(S): 15 TABLET, FILM COATED ORAL at 13:24

## 2019-01-02 RX ADMIN — Medication 1 APPLICATION(S): at 13:48

## 2019-01-02 RX ADMIN — SODIUM CHLORIDE 1000 MILLILITER(S): 9 INJECTION, SOLUTION INTRAVENOUS at 15:55

## 2019-01-02 RX ADMIN — Medication 3 UNIT(S): at 13:26

## 2019-01-02 RX ADMIN — CEFTRIAXONE 100 GRAM(S): 500 INJECTION, POWDER, FOR SOLUTION INTRAMUSCULAR; INTRAVENOUS at 06:00

## 2019-01-02 RX ADMIN — Medication 50 MILLIGRAM(S): at 05:06

## 2019-01-02 RX ADMIN — MEROPENEM 100 MILLIGRAM(S): 1 INJECTION INTRAVENOUS at 13:48

## 2019-01-02 RX ADMIN — Medication 300 MILLIGRAM(S): at 13:24

## 2019-01-02 RX ADMIN — ENOXAPARIN SODIUM 40 MILLIGRAM(S): 100 INJECTION SUBCUTANEOUS at 13:26

## 2019-01-02 RX ADMIN — Medication 7.5 MG/HR: at 18:09

## 2019-01-02 RX ADMIN — Medication 500 MILLIGRAM(S): at 13:24

## 2019-01-02 RX ADMIN — MEROPENEM 100 MILLIGRAM(S): 1 INJECTION INTRAVENOUS at 22:02

## 2019-01-02 RX ADMIN — SENNA PLUS 5 MILLILITER(S): 8.6 TABLET ORAL at 00:06

## 2019-01-02 RX ADMIN — Medication 5 MG/HR: at 10:50

## 2019-01-02 RX ADMIN — FAMOTIDINE 40 MILLIGRAM(S): 10 INJECTION INTRAVENOUS at 13:24

## 2019-01-02 RX ADMIN — Medication 3 UNIT(S): at 05:49

## 2019-01-02 RX ADMIN — SODIUM CHLORIDE 50 MILLILITER(S): 9 INJECTION, SOLUTION INTRAVENOUS at 22:14

## 2019-01-02 RX ADMIN — SENNA PLUS 5 MILLILITER(S): 8.6 TABLET ORAL at 22:02

## 2019-01-02 RX ADMIN — INSULIN GLARGINE 8 UNIT(S): 100 INJECTION, SOLUTION SUBCUTANEOUS at 23:47

## 2019-01-02 RX ADMIN — SODIUM CHLORIDE 75 MILLILITER(S): 9 INJECTION, SOLUTION INTRAVENOUS at 03:01

## 2019-01-02 NOTE — CONSULT NOTE ADULT - ASSESSMENT
IMPRESSION: sepsis/ rapid A fib, renal failure, dehydration, hypernatremia, multiple co morbidities. bed bound/ peg/ PNA/ UA      PLAN:    CNS: no CNS depressant    HEENT:  Oral care    PULMONARY:  HOB @ 45 degrees, aspiration precaution    CARDIOVASCULAR: D5 W Repeat Na, heart control, cardizem    GI: GI prophylaxis                                          Feeding NGT, free water    RENAL:  F/u  lytes.  Correct as needed. accurate I/O, f/up CMP    INFECTIOUS DISEASE: ABX TILL CX    HEMATOLOGICAL:  DVT prophylaxis.    ENDOCRINE:  Follow up FS.  Insulin protocol if needed.    CODE STATUS: DNR/ DNI    DISPOSITION: Downgrade to floor  palliative care

## 2019-01-02 NOTE — CONSULT NOTE ADULT - ASSESSMENT
JOEL  hypernatremia  dehydration  Afib with rvr  ARF  UTI  PNA  CVA 2 months ago, nonverbal, s/p PEG / dementia  DM  HTN  colon cancer    plan:    D5W  no need to correct na+ rapidly, can try to normalize over next 2-3 days  cardizem gtt  iv lopressor  iv abx adjusted gfr  f/u cx  cont talavera  trend lytes, renal fx  peg feeds with free h2o  poor prognosis  palliative care f/u  d/w resident  will follow

## 2019-01-02 NOTE — CONSULT NOTE ADULT - SUBJECTIVE AND OBJECTIVE BOX
NEPHROLOGY CONSULTATION NOTE    77 y/o M with PMH of A-fib, not on anticoagulation (see hospitalization history below), vascular dementia, recently diagnosed colon cancer, DM II, and HTN, recent joel with electrolyte derangements admitted with ARF, poorly responsive state, afib iwth rvr and joel with significant hypernatremia.    Renal consulted again for JOEL and hypernatremia.  Pt known to me from recent [facility] hospitalization.  Pt non-verbal.       PAST MEDICAL & SURGICAL HISTORY:  Stroke  DVT (deep venous thrombosis)  Anemia  Diabetes  HTN (hypertension)  Arrhythmia  Dementia  S/P cholecystectomy  History of hip replacement    Allergies:  No Known Allergies    Home Medications Reviewed    SOCIAL HISTORY:  Denies ETOH,Smoking,   FAMILY HISTORY:  No pertinent family history in first degree relatives    Yes      REVIEW OF SYSTEMS:  unobtainable    PHYSICAL EXAM:  NAD  lethargic  dry mm  pale  no jvd  b/l bs  irreg, tachy  soft, + binder over peg  no edema    Hospital Medications:   MEDICATIONS  (STANDING):  ascorbic acid 500 milliGRAM(s) Oral daily  collagenase Ointment 1 Application(s) Topical daily  dextrose 5%. 1000 milliLiter(s) (100 mL/Hr) IV Continuous <Continuous>  dextrose 50% Injectable 12.5 Gram(s) IV Push once  dextrose 50% Injectable 25 Gram(s) IV Push once  dextrose 50% Injectable 25 Gram(s) IV Push once  diltiazem Infusion 10 mG/Hr (10 mL/Hr) IV Continuous <Continuous>  enoxaparin Injectable 40 milliGRAM(s) SubCutaneous daily  famotidine    Tablet 40 milliGRAM(s) Oral daily  ferrous    sulfate Liquid 300 milliGRAM(s) Enteral Tube daily  insulin glargine Injectable (LANTUS) 8 Unit(s) SubCutaneous at bedtime  insulin lispro Injectable (HumaLOG) 3 Unit(s) SubCutaneous every 6 hours  meropenem  IVPB      meropenem  IVPB 1000 milliGRAM(s) IV Intermittent every 8 hours  metoprolol tartrate 50 milliGRAM(s) Enteral Tube every 12 hours  OLANZapine 5 milliGRAM(s) Oral daily  senna Syrup 5 milliLiter(s) Oral at bedtime        VITALS:  T(F): 97.6 (01-02-19 @ 15:30), Max: 99.5 (01-02-19 @ 06:00)  HR: 116 (01-02-19 @ 15:30)  BP: 140/85 (01-02-19 @ 15:30)  RR: 14 (01-02-19 @ 15:30)  SpO2: 98% (01-02-19 @ 15:30)  Wt(kg): --    01-01 @ 07:01  -  01-02 @ 07:00  --------------------------------------------------------  IN: 975 mL / OUT: 680 mL / NET: 295 mL    01-02 @ 07:01  -  01-02 @ 17:02  --------------------------------------------------------  IN: 157.5 mL / OUT: 100 mL / NET: 57.5 mL          LABS:  01-02    162<HH>  |  115<H>  |  73<HH>  ----------------------------<  133<H>  4.9   |  30  |  1.2    Ca    9.4      02 Jan 2019 11:59  Mg     3.0     01-02    TPro  5.1<L>  /  Alb  2.4<L>  /  TBili  0.5  /  DBili  0.2  /  AST  18  /  ALT  20  /  AlkPhos  167<H>  01-02                          9.8    6.35  )-----------( 170      ( 02 Jan 2019 07:42 )             34.6       Urine Studies:  Urinalysis Basic - ( 01 Jan 2019 14:20 )    Color: Dark Yellow / Appearance: Turbid / SG: >=1.030 / pH:   Gluc:  / Ketone: Negative  / Bili: Negative / Urobili: 1.0   Blood:  / Protein: 30 / Nitrite: Negative   Leuk Esterase: Large / RBC: 6-10 /HPF / WBC 6-10 /HPF   Sq Epi:  / Non Sq Epi: Occasional /HPF / Bacteria: Moderate /HPF      Sodium, Random Urine: 20.0 mmoL/L (01-02 @ 06:00)  Osmolality, Random Urine: 776 mos/kg (01-02 @ 06:00)  Creatinine, Random Urine: 52 mg/dL (01-02 @ 06:00)      RADIOLOGY & ADDITIONAL STUDIES:

## 2019-01-02 NOTE — CONSULT NOTE ADULT - SUBJECTIVE AND OBJECTIVE BOX
Patient is a 78y old  Male who presents with a chief complaint of sob (01 Jan 2019 16:47)      HPI:  79 y/o M with PMH of A-fib, not on anticoagulation (see hospitalization history below), vascular dementia, recently diagnosed colon cancer, DM II, and HTN was sent in from [facility] for desaturation. The patient has a history of advanced dementia and CVA and per documentation has been non-verbal since november of 2018. Currently he is obtunded and cannot provide any history. All history was obtained from patient's wife and daughter, nursing home papers, and previous hospital charts.     The nursing home writes that the patient desaturated to 88-90% on 3L O2 and was noted to have thick secretions. After being suctioned, the O2 saturation improved to 92-96%. However, the patient's wife wanted him sent to the hospital for an evaluation.     In the ED, the patient was found to be hypotensive to 70s/40s with a heart rate in the 180s. ECG showed atrial fibrillation with rapid ventricular response. 10mg cardizem IV was given, followed by 30 cardizem PO in the PEG tube. Heart rate improved to 110s and blood pressure improved to 120s/70s.    Upon assessment of the patient he was on 100% non-rebreather without documentation of the reason(s). There was no pulse oximetry data available in the system at the time of admission. The medicine team then weaned off oxygen support and the patient was saturating 92-93% on room air. He improved to 98% with 2L via nasal canula.    Patient's wife visits him daily in the nursing home. She states that at baseline he is bed-bound, minimally verbal, and unable to swallow (he has a PEG placed in November, 2018). However, she states that at baseline he is able to nod in response to questions and follow simple commands. She states that one week ago, his mental status changed. He is not responding to questions or commands, with no improvement over the week. Sodium in the ED was found to be 159, verified with a repeat VBG. On 12/14/18, his sodium was 146.     The patient has an extensive medical history with multiple hospitalizations. For convenience I have summarized his previous hospital courses below:    Admission on 2/13/2018: Presented from home for multiple falls and combative behavior at home. Was diagnosed with PNA and influenza infection. Was incidentally found to have a Hgb of 6.0, but no identifiable source of bleeding was discovered. Xarelto (being taken for A-fib) was held on discharge. He was discharged to a SNF for short term rehab.    Admission on 8/12/2018 for decreased ambulation and urinary frequency. At this time it was documented that at baseline he was requiring assistance with ADLs and had a home health aid for assistance. The inpatient work-up did not find any reversible causes or infections. The decreased ambulation was thought to be secondary to worsening dementia. He was discharged back to SNF for further short term rehab.    Admission 10/24/2018 for weakness and decreased PO intake. Found to have a subacute CVA and a urinary tract infection. Also presented with non-healing foot ulcers. After the stroke work-up was completed, he was cleared for an unmodified diet by speech pathology, however, the patient was hardly eating based on a 3 day calorie count. His wife decided to have a PEG tube placed, which was performed by the surgery team on 11/7/2018. He was also noted to have non-healing foot ulcers, and had an angiogram on 11/2/2018 with angioplasty to the anterior tibial artery. He was discharged on 11/8/2018 to long term placement in a nursing home.     Admission 11/19/2018 for fever noted at the nursing home. Also found to be in A-fib with RVR. Hospital course complicated by lower gastrointestinal bleeding. Colonoscopy performed at the time found a mass in the ascending colon and biopsies resulted as invasive adenocarcinoma, moderately differentiated. He was deemed not a candidate for surgery. There was no documentation available about what treatment options the patient's wife was considering. He was then discharged back to the nursing home. On discharge, was was noted to be non-verbal, but followed commands. The patient's wife had signed a DNR/DNI. (01 Jan 2019 16:47). events noted dec NA.      PAST MEDICAL & SURGICAL HISTORY:  Colon cancer  Stroke  DVT (deep venous thrombosis)  Anemia  Diabetes  HTN (hypertension)  Arrhythmia  Dementia  S/P cholecystectomy  History of hip replacement      SOCIAL HX:   Smoking  UTO    FAMILY HISTORY:  No pertinent family history in first degree relatives      REVIEW OF SYSTEMS HPI  	    Allergies    No Known Allergies    Intolerances        acetaminophen   Tablet .. 650 milliGRAM(s) Oral every 6 hours PRN  ascorbic acid 500 milliGRAM(s) Oral daily  cefTRIAXone   IVPB 1 Gram(s) IV Intermittent every 24 hours  collagenase Ointment 1 Application(s) Topical daily  dextrose 5%. 1000 milliLiter(s) IV Continuous <Continuous>  dextrose 50% Injectable 12.5 Gram(s) IV Push once  dextrose 50% Injectable 25 Gram(s) IV Push once  dextrose 50% Injectable 25 Gram(s) IV Push once  diltiazem    Tablet 30 milliGRAM(s) Enteral Tube every 6 hours  enoxaparin Injectable 40 milliGRAM(s) SubCutaneous daily  famotidine    Tablet 40 milliGRAM(s) Oral daily  ferrous    sulfate Liquid 300 milliGRAM(s) Enteral Tube daily  insulin glargine Injectable (LANTUS) 8 Unit(s) SubCutaneous at bedtime  insulin lispro Injectable (HumaLOG) 3 Unit(s) SubCutaneous every 6 hours  lactulose Syrup 20 Gram(s) Enteral Tube daily PRN  metoprolol tartrate 50 milliGRAM(s) Enteral Tube every 12 hours  OLANZapine 5 milliGRAM(s) Oral daily  senna Syrup 5 milliLiter(s) Oral at bedtime  : Home Meds:      PHYSICAL EXAM    ICU Vital Signs Last 24 Hrs  T(C): 37.5 (02 Jan 2019 06:00), Max: 38.3 (01 Jan 2019 12:20)  T(F): 99.5 (02 Jan 2019 06:00), Max: 100.9 (01 Jan 2019 12:20)  HR: 106 (02 Jan 2019 06:00) (101 - 178)  BP: 127/61 (02 Jan 2019 06:00) (72/59 - 167/73)  BP(mean): 81 (02 Jan 2019 06:00) (77 - 94)  RR: 22 (02 Jan 2019 06:00) (20 - 36)  SpO2: 99% (02 Jan 2019 06:00) (94% - 100%)      General:  HEENT: not following commands            Lymph Nodes: No cervical LN   Lungs: Bilateral BS, bibasilar crackles  Cardiovascular: Regular  Abdomen: Soft, Positive BS  Extremities: No clubbing  Skin:: ulcer  not following comamda    01-01-19 @ 07:01  -  01-02-19 @ 07:00  --------------------------------------------------------  IN:    dextrose 5% + sodium chloride 0.45%.: 375 mL    dextrose 5%.: 300 mL    dextrose 5%.: 300 mL  Total IN: 975 mL    OUT:    Voided: 680 mL  Total OUT: 680 mL    Total NET: 295 mL          LABS:                          11.1   6.40  )-----------( 186      ( 01 Jan 2019 12:13 )             39.2                                               01-02    159<H>  |  118<H>  |  83<HH>  ----------------------------<  198<H>  4.8   |  32  |  1.2    Ca    8.8      02 Jan 2019 01:22    TPro  5.6<L>  /  Alb  2.5<L>  /  TBili  0.5  /  DBili  x   /  AST  48<H>  /  ALT  28  /  AlkPhos  225<H>  01-01      PT/INR - ( 01 Jan 2019 12:16 )   PT: 18.20 sec;   INR: 1.59 ratio         PTT - ( 01 Jan 2019 12:16 )  PTT:33.2 sec                                       Urinalysis Basic - ( 01 Jan 2019 14:20 )    Color: Dark Yellow / Appearance: Turbid / SG: >=1.030 / pH: x  Gluc: x / Ketone: Negative  / Bili: Negative / Urobili: 1.0   Blood: x / Protein: 30 / Nitrite: Negative   Leuk Esterase: Large / RBC: 6-10 /HPF / WBC 6-10 /HPF   Sq Epi: x / Non Sq Epi: Occasional /HPF / Bacteria: Moderate /HPF                                                  LIVER FUNCTIONS - ( 01 Jan 2019 12:13 )  Alb: 2.5 g/dL / Pro: 5.6 g/dL / ALK PHOS: 225 U/L / ALT: 28 U/L / AST: 48 U/L / GGT: x                                                                                                                                       X-Rays   reviewed    MEDICATIONS  (STANDING):  ascorbic acid 500 milliGRAM(s) Oral daily  cefTRIAXone   IVPB 1 Gram(s) IV Intermittent every 24 hours  collagenase Ointment 1 Application(s) Topical daily  dextrose 5%. 1000 milliLiter(s) (75 mL/Hr) IV Continuous <Continuous>  dextrose 50% Injectable 12.5 Gram(s) IV Push once  dextrose 50% Injectable 25 Gram(s) IV Push once  dextrose 50% Injectable 25 Gram(s) IV Push once  diltiazem    Tablet 30 milliGRAM(s) Enteral Tube every 6 hours  enoxaparin Injectable 40 milliGRAM(s) SubCutaneous daily  famotidine    Tablet 40 milliGRAM(s) Oral daily  ferrous    sulfate Liquid 300 milliGRAM(s) Enteral Tube daily  insulin glargine Injectable (LANTUS) 8 Unit(s) SubCutaneous at bedtime  insulin lispro Injectable (HumaLOG) 3 Unit(s) SubCutaneous every 6 hours  metoprolol tartrate 50 milliGRAM(s) Enteral Tube every 12 hours  OLANZapine 5 milliGRAM(s) Oral daily  senna Syrup 5 milliLiter(s) Oral at bedtime    MEDICATIONS  (PRN):  acetaminophen   Tablet .. 650 milliGRAM(s) Oral every 6 hours PRN Temp greater or equal to 38.5C (101.3F), Mild Pain (1 - 3)  lactulose Syrup 20 Gram(s) Enteral Tube daily PRN Constipation

## 2019-01-02 NOTE — PROGRESS NOTE ADULT - ASSESSMENT
79 y/o M with multi co-morbidities who is admitted for sepsis, rapid A fib, renal failure, dehydration, and hypernatremia. Patient at baseline is bedbound, non verbal and PEG feed. Palliative team met with spouse at bedside to discuss goals of care. Spouse is understands patient's overall poor prognosis and will prefer comfort measures but deferred decision making to daughter. Palliative NP team spoke with daughter via phone, Palliative medicine introduced and available treatment options discussed. Daughter verbalized that family are aware of patient's status  but they are not ready for hospice. Daughter wants to continue with all necessary medical intervention at this time , but wants the code status to remain DNI/DNR. Patient appears comfortable with no acute symptoms to address.       Recommendations:  Ongoing medical mgt  DNI/DNR  we will f/u 79 y/o M with multi co-morbidities who is admitted for sepsis, rapid A fib, renal failure, dehydration, and hypernatremia. Patient at baseline is bedbound, non verbal and PEG feed. Palliative team met with spouse at bedside to discuss goals of care. Spouse is understands patient's overall poor prognosis and will prefer comfort measures but deferred decision making to daughter. Palliative NP team spoke with daughter (Miriam)via phone, Palliative medicine introduced and available treatment options discussed. Daughter verbalized that family are aware of patient's status  but they are not ready for hospice. Daughter wants to continue with all necessary medical intervention at this time , but wants the code status to remain DNI/DNR. Patient appears comfortable with no acute symptoms to address.     Case d/w Resident  Total time spent 30 mins,   see Goals of care note        Recommendations:  Ongoing medical mgt  DNI/DNR  we will f/u 77 y/o M with multi co-morbidities who is admitted for sepsis, rapid A fib, renal failure, dehydration, and hypernatremia. Patient at baseline is bedbound, non verbal and PEG feed. Palliative team met with spouse at bedside to discuss goals of care. Spouse  understands patient's overall poor prognosis and will prefer comfort measures but deferred decision making to daughter. Palliative NP team spoke with daughter (Miriam)via phone, Palliative medicine introduced and available treatment options discussed. Daughter verbalized that family are aware of patient's status  but they are not ready for hospice. Daughter wants to continue with all necessary medical intervention at this time , but wants the code status to remain DNI/DNR. Patient appears comfortable with no acute symptoms to address.     Case d/w Resident  Total time spent 30 mins,   see Goals of care note        Recommendations:  Ongoing medical mgt  DNI/DNR  we will f/u 79 y/o M with multi co-morbidities who is admitted for sepsis, rapid A fib, renal failure, dehydration, and hypernatremia. Patient at baseline is bedbound, non verbal and PEG feed. Palliative team met with spouse at bedside to discuss goals of care. Spouse  understands patient's overall poor prognosis and will prefer comfort measures but deferred decision making to daughter. Palliative NP team spoke with daughter (Miriam)via phone, Palliative medicine introduced and available treatment options discussed. Daughter verbalized that family are aware of patient's status  but they are not ready for hospice. Daughter wants to continue with all necessary medical intervention at this time , but wants the code status to remain DNI/DNR. Patient appears comfortable with no acute symptoms to address.     Case d/w Resident  Total time spent 30 mins,           Recommendations:  Ongoing medical mgt  DNI/DNR  we will f/u

## 2019-01-03 LAB
ANION GAP SERPL CALC-SCNC: 11 MMOL/L — SIGNIFICANT CHANGE UP (ref 7–14)
ANION GAP SERPL CALC-SCNC: 13 MMOL/L — SIGNIFICANT CHANGE UP (ref 7–14)
ANION GAP SERPL CALC-SCNC: 16 MMOL/L — HIGH (ref 7–14)
BASOPHILS # BLD AUTO: 0 K/UL — SIGNIFICANT CHANGE UP (ref 0–0.2)
BASOPHILS NFR BLD AUTO: 0 % — SIGNIFICANT CHANGE UP (ref 0–1)
BUN SERPL-MCNC: 51 MG/DL — HIGH (ref 10–20)
BUN SERPL-MCNC: 56 MG/DL — HIGH (ref 10–20)
BUN SERPL-MCNC: 60 MG/DL — HIGH (ref 10–20)
CALCIUM SERPL-MCNC: 8.4 MG/DL — LOW (ref 8.5–10.1)
CALCIUM SERPL-MCNC: 8.5 MG/DL — SIGNIFICANT CHANGE UP (ref 8.5–10.1)
CALCIUM SERPL-MCNC: 8.7 MG/DL — SIGNIFICANT CHANGE UP (ref 8.5–10.1)
CHLORIDE SERPL-SCNC: 112 MMOL/L — HIGH (ref 98–110)
CHLORIDE SERPL-SCNC: 112 MMOL/L — HIGH (ref 98–110)
CHLORIDE SERPL-SCNC: 113 MMOL/L — HIGH (ref 98–110)
CO2 SERPL-SCNC: 25 MMOL/L — SIGNIFICANT CHANGE UP (ref 17–32)
CO2 SERPL-SCNC: 29 MMOL/L — SIGNIFICANT CHANGE UP (ref 17–32)
CO2 SERPL-SCNC: 29 MMOL/L — SIGNIFICANT CHANGE UP (ref 17–32)
CREAT SERPL-MCNC: 0.9 MG/DL — SIGNIFICANT CHANGE UP (ref 0.7–1.5)
CREAT SERPL-MCNC: 1 MG/DL — SIGNIFICANT CHANGE UP (ref 0.7–1.5)
CREAT SERPL-MCNC: 1 MG/DL — SIGNIFICANT CHANGE UP (ref 0.7–1.5)
EOSINOPHIL # BLD AUTO: 0.19 K/UL — SIGNIFICANT CHANGE UP (ref 0–0.7)
EOSINOPHIL NFR BLD AUTO: 4.8 % — SIGNIFICANT CHANGE UP (ref 0–8)
GLUCOSE BLDC GLUCOMTR-MCNC: 103 MG/DL — HIGH (ref 70–99)
GLUCOSE BLDC GLUCOMTR-MCNC: 104 MG/DL — HIGH (ref 70–99)
GLUCOSE BLDC GLUCOMTR-MCNC: 125 MG/DL — HIGH (ref 70–99)
GLUCOSE BLDC GLUCOMTR-MCNC: 142 MG/DL — HIGH (ref 70–99)
GLUCOSE BLDC GLUCOMTR-MCNC: 152 MG/DL — HIGH (ref 70–99)
GLUCOSE BLDC GLUCOMTR-MCNC: 155 MG/DL — HIGH (ref 70–99)
GLUCOSE SERPL-MCNC: 131 MG/DL — HIGH (ref 70–99)
GLUCOSE SERPL-MCNC: 136 MG/DL — HIGH (ref 70–99)
GLUCOSE SERPL-MCNC: 97 MG/DL — SIGNIFICANT CHANGE UP (ref 70–99)
HCT VFR BLD CALC: 31.9 % — LOW (ref 42–52)
HGB BLD-MCNC: 9.1 G/DL — LOW (ref 14–18)
IMM GRANULOCYTES NFR BLD AUTO: 0.8 % — HIGH (ref 0.1–0.3)
LYMPHOCYTES # BLD AUTO: 0.38 K/UL — LOW (ref 1.2–3.4)
LYMPHOCYTES # BLD AUTO: 9.5 % — LOW (ref 20.5–51.1)
MAGNESIUM SERPL-MCNC: 2.6 MG/DL — HIGH (ref 1.8–2.4)
MCHC RBC-ENTMCNC: 24.1 PG — LOW (ref 27–31)
MCHC RBC-ENTMCNC: 28.5 G/DL — LOW (ref 32–37)
MCV RBC AUTO: 84.6 FL — SIGNIFICANT CHANGE UP (ref 80–94)
MONOCYTES # BLD AUTO: 0.15 K/UL — SIGNIFICANT CHANGE UP (ref 0.1–0.6)
MONOCYTES NFR BLD AUTO: 3.8 % — SIGNIFICANT CHANGE UP (ref 1.7–9.3)
NEUTROPHILS # BLD AUTO: 3.23 K/UL — SIGNIFICANT CHANGE UP (ref 1.4–6.5)
NEUTROPHILS NFR BLD AUTO: 81.1 % — HIGH (ref 42.2–75.2)
PLATELET # BLD AUTO: 142 K/UL — SIGNIFICANT CHANGE UP (ref 130–400)
POTASSIUM SERPL-MCNC: 3.6 MMOL/L — SIGNIFICANT CHANGE UP (ref 3.5–5)
POTASSIUM SERPL-MCNC: 4.2 MMOL/L — SIGNIFICANT CHANGE UP (ref 3.5–5)
POTASSIUM SERPL-MCNC: 4.4 MMOL/L — SIGNIFICANT CHANGE UP (ref 3.5–5)
POTASSIUM SERPL-SCNC: 3.6 MMOL/L — SIGNIFICANT CHANGE UP (ref 3.5–5)
POTASSIUM SERPL-SCNC: 4.2 MMOL/L — SIGNIFICANT CHANGE UP (ref 3.5–5)
POTASSIUM SERPL-SCNC: 4.4 MMOL/L — SIGNIFICANT CHANGE UP (ref 3.5–5)
RBC # BLD: 3.77 M/UL — LOW (ref 4.7–6.1)
RBC # FLD: 17.2 % — HIGH (ref 11.5–14.5)
SODIUM SERPL-SCNC: 153 MMOL/L — HIGH (ref 135–146)
SODIUM SERPL-SCNC: 153 MMOL/L — HIGH (ref 135–146)
SODIUM SERPL-SCNC: 154 MMOL/L — HIGH (ref 135–146)
WBC # BLD: 3.98 K/UL — LOW (ref 4.8–10.8)
WBC # FLD AUTO: 3.98 K/UL — LOW (ref 4.8–10.8)

## 2019-01-03 RX ORDER — OXYCODONE AND ACETAMINOPHEN 5; 325 MG/1; MG/1
1 TABLET ORAL EVERY 6 HOURS
Qty: 0 | Refills: 0 | Status: DISCONTINUED | OUTPATIENT
Start: 2019-01-03 | End: 2019-01-05

## 2019-01-03 RX ORDER — SODIUM CHLORIDE 9 MG/ML
1000 INJECTION, SOLUTION INTRAVENOUS
Qty: 0 | Refills: 0 | Status: DISCONTINUED | OUTPATIENT
Start: 2019-01-03 | End: 2019-01-04

## 2019-01-03 RX ADMIN — INSULIN GLARGINE 8 UNIT(S): 100 INJECTION, SOLUTION SUBCUTANEOUS at 23:47

## 2019-01-03 RX ADMIN — Medication 3 UNIT(S): at 17:26

## 2019-01-03 RX ADMIN — ENOXAPARIN SODIUM 40 MILLIGRAM(S): 100 INJECTION SUBCUTANEOUS at 13:19

## 2019-01-03 RX ADMIN — MEROPENEM 100 MILLIGRAM(S): 1 INJECTION INTRAVENOUS at 13:20

## 2019-01-03 RX ADMIN — Medication 1 APPLICATION(S): at 05:48

## 2019-01-03 RX ADMIN — Medication 1 APPLICATION(S): at 13:17

## 2019-01-03 RX ADMIN — Medication 650 MILLIGRAM(S): at 17:28

## 2019-01-03 RX ADMIN — OLANZAPINE 5 MILLIGRAM(S): 15 TABLET, FILM COATED ORAL at 13:20

## 2019-01-03 RX ADMIN — Medication 50 MILLIGRAM(S): at 17:28

## 2019-01-03 RX ADMIN — Medication 1 APPLICATION(S): at 05:47

## 2019-01-03 RX ADMIN — MEROPENEM 100 MILLIGRAM(S): 1 INJECTION INTRAVENOUS at 21:32

## 2019-01-03 RX ADMIN — Medication 3 UNIT(S): at 05:54

## 2019-01-03 RX ADMIN — Medication 3 UNIT(S): at 13:20

## 2019-01-03 RX ADMIN — Medication 7.5 MG/HR: at 00:09

## 2019-01-03 RX ADMIN — Medication 50 MILLIGRAM(S): at 05:49

## 2019-01-03 RX ADMIN — Medication 1 APPLICATION(S): at 17:27

## 2019-01-03 RX ADMIN — MEROPENEM 100 MILLIGRAM(S): 1 INJECTION INTRAVENOUS at 05:48

## 2019-01-03 RX ADMIN — SODIUM CHLORIDE 75 MILLILITER(S): 9 INJECTION, SOLUTION INTRAVENOUS at 21:31

## 2019-01-03 RX ADMIN — Medication 300 MILLIGRAM(S): at 13:20

## 2019-01-03 RX ADMIN — FAMOTIDINE 40 MILLIGRAM(S): 10 INJECTION INTRAVENOUS at 13:21

## 2019-01-03 RX ADMIN — Medication 500 MILLIGRAM(S): at 13:17

## 2019-01-03 NOTE — PROGRESS NOTE ADULT - ASSESSMENT
77 y/o M with PMH of A-fib, not on anticoagulation (bleeding adenocarcinoma colon), vascular dementia, recently diagnosed colon cancer, DM II, and HTN was sent in from [facility] for desaturation and fever.    1.) Symptomatic Hypernatremia: likely secondary to dehydration and/or inadequate free water intake with PEG feeds    - s/p 2L of LR in the ED.    - was on D5W at 100cc/hr, sodium better today 152 d/c D5W c/w with free water flushes via PEG    - BMP every 4 hours.    - Goal to correct sodium by no more than 6meq in the first 24 hours.     - [facility] for strict Is and Os.    2.) JOEL: Baseline creatinine 0.7-0.9. Likely prerenal etiology    - Monitor BMP.    - Hold losartan.    - nephro on board    3.) Respiratory distress: Resolved. Likely secondary to aspiration pneumonia and inability to control secretions.     - Chest x-ray is not significantly different than 1 month ago and pulmonary consult at the time had a low suspicion for pneumonia.    - f/u RVP negative    - Patient is currently saturating well on 2L nasal canula (baseline on 2-3L NC at [facility]).    - Consider starting a scopolamine patch if secretions continue to cause desaturations.    - Continue suction PRN.    - Monitor for changes.    4.) Atrial fibrillation with rapid ventricular response: Rate improved after cardizem in the ED.    - s/p cardizem IV and PO in the ED.    - rate better today d/earl cardizem drip and switched to PO cardizem 60 q6 through PEG tube    - Increased metoprolol to 50mg Q12h.    - Monitor heart rate and titrate up medications as needed.    5.) UTI    - Given positive UA and inability to obtain history of symptoms, will empirically cover for cystitis.    - currently on Meropenem I/V 1 Gm q8    6.) Multiple pressure ulcers present on admission: sacrum and bilateral heel    - appreciate burn eval; local enzymatic debridement, pt will need further debridement in future    - Continue local wound care as per burn    7.) Malnutrition: cachectic appearing with albumin of 2.5 on admission    - appreciate nutrition services consult; started pt on Glucerna 1.2 tube feeds 240ml q4 through PEG tube    8.) DM II:    - Continue basal / bolus insulin.    - Monitor blood glucose.     9.) Hypertension:    - Continue cardizem and metoprolol.    - Hold losartan due to JOEL.    10.) Adenocarcinoma of the colon:    - Outpatient oncology follow-up for goals of care.    11.) Vascular dementia with behavioral disturbance:    - Continue olanzapine.    12.) GERD:    - Continue famotidine.    13.) DVT PPx:    - lovenox.    14.) Disposition:    - DNR / DNI

## 2019-01-03 NOTE — DIETITIAN INITIAL EVALUATION ADULT. - SOURCE
pt. wife at bedside with limited English proficiency, but able to provide basic information/family/significant other

## 2019-01-03 NOTE — PROGRESS NOTE ADULT - ASSESSMENT
JOEL  hypernatremia  dehydration  Afib with rvr  ARF  UTI  PNA  CVA 2 months ago, nonverbal, s/p PEG / dementia  DM  HTN  colon cancer    plan:    off d5w  cont free water via peg  cardizem   iv lopressor  iv abx adjusted gfr  cont talavera  trend lytes, renal fx  poor prognosis  palliative care f/u  dnr / dni

## 2019-01-03 NOTE — CONSULT NOTE ADULT - ASSESSMENT
stage 4 pressure sores right foot and sacrum    rec: local wound care, enzymatic debridement with santyl    may need further debridement

## 2019-01-03 NOTE — DIETITIAN INITIAL EVALUATION ADULT. - ORAL INTAKE PTA
good/per [facility] documentation pt. on continuous feed with Glucerna 1.5 via PEG, vit C 500mg daily, no other supplements noted

## 2019-01-03 NOTE — CONSULT NOTE ADULT - SUBJECTIVE AND OBJECTIVE BOX
PATIENT SEEN AND EXAMINED WEDNESDAY 1/2/19    bedridden male with pressures sores sacrum and right  lateral foot    PE: sacrum: [id number] stage 4 pressure sore with adherent black necrotic tissue     rigth lateral foot : 5x3cm ulcer with exposed bone    left lower anterior leg--> 5x3cm partial thickness wound    Proc: sharp excisional debridement sacrum [id number] to and including fascia

## 2019-01-03 NOTE — DIETITIAN INITIAL EVALUATION ADULT. - PHYSICAL APPEARANCE
BMI 21.9, lethargic, skin: pressure ulcer stage III to sacrum and right lateral foot, no obvious physical signs of malnutrition noted on limited physical exam today/well nourished

## 2019-01-03 NOTE — DIETITIAN INITIAL EVALUATION ADULT. - NS AS NUTRI INTERV ENTERAL NUTRITION
When medically feasible provide Glucerna 1.2 at 240ml q4h via PEG. This regimen provides 1728kcal, 85g protein (97% est energy needs, 98% est protein needs)

## 2019-01-03 NOTE — DIETITIAN INITIAL EVALUATION ADULT. - ENERGY NEEDS
calorie 1783-2229kcal (MSJ x 1.2-1.5 AF) for pressure ulcer  protein 87-109g (1.2-1.5g/kg CBW) for pressure ulcer, lean towards lower end for JOEL, will monitor renal profile and adjust PRN   fluid 1ml/kcal or per LIP

## 2019-01-03 NOTE — DIETITIAN INITIAL EVALUATION ADULT. - OTHER INFO
Initial assessment for LOS. P/w: respiratory distress. Symptomatic Hypernatremia: likely secondary to dehydration and/or inadequate free water intake with PEG feeds: renal consult. JOEL: Baseline creatinine 0.7-0.9. Likely prerenal etiology: IV hydration. Atrial fibrillation with rapid ventricular response: Rate improved. Multiple pressure ulcers present on admission- local wound care. DM2: on insulin.  Adenocarcinoma of the colon: outpatient follow up. Palliative care f/u.

## 2019-01-04 LAB
-  AMIKACIN: SIGNIFICANT CHANGE UP
-  AMIKACIN: SIGNIFICANT CHANGE UP
-  AMOXICILLIN/CLAVULANIC ACID: SIGNIFICANT CHANGE UP
-  AMPICILLIN/SULBACTAM: SIGNIFICANT CHANGE UP
-  AMPICILLIN: SIGNIFICANT CHANGE UP
-  AZTREONAM: SIGNIFICANT CHANGE UP
-  AZTREONAM: SIGNIFICANT CHANGE UP
-  CEFAZOLIN: SIGNIFICANT CHANGE UP
-  CEFEPIME: SIGNIFICANT CHANGE UP
-  CEFEPIME: SIGNIFICANT CHANGE UP
-  CEFOXITIN: SIGNIFICANT CHANGE UP
-  CEFTAZIDIME: SIGNIFICANT CHANGE UP
-  CEFTRIAXONE: SIGNIFICANT CHANGE UP
-  CIPROFLOXACIN: SIGNIFICANT CHANGE UP
-  CIPROFLOXACIN: SIGNIFICANT CHANGE UP
-  ERTAPENEM: SIGNIFICANT CHANGE UP
-  GENTAMICIN: SIGNIFICANT CHANGE UP
-  GENTAMICIN: SIGNIFICANT CHANGE UP
-  IMIPENEM: SIGNIFICANT CHANGE UP
-  IMIPENEM: SIGNIFICANT CHANGE UP
-  LEVOFLOXACIN: SIGNIFICANT CHANGE UP
-  LEVOFLOXACIN: SIGNIFICANT CHANGE UP
-  MEROPENEM: SIGNIFICANT CHANGE UP
-  MEROPENEM: SIGNIFICANT CHANGE UP
-  PIPERACILLIN/TAZOBACTAM: SIGNIFICANT CHANGE UP
-  PIPERACILLIN/TAZOBACTAM: SIGNIFICANT CHANGE UP
-  TOBRAMYCIN: SIGNIFICANT CHANGE UP
-  TOBRAMYCIN: SIGNIFICANT CHANGE UP
-  TRIMETHOPRIM/SULFAMETHOXAZOLE: SIGNIFICANT CHANGE UP
ALBUMIN SERPL ELPH-MCNC: 2.1 G/DL — LOW (ref 3.5–5.2)
ALP SERPL-CCNC: 267 U/L — HIGH (ref 30–115)
ALT FLD-CCNC: 38 U/L — SIGNIFICANT CHANGE UP (ref 0–41)
ANION GAP SERPL CALC-SCNC: 12 MMOL/L — SIGNIFICANT CHANGE UP (ref 7–14)
AST SERPL-CCNC: 55 U/L — HIGH (ref 0–41)
BASOPHILS # BLD AUTO: 0.01 K/UL — SIGNIFICANT CHANGE UP (ref 0–0.2)
BASOPHILS NFR BLD AUTO: 0.2 % — SIGNIFICANT CHANGE UP (ref 0–1)
BILIRUB SERPL-MCNC: 0.6 MG/DL — SIGNIFICANT CHANGE UP (ref 0.2–1.2)
BUN SERPL-MCNC: 45 MG/DL — HIGH (ref 10–20)
CALCIUM SERPL-MCNC: 8.2 MG/DL — LOW (ref 8.5–10.1)
CHLORIDE SERPL-SCNC: 112 MMOL/L — HIGH (ref 98–110)
CO2 SERPL-SCNC: 29 MMOL/L — SIGNIFICANT CHANGE UP (ref 17–32)
CREAT SERPL-MCNC: 0.8 MG/DL — SIGNIFICANT CHANGE UP (ref 0.7–1.5)
CULTURE RESULTS: SIGNIFICANT CHANGE UP
EOSINOPHIL # BLD AUTO: 0.2 K/UL — SIGNIFICANT CHANGE UP (ref 0–0.7)
EOSINOPHIL NFR BLD AUTO: 4.3 % — SIGNIFICANT CHANGE UP (ref 0–8)
GLUCOSE BLDC GLUCOMTR-MCNC: 135 MG/DL — HIGH (ref 70–99)
GLUCOSE BLDC GLUCOMTR-MCNC: 159 MG/DL — HIGH (ref 70–99)
GLUCOSE BLDC GLUCOMTR-MCNC: 164 MG/DL — HIGH (ref 70–99)
GLUCOSE BLDC GLUCOMTR-MCNC: 197 MG/DL — HIGH (ref 70–99)
GLUCOSE SERPL-MCNC: 137 MG/DL — HIGH (ref 70–99)
HCT VFR BLD CALC: 34.6 % — LOW (ref 42–52)
HGB BLD-MCNC: 10 G/DL — LOW (ref 14–18)
IMM GRANULOCYTES NFR BLD AUTO: 1.1 % — HIGH (ref 0.1–0.3)
LYMPHOCYTES # BLD AUTO: 0.55 K/UL — LOW (ref 1.2–3.4)
LYMPHOCYTES # BLD AUTO: 11.9 % — LOW (ref 20.5–51.1)
MAGNESIUM SERPL-MCNC: 2.4 MG/DL — SIGNIFICANT CHANGE UP (ref 1.8–2.4)
MCHC RBC-ENTMCNC: 23.9 PG — LOW (ref 27–31)
MCHC RBC-ENTMCNC: 28.9 G/DL — LOW (ref 32–37)
MCV RBC AUTO: 82.8 FL — SIGNIFICANT CHANGE UP (ref 80–94)
METHOD TYPE: SIGNIFICANT CHANGE UP
METHOD TYPE: SIGNIFICANT CHANGE UP
MONOCYTES # BLD AUTO: 0.21 K/UL — SIGNIFICANT CHANGE UP (ref 0.1–0.6)
MONOCYTES NFR BLD AUTO: 4.5 % — SIGNIFICANT CHANGE UP (ref 1.7–9.3)
NEUTROPHILS # BLD AUTO: 3.61 K/UL — SIGNIFICANT CHANGE UP (ref 1.4–6.5)
NEUTROPHILS NFR BLD AUTO: 78 % — HIGH (ref 42.2–75.2)
NRBC # BLD: 0 /100 WBCS — SIGNIFICANT CHANGE UP (ref 0–0)
PLATELET # BLD AUTO: 147 K/UL — SIGNIFICANT CHANGE UP (ref 130–400)
POTASSIUM SERPL-MCNC: 4 MMOL/L — SIGNIFICANT CHANGE UP (ref 3.5–5)
POTASSIUM SERPL-SCNC: 4 MMOL/L — SIGNIFICANT CHANGE UP (ref 3.5–5)
PROT SERPL-MCNC: 4.6 G/DL — LOW (ref 6–8)
RBC # BLD: 4.18 M/UL — LOW (ref 4.7–6.1)
RBC # FLD: 17.2 % — HIGH (ref 11.5–14.5)
SODIUM SERPL-SCNC: 153 MMOL/L — HIGH (ref 135–146)
SPECIMEN SOURCE: SIGNIFICANT CHANGE UP
WBC # BLD: 4.63 K/UL — LOW (ref 4.8–10.8)
WBC # FLD AUTO: 4.63 K/UL — LOW (ref 4.8–10.8)

## 2019-01-04 RX ORDER — SODIUM CHLORIDE 9 MG/ML
1000 INJECTION, SOLUTION INTRAVENOUS
Qty: 0 | Refills: 0 | Status: DISCONTINUED | OUTPATIENT
Start: 2019-01-04 | End: 2019-01-04

## 2019-01-04 RX ADMIN — OLANZAPINE 5 MILLIGRAM(S): 15 TABLET, FILM COATED ORAL at 12:40

## 2019-01-04 RX ADMIN — Medication 3 UNIT(S): at 07:03

## 2019-01-04 RX ADMIN — Medication 1 APPLICATION(S): at 07:01

## 2019-01-04 RX ADMIN — Medication 3 UNIT(S): at 18:28

## 2019-01-04 RX ADMIN — Medication 500 MILLIGRAM(S): at 12:40

## 2019-01-04 RX ADMIN — Medication 3 UNIT(S): at 12:46

## 2019-01-04 RX ADMIN — Medication 50 MILLIGRAM(S): at 05:47

## 2019-01-04 RX ADMIN — MEROPENEM 100 MILLIGRAM(S): 1 INJECTION INTRAVENOUS at 21:18

## 2019-01-04 RX ADMIN — FAMOTIDINE 40 MILLIGRAM(S): 10 INJECTION INTRAVENOUS at 12:42

## 2019-01-04 RX ADMIN — MEROPENEM 100 MILLIGRAM(S): 1 INJECTION INTRAVENOUS at 14:40

## 2019-01-04 RX ADMIN — INSULIN GLARGINE 8 UNIT(S): 100 INJECTION, SOLUTION SUBCUTANEOUS at 22:08

## 2019-01-04 RX ADMIN — Medication 1 APPLICATION(S): at 12:46

## 2019-01-04 RX ADMIN — MEROPENEM 100 MILLIGRAM(S): 1 INJECTION INTRAVENOUS at 05:08

## 2019-01-04 RX ADMIN — ENOXAPARIN SODIUM 40 MILLIGRAM(S): 100 INJECTION SUBCUTANEOUS at 12:41

## 2019-01-04 RX ADMIN — Medication 300 MILLIGRAM(S): at 12:41

## 2019-01-04 RX ADMIN — Medication 1 APPLICATION(S): at 18:29

## 2019-01-04 RX ADMIN — Medication 1 APPLICATION(S): at 18:30

## 2019-01-04 RX ADMIN — Medication 50 MILLIGRAM(S): at 18:30

## 2019-01-04 RX ADMIN — Medication 3 UNIT(S): at 23:55

## 2019-01-04 NOTE — PROGRESS NOTE ADULT - PROVIDER SPECIALTY LIST ADULT
Internal Medicine
Nephrology
Nephrology
Palliative Care
Palliative Care

## 2019-01-04 NOTE — PROGRESS NOTE ADULT - SUBJECTIVE AND OBJECTIVE BOX
Patient is a 78y old  Male who presents with a chief complaint of sob     Patient seen, awake, no significant change in status . NAD    PAST MEDICAL & SURGICAL HISTORY:  Colon cancer  Stroke  DVT (deep venous thrombosis)  Anemia  Diabetes  HTN (hypertension)  Arrhythmia  Dementia  S/P cholecystectomy  History of hip replacement      SOCIAL HX:   Smoking  UTO    FAMILY HISTORY:  No pertinent family history in first degree relatives        PHYSICAL EXAM:  Constitution: Chronically ill male, awake, non verbal. NAD            Respiratory: decreased breath sound  Cardiovascular: S1S2 Irregular   Abdomen: Soft, non distended /tender + PEG   Extremities: bed bound, - clubbing   Skin:: sacral decubiti           T(C): , Max: 36.7 (05:32)  T(F): 98  HR: 120 (61 - 120)  BP: 125/75 (120/72 - 133/72)  RR: 18 (18 - 18)  SpO2: 96% (96% - 97%)                                    10.0   4.63  )-----------( 147      ( 04 Jan 2019 06:08 )             34.6                                                                                      01-04    153<H>  |  112<H>  |  45<H>  ----------------------------<  137<H>  4.0   |  29  |  0.8    Ca    8.2<L>      04 Jan 2019 06:08  Mg     2.4     01-04    TPro  4.6<L>  /  Alb  2.1<L>  /  TBili  0.6  /  DBili  x   /  AST  55<H>  /  ALT  38  /  AlkPhos  267<H>  01-04                                                      MEDICATIONS  (STANDING):  ascorbic acid 500 milliGRAM(s) Oral daily  collagenase Ointment 1 Application(s) Topical daily  collagenase Ointment 1 Application(s) Topical two times a day  Dakins Solution - 1/2 Strength 1 Application(s) Topical two times a day  dextrose 50% Injectable 12.5 Gram(s) IV Push once  dextrose 50% Injectable 25 Gram(s) IV Push once  dextrose 50% Injectable 25 Gram(s) IV Push once  diltiazem    Tablet 60 milliGRAM(s) Oral every 6 hours  enoxaparin Injectable 40 milliGRAM(s) SubCutaneous daily  famotidine    Tablet 40 milliGRAM(s) Oral daily  ferrous    sulfate Liquid 300 milliGRAM(s) Enteral Tube daily  insulin glargine Injectable (LANTUS) 8 Unit(s) SubCutaneous at bedtime  insulin lispro Injectable (HumaLOG) 3 Unit(s) SubCutaneous every 6 hours  meropenem  IVPB      meropenem  IVPB 1000 milliGRAM(s) IV Intermittent every 8 hours  metoprolol tartrate 50 milliGRAM(s) Enteral Tube every 12 hours  OLANZapine 5 milliGRAM(s) Oral daily  senna Syrup 5 milliLiter(s) Oral at bedtime    MEDICATIONS  (PRN):  acetaminophen   Tablet .. 650 milliGRAM(s) Oral every 6 hours PRN Temp greater or equal to 38.5C (101.3F), Mild Pain (1 - 3)  lactulose Syrup 20 Gram(s) Enteral Tube daily PRN Constipation  oxyCODONE    5 mG/acetaminophen 325 mG 1 Tablet(s) Oral every 6 hours PRN Moderate Pain (4 - 6)
[facility] Admit Note    See H and P- agree with all details, and with initial assessment and plan      Patient was seen and examined. Spoke with RN. Chart reviewed.  No events overnight. Nonverbal  Vital Signs Last 24 Hrs  T(F): 96 (03 Jan 2019 05:12), Max: 98.5 (02 Jan 2019 10:30)  HR: 91 (03 Jan 2019 05:12) (86 - 176)  BP: 128/70 (03 Jan 2019 05:12) (104/55 - 165/78)  SpO2: 98% (02 Jan 2019 19:55) (97% - 99%)  MEDICATIONS  (STANDING):  ascorbic acid 500 milliGRAM(s) Oral daily  collagenase Ointment 1 Application(s) Topical daily  collagenase Ointment 1 Application(s) Topical two times a day  Dakins Solution - 1/2 Strength 1 Application(s) Topical two times a day  dextrose 50% Injectable 12.5 Gram(s) IV Push once  dextrose 50% Injectable 25 Gram(s) IV Push once  dextrose 50% Injectable 25 Gram(s) IV Push once  diltiazem Infusion 7.5 mG/Hr (7.5 mL/Hr) IV Continuous <Continuous>  enoxaparin Injectable 40 milliGRAM(s) SubCutaneous daily  famotidine    Tablet 40 milliGRAM(s) Oral daily  ferrous    sulfate Liquid 300 milliGRAM(s) Enteral Tube daily  insulin glargine Injectable (LANTUS) 8 Unit(s) SubCutaneous at bedtime  insulin lispro Injectable (HumaLOG) 3 Unit(s) SubCutaneous every 6 hours  meropenem  IVPB      meropenem  IVPB 1000 milliGRAM(s) IV Intermittent every 8 hours  metoprolol tartrate 50 milliGRAM(s) Enteral Tube every 12 hours  OLANZapine 5 milliGRAM(s) Oral daily  senna Syrup 5 milliLiter(s) Oral at bedtime    MEDICATIONS  (PRN):  acetaminophen   Tablet .. 650 milliGRAM(s) Oral every 6 hours PRN Temp greater or equal to 38.5C (101.3F), Mild Pain (1 - 3)  lactulose Syrup 20 Gram(s) Enteral Tube daily PRN Constipation    Labs:                        9.1    3.98  )-----------( 142      ( 03 Jan 2019 06:17 )             31.9                         9.8    6.35  )-----------( 170      ( 02 Jan 2019 07:42 )             34.6     02 Jan 2019 23:43    153    |  112    |  60     ----------------------------<  131    4.4     |  25     |  1.0    02 Jan 2019 21:30    154    |  113    |  62     ----------------------------<  146    3.6     |  30     |  1.0      Ca    8.7        02 Jan 2019 23:43  Ca    8.4        02 Jan 2019 21:30  Mg     3.0       02 Jan 2019 07:42    TPro  5.1    /  Alb  2.4    /  TBili  0.5    /  DBili  0.2    /  AST  18     /  ALT  20     /  AlkPhos  167    02 Jan 2019 07:42  TPro  5.6    /  Alb  2.5    /  TBili  0.5    /  DBili  x      /  AST  48     /  ALT  28     /  AlkPhos  225    01 Jan 2019 12:13    PT/INR - ( 01 Jan 2019 12:16 )   PT: 18.20 sec;   INR: 1.59 ratio         PTT - ( 01 Jan 2019 12:16 )  PTT:33.2 sec  Urinalysis Basic - ( 01 Jan 2019 14:20 )    Color: Dark Yellow / Appearance: Turbid / SG: >=1.030 / pH: x  Gluc: x / Ketone: Negative  / Bili: Negative / Urobili: 1.0   Blood: x / Protein: 30 / Nitrite: Negative   Leuk Esterase: Large / RBC: 6-10 /HPF / WBC 6-10 /HPF   Sq Epi: x / Non Sq Epi: Occasional /HPF / Bacteria: Moderate /HPF        Culture - Urine (collected 01 Jan 2019 14:20)  Source: .Urine Clean Catch (Midstream)  Final Report (02 Jan 2019 20:22):    No growth    Culture - Blood (collected 01 Jan 2019 12:16)  Source: .Blood Blood-Peripheral  Preliminary Report (03 Jan 2019 01:01):    No growth to date.    Culture - Blood (collected 01 Jan 2019 12:16)  Source: .Blood Blood-Peripheral  Preliminary Report (03 Jan 2019 01:01):    No growth to date.      General: comfortable, NAD  Head:  Normocephalic, atraumatic  ENT:  Mucosa moist, no ulcerations  Neck:  Supple, no JVD,   Resp: CTA B/L  CV: RRR, S1S2,   GI: Soft, NT, bowel sounds  MS: edema, + peripheral pulses,       A/P:  77 y/o M with PMH of A-fib, not on anticoagulation (see hospitalization history below), vascular dementia, recently diagnosed colon cancer, DM II, and HTN was sent in from [facility] for desaturation.    1.) Symptomatic Hypernatremia: likely secondary to dehydration and/or inadequate free water intake with PEG feeds    - s/p 2L of LR in the ED.    - refused admission to ICU.    - Start D5W at 100cc/hr .    - BMP every 4 hours.    - Goal to correct sodium by no more than 10meq in the first 24 hours.    - Check urine sodium.    - Deluca for strict Is and Os.     -renal f/u noted    2.) JOEL: Baseline creatinine 0.7-0.9. Likely prerenal etiology    - Continue IV hydration.    - Monitor BMP.    - Hold losartan.    - renal f/u    3.) Respiratory distress: Resolved. Likely secondary to aspiration and inability to control secretions. Unlikely pneumonia.    - Chest x-ray is not significantly different than 1 month ago and pulmonary consult at the time had a low suspicion for pneumonia.    - f/u RVP (given low grade fever and lack of leukocytosis).    - Consider starting a scopolamine patch if secretions continue to cause desaturations.    - Continue suction PRN.    - Monitor for changes.     - pulm f/u    4.) Atrial fibrillation with rapid ventricular response: Rate improved after cardizem in the ED.    - s/p cardizem IV and PO in the ED.    - Continue cardizem.    - Increased metoprolol to 50mg Q12h.    - Monitor heart rate and titrate up medications as needed.    5.) Fever: Unclear etiology. Possibly viral respiratory infection vs. cystitis. Doubt pneumonia.    - Given positive UA and inability to obtain history of symptoms, will empirically cover for cystitis with rocephin.    - f/u RVP.    6.) Multiple pressure ulcers present on admission: sacrum and bilateral heel    - Continue local wound care with collagenase.- aggressive monitoring and treatment  ----Call burn to evaluate if worsens    7.) Malnutrition: cachectic appearing with albumin of 2.5 on admission    - Consider nutrition services consult to adjust PEG feeds.    8.) DM II:    - Continue basal / bolus insulin.    - Monitor blood glucose.     9.) Hypertension:    - Continue cardizem and metoprolol.    - Hold losartan due to JOEL.    10.) Adenocarcinoma of the colon:    - Outpatient oncology follow-up for goals of care.    11.) Vascular dementia with behavioral disturbance:    - Continue olanzapine.    Palliative care f/u        DVT prophylaxis  Decubitus prevention- all measures as per RN protocol  Please call or text me with any questions or updates
NEPHROLOGY FOLLOW UP NOTE    pt seen and examined  d/w wife at bedside  off ivf  cr better  na+ better        PAST MEDICAL & SURGICAL HISTORY:  Stroke  DVT (deep venous thrombosis)  Anemia  Diabetes  HTN (hypertension)  Arrhythmia  Dementia  S/P cholecystectomy  History of hip replacement    Allergies:  No Known Allergies      SOCIAL HISTORY:  Denies ETOH,Smoking,   FAMILY HISTORY:  No pertinent family history in first degree relatives    Yes      REVIEW OF SYSTEMS:  unobtainable    advance care planning and advance care directives reviewed and discussed    PHYSICAL EXAM:  NAD  lethargic  pale  dry mm  no jvd  b/l bs  irreg  soft, + binder over peg  no edema    Hospital Medications:   MEDICATIONS  (STANDING):  ascorbic acid 500 milliGRAM(s) Oral daily  collagenase Ointment 1 Application(s) Topical daily  collagenase Ointment 1 Application(s) Topical two times a day  Dakins Solution - 1/2 Strength 1 Application(s) Topical two times a day  dextrose 50% Injectable 12.5 Gram(s) IV Push once  dextrose 50% Injectable 25 Gram(s) IV Push once  dextrose 50% Injectable 25 Gram(s) IV Push once  diltiazem    Tablet 60 milliGRAM(s) Oral every 6 hours  enoxaparin Injectable 40 milliGRAM(s) SubCutaneous daily  famotidine    Tablet 40 milliGRAM(s) Oral daily  ferrous    sulfate Liquid 300 milliGRAM(s) Enteral Tube daily  insulin glargine Injectable (LANTUS) 8 Unit(s) SubCutaneous at bedtime  insulin lispro Injectable (HumaLOG) 3 Unit(s) SubCutaneous every 6 hours  meropenem  IVPB      meropenem  IVPB 1000 milliGRAM(s) IV Intermittent every 8 hours  metoprolol tartrate 50 milliGRAM(s) Enteral Tube every 12 hours  OLANZapine 5 milliGRAM(s) Oral daily  senna Syrup 5 milliLiter(s) Oral at bedtime        VITALS:  T(F): 96.7 (01-03-19 @ 10:00), Max: 98.4 (01-03-19 @ 08:15)  HR: 127 (01-03-19 @ 10:00)  BP: 164/77 (01-03-19 @ 10:00)  RR: 20 (01-03-19 @ 10:00)  SpO2: 98% (01-02-19 @ 19:55)  Wt(kg): --    01-01 @ 07:01  -  01-02 @ 07:00  --------------------------------------------------------  IN: 975 mL / OUT: 680 mL / NET: 295 mL    01-02 @ 07:01  -  01-03 @ 07:00  --------------------------------------------------------  IN: 1540 mL / OUT: 1200 mL / NET: 340 mL      Height (cm): 182.8 (01-03 @ 10:29)  Weight (kg): 73.3 (01-03 @ 08:53)  BMI (kg/m2): 21.9 (01-03 @ 10:29)  BSA (m2): 1.95 (01-03 @ 10:29)    LABS:  01-03    154<H>  |  112<H>  |  56<H>  ----------------------------<  136<H>  4.2   |  29  |  1.0    Ca    8.5      03 Jan 2019 06:17  Mg     2.6     01-03    TPro  5.1<L>  /  Alb  2.4<L>  /  TBili  0.5  /  DBili  0.2  /  AST  18  /  ALT  20  /  AlkPhos  167<H>  01-02                          9.1    3.98  )-----------( 142      ( 03 Jan 2019 06:17 )             31.9       Urine Studies:  Urinalysis Basic - ( 01 Jan 2019 14:20 )    Color: Dark Yellow / Appearance: Turbid / SG: >=1.030 / pH:   Gluc:  / Ketone: Negative  / Bili: Negative / Urobili: 1.0   Blood:  / Protein: 30 / Nitrite: Negative   Leuk Esterase: Large / RBC: 6-10 /HPF / WBC 6-10 /HPF   Sq Epi:  / Non Sq Epi: Occasional /HPF / Bacteria: Moderate /HPF      Sodium, Random Urine: 20.0 mmoL/L (01-02 @ 06:00)  Osmolality, Random Urine: 776 mos/kg (01-02 @ 06:00)  Creatinine, Random Urine: 52 mg/dL (01-02 @ 06:00)      RADIOLOGY & ADDITIONAL STUDIES:
NEPHROLOGY FOLLOW UP NOTE    pt seen and examined  palliative care input noted  pt now comfort care  d/w team and family        PAST MEDICAL & SURGICAL HISTORY:  Stroke  DVT (deep venous thrombosis)  Anemia  Diabetes  HTN (hypertension)  Arrhythmia  Dementia  S/P cholecystectomy  History of hip replacement    Allergies:  No Known Allergies      SOCIAL HISTORY:  Denies ETOH,Smoking,   FAMILY HISTORY:  No pertinent family history in first degree relatives    Yes      REVIEW OF SYSTEMS:  unobtainable      PHYSICAL EXAM:  NAD  lethargic  pale  dry mm  no jvd  b/l bs  irreg  soft, + binder over peg  no edema    Hospital Medications:   MEDICATIONS  (STANDING):  ascorbic acid 500 milliGRAM(s) Oral daily  collagenase Ointment 1 Application(s) Topical daily  collagenase Ointment 1 Application(s) Topical two times a day  Dakins Solution - 1/2 Strength 1 Application(s) Topical two times a day  dextrose 50% Injectable 12.5 Gram(s) IV Push once  dextrose 50% Injectable 25 Gram(s) IV Push once  dextrose 50% Injectable 25 Gram(s) IV Push once  diltiazem    Tablet 60 milliGRAM(s) Oral every 6 hours  enoxaparin Injectable 40 milliGRAM(s) SubCutaneous daily  famotidine    Tablet 40 milliGRAM(s) Oral daily  ferrous    sulfate Liquid 300 milliGRAM(s) Enteral Tube daily  insulin glargine Injectable (LANTUS) 8 Unit(s) SubCutaneous at bedtime  insulin lispro Injectable (HumaLOG) 3 Unit(s) SubCutaneous every 6 hours  meropenem  IVPB      meropenem  IVPB 1000 milliGRAM(s) IV Intermittent every 8 hours  metoprolol tartrate 50 milliGRAM(s) Enteral Tube every 12 hours  OLANZapine 5 milliGRAM(s) Oral daily  senna Syrup 5 milliLiter(s) Oral at bedtime        VITALS:  T(F): 98 (01-04-19 @ 12:49), Max: 98 (01-04-19 @ 05:32)  HR: 120 (01-04-19 @ 12:59)  BP: 125/75 (01-04-19 @ 12:59)  RR: 18 (01-04-19 @ 12:49)  SpO2: 96% (01-04-19 @ 12:59)  Wt(kg): --    01-02 @ 07:01  -  01-03 @ 07:00  --------------------------------------------------------  IN: 1540 mL / OUT: 1200 mL / NET: 340 mL    01-03 @ 07:01 - 01-04 @ 07:00  --------------------------------------------------------  IN: 3000 mL / OUT: 1050 mL / NET: 1950 mL    01-04 @ 07:01 - 01-04 @ 16:26  --------------------------------------------------------  IN: 660 mL / OUT: 376 mL / NET: 284 mL          LABS:  01-04    153<H>  |  112<H>  |  45<H>  ----------------------------<  137<H>  4.0   |  29  |  0.8    Ca    8.2<L>      04 Jan 2019 06:08  Mg     2.4     01-04    TPro  4.6<L>  /  Alb  2.1<L>  /  TBili  0.6  /  DBili      /  AST  55<H>  /  ALT  38  /  AlkPhos  267<H>  01-04                          10.0   4.63  )-----------( 147      ( 04 Jan 2019 06:08 )             34.6       Urine Studies:  Urinalysis Basic - ( 01 Jan 2019 14:20 )    Color: Dark Yellow / Appearance: Turbid / SG: >=1.030 / pH:   Gluc:  / Ketone: Negative  / Bili: Negative / Urobili: 1.0   Blood:  / Protein: 30 / Nitrite: Negative   Leuk Esterase: Large / RBC: 6-10 /HPF / WBC 6-10 /HPF   Sq Epi:  / Non Sq Epi: Occasional /HPF / Bacteria: Moderate /HPF      Sodium, Random Urine: 20.0 mmoL/L (01-02 @ 06:00)  Osmolality, Random Urine: 776 mos/kg (01-02 @ 06:00)  Creatinine, Random Urine: 52 mg/dL (01-02 @ 06:00)      RADIOLOGY & ADDITIONAL STUDIES:
Patient is a 78y old  Male who presents with a chief complaint of sob     Patient seen, awake, non verbal, not following command. No distress noted. Spouse at bedside    PAST MEDICAL & SURGICAL HISTORY:  Colon cancer  Stroke  DVT (deep venous thrombosis)  Anemia  Diabetes  HTN (hypertension)  Arrhythmia  Dementia  S/P cholecystectomy  History of hip replacement      SOCIAL HX:   Smoking  UTO    FAMILY HISTORY:  No pertinent family history in first degree relatives        PHYSICAL EXAM:  Constitution: Chronically ill male, awake, non verbal. NAD            Respiratory: decreased breath sound b/l + crackles  Cardiovascular: S1S2 Irregular   Abdomen: Soft, non distended /tender + PEG   Extremities: bed bound, - clubbing   Skin:: sacral decubiti           T(C): , Max: 37.5 (06:00)  T(F): 98.5  HR: 127 (101 - 176)  BP: 112/55 (100/74 - 167/73)  RR: 24 (20 - 28)  SpO2: 99% (97% - 100%)                              9.8    6.35  )-----------( 170      ( 02 Jan 2019 07:42 )             34.6                                                                                      01-02    157<H>  |  116<H>  |  77<HH>  ----------------------------<  123<H>  4.0   |  32  |  1.2    Ca    8.8      02 Jan 2019 07:42  Mg     3.0     01-02    TPro  5.1<L>  /  Alb  2.4<L>  /  TBili  0.5  /  DBili  0.2  /  AST  18  /  ALT  20  /  AlkPhos  167<H>  01-02                                                      MEDICATIONS  (STANDING):  ascorbic acid 500 milliGRAM(s) Oral daily  collagenase Ointment 1 Application(s) Topical daily  dextrose 5%. 1000 milliLiter(s) (75 mL/Hr) IV Continuous <Continuous>  dextrose 50% Injectable 12.5 Gram(s) IV Push once  dextrose 50% Injectable 25 Gram(s) IV Push once  dextrose 50% Injectable 25 Gram(s) IV Push once  diltiazem Infusion 7.5 mG/Hr (7.5 mL/Hr) IV Continuous <Continuous>  enoxaparin Injectable 40 milliGRAM(s) SubCutaneous daily  famotidine    Tablet 40 milliGRAM(s) Oral daily  ferrous    sulfate Liquid 300 milliGRAM(s) Enteral Tube daily  insulin glargine Injectable (LANTUS) 8 Unit(s) SubCutaneous at bedtime  insulin lispro Injectable (HumaLOG) 3 Unit(s) SubCutaneous every 6 hours  meropenem  IVPB      meropenem  IVPB 1000 milliGRAM(s) IV Intermittent every 8 hours  metoprolol tartrate 50 milliGRAM(s) Enteral Tube every 12 hours  OLANZapine 5 milliGRAM(s) Oral daily  senna Syrup 5 milliLiter(s) Oral at bedtime    MEDICATIONS  (PRN):  acetaminophen   Tablet .. 650 milliGRAM(s) Oral every 6 hours PRN Temp greater or equal to 38.5C (101.3F), Mild Pain (1 - 3)  lactulose Syrup 20 Gram(s) Enteral Tube daily PRN Constipation
Patient was seen and examined. Spoke with RN. Chart reviewed.  No events overnight.  Vital Signs Last 24 Hrs  T(F): 98 (04 Jan 2019 05:32), Max: 98 (04 Jan 2019 05:32)  HR: 61 (04 Jan 2019 05:32) (61 - 127)  BP: 133/72 (04 Jan 2019 05:32) (120/72 - 164/77)  SpO2: 96% (04 Jan 2019 08:23) (96% - 97%)  MEDICATIONS  (STANDING):  ascorbic acid 500 milliGRAM(s) Oral daily  collagenase Ointment 1 Application(s) Topical daily  collagenase Ointment 1 Application(s) Topical two times a day  Dakins Solution - 1/2 Strength 1 Application(s) Topical two times a day  dextrose 5%. 1000 milliLiter(s) (125 mL/Hr) IV Continuous <Continuous>  dextrose 50% Injectable 12.5 Gram(s) IV Push once  dextrose 50% Injectable 25 Gram(s) IV Push once  dextrose 50% Injectable 25 Gram(s) IV Push once  diltiazem    Tablet 60 milliGRAM(s) Oral every 6 hours  enoxaparin Injectable 40 milliGRAM(s) SubCutaneous daily  famotidine    Tablet 40 milliGRAM(s) Oral daily  ferrous    sulfate Liquid 300 milliGRAM(s) Enteral Tube daily  insulin glargine Injectable (LANTUS) 8 Unit(s) SubCutaneous at bedtime  insulin lispro Injectable (HumaLOG) 3 Unit(s) SubCutaneous every 6 hours  meropenem  IVPB      meropenem  IVPB 1000 milliGRAM(s) IV Intermittent every 8 hours  metoprolol tartrate 50 milliGRAM(s) Enteral Tube every 12 hours  OLANZapine 5 milliGRAM(s) Oral daily  senna Syrup 5 milliLiter(s) Oral at bedtime    MEDICATIONS  (PRN):  acetaminophen   Tablet .. 650 milliGRAM(s) Oral every 6 hours PRN Temp greater or equal to 38.5C (101.3F), Mild Pain (1 - 3)  lactulose Syrup 20 Gram(s) Enteral Tube daily PRN Constipation  oxyCODONE    5 mG/acetaminophen 325 mG 1 Tablet(s) Oral every 6 hours PRN Moderate Pain (4 - 6)    Labs:                        10.0   4.63  )-----------( 147      ( 04 Jan 2019 06:08 )             34.6                         9.1    3.98  )-----------( 142      ( 03 Jan 2019 06:17 )             31.9     04 Jan 2019 06:08    153    |  112    |  45     ----------------------------<  137    4.0     |  29     |  0.8    03 Jan 2019 18:11    153    |  113    |  51     ----------------------------<  97     3.6     |  29     |  0.9      Ca    8.2        04 Jan 2019 06:08  Ca    8.4        03 Jan 2019 18:11  Mg     2.4       04 Jan 2019 06:08  Mg     2.6       03 Jan 2019 06:17    TPro  4.6    /  Alb  2.1    /  TBili  0.6    /  DBili  x      /  AST  55     /  ALT  38     /  AlkPhos  267    04 Jan 2019 06:08          Culture - Other (collected 02 Jan 2019 14:57)  Source: .Other Sacrum  Preliminary Report (03 Jan 2019 20:46):    Numerous Enterococcus faecalis    Few Pseudomonas aeruginosa    Rare Klebsiella pneumoniae    Culture - Urine (collected 01 Jan 2019 14:20)  Source: .Urine Clean Catch (Midstream)  Final Report (02 Jan 2019 20:22):    No growth    Culture - Blood (collected 01 Jan 2019 12:16)  Source: .Blood Blood-Peripheral  Preliminary Report (03 Jan 2019 01:01):    No growth to date.    Culture - Blood (collected 01 Jan 2019 12:16)  Source: .Blood Blood-Peripheral  Preliminary Report (03 Jan 2019 01:01):    No growth to date.      General: comfortable, NAD  Neurology: nonfocal  Head:  Normocephalic, atraumatic  ENT:  Mucosa moist, no ulcerations  Neck:  Supple, no JVD,   Skin: no breakdowns (as per RN)  Resp: CTA B/L  CV: RRR, S1S2,   GI: Soft, NT, bowel sounds, peg  MS: No edema, + peripheral pulses, FROM all 4 extremity      A/P:  77 y/o M with PMH of A-fib, not on anticoagulation , vascular dementia, recently diagnosed colon cancer, DM II, and HTN was sent in from [facility] for desaturation.    1.) Symptomatic Hypernatremia: likely secondary to dehydration and/or inadequate free water intake with PEG feeds    - s/p 2L of LR in the ED.    - refused admission to ICU.    - Start D5W at 100cc/hr .    - BMP     - Goal to correct sodium by no more than 10meq in the first 24 hours.    - Check urine sodium.    - Deluca for strict Is and Os.     -renal f/u appreciated    2.) JOEL: Baseline creatinine 0.7-0.9. Likely prerenal etiology    - Continue IV hydration.    - Monitor BMP.    - Hold losartan.    - renal f/u    3.) Respiratory distress: Resolved. Likely secondary to aspiration and inability to control secretions. Unlikely pneumonia.    - Chest x-ray is not significantly different than 1 month ago and pulmonary consult at the time had a low suspicion for pneumonia.    - f/u RVP (given low grade fever and lack of leukocytosis).    - Consider starting a scopolamine patch if secretions continue to cause desaturations.    - Continue suction PRN.    - Monitor for changes.     - pulm f/u    4.) Atrial fibrillation with rapid ventricular response: Rate improved after cardizem in the ED.    - s/p cardizem IV and PO in the ED.    - Continue cardizem.    - Increased metoprolol to 50mg Q12h.    - Monitor heart rate and titrate up medications as needed.    5.) Fever: Unclear etiology. Possibly viral respiratory infection vs. cystitis. Doubt pneumonia.    - Given positive UA and inability to obtain history of symptoms, will empirically cover for cystitis with rocephin.    - f/u RVP.    6.) Multiple pressure ulcers present on admission: sacrum and bilateral heel    - Continue local wound care with collagenase.- aggressive monitoring and treatment  ----Call burn to evaluate if worsens    7.) Malnutrition: cachectic appearing with albumin of 2.5 on admission    - Consider nutrition services consult to adjust PEG feeds.    8.) DM II:    - Continue basal / bolus insulin.    - Monitor blood glucose.     9.) Hypertension:    - Continue cardizem and metoprolol.    - Hold losartan due to JOEL.    10.) Adenocarcinoma of the colon:    - Outpatient oncology follow-up for goals of care.    11.) Vascular dementia with behavioral disturbance:    - Continue olanzapine.    Palliative care f/u      DVT prophylaxis  Decubitus prevention- all measures as per RN protocol  Please call or text me with any questions or updates
SUSI MARTIN 78y Male  MRN#: [id number]   CODE STATUS: DNR/DNI      SUBJECTIVE  Patient is a 78y old Male who presents with a chief complaint of Respiratory Distress (03 Jan 2019 08:53)  Currently admitted to medicine with the primary diagnosis of Sepsis  Hospital course has been complicated by A fib with RVR.  Today is hospital day 2d, and this morning he is not in pain or distress. He is non verbal at baseline and does not follow commands.      OBJECTIVE  PAST MEDICAL & SURGICAL HISTORY  Colon cancer  Stroke  DVT (deep venous thrombosis)  Anemia  Diabetes  HTN (hypertension)  Arrhythmia  Dementia  S/P cholecystectomy  History of hip replacement    ALLERGIES:  No Known Allergies    MEDICATIONS:  STANDING MEDICATIONS  ascorbic acid 500 milliGRAM(s) Oral daily  collagenase Ointment 1 Application(s) Topical daily  collagenase Ointment 1 Application(s) Topical two times a day  Dakins Solution - 1/2 Strength 1 Application(s) Topical two times a day  dextrose 50% Injectable 12.5 Gram(s) IV Push once  dextrose 50% Injectable 25 Gram(s) IV Push once  dextrose 50% Injectable 25 Gram(s) IV Push once  diltiazem    Tablet 60 milliGRAM(s) Oral every 6 hours  enoxaparin Injectable 40 milliGRAM(s) SubCutaneous daily  famotidine    Tablet 40 milliGRAM(s) Oral daily  ferrous    sulfate Liquid 300 milliGRAM(s) Enteral Tube daily  insulin glargine Injectable (LANTUS) 8 Unit(s) SubCutaneous at bedtime  insulin lispro Injectable (HumaLOG) 3 Unit(s) SubCutaneous every 6 hours  meropenem  IVPB      meropenem  IVPB 1000 milliGRAM(s) IV Intermittent every 8 hours  metoprolol tartrate 50 milliGRAM(s) Enteral Tube every 12 hours  OLANZapine 5 milliGRAM(s) Oral daily  senna Syrup 5 milliLiter(s) Oral at bedtime    PRN MEDICATIONS  acetaminophen   Tablet .. 650 milliGRAM(s) Oral every 6 hours PRN  lactulose Syrup 20 Gram(s) Enteral Tube daily PRN      VITAL SIGNS: Last 24 Hours  T(C): 35.9 (03 Jan 2019 10:00), Max: 36.9 (03 Jan 2019 08:15)  T(F): 96.7 (03 Jan 2019 10:00), Max: 98.4 (03 Jan 2019 08:15)  HR: 127 (03 Jan 2019 10:00) (74 - 176)  BP: 164/77 (03 Jan 2019 10:00) (104/55 - 165/78)  BP(mean): --  RR: 20 (03 Jan 2019 10:00) (14 - 24)  SpO2: 98% (02 Jan 2019 19:55) (98% - 99%)    LABS:                        9.1    3.98  )-----------( 142      ( 03 Jan 2019 06:17 )             31.9     01-03    154<H>  |  112<H>  |  56<H>  ----------------------------<  136<H>  4.2   |  29  |  1.0    Ca    8.5      03 Jan 2019 06:17  Mg     2.6     01-03    TPro  5.1<L>  /  Alb  2.4<L>  /  TBili  0.5  /  DBili  0.2  /  AST  18  /  ALT  20  /  AlkPhos  167<H>  01-02    PT/INR - ( 01 Jan 2019 12:16 )   PT: 18.20 sec;   INR: 1.59 ratio         PTT - ( 01 Jan 2019 12:16 )  PTT:33.2 sec  Urinalysis Basic - ( 01 Jan 2019 14:20 )    Color: Dark Yellow / Appearance: Turbid / SG: >=1.030 / pH: x  Gluc: x / Ketone: Negative  / Bili: Negative / Urobili: 1.0   Blood: x / Protein: 30 / Nitrite: Negative   Leuk Esterase: Large / RBC: 6-10 /HPF / WBC 6-10 /HPF   Sq Epi: x / Non Sq Epi: Occasional /HPF / Bacteria: Moderate /HPF            Culture - Urine (collected 01 Jan 2019 14:20)  Source: .Urine Clean Catch (Midstream)  Final Report (02 Jan 2019 20:22):    No growth    Culture - Blood (collected 01 Jan 2019 12:16)  Source: .Blood Blood-Peripheral  Preliminary Report (03 Jan 2019 01:01):    No growth to date.    Culture - Blood (collected 01 Jan 2019 12:16)  Source: .Blood Blood-Peripheral  Preliminary Report (03 Jan 2019 01:01):    No growth to date.          RADIOLOGY:  < from: CT Head No Cont (01.02.19 @ 07:38) >  Impression:      No evidence of acute intracranial pathology.    Chronic right occipital lobe infarction.    < end of copied text >  < from: [facility] Duplex Lower Ext Vein Scan, Bilat (12.07.18 @ 14:45) >  Impression:    No evidence of deep venous thrombosis or superficial thrombophlebitis in   the bilateral lower extremities.    < end of copied text >    < from: Xray Chest 1 View-PORTABLE IMMEDIATE (01.01.19 @ 13:40) >  Impression:    Compared with 12/6/2018, stable bilateral lung opacities/effusions. No   new consolidation.    < end of copied text >    PHYSICAL EXAM:    GENERAL: NAD, non verbal   HEENT:  Atraumatic, Normocephalic. EOMI, PERRLA, conjunctiva and sclera clear, No JVD  PULMONARY: decrease air entry b/l bases  CARDIOVASCULAR: IRRegular rate and rhythm; No murmurs, rubs, or gallops  GASTROINTESTINAL: Soft, Nontender, Nondistended; Bowel sounds present  MUSCULOSKELETAL:  2+ Peripheral Pulses, No clubbing, cyanosis, or edema  NEUROLOGY: unable to follow commands, withdraws to pain.  SKIN: stage 4 sacral decubitus ulcer, stage 2-3 heel and knee ulcers
SUSI MARTIN 78y Male  MRN#: [id number]   CODE STATUS DNR/Hospice      SUBJECTIVE  Patient is a 78y old Male who presents with a chief complaint of Respiratory Distress (03 Jan 2019 08:53)  Currently admitted to medicine with the primary diagnosis of Sepsis  Hospital course has been complicated by A fib with RVR.  Today is hospital day 3d, and this morning he is not in pain or distress. He is non verbal at baseline and does not follow commands.      OBJECTIVE  PAST MEDICAL & SURGICAL HISTORY  Colon cancer  Stroke  DVT (deep venous thrombosis)  Anemia  Diabetes  HTN (hypertension)  Arrhythmia  Dementia  S/P cholecystectomy  History of hip replacement    ALLERGIES:  No Known Allergies    MEDICATIONS:  STANDING MEDICATIONS  ascorbic acid 500 milliGRAM(s) Oral daily  collagenase Ointment 1 Application(s) Topical daily  collagenase Ointment 1 Application(s) Topical two times a day  Dakins Solution - 1/2 Strength 1 Application(s) Topical two times a day  dextrose 50% Injectable 12.5 Gram(s) IV Push once  dextrose 50% Injectable 25 Gram(s) IV Push once  dextrose 50% Injectable 25 Gram(s) IV Push once  diltiazem    Tablet 60 milliGRAM(s) Oral every 6 hours  enoxaparin Injectable 40 milliGRAM(s) SubCutaneous daily  famotidine    Tablet 40 milliGRAM(s) Oral daily  ferrous    sulfate Liquid 300 milliGRAM(s) Enteral Tube daily  insulin glargine Injectable (LANTUS) 8 Unit(s) SubCutaneous at bedtime  insulin lispro Injectable (HumaLOG) 3 Unit(s) SubCutaneous every 6 hours  meropenem  IVPB      meropenem  IVPB 1000 milliGRAM(s) IV Intermittent every 8 hours  metoprolol tartrate 50 milliGRAM(s) Enteral Tube every 12 hours  OLANZapine 5 milliGRAM(s) Oral daily  senna Syrup 5 milliLiter(s) Oral at bedtime    PRN MEDICATIONS  acetaminophen   Tablet .. 650 milliGRAM(s) Oral every 6 hours PRN  lactulose Syrup 20 Gram(s) Enteral Tube daily PRN  oxyCODONE    5 mG/acetaminophen 325 mG 1 Tablet(s) Oral every 6 hours PRN      VITAL SIGNS: Last 24 Hours  T(C): 36.7 (04 Jan 2019 12:49), Max: 36.7 (04 Jan 2019 05:32)  T(F): 98 (04 Jan 2019 12:49), Max: 98 (04 Jan 2019 05:32)  HR: 120 (04 Jan 2019 12:59) (61 - 120)  BP: 125/75 (04 Jan 2019 12:59) (120/72 - 133/72)  BP(mean): --  RR: 18 (04 Jan 2019 12:49) (18 - 18)  SpO2: 96% (04 Jan 2019 12:59) (96% - 97%)    LABS:                        10.0   4.63  )-----------( 147      ( 04 Jan 2019 06:08 )             34.6     01-04    153<H>  |  112<H>  |  45<H>  ----------------------------<  137<H>  4.0   |  29  |  0.8    Ca    8.2<L>      04 Jan 2019 06:08  Mg     2.4     01-04    TPro  4.6<L>  /  Alb  2.1<L>  /  TBili  0.6  /  DBili  x   /  AST  55<H>  /  ALT  38  /  AlkPhos  267<H>  01-04              Culture - Other (collected 02 Jan 2019 14:57)  Source: .Other Sacrum  Preliminary Report (03 Jan 2019 20:46):    Numerous Enterococcus faecalis    Few Pseudomonas aeruginosa    Rare Klebsiella pneumoniae          RADIOLOGY:    < from: CT Head No Cont (01.02.19 @ 07:38) >  Impression:      No evidence of acute intracranial pathology.    Chronic right occipital lobe infarction.    < end of copied text >  < from: VA Duplex Lower Ext Vein Scan, Bilat (12.07.18 @ 14:45) >  Impression:    No evidence of deep venous thrombosis or superficial thrombophlebitis in   the bilateral lower extremities.    < end of copied text >    < from: Xray Chest 1 View-PORTABLE IMMEDIATE (01.01.19 @ 13:40) >  Impression:    Compared with 12/6/2018, stable bilateral lung opacities/effusions. No   new consolidation.    < end of copied text >    PHYSICAL EXAM:    GENERAL: NAD, non verbal   HEENT:  Atraumatic, Normocephalic. EOMI, PERRLA, conjunctiva and sclera clear, No JVD  PULMONARY: decrease air entry b/l bases  CARDIOVASCULAR: IRRegular rate and rhythm; No murmurs, rubs, or gallops  GASTROINTESTINAL: Soft, Nontender, Nondistended; Bowel sounds present  MUSCULOSKELETAL:  2+ Peripheral Pulses, No clubbing, cyanosis, or edema  NEUROLOGY: unable to follow commands, withdraws to pain.  SKIN: stage 4 sacral decubitus ulcer, stage 2-3 heel and knee ulcers

## 2019-01-04 NOTE — PROGRESS NOTE ADULT - ASSESSMENT
77 y/o M with PMH of A-fib, not on anticoagulation (bleeding adenocarcinoma colon), vascular dementia, recently diagnosed colon cancer, DM II, and HTN was sent in from [facility] for desaturation and fever.    1.) Symptomatic Hypernatremia: likely secondary to dehydration and/or inadequate free water intake with PEG feeds    - s/p 2L of LR in the ED.    - was on D5W at 100cc/hr, sodium better today 152 d/c D5W c/w with free water flushes via PEG    - Goal to correct sodium by no more than 6meq in the first 24 hours.     - [facility] for strict Is and Os.    2.) JOEL: Baseline creatinine 0.7-0.9. Likely prerenal etiology    - Monitor BMP.    - Hold losartan.    - nephro on board    3.) Respiratory distress: Resolved. Likely secondary to aspiration pneumonia and inability to control secretions.     - Chest x-ray is not significantly different than 1 month ago and pulmonary consult at the time had a low suspicion for pneumonia.    - f/u RVP negative    - Patient is currently saturating well on 2L nasal canula (baseline on 2-3L NC at [facility]).    - Consider starting a scopolamine patch if secretions continue to cause desaturations.    - Continue suction PRN.    - Monitor for changes.    4.) Atrial fibrillation with rapid ventricular response: Rate improved after cardizem in the ED.    - s/p cardizem IV and PO in the ED.    - rate better today d/earl cardizem drip and switched to PO cardizem 60 q6 through PEG tube    - Increased metoprolol to 50mg Q12h.    - Monitor heart rate and titrate up medications as needed.    5.) UTI    - Given positive UA and inability to obtain history of symptoms, will empirically cover for cystitis.    - currently on Meropenem I/V 1 Gm q8    6.) Multiple pressure ulcers present on admission: sacrum and bilateral heel    - appreciate burn eval; local enzymatic debridement, pt will need further debridement in future    - Continue local wound care as per burn    7.) Malnutrition: cachectic appearing with albumin of 2.5 on admission    - appreciate nutrition services consult; started pt on Glucerna 1.2 tube feeds 240ml q4 through PEG tube    8.) DM II:    - Continue basal / bolus insulin.    - Monitor blood glucose.     9.) Hypertension:    - Continue cardizem and metoprolol.    - Hold losartan due to JOEL.    10.) Adenocarcinoma of the colon:    - Outpatient oncology follow-up for goals of care.    11.) Vascular dementia with behavioral disturbance:    - Continue olanzapine.    12.) GERD:    - Continue famotidine.    13.) DVT PPx:    - lovenox.    14.) Disposition:    - DNR / DNI

## 2019-01-04 NOTE — PROGRESS NOTE ADULT - ASSESSMENT
JOEL  hypernatremia  dehydration  Afib with rvr  ARF  UTI  PNA  CVA 2 months ago, nonverbal, s/p PEG / dementia  DM  HTN  colon cancer    plan:    comfort care  dnr / dni  no escalation in care  no further labs  may cont free water with peg feeds  poor prognosis

## 2019-01-04 NOTE — GOALS OF CARE CONVERSATION - PERSONAL ADVANCE DIRECTIVE - CONVERSATION DETAILS
Palliative care team met with patient's spouse at bedside to discuss goals of care.  Discussed current diagnosis, prognosis, and treatment options.  Patient's spouse verbalized understanding and all questions were answered. Patient was residing in [facility] prior to admission.  At this time patient's spouse in agreement patient would benefit from comfort measures.  Spouse relating she understands patient is at end of life and would like him to be pain free and comfortable.  No further blood work or finger sticks.  DNR/DNI.  Discontinue IV Abx. Spouse is requesting to continue PEG feeds.  Spouse is requesting he return to [facility] with hospice care services.  All above discussed with ganesh Pineda telephonically.      Plan on hospice care in [facility].    Plan on DNR/DNI and comfort measures.      Plan to continue PEG feeds per spouses request.      Palliative care team will continue to monitor and provide support.

## 2019-01-04 NOTE — PROGRESS NOTE ADULT - REASON FOR ADMISSION
Respiratory Distress

## 2019-01-04 NOTE — PROGRESS NOTE ADULT - ASSESSMENT
77 y/o male with PMH as above being seen for follow up    Patient is awake, non verbal, appears comfortable with no symptoms to address at this time. Family  requested to speak with palliative team to re-evaluate goals. Palliative team discussed goals with spouse, granddaughter (Malia RN) and daughter Miriam. Family had decided to defer further aggressive/invasive intervention to pursue comfort measures only with hospice at [facility] [place]. Family stated patient has a poor functional status and quality of life, and they don not want him to suffer anymore. Family wants to continue feeding via PEG and wound care.  Palliative team will continue to provide supportive care     Case d/w resident/RN   see goals of care note           Recommendations:  No further escalation of care  no blood draws, and scans  Comfort measures only  DNi/DNR  hospice consult   we will f/u 77 y/o male with PMH as above being seen for follow up    Patient is awake, non verbal, appears comfortable with no symptoms to address at this time. Family  requested to speak with palliative team to re-evaluate goals. Palliative team discussed goals with spouse, granddaughter (Malia RN) and daughter Miriam. Family had decided to defer further aggressive/invasive intervention to pursue comfort measures only with hospice at [facility]. Family stated patient has a poor functional status and quality of life, and they don not want him to suffer anymore. Family wants to continue feeding via PEG and wound care.  Palliative team will continue to provide supportive care     Case d/w resident/RN   see goals of care note   total time spent 30 mins         Recommendations:  No further escalation of care  no blood draws, and scans  Comfort measures only  DNi/DNR  hospice consult   we will f/u

## 2019-01-05 ENCOUNTER — TRANSCRIPTION ENCOUNTER (OUTPATIENT)
Age: 79
End: 2019-01-05

## 2019-01-05 VITALS
SYSTOLIC BLOOD PRESSURE: 125 MMHG | TEMPERATURE: 96 F | HEART RATE: 105 BPM | DIASTOLIC BLOOD PRESSURE: 76 MMHG | RESPIRATION RATE: 18 BRPM

## 2019-01-05 LAB
-  AMPICILLIN: SIGNIFICANT CHANGE UP
-  TETRACYCLINE: SIGNIFICANT CHANGE UP
-  VANCOMYCIN: SIGNIFICANT CHANGE UP
GLUCOSE BLDC GLUCOMTR-MCNC: 153 MG/DL — HIGH (ref 70–99)
GLUCOSE BLDC GLUCOMTR-MCNC: 170 MG/DL — HIGH (ref 70–99)
GLUCOSE BLDC GLUCOMTR-MCNC: 176 MG/DL — HIGH (ref 70–99)
METHOD TYPE: SIGNIFICANT CHANGE UP
ORGANISM # SPEC MICROSCOPIC CNT: SIGNIFICANT CHANGE UP

## 2019-01-05 RX ORDER — DILTIAZEM HCL 120 MG
1 CAPSULE, EXT RELEASE 24 HR ORAL
Qty: 0 | Refills: 0 | COMMUNITY
Start: 2019-01-05

## 2019-01-05 RX ADMIN — ENOXAPARIN SODIUM 40 MILLIGRAM(S): 100 INJECTION SUBCUTANEOUS at 12:18

## 2019-01-05 RX ADMIN — FAMOTIDINE 40 MILLIGRAM(S): 10 INJECTION INTRAVENOUS at 12:39

## 2019-01-05 RX ADMIN — OLANZAPINE 5 MILLIGRAM(S): 15 TABLET, FILM COATED ORAL at 12:17

## 2019-01-05 RX ADMIN — Medication 50 MILLIGRAM(S): at 05:22

## 2019-01-05 RX ADMIN — Medication 500 MILLIGRAM(S): at 12:18

## 2019-01-05 RX ADMIN — MEROPENEM 100 MILLIGRAM(S): 1 INJECTION INTRAVENOUS at 13:19

## 2019-01-05 RX ADMIN — Medication 3 UNIT(S): at 12:19

## 2019-01-05 RX ADMIN — MEROPENEM 100 MILLIGRAM(S): 1 INJECTION INTRAVENOUS at 05:13

## 2019-01-05 RX ADMIN — Medication 300 MILLIGRAM(S): at 12:18

## 2019-01-05 RX ADMIN — Medication 1 APPLICATION(S): at 06:51

## 2019-01-05 RX ADMIN — Medication 1 APPLICATION(S): at 12:21

## 2019-01-05 RX ADMIN — Medication 3 UNIT(S): at 06:56

## 2019-01-05 NOTE — DISCHARGE NOTE ADULT - PLAN OF CARE
maintain optimal blood pressure continue taking medications and follow up with hospice team for further care prevention of complications COMPLETE RESOLUTION OF SYMPTOMS

## 2019-01-05 NOTE — DISCHARGE NOTE ADULT - CARE PLAN
Principal Discharge DX:	Pneumonia  Goal:	COMPLETE RESOLUTION OF SYMPTOMS  Assessment and plan of treatment:	continue taking medications and follow up with hospice team for further care  Secondary Diagnosis:	Rapid atrial fibrillation  Goal:	prevention of complications  Assessment and plan of treatment:	continue taking medications and follow up with hospice team for further care  Secondary Diagnosis:	HTN (hypertension)  Goal:	maintain optimal blood pressure  Assessment and plan of treatment:	continue taking medications and follow up with hospice team for further care  Secondary Diagnosis:	Colon cancer  Goal:	prevention of complications  Assessment and plan of treatment:	continue taking medications and follow up with hospice team for further care

## 2019-01-05 NOTE — DISCHARGE NOTE ADULT - CARE PROVIDER_API CALL
Hospice,   Hospice team at [facility]  Phone: (   )    -  Fax: (   )    -

## 2019-01-05 NOTE — DISCHARGE NOTE ADULT - PROVIDER TOKENS
FREE:[LAST:[Hospice],PHONE:[(   )    -],FAX:[(   )    -],ADDRESS:[Hospice team at [facility]]]

## 2019-01-05 NOTE — DISCHARGE NOTE ADULT - MEDICATION SUMMARY - MEDICATIONS TO STOP TAKING
I will STOP taking the medications listed below when I get home from the hospital:    losartan 50 mg oral tablet  -- 1 tab(s) by gastrostomy tube once a day

## 2019-01-05 NOTE — DISCHARGE NOTE ADULT - HOSPITAL COURSE
79 y/o M with PMH of A-fib, not on anticoagulation (see hospitalization history below), vascular dementia, recently diagnosed colon cancer, DM II, and HTN was sent in from [facility] for desaturation. The patient has a history of advanced dementia and CVA and per documentation has been non-verbal since november of 2018. Currently he is obtunded and cannot provide any history. All history was obtained from patient's wife and daughter, nursing home papers, and previous hospital charts.     The nursing home writes that the patient desaturated to 88-90% on 3L O2 and was noted to have thick secretions. After being suctioned, the O2 saturation improved to 92-96%. However, the patient's wife wanted him sent to the hospital for an evaluation.     In the ED, the patient was found to be hypotensive to 70s/40s with a heart rate in the 180s. ECG showed atrial fibrillation with rapid ventricular response. 10mg cardizem IV was given, followed by 30 cardizem PO in the PEG tube. Heart rate improved to 110s and blood pressure improved to 120s/70s.    Upon assessment of the patient he was on 100% non-rebreather without documentation of the reason(s). There was no pulse oximetry data available in the system at the time of admission. The medicine team then weaned off oxygen support and the patient was saturating 92-93% on room air. He improved to 98% with 2L via nasal canula.    Patient's wife visits him daily in the nursing home. She states that at baseline he is bed-bound, minimally verbal, and unable to swallow (he has a PEG placed in November, 2018). However, she states that at baseline he is able to nod in response to questions and follow simple commands. She states that one week ago, his mental status changed. He is not responding to questions or commands, with no improvement over the week. Sodium in the ED was found to be 159, verified with a repeat VBG. On 12/14/18, his sodium was 146.     The patient has an extensive medical history with multiple hospitalizations. For convenience I have summarized his previous hospital courses below:    Admission on 2/13/2018: Presented from home for multiple falls and combative behavior at home. Was diagnosed with PNA and influenza infection. Was incidentally found to have a Hgb of 6.0, but no identifiable source of bleeding was discovered. Xarelto (being taken for A-fib) was held on discharge. He was discharged to a SNF for short term rehab.    Admission on 8/12/2018 for decreased ambulation and urinary frequency. At this time it was documented that at baseline he was requiring assistance with ADLs and had a home health aid for assistance. The inpatient work-up did not find any reversible causes or infections. The decreased ambulation was thought to be secondary to worsening dementia. He was discharged back to SNF for further short term rehab.    Admission 10/24/2018 for weakness and decreased PO intake. Found to have a subacute CVA and a urinary tract infection. Also presented with non-healing foot ulcers. After the stroke work-up was completed, he was cleared for an unmodified diet by speech pathology, however, the patient was hardly eating based on a 3 day calorie count. His wife decided to have a PEG tube placed, which was performed by the surgery team on 11/7/2018. He was also noted to have non-healing foot ulcers, and had an angiogram on 11/2/2018 with angioplasty to the anterior tibial artery. He was discharged on 11/8/2018 to long term placement in a nursing home.     Admission 11/19/2018 for fever noted at the nursing home. Also found to be in A-fib with RVR. Hospital course complicated by lower gastrointestinal bleeding. Colonoscopy performed at the time found a mass in the ascending colon and biopsies resulted as invasive adenocarcinoma, moderately differentiated. He was deemed not a candidate for surgery. There was no documentation available about what treatment options the patient's wife was considering. He was then discharged back to the nursing home. On discharge, was was noted to be non-verbal, but followed commands. The patient's wife had signed a DNR/DNI.     Pt was treated for aspiration pneumonia and A fib with RVR. His heart rate got better and he was afebrile on ABX. He was seen by Palliative team and after discussion with the HCP and family pt was made comfort measures only and will go to [facility] with hospice.

## 2019-01-05 NOTE — DISCHARGE NOTE ADULT - PATIENT PORTAL LINK FT
You can access the [facility] Patient Portal, offered by [facility], by registering with the following website: [email or web address]

## 2019-01-05 NOTE — DISCHARGE NOTE ADULT - MEDICATION SUMMARY - MEDICATIONS TO TAKE
I will START or STAY ON the medications listed below when I get home from the hospital:    acetaminophen 325 mg oral tablet  -- 2 tab(s) by gastrostomy tube every 6 hours, As Needed - 3)  -- Indication: For Pain    oxycodone-acetaminophen 5 mg-325 mg oral tablet  -- 1 tab(s) by mouth every 6 hours, As needed, Moderate Pain (4 - 6)  -- Indication: For Pain    dilTIAZem 60 mg oral tablet  -- 1 tab(s) by mouth every 6 hours  -- Indication: For A FIB    Lantus 100 units/mL subcutaneous solution  -- 8 unit(s) subcutaneous once a day (at bedtime)  -- Indication: For DM    insulin aspart 100 units/mL subcutaneous solution  -- 1 unit(s) blood sugar 150-200   2 unit(s) blood sugar 200-250   3 unit(s) blood sugar  250-300   4 unit(s) blood sugar 300-350   5 unit(s) blood sugar 350-400   subcutaneous 4 times a day with peg feed  -- Indication: For DM    OLANZapine 5 mg oral tablet, disintegrating  -- 1 tab(s) by mouth once a day  -- Indication: For MOOD DISORDER    Betadine 5% topical spray  -- Apply on skin to affected area once a day  -- Indication: For SACRAL ULCERS    metoprolol tartrate 25 mg oral tablet  -- 1 tab(s) by gastrostomy tube 2 times a day  -- Indication: For A FIB    collagenase 250 units/g topical ointment  -- Apply on skin to affected area once a day to R. foot ulcer  -- Indication: For SACRAL ULCER    famotidine 40 mg oral tablet  -- 1 tab(s) by gastrostomy tube once a day, As Needed  -- Indication: For GERD    ferrous sulfate 300 mg/5 mL (60 mg elemental iron) oral liquid  -- 5 milliliter(s) by gastrostomy tube once a day  -- Indication: For ANEMIA    lactulose 10 g/15 mL oral syrup  -- 30 milliliter(s) by gastrostomy tube once a day, As Needed  -- Indication: For CoNSTIPATION    senna 8.8 mg/5 mL oral syrup  -- 5 milliliter(s) by gastrostomy tube once a day (at bedtime)  -- Indication: For CoNSTIPATION    ascorbic acid 500 mg oral tablet  -- 1 tab(s) by gastrostomy tube once a day  -- Indication: For SACRAL ULCER

## 2019-02-14 NOTE — ED ADULT TRIAGE NOTE - NS ED TRIAGE EKG
[FreeTextEntry1] : Ms. Bloom is a 67 year old female with a history of AVDz, asthma, allergy, GERD, TBM, s/p pneumonia, who presents now for a follow up s/p asthmatic bronchitis and perianal abscess. \par \par Her chronic SOB which is multifactorial due to:\par - tracheomalacia s/p tracheoplasty with residual frequent mucus production, though patient is non-compliant with vest therapy \par - asthmatic symptoms. \par - chronic bronchitis\par - asthma\par - allergies rhinitis\par - GERD\par - poor breathing mechanics \par - CAD/AV disease\par \par problem 2: asthmatic bronchitis\par -taper Medrol by 4 mg every 3rd day to 4 mg baseline\par -continue to use albuterol via the nebulizer QID \par -followed by Mucomyst QiD\par -followed by the acapella device/ chest vest therapy \par -followed by Perforomist via the nebulizer BID\par -followed by Budesonide via the nebulizer BID \par -continue to use Breo Ellipta 200 at 1 inhalation QD \par -continue to use Spiriva 1 inhalation QD\par -continue to use Xolair 225 injection; follow up injections every 2 weeks- she is to continue on this while having chemotherapy, given today in the LUE (1/3/19)\par -continue to use Accolate 20 mg BID\par -continue Medrol 4 mg to continue \par -Information sheet given about prednisone to the patient to be reviewed, this medication is never to be used without consulting the prescribing physician. Proper dietary restraint is necessary specifically salt containing foods, if any reaction may occur should be reported. \par -Asthma is believed to be caused by inherited (genetic) and environmental factor, but its exact cause is unknown. Asthma may be triggered by allergens, lung infections, or irritants in the air. Asthma triggers are different for each person\par -Inhaler technique reviewed as well as oral hygiene techniques reviewed with patient. Avoidance of cold air, extremes of temperature, rescue inhaler should be used before exercise. Order of medication reviewed with patient. Recommended use of a cool mist humidifier in the bedroom. \par \par problem 3: tracheomalacia, residual bronchomalacia \par -s/p tracheoplasty with Dr. Mt Zapien\par -continue to follow up \par \par problem 4: chronic bronchitis and mucus clearance\par -continue to use acapella device multiple times daily\par -recommended to use chest vest therapy multiple times daily \par -she is being sent for sputum cultures \par Patient has a chronic cough greater than 6 months, tried and failed manual chest physiotherapy at home, no skilled caregiver available at home to perform manual CPT, tried and failed acapella vibratory physiotherapy, and recommended chest vest therapy \par \par problem 5: GERD\par -continue to use Protonix 40 mg before breakfast\par -continue Baclofen 10 mg Qmeal/QHS\par -Rule of 2s: avoid eating too much, eating too late, eating too spicy, eating two hours before bed\par -Things to avoid including overeating, spicy foods, tight clothing, eating within three hours of bed, this list is not all inclusive. \par -For treatment of reflux, possible options discussed including diet control, H2 blockers, PPIs, as well as coating motility agents discussed as treatment options. Timing of meals and proximity of last meal to sleep were discussed. If symptoms persist, a formal gastrointestinal evaluation is needed. \par \par problem 6: allergic rhinitis \par -continue to use nasal saline\par -continue to use Xyzal 5 mg before bed\par -Environmental measures for allergies were encouraged including mattress and pillow cover, air purifier, and environmental controls. \par \par problem 7: hx of abnormal aortic valve / cardiac health\par -continue to follow up with Dr. Leahy, AV Disease, AF\par -echocadiogram in June 2018  (Tosin)\par \par problem 8: colon cancer\par -s/p radiation therapy, surgery \par -continue to use chemotherapy follow up with Dr. King or Dr. Mario\par \par problem 9: poor breathing mechanics\par -Proper breathing techniques were reviewed with an emphasis of exhalation. Patient instructed to breath in for 1 second and out for four seconds. Patient was encouraged to not talk while walking.\par \par problem 10: immunodeficiency\par - -Due to the fact that this pt has had more infections than would be expected and immunological blood work is indicated this would include: IgG subclasses, quantitative immunoglobulins, Strep pneumoniae titers as well as Vitamin D levels. Based on this blood work we will be able to decide where the pt needs additional pneumococcal vaccine either Prevnar 13 or pneumovax. Immunology evaluation will also be potentially indicated.\par \par problem 11: r/o immunodeficiency (on Medrol)\par -Due to the fact that this pt has had more infections than would be expected and immunological blood work is indicated this would include: IgG subclasses, quantitative immunoglobulins, Strep pneumoniae titers as well as Vitamin D levels.\par -Based on this blood work we will be able to decide where the pt needs additional pneumococcal vaccine either Prevnar 13 or pneumovax. Immunology evaluation will also be potentially indicated. \par \par problem 13: abnormal chest CT- ? new nodule- likely inflammation \par - F/u CT- 3/19\par -questionable DIEUDONNE or HERMELINDO, all sputum is negative \par \par Problem 14: Sensory Neuropathic cough \par - Continue  Amitriptyline 10 mg QHS for the first weeks then up to TID\par \par problem 15:  health maintenance \par -recommended yearly flu shot after October 15\par -recommended strep pneumonia vaccines: Prevnar-13 vaccine, followed by Pneumo vaccine 23 one year following\par -recommended early intervention for URIs\par -recommended regular osteoporosis evaluations\par -recommended early dermatological evaluations\par -recommended after the age of 50 to the age of 70, colonoscopy every 5 years \par \par \par F/U in 6 weeks - SPI / NIOX\par She is encouraged to call with any changes, concerns, or questions.  EKG completed

## 2020-02-28 NOTE — PRE-OP CHECKLIST - ORDERS/MEDICATION ADMINISTRATION RECORD ON CHART
[de-identified] : JEF GRAY is a 6 year  old male with Allergic Rhinitis, asthma, atopic dermatitis  Oral Allergy Syndrome (OAS), concern for peanut allergy, returns for a follow up visit. \par \par He has been using Qvar and short acting bronchodilator about 1-2 week in the winter season. He doesn't have significant symptoms in the summer. No need for oral steroids or ER visits for asthma. \par \par He has been avoiding peanuts. \par \par Today he has some  URI symptoms.\par \par \par HISTORY:\par Concern for food allergy: \par Peanut - has never tried.\par Tree nuts have been introduced and he is tolerating them well, including cashews.\par Apples, stone, banana, carrots - Itching of throat. Can eat mangoes when peeled. Can eat apples and carrots when cooked.\par \par Asthma: Diagnosed at ~4 years of age. Has been wheezing more. Mother has been getting more calls from the school nurse for wheezing. Got the flu in December, used Qvar 40 2 puffs BID for 15 days, but hasn't needed steroids. Has been using albuterol and Qvar for exercise about once a week. Has nighttime cough once every week or every other week. Never needed systemic steroids. No ER visits or admissions. Born term. ACT 18.\par \par Allergic rhinitis: Year round. Using Benadryl at night, which controls his nighttime cough and congestion. Using Claritin as needed. No nose sprays.\par \par Atopic dermatitis: Has dry skin all over his body, but doesn't have any redness or itching.  [FreeTextEntry7] : 21 done

## 2020-07-03 NOTE — PATIENT PROFILE ADULT - FLU SEASON?
Well appearing, awake, alert, oriented to person, place, time/situation and in no apparent distress. Yes... normal...

## 2020-10-06 NOTE — H&P ADULT - ASSESSMENT
Name band; 79 y/o M with PMH of A-fib, not on anticoagulation (see hospitalization history below), vascular dementia, recently diagnosed colon cancer, DM II, and HTN was sent in from [facility] for desaturation.    1.) Symptomatic Hypernatremia: likely secondary to dehydration and/or inadequate free water intake with PEG feeds    - s/p 2L of LR in the ED.    - Admit to ICU.    - Start D5W at 100cc/hr .    - BMP every 4 hours.    - Goal to correct sodium by no more than 10meq in the first 24 hours.    - Check urine sodium.    - Deluca for strict Is and Os.    2.) JOEL: Baseline creatinine 0.7-0.9. Likely prerenal etiology    - Continue IV hydration.    - Monitor BMP.    - Hold losartan.    - Consider nephrology consult if no improvement after 24 hours.    3.) Respiratory distress: Resolved. Likely secondary to aspiration and inability to control secretions. Unlikely pneumonia.    - Chest x-ray is not significantly different than 1 month ago and pulmonary consult at the time had a low suspicion for pneumonia.    - f/u RVP (given low grade fever and lack of leukocytosis).    - Patient is currently saturating well on 2L nasal canula (baseline on 2-3L NC at SNF).    - Consider starting a scopolamine patch if secretions continue to cause desaturations.    - Continue suction PRN.    - Monitor for changes.    4.) Atrial fibrillation with rapid ventricular response: Rate improved after cardizem.    - s/p cardizem IV and PO in the ED.    - Continue cardizem and metoprolol.    5.) Fever: Unclear etiology. Possibly viral respiratory infection vs. cystitis. Doubt pneumonia.    - Given positive UA and inability to obtain history of symptoms, will empirically cover for cystitis with rocephin.    - f/u RVP.    6.) Multiple pressure ulcers present on admission: sacrum and bilateral heel    - Continue local wound care with collagenase.    7.) Malnutrition: cachectic appearing with albumin of 2.5 on admission    - Consider nutrition services consult to adjust PEG feeds.    8.) DM II:    - Continue basal / bolus insulin.    - Monitor blood glucose.     9.) Hypertension:    - Continue cardizem and metoprolol.    - Hold losartan due to JOEL.    10.) Adenocarcinoma of the colon:    - Outpatient oncology follow-up for goals of care.    11.) Vascular dementia with behavioral disturbance:    - Continue olanzapine.    12.) GERD:    - Continue famotidine.    13.) DVT PPx:    - lovenox.    14.) Disposition:    - DNR / DNI 77 y/o M with PMH of A-fib, not on anticoagulation (see hospitalization history below), vascular dementia, recently diagnosed colon cancer, DM II, and HTN was sent in from [facility] for desaturation.    1.) Symptomatic Hypernatremia: likely secondary to dehydration and/or inadequate free water intake with PEG feeds    - s/p 2L of LR in the ED.    - Admit to ICU.    - Start D5W at 100cc/hr .    - BMP every 4 hours.    - Goal to correct sodium by no more than 10meq in the first 24 hours.    - Check urine sodium.    - Deluca for strict Is and Os.    2.) JOEL: Baseline creatinine 0.7-0.9. Likely prerenal etiology    - Continue IV hydration.    - Monitor BMP.    - Hold losartan.    - Consider nephrology consult if no improvement after 24 hours.    3.) Respiratory distress: Resolved. Likely secondary to aspiration and inability to control secretions. Unlikely pneumonia.    - Chest x-ray is not significantly different than 1 month ago and pulmonary consult at the time had a low suspicion for pneumonia.    - f/u RVP (given low grade fever and lack of leukocytosis).    - Patient is currently saturating well on 2L nasal canula (baseline on 2-3L NC at ).    - Consider starting a scopolamine patch if secretions continue to cause desaturations.    - Continue suction PRN.    - Monitor for changes.    4.) Atrial fibrillation with rapid ventricular response: Rate improved after cardizem in the ED.    - s/p cardizem IV and PO in the ED.    - Continue cardizem.    - Increased metoprolol to 50mg Q12h.    - Monitor heart rate and titrate up medications as needed.    5.) Fever: Unclear etiology. Possibly viral respiratory infection vs. cystitis. Doubt pneumonia.    - Given positive UA and inability to obtain history of symptoms, will empirically cover for cystitis with rocephin.    - f/u RVP.    6.) Multiple pressure ulcers present on admission: sacrum and bilateral heel    - Continue local wound care with collagenase.    7.) Malnutrition: cachectic appearing with albumin of 2.5 on admission    - Consider nutrition services consult to adjust PEG feeds.    8.) DM II:    - Continue basal / bolus insulin.    - Monitor blood glucose.     9.) Hypertension:    - Continue cardizem and metoprolol.    - Hold losartan due to JOEL.    10.) Adenocarcinoma of the colon:    - Outpatient oncology follow-up for goals of care.    11.) Vascular dementia with behavioral disturbance:    - Continue olanzapine.    12.) GERD:    - Continue famotidine.    13.) DVT PPx:    - lovenox.    14.) Disposition:    - DNR / DNI

## 2021-04-13 NOTE — GOALS OF CARE CONVERSATION - PERSONAL ADVANCE DIRECTIVE - MOLST COMPLETED
Received voicemail from patient asking to call her back at [phone]  States she wants to know if Dr Penelope Ballesteros would like her to proceed with OT  This was not ordered at the office visit  Called patient with no answer  Left generic message stating that Dr Penelope Ballesteros did not order this and to call her PCP if she has questions about starting OT  I did not leave any specific patient identifies on message  04-Jan-2019

## 2021-09-09 NOTE — ED ADULT TRIAGE NOTE - WEIGHT METHOD
"Subjective   Kristina Kang is a 58 y.o. female.     History of Present Illness    Since the last visit, she has overall felt well.  She has Essential Hypertension and well controlled on current medication, Hyperlipidemia with goals met with current Rx and Vitamin D deficiency and labs are at goal >30 ng/mL.  she has been compliant with current medications have reviewed them.  The patient denies medication side effects.  Will refill medications. /79 (BP Location: Left arm, Patient Position: Sitting, Cuff Size: Adult)   Pulse 75   Temp 97.5 °F (36.4 °C)   Resp 16   Ht 162 cm (63.78\")   Wt 63.3 kg (139 lb 9.6 oz)   LMP  (LMP Unknown)   SpO2 96%   BMI 24.13 kg/m²   Dose Zoloft 50mg great--anxiety controlled  Results for orders placed or performed in visit on 08/16/21   Comprehensive Metabolic Panel    Specimen: Blood   Result Value Ref Range    Glucose 86 65 - 99 mg/dL    BUN 12 8 - 23 mg/dL    Creatinine 0.86 0.57 - 1.00 mg/dL    eGFR Non African Am 67 >60 mL/min/1.73    eGFR African Am 82 >60 mL/min/1.73    BUN/Creatinine Ratio 14.0 7.0 - 25.0    Sodium 144 136 - 145 mmol/L    Potassium 4.1 3.5 - 5.2 mmol/L    Chloride 110 (H) 98 - 107 mmol/L    Total CO2 26.8 22.0 - 29.0 mmol/L    Calcium 10.8 (H) 8.6 - 10.5 mg/dL    Total Protein 6.9 6.0 - 8.5 g/dL    Albumin 4.50 3.50 - 5.20 g/dL    Globulin 2.4 gm/dL    A/G Ratio 1.9 g/dL    Total Bilirubin 0.3 0.0 - 1.2 mg/dL    Alkaline Phosphatase 85 39 - 117 U/L    AST (SGOT) 15 1 - 32 U/L    ALT (SGPT) 7 1 - 33 U/L   Vitamin D 25 Hydroxy    Specimen: Blood   Result Value Ref Range    25 Hydroxy, Vitamin D 47.7 30.0 - 100.0 ng/ml   Lipid Panel    Specimen: Blood   Result Value Ref Range    Total Cholesterol 137 0 - 200 mg/dL    Triglycerides 73 0 - 150 mg/dL    HDL Cholesterol 40 40 - 60 mg/dL    VLDL Cholesterol Alexis 15 5 - 40 mg/dL    LDL Chol Calc (NIH) 82 0 - 100 mg/dL   TSH    Specimen: Blood   Result Value Ref Range    TSH 0.853 0.270 - 4.200 uIU/mL "   T4, Free    Specimen: Blood   Result Value Ref Range    Free T4 0.99 0.93 - 1.70 ng/dL   PTH, Intact   Result Value Ref Range    PTH, Intact 36 15 - 65 pg/mL   Cardiovascular Risk Assessment   Result Value Ref Range    Interpretation Note          Having ?uretheral irritation for 1 week;  Not burn; not urgent or frequent--constant.   ?UTI    More active--weight down     Dr Avendaño--endocrine---monitors Ca+;  Hx hypercalcemia-----now off HCTZ and watch Ca+;  Also watch K+  DEXA FRAX score 4.2%, 0.5%---DEXA 1-1-22 due  GI Dr Malone---polyps --colon--f/u scope 1 yr  July 2016---Amaurosis Fugax---carotid doppler neg--neg work up---not reoccured.  I want her LDL <70 d/t this and on ASA 81mg  Hep C in remission---watch LFTs  The following portions of the patient's history were reviewed and updated as appropriate: allergies, current medications, past family history, past medical history, past social history, past surgical history and problem list.    Review of Systems   Constitutional: Negative for activity change, appetite change and unexpected weight change.   HENT: Negative for nosebleeds and trouble swallowing.    Eyes: Negative for pain and visual disturbance.   Respiratory: Negative for chest tightness, shortness of breath and wheezing.    Cardiovascular: Negative for chest pain and palpitations.   Gastrointestinal: Negative for abdominal pain and blood in stool.   Endocrine: Negative.    Genitourinary: Negative for difficulty urinating and hematuria.   Musculoskeletal: Negative for joint swelling.   Skin: Negative for color change and rash.   Allergic/Immunologic: Negative.    Neurological: Negative for syncope and speech difficulty.   Hematological: Negative for adenopathy.   Psychiatric/Behavioral: Negative for agitation and confusion.   All other systems reviewed and are negative.      Objective   Physical Exam  Vitals and nursing note reviewed.   Constitutional:       General: She is not in acute distress.      Appearance: Normal appearance. She is well-developed. She is not ill-appearing or toxic-appearing.   HENT:      Head: Normocephalic.      Right Ear: External ear normal.      Left Ear: External ear normal.      Nose: Nose normal.      Mouth/Throat:      Pharynx: Oropharynx is clear.   Eyes:      General: No scleral icterus.     Conjunctiva/sclera: Conjunctivae normal.      Pupils: Pupils are equal, round, and reactive to light.   Neck:      Thyroid: No thyromegaly.      Vascular: No carotid bruit.   Cardiovascular:      Rate and Rhythm: Normal rate and regular rhythm.      Heart sounds: Normal heart sounds. No murmur heard.     Pulmonary:      Effort: Pulmonary effort is normal. No respiratory distress.      Breath sounds: Normal breath sounds. No rales.   Abdominal:      Tenderness: There is no abdominal tenderness.   Musculoskeletal:         General: No deformity. Normal range of motion.      Cervical back: Normal range of motion and neck supple.      Right lower leg: No edema.      Left lower leg: No edema.   Skin:     General: Skin is warm and dry.      Findings: No rash.   Neurological:      General: No focal deficit present.      Mental Status: She is alert and oriented to person, place, and time. Mental status is at baseline.   Psychiatric:         Mood and Affect: Mood normal.         Behavior: Behavior normal.         Thought Content: Thought content normal.         Judgment: Judgment normal.         Assessment/Plan   Diagnoses and all orders for this visit:    1. Essential hypertension (Primary)    2. Mixed hyperlipidemia    3. Vaginal atrophy    4. Generalized anxiety disorder    5. Chronic seasonal allergic rhinitis    6. Low folic acid  -     CBC & Differential  -     Vitamin B12  -     Folate    Other orders  -     valsartan (DIOVAN) 160 MG tablet; Take 1 tablet by mouth Daily. for blood pressure.  Dispense: 90 tablet; Refill: 1  -     estradiol (ESTRACE VAGINAL) 0.1 MG/GM vaginal cream; Insert 0.5 g  into the vagina 2 (Two) Times a Week. At HS  Dispense: 42.5 g; Refill: 12        Will change to Estrace crm for atrophy  Yearly mammo  Plan, Kristina A Kang, was seen today.  she was seen for HTN and continue medication and Hyperlipidemia and will continue current medication.  DR Avendaño watches Ca+   Watching lymph count--and folate--CBC due Feb  Ok urine check--need cx       Answers for HPI/ROS submitted by the patient on 9/2/2021  Please describe your symptoms.: Follow-up visit.  Have you had these symptoms before?: No  How long have you been having these symptoms?: 1-4 days  What is the primary reason for your visit?: Other       stated

## 2022-04-18 NOTE — PROGRESS NOTE ADULT - ASSESSMENT
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, markedly decreased PO intake.     IMPRESSION   Failure to Thrive   Dementia   Decreased PO intake -  PEG tube Placed 11/07/18   DFU with PVD     Plan   #Acute/subacute CVA  -CT head and MRI show right medial occipital lobe subacute infarct with stable microhemorrhages  -cont therapeutic A/c     #Failure to thrive/Sev malnutrition, however patient eats small amounts throughout the day, does not meet caloric needs  - Gtube placed successfully   -start GTube  feeds  as per [facility] recommendations     #PVD/DFU  -s/p RLE angiogram 11/2  -podiatry recommending serial xrays o/p, santyl, dsd/ kerlix - Daily    # Afib  -rate controlled on meto tartrate 50 q8h, A/c  -start  Heparin tonight n bolus   and switch to Eliquis tomorrow   - F/u ptt 1130 pm   - PTT gaol 45-55    # HTN   -amlodipine 2.5 / losartan 50    Activity: ambulate as tolerated   Diet Dysphagia 2   DISPO:  Continue to medically optimize 1.76

## 2022-08-18 NOTE — ASU PREOP CHECKLIST - LATEX ALLERGY
Thank you for choosing [facility] / Foot & Ankle Surgery!    DR KAUFMAN'S CLINIC:  [facility]   [address]   [place] [address]      TRIAGE LINE: [phone]  APPOINTMENTS: [phone]  RADIOLOGY: [phone]  SET UP SURGERY: [phone]  BILLING QUESTIONS: [phone]  FAX: [phone]         Follow up: 4 weeks    PLANTAR FASCIITIS  Plantar fasciitis is often referred to as heel spurs or heel pain. Plantar fasciitis is a very common problem that affects people of all foot shapes, age, weight and activity level. Pain may be in the arch or on the weight-bearing surface of the heel. The pain may come on without injury or identifiable cause. Pain is generally present when first getting out of bed in the morning or up from a seated break.     CAUSES  The plantar fascia is a dense fibrous band of tissue that stretches across the bottom surface of the foot. The fascia helps support the foot muscles and arch. Plantar fasciitis is thought to be caused by mechanical strain or overload. Frequent walking without shoes or wearing unsupportive shoes is thought to cause structural overload and ultimately inflammation of the plantar fascia. Some people have heel spurs that can be seen on x-ray. The heel spur is actually a minor component of plantar fascitis and is largely ignored.       SELF TREATMENT   The easiest solution is to stop walking around your home without shoes. Plantar fasciitis is largely a shoe problem. Shoes are either not being worn often enough or your current shoes are inadequate for your weight, foot structure or activity level. The majority of shoes on the market today are not sufficient to resist development of plantar fasciitis or to promote healing. Assume that your current shoes are inadequate and will need to be replaced. Even high quality shoes wear out with 6 months to one year of frequent use. Weight loss is another option. Losing ten pounds in  the next two months may be enough to resolve the problem. Ice applied to the area of pain two to three times per day for ten minutes each session can be very helpful. Warm foot soaks in epsom salts can also relieve pain. This should continue until the problem resolves. Achilles tendon stretching is essential. Stretch multiple times daily to promote healing and to prevent recurrence in the future. Over all stretching of the body is helpful as well such as the calves, thighs and lower back. Normally when one area of the body is tight, other areas are too. Gentle Yoga can be good for this.     Over the counter topical anti inflammatories can be helpful such as biofreeze, bengay, salon pas, ect...  Oral ibuprofen or aleve is recommended as well to try to calm down inflammation.     Night splints can be helpful to gradually stretch the foot at night as a lot of pain is when you get up in the morning. Taking a towel or thera band and stretching the foot back multiple times before you get ou of bed can be beneficial as well.     MEDICAL TREATMENT  Medical treatments often include custom arch supports, cortisone injections, physical therapy, splints to be worn in bed, prescription medications and surgery. The home treatments listed above will be necessary regardless of these advanced medical treatments. Surgery is rarely needed but is very helpful in selected cases.     PROGNOSIS  Plantar fasciitis can last from one day to a lifetime. Some people get intermittent fascitis that is very short-lived. Others suffer daily for years. Excessive body weight, frequent bare foot walking, long hours on the feet, inadequate shoes, predisposing foot structures and excessive activity such as running are all potential issues that lead to chronic and/or recurring plantar fascitis. Having plantar fasciitis means that you are forever prone to this problem and will require modification of some of the above factors. Most people seek treatment  within one to four months. Healing usually requires a similar one to four month time frame. Healing time is relative to the amount of effort spent treating the problem.   Plantar fasciitis is highly recurrent. Risk factors often continue, including return to bare foot walking, inadequate shoes, excessive body weight, excessive activities, etc. Your life style and foot structure may predispose you to recurrent plantar fasciitis. A daily prevention regimen can be very helpful. Ongoing use of shoe inserts, careful attention to appropriate shoes, daily Achilles stretching, etc. may prevent recurrence. Prompt attention at the earliest warning signs of heel pain can resolve the problem in as short as a few days.     EXERCISES  Stair Exercise: Step on the stairs with the ball of your foot and hold your position for at least 15 seconds, then slowly step down with the heels of your foot. You can do this daily and as often as you want.   Picking the Towel: Sit comfortably and then pick the towel up with your toes. You can use any object other than a towel as long as the material can be soft and you can pick it up with your toes.  Rolling the Bottle: Use a small ball or frozen water bottle and then roll it around with your foot.   Flex the Toes: Sit comfortably and then flex your toes by pointing it towards the floor or towards your body. This will relax and flex your foot and exercise your plantar fascia, the calf, and the Achilles tendon. The inability of the foot to stretch often causes the bunching up of the plantar fascia area leading to the pain.  Calf/Achilles Stretching: Lay on you back and raise one foot, then point your toes towards the floor. See photo below:               Hold each stretch for 10 seconds. Stretch 10 times per set, three sets per day. Morning, afternoon and evening. If your heel pain is very severe in the morning, consider doing the first set of stretches before you get out of bed.      OVER THE  COUNTER INSERT RECOMMENDATIONS  SuperFeet   Sofsole Fit Spenco   Power Step   Walk-Fit Arch Cradles     Most of these can be found at your local ePrep, sporting [employer], or online.  **A good high quality over the counter insert should cost around $40-$50      [facility]  [address] [place] [address]  [phone]   [address]  [phone] [place]  [address] [place] [address]  [phone]   [address]  [phone]   [place]  [address] [facility]  [phone] [place] [address]  [phone]   [place]  [address]  [phone]   [place]  [address]  [phone] [place]  [address]  [phone]           no

## 2023-02-08 NOTE — ASU PREOP CHECKLIST - STERILIZATION AFFIRMATION
n/a Erivedge Counseling- I discussed with the patient the risks of Erivedge including but not limited to nausea, vomiting, diarrhea, constipation, weight loss, changes in the sense of taste, decreased appetite, muscle spasms, and hair loss.  The patient verbalized understanding of the proper use and possible adverse effects of Erivedge.  All of the patient's questions and concerns were addressed.

## 2023-07-08 NOTE — PRE-ANESTHESIA EVALUATION ADULT - NSATTENDATTESTRD_GEN_ALL_CORE
52
The patient has been re-examined and I agree with the above assessment or I updated with my findings.
The patient has been re-examined and I agree with the above assessment or I updated with my findings.

## 2024-11-07 NOTE — DISCHARGE NOTE ADULT - NURSING SECTION COMPLETE
Return to the ER for increased difficulty breathing or any new concerns.   Patient/Caregiver provided printed discharge information.

## 2025-05-29 NOTE — H&P ADULT - PROBLEM/PLAN-3
Halie Farmer, APRN-CNP  [facility]  [facility]  [address], [address]  [facility] [address]  Dept: [phone]  Dept Fax: [phone]      Post-Discharge Transitional Care  Follow Up      Marta Alexander   YOB: 1959    Date of Office Visit:  5/29/2025  Date of Hospital Admission: 3/13/23  Date of Hospital Discharge: 3/13/23  Risk of hospital readmission (high >=14%. Medium >=10%) :No data recorded    Care management risk score Rising risk (score 2-5) and Complex Care (Scores >=6): No Risk Score On File     Non face to face  following discharge, date last encounter closed (first attempt may have been earlier): 05/20/2025    Call initiated 2 business days of discharge: No    ASSESSMENT/PLAN:   1. Hospital discharge follow-up  2. Other closed fracture of right femur with routine healing, unspecified portion of femur, subsequent encounter   - In-patient notes, labs and diagnostics reviewed and appreciated   - Surgical incision is healing well.  - Reports more pain in the back of the knee than in the hip.  - Currently not on Lovenox due to concerns about its side effects.  - Advised to continue follow-up with Dr. Vyas as needed.    3. Type 2 diabetes mellitus without complication, without long-term current use of insulin (HCC)  - Stable: Continue to decrease, fats, carbohydrates, and engage in a healthier diet overall, as well as routine exercise (40 minutes of aerobic exercise- moderate to vigorous intensity three to four times a week).   - A1c 5.8;     Follow-up  - Follow-up appointment scheduled in 3 months.     Medical Decision Making: high complexity  No follow-ups on file.           Subjective:     History of Present Illness  The patient is a 66-year-old female, an established patient, presenting today for a hospital follow-up. She originally presented to [facility] ED and then was transferred to [facility].     She was admitted to the hospital on 
DISPLAY PLAN FREE TEXT